# Patient Record
Sex: MALE | Race: WHITE | NOT HISPANIC OR LATINO | Employment: UNEMPLOYED | URBAN - METROPOLITAN AREA
[De-identification: names, ages, dates, MRNs, and addresses within clinical notes are randomized per-mention and may not be internally consistent; named-entity substitution may affect disease eponyms.]

---

## 2019-01-01 ENCOUNTER — APPOINTMENT (OUTPATIENT)
Dept: RADIOLOGY | Facility: MEDICAL CENTER | Age: 54
DRG: 291 | End: 2019-01-01
Attending: HOSPITALIST
Payer: COMMERCIAL

## 2019-01-01 ENCOUNTER — APPOINTMENT (OUTPATIENT)
Dept: CARDIOLOGY | Facility: MEDICAL CENTER | Age: 54
DRG: 291 | End: 2019-01-01
Attending: HOSPITALIST
Payer: COMMERCIAL

## 2019-01-01 ENCOUNTER — PATIENT OUTREACH (OUTPATIENT)
Dept: HEALTH INFORMATION MANAGEMENT | Facility: OTHER | Age: 54
End: 2019-01-01

## 2019-01-01 ENCOUNTER — HOSPITAL ENCOUNTER (OUTPATIENT)
Dept: RADIOLOGY | Facility: MEDICAL CENTER | Age: 54
End: 2019-12-01

## 2019-01-01 ENCOUNTER — HOSPITAL ENCOUNTER (OUTPATIENT)
Facility: MEDICAL CENTER | Age: 54
End: 2019-11-30
Payer: OTHER MISCELLANEOUS

## 2019-01-01 ENCOUNTER — HOSPITAL ENCOUNTER (INPATIENT)
Facility: MEDICAL CENTER | Age: 54
LOS: 6 days | DRG: 291 | End: 2019-12-07
Attending: EMERGENCY MEDICINE | Admitting: HOSPITALIST
Payer: COMMERCIAL

## 2019-01-01 ENCOUNTER — APPOINTMENT (OUTPATIENT)
Dept: RADIOLOGY | Facility: MEDICAL CENTER | Age: 54
DRG: 291 | End: 2019-01-01
Attending: EMERGENCY MEDICINE
Payer: COMMERCIAL

## 2019-01-01 VITALS
RESPIRATION RATE: 16 BRPM | BODY MASS INDEX: 24.94 KG/M2 | HEART RATE: 69 BPM | WEIGHT: 155.2 LBS | TEMPERATURE: 98.3 F | HEIGHT: 66 IN | DIASTOLIC BLOOD PRESSURE: 60 MMHG | SYSTOLIC BLOOD PRESSURE: 93 MMHG | OXYGEN SATURATION: 90 %

## 2019-01-01 LAB
ALBUMIN SERPL BCP-MCNC: 3.7 G/DL (ref 3.2–4.9)
ALBUMIN SERPL BCP-MCNC: 3.9 G/DL (ref 3.2–4.9)
ALBUMIN/GLOB SERPL: 1.3 G/DL
ALBUMIN/GLOB SERPL: 1.4 G/DL
ALP SERPL-CCNC: 60 U/L (ref 30–99)
ALP SERPL-CCNC: 69 U/L (ref 30–99)
ALT SERPL-CCNC: 11 U/L (ref 2–50)
ALT SERPL-CCNC: 13 U/L (ref 2–50)
ANION GAP SERPL CALC-SCNC: 3 MMOL/L (ref 0–11.9)
ANION GAP SERPL CALC-SCNC: 4 MMOL/L (ref 0–11.9)
ANION GAP SERPL CALC-SCNC: 4 MMOL/L (ref 0–11.9)
ANION GAP SERPL CALC-SCNC: 5 MMOL/L (ref 0–11.9)
ANION GAP SERPL CALC-SCNC: 6 MMOL/L (ref 0–11.9)
ANION GAP SERPL CALC-SCNC: 8 MMOL/L (ref 0–11.9)
ANISOCYTOSIS BLD QL SMEAR: ABNORMAL
ANISOCYTOSIS BLD QL SMEAR: ABNORMAL
AST SERPL-CCNC: 10 U/L (ref 12–45)
AST SERPL-CCNC: 9 U/L (ref 12–45)
BASE EXCESS BLDA CALC-SCNC: 15 MMOL/L (ref -4–3)
BASOPHILS # BLD AUTO: 0.3 % (ref 0–1.8)
BASOPHILS # BLD AUTO: 0.4 % (ref 0–1.8)
BASOPHILS # BLD AUTO: 0.5 % (ref 0–1.8)
BASOPHILS # BLD AUTO: 0.5 % (ref 0–1.8)
BASOPHILS # BLD: 0.03 K/UL (ref 0–0.12)
BASOPHILS # BLD: 0.04 K/UL (ref 0–0.12)
BILIRUB SERPL-MCNC: 0.6 MG/DL (ref 0.1–1.5)
BILIRUB SERPL-MCNC: 0.9 MG/DL (ref 0.1–1.5)
BODY TEMPERATURE: ABNORMAL CENTIGRADE
BUN SERPL-MCNC: 12 MG/DL (ref 8–22)
BUN SERPL-MCNC: 13 MG/DL (ref 8–22)
BUN SERPL-MCNC: 14 MG/DL (ref 8–22)
BUN SERPL-MCNC: 14 MG/DL (ref 8–22)
BUN SERPL-MCNC: 15 MG/DL (ref 8–22)
BUN SERPL-MCNC: 15 MG/DL (ref 8–22)
BUN SERPL-MCNC: 17 MG/DL (ref 8–22)
BUN SERPL-MCNC: 18 MG/DL (ref 8–22)
CALCIUM SERPL-MCNC: 8.2 MG/DL (ref 8.5–10.5)
CALCIUM SERPL-MCNC: 8.4 MG/DL (ref 8.5–10.5)
CALCIUM SERPL-MCNC: 8.4 MG/DL (ref 8.5–10.5)
CALCIUM SERPL-MCNC: 8.5 MG/DL (ref 8.5–10.5)
CALCIUM SERPL-MCNC: 8.6 MG/DL (ref 8.5–10.5)
CALCIUM SERPL-MCNC: 8.6 MG/DL (ref 8.5–10.5)
CALCIUM SERPL-MCNC: 8.7 MG/DL (ref 8.5–10.5)
CALCIUM SERPL-MCNC: 8.7 MG/DL (ref 8.5–10.5)
CHLORIDE SERPL-SCNC: 101 MMOL/L (ref 96–112)
CHLORIDE SERPL-SCNC: 84 MMOL/L (ref 96–112)
CHLORIDE SERPL-SCNC: 85 MMOL/L (ref 96–112)
CHLORIDE SERPL-SCNC: 89 MMOL/L (ref 96–112)
CHLORIDE SERPL-SCNC: 92 MMOL/L (ref 96–112)
CHLORIDE SERPL-SCNC: 93 MMOL/L (ref 96–112)
CHLORIDE SERPL-SCNC: 93 MMOL/L (ref 96–112)
CHLORIDE SERPL-SCNC: 98 MMOL/L (ref 96–112)
CO2 SERPL-SCNC: 31 MMOL/L (ref 20–33)
CO2 SERPL-SCNC: 32 MMOL/L (ref 20–33)
CO2 SERPL-SCNC: 36 MMOL/L (ref 20–33)
CO2 SERPL-SCNC: 38 MMOL/L (ref 20–33)
CO2 SERPL-SCNC: 39 MMOL/L (ref 20–33)
CO2 SERPL-SCNC: 42 MMOL/L (ref 20–33)
COMMENT 1642: NORMAL
COMMENT 1642: NORMAL
CREAT SERPL-MCNC: 0.59 MG/DL (ref 0.5–1.4)
CREAT SERPL-MCNC: 0.64 MG/DL (ref 0.5–1.4)
CREAT SERPL-MCNC: 0.64 MG/DL (ref 0.5–1.4)
CREAT SERPL-MCNC: 0.68 MG/DL (ref 0.5–1.4)
CREAT SERPL-MCNC: 0.72 MG/DL (ref 0.5–1.4)
CREAT SERPL-MCNC: 0.73 MG/DL (ref 0.5–1.4)
CREAT SERPL-MCNC: 0.81 MG/DL (ref 0.5–1.4)
CREAT SERPL-MCNC: 0.84 MG/DL (ref 0.5–1.4)
EKG IMPRESSION: NORMAL
EOSINOPHIL # BLD AUTO: 0.02 K/UL (ref 0–0.51)
EOSINOPHIL # BLD AUTO: 0.03 K/UL (ref 0–0.51)
EOSINOPHIL # BLD AUTO: 0.04 K/UL (ref 0–0.51)
EOSINOPHIL # BLD AUTO: 0.06 K/UL (ref 0–0.51)
EOSINOPHIL # BLD AUTO: 0.07 K/UL (ref 0–0.51)
EOSINOPHIL # BLD AUTO: 0.08 K/UL (ref 0–0.51)
EOSINOPHIL NFR BLD: 0.2 % (ref 0–6.9)
EOSINOPHIL NFR BLD: 0.3 % (ref 0–6.9)
EOSINOPHIL NFR BLD: 0.3 % (ref 0–6.9)
EOSINOPHIL NFR BLD: 0.8 % (ref 0–6.9)
EOSINOPHIL NFR BLD: 0.8 % (ref 0–6.9)
EOSINOPHIL NFR BLD: 1 % (ref 0–6.9)
ERYTHROCYTE [DISTWIDTH] IN BLOOD BY AUTOMATED COUNT: 52.4 FL (ref 35.9–50)
ERYTHROCYTE [DISTWIDTH] IN BLOOD BY AUTOMATED COUNT: 53 FL (ref 35.9–50)
ERYTHROCYTE [DISTWIDTH] IN BLOOD BY AUTOMATED COUNT: 53.8 FL (ref 35.9–50)
ERYTHROCYTE [DISTWIDTH] IN BLOOD BY AUTOMATED COUNT: 54.1 FL (ref 35.9–50)
ERYTHROCYTE [DISTWIDTH] IN BLOOD BY AUTOMATED COUNT: 55 FL (ref 35.9–50)
ERYTHROCYTE [DISTWIDTH] IN BLOOD BY AUTOMATED COUNT: 55.1 FL (ref 35.9–50)
ERYTHROCYTE [DISTWIDTH] IN BLOOD BY AUTOMATED COUNT: 55.8 FL (ref 35.9–50)
FERRITIN SERPL-MCNC: 57.4 NG/ML (ref 22–322)
GLOBULIN SER CALC-MCNC: 2.7 G/DL (ref 1.9–3.5)
GLOBULIN SER CALC-MCNC: 2.8 G/DL (ref 1.9–3.5)
GLUCOSE SERPL-MCNC: 101 MG/DL (ref 65–99)
GLUCOSE SERPL-MCNC: 107 MG/DL (ref 65–99)
GLUCOSE SERPL-MCNC: 110 MG/DL (ref 65–99)
GLUCOSE SERPL-MCNC: 124 MG/DL (ref 65–99)
GLUCOSE SERPL-MCNC: 90 MG/DL (ref 65–99)
GLUCOSE SERPL-MCNC: 96 MG/DL (ref 65–99)
GLUCOSE SERPL-MCNC: 97 MG/DL (ref 65–99)
GLUCOSE SERPL-MCNC: 98 MG/DL (ref 65–99)
HCO3 BLDA-SCNC: 41 MMOL/L (ref 17–25)
HCT VFR BLD AUTO: 31.2 % (ref 42–52)
HCT VFR BLD AUTO: 31.3 % (ref 42–52)
HCT VFR BLD AUTO: 31.5 % (ref 42–52)
HCT VFR BLD AUTO: 32.1 % (ref 42–52)
HCT VFR BLD AUTO: 32.5 % (ref 42–52)
HCT VFR BLD AUTO: 32.6 % (ref 42–52)
HCT VFR BLD AUTO: 34.4 % (ref 42–52)
HGB BLD-MCNC: 8.6 G/DL (ref 14–18)
HGB BLD-MCNC: 8.7 G/DL (ref 14–18)
HGB BLD-MCNC: 8.9 G/DL (ref 14–18)
HGB BLD-MCNC: 9.4 G/DL (ref 14–18)
HGB BLD-MCNC: 9.6 G/DL (ref 14–18)
HGB RETIC QN AUTO: 21.4 PG/CELL (ref 29–35)
HYPOCHROMIA BLD QL SMEAR: ABNORMAL
IMM GRANULOCYTES # BLD AUTO: 0.02 K/UL (ref 0–0.11)
IMM GRANULOCYTES # BLD AUTO: 0.02 K/UL (ref 0–0.11)
IMM GRANULOCYTES # BLD AUTO: 0.03 K/UL (ref 0–0.11)
IMM GRANULOCYTES # BLD AUTO: 0.04 K/UL (ref 0–0.11)
IMM GRANULOCYTES # BLD AUTO: 0.04 K/UL (ref 0–0.11)
IMM GRANULOCYTES # BLD AUTO: 0.05 K/UL (ref 0–0.11)
IMM GRANULOCYTES NFR BLD AUTO: 0.2 % (ref 0–0.9)
IMM GRANULOCYTES NFR BLD AUTO: 0.3 % (ref 0–0.9)
IMM GRANULOCYTES NFR BLD AUTO: 0.3 % (ref 0–0.9)
IMM GRANULOCYTES NFR BLD AUTO: 0.4 % (ref 0–0.9)
IMM GRANULOCYTES NFR BLD AUTO: 0.4 % (ref 0–0.9)
IMM GRANULOCYTES NFR BLD AUTO: 0.5 % (ref 0–0.9)
IMM RETICS NFR: 39.9 % (ref 9.3–17.4)
IRON SATN MFR SERPL: 4 % (ref 15–55)
IRON SERPL-MCNC: 17 UG/DL (ref 50–180)
LV EJECT FRACT  99904: 60
LV EJECT FRACT MOD 2C 99903: 56.82
LV EJECT FRACT MOD 4C 99902: 62.07
LV EJECT FRACT MOD BP 99901: 58.47
LYMPHOCYTES # BLD AUTO: 0.45 K/UL (ref 1–4.8)
LYMPHOCYTES # BLD AUTO: 0.63 K/UL (ref 1–4.8)
LYMPHOCYTES # BLD AUTO: 0.72 K/UL (ref 1–4.8)
LYMPHOCYTES # BLD AUTO: 0.73 K/UL (ref 1–4.8)
LYMPHOCYTES # BLD AUTO: 0.79 K/UL (ref 1–4.8)
LYMPHOCYTES # BLD AUTO: 0.9 K/UL (ref 1–4.8)
LYMPHOCYTES NFR BLD: 4.9 % (ref 22–41)
LYMPHOCYTES NFR BLD: 5.8 % (ref 22–41)
LYMPHOCYTES NFR BLD: 7.8 % (ref 22–41)
LYMPHOCYTES NFR BLD: 8.9 % (ref 22–41)
LYMPHOCYTES NFR BLD: 9.4 % (ref 22–41)
LYMPHOCYTES NFR BLD: 9.6 % (ref 22–41)
MAGNESIUM SERPL-MCNC: 1.9 MG/DL (ref 1.5–2.5)
MCH RBC QN AUTO: 21.3 PG (ref 27–33)
MCH RBC QN AUTO: 21.4 PG (ref 27–33)
MCH RBC QN AUTO: 21.4 PG (ref 27–33)
MCH RBC QN AUTO: 21.7 PG (ref 27–33)
MCH RBC QN AUTO: 21.8 PG (ref 27–33)
MCH RBC QN AUTO: 21.9 PG (ref 27–33)
MCH RBC QN AUTO: 22 PG (ref 27–33)
MCHC RBC AUTO-ENTMCNC: 27.3 G/DL (ref 33.7–35.3)
MCHC RBC AUTO-ENTMCNC: 27.3 G/DL (ref 33.7–35.3)
MCHC RBC AUTO-ENTMCNC: 27.7 G/DL (ref 33.7–35.3)
MCHC RBC AUTO-ENTMCNC: 27.8 G/DL (ref 33.7–35.3)
MCHC RBC AUTO-ENTMCNC: 27.9 G/DL (ref 33.7–35.3)
MCHC RBC AUTO-ENTMCNC: 28.5 G/DL (ref 33.7–35.3)
MCHC RBC AUTO-ENTMCNC: 28.9 G/DL (ref 33.7–35.3)
MCV RBC AUTO: 75.8 FL (ref 81.4–97.8)
MCV RBC AUTO: 76.3 FL (ref 81.4–97.8)
MCV RBC AUTO: 77.1 FL (ref 81.4–97.8)
MCV RBC AUTO: 77.7 FL (ref 81.4–97.8)
MCV RBC AUTO: 78.2 FL (ref 81.4–97.8)
MCV RBC AUTO: 78.6 FL (ref 81.4–97.8)
MCV RBC AUTO: 79.5 FL (ref 81.4–97.8)
MICROCYTES BLD QL SMEAR: ABNORMAL
MICROCYTES BLD QL SMEAR: ABNORMAL
MONOCYTES # BLD AUTO: 0.88 K/UL (ref 0–0.85)
MONOCYTES # BLD AUTO: 1 K/UL (ref 0–0.85)
MONOCYTES # BLD AUTO: 1.02 K/UL (ref 0–0.85)
MONOCYTES # BLD AUTO: 1.25 K/UL (ref 0–0.85)
MONOCYTES # BLD AUTO: 1.36 K/UL (ref 0–0.85)
MONOCYTES # BLD AUTO: 1.45 K/UL (ref 0–0.85)
MONOCYTES NFR BLD AUTO: 10.8 % (ref 0–13.4)
MONOCYTES NFR BLD AUTO: 12 % (ref 0–13.4)
MONOCYTES NFR BLD AUTO: 12.5 % (ref 0–13.4)
MONOCYTES NFR BLD AUTO: 12.6 % (ref 0–13.4)
MONOCYTES NFR BLD AUTO: 13.5 % (ref 0–13.4)
MONOCYTES NFR BLD AUTO: 13.7 % (ref 0–13.4)
MORPHOLOGY BLD-IMP: NORMAL
NEUTROPHILS # BLD AUTO: 5.62 K/UL (ref 1.82–7.42)
NEUTROPHILS # BLD AUTO: 6.42 K/UL (ref 1.82–7.42)
NEUTROPHILS # BLD AUTO: 6.44 K/UL (ref 1.82–7.42)
NEUTROPHILS # BLD AUTO: 7.48 K/UL (ref 1.82–7.42)
NEUTROPHILS # BLD AUTO: 8.79 K/UL (ref 1.82–7.42)
NEUTROPHILS # BLD AUTO: 9.06 K/UL (ref 1.82–7.42)
NEUTROPHILS NFR BLD: 75.2 % (ref 44–72)
NEUTROPHILS NFR BLD: 77.1 % (ref 44–72)
NEUTROPHILS NFR BLD: 78.4 % (ref 44–72)
NEUTROPHILS NFR BLD: 78.7 % (ref 44–72)
NEUTROPHILS NFR BLD: 80.6 % (ref 44–72)
NEUTROPHILS NFR BLD: 80.7 % (ref 44–72)
NRBC # BLD AUTO: 0 K/UL
NRBC BLD-RTO: 0 /100 WBC
NT-PROBNP SERPL IA-MCNC: 1322 PG/ML (ref 0–125)
NT-PROBNP SERPL IA-MCNC: 2392 PG/ML (ref 0–125)
NT-PROBNP SERPL IA-MCNC: 2651 PG/ML (ref 0–125)
OVALOCYTES BLD QL SMEAR: NORMAL
PCO2 BLDA: 59.4 MMHG (ref 26–37)
PH BLDA: 7.46 [PH] (ref 7.4–7.5)
PHOSPHATE SERPL-MCNC: 3.1 MG/DL (ref 2.5–4.5)
PLATELET # BLD AUTO: 304 K/UL (ref 164–446)
PLATELET # BLD AUTO: 307 K/UL (ref 164–446)
PLATELET # BLD AUTO: 329 K/UL (ref 164–446)
PLATELET # BLD AUTO: 330 K/UL (ref 164–446)
PLATELET # BLD AUTO: 339 K/UL (ref 164–446)
PLATELET # BLD AUTO: 361 K/UL (ref 164–446)
PLATELET # BLD AUTO: 374 K/UL (ref 164–446)
PLATELET BLD QL SMEAR: NORMAL
PMV BLD AUTO: 10.2 FL (ref 9–12.9)
PMV BLD AUTO: 9.6 FL (ref 9–12.9)
PMV BLD AUTO: 9.6 FL (ref 9–12.9)
PMV BLD AUTO: 9.8 FL (ref 9–12.9)
PMV BLD AUTO: 9.9 FL (ref 9–12.9)
PO2 BLDA: 63.6 MMHG (ref 64–87)
POIKILOCYTOSIS BLD QL SMEAR: NORMAL
POLYCHROMASIA BLD QL SMEAR: NORMAL
POTASSIUM SERPL-SCNC: 3.3 MMOL/L (ref 3.6–5.5)
POTASSIUM SERPL-SCNC: 3.4 MMOL/L (ref 3.6–5.5)
POTASSIUM SERPL-SCNC: 3.5 MMOL/L (ref 3.6–5.5)
POTASSIUM SERPL-SCNC: 3.7 MMOL/L (ref 3.6–5.5)
POTASSIUM SERPL-SCNC: 3.8 MMOL/L (ref 3.6–5.5)
POTASSIUM SERPL-SCNC: 3.9 MMOL/L (ref 3.6–5.5)
POTASSIUM SERPL-SCNC: 4 MMOL/L (ref 3.6–5.5)
POTASSIUM SERPL-SCNC: 4.1 MMOL/L (ref 3.6–5.5)
PROT SERPL-MCNC: 6.5 G/DL (ref 6–8.2)
PROT SERPL-MCNC: 6.6 G/DL (ref 6–8.2)
RBC # BLD AUTO: 4.01 M/UL (ref 4.7–6.1)
RBC # BLD AUTO: 4.04 M/UL (ref 4.7–6.1)
RBC # BLD AUTO: 4.06 M/UL (ref 4.7–6.1)
RBC # BLD AUTO: 4.09 M/UL (ref 4.7–6.1)
RBC # BLD AUTO: 4.17 M/UL (ref 4.7–6.1)
RBC # BLD AUTO: 4.29 M/UL (ref 4.7–6.1)
RBC # BLD AUTO: 4.43 M/UL (ref 4.7–6.1)
RBC BLD AUTO: PRESENT
RBC BLD AUTO: PRESENT
RETICS # AUTO: 0.06 M/UL (ref 0.04–0.06)
RETICS/RBC NFR: 1.5 % (ref 0.8–2.1)
SAO2 % BLDA: 88.2 % (ref 93–99)
SODIUM SERPL-SCNC: 127 MMOL/L (ref 135–145)
SODIUM SERPL-SCNC: 129 MMOL/L (ref 135–145)
SODIUM SERPL-SCNC: 129 MMOL/L (ref 135–145)
SODIUM SERPL-SCNC: 130 MMOL/L (ref 135–145)
SODIUM SERPL-SCNC: 131 MMOL/L (ref 135–145)
SODIUM SERPL-SCNC: 136 MMOL/L (ref 135–145)
SODIUM SERPL-SCNC: 137 MMOL/L (ref 135–145)
SODIUM SERPL-SCNC: 138 MMOL/L (ref 135–145)
TIBC SERPL-MCNC: 433 UG/DL (ref 250–450)
TROPONIN T SERPL-MCNC: 14 NG/L (ref 6–19)
WBC # BLD AUTO: 10.9 K/UL (ref 4.8–10.8)
WBC # BLD AUTO: 11.5 K/UL (ref 4.8–10.8)
WBC # BLD AUTO: 13.6 K/UL (ref 4.8–10.8)
WBC # BLD AUTO: 7.5 K/UL (ref 4.8–10.8)
WBC # BLD AUTO: 8.2 K/UL (ref 4.8–10.8)
WBC # BLD AUTO: 8.4 K/UL (ref 4.8–10.8)
WBC # BLD AUTO: 9.3 K/UL (ref 4.8–10.8)

## 2019-01-01 PROCEDURE — 700102 HCHG RX REV CODE 250 W/ 637 OVERRIDE(OP): Performed by: HOSPITALIST

## 2019-01-01 PROCEDURE — 36415 COLL VENOUS BLD VENIPUNCTURE: CPT

## 2019-01-01 PROCEDURE — 770020 HCHG ROOM/CARE - TELE (206)

## 2019-01-01 PROCEDURE — 82728 ASSAY OF FERRITIN: CPT

## 2019-01-01 PROCEDURE — 82803 BLOOD GASES ANY COMBINATION: CPT

## 2019-01-01 PROCEDURE — 3E02340 INTRODUCTION OF INFLUENZA VACCINE INTO MUSCLE, PERCUTANEOUS APPROACH: ICD-10-PCS | Performed by: HOSPITALIST

## 2019-01-01 PROCEDURE — A9270 NON-COVERED ITEM OR SERVICE: HCPCS | Performed by: HOSPITALIST

## 2019-01-01 PROCEDURE — 93005 ELECTROCARDIOGRAM TRACING: CPT | Performed by: EMERGENCY MEDICINE

## 2019-01-01 PROCEDURE — 85025 COMPLETE CBC W/AUTO DIFF WBC: CPT

## 2019-01-01 PROCEDURE — 80048 BASIC METABOLIC PNL TOTAL CA: CPT

## 2019-01-01 PROCEDURE — 700111 HCHG RX REV CODE 636 W/ 250 OVERRIDE (IP): Performed by: HOSPITALIST

## 2019-01-01 PROCEDURE — 84484 ASSAY OF TROPONIN QUANT: CPT

## 2019-01-01 PROCEDURE — 700105 HCHG RX REV CODE 258: Performed by: HOSPITALIST

## 2019-01-01 PROCEDURE — 80053 COMPREHEN METABOLIC PANEL: CPT

## 2019-01-01 PROCEDURE — 93306 TTE W/DOPPLER COMPLETE: CPT

## 2019-01-01 PROCEDURE — 71045 X-RAY EXAM CHEST 1 VIEW: CPT

## 2019-01-01 PROCEDURE — 700102 HCHG RX REV CODE 250 W/ 637 OVERRIDE(OP): Performed by: FAMILY MEDICINE

## 2019-01-01 PROCEDURE — 90686 IIV4 VACC NO PRSV 0.5 ML IM: CPT | Performed by: HOSPITALIST

## 2019-01-01 PROCEDURE — 84100 ASSAY OF PHOSPHORUS: CPT

## 2019-01-01 PROCEDURE — 83880 ASSAY OF NATRIURETIC PEPTIDE: CPT

## 2019-01-01 PROCEDURE — 99233 SBSQ HOSP IP/OBS HIGH 50: CPT | Performed by: HOSPITALIST

## 2019-01-01 PROCEDURE — 99233 SBSQ HOSP IP/OBS HIGH 50: CPT | Performed by: FAMILY MEDICINE

## 2019-01-01 PROCEDURE — 83540 ASSAY OF IRON: CPT

## 2019-01-01 PROCEDURE — 99223 1ST HOSP IP/OBS HIGH 75: CPT | Performed by: HOSPITALIST

## 2019-01-01 PROCEDURE — 99232 SBSQ HOSP IP/OBS MODERATE 35: CPT | Performed by: HOSPITALIST

## 2019-01-01 PROCEDURE — 93306 TTE W/DOPPLER COMPLETE: CPT | Mod: 26 | Performed by: INTERNAL MEDICINE

## 2019-01-01 PROCEDURE — 304561 HCHG STAT O2

## 2019-01-01 PROCEDURE — 97165 OT EVAL LOW COMPLEX 30 MIN: CPT

## 2019-01-01 PROCEDURE — 97161 PT EVAL LOW COMPLEX 20 MIN: CPT

## 2019-01-01 PROCEDURE — 85046 RETICYTE/HGB CONCENTRATE: CPT

## 2019-01-01 PROCEDURE — 99285 EMERGENCY DEPT VISIT HI MDM: CPT

## 2019-01-01 PROCEDURE — A9270 NON-COVERED ITEM OR SERVICE: HCPCS | Performed by: FAMILY MEDICINE

## 2019-01-01 PROCEDURE — 85027 COMPLETE CBC AUTOMATED: CPT

## 2019-01-01 PROCEDURE — 99239 HOSP IP/OBS DSCHRG MGMT >30: CPT | Performed by: HOSPITALIST

## 2019-01-01 PROCEDURE — 83735 ASSAY OF MAGNESIUM: CPT

## 2019-01-01 PROCEDURE — 90471 IMMUNIZATION ADMIN: CPT

## 2019-01-01 PROCEDURE — 83550 IRON BINDING TEST: CPT

## 2019-01-01 RX ORDER — ACETAZOLAMIDE 125 MG/1
125 TABLET ORAL
Status: DISPENSED | OUTPATIENT
Start: 2019-01-01 | End: 2019-01-01

## 2019-01-01 RX ORDER — BISACODYL 10 MG
10 SUPPOSITORY, RECTAL RECTAL
Status: DISCONTINUED | OUTPATIENT
Start: 2019-01-01 | End: 2019-01-01 | Stop reason: HOSPADM

## 2019-01-01 RX ORDER — METOLAZONE 5 MG/1
2.5 TABLET ORAL
Status: DISCONTINUED | OUTPATIENT
Start: 2019-01-01 | End: 2019-01-01

## 2019-01-01 RX ORDER — DULOXETIN HYDROCHLORIDE 60 MG/1
60 CAPSULE, DELAYED RELEASE ORAL
Status: DISCONTINUED | OUTPATIENT
Start: 2019-01-01 | End: 2019-01-01 | Stop reason: HOSPADM

## 2019-01-01 RX ORDER — POLYETHYLENE GLYCOL 3350 17 G/17G
1 POWDER, FOR SOLUTION ORAL
Status: DISCONTINUED | OUTPATIENT
Start: 2019-01-01 | End: 2019-01-01 | Stop reason: HOSPADM

## 2019-01-01 RX ORDER — POTASSIUM CHLORIDE 20 MEQ/1
20 TABLET, EXTENDED RELEASE ORAL DAILY
Status: DISCONTINUED | OUTPATIENT
Start: 2019-01-01 | End: 2019-01-01

## 2019-01-01 RX ORDER — POTASSIUM CHLORIDE 1.5 G/1.58G
20 POWDER, FOR SOLUTION ORAL DAILY
Start: 2019-01-01

## 2019-01-01 RX ORDER — PROMETHAZINE HYDROCHLORIDE 25 MG/1
12.5-25 SUPPOSITORY RECTAL EVERY 4 HOURS PRN
Status: DISCONTINUED | OUTPATIENT
Start: 2019-01-01 | End: 2019-01-01 | Stop reason: HOSPADM

## 2019-01-01 RX ORDER — ONDANSETRON 4 MG/1
4 TABLET, ORALLY DISINTEGRATING ORAL EVERY 4 HOURS PRN
Status: DISCONTINUED | OUTPATIENT
Start: 2019-01-01 | End: 2019-01-01 | Stop reason: HOSPADM

## 2019-01-01 RX ORDER — ONDANSETRON 2 MG/ML
4 INJECTION INTRAMUSCULAR; INTRAVENOUS EVERY 4 HOURS PRN
Status: DISCONTINUED | OUTPATIENT
Start: 2019-01-01 | End: 2019-01-01 | Stop reason: HOSPADM

## 2019-01-01 RX ORDER — OXYCODONE HYDROCHLORIDE 5 MG/1
5 TABLET ORAL
Status: DISCONTINUED | OUTPATIENT
Start: 2019-01-01 | End: 2019-01-01 | Stop reason: HOSPADM

## 2019-01-01 RX ORDER — AMIODARONE HYDROCHLORIDE 200 MG/1
200 TABLET ORAL DAILY
Status: DISCONTINUED | OUTPATIENT
Start: 2019-01-01 | End: 2019-01-01 | Stop reason: HOSPADM

## 2019-01-01 RX ORDER — POTASSIUM CHLORIDE 1.5 G/1.58G
20 POWDER, FOR SOLUTION ORAL 3 TIMES DAILY
Status: ON HOLD | COMMUNITY
End: 2019-01-01 | Stop reason: SDUPTHER

## 2019-01-01 RX ORDER — CALCITRIOL 0.5 UG/1
0.5 CAPSULE, LIQUID FILLED ORAL 2 TIMES DAILY
COMMUNITY

## 2019-01-01 RX ORDER — TAMSULOSIN HYDROCHLORIDE 0.4 MG/1
0.4 CAPSULE ORAL
COMMUNITY

## 2019-01-01 RX ORDER — TOPIRAMATE 25 MG/1
75 TABLET ORAL DAILY
COMMUNITY

## 2019-01-01 RX ORDER — AMOXICILLIN 250 MG
2 CAPSULE ORAL 2 TIMES DAILY
Status: DISCONTINUED | OUTPATIENT
Start: 2019-01-01 | End: 2019-01-01 | Stop reason: HOSPADM

## 2019-01-01 RX ORDER — PROMETHAZINE HYDROCHLORIDE 25 MG/1
12.5-25 TABLET ORAL EVERY 4 HOURS PRN
Status: DISCONTINUED | OUTPATIENT
Start: 2019-01-01 | End: 2019-01-01 | Stop reason: HOSPADM

## 2019-01-01 RX ORDER — ASCORBIC ACID 500 MG
500 TABLET ORAL 2 TIMES DAILY
COMMUNITY

## 2019-01-01 RX ORDER — FUROSEMIDE 10 MG/ML
40 INJECTION INTRAMUSCULAR; INTRAVENOUS
Status: DISCONTINUED | OUTPATIENT
Start: 2019-01-01 | End: 2019-01-01

## 2019-01-01 RX ORDER — MORPHINE SULFATE 4 MG/ML
2 INJECTION, SOLUTION INTRAMUSCULAR; INTRAVENOUS
Status: DISCONTINUED | OUTPATIENT
Start: 2019-01-01 | End: 2019-01-01 | Stop reason: HOSPADM

## 2019-01-01 RX ORDER — FUROSEMIDE 10 MG/ML
20 INJECTION INTRAMUSCULAR; INTRAVENOUS
Status: DISCONTINUED | OUTPATIENT
Start: 2019-01-01 | End: 2019-01-01

## 2019-01-01 RX ORDER — CALCITRIOL 0.25 UG/1
0.5 CAPSULE, LIQUID FILLED ORAL 2 TIMES DAILY
Status: DISCONTINUED | OUTPATIENT
Start: 2019-01-01 | End: 2019-01-01 | Stop reason: HOSPADM

## 2019-01-01 RX ORDER — CLONIDINE HYDROCHLORIDE 0.1 MG/1
0.1 TABLET ORAL EVERY 6 HOURS PRN
Status: DISCONTINUED | OUTPATIENT
Start: 2019-01-01 | End: 2019-01-01 | Stop reason: HOSPADM

## 2019-01-01 RX ORDER — FOLIC ACID 1 MG/1
1 TABLET ORAL DAILY
COMMUNITY

## 2019-01-01 RX ORDER — AMIODARONE HYDROCHLORIDE 200 MG/1
200 TABLET ORAL DAILY
COMMUNITY

## 2019-01-01 RX ORDER — FUROSEMIDE 40 MG/1
20 TABLET ORAL DAILY
Start: 2019-01-01

## 2019-01-01 RX ORDER — FERROUS SULFATE 325(65) MG
325 TABLET ORAL DAILY
Status: DISCONTINUED | OUTPATIENT
Start: 2019-01-01 | End: 2019-01-01 | Stop reason: HOSPADM

## 2019-01-01 RX ORDER — POTASSIUM CHLORIDE 20 MEQ/1
20 TABLET, EXTENDED RELEASE ORAL ONCE
Status: COMPLETED | OUTPATIENT
Start: 2019-01-01 | End: 2019-01-01

## 2019-01-01 RX ORDER — TAMSULOSIN HYDROCHLORIDE 0.4 MG/1
0.4 CAPSULE ORAL
Status: DISCONTINUED | OUTPATIENT
Start: 2019-01-01 | End: 2019-01-01 | Stop reason: HOSPADM

## 2019-01-01 RX ORDER — FUROSEMIDE 40 MG/1
40 TABLET ORAL DAILY
Status: ON HOLD | COMMUNITY
End: 2019-01-01 | Stop reason: SDUPTHER

## 2019-01-01 RX ORDER — FOLIC ACID 1 MG/1
1 TABLET ORAL DAILY
Status: DISCONTINUED | OUTPATIENT
Start: 2019-01-01 | End: 2019-01-01 | Stop reason: HOSPADM

## 2019-01-01 RX ORDER — ACETAMINOPHEN 325 MG/1
650 TABLET ORAL EVERY 6 HOURS PRN
Status: DISCONTINUED | OUTPATIENT
Start: 2019-01-01 | End: 2019-01-01 | Stop reason: HOSPADM

## 2019-01-01 RX ORDER — ACETAZOLAMIDE 125 MG/1
125 TABLET ORAL DAILY
Status: DISCONTINUED | OUTPATIENT
Start: 2019-01-01 | End: 2019-01-01 | Stop reason: HOSPADM

## 2019-01-01 RX ORDER — DULOXETIN HYDROCHLORIDE 60 MG/1
60 CAPSULE, DELAYED RELEASE ORAL
COMMUNITY

## 2019-01-01 RX ORDER — FERROUS SULFATE 325(65) MG
325 TABLET ORAL DAILY
COMMUNITY

## 2019-01-01 RX ORDER — PROCHLORPERAZINE EDISYLATE 5 MG/ML
5-10 INJECTION INTRAMUSCULAR; INTRAVENOUS EVERY 4 HOURS PRN
Status: DISCONTINUED | OUTPATIENT
Start: 2019-01-01 | End: 2019-01-01 | Stop reason: HOSPADM

## 2019-01-01 RX ORDER — OXYCODONE HYDROCHLORIDE 5 MG/1
2.5 TABLET ORAL
Status: DISCONTINUED | OUTPATIENT
Start: 2019-01-01 | End: 2019-01-01 | Stop reason: HOSPADM

## 2019-01-01 RX ORDER — OMEPRAZOLE 20 MG/1
20 CAPSULE, DELAYED RELEASE ORAL DAILY
Status: DISCONTINUED | OUTPATIENT
Start: 2019-01-01 | End: 2019-01-01 | Stop reason: HOSPADM

## 2019-01-01 RX ORDER — TOPIRAMATE 25 MG/1
75 TABLET ORAL DAILY
Status: DISCONTINUED | OUTPATIENT
Start: 2019-01-01 | End: 2019-01-01 | Stop reason: HOSPADM

## 2019-01-01 RX ORDER — POTASSIUM CHLORIDE 20 MEQ/1
40 TABLET, EXTENDED RELEASE ORAL DAILY
Status: DISCONTINUED | OUTPATIENT
Start: 2019-01-01 | End: 2019-01-01 | Stop reason: HOSPADM

## 2019-01-01 RX ADMIN — CALCITRIOL CAPSULES 0.25 MCG 0.5 MCG: 0.25 CAPSULE ORAL at 05:26

## 2019-01-01 RX ADMIN — POTASSIUM CHLORIDE 20 MEQ: 1500 TABLET, EXTENDED RELEASE ORAL at 17:59

## 2019-01-01 RX ADMIN — CALCITRIOL CAPSULES 0.25 MCG 0.5 MCG: 0.25 CAPSULE ORAL at 17:27

## 2019-01-01 RX ADMIN — FERROUS SULFATE TAB 325 MG (65 MG ELEMENTAL FE) 325 MG: 325 (65 FE) TAB at 05:00

## 2019-01-01 RX ADMIN — FUROSEMIDE 40 MG: 10 INJECTION, SOLUTION INTRAMUSCULAR; INTRAVENOUS at 05:23

## 2019-01-01 RX ADMIN — CALCITRIOL CAPSULES 0.25 MCG 0.5 MCG: 0.25 CAPSULE ORAL at 05:23

## 2019-01-01 RX ADMIN — METOPROLOL TARTRATE 25 MG: 25 TABLET, FILM COATED ORAL at 17:34

## 2019-01-01 RX ADMIN — RIVAROXABAN 20 MG: 20 TABLET, FILM COATED ORAL at 17:27

## 2019-01-01 RX ADMIN — DULOXETINE HYDROCHLORIDE 60 MG: 60 CAPSULE, DELAYED RELEASE ORAL at 21:06

## 2019-01-01 RX ADMIN — AMIODARONE HYDROCHLORIDE 200 MG: 200 TABLET ORAL at 05:46

## 2019-01-01 RX ADMIN — TOPIRAMATE 75 MG: 25 TABLET, FILM COATED ORAL at 06:07

## 2019-01-01 RX ADMIN — TAMSULOSIN HYDROCHLORIDE 0.4 MG: 0.4 CAPSULE ORAL at 21:05

## 2019-01-01 RX ADMIN — METOLAZONE 2.5 MG: 5 TABLET ORAL at 12:35

## 2019-01-01 RX ADMIN — TOPIRAMATE 75 MG: 25 TABLET, FILM COATED ORAL at 04:59

## 2019-01-01 RX ADMIN — METOPROLOL TARTRATE 25 MG: 25 TABLET, FILM COATED ORAL at 05:26

## 2019-01-01 RX ADMIN — SODIUM CHLORIDE 125 MG: 9 INJECTION, SOLUTION INTRAVENOUS at 06:38

## 2019-01-01 RX ADMIN — METOPROLOL TARTRATE 25 MG: 25 TABLET, FILM COATED ORAL at 17:11

## 2019-01-01 RX ADMIN — RIVAROXABAN 20 MG: 20 TABLET, FILM COATED ORAL at 17:59

## 2019-01-01 RX ADMIN — OMEPRAZOLE 20 MG: 20 CAPSULE, DELAYED RELEASE ORAL at 05:46

## 2019-01-01 RX ADMIN — AMIODARONE HYDROCHLORIDE 200 MG: 200 TABLET ORAL at 05:23

## 2019-01-01 RX ADMIN — CALCITRIOL CAPSULES 0.25 MCG 0.5 MCG: 0.25 CAPSULE ORAL at 05:00

## 2019-01-01 RX ADMIN — FERROUS SULFATE TAB 325 MG (65 MG ELEMENTAL FE) 325 MG: 325 (65 FE) TAB at 06:02

## 2019-01-01 RX ADMIN — ACETAMINOPHEN 650 MG: 325 TABLET, FILM COATED ORAL at 20:53

## 2019-01-01 RX ADMIN — SODIUM CHLORIDE 250 MG: 9 INJECTION, SOLUTION INTRAVENOUS at 06:00

## 2019-01-01 RX ADMIN — FERROUS SULFATE TAB 325 MG (65 MG ELEMENTAL FE) 325 MG: 325 (65 FE) TAB at 04:59

## 2019-01-01 RX ADMIN — AMIODARONE HYDROCHLORIDE 200 MG: 200 TABLET ORAL at 05:26

## 2019-01-01 RX ADMIN — DULOXETINE HYDROCHLORIDE 60 MG: 60 CAPSULE, DELAYED RELEASE ORAL at 19:25

## 2019-01-01 RX ADMIN — METOPROLOL TARTRATE 25 MG: 25 TABLET, FILM COATED ORAL at 17:04

## 2019-01-01 RX ADMIN — OMEPRAZOLE 20 MG: 20 CAPSULE, DELAYED RELEASE ORAL at 05:26

## 2019-01-01 RX ADMIN — METOPROLOL TARTRATE 25 MG: 25 TABLET, FILM COATED ORAL at 06:01

## 2019-01-01 RX ADMIN — METOPROLOL TARTRATE 25 MG: 25 TABLET, FILM COATED ORAL at 06:07

## 2019-01-01 RX ADMIN — METOLAZONE 2.5 MG: 5 TABLET ORAL at 06:06

## 2019-01-01 RX ADMIN — RIVAROXABAN 20 MG: 20 TABLET, FILM COATED ORAL at 17:04

## 2019-01-01 RX ADMIN — OMEPRAZOLE 20 MG: 20 CAPSULE, DELAYED RELEASE ORAL at 06:05

## 2019-01-01 RX ADMIN — ACETAMINOPHEN 650 MG: 325 TABLET, FILM COATED ORAL at 21:56

## 2019-01-01 RX ADMIN — METOPROLOL TARTRATE 25 MG: 25 TABLET, FILM COATED ORAL at 17:26

## 2019-01-01 RX ADMIN — POTASSIUM CHLORIDE 40 MEQ: 1500 TABLET, EXTENDED RELEASE ORAL at 05:46

## 2019-01-01 RX ADMIN — AMIODARONE HYDROCHLORIDE 200 MG: 200 TABLET ORAL at 04:59

## 2019-01-01 RX ADMIN — CALCITRIOL CAPSULES 0.25 MCG 0.5 MCG: 0.25 CAPSULE ORAL at 04:59

## 2019-01-01 RX ADMIN — OXYCODONE HYDROCHLORIDE 5 MG: 5 TABLET ORAL at 03:00

## 2019-01-01 RX ADMIN — FERROUS SULFATE TAB 325 MG (65 MG ELEMENTAL FE) 325 MG: 325 (65 FE) TAB at 05:23

## 2019-01-01 RX ADMIN — FUROSEMIDE 40 MG: 10 INJECTION, SOLUTION INTRAMUSCULAR; INTRAVENOUS at 17:12

## 2019-01-01 RX ADMIN — SODIUM CHLORIDE 125 MG: 9 INJECTION, SOLUTION INTRAVENOUS at 14:00

## 2019-01-01 RX ADMIN — FOLIC ACID 1 MG: 1 TABLET ORAL at 05:46

## 2019-01-01 RX ADMIN — SENNOSIDES AND DOCUSATE SODIUM 2 TABLET: 8.6; 5 TABLET ORAL at 05:25

## 2019-01-01 RX ADMIN — METOPROLOL TARTRATE 25 MG: 25 TABLET, FILM COATED ORAL at 05:24

## 2019-01-01 RX ADMIN — FOLIC ACID 1 MG: 1 TABLET ORAL at 05:00

## 2019-01-01 RX ADMIN — DULOXETINE HYDROCHLORIDE 60 MG: 60 CAPSULE, DELAYED RELEASE ORAL at 20:53

## 2019-01-01 RX ADMIN — FOLIC ACID 1 MG: 1 TABLET ORAL at 05:24

## 2019-01-01 RX ADMIN — DULOXETINE HYDROCHLORIDE 60 MG: 60 CAPSULE, DELAYED RELEASE ORAL at 03:00

## 2019-01-01 RX ADMIN — DULOXETINE HYDROCHLORIDE 60 MG: 60 CAPSULE, DELAYED RELEASE ORAL at 21:37

## 2019-01-01 RX ADMIN — TOPIRAMATE 75 MG: 25 TABLET, FILM COATED ORAL at 05:46

## 2019-01-01 RX ADMIN — OMEPRAZOLE 20 MG: 20 CAPSULE, DELAYED RELEASE ORAL at 05:23

## 2019-01-01 RX ADMIN — CALCITRIOL CAPSULES 0.25 MCG 0.5 MCG: 0.25 CAPSULE ORAL at 17:04

## 2019-01-01 RX ADMIN — INFLUENZA A VIRUS A/BRISBANE/02/2018 IVR-190 (H1N1) ANTIGEN (FORMALDEHYDE INACTIVATED), INFLUENZA A VIRUS A/KANSAS/14/2017 X-327 (H3N2) ANTIGEN (FORMALDEHYDE INACTIVATED), INFLUENZA B VIRUS B/PHUKET/3073/2013 ANTIGEN (FORMALDEHYDE INACTIVATED), AND INFLUENZA B VIRUS B/MARYLAND/15/2016 BX-69A ANTIGEN (FORMALDEHYDE INACTIVATED) 0.5 ML: 15; 15; 15; 15 INJECTION, SUSPENSION INTRAMUSCULAR at 06:36

## 2019-01-01 RX ADMIN — TOPIRAMATE 75 MG: 25 TABLET, FILM COATED ORAL at 05:24

## 2019-01-01 RX ADMIN — FUROSEMIDE 40 MG: 10 INJECTION, SOLUTION INTRAMUSCULAR; INTRAVENOUS at 03:00

## 2019-01-01 RX ADMIN — SODIUM CHLORIDE 250 MG: 9 INJECTION, SOLUTION INTRAVENOUS at 05:56

## 2019-01-01 RX ADMIN — TAMSULOSIN HYDROCHLORIDE 0.4 MG: 0.4 CAPSULE ORAL at 20:53

## 2019-01-01 RX ADMIN — TOPIRAMATE 75 MG: 25 TABLET, FILM COATED ORAL at 05:25

## 2019-01-01 RX ADMIN — TOPIRAMATE 75 MG: 25 TABLET, FILM COATED ORAL at 06:01

## 2019-01-01 RX ADMIN — TAMSULOSIN HYDROCHLORIDE 0.4 MG: 0.4 CAPSULE ORAL at 19:16

## 2019-01-01 RX ADMIN — RIVAROXABAN 20 MG: 20 TABLET, FILM COATED ORAL at 17:34

## 2019-01-01 RX ADMIN — OMEPRAZOLE 20 MG: 20 CAPSULE, DELAYED RELEASE ORAL at 05:00

## 2019-01-01 RX ADMIN — AMIODARONE HYDROCHLORIDE 200 MG: 200 TABLET ORAL at 06:02

## 2019-01-01 RX ADMIN — OMEPRAZOLE 20 MG: 20 CAPSULE, DELAYED RELEASE ORAL at 06:01

## 2019-01-01 RX ADMIN — CALCITRIOL CAPSULES 0.25 MCG 0.5 MCG: 0.25 CAPSULE ORAL at 17:34

## 2019-01-01 RX ADMIN — FUROSEMIDE 20 MG: 10 INJECTION, SOLUTION INTRAMUSCULAR; INTRAVENOUS at 05:27

## 2019-01-01 RX ADMIN — FOLIC ACID 1 MG: 1 TABLET ORAL at 06:06

## 2019-01-01 RX ADMIN — TAMSULOSIN HYDROCHLORIDE 0.4 MG: 0.4 CAPSULE ORAL at 22:31

## 2019-01-01 RX ADMIN — DULOXETINE HYDROCHLORIDE 60 MG: 60 CAPSULE, DELAYED RELEASE ORAL at 22:31

## 2019-01-01 RX ADMIN — FUROSEMIDE 40 MG: 10 INJECTION, SOLUTION INTRAMUSCULAR; INTRAVENOUS at 05:01

## 2019-01-01 RX ADMIN — FOLIC ACID 1 MG: 1 TABLET ORAL at 04:59

## 2019-01-01 RX ADMIN — RIVAROXABAN 20 MG: 20 TABLET, FILM COATED ORAL at 17:11

## 2019-01-01 RX ADMIN — FERROUS SULFATE TAB 325 MG (65 MG ELEMENTAL FE) 325 MG: 325 (65 FE) TAB at 05:45

## 2019-01-01 RX ADMIN — SENNOSIDES AND DOCUSATE SODIUM 2 TABLET: 8.6; 5 TABLET ORAL at 05:23

## 2019-01-01 RX ADMIN — AMIODARONE HYDROCHLORIDE 200 MG: 200 TABLET ORAL at 05:00

## 2019-01-01 RX ADMIN — FOLIC ACID 1 MG: 1 TABLET ORAL at 05:27

## 2019-01-01 RX ADMIN — ASPIRIN 81 MG: 81 TABLET, COATED ORAL at 06:01

## 2019-01-01 RX ADMIN — TOPIRAMATE 75 MG: 25 TABLET, FILM COATED ORAL at 05:14

## 2019-01-01 RX ADMIN — CALCITRIOL CAPSULES 0.25 MCG 0.5 MCG: 0.25 CAPSULE ORAL at 06:05

## 2019-01-01 RX ADMIN — CALCITRIOL CAPSULES 0.25 MCG 0.5 MCG: 0.25 CAPSULE ORAL at 06:01

## 2019-01-01 RX ADMIN — FUROSEMIDE 40 MG: 10 INJECTION, SOLUTION INTRAMUSCULAR; INTRAVENOUS at 06:08

## 2019-01-01 RX ADMIN — ACETAMINOPHEN 650 MG: 325 TABLET, FILM COATED ORAL at 17:27

## 2019-01-01 RX ADMIN — DULOXETINE HYDROCHLORIDE 60 MG: 60 CAPSULE, DELAYED RELEASE ORAL at 19:16

## 2019-01-01 RX ADMIN — ACETAZOLAMIDE 125 MG: 125 TABLET ORAL at 05:46

## 2019-01-01 RX ADMIN — FUROSEMIDE 40 MG: 10 INJECTION, SOLUTION INTRAMUSCULAR; INTRAVENOUS at 17:27

## 2019-01-01 RX ADMIN — TAMSULOSIN HYDROCHLORIDE 0.4 MG: 0.4 CAPSULE ORAL at 19:25

## 2019-01-01 RX ADMIN — FERROUS SULFATE TAB 325 MG (65 MG ELEMENTAL FE) 325 MG: 325 (65 FE) TAB at 05:26

## 2019-01-01 RX ADMIN — POTASSIUM CHLORIDE 20 MEQ: 1500 TABLET, EXTENDED RELEASE ORAL at 05:26

## 2019-01-01 RX ADMIN — TAMSULOSIN HYDROCHLORIDE 0.4 MG: 0.4 CAPSULE ORAL at 03:00

## 2019-01-01 RX ADMIN — ACETAZOLAMIDE 125 MG: 125 TABLET ORAL at 05:27

## 2019-01-01 RX ADMIN — TAMSULOSIN HYDROCHLORIDE 0.4 MG: 0.4 CAPSULE ORAL at 21:37

## 2019-01-01 RX ADMIN — ACETAZOLAMIDE 125 MG: 125 TABLET ORAL at 19:16

## 2019-01-01 RX ADMIN — ACETAMINOPHEN 650 MG: 325 TABLET, FILM COATED ORAL at 19:25

## 2019-01-01 RX ADMIN — POTASSIUM CHLORIDE 40 MEQ: 1500 TABLET, EXTENDED RELEASE ORAL at 04:59

## 2019-01-01 RX ADMIN — OMEPRAZOLE 20 MG: 20 CAPSULE, DELAYED RELEASE ORAL at 04:59

## 2019-01-01 RX ADMIN — CALCITRIOL CAPSULES 0.25 MCG 0.5 MCG: 0.25 CAPSULE ORAL at 05:46

## 2019-01-01 RX ADMIN — AMIODARONE HYDROCHLORIDE 200 MG: 200 TABLET ORAL at 06:05

## 2019-01-01 RX ADMIN — CALCITRIOL CAPSULES 0.25 MCG 0.5 MCG: 0.25 CAPSULE ORAL at 17:03

## 2019-01-01 RX ADMIN — METOPROLOL TARTRATE 25 MG: 25 TABLET, FILM COATED ORAL at 05:45

## 2019-01-01 RX ADMIN — FERROUS SULFATE TAB 325 MG (65 MG ELEMENTAL FE) 325 MG: 325 (65 FE) TAB at 06:07

## 2019-01-01 RX ADMIN — CALCITRIOL CAPSULES 0.25 MCG 0.5 MCG: 0.25 CAPSULE ORAL at 17:59

## 2019-01-01 RX ADMIN — FUROSEMIDE 40 MG: 10 INJECTION, SOLUTION INTRAMUSCULAR; INTRAVENOUS at 17:04

## 2019-01-01 RX ADMIN — METOPROLOL TARTRATE 25 MG: 25 TABLET, FILM COATED ORAL at 05:00

## 2019-01-01 RX ADMIN — FOLIC ACID 1 MG: 1 TABLET ORAL at 06:01

## 2019-01-01 RX ADMIN — CALCITRIOL CAPSULES 0.25 MCG 0.5 MCG: 0.25 CAPSULE ORAL at 17:11

## 2019-01-01 SDOH — ECONOMIC STABILITY: FOOD INSECURITY: WITHIN THE PAST 12 MONTHS, THE FOOD YOU BOUGHT JUST DIDN'T LAST AND YOU DIDN'T HAVE MONEY TO GET MORE.: SOMETIMES TRUE

## 2019-01-01 SDOH — ECONOMIC STABILITY: TRANSPORTATION INSECURITY
IN THE PAST 12 MONTHS, HAS LACK OF TRANSPORTATION KEPT YOU FROM MEETINGS, WORK, OR FROM GETTING THINGS NEEDED FOR DAILY LIVING?: NO

## 2019-01-01 SDOH — ECONOMIC STABILITY: INCOME INSECURITY: HOW HARD IS IT FOR YOU TO PAY FOR THE VERY BASICS LIKE FOOD, HOUSING, MEDICAL CARE, AND HEATING?: HARD

## 2019-01-01 SDOH — ECONOMIC STABILITY: FOOD INSECURITY: WITHIN THE PAST 12 MONTHS, YOU WORRIED THAT YOUR FOOD WOULD RUN OUT BEFORE YOU GOT MONEY TO BUY MORE.: SOMETIMES TRUE

## 2019-01-01 SDOH — ECONOMIC STABILITY: TRANSPORTATION INSECURITY
IN THE PAST 12 MONTHS, HAS THE LACK OF TRANSPORTATION KEPT YOU FROM MEDICAL APPOINTMENTS OR FROM GETTING MEDICATIONS?: NO

## 2019-01-01 ASSESSMENT — ENCOUNTER SYMPTOMS
SENSORY CHANGE: 0
CONSTITUTIONAL NEGATIVE: 1
COUGH: 0
FEVER: 0
FOCAL WEAKNESS: 0
DIAPHORESIS: 0
TREMORS: 0
ORTHOPNEA: 1
DEPRESSION: 0
FOCAL WEAKNESS: 0
WEAKNESS: 1
DIZZINESS: 0
WEAKNESS: 1
SHORTNESS OF BREATH: 1
PHOTOPHOBIA: 0
SPEECH CHANGE: 0
HALLUCINATIONS: 0
STRIDOR: 0
NECK PAIN: 0
WHEEZING: 0
DIARRHEA: 0
CHILLS: 0
PALPITATIONS: 0
HEADACHES: 0
PND: 1
BACK PAIN: 0
SPEECH CHANGE: 0
SHORTNESS OF BREATH: 1
DIARRHEA: 0
CHILLS: 0
SPEECH CHANGE: 0
FLANK PAIN: 0
SPEECH CHANGE: 0
DIAPHORESIS: 0
FEVER: 0
HEARTBURN: 0
EYE DISCHARGE: 0
ABDOMINAL PAIN: 0
SHORTNESS OF BREATH: 1
COUGH: 0
NECK PAIN: 0
VOMITING: 0
COUGH: 0
VOMITING: 0
TREMORS: 0
COUGH: 0
SENSORY CHANGE: 0
CHILLS: 0
ABDOMINAL PAIN: 0
FOCAL WEAKNESS: 0
PALPITATIONS: 0
BLURRED VISION: 0
PSYCHIATRIC NEGATIVE: 1
NEUROLOGICAL NEGATIVE: 1
MUSCULOSKELETAL NEGATIVE: 1
DOUBLE VISION: 0
BACK PAIN: 0
SHORTNESS OF BREATH: 1
SENSORY CHANGE: 0
ABDOMINAL PAIN: 0
FOCAL WEAKNESS: 0
BACK PAIN: 0
FLANK PAIN: 0
COUGH: 1
NECK PAIN: 0
PALPITATIONS: 0
WHEEZING: 0
WHEEZING: 0
NECK PAIN: 0
BACK PAIN: 0
DIARRHEA: 0
SHORTNESS OF BREATH: 1
WEAKNESS: 1
MYALGIAS: 0
FEVER: 0
SHORTNESS OF BREATH: 1
VOMITING: 0
DIAPHORESIS: 0
WHEEZING: 0
FEVER: 0
TINGLING: 0
FEVER: 0
GASTROINTESTINAL NEGATIVE: 1
STRIDOR: 0
DIAPHORESIS: 0
ABDOMINAL PAIN: 0
VOMITING: 0
SENSORY CHANGE: 0
CHILLS: 0
VOMITING: 0
NECK PAIN: 0
NAUSEA: 0
WEAKNESS: 1
EYE REDNESS: 0
CHILLS: 0
EYES NEGATIVE: 1
BACK PAIN: 0
TREMORS: 0
DIARRHEA: 0

## 2019-01-01 ASSESSMENT — CHA2DS2 SCORE
HYPERTENSION: NO
PRIOR STROKE OR TIA OR THROMBOEMBOLISM: NO
VASCULAR DISEASE: NO
DIABETES: NO
AGE 65 TO 74: NO
SEX: MALE
CHA2DS2 VASC SCORE: 1
AGE 75 OR GREATER: NO
CHF OR LEFT VENTRICULAR DYSFUNCTION: YES

## 2019-01-01 ASSESSMENT — COGNITIVE AND FUNCTIONAL STATUS - GENERAL
HELP NEEDED FOR BATHING: A LITTLE
TOILETING: A LITTLE
MOBILITY SCORE: 24
SUGGESTED CMS G CODE MODIFIER MOBILITY: CK
MOBILITY SCORE: 19
DRESSING REGULAR LOWER BODY CLOTHING: A LITTLE
STANDING UP FROM CHAIR USING ARMS: A LITTLE
DRESSING REGULAR UPPER BODY CLOTHING: A LITTLE
CLIMB 3 TO 5 STEPS WITH RAILING: A LITTLE
SUGGESTED CMS G CODE MODIFIER DAILY ACTIVITY: CJ
HELP NEEDED FOR BATHING: A LITTLE
DAILY ACTIVITIY SCORE: 21
MOVING FROM LYING ON BACK TO SITTING ON SIDE OF FLAT BED: A LITTLE
DRESSING REGULAR LOWER BODY CLOTHING: A LITTLE
SUGGESTED CMS G CODE MODIFIER MOBILITY: CH
WALKING IN HOSPITAL ROOM: A LITTLE
MOVING TO AND FROM BED TO CHAIR: A LITTLE
DAILY ACTIVITIY SCORE: 21
SUGGESTED CMS G CODE MODIFIER DAILY ACTIVITY: CJ

## 2019-01-01 ASSESSMENT — PATIENT HEALTH QUESTIONNAIRE - PHQ9
1. LITTLE INTEREST OR PLEASURE IN DOING THINGS: NOT AT ALL
SUM OF ALL RESPONSES TO PHQ9 QUESTIONS 1 AND 2: 0
SUM OF ALL RESPONSES TO PHQ9 QUESTIONS 1 AND 2: 0
2. FEELING DOWN, DEPRESSED, IRRITABLE, OR HOPELESS: NOT AT ALL
1. LITTLE INTEREST OR PLEASURE IN DOING THINGS: NOT AT ALL

## 2019-01-01 ASSESSMENT — LIFESTYLE VARIABLES
HAVE YOU EVER FELT YOU SHOULD CUT DOWN ON YOUR DRINKING: NO
HOW MANY TIMES IN THE PAST YEAR HAVE YOU HAD 5 OR MORE DRINKS IN A DAY: 0
TOTAL SCORE: 0
CONSUMPTION TOTAL: NEGATIVE
EVER HAD A DRINK FIRST THING IN THE MORNING TO STEADY YOUR NERVES TO GET RID OF A HANGOVER: NO
HAVE PEOPLE ANNOYED YOU BY CRITICIZING YOUR DRINKING: NO
TOTAL SCORE: 0
ON A TYPICAL DAY WHEN YOU DRINK ALCOHOL HOW MANY DRINKS DO YOU HAVE: 0
EVER FELT BAD OR GUILTY ABOUT YOUR DRINKING: NO
ALCOHOL_USE: NO
TOTAL SCORE: 0
EVER_SMOKED: YES
SUBSTANCE_ABUSE: 0
AVERAGE NUMBER OF DAYS PER WEEK YOU HAVE A DRINK CONTAINING ALCOHOL: 0

## 2019-01-01 ASSESSMENT — COPD QUESTIONNAIRES
DURING THE PAST 4 WEEKS HOW MUCH DID YOU FEEL SHORT OF BREATH: SOME OF THE TIME
HAVE YOU SMOKED AT LEAST 100 CIGARETTES IN YOUR ENTIRE LIFE: YES
IN THE PAST 12 MONTHS DO YOU DO LESS THAN YOU USED TO BECAUSE OF YOUR BREATHING PROBLEMS: AGREE
DO YOU EVER COUGH UP ANY MUCUS OR PHLEGM?: YES, EVERY DAY
COPD SCREENING SCORE: 7

## 2019-01-01 ASSESSMENT — GAIT ASSESSMENTS
GAIT LEVEL OF ASSIST: SUPERVISED
DISTANCE (FEET): 200
ASSISTIVE DEVICE: FRONT WHEEL WALKER

## 2019-01-01 ASSESSMENT — ACTIVITIES OF DAILY LIVING (ADL): TOILETING: INDEPENDENT

## 2019-12-01 PROBLEM — E66.3 OVERWEIGHT (BMI 25.0-29.9): Status: ACTIVE | Noted: 2019-01-01

## 2019-12-01 PROBLEM — I10 ESSENTIAL HYPERTENSION: Status: ACTIVE | Noted: 2019-01-01

## 2019-12-01 PROBLEM — I48.0 PAROXYSMAL ATRIAL FIBRILLATION (HCC): Status: ACTIVE | Noted: 2019-01-01

## 2019-12-01 PROBLEM — I50.9 CHF (CONGESTIVE HEART FAILURE) (HCC): Status: ACTIVE | Noted: 2019-01-01

## 2019-12-01 PROBLEM — F32.9 MAJOR DEPRESSION: Status: ACTIVE | Noted: 2019-01-01

## 2019-12-01 PROBLEM — D50.9 MICROCYTIC ANEMIA: Status: ACTIVE | Noted: 2019-01-01

## 2019-12-01 NOTE — PROGRESS NOTES
Received ED/ED transfer request from [place]  Sending Physician: Fidel  Diagnosis CHF Exacerbation  Patient Accepted by: ANDRIY

## 2019-12-01 NOTE — CARE PLAN
Problem: Safety  Goal: Will remain free from injury  Outcome: PROGRESSING AS EXPECTED  Goal: Will remain free from falls  Outcome: PROGRESSING AS EXPECTED   Fall precautions in place. Bed locked and in lowest position. Call light and personal belongings within reach. Pt verbalized understanding.   Problem: Knowledge Deficit  Goal: Knowledge of disease process/condition, treatment plan, diagnostic tests, and medications will improve  Outcome: PROGRESSING AS EXPECTED  Goal: Knowledge of the prescribed therapeutic regimen will improve  Outcome: PROGRESSING AS EXPECTED   Updated pt on POC.

## 2019-12-01 NOTE — PROGRESS NOTES
Assumed care of patient at bedside report from NOC RN. Updated on POC. Patient currently A & O x 4; on 2 L O2 nasal cannula; up standby assist; without complaints of acute pain. Call light within reach. Whiteboard updated. Fall precautions in place. Bed locked and in lowest position. All questions answered. No other needs indicated at this time.

## 2019-12-01 NOTE — PROGRESS NOTES
Received report from JOMAR Jon RN. Pt A&O x 4.  No report of pain except for headache, requesting medicaton. Pt transported via gurney with ACLS RN and zoll monitor on. Tele box on pt, pt's rhythm is A-fib (71).  Pt oriented to call light and unit routine, verbalized understanding. Pt transferred to unit with belongings. Call light and bedside table within reach. Fall precautions in place. Will continue to monitor.

## 2019-12-01 NOTE — ED NOTES
Pt being taken upstairs at this time by Milly ZELAYA via chandrika on cardiac monitor and oxygen via nasal cannula. Pt is awake and alert, talking to staff, in no apparent distress at time of transfer. Pt's paperwork and belongings sent upstairs with pt and RN.

## 2019-12-01 NOTE — PROGRESS NOTES
2 RN skin check completed with NATHALIE Weber.   Devices in place: VANGIE, NC.  Skin assessed under devices: yes.  Confirmed pressure ulcers found on: n/a.  New potential pressure ulcers noted on: none. Wound consult placed: no.  The following interventions in place: pt able to turn and reposition self in bed, moisturizer, clean and dry linen on hospital bed.    Bilat ears intact, pink and blanching.  Bilat elbows and sacrum intact and blanching.  Small scab noted to R great toe.   Bilat heels dry, callused, intact and blanching.

## 2019-12-01 NOTE — H&P
Hospital Medicine History & Physical Note    Date of Service  12/1/2019    Primary Care Physician  Shazia Flores M.D.    Consultants  None    Code Status  Full code     Chief Complaint  Swelling, Shortness of breath     History of Presenting Illness  54 y.o. male with history of atrial fibrillation on anticoagulation with rate control, essential hypertension with poor control, and depression on medical regimen, was in his usual state of health until approximately 3 days prior to admission.  He reports the onset of shortness of breath, breathing quickly as well.  This is made worse with any activity, and somewhat improved by rest.  He also reports the onset of increased lower extremity swelling.  Of note, prior to his arrival here, his physician had increased his Lasix regimen from 20 to 40 mg daily.  He currently denies headache or vision changes he has no chest pain, no abdominal pain, nausea vomiting diarrhea or constipation.  No other complaints.    Review of Systems  Review of Systems   Constitutional: Negative.    HENT: Negative.    Eyes: Negative.    Respiratory: Positive for shortness of breath.    Cardiovascular: Positive for leg swelling. Negative for chest pain.   Gastrointestinal: Negative.    Genitourinary: Negative.    Musculoskeletal: Negative.    Skin: Negative.    Neurological: Negative.    Endo/Heme/Allergies: Negative.    Psychiatric/Behavioral: Negative.        Past Medical History   has a past medical history of Atrial fibrillation (HCC), GERD (gastroesophageal reflux disease), Hypertension, and Psychiatric problem.    Surgical History   has a past surgical history that includes other orthopedic surgery and other abdominal surgery.     Family History  family history includes Cancer in his father and mother.     Social History   reports that he has quit smoking. He smoked 0.00 packs per day. He has never used smokeless tobacco. He reports current alcohol use. He reports previous drug  use.    Allergies  Allergies   Allergen Reactions   • Iron Dextran    • Keflex    • Penicillins        Medications  None       Physical Exam  Temp:  [37.8 °C (100 °F)] 37.8 °C (100 °F)  Pulse:  [81] 81  Resp:  [18] 18  BP: (121)/(63) 121/63  SpO2:  [94 %] 94 %    Physical Exam  Vitals signs and nursing note reviewed.   Constitutional:       General: He is not in acute distress.     Appearance: Normal appearance. He is not ill-appearing.   HENT:      Head: Normocephalic and atraumatic.      Nose: Nose normal.      Mouth/Throat:      Mouth: Mucous membranes are moist.      Pharynx: Oropharynx is clear. No oropharyngeal exudate or posterior oropharyngeal erythema.   Eyes:      Extraocular Movements: Extraocular movements intact.      Conjunctiva/sclera: Conjunctivae normal.      Pupils: Pupils are equal, round, and reactive to light.   Neck:      Musculoskeletal: Normal range of motion and neck supple. No muscular tenderness.   Cardiovascular:      Rate and Rhythm: Normal rate and regular rhythm.      Pulses: Normal pulses.      Heart sounds: Normal heart sounds. No murmur. No friction rub. No gallop.    Pulmonary:      Effort: Respiratory distress present.      Breath sounds: Rales present. No wheezing or rhonchi.   Abdominal:      General: Abdomen is flat. Bowel sounds are normal. There is no distension.      Palpations: Abdomen is soft. There is no mass.      Tenderness: There is no tenderness.   Musculoskeletal: Normal range of motion.         General: No swelling or deformity.      Right lower leg: Edema present.      Left lower leg: Edema present.   Lymphadenopathy:      Cervical: No cervical adenopathy.   Skin:     General: Skin is warm and dry.      Findings: Lesion present.   Neurological:      General: No focal deficit present.      Mental Status: He is alert and oriented to person, place, and time.      Cranial Nerves: No cranial nerve deficit.      Motor: No weakness.      Gait: Gait normal.   Psychiatric:          Mood and Affect: Mood normal.         Behavior: Behavior normal.         Laboratory:  Recent Labs     12/01/19 0001   WBC 11.5*   RBC 4.17*   HEMOGLOBIN 8.9*   HEMATOCRIT 32.6*   MCV 78.2*   MCH 21.3*   MCHC 27.3*   RDW 55.1*   PLATELETCT 339   MPV 9.6     Recent Labs     12/01/19 0001   SODIUM 138   POTASSIUM 4.0   CHLORIDE 101   CO2 31   GLUCOSE 107*   BUN 13   CREATININE 0.81   CALCIUM 8.4*     Recent Labs     12/01/19 0001   ALTSGPT 11   ASTSGOT 9*   ALKPHOSPHAT 69   TBILIRUBIN 0.6   GLUCOSE 107*         Recent Labs     12/01/19 0001   NTPROBNP 2392*         Recent Labs     12/01/19 0001   TROPONINT 14       Urinalysis:    No results found     Imaging:  Chest radiograph from outside hospital revealing interval worsening of severe interstitial prominence which may reflect worsening edema or atypical infection, questionable new small left pleural effusion, cardiomegaly and aortic atherosclerosis, bibasilar subsegmental atelectasis      Assessment/Plan:  I anticipate this patient will require at least two midnights for appropriate medical management, necessitating inpatient admission.    * CHF (congestive heart failure) (HCC)  Assessment & Plan  Suspected based on clinical and laboratory findings.  No current echocardiogram for review to evaluate ejection fraction.  Plan for formal echocardiogram, diuresis, close monitoring.  Continue BB as already taking.  No ACEI    Major depression  Assessment & Plan  Controlled with cymbalta which will be continued.     Chronic migraine  Assessment & Plan  Continue current medication regimen.      Essential hypertension  Assessment & Plan  Controlled with current medication regimen.  Monitor.     Overweight (BMI 25.0-29.9)  Assessment & Plan  Body mass index is 29.05 kg/m².      Paroxysmal atrial fibrillation (HCC)  Assessment & Plan  On anticoagulation with rate controlled.      Iron deficiency anemia  Assessment & Plan  Monitor.  No current evidence of bleed.  Transfuse if needed for hemoglobin less than 7 gm/dL        VTE prophylaxis: sCD, xarelto    No respiratory distress. Crackles bilat R>L

## 2019-12-01 NOTE — ED PROVIDER NOTES
ED Provider Note    CHIEF COMPLAINT  Chief Complaint   Patient presents with   • Shortness of Breath       HPI  Jas Vann is a 54 y.o. male who presents as a transfer from a skilled nursing facility at [facility].  Patient has had worsening shortness of breath.  Worsening lower extremity edema.  Was worked up and found to have evidence of CHF exacerbation.  He was on supplemental oxygen in the emergency department to maintain adequate oxygenation.  Patient denies chronic supplemental oxygen use.  Has a history of chronic tobacco abuse.  Also has a history of atrial fibrillation on Xarelto.    There was a report that the patient had hemoptysis at the outside hospital.  D-dimer was negative.  No obvious coagulopathy was identified however the patient is on chronic anticoagulation.  No repeat bouts of hemoptysis.    Patient denies chest pain.  Has exertional dyspnea and occasional cough.  No fevers.    REVIEW OF SYSTEMS  See HPI for further details. All other systems are negative.     PAST MEDICAL HISTORY   has a past medical history of Atrial fibrillation (HCC), GERD (gastroesophageal reflux disease), Hypertension, and Psychiatric problem.    SOCIAL HISTORY  Social History     Tobacco Use   • Smoking status: Former Smoker     Packs/day: 0.00   • Smokeless tobacco: Never Used   Substance and Sexual Activity   • Alcohol use: Yes   • Drug use: Not Currently   • Sexual activity: Not on file       SURGICAL HISTORY   has a past surgical history that includes other orthopedic surgery and other abdominal surgery.    CURRENT MEDICATIONS  Home Medications     Reviewed by Michelle Bernard R.N. (Registered Nurse) on 12/01/19 at 0255  Med List Status: Complete   Medication Last Dose Status   amiodarone (CORDARONE) 200 MG Tab  Active   ascorbic acid (ASCORBIC ACID) 500 MG Tab  Active   aspirin EC (ECOTRIN) 81 MG Tablet Delayed Response  Active   calcitRIOL (ROCALTROL) 0.5 MCG Cap  Active   DULoxetine  "(CYMBALTA) 60 MG Cap DR Particles delayed-release capsule  Active   esomeprazole (NEXIUM) 20 MG capsule  Active   ferrous sulfate 325 (65 Fe) MG tablet  Active   folic acid (FOLVITE) 1 MG Tab  Active   furosemide (LASIX) 40 MG Tab  Active   metoprolol (LOPRESSOR) 25 MG Tab  Active   potassium chloride (KLOR-CON) 20 MEQ Pack  Active   rivaroxaban (XARELTO) 20 MG Tab tablet  Active   tamsulosin (FLOMAX) 0.4 MG capsule  Active   topiramate (TOPAMAX) 25 MG Tab  Active   vitamin D (CHOLECALCIFEROL) 1000 UNIT Tab  Active                ALLERGIES  Allergies   Allergen Reactions   • Iron Dextran    • Keflex    • Penicillins        PHYSICAL EXAM  VITAL SIGNS: /63   Pulse 81   Temp 37.8 °C (100 °F) (Temporal)   Resp 18   Ht 1.676 m (5' 6\")   Wt 81.6 kg (180 lb)   SpO2 94%   BMI 29.05 kg/m²   Pulse ox interpretation: I interpret this pulse ox as normal.  Constitutional: Alert in no apparent distress.  HENT: No signs of trauma, Bilateral external ears normal, Nose normal.   Eyes: Pupils are equal and reactive, Conjunctiva normal, Non-icteric.   Neck: Normal range of motion, No tenderness, Supple, No stridor.   Cardiovascular: Regular rate and rhythm.   Thorax & Lungs: Bibasilar rales, No respiratory distress, No chest tenderness.   Abdomen: Bowel sounds normal, Soft, No tenderness, No masses, No pulsatile masses. No peritoneal signs.  Skin: Warm, Dry, No erythema, No rash.   Back: No bony tenderness, No CVA tenderness.   Extremities: Intact distal pulses, 2+ lower extremity edema, No tenderness, No cyanosis  Musculoskeletal: Good range of motion in all major joints. No tenderness to palpation or major deformities noted.   Neurologic: Alert, Normal motor function and gait, Normal sensory function, No focal deficits noted.       DIAGNOSTIC STUDIES / PROCEDURES    EKG - Physician interpretation  Results for orders placed or performed during the hospital encounter of 11/30/19   EKG   Result Value Ref Range    Report   "     [facility].    Test Date:  2019  Pt Name:    CHARLENE GONZALEZ               Department: ER  MRN:        [id number]                      Room:       BL 21  Gender:     Male                         Technician: [id number]  :        1965                   Requested By:WILL WISEMAN  Order #:    [id number]                    Reading MD: WILL WISEMAN MD    Measurements  Intervals                                Axis  Rate:       77                           P:  MD:                                      QRS:        49  QRSD:       92                           T:          85  QT:         380  QTc:        431    Interpretive Statements  ATRIAL FIBRILLATION, V-RATE    RSR' IN V1 OR V2, PROBABLY NORMAL VARIANT  No previous ECG available for comparison  Electronically Signed On 2019 1:15:45 PST by WILL WISEMAN MD           LABS  Labs Reviewed   CBC WITH DIFFERENTIAL - Abnormal; Notable for the following components:       Result Value    WBC 11.5 (*)     RBC 4.17 (*)     Hemoglobin 8.9 (*)     Hematocrit 32.6 (*)     MCV 78.2 (*)     MCH 21.3 (*)     MCHC 27.3 (*)     RDW 55.1 (*)     Neutrophils-Polys 78.70 (*)     Lymphocytes 7.80 (*)     Neutrophils (Absolute) 9.06 (*)     Lymphs (Absolute) 0.90 (*)     Monos (Absolute) 1.45 (*)     All other components within normal limits   COMP METABOLIC PANEL - Abnormal; Notable for the following components:    Glucose 107 (*)     Calcium 8.4 (*)     AST(SGOT) 9 (*)     All other components within normal limits   PROBRAIN NATRIURETIC PEPTIDE, NT - Abnormal; Notable for the following components:    NT-proBNP 2392 (*)     All other components within normal limits   TROPONIN   PERIPHERAL SMEAR REVIEW   MORPHOLOGY   DIFFERENTIAL COMMENT   ESTIMATED GFR         RADIOLOGY  DX-CHEST-PORTABLE (1 VIEW)   Final Result      1.  Enlarged cardiac silhouette with changes most consistent with pulmonary edema.      EC-ECHOCARDIOGRAM COMPLETE W/O CONT     (Results Pending)         COURSE & MEDICAL DECISION MAKING    Medications   amiodarone (CORDARONE) tablet 200 mg (has no administration in time range)   aspirin EC (ECOTRIN) tablet 81 mg (has no administration in time range)   calcitRIOL (ROCALTROL) capsule 0.5 mcg (has no administration in time range)   DULoxetine (CYMBALTA) capsule 60 mg (60 mg Oral Given 12/1/19 0300)   omeprazole (PRILOSEC) capsule 20 mg (has no administration in time range)   ferrous sulfate tablet 325 mg (has no administration in time range)   folic acid (FOLVITE) tablet 1 mg (has no administration in time range)   metoprolol (LOPRESSOR) tablet 25 mg (has no administration in time range)   rivaroxaban (XARELTO) tablet 20 mg (has no administration in time range)   tamsulosin (FLOMAX) capsule 0.4 mg (0.4 mg Oral Given 12/1/19 0300)   topiramate (TOPAMAX) tablet 75 mg (has no administration in time range)   acetaminophen (TYLENOL) tablet 650 mg (has no administration in time range)   Pharmacy Consult Request ...Pain Management Review 1 Each (has no administration in time range)     And   oxyCODONE immediate-release (ROXICODONE) tablet 2.5 mg (has no administration in time range)     And   oxyCODONE immediate-release (ROXICODONE) tablet 5 mg (5 mg Oral Given 12/1/19 0300)     And   morphine (pf) 4 MG/ML injection 2 mg (has no administration in time range)   cloNIDine (CATAPRES) tablet 0.1 mg (has no administration in time range)   ondansetron (ZOFRAN) syringe/vial injection 4 mg (has no administration in time range)   ondansetron (ZOFRAN ODT) dispertab 4 mg (has no administration in time range)   promethazine (PHENERGAN) tablet 12.5-25 mg (has no administration in time range)   promethazine (PHENERGAN) suppository 12.5-25 mg (has no administration in time range)   prochlorperazine (COMPAZINE) injection 5-10 mg (has no administration in time range)   senna-docusate (PERICOLACE or SENOKOT S) 8.6-50 MG per tablet 2 Tab (has no administration in time  "range)     And   polyethylene glycol/lytes (MIRALAX) PACKET 1 Packet (has no administration in time range)     And   magnesium hydroxide (MILK OF MAGNESIA) suspension 30 mL (has no administration in time range)     And   bisacodyl (DULCOLAX) suppository 10 mg (has no administration in time range)   furosemide (LASIX) injection 40 mg (40 mg Intravenous Given 12/1/19 0300)   influenza vaccine quad injection 0.5 mL (has no administration in time range)       Pertinent Labs & Imaging studies reviewed. (See chart for details)  54 y.o. male presenting from a skilled nursing facility with symptoms concerning for CHF exacerbation.  Received Lasix prior to transfer.  Continues to require supplemental oxygen.  Does not appear to be in extremis at this time.  No indication for intubation or BiPAP.  Is doing well with simple nasal cannula oxygen.  Has elevated BNP, lower extremity edema, pulmonary edema on x-ray.    Spoke with the admitting hospitalist, Dr. Hartmann who is agreeable to hospitalization.      /69   Pulse 62   Temp 37.2 °C (99 °F) (Temporal)   Resp 18   Ht 1.676 m (5' 6\")   Wt 81.3 kg (179 lb 3.7 oz)   SpO2 97%   BMI 28.93 kg/m²       FINAL IMPRESSION  Congestive heart failure      Electronically signed by: Kiran Mendez, 12/1/2019 12:02 AM    "

## 2019-12-01 NOTE — ASSESSMENT & PLAN NOTE
Rate controlled   Monitor telemetry  Continue with amiodarone, metoprolol  Xarelto for stroke risk reduction  Monitor telemetry

## 2019-12-01 NOTE — ASSESSMENT & PLAN NOTE
Hemoglobin improved  Recommend outpatient follow-up with GI to work-up GI bleed  Follow-up hemoglobin - transfuse if hemoglobin less than 7   IV Ferrlecit  No symptoms of bleeding, monitor

## 2019-12-01 NOTE — ASSESSMENT & PLAN NOTE
Likely right-sided heart failure.  With dyspnea, lower extremity edema-improved symptoms  Echocardiogram showed normal ejection fraction with severely dilated right ventricle and severe pulmonary hypertension.  Increasing met alk, low BP- ? Contraction alkalosis component .  Lasix stopped.  Continue low-dose Diamox  Strict I's and O's.  Daily weights.  Monitor BMP

## 2019-12-01 NOTE — CARE PLAN
Problem: Communication  Goal: The ability to communicate needs accurately and effectively will improve  Outcome: PROGRESSING AS EXPECTED   Call light within reach. Educated pt to use call light as needed. Reorient and re-educate as needed. Continue to monitor.    Problem: Respiratory:  Goal: Respiratory status will improve  Outcome: PROGRESSING AS EXPECTED   Assess and monitor pulmonary status. Adjust oxygen as needed to maintain adequate saturation. Encourage ambulation, turning, coughing and deep breathing. Continue to monitor.

## 2019-12-01 NOTE — PROGRESS NOTES
Patient coughed up small amount of thick, dark red sputum. Dr. Hartmann notified and made aware as patient is on Xarelto. Continue to monitor per Dr. Hartmann at this time.

## 2019-12-02 PROBLEM — I27.20 PULMONARY HYPERTENSION (HCC): Status: ACTIVE | Noted: 2019-01-01

## 2019-12-02 NOTE — CARE PLAN
Problem: Bowel/Gastric:  Goal: Normal bowel function is maintained or improved  Outcome: PROGRESSING AS EXPECTED   Assess BM pattern. Educate and encourage adequate fluid intake, diet and medication use to promote BM. Re-educate and reinforce as needed. Continue to monitor.     Problem: Pain Management  Goal: Pain level will decrease to patient's comfort goal  Outcome: PROGRESSING AS EXPECTED   Assess and monitor for pain. Provide pharmacological and non-pharmacological interventions as appropriate. Re-evaluate and continue to monitor.     Problem: Communication  Goal: The ability to communicate needs accurately and effectively will improve  Outcome: PROGRESSING SLOWER THAN EXPECTED   Call light within reach. Educated pt to use call light as needed. Reorient and re-educate as needed. Continue to monitor.    Problem: Knowledge Deficit  Goal: Knowledge of disease process/condition, treatment plan, diagnostic tests, and medications will improve  Outcome: PROGRESSING SLOWER THAN EXPECTED   Discuss and review POC with patient/family. Re-educate as needed.

## 2019-12-02 NOTE — PROGRESS NOTES
This is a 54 years old male with past medical history of A. fib on Xarelto comes in with shortness of breath and swelling on the lower extremities.  Started on diuretics.  Echocardiogram ordered.  Further recommendations to follow.

## 2019-12-02 NOTE — PROGRESS NOTES
Bedside report received. No overnight events per night RN. Patient A&O x 4. VS'S. Currently requiring 5L via NC. Crackles throughout. BLE 2+ pitting edema. No complaints of pain at this time. POC discussed with patient. Pt verbalizes understanding. Call light and belongings with in reach. Bed locked and in lowest position, alarm and fall precautions in place.

## 2019-12-02 NOTE — PROGRESS NOTES
[facility] Medicine Daily Progress Note    Date of Service  12/2/2019    Chief Complaint  54 y.o. male admitted 11/30/2019 with shortness of breath and peripheral edema.    Hospital Course  This is a 54 years old male with past medical history of paroxysmal A. fib on Xarelto, and history of congestive heart failure comes in with shortness of breath and swelling on the lower extremities.  Started on diuretics.  Echocardiogram was ordered and showed normal ejection fraction with severely dilated right ventricle and severe pulmonary hypertension.  It also showed severe biatrial enlargement.  Interval Problem Update  Resting comfortably in chair.  Respiratory status stable.  Swelling on the lower extremities slightly improved.  Hemodynamically stable.  Denies any chest pain.  No acute distress noted.    Consultants/Specialty  None    Code Status  Full code    Disposition  Back to skilled nursing facility once medically cleared.    Review of Systems  Review of Systems   Constitutional: Positive for malaise/fatigue. Negative for chills and fever.   HENT: Negative for ear pain, hearing loss and tinnitus.    Eyes: Negative for blurred vision, double vision and photophobia.   Respiratory: Positive for cough and shortness of breath.    Cardiovascular: Positive for orthopnea, leg swelling and PND. Negative for chest pain and palpitations.   Gastrointestinal: Negative for heartburn, nausea and vomiting.   Genitourinary: Negative for dysuria and urgency.   Musculoskeletal: Negative for back pain, myalgias and neck pain.   Skin: Negative for rash.   Neurological: Negative for dizziness, tingling and headaches.   Psychiatric/Behavioral: Negative for depression and suicidal ideas.        Physical Exam  Temp:  [36.4 °C (97.6 °F)-37 °C (98.6 °F)] 36.6 °C (97.9 °F)  Pulse:  [75-89] 75  Resp:  [15-16] 16  BP: (107-125)/(59-76) 107/59  SpO2:  [90 %-95 %] 93 %    Physical Exam  Constitutional:       Appearance: Normal appearance.   HENT:       Head: Normocephalic and atraumatic.   Eyes:      Extraocular Movements: Extraocular movements intact.      Pupils: Pupils are equal, round, and reactive to light.   Neck:      Musculoskeletal: No neck rigidity or muscular tenderness.   Cardiovascular:      Rate and Rhythm: Normal rate and regular rhythm.      Heart sounds: No friction rub. No gallop.    Pulmonary:      Effort: Pulmonary effort is normal.      Breath sounds: Examination of the right-lower field reveals rales. Examination of the left-lower field reveals rales. Decreased breath sounds and rales present. No wheezing.   Abdominal:      General: There is no distension.      Palpations: There is no mass.   Musculoskeletal:         General: Swelling present. No tenderness.      Right lower leg: Edema present.      Left lower leg: Edema present.   Skin:     Coloration: Skin is not jaundiced or pale.   Neurological:      General: No focal deficit present.      Mental Status: He is alert and oriented to person, place, and time.   Psychiatric:         Mood and Affect: Mood normal.         Behavior: Behavior normal.         Fluids    Intake/Output Summary (Last 24 hours) at 12/2/2019 1533  Last data filed at 12/2/2019 0528  Gross per 24 hour   Intake 560 ml   Output 1275 ml   Net -715 ml       Laboratory  Recent Labs     12/01/19  0001 12/02/19  0159   WBC 11.5* 9.3   RBC 4.17* 4.04*   HEMOGLOBIN 8.9* 8.9*   HEMATOCRIT 32.6* 32.1*   MCV 78.2* 79.5*   MCH 21.3* 22.0*   MCHC 27.3* 27.7*   RDW 55.1* 55.8*   PLATELETCT 339 304   MPV 9.6 10.2     Recent Labs     12/01/19  0001 12/02/19  0159   SODIUM 138 137   POTASSIUM 4.0 3.9   CHLORIDE 101 98   CO2 31 36*   GLUCOSE 107* 124*   BUN 13 12   CREATININE 0.81 0.72   CALCIUM 8.4* 8.5                   Imaging  EC-ECHOCARDIOGRAM COMPLETE W/O CONT   Final Result      OUTSIDE IMAGES-DX CHEST   Final Result      ZX-VZNZHPO-RROLOLD FILM X-RAY   Final Result      DX-CHEST-PORTABLE (1 VIEW)   Final Result      1.   Enlarged cardiac silhouette with changes most consistent with pulmonary edema.           Assessment/Plan  * CHF (congestive heart failure) (HCC)  Assessment & Plan  Likely right-sided heart failure.  Echocardiogram showed normal ejection fraction with severely dilated right ventricle and severe pulmonary hypertension.  Continue with forced diuresis.  Strict I's and O's.  Daily weights.      Pulmonary hypertension (HCC)- (present on admission)  Assessment & Plan  Severe.  Continue with forced diuresis.  Will likely need home oxygen.    Major depression  Assessment & Plan  Controlled with cymbalta which will be continued.     Chronic migraine  Assessment & Plan  Continue current medication regimen.      Essential hypertension  Assessment & Plan  Controlled with current medication regimen.  Monitor.     Overweight (BMI 25.0-29.9)  Assessment & Plan  Body mass index is 29.05 kg/m².      Paroxysmal atrial fibrillation (HCC)  Assessment & Plan  Continue metoprolol, amiodarone, and Xarelto.  Rate is controlled.    Iron deficiency anemia  Assessment & Plan  H&H has been stable.  Iron studies pending.  Need work-up for possible GI bleed as an outpatient.         VTE prophylaxis: Xarelto

## 2019-12-03 PROBLEM — J96.01 ACUTE RESPIRATORY FAILURE WITH HYPOXIA (HCC): Status: ACTIVE | Noted: 2019-01-01

## 2019-12-03 NOTE — PROGRESS NOTES
Patient diagnosed with acutely decompensated right heart failure by hospital medicine, this is a new diagnosis for the patient. Please see below for measures that must be addressed prior to discharge.     Daily Nurse: please begin to fill out the HF checklist (pink sheet in hard chart) and use it to guide your daily care.    Discharge Nurse: please ensure completeness of the HF checklist (pink sheet in hard chart) and have it co-signed by the charge RN before the patient leaves the hospital.    Thank you, Genesis, Cardiovascular Nurse Navigator, RN, [facility] [phone]    HF Measures:  1. Documentation of LV systolic function (echo or cath) PTA, during this hospitalization, or plan to assess post discharge or reason for not assessing documented.  2. ACE-I, ARNI or ARB prescribed on discharge for LVEF <40%  3. For HF patients with LVEF less than or equal to 40% evidence based beta blocker must be prescribed upon discharge one of the following: carvedilol, bisoprolol, Toprol XL  4. For EF less than or equal to 35% aldosterone blockade prescribed upon discharge  5. Nutrition consult for diet education  6. HF education documented daily  7. Screening for and administering immunizations as long as no contraindications: Pneumonia and Influenza  8. Written discharge instructions include:  ? Daily weights  ? Record weight on tracker  ? Bring tracker to appointments  ? Call MD for weight gain of 3lb /day or 5lb/week  ? HF medication teaching  ? Low sodium diet  ? Follow up appointment within seven calendar days of d/c must include: date, time and location  ? Activity  ? Worsening symptoms  What if any of the above HF measures are contraindicated?  ? Request that the discharging provider document the medication/intervention and the contraindication specifically in a progress note  ? For example: “no CHF meds due to hypotension” is not enough. It needs to say: “No ACE-I, ARNI, ARB due to hypotension”; “No Beta Blockade due to  bradycardia”…

## 2019-12-03 NOTE — PROGRESS NOTES
Community Health Worker Intake completed at bedside 12/2/2019.  • Social determinates of health intake completed.   • Identified barriers to food and financial security.  • Patient sees PCP at [facility] in [place].  • Referral to RN or SW declined at this time.    Pt. Reports living at [facility], having his sister as his support system, and feeling confident about managing his health.    Plan:  JOSHUA Hanley will provide pt. With CCM brochure tomorrow at bedside.

## 2019-12-03 NOTE — THERAPY
"Pt w/ hx of developmental delay.  He apparently resides in a snf and works at a [employer] store.  He often is slow to answer questions or does not answer them at all.  He does however agree to work w/ PT.  Pt is able to move in/out of bed and stand w/o assist.  He ambulates in the hallway w/ a fww w/o loss of balance.  Anticipate that he is close to, or at his PLOF.  PT will not formally follow.  Recommend oob/amb prn w/ nsg and fww.    Physical Therapy Evaluation completed.   Bed Mobility:  Supine to Sit: Supervised  Transfers: Sit to Stand: Supervised  Gait:   with Front-Wheel Walker       Plan of Care: Patient with no further skilled PT needs in the acute care setting at this time  Discharge Recommendations: Equipment: No Equipment Needed. Post-acute therapy   Anticipate that the patient will have no further physical therapy needs after discharge from the hospital.    See \"Rehab Therapy-Acute\" Patient Summary Report for complete documentation.     "

## 2019-12-03 NOTE — PROGRESS NOTES
Bedside report received. Per night RN pt had an increase in O2 demand, requiring 5 L via oxy mask. Patient A&O x 4. VS'S, currently on 4 L via NC with crackles throughout. Increase dyspnea with exertion. No complaints of pain at this time. [email or web address].FIB on telemetry monitor. POC discussed with patient. Pt verbalizes understanding. Call light and belongings with in reach. Bed locked and in lowest position, alarm and fall precautions in place.

## 2019-12-03 NOTE — DISCHARGE PLANNING
Received Choice form at 0861  Agency/Facility Name: [facility]  Referral sent per Choice form @ 2580

## 2019-12-03 NOTE — CARE PLAN
Problem: Communication  Goal: The ability to communicate needs accurately and effectively will improve  Outcome: PROGRESSING AS EXPECTED  Intervention: Educate patient and significant other/support system about the plan of care, procedures, treatments, medications and allow for questions  Note:   White board updated with POC and care team information during bedside report.      Problem: Safety  Goal: Will remain free from falls  Outcome: PROGRESSING AS EXPECTED  Intervention: Implement fall precautions  Note:   Fall precautions in place. Bed in lowest position. Non-skid socks in place. Personal possessions within reach. Mobility sign on door. Bed-alarm on. Call light within reach. Pt educated regarding fall prevention and states understanding.

## 2019-12-03 NOTE — PROGRESS NOTES
[facility] Medicine Daily Progress Note    Date of Service  12/3/2019    Chief Complaint  54 y.o. male admitted 11/30/2019 with lower ext swelling and shortness of breath.     Hospital Course    History of atrial fibrillation on anticoagulation, hypertension, depression, congestive heart failure admitted with findings of CHF.  Started on diuresis-- Echocardiogram was ordered and showed normal ejection fraction with severely dilated right ventricle and severe pulmonary hypertension with severe by atrial enlargement.    Interval Problem Update  O2 requirements increased to 5-6 L nc.   WBC increased-patient afebrile.  No new pain, urinary or acute GI symptoms.  Diuresing with IV Lasix.     Consultants/Specialty  none    Code Status  Full code    Disposition  Anticipate transfer back to nursing facility when stabilized    Review of Systems  Review of Systems   Constitutional: Positive for malaise/fatigue. Negative for chills, diaphoresis and fever.   HENT: Negative for congestion.    Eyes: Negative for discharge and redness.   Respiratory: Positive for shortness of breath. Negative for cough, wheezing and stridor.    Cardiovascular: Positive for leg swelling. Negative for chest pain and palpitations.   Gastrointestinal: Negative for abdominal pain, diarrhea and vomiting.   Genitourinary: Negative for flank pain and hematuria.   Musculoskeletal: Negative for back pain, joint pain and neck pain.   Neurological: Positive for weakness (Improved). Negative for tremors, sensory change, speech change and focal weakness.   Psychiatric/Behavioral: Negative for hallucinations and substance abuse.        Physical Exam  Temp:  [36.2 °C (97.2 °F)-36.9 °C (98.5 °F)] 36.2 °C (97.2 °F)  Pulse:  [62-92] 92  Resp:  [16-17] 17  BP: (107-118)/(58-71) 110/71  SpO2:  [93 %-95 %] 94 %    Physical Exam  Constitutional:       General: He is not in acute distress.     Appearance: He is not diaphoretic.   HENT:      Head: Normocephalic and  atraumatic.      Right Ear: External ear normal.      Left Ear: External ear normal.      Nose: Nose normal.   Eyes:      General: No scleral icterus.     Conjunctiva/sclera: Conjunctivae normal.      Pupils: Pupils are equal, round, and reactive to light.   Neck:      Musculoskeletal: Normal range of motion. No neck rigidity.   Cardiovascular:      Heart sounds: No murmur.      Comments: irreg irreg  Pulmonary:      Breath sounds: No stridor. No wheezing, rhonchi or rales.      Comments: Tachypnea w exertion   Coarse breath sounds  Abdominal:      General: There is no distension.      Palpations: Abdomen is soft.      Tenderness: There is no tenderness.   Musculoskeletal:         General: No swelling (3+ lower extremity edema).   Skin:     General: Skin is dry.   Neurological:      General: No focal deficit present.   Psychiatric:      Comments: Flat affect         Fluids    Intake/Output Summary (Last 24 hours) at 12/3/2019 0839  Last data filed at 12/3/2019 0600  Gross per 24 hour   Intake 440 ml   Output 1775 ml   Net -1335 ml       Laboratory  Recent Labs     12/01/19  0001 12/02/19  0159 12/03/19 0216   WBC 11.5* 9.3 13.6*   RBC 4.17* 4.04* 4.01*   HEMOGLOBIN 8.9* 8.9* 8.6*   HEMATOCRIT 32.6* 32.1* 31.5*   MCV 78.2* 79.5* 78.6*   MCH 21.3* 22.0* 21.4*   MCHC 27.3* 27.7* 27.3*   RDW 55.1* 55.8* 55.0*   PLATELETCT 339 304 329   MPV 9.6 10.2 9.9     Recent Labs     12/01/19  0001 12/02/19  0159 12/03/19 0216   SODIUM 138 137 136   POTASSIUM 4.0 3.9 3.7   CHLORIDE 101 98 92*   CO2 31 36* 39*   GLUCOSE 107* 124* 110*   BUN 13 12 14   CREATININE 0.81 0.72 0.68   CALCIUM 8.4* 8.5 8.6                   Imaging  EC-ECHOCARDIOGRAM COMPLETE W/O CONT   Final Result      OUTSIDE IMAGES-DX CHEST   Final Result      [id number] FILM X-RAY   Final Result      DX-CHEST-PORTABLE (1 VIEW)   Final Result      1.  Enlarged cardiac silhouette with changes most consistent with pulmonary edema.           Assessment/Plan  *  CHF (congestive heart failure) (HCC)  Assessment & Plan  Likely right-sided heart failure.  With dyspnea, lower extremity edema-clinically improved  Echocardiogram showed normal ejection fraction with severely dilated right ventricle and severe pulmonary hypertension.  Continue IV Lasix diuresis  Add Diamox x2 doses  Monitor BMP  Strict I's and O's.  Daily weights.      Acute respiratory failure with hypoxia (HCC)  Assessment & Plan  Due to acute diastolic heart failure with pulmonary edema- still requiring 5 L  Nc.   Continue IV Lasix, Zaroxolyn  Add Diamox  Wean O2 nasal cannula systolic    Iron deficiency anemia  Assessment & Plan  H&H has been stable.  No symptoms of acute bleeding   recommend outpatient follow-up with GI to work-up GI bleed  Follow-up hemoglobin - transfuse if hemoglobin less than 7  Work-up consistent with iron deficiency-start IV iron    Pulmonary hypertension (HCC)- (present on admission)  Assessment & Plan  Severe.  Continue with Lasix diuresis  O2 support    Major depression  Assessment & Plan  Continue with Cymbalta     Chronic migraine  Assessment & Plan  Neuro stable, continue current medication regimen.      Essential hypertension  Assessment & Plan  Controlled  Continue with metoprolol, diuretics  PRN clonidine  Monitor blood pressure response    Overweight (BMI 25.0-29.9)  Assessment & Plan  Body mass index is 29.05 kg/m².      Paroxysmal atrial fibrillation (HCC)  Assessment & Plan  Rate controlled   continue metoprolol, amiodarone, and Xarelto.         VTE prophylaxis: xarelto       Discussed with multi disciplinary team plan of care.

## 2019-12-03 NOTE — THERAPY
"Occupational Therapy Evaluation completed.   Functional Status:  Supervision supine to sit.  Pt stood and walked to sink without AD, appeared unsteady.  Pt brushed teeth standing at sink supervised.  Pt utilized FWW for balance supervised: left up w/PT.  Plan of Care: Patient with no further skilled OT needs in the acute care setting at this time  Discharge Recommendations:  Pt likely at functional baseline and can return to his SNF at DC. Patient is currently not being actively followed for occupational therapy services at this time. However, pt may be seen at the request of attending provider for an additional visit to address any discharge or equipment needs within 30 days from evaluation.      Pt is 53 y/o M seen for OT evaluation.  Pt admitted from SNF with CHF.  Pt with hx of afib, HTN, and depression.  Pt was a poor historian but is likely at his functional baseline at this time.  Pt with no further acute OT needs, but OT will remain available for DC needs.     See \"Rehab Therapy-Acute\" Patient Summary Report for complete documentation.    "

## 2019-12-03 NOTE — DISCHARGE PLANNING
Care Transition Team Assessment     RN CM met with patient at bedside. Patient reports being a long-term resident at [facility] in [place], [place]. He has been there for over a year.   NATHALIE BOJORQUEZ spoke with Svetlana, staff at [facility], who states that patient has a sort of developmental delay. He has a sister that he does keep in contact with and the patient has a part time job at a [employer] store in [place].   Patient reports that he does want to return there upon discharge.    Information Source  Orientation : Oriented x 4  Information Given By: Patient, Other (Comments)(Facility staff at [facility])  Informant's Name: Svetlana Mark  Who is responsible for making decisions for patient? : Patient    Readmission Evaluation  Is this a readmission?: No    Elopement Risk  Legal Hold: No  Ambulatory or Self Mobile in Wheelchair: Yes  Disoriented: No  Psychiatric Symptoms: None  History of Wandering: No  Elopement this Admit: No  Vocalizing Wanting to Leave: No  Displays Behaviors, Body Language Wanting to Leave: No-Not at Risk for Elopement  Elopement Risk: Not at Risk for Elopement    Interdisciplinary Discharge Planning  Does Admitting Nurse Feel This Could be a Complex Discharge?: No  Primary Care Physician: Shazia Flores  Lives with - Patient's Self Care Capacity: Other (Comments)(SNF)  Patient or legal guardian wants to designate a caregiver (see row info): No  Support Systems: Other (Comments)(staff at [facility])  Housing / Facility: Skilled Nursing Facility, Long Term Care Facility  Name of Care Facility: [facility] SNF  Do You Take your Prescribed Medications Regularly: Yes  Able to Return to Previous ADL's: Yes  Mobility Issues: No  Prior Services: Other (Comments)(SNF long term care)  Patient Expects to be Discharged to:: SNF  Assistance Needed: Unknown at this Time    Discharge Preparedness  What is your plan after discharge?: Skilled nursing facility  What are your discharge supports?: Other  (comment)(staff at SNF)  Prior Functional Level: Ambulatory    Functional Assesment  Prior Functional Level: Ambulatory    Finances  Financial Barriers to Discharge: No  Prescription Coverage: Yes    Vision / Hearing Impairment  Vision Impairment : Yes  Right Eye Vision: Impaired, Wears Glasses  Left Eye Vision: Impaired, Wears Glasses  Hearing Impairment : No    Domestic Abuse  Have you ever been the victim of abuse or violence?: No  Physical Abuse or Sexual Abuse: No  Verbal Abuse or Emotional Abuse: No  Possible Abuse Reported to:: Not Applicable    Anticipated Discharge Information  Anticipated discharge disposition: SNF  Discharge Address: [facility] [place] [address]  Discharge Contact Phone Number: [phone]

## 2019-12-04 NOTE — PROGRESS NOTES
Assumed care of patient.  Pt sitting on side of bed, shortness of breath noted, oxygen in place.  Safety precautions in place with bed in lowest, locked position and call bell  In reach.

## 2019-12-04 NOTE — RESPIRATORY CARE
COPD EDUCATION by COPD CLINICAL EDUCATOR  12/4/2019 at 8:09 AM by Samy Garcia     Patient reviewed by COPD education team. Patient does not have a history or diagnosis of COPD and is a non-smoker, therefore does not qualify for the COPD program.

## 2019-12-04 NOTE — PROGRESS NOTES
Dr Thomas, Hospitalist notified of critical C02 result of 42.  Pt on 5L sat 95%.  No new orders received.

## 2019-12-04 NOTE — PROGRESS NOTES
[facility] Medicine Daily Progress Note    Date of Service  12/4/2019    Chief Complaint  54 y.o. male admitted 11/30/2019 with lower ext swelling and shortness of breath.     Hospital Course    History of atrial fibrillation on anticoagulation, hypertension, depression, congestive heart failure admitted with findings of CHF.  Started on diuresis-- Echocardiogram was ordered and showed normal ejection fraction with severely dilated right ventricle and severe pulmonary hypertension with severe by atrial enlargement.    Interval Problem Update    Diuresing with IV Lasix.  Increasing metabolic alkalosis.  With hypercarbia PCO2 59 on blood gas.  Follow-up chest x-ray with persistent left mid pleural fluid  Thickening.  Still requiring O2 mask.    Consultants/Specialty  none    Code Status  Full code    Disposition  Anticipate transfer back to nursing facility when stabilized    Review of Systems  Review of Systems   Constitutional: Negative for chills, diaphoresis, fever and malaise/fatigue.   HENT: Negative for congestion.    Respiratory: Positive for shortness of breath. Negative for cough, wheezing and stridor.    Cardiovascular: Positive for leg swelling. Negative for chest pain and palpitations.   Gastrointestinal: Negative for abdominal pain, diarrhea and vomiting.   Genitourinary: Negative for flank pain and hematuria.   Musculoskeletal: Negative for back pain, joint pain and neck pain.   Neurological: Positive for weakness (Improved). Negative for tremors, sensory change, speech change and focal weakness.        Physical Exam  Temp:  [36.2 °C (97.1 °F)-37.1 °C (98.8 °F)] 36.2 °C (97.1 °F)  Pulse:  [72-90] 73  Resp:  [16-18] 16  BP: ()/(54-68) 95/54  SpO2:  [91 %-99 %] 91 %    Physical Exam  Constitutional:       General: He is not in acute distress.     Appearance: He is not diaphoretic.   HENT:      Head: Normocephalic and atraumatic.      Right Ear: External ear normal.      Left Ear: External ear normal.       Nose: Nose normal.   Eyes:      General: No scleral icterus.     Conjunctiva/sclera: Conjunctivae normal.      Pupils: Pupils are equal, round, and reactive to light.   Neck:      Musculoskeletal: Normal range of motion. No neck rigidity.   Cardiovascular:      Heart sounds: No murmur.      Comments: irreg irreg  Pulmonary:      Breath sounds: No stridor. Rhonchi present. No wheezing or rales.      Comments: Tachypnea w exertion   Diminished breath sounds bases  Abdominal:      General: There is no distension.      Palpations: Abdomen is soft.      Tenderness: There is no tenderness.   Musculoskeletal:         General: Swelling (3+ lower extremity edema) present.   Skin:     General: Skin is dry.   Neurological:      General: No focal deficit present.   Psychiatric:      Comments: Flat affect         Fluids    Intake/Output Summary (Last 24 hours) at 12/4/2019 1049  Last data filed at 12/4/2019 0941  Gross per 24 hour   Intake --   Output 3400 ml   Net -3400 ml       Laboratory  Recent Labs     12/02/19 0159 12/03/19 0216 12/04/19 0219   WBC 9.3 13.6* 10.9*   RBC 4.04* 4.01* 4.06*   HEMOGLOBIN 8.9* 8.6* 8.7*   HEMATOCRIT 32.1* 31.5* 31.3*   MCV 79.5* 78.6* 77.1*   MCH 22.0* 21.4* 21.4*   MCHC 27.7* 27.3* 27.8*   RDW 55.8* 55.0* 54.1*   PLATELETCT 304 329 307   MPV 10.2 9.9 9.9     Recent Labs     12/02/19  0159 12/03/19 0216 12/04/19 0219   SODIUM 137 136 131*   POTASSIUM 3.9 3.7 3.4*   CHLORIDE 98 92* 85*   CO2 36* 39* 42*   GLUCOSE 124* 110* 101*   BUN 12 14 15   CREATININE 0.72 0.68 0.64   CALCIUM 8.5 8.6 8.7                   Imaging  EC-ECHOCARDIOGRAM COMPLETE W/O CONT   Final Result      OUTSIDE IMAGES-DX CHEST   Final Result      IN-VHIAIKG-[id number] FILM X-RAY   Final Result      DX-CHEST-PORTABLE (1 VIEW)   Final Result      1.  Enlarged cardiac silhouette with changes most consistent with pulmonary edema.           Assessment/Plan  * CHF (congestive heart failure) ([facility])  Assessment & Plan  Likely  right-sided heart failure.  With dyspnea, lower extremity edema-improved symptoms  Echocardiogram showed normal ejection fraction with severely dilated right ventricle and severe pulmonary hypertension.  Increasing met alk, low BP- ? Contraction alkalosis component .- Decrease IV Lasix  Continue with Diamox  Strict I's and O's.  Daily weights.  Monitor BMP      Acute respiratory failure with hypoxia (HCC)  Assessment & Plan  Due to acute diastolic heart failure with pulmonary edema-improved dyspnea.  Although still requiring O2 2 L mask  Follow-up blood gas with hypercarbia-try to keep SaO2 high 80s to low 90s to improve ventilation  Continue with diuretics  Add Diamox  Check CT chest If clinically worsens to eval for loculated effusion  Wean to O2 nasal cannula as tolerated.     Iron deficiency anemia  Assessment & Plan  H&H has been stable.  No symptoms of acute bleeding   recommend outpatient follow-up with GI to work-up GI bleed  Follow-up hemoglobin - transfuse if hemoglobin less than 7  Work-up consistent with iron deficiency-continue with daily Ferrlecit    Pulmonary hypertension (HCC)- (present on admission)  Assessment & Plan  Severe.  Decrease Lasix  O2 support    Major depression  Assessment & Plan  Continue with Cymbalta     Chronic migraine  Assessment & Plan  Neuro stable, continue current medication regimen.      Essential hypertension  Assessment & Plan  Controlled  Continue with metoprolol, diuretics  PRN clonidine  Monitor blood pressure response    Overweight (BMI 25.0-29.9)  Assessment & Plan  Body mass index is 29.05 kg/m².      Paroxysmal atrial fibrillation (HCC)  Assessment & Plan  Rate controlled   continue metoprolol, amiodarone, and Xarelto.         VTE prophylaxis: xarelto       Discussed with multi disciplinary team plan of care.

## 2019-12-04 NOTE — PROGRESS NOTES
Bedside report received. Patient A&O x 3 disoriented to time. VS'S. Overnight pt was requiring 5 L via Oxy mask, critical Lab value of a Co2 on chem pan was 42. Patient was titrated down to 3L via NC with morning assessment, with O2 sats now in the low 90's. Lungs are diminished throughout. BLE edema slightly improved. No complaints of pain at this time. POC discussed with patient. Pt verbalizes understanding. Call light and belongings with in reach. Bed locked and in lowest position, alarm and fall precautions in place.

## 2019-12-04 NOTE — CARE PLAN
Problem: Safety  Goal: Will remain free from injury  Outcome: PROGRESSING AS EXPECTED  Goal: Will remain free from falls  Outcome: PROGRESSING AS EXPECTED     Problem: Respiratory:  Goal: Respiratory status will improve  Outcome: PROGRESSING AS EXPECTED  Intervention: Assess and monitor pulmonary status  Flowsheets  Taken 12/3/2019 2000 by Heavenly Dalton CAubreeN.A.  O2 (LPM): 5  Taken 12/3/2019 2000 by Mica Pate RMIRIAM  Respiratory Pattern: Dyspnea with Exertion;Dyspnea at Rest

## 2019-12-04 NOTE — ASSESSMENT & PLAN NOTE
Due to acute diastolic heart failure with pulmonary edema-improved dyspnea.    Follow-up blood gas with hypercarbia-try to keep SaO2 high 80s to low 90s to improve ventilation  Check CT chest If clinically worsens to eval for loculated effusion  O2 requirements decreased-continue to wean O2 as tolerated

## 2019-12-05 NOTE — PROGRESS NOTES
Report received from NATHALIE James. Assumed care of patient. Updated patient on plan of care. Fall precautions in place, call light within reach.

## 2019-12-05 NOTE — CARE PLAN
Problem: Communication  Goal: The ability to communicate needs accurately and effectively will improve  Outcome: PROGRESSING AS EXPECTED     Problem: Knowledge Deficit  Goal: Knowledge of disease process/condition, treatment plan, diagnostic tests, and medications will improve  Outcome: PROGRESSING AS EXPECTED   Pt educated regarding activity, diet, meds and plan of care. Verbalized understanding.        Problem: Safety  Goal: Will remain free from falls  Outcome: PROGRESSING AS EXPECTED   Discussed with patient the importance to use call light when assistance is needed. Patient verbalized understanding. Patient has bed alarm on, call light within reach, treaded socks on, and hourly rounding in place.

## 2019-12-05 NOTE — CARE PLAN
Problem: Communication  Goal: The ability to communicate needs accurately and effectively will improve  Outcome: PROGRESSING AS EXPECTED     Problem: Safety  Goal: Will remain free from injury  Outcome: PROGRESSING AS EXPECTED  Goal: Will remain free from falls  Outcome: PROGRESSING AS EXPECTED     Problem: Infection  Goal: Will remain free from infection  Outcome: PROGRESSING AS EXPECTED     Problem: Venous Thromboembolism (VTW)/Deep Vein Thrombosis (DVT) Prevention:  Goal: Patient will participate in Venous Thrombosis (VTE)/Deep Vein Thrombosis (DVT)Prevention Measures  Outcome: PROGRESSING AS EXPECTED     Problem: Bowel/Gastric:  Goal: Normal bowel function is maintained or improved  Outcome: PROGRESSING AS EXPECTED  Goal: Will not experience complications related to bowel motility  Outcome: PROGRESSING AS EXPECTED     Problem: Knowledge Deficit  Goal: Knowledge of disease process/condition, treatment plan, diagnostic tests, and medications will improve  Outcome: PROGRESSING AS EXPECTED  Goal: Knowledge of the prescribed therapeutic regimen will improve  Outcome: PROGRESSING AS EXPECTED     Problem: Discharge Barriers/Planning  Goal: Patient's continuum of care needs will be met  Outcome: PROGRESSING AS EXPECTED

## 2019-12-06 PROBLEM — E87.1 HYPONATREMIA: Status: ACTIVE | Noted: 2019-01-01

## 2019-12-06 NOTE — DISCHARGE PLANNING
Agency/Facility Name: [facility]  Outcome: Transportation set up for 12/7/19 at 0700 going to [facility]

## 2019-12-06 NOTE — DISCHARGE PLANNING
RN RENO left voicemail for evening Teresa CUNNINGHAM to print out DC summary once it is completed and place in transfer packet for patient to leave at 0700 tomorrow morning.  Bedside RN aware as well.

## 2019-12-06 NOTE — CARE PLAN
Problem: Safety  Goal: Will remain free from injury  Outcome: PROGRESSING AS EXPECTED  Goal: Will remain free from falls  Outcome: PROGRESSING AS EXPECTED     Problem: Infection  Goal: Will remain free from infection  Outcome: PROGRESSING AS EXPECTED     Problem: Venous Thromboembolism (VTW)/Deep Vein Thrombosis (DVT) Prevention:  Goal: Patient will participate in Venous Thrombosis (VTE)/Deep Vein Thrombosis (DVT)Prevention Measures  Outcome: PROGRESSING AS EXPECTED  Intervention: Ensure patient wears graduated elastic stockings (HUNG hose) and/or SCDs, if ordered, when in bed or chair (Remove at least once per shift for skin check)  Flowsheets (Taken 12/5/2019 6810)  SCDs, Sequential Compression Device: On     Problem: Bowel/Gastric:  Goal: Normal bowel function is maintained or improved  Outcome: PROGRESSING AS EXPECTED     Problem: Knowledge Deficit  Goal: Knowledge of disease process/condition, treatment plan, diagnostic tests, and medications will improve  Outcome: PROGRESSING AS EXPECTED     Problem: Discharge Barriers/Planning  Goal: Patient's continuum of care needs will be met  Outcome: PROGRESSING AS EXPECTED     Problem: Fluid Volume:  Goal: Will maintain balanced intake and output  Outcome: PROGRESSING AS EXPECTED

## 2019-12-06 NOTE — DISCHARGE PLANNING
Spoke to Alan @ [facility]    Transport is scheduled for 12/07 @0700 going to [facility].

## 2019-12-06 NOTE — DISCHARGE PLANNING
Anticipated Discharge Disposition: [facility], long-term bed    Action: NATHALIE BOJORQUEZ working with NATHALIE Nunes CM on Judy 6, as she has a patient that is also from [facility]. Both patient may be ready for discharge by weekend, Manju working to see if patients can ride together via [facility] back to SNF on Saturday.    Barriers to Discharge: transport back to [place] pending medical clearance    Plan: RN CM to f/u with treatment team for medical clearance   F/u regarding transport

## 2019-12-06 NOTE — DISCHARGE PLANNING
Anticipated Discharge Disposition: [facility] SNF    Action: RN CM verified with Dr. Lam that patient can discharge tomorrow morning to go back to his SNF at [facility]. NATHALIE BOJORQUEZ, Manju, has arranged with leadership and [facility] for her patient and Mr. Vann to ride together back to [facility]. Transport scheduled for 0700.   Patient is agreeable.  RN CM spoke with nurse, Yohana, at [facility] and notified that both patients will be back tomorrow via [facility].     Barriers to Discharge: none    Plan: Patient to discharge at 0700 back to his SNF at [facility] 12/7

## 2019-12-06 NOTE — PROGRESS NOTES
[facility] Medicine Daily Progress Note    Date of Service  12/5/2019    Chief Complaint  54 y.o. male admitted 11/30/2019 with lower ext swelling and shortness of breath.     Hospital Course    History of atrial fibrillation on anticoagulation, hypertension, depression, congestive heart failure admitted with findings of CHF.  Started on diuresis-- Echocardiogram was ordered and showed normal ejection fraction with severely dilated right ventricle and severe pulmonary hypertension with severe by atrial enlargement.  Had acute hypoxic respiratory  failure requiring O2 support    Interval Problem Update    Hypotensive-  SBP high 80s.  Patient asymptomatic.  No tachycardia.  Titrating down O2.    Consultants/Specialty  none    Code Status  Full code    Disposition  Anticipate transfer back to nursing facility when stabilized    Review of Systems  Review of Systems   Constitutional: Negative for chills, diaphoresis and fever.   Respiratory: Positive for shortness of breath. Negative for cough and wheezing.         Improved   Cardiovascular: Positive for leg swelling. Negative for chest pain.   Gastrointestinal: Negative for abdominal pain, diarrhea and vomiting.   Musculoskeletal: Negative for back pain, joint pain and neck pain.   Neurological: Positive for weakness (Improved). Negative for tremors, sensory change, speech change and focal weakness.        Physical Exam  Temp:  [36.1 °C (96.9 °F)-37 °C (98.6 °F)] 36.1 °C (96.9 °F)  Pulse:  [64-92] 64  Resp:  [16-18] 16  BP: ()/(55-79) 88/63  SpO2:  [91 %-98 %] 98 %    Physical Exam  Constitutional:       General: He is not in acute distress.     Appearance: He is ill-appearing. He is not diaphoretic.      Comments: Flat affect   HENT:      Head: Normocephalic and atraumatic.      Right Ear: External ear normal.      Left Ear: External ear normal.      Nose: Nose normal.   Eyes:      General: No scleral icterus.     Conjunctiva/sclera: Conjunctivae normal.       Pupils: Pupils are equal, round, and reactive to light.   Neck:      Musculoskeletal: Normal range of motion. No neck rigidity.   Cardiovascular:      Heart sounds: No murmur.      Comments: irreg irreg  Pulmonary:      Breath sounds: No stridor. Rhonchi present. No wheezing or rales.      Comments: Diminished breath sounds bases  Abdominal:      General: There is no distension.      Palpations: Abdomen is soft.      Tenderness: There is no tenderness.   Musculoskeletal:         General: Swelling (3+ lower extremity edema) present.   Skin:     General: Skin is dry.   Neurological:      General: No focal deficit present.   Psychiatric:      Comments: Flat affect         Fluids    Intake/Output Summary (Last 24 hours) at 12/5/2019 1702  Last data filed at 12/5/2019 0900  Gross per 24 hour   Intake --   Output 1400 ml   Net -1400 ml       Laboratory  Recent Labs     12/03/19 0216 12/04/19 0219 12/05/19 0214   WBC 13.6* 10.9* 7.5   RBC 4.01* 4.06* 4.09*   HEMOGLOBIN 8.6* 8.7* 8.9*   HEMATOCRIT 31.5* 31.3* 31.2*   MCV 78.6* 77.1* 76.3*   MCH 21.4* 21.4* 21.8*   MCHC 27.3* 27.8* 28.5*   RDW 55.0* 54.1* 53.0*   PLATELETCT 329 307 330   MPV 9.9 9.9 9.9     Recent Labs     12/03/19 0216 12/04/19 0219 12/05/19 0214   SODIUM 136 131* 127*   POTASSIUM 3.7 3.4* 3.3*   CHLORIDE 92* 85* 84*   CO2 39* 42* 38*   GLUCOSE 110* 101* 98   BUN 14 15 15   CREATININE 0.68 0.64 0.59   CALCIUM 8.6 8.7 8.7                   Imaging  DX-CHEST-PORTABLE (1 VIEW)   Final Result      Loculated pleural fluid or thickening in the left mid chest is more pronounced than on the prior exam. Otherwise, no significant interval change.      EC-ECHOCARDIOGRAM COMPLETE W/O CONT   Final Result      OUTSIDE IMAGES-DX CHEST   Final Result      [id number] FILM X-RAY   Final Result      DX-CHEST-PORTABLE (1 VIEW)   Final Result      1.  Enlarged cardiac silhouette with changes most consistent with pulmonary edema.           Assessment/Plan  * CHF  (congestive heart failure) (HCC)  Assessment & Plan  Likely right-sided heart failure.  With dyspnea, lower extremity edema-improved symptoms  Echocardiogram showed normal ejection fraction with severely dilated right ventricle and severe pulmonary hypertension.  Increasing met alk, low BP- ? Contraction alkalosis component .  Stop Lasix  Continue with Diamox with holding parameters  Strict I's and O's.  Daily weights.  Monitor BMP      Acute respiratory failure with hypoxia (HCC)  Assessment & Plan  Due to acute diastolic heart failure with pulmonary edema-improved dyspnea.  Although still requiring O2 2 L mask  Follow-up blood gas with hypercarbia-try to keep SaO2 high 80s to low 90s to improve ventilation  Diamox  Check CT chest If clinically worsens to eval for loculated effusion  Wean oxygen as tolerated    Essential hypertension  Assessment & Plan  Controlled- mildly hypotensive range  Continue Lopressor with holding parameters  Stop Lasix  Monitor blood pressure response  IV fluid bolus if worsening hypotension, symptomatic    Iron deficiency anemia  Assessment & Plan  H&H has been stable.    Recommend outpatient follow-up with GI to work-up GI bleed  Follow-up hemoglobin - transfuse if hemoglobin less than 7  Work-up consistent with iron deficiency-IV Ferrlecit to 12/6  No symptoms of bleeding, monitor    Pulmonary hypertension (HCC)- (present on admission)  Assessment & Plan  Severe.  Stop Lasix due to low blood pressure  O2 support    Major depression  Assessment & Plan  Continue with Cymbalta     Chronic migraine  Assessment & Plan  Neuro stable, continue current medication regimen.      Overweight (BMI 25.0-29.9)  Assessment & Plan  Body mass index is 29.05 kg/m².      Paroxysmal atrial fibrillation (HCC)  Assessment & Plan  Rate controlled   continue metoprolol, amiodarone, and Xarelto.  Monitor telemetry         VTE prophylaxis: xarelto

## 2019-12-06 NOTE — PROGRESS NOTES
Report received. Care assumed. Patient A&Ox4. Pt reports feeling 0/10 pain, no SOB at this time.  Pt denies needs.Discussed POC for the night with Pt. Call light within reach, encouraged pt to call for assistance.

## 2019-12-06 NOTE — DISCHARGE PLANNING
Leoncio from [facility] requested bedside RN call [phone] between [phone] tomorrow, 12/7, to call report to their nursing staff as that's when they will be in. RN RENO informed bedside RN.

## 2019-12-07 NOTE — DISCHARGE INSTRUCTIONS
Discharge Instructions    Discharged to other by medical transportation with escort. Discharged via wheelchair, hospital escort: Yes.  Special equipment needed: Wheelchair    Be sure to schedule a follow-up appointment with your primary care doctor or any specialists as instructed.     Discharge Plan:   Diet Plan: Discussed  Activity Level: Discussed  Smoking Cessation Offered: Patient Refused  Confirmed Follow up Appointment: Patient to Call and Schedule Appointment  Confirmed Symptoms Management: Discussed  Medication Reconciliation Updated: Yes  Influenza Vaccine Indication: Not indicated: Previously immunized this influenza season and > 8 years of age  Influenza Vaccine Given - only chart on this line when given: Influenza Vaccine Given (See MAR)    I understand that a diet low in cholesterol, fat, and sodium is recommended for good health. Unless I have been given specific instructions below for another diet, I accept this instruction as my diet prescription.   Other diet: Cardiac    Special Instructions:   HF Patient Discharge Instructions  · Monitor your weight daily, and maintain a weight chart, to track your weight changes.   · Activity as tolerated, unless your Doctor has ordered otherwise. Other activity order: as tolerated.  · Follow a low fat, low cholesterol, low salt diet unless instructed otherwise by your Doctor. Read the labels on the back of food products and track your intake of fat, cholesterol and salt.   · Fluid Restriction No. If a Fluid Restriction has been ordered by your Doctor, measure fluids with a measuring cup to ensure that you are not exceeding the restriction.   · No smoking.  · Oxygen Yes. If your Doctor has ordered that you wear Oxygen at home, it is important to wear it as ordered.  · Did you receive an explanation from staff on the importance of taking each of your medications and why it is necessary to keep taking them unless your doctor says to stop? Yes  · Were all of your  questions answered about how to manage your heart failure and what to do if you have increased signs and symptoms after you go home? Yes  · Do you feel like your heart failure care team involved you in the care treatment plan and allowed you to make decisions regarding your care while in the hospital and addressed any discharge needs you might have? Yes    See the educational handout provided at discharge for more information on monitoring your daily weight, activity and diet. This also explains more about Heart Failure, symptoms of a flare-up and some of the tests that you have undergone.     Warning Signs of a Flare-Up include:  · Swelling in the ankles or lower legs.  · Shortness of breath, while at rest, or while doing normal activities.   · Shortness of breath at night when in bed, or coughing in bed.   · Requiring more pillows to sleep at night, or needing to sit up at night to sleep.  · Feeling weak, dizzy or fatigued.     When to call your Doctor:  · Call [facility] seven days a week from 8:00 a.m. to 8:00 p.m. for medical questions [phone].  · Call your Primary Care Physician or Cardiologist if:   1. You experience any pain radiating to your jaw or neck.  2. You have any difficulty breathing.  3. You experience weight gain of 3 lbs in a day or 5 lbs in a week.   4. You feel any palpitations or irregular heartbeats.  5. You become dizzy or lose consciousness.   If you have had an angiogram or had a pacemaker or AICD placed, and experience:  1. Bleeding, drainage or swelling at the surgical / puncture site.  2. Fever greater than 100.0 F  3. Shock from internal defibrillator.  4. Cool and / or numb extremities.      · Is patient discharged on Warfarin / Coumadin?   No         Depression / Suicide Risk    As you are discharged from this [facility], it is important to learn how to keep safe from harming yourself.    Recognize the warning signs:  · Abrupt changes in personality,  positive or negative- including increase in energy   · Giving away possessions  · Change in eating patterns- significant weight changes-  positive or negative  · Change in sleeping patterns- unable to sleep or sleeping all the time   · Unwillingness or inability to communicate  · Depression  · Unusual sadness, discouragement and loneliness  · Talk of wanting to die  · Neglect of personal appearance   · Rebelliousness- reckless behavior  · Withdrawal from people/activities they love  · Confusion- inability to concentrate     If you or a loved one observes any of these behaviors or has concerns about self-harm, here's what you can do:  · Talk about it- your feelings and reasons for harming yourself  · Remove any means that you might use to hurt yourself (examples: pills, rope, extension cords, firearm)  · Get professional help from the community (Mental Health, Substance Abuse, psychological counseling)  · Do not be alone:Call your Safe Contact- someone whom you trust who will be there for you.  · Call your local CRISIS HOTLINE 902-3805 or 023-650-7929  · Call your local Children's Mobile Crisis Response Team [place] (730) 180-8043 or www.[employer]  · Call the toll free National Suicide Prevention Hotlines   · National Suicide Prevention Lifeline 800-121-DUTC (0676)  · National Hope Line Network 800-SUICIDE (676-8263)

## 2019-12-07 NOTE — PROGRESS NOTES
[facility] Medicine Daily Progress Note    Date of Service  12/6/2019    Chief Complaint  54 y.o. male admitted 11/30/2019 with lower ext swelling and shortness of breath.     Hospital Course    History of atrial fibrillation on anticoagulation, hypertension, depression, congestive heart failure admitted with findings of CHF.  Started on diuresis-- Echocardiogram was ordered and showed normal ejection fraction with severely dilated right ventricle and severe pulmonary hypertension with severe by atrial enlargement.  Had acute hypoxic respiratory  failure requiring O2 support    Interval Problem Update    Decreased shortness of breath, weaning  down O2 --now 1 L nasal cannula..  A. fib controlled.    Consultants/Specialty  none    Code Status  Full code    Disposition  Anticipate transfer back to nursing facility when stabilized    Review of Systems  Review of Systems   Constitutional: Negative for chills, diaphoresis and fever.   Respiratory: Positive for shortness of breath. Negative for cough and wheezing.         Improved   Cardiovascular: Positive for leg swelling (Improved). Negative for chest pain.   Gastrointestinal: Negative for abdominal pain, diarrhea and vomiting.   Musculoskeletal: Negative for back pain, joint pain and neck pain.   Neurological: Positive for weakness (Improved). Negative for sensory change, speech change and focal weakness.        Physical Exam  Temp:  [36.4 °C (97.6 °F)-37.1 °C (98.7 °F)] 36.6 °C (97.8 °F)  Pulse:  [61-91] 91  Resp:  [16-20] 17  BP: ()/(56-77) 104/67  SpO2:  [95 %-96 %] 96 %    Physical Exam  Constitutional:       General: He is not in acute distress.     Appearance: He is ill-appearing. He is not diaphoretic.      Comments: Flat affect   HENT:      Head: Normocephalic and atraumatic.      Right Ear: External ear normal.      Left Ear: External ear normal.   Eyes:      General: No scleral icterus.     Conjunctiva/sclera: Conjunctivae normal.   Neck:       Musculoskeletal: Normal range of motion. No neck rigidity.   Cardiovascular:      Heart sounds: No murmur.      Comments: irreg irreg  Pulmonary:      Breath sounds: No stridor. No wheezing, rhonchi or rales.   Abdominal:      General: There is no distension.      Palpations: Abdomen is soft.      Tenderness: There is no tenderness.   Musculoskeletal:         General: Swelling (3+ lower extremity edema) present.   Skin:     General: Skin is dry.   Neurological:      General: No focal deficit present.   Psychiatric:      Comments: Flat affect         Fluids    Intake/Output Summary (Last 24 hours) at 12/6/2019 1812  Last data filed at 12/6/2019 0838  Gross per 24 hour   Intake 250 ml   Output 1090 ml   Net -840 ml       Laboratory  Recent Labs     12/04/19 0219 12/05/19 0214 12/06/19 0212   WBC 10.9* 7.5 8.4   RBC 4.06* 4.09* 4.29*   HEMOGLOBIN 8.7* 8.9* 9.4*   HEMATOCRIT 31.3* 31.2* 32.5*   MCV 77.1* 76.3* 75.8*   MCH 21.4* 21.8* 21.9*   MCHC 27.8* 28.5* 28.9*   RDW 54.1* 53.0* 52.4*   PLATELETCT 307 330 361   MPV 9.9 9.9 9.8     Recent Labs     12/04/19 0219 12/05/19 0214 12/06/19 0212   SODIUM 131* 127* 129*   POTASSIUM 3.4* 3.3* 3.5*   CHLORIDE 85* 84* 89*   CO2 42* 38* 32   GLUCOSE 101* 98 90   BUN 15 15 14   CREATININE 0.64 0.59 0.64   CALCIUM 8.7 8.7 8.6                   Imaging  DX-CHEST-PORTABLE (1 VIEW)   Final Result      Loculated pleural fluid or thickening in the left mid chest is more pronounced than on the prior exam. Otherwise, no significant interval change.      EC-ECHOCARDIOGRAM COMPLETE W/O CONT   Final Result      OUTSIDE IMAGES-DX CHEST   Final Result      [id number] FILM X-RAY   Final Result      DX-CHEST-PORTABLE (1 VIEW)   Final Result      1.  Enlarged cardiac silhouette with changes most consistent with pulmonary edema.           Assessment/Plan  * CHF (congestive heart failure) (HCC)  Assessment & Plan  Likely right-sided heart failure.  With dyspnea, lower extremity  edema-improved symptoms  Echocardiogram showed normal ejection fraction with severely dilated right ventricle and severe pulmonary hypertension.  Increasing met alk, low BP- ? Contraction alkalosis component .  Lasix stopped.  Continue low-dose Diamox  Strict I's and O's.  Daily weights.  Monitor BMP      Acute respiratory failure with hypoxia (HCC)  Assessment & Plan  Due to acute diastolic heart failure with pulmonary edema-improved dyspnea.    Follow-up blood gas with hypercarbia-try to keep SaO2 high 80s to low 90s to improve ventilation  Check CT chest If clinically worsens to eval for loculated effusion  O2 requirements decreased-continue to wean O2 as tolerated    Essential hypertension  Assessment & Plan  Controlled  Continue Lopressor with holding parameters  Monitor blood pressure response      Iron deficiency anemia  Assessment & Plan  Hemoglobin improved  Recommend outpatient follow-up with GI to work-up GI bleed  Follow-up hemoglobin - transfuse if hemoglobin less than 7   IV Ferrlecit  No symptoms of bleeding, monitor    Hyponatremia  Assessment & Plan  Improved  Continue to monitor, further workup if continued decline.     Pulmonary hypertension (HCC)- (present on admission)  Assessment & Plan  Severe.  O2 support    Major depression  Assessment & Plan  Continue with Cymbalta     Chronic migraine  Assessment & Plan  Neuro stable, continue current medication regimen.      Overweight (BMI 25.0-29.9)  Assessment & Plan  Body mass index is 29.05 kg/m².      Paroxysmal atrial fibrillation (HCC)  Assessment & Plan  Rate controlled   Monitor telemetry  Continue with amiodarone, metoprolol  Xarelto for stroke risk reduction  Monitor telemetry         VTE prophylaxis: xarelto

## 2019-12-07 NOTE — DISCHARGE SUMMARY
Hospital Medicine Discharge Note     Admit Date:  11/30/2019       Discharge Date:   12/7/2019    Attending Physician:  Skinny Lam M.D.      Diagnoses (includes active and resolved):     Principal Problem:    CHF (congestive heart failure) (HCC) POA: Unknown    Right heart failure  Active Problems:    Iron deficiency anemia POA: Unknown    Essential hypertension POA: Unknown    Acute respiratory failure with hypoxia (HCC) POA: Unknown    Paroxysmal atrial fibrillation (HCC) POA: Unknown    Overweight (BMI 25.0-29.9) POA: Unknown    Chronic migraine POA: Unknown    Major depression POA: Unknown    Pulmonary hypertension (HCC) POA: Yes    Hyponatremia POA: Unknown    Hospital Summary (Brief Narrative):           54-year-old male History of atrial fibrillation on anticoagulation, hypertension, depression, congestive heart failure admitted with findings of CHF.  Started on diuresis-- Echocardiogram was ordered and showed normal ejection fraction with severely dilated right ventricle and severe pulmonary hypertension with severe by atrial enlargement.  Had acute hypoxic respiratory  failure requiring O2 support.  Patient was diuresed with Lasix.  He did have findings of metabolic alkalosis.  This was attributed to volume contraction although blood gas also revealed PCO2 59 possible compensatory mechanism.  We have been weaning down his oxygen down to 1 L nasal cannula.  He has no chest pain or shortness of breath.  A. fib has been controlled.  He has anemia in the 8-9 range hemoglobin without symptoms of bleeding.  Recommend outpatient follow-up.  He was given IV Ferrlecit iron replacement.  Also had hyponatremia sodium low 130s to high 120s prior to discharge.  Appeared asymptomatic.  Recommend continued surveillance/monitoring following discharge.  Prior to discharge he has history of major depression and at times with flat affect.  Recommended continue his anti-depressants upon discharge.  Patient will be  transferred back to SNF for continued therapies and management of his comorbidities.  On day of discharge I examined patient, discussed findings, plan of care and follow-up. Patient stable on .5 L nc .  He was discharged to nursing facility in stable condition.    Consultants:        none    Imaging/ Testing:      DX-CHEST-PORTABLE (1 VIEW)   Final Result      Loculated pleural fluid or thickening in the left mid chest is more pronounced than on the prior exam. Otherwise, no significant interval change.      EC-ECHOCARDIOGRAM COMPLETE W/O CONT   Final Result   Normal left ventricular systolic function.  Left ventricular ejection fraction is visually estimated to be 60%.  Mild concentric left ventricular hypertrophy.  Aortic sclerosis without stenosis.  Mild aortic insufficiency.  Severely dilated right ventricle.  Reduced right ventricular systolic function.  Severe biatrial enlargement.  Rhythm is atrial fibrillation.  No prior study is available for comparison.    OUTSIDE IMAGES-DX CHEST   Final Result      [id number] FILM X-RAY   Final Result      DX-CHEST-PORTABLE (1 VIEW)   Final Result      1.  Enlarged cardiac silhouette with changes most consistent with pulmonary edema.            Procedures:          none    Discharge Medications:             Medication List      CHANGE how you take these medications      Instructions   furosemide 40 MG Tabs  What changed:  how much to take  Commonly known as:  LASIX   Take 0.5 Tabs by mouth every day.  Dose:  20 mg     metoprolol 25 MG Tabs  Start taking on:  December 7, 2019  What changed:    · how much to take  · when to take this  Commonly known as:  LOPRESSOR   Take 1 Tab by mouth 2 Times a Day.  Dose:  25 mg     potassium chloride 20 MEQ Pack  What changed:  when to take this  Commonly known as:  KLOR-CON   Take 1 Packet by mouth every day.  Dose:  20 mEq        CONTINUE taking these medications      Instructions   amiodarone 200 MG Tabs  Commonly known as:   CORDARONE   Take 200 mg by mouth every day.  Dose:  200 mg     ascorbic acid 500 MG Tabs  Commonly known as:  ascorbic acid   Take 500 mg by mouth 2 Times a Day.  Dose:  500 mg     calcitRIOL 0.5 MCG Caps  Commonly known as:  ROCALTROL   Take 0.5 mcg by mouth 2 Times a Day.  Dose:  0.5 mcg     DULoxetine 60 MG Cpep delayed-release capsule  Commonly known as:  CYMBALTA   Take 60 mg by mouth every bedtime.  Dose:  60 mg     ferrous sulfate 325 (65 Fe) MG tablet   Take 325 mg by mouth every day.  Dose:  325 mg     folic acid 1 MG Tabs  Commonly known as:  FOLVITE   Take 1 mg by mouth every day.  Dose:  1 mg     NEXIUM 20 MG capsule  Generic drug:  esomeprazole   Take 20 mg by mouth 2 Times a Day.  Dose:  20 mg     tamsulosin 0.4 MG capsule  Commonly known as:  FLOMAX   Take 0.4 mg by mouth every bedtime.  Dose:  0.4 mg     topiramate 25 MG Tabs  Commonly known as:  TOPAMAX   Take 75 mg by mouth every day.  Dose:  75 mg     vitamin D 1000 UNIT Tabs  Commonly known as:  cholecalciferol   Take 1,000 Units by mouth every day.  Dose:  1,000 Units     XARELTO 20 MG Tabs tablet  Generic drug:  rivaroxaban   Take 20 mg by mouth with dinner.  Dose:  20 mg        STOP taking these medications    aspirin EC 81 MG Tbec  Commonly known as:  ECOTRIN               Diet:       DIET ORDERS (From admission to next 24h)     Start     Ordered    12/01/19 0152  Diet Order 2 Gram Sodium, Cardiac  ALL MEALS     Question Answer Comment   Diet: 2 Gram Sodium    Diet: Cardiac        12/01/19 0152                  Activity:   As tolerated and directed by skilled nursing.      Code status:   Full code    Primary Care Provider:    Shazia Flores M.D.    Follow up appointment details :      .  [facility]  [address] [facility] [address]  [phone]        [facility]  [address], [address]  [facility] [address] [phone]  In 4 weeks              Time spent on discharge day patient visit:  40 minutes    #################################################

## 2019-12-07 NOTE — PROGRESS NOTES
Pt to be discharged to SNF with Medical transport. Discharge instructions given to pt prior to leaving unit including when to see PCP, and new medications. IV and monitor removed. All questions answered. Verbalized understanding. All belongings including glasses are with pt when leaving unit.

## 2019-12-07 NOTE — PROGRESS NOTES
Monitor called stating patient heart rate went in down to 45 for 1 minute. Patient HR now A-fib 57-60s.  Patient not symptomatic. No hx of this in the past. Dr. Cavazos updated.

## 2019-12-07 NOTE — CARE PLAN
Problem: Communication  Goal: The ability to communicate needs accurately and effectively will improve  Outcome: PROGRESSING AS EXPECTED     Problem: Safety  Goal: Will remain free from injury  Outcome: PROGRESSING AS EXPECTED  Goal: Will remain free from falls  Outcome: PROGRESSING AS EXPECTED     Problem: Infection  Goal: Will remain free from infection  Outcome: PROGRESSING AS EXPECTED     Problem: Venous Thromboembolism (VTW)/Deep Vein Thrombosis (DVT) Prevention:  Goal: Patient will participate in Venous Thrombosis (VTE)/Deep Vein Thrombosis (DVT)Prevention Measures  Outcome: PROGRESSING AS EXPECTED     Problem: Bowel/Gastric:  Goal: Normal bowel function is maintained or improved  Outcome: PROGRESSING AS EXPECTED  Goal: Will not experience complications related to bowel motility  Outcome: PROGRESSING AS EXPECTED     Problem: Knowledge Deficit  Goal: Knowledge of disease process/condition, treatment plan, diagnostic tests, and medications will improve  Outcome: PROGRESSING AS EXPECTED  Goal: Knowledge of the prescribed therapeutic regimen will improve  Outcome: PROGRESSING AS EXPECTED     Problem: Discharge Barriers/Planning  Goal: Patient's continuum of care needs will be met  Outcome: PROGRESSING AS EXPECTED     Problem: Fluid Volume:  Goal: Will maintain balanced intake and output  Outcome: PROGRESSING AS EXPECTED     Problem: Pain Management  Goal: Pain level will decrease to patient's comfort goal  Outcome: PROGRESSING AS EXPECTED

## 2019-12-09 NOTE — PROGRESS NOTES
JOSHUA wsidom d/c Jas from [facility] services since he was d/c'ed to [facility] 12/9/2019.

## 2019-12-17 NOTE — DOCUMENTATION QUERY
[facility]                                                                       Query Response Note      PATIENT:               CHARLENE GONZALEZ  ACCT #:                  [id number]  MRN:                     [id number]  :                      1965  ADMIT DATE:       2019 11:56 PM  DISCH DATE:        2019 7:59 AM  RESPONDING  PROVIDER #:        [id number]           QUERY TEXT:    Congestive Heart Failure is documented in the Medical Record. Please document the type and acuity (includes probable or suspected)    NOTE:  If an appropriate response is not listed below, please respond with a new note.      The patient's Clinical Indicators include:  ED Provider Notes  Date of Service:  2019 12:02 AM  FINAL IMPRESSION  Congestive heart failure    H&P  Date of Service:  2019  1:11 AM  * CHF (congestive heart failure) (HCC)  Assessment & Plan  Suspected based on clinical and laboratory findings.  No current echocardiogram for review to evaluate ejection fraction.  Plan for formal echocardiogram, diuresis, close monitoring.  Continue BB as already taking.  No ACEI      Assessment & Plan Note  Date of Service:  2019  1:14 AM  Likely right-sided heart failure.  With dyspnea, lower extremity edema-improved symptoms  Echocardiogram showed normal ejection fraction with severely dilated right ventricle and severe pulmonary hypertension.  Increasing met alk, low BP- ? Contraction alkalosis component .  Lasix stopped.  Continue low-dose Diamox  Strict I's and O's.  Daily weights.  Monitor BMP    Progress Notes  Date of Service:  12/3/2019  8:39 AM  Acute respiratory failure with hypoxia (HCC)  Assessment & Plan  Due to acute diastolic heart failure with pulmonary edema- still requiring 5 L  Nc.   Continue IV Lasix, Zaroxolyn  Add Diamox  Wean O2 nasal cannula systolic    Discharge Summary  Date of Service:  2019 12:52  PM  Principal Problem:    CHF (congestive heart failure) ([facility]) POA: Unknown    Right heart failure  Options provided:   -- Acute Systolic heart failure   -- Chronic Systolic heart failure   -- Acute on Chronic Systolic heart failure   -- Acute Diastolic heart failure   -- Chronic Diastolic heart failure   -- Acute on Chronic Diastolic heart failure   -- Acute Systolic and Diastolic heart failure   -- Chronic Systolic and Diastolic heart failure   -- Acute on Chronic Systolic and diastolic heart failure   -- Unable to determine      Query created by: Jose Dawson on 12/12/2019 1:50 PM    RESPONSE TEXT:    Acute on Chronic Diastolic heart failure          Electronically signed by:  ADAMS HAWK 12/17/2019 9:29 AM

## 2020-01-01 ENCOUNTER — APPOINTMENT (OUTPATIENT)
Dept: RADIOLOGY | Facility: MEDICAL CENTER | Age: 55
DRG: 003 | End: 2020-01-01
Attending: INTERNAL MEDICINE
Payer: COMMERCIAL

## 2020-01-01 ENCOUNTER — HOSPITAL ENCOUNTER (OUTPATIENT)
Dept: RADIOLOGY | Facility: MEDICAL CENTER | Age: 55
End: 2020-09-10
Payer: COMMERCIAL

## 2020-01-01 ENCOUNTER — ANESTHESIA (OUTPATIENT)
Dept: SURGERY | Facility: MEDICAL CENTER | Age: 55
DRG: 003 | End: 2020-01-01
Payer: COMMERCIAL

## 2020-01-01 ENCOUNTER — APPOINTMENT (OUTPATIENT)
Dept: RADIOLOGY | Facility: MEDICAL CENTER | Age: 55
DRG: 003 | End: 2020-01-01
Attending: PSYCHIATRY & NEUROLOGY
Payer: COMMERCIAL

## 2020-01-01 ENCOUNTER — HOSPITAL ENCOUNTER (INPATIENT)
Facility: MEDICAL CENTER | Age: 55
LOS: 52 days | DRG: 003 | End: 2020-10-31
Attending: EMERGENCY MEDICINE | Admitting: PSYCHIATRY & NEUROLOGY
Payer: COMMERCIAL

## 2020-01-01 ENCOUNTER — APPOINTMENT (OUTPATIENT)
Dept: RADIOLOGY | Facility: MEDICAL CENTER | Age: 55
DRG: 003 | End: 2020-01-01
Attending: NURSE PRACTITIONER
Payer: COMMERCIAL

## 2020-01-01 ENCOUNTER — ANESTHESIA EVENT (OUTPATIENT)
Dept: SURGERY | Facility: MEDICAL CENTER | Age: 55
DRG: 003 | End: 2020-01-01
Payer: COMMERCIAL

## 2020-01-01 ENCOUNTER — APPOINTMENT (OUTPATIENT)
Dept: CARDIOLOGY | Facility: MEDICAL CENTER | Age: 55
DRG: 003 | End: 2020-01-01
Attending: PSYCHIATRY & NEUROLOGY
Payer: COMMERCIAL

## 2020-01-01 ENCOUNTER — APPOINTMENT (OUTPATIENT)
Dept: RADIOLOGY | Facility: MEDICAL CENTER | Age: 55
DRG: 003 | End: 2020-01-01
Attending: SURGERY
Payer: COMMERCIAL

## 2020-01-01 VITALS
SYSTOLIC BLOOD PRESSURE: 125 MMHG | WEIGHT: 138.45 LBS | HEIGHT: 68 IN | TEMPERATURE: 97.4 F | OXYGEN SATURATION: 57 % | BODY MASS INDEX: 20.98 KG/M2 | HEART RATE: 56 BPM | DIASTOLIC BLOOD PRESSURE: 49 MMHG

## 2020-01-01 DIAGNOSIS — I48.0 PAROXYSMAL ATRIAL FIBRILLATION (HCC): ICD-10-CM

## 2020-01-01 DIAGNOSIS — A41.9 SEPTIC SHOCK (HCC): ICD-10-CM

## 2020-01-01 DIAGNOSIS — A41.9 SEPSIS WITH ACUTE HYPOXIC RESPIRATORY FAILURE WITHOUT SEPTIC SHOCK, DUE TO UNSPECIFIED ORGANISM (HCC): ICD-10-CM

## 2020-01-01 DIAGNOSIS — J18.9 PNEUMONIA DUE TO INFECTIOUS ORGANISM, UNSPECIFIED LATERALITY, UNSPECIFIED PART OF LUNG: ICD-10-CM

## 2020-01-01 DIAGNOSIS — R65.21 SEPTIC SHOCK (HCC): ICD-10-CM

## 2020-01-01 DIAGNOSIS — K63.1 BOWEL PERFORATION (HCC): ICD-10-CM

## 2020-01-01 DIAGNOSIS — J96.01 ACUTE RESPIRATORY FAILURE WITH HYPOXIA AND HYPERCAPNIA (HCC): ICD-10-CM

## 2020-01-01 DIAGNOSIS — J96.01 SEPSIS WITH ACUTE HYPOXIC RESPIRATORY FAILURE WITHOUT SEPTIC SHOCK, DUE TO UNSPECIFIED ORGANISM (HCC): ICD-10-CM

## 2020-01-01 DIAGNOSIS — J96.02 ACUTE RESPIRATORY FAILURE WITH HYPOXIA AND HYPERCAPNIA (HCC): ICD-10-CM

## 2020-01-01 DIAGNOSIS — R65.20 SEPSIS WITH ACUTE HYPOXIC RESPIRATORY FAILURE WITHOUT SEPTIC SHOCK, DUE TO UNSPECIFIED ORGANISM (HCC): ICD-10-CM

## 2020-01-01 LAB
ABO + RH BLD: NORMAL
ABO GROUP BLD: NORMAL
ABO GROUP BLD: NORMAL
ACTION RANGE TRIGGERED IACRT: NO
ACTION RANGE TRIGGERED IACRT: YES
ALBUMIN SERPL BCP-MCNC: 1.8 G/DL (ref 3.2–4.9)
ALBUMIN SERPL BCP-MCNC: 2.1 G/DL (ref 3.2–4.9)
ALBUMIN SERPL BCP-MCNC: 2.3 G/DL (ref 3.2–4.9)
ALBUMIN SERPL BCP-MCNC: 2.5 G/DL (ref 3.2–4.9)
ALBUMIN SERPL BCP-MCNC: 3.1 G/DL (ref 3.2–4.9)
ALBUMIN SERPL BCP-MCNC: 3.2 G/DL (ref 3.2–4.9)
ALBUMIN SERPL BCP-MCNC: 3.4 G/DL (ref 3.2–4.9)
ALBUMIN SERPL BCP-MCNC: 3.8 G/DL (ref 3.2–4.9)
ALBUMIN/GLOB SERPL: 0.9 G/DL
ALBUMIN/GLOB SERPL: 0.9 G/DL
ALBUMIN/GLOB SERPL: 1 G/DL
ALBUMIN/GLOB SERPL: 1 G/DL
ALBUMIN/GLOB SERPL: 1.1 G/DL
ALBUMIN/GLOB SERPL: 1.2 G/DL
ALBUMIN/GLOB SERPL: 1.7 G/DL
ALP SERPL-CCNC: 104 U/L (ref 30–99)
ALP SERPL-CCNC: 144 U/L (ref 30–99)
ALP SERPL-CCNC: 158 U/L (ref 30–99)
ALP SERPL-CCNC: 165 U/L (ref 30–99)
ALP SERPL-CCNC: 171 U/L (ref 30–99)
ALP SERPL-CCNC: 174 U/L (ref 30–99)
ALP SERPL-CCNC: 180 U/L (ref 30–99)
ALP SERPL-CCNC: 225 U/L (ref 30–99)
ALP SERPL-CCNC: 49 U/L (ref 30–99)
ALP SERPL-CCNC: 63 U/L (ref 30–99)
ALT SERPL-CCNC: 14 U/L (ref 2–50)
ALT SERPL-CCNC: 15 U/L (ref 2–50)
ALT SERPL-CCNC: 16 U/L (ref 2–50)
ALT SERPL-CCNC: 208 U/L (ref 2–50)
ALT SERPL-CCNC: 25 U/L (ref 2–50)
ALT SERPL-CCNC: 26 U/L (ref 2–50)
ALT SERPL-CCNC: 29 U/L (ref 2–50)
ALT SERPL-CCNC: 33 U/L (ref 2–50)
ALT SERPL-CCNC: 349 U/L (ref 2–50)
ALT SERPL-CCNC: 414 U/L (ref 2–50)
ANION GAP SERPL CALC-SCNC: 10 MMOL/L (ref 7–16)
ANION GAP SERPL CALC-SCNC: 11 MMOL/L (ref 7–16)
ANION GAP SERPL CALC-SCNC: 15 MMOL/L (ref 7–16)
ANION GAP SERPL CALC-SCNC: 17 MMOL/L (ref 7–16)
ANION GAP SERPL CALC-SCNC: 18 MMOL/L (ref 7–16)
ANION GAP SERPL CALC-SCNC: 18 MMOL/L (ref 7–16)
ANION GAP SERPL CALC-SCNC: 19 MMOL/L (ref 7–16)
ANION GAP SERPL CALC-SCNC: 2 MMOL/L (ref 7–16)
ANION GAP SERPL CALC-SCNC: 3 MMOL/L (ref 7–16)
ANION GAP SERPL CALC-SCNC: 4 MMOL/L (ref 7–16)
ANION GAP SERPL CALC-SCNC: 5 MMOL/L (ref 7–16)
ANION GAP SERPL CALC-SCNC: 6 MMOL/L (ref 7–16)
ANION GAP SERPL CALC-SCNC: 7 MMOL/L (ref 7–16)
ANION GAP SERPL CALC-SCNC: 8 MMOL/L (ref 7–16)
ANION GAP SERPL CALC-SCNC: 9 MMOL/L (ref 7–16)
ANISOCYTOSIS BLD QL SMEAR: ABNORMAL
APTT PPP: 35.5 SEC (ref 24.7–36)
APTT PPP: 47.3 SEC (ref 24.7–36)
AST SERPL-CCNC: 10 U/L (ref 12–45)
AST SERPL-CCNC: 10 U/L (ref 12–45)
AST SERPL-CCNC: 11 U/L (ref 12–45)
AST SERPL-CCNC: 16 U/L (ref 12–45)
AST SERPL-CCNC: 20 U/L (ref 12–45)
AST SERPL-CCNC: 26 U/L (ref 12–45)
AST SERPL-CCNC: 297 U/L (ref 12–45)
AST SERPL-CCNC: 521 U/L (ref 12–45)
AST SERPL-CCNC: 543 U/L (ref 12–45)
AST SERPL-CCNC: 7 U/L (ref 12–45)
BACTERIA BLD CULT: NORMAL
BACTERIA BLD CULT: NORMAL
BACTERIA BRONCH AEROBE CULT: ABNORMAL
BACTERIA BRONCH AEROBE CULT: ABNORMAL
BACTERIA BRONCH AEROBE CULT: NORMAL
BARCODED ABORH UBTYP: 5100
BARCODED PRD CODE UBPRD: NORMAL
BARCODED UNIT NUM UBUNT: NORMAL
BASE EXCESS BLDA CALC-SCNC: -1 MMOL/L (ref -4–3)
BASE EXCESS BLDA CALC-SCNC: -11 MMOL/L (ref -4–3)
BASE EXCESS BLDA CALC-SCNC: -4 MMOL/L (ref -4–3)
BASE EXCESS BLDA CALC-SCNC: -4 MMOL/L (ref -4–3)
BASE EXCESS BLDA CALC-SCNC: -5 MMOL/L (ref -4–3)
BASE EXCESS BLDA CALC-SCNC: -5 MMOL/L (ref -4–3)
BASE EXCESS BLDA CALC-SCNC: -7 MMOL/L (ref -4–3)
BASE EXCESS BLDA CALC-SCNC: -8 MMOL/L (ref -4–3)
BASE EXCESS BLDA CALC-SCNC: -8 MMOL/L (ref -4–3)
BASE EXCESS BLDA CALC-SCNC: -9 MMOL/L (ref -4–3)
BASE EXCESS BLDA CALC-SCNC: 11 MMOL/L (ref -4–3)
BASE EXCESS BLDA CALC-SCNC: 12 MMOL/L (ref -4–3)
BASE EXCESS BLDA CALC-SCNC: 19 MMOL/L (ref -4–3)
BASE EXCESS BLDA CALC-SCNC: 2 MMOL/L (ref -4–3)
BASE EXCESS BLDA CALC-SCNC: 2 MMOL/L (ref -4–3)
BASE EXCESS BLDA CALC-SCNC: 3 MMOL/L (ref -4–3)
BASE EXCESS BLDA CALC-SCNC: 3 MMOL/L (ref -4–3)
BASE EXCESS BLDA CALC-SCNC: 4 MMOL/L (ref -4–3)
BASE EXCESS BLDA CALC-SCNC: 5 MMOL/L (ref -4–3)
BASE EXCESS BLDA CALC-SCNC: 5 MMOL/L (ref -4–3)
BASE EXCESS BLDA CALC-SCNC: 6 MMOL/L (ref -4–3)
BASE EXCESS BLDA CALC-SCNC: 6 MMOL/L (ref -4–3)
BASE EXCESS BLDA CALC-SCNC: 7 MMOL/L (ref -4–3)
BASE EXCESS BLDA CALC-SCNC: 8 MMOL/L (ref -4–3)
BASE EXCESS BLDA CALC-SCNC: 9 MMOL/L (ref -4–3)
BASE EXCESS BLDA CALC-SCNC: ABNORMAL MMOL/L (ref -4–3)
BASOPHILS # BLD AUTO: 0 % (ref 0–1.8)
BASOPHILS # BLD AUTO: 0.1 % (ref 0–1.8)
BASOPHILS # BLD AUTO: 0.2 % (ref 0–1.8)
BASOPHILS # BLD AUTO: 0.3 % (ref 0–1.8)
BASOPHILS # BLD AUTO: 0.4 % (ref 0–1.8)
BASOPHILS # BLD AUTO: 0.5 % (ref 0–1.8)
BASOPHILS # BLD AUTO: 0.6 % (ref 0–1.8)
BASOPHILS # BLD AUTO: 0.7 % (ref 0–1.8)
BASOPHILS # BLD AUTO: 0.8 % (ref 0–1.8)
BASOPHILS # BLD AUTO: 0.9 % (ref 0–1.8)
BASOPHILS # BLD: 0 K/UL (ref 0–0.12)
BASOPHILS # BLD: 0.01 K/UL (ref 0–0.12)
BASOPHILS # BLD: 0.02 K/UL (ref 0–0.12)
BASOPHILS # BLD: 0.03 K/UL (ref 0–0.12)
BASOPHILS # BLD: 0.04 K/UL (ref 0–0.12)
BASOPHILS # BLD: 0.05 K/UL (ref 0–0.12)
BASOPHILS # BLD: 0.06 K/UL (ref 0–0.12)
BASOPHILS # BLD: 0.06 K/UL (ref 0–0.12)
BASOPHILS # BLD: 0.07 K/UL (ref 0–0.12)
BASOPHILS # BLD: 0.09 K/UL (ref 0–0.12)
BASOPHILS # BLD: 0.13 K/UL (ref 0–0.12)
BILIRUB SERPL-MCNC: 0.2 MG/DL (ref 0.1–1.5)
BILIRUB SERPL-MCNC: 0.3 MG/DL (ref 0.1–1.5)
BILIRUB SERPL-MCNC: 0.4 MG/DL (ref 0.1–1.5)
BILIRUB SERPL-MCNC: 0.4 MG/DL (ref 0.1–1.5)
BILIRUB SERPL-MCNC: 0.5 MG/DL (ref 0.1–1.5)
BILIRUB SERPL-MCNC: 0.6 MG/DL (ref 0.1–1.5)
BILIRUB SERPL-MCNC: 0.6 MG/DL (ref 0.1–1.5)
BILIRUB SERPL-MCNC: 1.1 MG/DL (ref 0.1–1.5)
BLD GP AB SCN SERPL QL: NORMAL
BLD GP AB SCN SERPL QL: NORMAL
BLOOD CULTURE HOLD CXBCH: NORMAL
BLOOD CULTURE HOLD CXBCH: NORMAL
BODY TEMPERATURE: 37 CENTIGRADE
BODY TEMPERATURE: ABNORMAL CENTIGRADE
BODY TEMPERATURE: ABNORMAL CENTIGRADE
BODY TEMPERATURE: ABNORMAL DEGREES
BUN SERPL-MCNC: 12 MG/DL (ref 8–22)
BUN SERPL-MCNC: 14 MG/DL (ref 8–22)
BUN SERPL-MCNC: 15 MG/DL (ref 8–22)
BUN SERPL-MCNC: 16 MG/DL (ref 8–22)
BUN SERPL-MCNC: 17 MG/DL (ref 8–22)
BUN SERPL-MCNC: 18 MG/DL (ref 8–22)
BUN SERPL-MCNC: 19 MG/DL (ref 8–22)
BUN SERPL-MCNC: 20 MG/DL (ref 8–22)
BUN SERPL-MCNC: 21 MG/DL (ref 8–22)
BUN SERPL-MCNC: 22 MG/DL (ref 8–22)
BUN SERPL-MCNC: 23 MG/DL (ref 8–22)
BUN SERPL-MCNC: 25 MG/DL (ref 8–22)
BUN SERPL-MCNC: 25 MG/DL (ref 8–22)
BUN SERPL-MCNC: 31 MG/DL (ref 8–22)
BUN SERPL-MCNC: 32 MG/DL (ref 8–22)
BUN SERPL-MCNC: 33 MG/DL (ref 8–22)
BUN SERPL-MCNC: 33 MG/DL (ref 8–22)
BURR CELLS BLD QL SMEAR: NORMAL
CA-I BLD ISE-SCNC: 0.88 MMOL/L (ref 1.1–1.3)
CA-I SERPL-SCNC: 0.9 MMOL/L (ref 1.1–1.3)
CA-I SERPL-SCNC: 0.9 MMOL/L (ref 1.1–1.3)
CALCIUM SERPL-MCNC: 6 MG/DL (ref 8.5–10.5)
CALCIUM SERPL-MCNC: 6.6 MG/DL (ref 8.5–10.5)
CALCIUM SERPL-MCNC: 6.8 MG/DL (ref 8.5–10.5)
CALCIUM SERPL-MCNC: 7.2 MG/DL (ref 8.5–10.5)
CALCIUM SERPL-MCNC: 7.5 MG/DL (ref 8.5–10.5)
CALCIUM SERPL-MCNC: 7.6 MG/DL (ref 8.5–10.5)
CALCIUM SERPL-MCNC: 7.7 MG/DL (ref 8.5–10.5)
CALCIUM SERPL-MCNC: 7.7 MG/DL (ref 8.5–10.5)
CALCIUM SERPL-MCNC: 7.8 MG/DL (ref 8.5–10.5)
CALCIUM SERPL-MCNC: 7.9 MG/DL (ref 8.5–10.5)
CALCIUM SERPL-MCNC: 8 MG/DL (ref 8.5–10.5)
CALCIUM SERPL-MCNC: 8.1 MG/DL (ref 8.5–10.5)
CALCIUM SERPL-MCNC: 8.1 MG/DL (ref 8.5–10.5)
CALCIUM SERPL-MCNC: 8.2 MG/DL (ref 8.5–10.5)
CALCIUM SERPL-MCNC: 8.3 MG/DL (ref 8.5–10.5)
CALCIUM SERPL-MCNC: 8.4 MG/DL (ref 8.5–10.5)
CALCIUM SERPL-MCNC: 8.4 MG/DL (ref 8.5–10.5)
CALCIUM SERPL-MCNC: 8.5 MG/DL (ref 8.5–10.5)
CALCIUM SERPL-MCNC: 8.6 MG/DL (ref 8.5–10.5)
CALCIUM SERPL-MCNC: 8.7 MG/DL (ref 8.5–10.5)
CALCIUM SERPL-MCNC: 8.8 MG/DL (ref 8.5–10.5)
CALCIUM SERPL-MCNC: 9.1 MG/DL (ref 8.5–10.5)
CALCIUM SERPL-MCNC: 9.1 MG/DL (ref 8.5–10.5)
CFT BLD TEG: 5.4 MIN (ref 5–10)
CHLORIDE SERPL-SCNC: 100 MMOL/L (ref 96–112)
CHLORIDE SERPL-SCNC: 101 MMOL/L (ref 96–112)
CHLORIDE SERPL-SCNC: 102 MMOL/L (ref 96–112)
CHLORIDE SERPL-SCNC: 103 MMOL/L (ref 96–112)
CHLORIDE SERPL-SCNC: 104 MMOL/L (ref 96–112)
CHLORIDE SERPL-SCNC: 105 MMOL/L (ref 96–112)
CHLORIDE SERPL-SCNC: 105 MMOL/L (ref 96–112)
CHLORIDE SERPL-SCNC: 106 MMOL/L (ref 96–112)
CHLORIDE SERPL-SCNC: 106 MMOL/L (ref 96–112)
CHLORIDE SERPL-SCNC: 107 MMOL/L (ref 96–112)
CHLORIDE SERPL-SCNC: 107 MMOL/L (ref 96–112)
CHLORIDE SERPL-SCNC: 108 MMOL/L (ref 96–112)
CHLORIDE SERPL-SCNC: 92 MMOL/L (ref 96–112)
CHLORIDE SERPL-SCNC: 93 MMOL/L (ref 96–112)
CHLORIDE SERPL-SCNC: 93 MMOL/L (ref 96–112)
CHLORIDE SERPL-SCNC: 94 MMOL/L (ref 96–112)
CHLORIDE SERPL-SCNC: 95 MMOL/L (ref 96–112)
CHLORIDE SERPL-SCNC: 96 MMOL/L (ref 96–112)
CHLORIDE SERPL-SCNC: 96 MMOL/L (ref 96–112)
CHLORIDE SERPL-SCNC: 97 MMOL/L (ref 96–112)
CHLORIDE SERPL-SCNC: 98 MMOL/L (ref 96–112)
CHLORIDE SERPL-SCNC: 99 MMOL/L (ref 96–112)
CLOT ANGLE BLD TEG: 76.7 DEGREES (ref 53–72)
CLOT LYSIS 30M P MA LENFR BLD TEG: 4.9 % (ref 0–8)
CO2 BLDA-SCNC: 17 MMOL/L (ref 20–33)
CO2 BLDA-SCNC: 17 MMOL/L (ref 20–33)
CO2 BLDA-SCNC: 19 MMOL/L (ref 20–33)
CO2 BLDA-SCNC: 20 MMOL/L (ref 20–33)
CO2 BLDA-SCNC: 21 MMOL/L (ref 20–33)
CO2 BLDA-SCNC: 23 MMOL/L (ref 20–33)
CO2 BLDA-SCNC: 24 MMOL/L (ref 20–33)
CO2 BLDA-SCNC: 25 MMOL/L (ref 20–33)
CO2 BLDA-SCNC: 28 MMOL/L (ref 20–33)
CO2 BLDA-SCNC: 29 MMOL/L (ref 20–33)
CO2 BLDA-SCNC: 31 MMOL/L (ref 20–33)
CO2 BLDA-SCNC: 31 MMOL/L (ref 20–33)
CO2 BLDA-SCNC: 32 MMOL/L (ref 20–33)
CO2 BLDA-SCNC: 32 MMOL/L (ref 20–33)
CO2 BLDA-SCNC: 33 MMOL/L (ref 20–33)
CO2 BLDA-SCNC: 36 MMOL/L (ref 20–33)
CO2 BLDA-SCNC: 39 MMOL/L (ref 20–33)
CO2 BLDA-SCNC: 44 MMOL/L (ref 20–33)
CO2 BLDA-SCNC: ABNORMAL MMOL/L (ref 20–33)
CO2 SERPL-SCNC: 17 MMOL/L (ref 20–33)
CO2 SERPL-SCNC: 19 MMOL/L (ref 20–33)
CO2 SERPL-SCNC: 19 MMOL/L (ref 20–33)
CO2 SERPL-SCNC: 20 MMOL/L (ref 20–33)
CO2 SERPL-SCNC: 21 MMOL/L (ref 20–33)
CO2 SERPL-SCNC: 24 MMOL/L (ref 20–33)
CO2 SERPL-SCNC: 25 MMOL/L (ref 20–33)
CO2 SERPL-SCNC: 26 MMOL/L (ref 20–33)
CO2 SERPL-SCNC: 27 MMOL/L (ref 20–33)
CO2 SERPL-SCNC: 27 MMOL/L (ref 20–33)
CO2 SERPL-SCNC: 28 MMOL/L (ref 20–33)
CO2 SERPL-SCNC: 29 MMOL/L (ref 20–33)
CO2 SERPL-SCNC: 30 MMOL/L (ref 20–33)
CO2 SERPL-SCNC: 31 MMOL/L (ref 20–33)
CO2 SERPL-SCNC: 31 MMOL/L (ref 20–33)
CO2 SERPL-SCNC: 32 MMOL/L (ref 20–33)
CO2 SERPL-SCNC: 33 MMOL/L (ref 20–33)
CO2 SERPL-SCNC: 35 MMOL/L (ref 20–33)
CO2 SERPL-SCNC: 38 MMOL/L (ref 20–33)
CO2 SERPL-SCNC: 39 MMOL/L (ref 20–33)
CO2 SERPL-SCNC: 39 MMOL/L (ref 20–33)
CO2 SERPL-SCNC: 43 MMOL/L (ref 20–33)
COMMENT 1642: NORMAL
COMPONENT R 8504R: NORMAL
CORTIS SERPL-MCNC: 20 UG/DL (ref 0–23)
COVID ORDER STATUS COVID19: NORMAL
CREAT SERPL-MCNC: 0.22 MG/DL (ref 0.5–1.4)
CREAT SERPL-MCNC: 0.28 MG/DL (ref 0.5–1.4)
CREAT SERPL-MCNC: 0.28 MG/DL (ref 0.5–1.4)
CREAT SERPL-MCNC: 0.29 MG/DL (ref 0.5–1.4)
CREAT SERPL-MCNC: 0.3 MG/DL (ref 0.5–1.4)
CREAT SERPL-MCNC: 0.3 MG/DL (ref 0.5–1.4)
CREAT SERPL-MCNC: 0.31 MG/DL (ref 0.5–1.4)
CREAT SERPL-MCNC: 0.32 MG/DL (ref 0.5–1.4)
CREAT SERPL-MCNC: 0.33 MG/DL (ref 0.5–1.4)
CREAT SERPL-MCNC: 0.33 MG/DL (ref 0.5–1.4)
CREAT SERPL-MCNC: 0.34 MG/DL (ref 0.5–1.4)
CREAT SERPL-MCNC: 0.35 MG/DL (ref 0.5–1.4)
CREAT SERPL-MCNC: 0.36 MG/DL (ref 0.5–1.4)
CREAT SERPL-MCNC: 0.37 MG/DL (ref 0.5–1.4)
CREAT SERPL-MCNC: 0.37 MG/DL (ref 0.5–1.4)
CREAT SERPL-MCNC: 0.38 MG/DL (ref 0.5–1.4)
CREAT SERPL-MCNC: 0.39 MG/DL (ref 0.5–1.4)
CREAT SERPL-MCNC: 0.4 MG/DL (ref 0.5–1.4)
CREAT SERPL-MCNC: 0.41 MG/DL (ref 0.5–1.4)
CREAT SERPL-MCNC: 0.42 MG/DL (ref 0.5–1.4)
CREAT SERPL-MCNC: 0.42 MG/DL (ref 0.5–1.4)
CREAT SERPL-MCNC: 0.43 MG/DL (ref 0.5–1.4)
CREAT SERPL-MCNC: 0.44 MG/DL (ref 0.5–1.4)
CREAT SERPL-MCNC: 0.46 MG/DL (ref 0.5–1.4)
CREAT SERPL-MCNC: 0.47 MG/DL (ref 0.5–1.4)
CREAT SERPL-MCNC: 0.47 MG/DL (ref 0.5–1.4)
CREAT SERPL-MCNC: 0.48 MG/DL (ref 0.5–1.4)
CREAT SERPL-MCNC: 0.48 MG/DL (ref 0.5–1.4)
CREAT SERPL-MCNC: 0.49 MG/DL (ref 0.5–1.4)
CREAT SERPL-MCNC: 0.5 MG/DL (ref 0.5–1.4)
CREAT SERPL-MCNC: 0.52 MG/DL (ref 0.5–1.4)
CREAT SERPL-MCNC: 0.7 MG/DL (ref 0.5–1.4)
CREAT SERPL-MCNC: 1.02 MG/DL (ref 0.5–1.4)
CREAT SERPL-MCNC: 1.11 MG/DL (ref 0.5–1.4)
CREAT SERPL-MCNC: 1.13 MG/DL (ref 0.5–1.4)
CREAT SERPL-MCNC: 1.23 MG/DL (ref 0.5–1.4)
CRP SERPL HS-MCNC: 0.76 MG/DL (ref 0–0.75)
CRP SERPL HS-MCNC: 1.12 MG/DL (ref 0–0.75)
CRP SERPL HS-MCNC: 10.85 MG/DL (ref 0–0.75)
CRP SERPL HS-MCNC: 3.5 MG/DL (ref 0–0.75)
CRP SERPL HS-MCNC: 3.72 MG/DL (ref 0–0.75)
CRP SERPL HS-MCNC: 5.06 MG/DL (ref 0–0.75)
CRP SERPL HS-MCNC: 8.09 MG/DL (ref 0–0.75)
CT.EXTRINSIC BLD ROTEM: 1 MIN (ref 1–3)
EKG IMPRESSION: NORMAL
EOSINOPHIL # BLD AUTO: 0 K/UL (ref 0–0.51)
EOSINOPHIL # BLD AUTO: 0.01 K/UL (ref 0–0.51)
EOSINOPHIL # BLD AUTO: 0.02 K/UL (ref 0–0.51)
EOSINOPHIL # BLD AUTO: 0.03 K/UL (ref 0–0.51)
EOSINOPHIL # BLD AUTO: 0.04 K/UL (ref 0–0.51)
EOSINOPHIL # BLD AUTO: 0.05 K/UL (ref 0–0.51)
EOSINOPHIL # BLD AUTO: 0.06 K/UL (ref 0–0.51)
EOSINOPHIL # BLD AUTO: 0.07 K/UL (ref 0–0.51)
EOSINOPHIL # BLD AUTO: 0.08 K/UL (ref 0–0.51)
EOSINOPHIL # BLD AUTO: 0.09 K/UL (ref 0–0.51)
EOSINOPHIL # BLD AUTO: 0.1 K/UL (ref 0–0.51)
EOSINOPHIL NFR BLD: 0 % (ref 0–6.9)
EOSINOPHIL NFR BLD: 0.1 % (ref 0–6.9)
EOSINOPHIL NFR BLD: 0.2 % (ref 0–6.9)
EOSINOPHIL NFR BLD: 0.3 % (ref 0–6.9)
EOSINOPHIL NFR BLD: 0.4 % (ref 0–6.9)
EOSINOPHIL NFR BLD: 0.5 % (ref 0–6.9)
EOSINOPHIL NFR BLD: 0.7 % (ref 0–6.9)
EOSINOPHIL NFR BLD: 0.8 % (ref 0–6.9)
EOSINOPHIL NFR BLD: 0.8 % (ref 0–6.9)
EOSINOPHIL NFR BLD: 1.2 % (ref 0–6.9)
EOSINOPHIL NFR BLD: 1.2 % (ref 0–6.9)
EOSINOPHIL NFR BLD: 1.6 % (ref 0–6.9)
ERYTHROCYTE [DISTWIDTH] IN BLOOD BY AUTOMATED COUNT: 47.6 FL (ref 35.9–50)
ERYTHROCYTE [DISTWIDTH] IN BLOOD BY AUTOMATED COUNT: 48.2 FL (ref 35.9–50)
ERYTHROCYTE [DISTWIDTH] IN BLOOD BY AUTOMATED COUNT: 48.2 FL (ref 35.9–50)
ERYTHROCYTE [DISTWIDTH] IN BLOOD BY AUTOMATED COUNT: 48.4 FL (ref 35.9–50)
ERYTHROCYTE [DISTWIDTH] IN BLOOD BY AUTOMATED COUNT: 48.6 FL (ref 35.9–50)
ERYTHROCYTE [DISTWIDTH] IN BLOOD BY AUTOMATED COUNT: 48.7 FL (ref 35.9–50)
ERYTHROCYTE [DISTWIDTH] IN BLOOD BY AUTOMATED COUNT: 49 FL (ref 35.9–50)
ERYTHROCYTE [DISTWIDTH] IN BLOOD BY AUTOMATED COUNT: 49.1 FL (ref 35.9–50)
ERYTHROCYTE [DISTWIDTH] IN BLOOD BY AUTOMATED COUNT: 49.5 FL (ref 35.9–50)
ERYTHROCYTE [DISTWIDTH] IN BLOOD BY AUTOMATED COUNT: 49.6 FL (ref 35.9–50)
ERYTHROCYTE [DISTWIDTH] IN BLOOD BY AUTOMATED COUNT: 49.7 FL (ref 35.9–50)
ERYTHROCYTE [DISTWIDTH] IN BLOOD BY AUTOMATED COUNT: 49.7 FL (ref 35.9–50)
ERYTHROCYTE [DISTWIDTH] IN BLOOD BY AUTOMATED COUNT: 49.8 FL (ref 35.9–50)
ERYTHROCYTE [DISTWIDTH] IN BLOOD BY AUTOMATED COUNT: 49.9 FL (ref 35.9–50)
ERYTHROCYTE [DISTWIDTH] IN BLOOD BY AUTOMATED COUNT: 50 FL (ref 35.9–50)
ERYTHROCYTE [DISTWIDTH] IN BLOOD BY AUTOMATED COUNT: 50.4 FL (ref 35.9–50)
ERYTHROCYTE [DISTWIDTH] IN BLOOD BY AUTOMATED COUNT: 50.4 FL (ref 35.9–50)
ERYTHROCYTE [DISTWIDTH] IN BLOOD BY AUTOMATED COUNT: 50.5 FL (ref 35.9–50)
ERYTHROCYTE [DISTWIDTH] IN BLOOD BY AUTOMATED COUNT: 50.7 FL (ref 35.9–50)
ERYTHROCYTE [DISTWIDTH] IN BLOOD BY AUTOMATED COUNT: 50.8 FL (ref 35.9–50)
ERYTHROCYTE [DISTWIDTH] IN BLOOD BY AUTOMATED COUNT: 51 FL (ref 35.9–50)
ERYTHROCYTE [DISTWIDTH] IN BLOOD BY AUTOMATED COUNT: 51.2 FL (ref 35.9–50)
ERYTHROCYTE [DISTWIDTH] IN BLOOD BY AUTOMATED COUNT: 51.2 FL (ref 35.9–50)
ERYTHROCYTE [DISTWIDTH] IN BLOOD BY AUTOMATED COUNT: 51.3 FL (ref 35.9–50)
ERYTHROCYTE [DISTWIDTH] IN BLOOD BY AUTOMATED COUNT: 51.4 FL (ref 35.9–50)
ERYTHROCYTE [DISTWIDTH] IN BLOOD BY AUTOMATED COUNT: 51.5 FL (ref 35.9–50)
ERYTHROCYTE [DISTWIDTH] IN BLOOD BY AUTOMATED COUNT: 51.6 FL (ref 35.9–50)
ERYTHROCYTE [DISTWIDTH] IN BLOOD BY AUTOMATED COUNT: 51.8 FL (ref 35.9–50)
ERYTHROCYTE [DISTWIDTH] IN BLOOD BY AUTOMATED COUNT: 51.9 FL (ref 35.9–50)
ERYTHROCYTE [DISTWIDTH] IN BLOOD BY AUTOMATED COUNT: 52.1 FL (ref 35.9–50)
ERYTHROCYTE [DISTWIDTH] IN BLOOD BY AUTOMATED COUNT: 52.3 FL (ref 35.9–50)
ERYTHROCYTE [DISTWIDTH] IN BLOOD BY AUTOMATED COUNT: 52.5 FL (ref 35.9–50)
ERYTHROCYTE [DISTWIDTH] IN BLOOD BY AUTOMATED COUNT: 52.6 FL (ref 35.9–50)
ERYTHROCYTE [DISTWIDTH] IN BLOOD BY AUTOMATED COUNT: 52.8 FL (ref 35.9–50)
ERYTHROCYTE [DISTWIDTH] IN BLOOD BY AUTOMATED COUNT: 52.8 FL (ref 35.9–50)
ERYTHROCYTE [DISTWIDTH] IN BLOOD BY AUTOMATED COUNT: 52.9 FL (ref 35.9–50)
ERYTHROCYTE [DISTWIDTH] IN BLOOD BY AUTOMATED COUNT: 53 FL (ref 35.9–50)
ERYTHROCYTE [DISTWIDTH] IN BLOOD BY AUTOMATED COUNT: 53 FL (ref 35.9–50)
ERYTHROCYTE [DISTWIDTH] IN BLOOD BY AUTOMATED COUNT: 53.1 FL (ref 35.9–50)
ERYTHROCYTE [DISTWIDTH] IN BLOOD BY AUTOMATED COUNT: 53.1 FL (ref 35.9–50)
ERYTHROCYTE [DISTWIDTH] IN BLOOD BY AUTOMATED COUNT: 53.2 FL (ref 35.9–50)
ERYTHROCYTE [DISTWIDTH] IN BLOOD BY AUTOMATED COUNT: 53.4 FL (ref 35.9–50)
ERYTHROCYTE [DISTWIDTH] IN BLOOD BY AUTOMATED COUNT: 53.9 FL (ref 35.9–50)
ERYTHROCYTE [DISTWIDTH] IN BLOOD BY AUTOMATED COUNT: 53.9 FL (ref 35.9–50)
ERYTHROCYTE [DISTWIDTH] IN BLOOD BY AUTOMATED COUNT: 54 FL (ref 35.9–50)
ERYTHROCYTE [DISTWIDTH] IN BLOOD BY AUTOMATED COUNT: 54.4 FL (ref 35.9–50)
ERYTHROCYTE [DISTWIDTH] IN BLOOD BY AUTOMATED COUNT: 54.4 FL (ref 35.9–50)
ERYTHROCYTE [DISTWIDTH] IN BLOOD BY AUTOMATED COUNT: 54.9 FL (ref 35.9–50)
ERYTHROCYTE [DISTWIDTH] IN BLOOD BY AUTOMATED COUNT: 54.9 FL (ref 35.9–50)
FUNGUS SPEC CULT: NORMAL
FUNGUS SPEC FUNGUS STN: NORMAL
GLOBULIN SER CALC-MCNC: 1.9 G/DL (ref 1.9–3.5)
GLOBULIN SER CALC-MCNC: 1.9 G/DL (ref 1.9–3.5)
GLOBULIN SER CALC-MCNC: 2 G/DL (ref 1.9–3.5)
GLOBULIN SER CALC-MCNC: 2.5 G/DL (ref 1.9–3.5)
GLOBULIN SER CALC-MCNC: 2.5 G/DL (ref 1.9–3.5)
GLOBULIN SER CALC-MCNC: 2.7 G/DL (ref 1.9–3.5)
GLOBULIN SER CALC-MCNC: 2.7 G/DL (ref 1.9–3.5)
GLOBULIN SER CALC-MCNC: 3 G/DL (ref 1.9–3.5)
GLOBULIN SER CALC-MCNC: 3.2 G/DL (ref 1.9–3.5)
GLOBULIN SER CALC-MCNC: 3.7 G/DL (ref 1.9–3.5)
GLUCOSE BLD-MCNC: 100 MG/DL (ref 65–99)
GLUCOSE BLD-MCNC: 102 MG/DL (ref 65–99)
GLUCOSE BLD-MCNC: 115 MG/DL (ref 65–99)
GLUCOSE BLD-MCNC: 121 MG/DL (ref 65–99)
GLUCOSE BLD-MCNC: 122 MG/DL (ref 65–99)
GLUCOSE BLD-MCNC: 123 MG/DL (ref 65–99)
GLUCOSE BLD-MCNC: 139 MG/DL (ref 65–99)
GLUCOSE BLD-MCNC: 143 MG/DL (ref 65–99)
GLUCOSE BLD-MCNC: 276 MG/DL (ref 65–99)
GLUCOSE BLD-MCNC: 88 MG/DL (ref 65–99)
GLUCOSE BLD-MCNC: 90 MG/DL (ref 65–99)
GLUCOSE SERPL-MCNC: 101 MG/DL (ref 65–99)
GLUCOSE SERPL-MCNC: 103 MG/DL (ref 65–99)
GLUCOSE SERPL-MCNC: 105 MG/DL (ref 65–99)
GLUCOSE SERPL-MCNC: 105 MG/DL (ref 65–99)
GLUCOSE SERPL-MCNC: 106 MG/DL (ref 65–99)
GLUCOSE SERPL-MCNC: 107 MG/DL (ref 65–99)
GLUCOSE SERPL-MCNC: 109 MG/DL (ref 65–99)
GLUCOSE SERPL-MCNC: 110 MG/DL (ref 65–99)
GLUCOSE SERPL-MCNC: 112 MG/DL (ref 65–99)
GLUCOSE SERPL-MCNC: 114 MG/DL (ref 65–99)
GLUCOSE SERPL-MCNC: 115 MG/DL (ref 65–99)
GLUCOSE SERPL-MCNC: 118 MG/DL (ref 65–99)
GLUCOSE SERPL-MCNC: 120 MG/DL (ref 65–99)
GLUCOSE SERPL-MCNC: 135 MG/DL (ref 65–99)
GLUCOSE SERPL-MCNC: 138 MG/DL (ref 65–99)
GLUCOSE SERPL-MCNC: 145 MG/DL (ref 65–99)
GLUCOSE SERPL-MCNC: 152 MG/DL (ref 65–99)
GLUCOSE SERPL-MCNC: 152 MG/DL (ref 65–99)
GLUCOSE SERPL-MCNC: 40 MG/DL (ref 65–99)
GLUCOSE SERPL-MCNC: 74 MG/DL (ref 65–99)
GLUCOSE SERPL-MCNC: 77 MG/DL (ref 65–99)
GLUCOSE SERPL-MCNC: 78 MG/DL (ref 65–99)
GLUCOSE SERPL-MCNC: 79 MG/DL (ref 65–99)
GLUCOSE SERPL-MCNC: 79 MG/DL (ref 65–99)
GLUCOSE SERPL-MCNC: 80 MG/DL (ref 65–99)
GLUCOSE SERPL-MCNC: 82 MG/DL (ref 65–99)
GLUCOSE SERPL-MCNC: 84 MG/DL (ref 65–99)
GLUCOSE SERPL-MCNC: 84 MG/DL (ref 65–99)
GLUCOSE SERPL-MCNC: 85 MG/DL (ref 65–99)
GLUCOSE SERPL-MCNC: 86 MG/DL (ref 65–99)
GLUCOSE SERPL-MCNC: 87 MG/DL (ref 65–99)
GLUCOSE SERPL-MCNC: 88 MG/DL (ref 65–99)
GLUCOSE SERPL-MCNC: 88 MG/DL (ref 65–99)
GLUCOSE SERPL-MCNC: 89 MG/DL (ref 65–99)
GLUCOSE SERPL-MCNC: 90 MG/DL (ref 65–99)
GLUCOSE SERPL-MCNC: 91 MG/DL (ref 65–99)
GLUCOSE SERPL-MCNC: 94 MG/DL (ref 65–99)
GLUCOSE SERPL-MCNC: 96 MG/DL (ref 65–99)
GLUCOSE SERPL-MCNC: 97 MG/DL (ref 65–99)
GLUCOSE SERPL-MCNC: 98 MG/DL (ref 65–99)
GRAM STN SPEC: ABNORMAL
GRAM STN SPEC: NORMAL
HBV SURFACE AG SER QL: NORMAL
HCO3 BLDA-SCNC: 16.1 MMOL/L (ref 17–25)
HCO3 BLDA-SCNC: 16.1 MMOL/L (ref 17–25)
HCO3 BLDA-SCNC: 17.4 MMOL/L (ref 17–25)
HCO3 BLDA-SCNC: 18.6 MMOL/L (ref 17–25)
HCO3 BLDA-SCNC: 18.6 MMOL/L (ref 17–25)
HCO3 BLDA-SCNC: 19.2 MMOL/L (ref 17–25)
HCO3 BLDA-SCNC: 20.2 MMOL/L (ref 17–25)
HCO3 BLDA-SCNC: 20.7 MMOL/L (ref 17–25)
HCO3 BLDA-SCNC: 22.1 MMOL/L (ref 17–25)
HCO3 BLDA-SCNC: 22.5 MMOL/L (ref 17–25)
HCO3 BLDA-SCNC: 23.3 MMOL/L (ref 17–25)
HCO3 BLDA-SCNC: 23.3 MMOL/L (ref 17–25)
HCO3 BLDA-SCNC: 27.1 MMOL/L (ref 17–25)
HCO3 BLDA-SCNC: 27.6 MMOL/L (ref 17–25)
HCO3 BLDA-SCNC: 29.7 MMOL/L (ref 17–25)
HCO3 BLDA-SCNC: 30 MMOL/L (ref 17–25)
HCO3 BLDA-SCNC: 30.4 MMOL/L (ref 17–25)
HCO3 BLDA-SCNC: 31 MMOL/L (ref 17–25)
HCO3 BLDA-SCNC: 31.2 MMOL/L (ref 17–25)
HCO3 BLDA-SCNC: 31.3 MMOL/L (ref 17–25)
HCO3 BLDA-SCNC: 31.3 MMOL/L (ref 17–25)
HCO3 BLDA-SCNC: 31.5 MMOL/L (ref 17–25)
HCO3 BLDA-SCNC: 31.9 MMOL/L (ref 17–25)
HCO3 BLDA-SCNC: 33 MMOL/L (ref 17–25)
HCO3 BLDA-SCNC: 34.4 MMOL/L (ref 17–25)
HCO3 BLDA-SCNC: 35 MMOL/L (ref 17–25)
HCO3 BLDA-SCNC: 37.1 MMOL/L (ref 17–25)
HCO3 BLDA-SCNC: 39 MMOL/L (ref 17–25)
HCO3 BLDA-SCNC: 42.7 MMOL/L (ref 17–25)
HCO3 BLDA-SCNC: ABNORMAL MMOL/L (ref 17–25)
HCT VFR BLD AUTO: 22.1 % (ref 42–52)
HCT VFR BLD AUTO: 24.4 % (ref 42–52)
HCT VFR BLD AUTO: 24.5 % (ref 42–52)
HCT VFR BLD AUTO: 24.7 % (ref 42–52)
HCT VFR BLD AUTO: 25 % (ref 42–52)
HCT VFR BLD AUTO: 25 % (ref 42–52)
HCT VFR BLD AUTO: 25.1 % (ref 42–52)
HCT VFR BLD AUTO: 25.5 % (ref 42–52)
HCT VFR BLD AUTO: 25.7 % (ref 42–52)
HCT VFR BLD AUTO: 25.9 % (ref 42–52)
HCT VFR BLD AUTO: 26 % (ref 42–52)
HCT VFR BLD AUTO: 26.5 % (ref 42–52)
HCT VFR BLD AUTO: 26.6 % (ref 42–52)
HCT VFR BLD AUTO: 27 % (ref 42–52)
HCT VFR BLD AUTO: 27.2 % (ref 42–52)
HCT VFR BLD AUTO: 27.3 % (ref 42–52)
HCT VFR BLD AUTO: 27.4 % (ref 42–52)
HCT VFR BLD AUTO: 27.4 % (ref 42–52)
HCT VFR BLD AUTO: 28.3 % (ref 42–52)
HCT VFR BLD AUTO: 28.7 % (ref 42–52)
HCT VFR BLD AUTO: 28.8 % (ref 42–52)
HCT VFR BLD AUTO: 28.9 % (ref 42–52)
HCT VFR BLD AUTO: 28.9 % (ref 42–52)
HCT VFR BLD AUTO: 29 % (ref 42–52)
HCT VFR BLD AUTO: 29.1 % (ref 42–52)
HCT VFR BLD AUTO: 29.2 % (ref 42–52)
HCT VFR BLD AUTO: 29.5 % (ref 42–52)
HCT VFR BLD AUTO: 29.6 % (ref 42–52)
HCT VFR BLD AUTO: 29.6 % (ref 42–52)
HCT VFR BLD AUTO: 30.1 % (ref 42–52)
HCT VFR BLD AUTO: 30.2 % (ref 42–52)
HCT VFR BLD AUTO: 30.3 % (ref 42–52)
HCT VFR BLD AUTO: 30.3 % (ref 42–52)
HCT VFR BLD AUTO: 30.6 % (ref 42–52)
HCT VFR BLD AUTO: 30.7 % (ref 42–52)
HCT VFR BLD AUTO: 30.7 % (ref 42–52)
HCT VFR BLD AUTO: 31 % (ref 42–52)
HCT VFR BLD AUTO: 31.1 % (ref 42–52)
HCT VFR BLD AUTO: 31.4 % (ref 42–52)
HCT VFR BLD AUTO: 31.5 % (ref 42–52)
HCT VFR BLD AUTO: 31.5 % (ref 42–52)
HCT VFR BLD AUTO: 31.7 % (ref 42–52)
HCT VFR BLD AUTO: 31.9 % (ref 42–52)
HCT VFR BLD AUTO: 32.3 % (ref 42–52)
HCT VFR BLD AUTO: 33 % (ref 42–52)
HCT VFR BLD AUTO: 33.1 % (ref 42–52)
HCT VFR BLD AUTO: 33.3 % (ref 42–52)
HCT VFR BLD AUTO: 33.4 % (ref 42–52)
HCT VFR BLD AUTO: 33.8 % (ref 42–52)
HCT VFR BLD AUTO: 33.9 % (ref 42–52)
HCT VFR BLD AUTO: 34.7 % (ref 42–52)
HCT VFR BLD AUTO: 34.9 % (ref 42–52)
HCT VFR BLD AUTO: 36.3 % (ref 42–52)
HCT VFR BLD AUTO: 36.4 % (ref 42–52)
HCT VFR BLD AUTO: 37.4 % (ref 42–52)
HCT VFR BLD AUTO: 38 % (ref 42–52)
HCT VFR BLD AUTO: 38.8 % (ref 42–52)
HCT VFR BLD CALC: 28 % (ref 42–52)
HCT VFR BLD CALC: 32 % (ref 42–52)
HCT VFR BLD CALC: 34 % (ref 42–52)
HCV AB SER QL: NORMAL
HGB BLD-MCNC: 10.3 G/DL (ref 14–18)
HGB BLD-MCNC: 10.5 G/DL (ref 14–18)
HGB BLD-MCNC: 10.5 G/DL (ref 14–18)
HGB BLD-MCNC: 10.9 G/DL (ref 14–18)
HGB BLD-MCNC: 11.6 G/DL (ref 14–18)
HGB BLD-MCNC: 6.7 G/DL (ref 14–18)
HGB BLD-MCNC: 7.3 G/DL (ref 14–18)
HGB BLD-MCNC: 7.4 G/DL (ref 14–18)
HGB BLD-MCNC: 7.4 G/DL (ref 14–18)
HGB BLD-MCNC: 7.5 G/DL (ref 14–18)
HGB BLD-MCNC: 7.6 G/DL (ref 14–18)
HGB BLD-MCNC: 7.7 G/DL (ref 14–18)
HGB BLD-MCNC: 7.8 G/DL (ref 14–18)
HGB BLD-MCNC: 7.9 G/DL (ref 14–18)
HGB BLD-MCNC: 8 G/DL (ref 14–18)
HGB BLD-MCNC: 8 G/DL (ref 14–18)
HGB BLD-MCNC: 8.1 G/DL (ref 14–18)
HGB BLD-MCNC: 8.1 G/DL (ref 14–18)
HGB BLD-MCNC: 8.3 G/DL (ref 14–18)
HGB BLD-MCNC: 8.4 G/DL (ref 14–18)
HGB BLD-MCNC: 8.4 G/DL (ref 14–18)
HGB BLD-MCNC: 8.5 G/DL (ref 14–18)
HGB BLD-MCNC: 8.6 G/DL (ref 14–18)
HGB BLD-MCNC: 8.7 G/DL (ref 14–18)
HGB BLD-MCNC: 8.8 G/DL (ref 14–18)
HGB BLD-MCNC: 8.9 G/DL (ref 14–18)
HGB BLD-MCNC: 9 G/DL (ref 14–18)
HGB BLD-MCNC: 9.1 G/DL (ref 14–18)
HGB BLD-MCNC: 9.2 G/DL (ref 14–18)
HGB BLD-MCNC: 9.3 G/DL (ref 14–18)
HGB BLD-MCNC: 9.4 G/DL (ref 14–18)
HGB BLD-MCNC: 9.4 G/DL (ref 14–18)
HGB BLD-MCNC: 9.5 G/DL (ref 14–18)
HGB BLD-MCNC: 9.6 G/DL (ref 14–18)
HGB BLD-MCNC: 9.7 G/DL (ref 14–18)
HGB BLD-MCNC: 9.8 G/DL (ref 14–18)
HGB BLD-MCNC: 9.9 G/DL (ref 14–18)
HIV 1+2 AB+HIV1 P24 AG SERPL QL IA: NORMAL
HIV 1+2 AB+HIV1 P24 AG SERPL QL IA: NORMAL
HOROWITZ INDEX BLDA+IHG-RTO: 113 MM[HG]
HOROWITZ INDEX BLDA+IHG-RTO: 113 MM[HG]
HOROWITZ INDEX BLDA+IHG-RTO: 116 MM[HG]
HOROWITZ INDEX BLDA+IHG-RTO: 119 MM[HG]
HOROWITZ INDEX BLDA+IHG-RTO: 129 MM[HG]
HOROWITZ INDEX BLDA+IHG-RTO: 135 MM[HG]
HOROWITZ INDEX BLDA+IHG-RTO: 145 MM[HG]
HOROWITZ INDEX BLDA+IHG-RTO: 154 MM[HG]
HOROWITZ INDEX BLDA+IHG-RTO: 156 MM[HG]
HOROWITZ INDEX BLDA+IHG-RTO: 156 MM[HG]
HOROWITZ INDEX BLDA+IHG-RTO: 168 MM[HG]
HOROWITZ INDEX BLDA+IHG-RTO: 170 MM[HG]
HOROWITZ INDEX BLDA+IHG-RTO: 183 MM[HG]
HOROWITZ INDEX BLDA+IHG-RTO: 203 MM[HG]
HOROWITZ INDEX BLDA+IHG-RTO: 215 MM[HG]
HOROWITZ INDEX BLDA+IHG-RTO: 217 MM[HG]
HOROWITZ INDEX BLDA+IHG-RTO: 230 MM[HG]
HOROWITZ INDEX BLDA+IHG-RTO: 231 MM[HG]
HOROWITZ INDEX BLDA+IHG-RTO: 233 MM[HG]
HOROWITZ INDEX BLDA+IHG-RTO: 237 MM[HG]
HOROWITZ INDEX BLDA+IHG-RTO: 238 MM[HG]
HOROWITZ INDEX BLDA+IHG-RTO: 244 MM[HG]
HOROWITZ INDEX BLDA+IHG-RTO: 280 MM[HG]
HOROWITZ INDEX BLDA+IHG-RTO: 313 MM[HG]
HOROWITZ INDEX BLDA+IHG-RTO: 47 MM[HG]
HOROWITZ INDEX BLDA+IHG-RTO: 60 MM[HG]
HOROWITZ INDEX BLDA+IHG-RTO: 78 MM[HG]
HOROWITZ INDEX BLDA+IHG-RTO: 90 MM[HG]
HOROWITZ INDEX BLDA+IHG-RTO: 91 MM[HG]
HYPOCHROMIA BLD QL SMEAR: ABNORMAL
IMM GRANULOCYTES # BLD AUTO: 0.02 K/UL (ref 0–0.11)
IMM GRANULOCYTES # BLD AUTO: 0.03 K/UL (ref 0–0.11)
IMM GRANULOCYTES # BLD AUTO: 0.04 K/UL (ref 0–0.11)
IMM GRANULOCYTES # BLD AUTO: 0.05 K/UL (ref 0–0.11)
IMM GRANULOCYTES # BLD AUTO: 0.06 K/UL (ref 0–0.11)
IMM GRANULOCYTES # BLD AUTO: 0.07 K/UL (ref 0–0.11)
IMM GRANULOCYTES # BLD AUTO: 0.08 K/UL (ref 0–0.11)
IMM GRANULOCYTES # BLD AUTO: 0.09 K/UL (ref 0–0.11)
IMM GRANULOCYTES # BLD AUTO: 0.1 K/UL (ref 0–0.11)
IMM GRANULOCYTES # BLD AUTO: 0.11 K/UL (ref 0–0.11)
IMM GRANULOCYTES # BLD AUTO: 0.13 K/UL (ref 0–0.11)
IMM GRANULOCYTES # BLD AUTO: 0.14 K/UL (ref 0–0.11)
IMM GRANULOCYTES # BLD AUTO: 0.16 K/UL (ref 0–0.11)
IMM GRANULOCYTES # BLD AUTO: 0.21 K/UL (ref 0–0.11)
IMM GRANULOCYTES # BLD AUTO: 0.49 K/UL (ref 0–0.11)
IMM GRANULOCYTES NFR BLD AUTO: 0.3 % (ref 0–0.9)
IMM GRANULOCYTES NFR BLD AUTO: 0.4 % (ref 0–0.9)
IMM GRANULOCYTES NFR BLD AUTO: 0.5 % (ref 0–0.9)
IMM GRANULOCYTES NFR BLD AUTO: 0.6 % (ref 0–0.9)
IMM GRANULOCYTES NFR BLD AUTO: 0.7 % (ref 0–0.9)
IMM GRANULOCYTES NFR BLD AUTO: 0.8 % (ref 0–0.9)
IMM GRANULOCYTES NFR BLD AUTO: 0.9 % (ref 0–0.9)
IMM GRANULOCYTES NFR BLD AUTO: 0.9 % (ref 0–0.9)
IMM GRANULOCYTES NFR BLD AUTO: 1 % (ref 0–0.9)
IMM GRANULOCYTES NFR BLD AUTO: 1.6 % (ref 0–0.9)
INHALED O2 FLOW RATE: 10 L/MIN (ref 2–10)
INHALED O2 FLOW RATE: 10 L/MIN (ref 2–10)
INR PPP: 1.38 (ref 0.87–1.13)
INR PPP: 1.5 (ref 0.87–1.13)
INR PPP: 1.52 (ref 0.87–1.13)
INR PPP: 1.59 (ref 0.87–1.13)
INR PPP: 1.6 (ref 0.87–1.13)
INST. QUALIFIED PATIENT IIQPT: YES
LACTATE BLD-SCNC: 0.7 MMOL/L (ref 0.5–2)
LACTATE BLD-SCNC: 1.3 MMOL/L (ref 0.5–2)
LACTATE BLD-SCNC: 1.4 MMOL/L (ref 0.5–2)
LACTATE BLD-SCNC: 1.5 MMOL/L (ref 0.5–2)
LACTATE BLD-SCNC: 3.7 MMOL/L (ref 0.5–2)
LACTATE BLD-SCNC: 6.5 MMOL/L (ref 0.5–2)
LACTATE BLD-SCNC: 7.2 MMOL/L (ref 0.5–2)
LACTATE BLD-SCNC: 8.1 MMOL/L (ref 0.5–2)
LACTATE BLD-SCNC: 8.2 MMOL/L (ref 0.5–2)
LACTATE BLD-SCNC: 8.2 MMOL/L (ref 0.5–2)
LACTATE BLD-SCNC: 8.5 MMOL/L (ref 0.5–2)
LACTATE BLD-SCNC: 8.9 MMOL/L (ref 0.5–2)
LACTATE BLD-SCNC: 9.3 MMOL/L (ref 0.5–2)
LV EJECT FRACT  99904: 65
LV EJECT FRACT MOD 2C 99903: 68
LV EJECT FRACT MOD 4C 99902: 61.62
LV EJECT FRACT MOD BP 99901: 65.37
LYMPHOCYTES # BLD AUTO: 0.31 K/UL (ref 1–4.8)
LYMPHOCYTES # BLD AUTO: 0.32 K/UL (ref 1–4.8)
LYMPHOCYTES # BLD AUTO: 0.33 K/UL (ref 1–4.8)
LYMPHOCYTES # BLD AUTO: 0.33 K/UL (ref 1–4.8)
LYMPHOCYTES # BLD AUTO: 0.35 K/UL (ref 1–4.8)
LYMPHOCYTES # BLD AUTO: 0.35 K/UL (ref 1–4.8)
LYMPHOCYTES # BLD AUTO: 0.38 K/UL (ref 1–4.8)
LYMPHOCYTES # BLD AUTO: 0.39 K/UL (ref 1–4.8)
LYMPHOCYTES # BLD AUTO: 0.39 K/UL (ref 1–4.8)
LYMPHOCYTES # BLD AUTO: 0.42 K/UL (ref 1–4.8)
LYMPHOCYTES # BLD AUTO: 0.42 K/UL (ref 1–4.8)
LYMPHOCYTES # BLD AUTO: 0.48 K/UL (ref 1–4.8)
LYMPHOCYTES # BLD AUTO: 0.48 K/UL (ref 1–4.8)
LYMPHOCYTES # BLD AUTO: 0.49 K/UL (ref 1–4.8)
LYMPHOCYTES # BLD AUTO: 0.51 K/UL (ref 1–4.8)
LYMPHOCYTES # BLD AUTO: 0.51 K/UL (ref 1–4.8)
LYMPHOCYTES # BLD AUTO: 0.54 K/UL (ref 1–4.8)
LYMPHOCYTES # BLD AUTO: 0.55 K/UL (ref 1–4.8)
LYMPHOCYTES # BLD AUTO: 0.56 K/UL (ref 1–4.8)
LYMPHOCYTES # BLD AUTO: 0.58 K/UL (ref 1–4.8)
LYMPHOCYTES # BLD AUTO: 0.58 K/UL (ref 1–4.8)
LYMPHOCYTES # BLD AUTO: 0.59 K/UL (ref 1–4.8)
LYMPHOCYTES # BLD AUTO: 0.6 K/UL (ref 1–4.8)
LYMPHOCYTES # BLD AUTO: 0.64 K/UL (ref 1–4.8)
LYMPHOCYTES # BLD AUTO: 0.66 K/UL (ref 1–4.8)
LYMPHOCYTES # BLD AUTO: 0.68 K/UL (ref 1–4.8)
LYMPHOCYTES # BLD AUTO: 0.68 K/UL (ref 1–4.8)
LYMPHOCYTES # BLD AUTO: 0.71 K/UL (ref 1–4.8)
LYMPHOCYTES # BLD AUTO: 0.72 K/UL (ref 1–4.8)
LYMPHOCYTES # BLD AUTO: 0.73 K/UL (ref 1–4.8)
LYMPHOCYTES # BLD AUTO: 0.74 K/UL (ref 1–4.8)
LYMPHOCYTES # BLD AUTO: 0.75 K/UL (ref 1–4.8)
LYMPHOCYTES # BLD AUTO: 0.78 K/UL (ref 1–4.8)
LYMPHOCYTES # BLD AUTO: 0.78 K/UL (ref 1–4.8)
LYMPHOCYTES # BLD AUTO: 0.84 K/UL (ref 1–4.8)
LYMPHOCYTES # BLD AUTO: 0.94 K/UL (ref 1–4.8)
LYMPHOCYTES # BLD AUTO: 0.96 K/UL (ref 1–4.8)
LYMPHOCYTES # BLD AUTO: 1.07 K/UL (ref 1–4.8)
LYMPHOCYTES # BLD AUTO: 1.09 K/UL (ref 1–4.8)
LYMPHOCYTES NFR BLD: 0.9 % (ref 22–41)
LYMPHOCYTES NFR BLD: 1 % (ref 22–41)
LYMPHOCYTES NFR BLD: 1 % (ref 22–41)
LYMPHOCYTES NFR BLD: 1.7 % (ref 22–41)
LYMPHOCYTES NFR BLD: 1.8 % (ref 22–41)
LYMPHOCYTES NFR BLD: 10 % (ref 22–41)
LYMPHOCYTES NFR BLD: 10.2 % (ref 22–41)
LYMPHOCYTES NFR BLD: 10.3 % (ref 22–41)
LYMPHOCYTES NFR BLD: 10.7 % (ref 22–41)
LYMPHOCYTES NFR BLD: 10.9 % (ref 22–41)
LYMPHOCYTES NFR BLD: 12.1 % (ref 22–41)
LYMPHOCYTES NFR BLD: 2.6 % (ref 22–41)
LYMPHOCYTES NFR BLD: 2.9 % (ref 22–41)
LYMPHOCYTES NFR BLD: 3.1 % (ref 22–41)
LYMPHOCYTES NFR BLD: 3.3 % (ref 22–41)
LYMPHOCYTES NFR BLD: 3.5 % (ref 22–41)
LYMPHOCYTES NFR BLD: 3.7 % (ref 22–41)
LYMPHOCYTES NFR BLD: 4 % (ref 22–41)
LYMPHOCYTES NFR BLD: 4.1 % (ref 22–41)
LYMPHOCYTES NFR BLD: 4.2 % (ref 22–41)
LYMPHOCYTES NFR BLD: 4.3 % (ref 22–41)
LYMPHOCYTES NFR BLD: 4.4 % (ref 22–41)
LYMPHOCYTES NFR BLD: 4.6 % (ref 22–41)
LYMPHOCYTES NFR BLD: 4.6 % (ref 22–41)
LYMPHOCYTES NFR BLD: 4.8 % (ref 22–41)
LYMPHOCYTES NFR BLD: 5 % (ref 22–41)
LYMPHOCYTES NFR BLD: 5 % (ref 22–41)
LYMPHOCYTES NFR BLD: 5.1 % (ref 22–41)
LYMPHOCYTES NFR BLD: 5.2 % (ref 22–41)
LYMPHOCYTES NFR BLD: 5.2 % (ref 22–41)
LYMPHOCYTES NFR BLD: 5.3 % (ref 22–41)
LYMPHOCYTES NFR BLD: 5.4 % (ref 22–41)
LYMPHOCYTES NFR BLD: 5.6 % (ref 22–41)
LYMPHOCYTES NFR BLD: 5.8 % (ref 22–41)
LYMPHOCYTES NFR BLD: 5.9 % (ref 22–41)
LYMPHOCYTES NFR BLD: 6.4 % (ref 22–41)
LYMPHOCYTES NFR BLD: 6.5 % (ref 22–41)
LYMPHOCYTES NFR BLD: 6.5 % (ref 22–41)
LYMPHOCYTES NFR BLD: 6.9 % (ref 22–41)
LYMPHOCYTES NFR BLD: 7.4 % (ref 22–41)
LYMPHOCYTES NFR BLD: 7.5 % (ref 22–41)
LYMPHOCYTES NFR BLD: 7.6 % (ref 22–41)
LYMPHOCYTES NFR BLD: 7.7 % (ref 22–41)
LYMPHOCYTES NFR BLD: 7.9 % (ref 22–41)
LYMPHOCYTES NFR BLD: 8.1 % (ref 22–41)
LYMPHOCYTES NFR BLD: 8.4 % (ref 22–41)
LYMPHOCYTES NFR BLD: 8.5 % (ref 22–41)
LYMPHOCYTES NFR BLD: 8.8 % (ref 22–41)
LYMPHOCYTES NFR BLD: 9.6 % (ref 22–41)
MACROCYTES BLD QL SMEAR: ABNORMAL
MAGNESIUM SERPL-MCNC: 1.5 MG/DL (ref 1.5–2.5)
MAGNESIUM SERPL-MCNC: 1.6 MG/DL (ref 1.5–2.5)
MAGNESIUM SERPL-MCNC: 1.7 MG/DL (ref 1.5–2.5)
MAGNESIUM SERPL-MCNC: 1.7 MG/DL (ref 1.5–2.5)
MAGNESIUM SERPL-MCNC: 1.8 MG/DL (ref 1.5–2.5)
MAGNESIUM SERPL-MCNC: 1.9 MG/DL (ref 1.5–2.5)
MAGNESIUM SERPL-MCNC: 2 MG/DL (ref 1.5–2.5)
MAGNESIUM SERPL-MCNC: 2.1 MG/DL (ref 1.5–2.5)
MAGNESIUM SERPL-MCNC: 2.2 MG/DL (ref 1.5–2.5)
MAGNESIUM SERPL-MCNC: 2.3 MG/DL (ref 1.5–2.5)
MAGNESIUM SERPL-MCNC: 2.4 MG/DL (ref 1.5–2.5)
MAGNESIUM SERPL-MCNC: 2.4 MG/DL (ref 1.5–2.5)
MANUAL DIFF BLD: ABNORMAL
MANUAL DIFF BLD: ABNORMAL
MANUAL DIFF BLD: NORMAL
MANUAL DIFF BLD: NORMAL
MCF BLD TEG: 79 MM (ref 50–70)
MCH RBC QN AUTO: 24.1 PG (ref 27–33)
MCH RBC QN AUTO: 24.1 PG (ref 27–33)
MCH RBC QN AUTO: 24.2 PG (ref 27–33)
MCH RBC QN AUTO: 24.3 PG (ref 27–33)
MCH RBC QN AUTO: 24.4 PG (ref 27–33)
MCH RBC QN AUTO: 24.5 PG (ref 27–33)
MCH RBC QN AUTO: 24.6 PG (ref 27–33)
MCH RBC QN AUTO: 24.7 PG (ref 27–33)
MCH RBC QN AUTO: 24.9 PG (ref 27–33)
MCH RBC QN AUTO: 24.9 PG (ref 27–33)
MCH RBC QN AUTO: 25 PG (ref 27–33)
MCH RBC QN AUTO: 25.1 PG (ref 27–33)
MCH RBC QN AUTO: 25.2 PG (ref 27–33)
MCH RBC QN AUTO: 25.3 PG (ref 27–33)
MCH RBC QN AUTO: 25.4 PG (ref 27–33)
MCH RBC QN AUTO: 25.8 PG (ref 27–33)
MCH RBC QN AUTO: 26.3 PG (ref 27–33)
MCH RBC QN AUTO: 26.4 PG (ref 27–33)
MCH RBC QN AUTO: 26.6 PG (ref 27–33)
MCH RBC QN AUTO: 26.6 PG (ref 27–33)
MCH RBC QN AUTO: 26.8 PG (ref 27–33)
MCH RBC QN AUTO: 26.9 PG (ref 27–33)
MCH RBC QN AUTO: 27.3 PG (ref 27–33)
MCH RBC QN AUTO: 27.4 PG (ref 27–33)
MCH RBC QN AUTO: 27.8 PG (ref 27–33)
MCH RBC QN AUTO: 28 PG (ref 27–33)
MCH RBC QN AUTO: 28.1 PG (ref 27–33)
MCH RBC QN AUTO: 28.6 PG (ref 27–33)
MCH RBC QN AUTO: 28.6 PG (ref 27–33)
MCH RBC QN AUTO: 28.7 PG (ref 27–33)
MCH RBC QN AUTO: 28.7 PG (ref 27–33)
MCH RBC QN AUTO: 29.2 PG (ref 27–33)
MCH RBC QN AUTO: 29.2 PG (ref 27–33)
MCH RBC QN AUTO: 29.3 PG (ref 27–33)
MCH RBC QN AUTO: 29.4 PG (ref 27–33)
MCH RBC QN AUTO: 29.5 PG (ref 27–33)
MCH RBC QN AUTO: 29.6 PG (ref 27–33)
MCH RBC QN AUTO: 29.7 PG (ref 27–33)
MCH RBC QN AUTO: 29.7 PG (ref 27–33)
MCH RBC QN AUTO: 29.8 PG (ref 27–33)
MCH RBC QN AUTO: 29.9 PG (ref 27–33)
MCH RBC QN AUTO: 30 PG (ref 27–33)
MCH RBC QN AUTO: 30.2 PG (ref 27–33)
MCH RBC QN AUTO: 30.2 PG (ref 27–33)
MCH RBC QN AUTO: 30.3 PG (ref 27–33)
MCHC RBC AUTO-ENTMCNC: 26.5 G/DL (ref 33.7–35.3)
MCHC RBC AUTO-ENTMCNC: 27.5 G/DL (ref 33.7–35.3)
MCHC RBC AUTO-ENTMCNC: 27.6 G/DL (ref 33.7–35.3)
MCHC RBC AUTO-ENTMCNC: 27.8 G/DL (ref 33.7–35.3)
MCHC RBC AUTO-ENTMCNC: 28 G/DL (ref 33.7–35.3)
MCHC RBC AUTO-ENTMCNC: 28.2 G/DL (ref 33.7–35.3)
MCHC RBC AUTO-ENTMCNC: 28.2 G/DL (ref 33.7–35.3)
MCHC RBC AUTO-ENTMCNC: 28.3 G/DL (ref 33.7–35.3)
MCHC RBC AUTO-ENTMCNC: 28.4 G/DL (ref 33.7–35.3)
MCHC RBC AUTO-ENTMCNC: 28.5 G/DL (ref 33.7–35.3)
MCHC RBC AUTO-ENTMCNC: 28.6 G/DL (ref 33.7–35.3)
MCHC RBC AUTO-ENTMCNC: 28.6 G/DL (ref 33.7–35.3)
MCHC RBC AUTO-ENTMCNC: 28.7 G/DL (ref 33.7–35.3)
MCHC RBC AUTO-ENTMCNC: 28.8 G/DL (ref 33.7–35.3)
MCHC RBC AUTO-ENTMCNC: 28.9 G/DL (ref 33.7–35.3)
MCHC RBC AUTO-ENTMCNC: 29 G/DL (ref 33.7–35.3)
MCHC RBC AUTO-ENTMCNC: 29.1 G/DL (ref 33.7–35.3)
MCHC RBC AUTO-ENTMCNC: 29.2 G/DL (ref 33.7–35.3)
MCHC RBC AUTO-ENTMCNC: 29.2 G/DL (ref 33.7–35.3)
MCHC RBC AUTO-ENTMCNC: 29.3 G/DL (ref 33.7–35.3)
MCHC RBC AUTO-ENTMCNC: 29.4 G/DL (ref 33.7–35.3)
MCHC RBC AUTO-ENTMCNC: 29.5 G/DL (ref 33.7–35.3)
MCHC RBC AUTO-ENTMCNC: 29.6 G/DL (ref 33.7–35.3)
MCHC RBC AUTO-ENTMCNC: 29.6 G/DL (ref 33.7–35.3)
MCHC RBC AUTO-ENTMCNC: 29.7 G/DL (ref 33.7–35.3)
MCHC RBC AUTO-ENTMCNC: 29.8 G/DL (ref 33.7–35.3)
MCHC RBC AUTO-ENTMCNC: 30 G/DL (ref 33.7–35.3)
MCHC RBC AUTO-ENTMCNC: 30.1 G/DL (ref 33.7–35.3)
MCHC RBC AUTO-ENTMCNC: 30.3 G/DL (ref 33.7–35.3)
MCHC RBC AUTO-ENTMCNC: 30.3 G/DL (ref 33.7–35.3)
MCHC RBC AUTO-ENTMCNC: 30.5 G/DL (ref 33.7–35.3)
MCHC RBC AUTO-ENTMCNC: 30.8 G/DL (ref 33.7–35.3)
MCHC RBC AUTO-ENTMCNC: 30.8 G/DL (ref 33.7–35.3)
MCHC RBC AUTO-ENTMCNC: 30.9 G/DL (ref 33.7–35.3)
MCV RBC AUTO: 100 FL (ref 81.4–97.8)
MCV RBC AUTO: 100.3 FL (ref 81.4–97.8)
MCV RBC AUTO: 100.8 FL (ref 81.4–97.8)
MCV RBC AUTO: 102.4 FL (ref 81.4–97.8)
MCV RBC AUTO: 102.9 FL (ref 81.4–97.8)
MCV RBC AUTO: 105.7 FL (ref 81.4–97.8)
MCV RBC AUTO: 106.7 FL (ref 81.4–97.8)
MCV RBC AUTO: 83.7 FL (ref 81.4–97.8)
MCV RBC AUTO: 84.8 FL (ref 81.4–97.8)
MCV RBC AUTO: 85 FL (ref 81.4–97.8)
MCV RBC AUTO: 85.2 FL (ref 81.4–97.8)
MCV RBC AUTO: 85.4 FL (ref 81.4–97.8)
MCV RBC AUTO: 85.4 FL (ref 81.4–97.8)
MCV RBC AUTO: 85.5 FL (ref 81.4–97.8)
MCV RBC AUTO: 85.6 FL (ref 81.4–97.8)
MCV RBC AUTO: 85.8 FL (ref 81.4–97.8)
MCV RBC AUTO: 86 FL (ref 81.4–97.8)
MCV RBC AUTO: 86.5 FL (ref 81.4–97.8)
MCV RBC AUTO: 87 FL (ref 81.4–97.8)
MCV RBC AUTO: 87 FL (ref 81.4–97.8)
MCV RBC AUTO: 87.2 FL (ref 81.4–97.8)
MCV RBC AUTO: 87.4 FL (ref 81.4–97.8)
MCV RBC AUTO: 87.4 FL (ref 81.4–97.8)
MCV RBC AUTO: 87.9 FL (ref 81.4–97.8)
MCV RBC AUTO: 88 FL (ref 81.4–97.8)
MCV RBC AUTO: 88.1 FL (ref 81.4–97.8)
MCV RBC AUTO: 88.7 FL (ref 81.4–97.8)
MCV RBC AUTO: 89.9 FL (ref 81.4–97.8)
MCV RBC AUTO: 90.4 FL (ref 81.4–97.8)
MCV RBC AUTO: 91.3 FL (ref 81.4–97.8)
MCV RBC AUTO: 91.9 FL (ref 81.4–97.8)
MCV RBC AUTO: 92.5 FL (ref 81.4–97.8)
MCV RBC AUTO: 92.7 FL (ref 81.4–97.8)
MCV RBC AUTO: 93.5 FL (ref 81.4–97.8)
MCV RBC AUTO: 94.2 FL (ref 81.4–97.8)
MCV RBC AUTO: 94.5 FL (ref 81.4–97.8)
MCV RBC AUTO: 95 FL (ref 81.4–97.8)
MCV RBC AUTO: 95.7 FL (ref 81.4–97.8)
MCV RBC AUTO: 96.1 FL (ref 81.4–97.8)
MCV RBC AUTO: 96.2 FL (ref 81.4–97.8)
MCV RBC AUTO: 96.5 FL (ref 81.4–97.8)
MCV RBC AUTO: 96.6 FL (ref 81.4–97.8)
MCV RBC AUTO: 96.9 FL (ref 81.4–97.8)
MCV RBC AUTO: 97.3 FL (ref 81.4–97.8)
MCV RBC AUTO: 97.6 FL (ref 81.4–97.8)
MCV RBC AUTO: 97.7 FL (ref 81.4–97.8)
MCV RBC AUTO: 97.7 FL (ref 81.4–97.8)
MCV RBC AUTO: 98.3 FL (ref 81.4–97.8)
MCV RBC AUTO: 98.4 FL (ref 81.4–97.8)
MCV RBC AUTO: 98.5 FL (ref 81.4–97.8)
MCV RBC AUTO: 98.5 FL (ref 81.4–97.8)
MCV RBC AUTO: 98.8 FL (ref 81.4–97.8)
MCV RBC AUTO: 99 FL (ref 81.4–97.8)
MCV RBC AUTO: 99.7 FL (ref 81.4–97.8)
METAMYELOCYTES NFR BLD MANUAL: 2.6 %
METAMYELOCYTES NFR BLD MANUAL: 6 %
MICROCYTES BLD QL SMEAR: ABNORMAL
MONOCYTES # BLD AUTO: 0.25 K/UL (ref 0–0.85)
MONOCYTES # BLD AUTO: 0.39 K/UL (ref 0–0.85)
MONOCYTES # BLD AUTO: 0.47 K/UL (ref 0–0.85)
MONOCYTES # BLD AUTO: 0.54 K/UL (ref 0–0.85)
MONOCYTES # BLD AUTO: 0.57 K/UL (ref 0–0.85)
MONOCYTES # BLD AUTO: 0.59 K/UL (ref 0–0.85)
MONOCYTES # BLD AUTO: 0.61 K/UL (ref 0–0.85)
MONOCYTES # BLD AUTO: 0.62 K/UL (ref 0–0.85)
MONOCYTES # BLD AUTO: 0.63 K/UL (ref 0–0.85)
MONOCYTES # BLD AUTO: 0.65 K/UL (ref 0–0.85)
MONOCYTES # BLD AUTO: 0.65 K/UL (ref 0–0.85)
MONOCYTES # BLD AUTO: 0.68 K/UL (ref 0–0.85)
MONOCYTES # BLD AUTO: 0.68 K/UL (ref 0–0.85)
MONOCYTES # BLD AUTO: 0.69 K/UL (ref 0–0.85)
MONOCYTES # BLD AUTO: 0.69 K/UL (ref 0–0.85)
MONOCYTES # BLD AUTO: 0.71 K/UL (ref 0–0.85)
MONOCYTES # BLD AUTO: 0.72 K/UL (ref 0–0.85)
MONOCYTES # BLD AUTO: 0.73 K/UL (ref 0–0.85)
MONOCYTES # BLD AUTO: 0.77 K/UL (ref 0–0.85)
MONOCYTES # BLD AUTO: 0.78 K/UL (ref 0–0.85)
MONOCYTES # BLD AUTO: 0.78 K/UL (ref 0–0.85)
MONOCYTES # BLD AUTO: 0.79 K/UL (ref 0–0.85)
MONOCYTES # BLD AUTO: 0.81 K/UL (ref 0–0.85)
MONOCYTES # BLD AUTO: 0.82 K/UL (ref 0–0.85)
MONOCYTES # BLD AUTO: 0.83 K/UL (ref 0–0.85)
MONOCYTES # BLD AUTO: 0.84 K/UL (ref 0–0.85)
MONOCYTES # BLD AUTO: 0.85 K/UL (ref 0–0.85)
MONOCYTES # BLD AUTO: 0.88 K/UL (ref 0–0.85)
MONOCYTES # BLD AUTO: 0.9 K/UL (ref 0–0.85)
MONOCYTES # BLD AUTO: 0.92 K/UL (ref 0–0.85)
MONOCYTES # BLD AUTO: 0.92 K/UL (ref 0–0.85)
MONOCYTES # BLD AUTO: 0.96 K/UL (ref 0–0.85)
MONOCYTES # BLD AUTO: 0.96 K/UL (ref 0–0.85)
MONOCYTES # BLD AUTO: 0.98 K/UL (ref 0–0.85)
MONOCYTES # BLD AUTO: 1.04 K/UL (ref 0–0.85)
MONOCYTES # BLD AUTO: 1.05 K/UL (ref 0–0.85)
MONOCYTES # BLD AUTO: 1.06 K/UL (ref 0–0.85)
MONOCYTES # BLD AUTO: 1.11 K/UL (ref 0–0.85)
MONOCYTES # BLD AUTO: 1.13 K/UL (ref 0–0.85)
MONOCYTES # BLD AUTO: 1.14 K/UL (ref 0–0.85)
MONOCYTES # BLD AUTO: 1.15 K/UL (ref 0–0.85)
MONOCYTES # BLD AUTO: 1.15 K/UL (ref 0–0.85)
MONOCYTES # BLD AUTO: 1.17 K/UL (ref 0–0.85)
MONOCYTES # BLD AUTO: 1.17 K/UL (ref 0–0.85)
MONOCYTES # BLD AUTO: 1.32 K/UL (ref 0–0.85)
MONOCYTES # BLD AUTO: 1.33 K/UL (ref 0–0.85)
MONOCYTES # BLD AUTO: 1.33 K/UL (ref 0–0.85)
MONOCYTES # BLD AUTO: 1.34 K/UL (ref 0–0.85)
MONOCYTES # BLD AUTO: 1.44 K/UL (ref 0–0.85)
MONOCYTES # BLD AUTO: 1.6 K/UL (ref 0–0.85)
MONOCYTES # BLD AUTO: 1.71 K/UL (ref 0–0.85)
MONOCYTES NFR BLD AUTO: 1 % (ref 0–13.4)
MONOCYTES NFR BLD AUTO: 1.7 % (ref 0–13.4)
MONOCYTES NFR BLD AUTO: 10 % (ref 0–13.4)
MONOCYTES NFR BLD AUTO: 10.2 % (ref 0–13.4)
MONOCYTES NFR BLD AUTO: 10.4 % (ref 0–13.4)
MONOCYTES NFR BLD AUTO: 10.5 % (ref 0–13.4)
MONOCYTES NFR BLD AUTO: 11.2 % (ref 0–13.4)
MONOCYTES NFR BLD AUTO: 12 % (ref 0–13.4)
MONOCYTES NFR BLD AUTO: 2.6 % (ref 0–13.4)
MONOCYTES NFR BLD AUTO: 3.1 % (ref 0–13.4)
MONOCYTES NFR BLD AUTO: 3.4 % (ref 0–13.4)
MONOCYTES NFR BLD AUTO: 3.6 % (ref 0–13.4)
MONOCYTES NFR BLD AUTO: 4.8 % (ref 0–13.4)
MONOCYTES NFR BLD AUTO: 5.2 % (ref 0–13.4)
MONOCYTES NFR BLD AUTO: 5.6 % (ref 0–13.4)
MONOCYTES NFR BLD AUTO: 6 % (ref 0–13.4)
MONOCYTES NFR BLD AUTO: 6.3 % (ref 0–13.4)
MONOCYTES NFR BLD AUTO: 6.3 % (ref 0–13.4)
MONOCYTES NFR BLD AUTO: 6.4 % (ref 0–13.4)
MONOCYTES NFR BLD AUTO: 6.6 % (ref 0–13.4)
MONOCYTES NFR BLD AUTO: 6.7 % (ref 0–13.4)
MONOCYTES NFR BLD AUTO: 6.9 % (ref 0–13.4)
MONOCYTES NFR BLD AUTO: 7 % (ref 0–13.4)
MONOCYTES NFR BLD AUTO: 7.1 % (ref 0–13.4)
MONOCYTES NFR BLD AUTO: 7.2 % (ref 0–13.4)
MONOCYTES NFR BLD AUTO: 7.2 % (ref 0–13.4)
MONOCYTES NFR BLD AUTO: 7.5 % (ref 0–13.4)
MONOCYTES NFR BLD AUTO: 7.6 % (ref 0–13.4)
MONOCYTES NFR BLD AUTO: 7.7 % (ref 0–13.4)
MONOCYTES NFR BLD AUTO: 7.7 % (ref 0–13.4)
MONOCYTES NFR BLD AUTO: 7.8 % (ref 0–13.4)
MONOCYTES NFR BLD AUTO: 7.8 % (ref 0–13.4)
MONOCYTES NFR BLD AUTO: 8.1 % (ref 0–13.4)
MONOCYTES NFR BLD AUTO: 8.2 % (ref 0–13.4)
MONOCYTES NFR BLD AUTO: 8.3 % (ref 0–13.4)
MONOCYTES NFR BLD AUTO: 8.3 % (ref 0–13.4)
MONOCYTES NFR BLD AUTO: 8.5 % (ref 0–13.4)
MONOCYTES NFR BLD AUTO: 8.6 % (ref 0–13.4)
MONOCYTES NFR BLD AUTO: 8.8 % (ref 0–13.4)
MONOCYTES NFR BLD AUTO: 8.9 % (ref 0–13.4)
MONOCYTES NFR BLD AUTO: 9 % (ref 0–13.4)
MONOCYTES NFR BLD AUTO: 9.1 % (ref 0–13.4)
MONOCYTES NFR BLD AUTO: 9.2 % (ref 0–13.4)
MONOCYTES NFR BLD AUTO: 9.4 % (ref 0–13.4)
MONOCYTES NFR BLD AUTO: 9.6 % (ref 0–13.4)
MONOCYTES NFR BLD AUTO: 9.6 % (ref 0–13.4)
MONOCYTES NFR BLD AUTO: 9.7 % (ref 0–13.4)
MORPHOLOGY BLD-IMP: NORMAL
MRSA DNA SPEC QL NAA+PROBE: NORMAL
MYELOCYTES NFR BLD MANUAL: 0.9 %
MYELOCYTES NFR BLD MANUAL: 3.4 %
NEUTROPHILS # BLD AUTO: 10.16 K/UL (ref 1.82–7.42)
NEUTROPHILS # BLD AUTO: 10.36 K/UL (ref 1.82–7.42)
NEUTROPHILS # BLD AUTO: 10.52 K/UL (ref 1.82–7.42)
NEUTROPHILS # BLD AUTO: 10.92 K/UL (ref 1.82–7.42)
NEUTROPHILS # BLD AUTO: 11.73 K/UL (ref 1.82–7.42)
NEUTROPHILS # BLD AUTO: 11.98 K/UL (ref 1.82–7.42)
NEUTROPHILS # BLD AUTO: 12.86 K/UL (ref 1.82–7.42)
NEUTROPHILS # BLD AUTO: 12.94 K/UL (ref 1.82–7.42)
NEUTROPHILS # BLD AUTO: 13.65 K/UL (ref 1.82–7.42)
NEUTROPHILS # BLD AUTO: 13.74 K/UL (ref 1.82–7.42)
NEUTROPHILS # BLD AUTO: 15.23 K/UL (ref 1.82–7.42)
NEUTROPHILS # BLD AUTO: 16.71 K/UL (ref 1.82–7.42)
NEUTROPHILS # BLD AUTO: 16.91 K/UL (ref 1.82–7.42)
NEUTROPHILS # BLD AUTO: 17.1 K/UL (ref 1.82–7.42)
NEUTROPHILS # BLD AUTO: 17.53 K/UL (ref 1.82–7.42)
NEUTROPHILS # BLD AUTO: 17.74 K/UL (ref 1.82–7.42)
NEUTROPHILS # BLD AUTO: 19.83 K/UL (ref 1.82–7.42)
NEUTROPHILS # BLD AUTO: 26.97 K/UL (ref 1.82–7.42)
NEUTROPHILS # BLD AUTO: 34.75 K/UL (ref 1.82–7.42)
NEUTROPHILS # BLD AUTO: 38.61 K/UL (ref 1.82–7.42)
NEUTROPHILS # BLD AUTO: 4.56 K/UL (ref 1.82–7.42)
NEUTROPHILS # BLD AUTO: 44.98 K/UL (ref 1.82–7.42)
NEUTROPHILS # BLD AUTO: 5.17 K/UL (ref 1.82–7.42)
NEUTROPHILS # BLD AUTO: 5.34 K/UL (ref 1.82–7.42)
NEUTROPHILS # BLD AUTO: 5.52 K/UL (ref 1.82–7.42)
NEUTROPHILS # BLD AUTO: 5.55 K/UL (ref 1.82–7.42)
NEUTROPHILS # BLD AUTO: 5.66 K/UL (ref 1.82–7.42)
NEUTROPHILS # BLD AUTO: 6.12 K/UL (ref 1.82–7.42)
NEUTROPHILS # BLD AUTO: 6.32 K/UL (ref 1.82–7.42)
NEUTROPHILS # BLD AUTO: 6.47 K/UL (ref 1.82–7.42)
NEUTROPHILS # BLD AUTO: 6.51 K/UL (ref 1.82–7.42)
NEUTROPHILS # BLD AUTO: 6.85 K/UL (ref 1.82–7.42)
NEUTROPHILS # BLD AUTO: 7.1 K/UL (ref 1.82–7.42)
NEUTROPHILS # BLD AUTO: 7.27 K/UL (ref 1.82–7.42)
NEUTROPHILS # BLD AUTO: 7.32 K/UL (ref 1.82–7.42)
NEUTROPHILS # BLD AUTO: 7.45 K/UL (ref 1.82–7.42)
NEUTROPHILS # BLD AUTO: 7.53 K/UL (ref 1.82–7.42)
NEUTROPHILS # BLD AUTO: 7.54 K/UL (ref 1.82–7.42)
NEUTROPHILS # BLD AUTO: 7.68 K/UL (ref 1.82–7.42)
NEUTROPHILS # BLD AUTO: 7.69 K/UL (ref 1.82–7.42)
NEUTROPHILS # BLD AUTO: 7.77 K/UL (ref 1.82–7.42)
NEUTROPHILS # BLD AUTO: 7.84 K/UL (ref 1.82–7.42)
NEUTROPHILS # BLD AUTO: 8.15 K/UL (ref 1.82–7.42)
NEUTROPHILS # BLD AUTO: 8.72 K/UL (ref 1.82–7.42)
NEUTROPHILS # BLD AUTO: 8.82 K/UL (ref 1.82–7.42)
NEUTROPHILS # BLD AUTO: 9.17 K/UL (ref 1.82–7.42)
NEUTROPHILS # BLD AUTO: 9.19 K/UL (ref 1.82–7.42)
NEUTROPHILS # BLD AUTO: 9.23 K/UL (ref 1.82–7.42)
NEUTROPHILS # BLD AUTO: 9.46 K/UL (ref 1.82–7.42)
NEUTROPHILS # BLD AUTO: 9.51 K/UL (ref 1.82–7.42)
NEUTROPHILS # BLD AUTO: 9.66 K/UL (ref 1.82–7.42)
NEUTROPHILS # BLD AUTO: 9.73 K/UL (ref 1.82–7.42)
NEUTROPHILS # BLD AUTO: 9.86 K/UL (ref 1.82–7.42)
NEUTROPHILS NFR BLD: 61.5 % (ref 44–72)
NEUTROPHILS NFR BLD: 74.3 % (ref 44–72)
NEUTROPHILS NFR BLD: 77.5 % (ref 44–72)
NEUTROPHILS NFR BLD: 78.4 % (ref 44–72)
NEUTROPHILS NFR BLD: 78.6 % (ref 44–72)
NEUTROPHILS NFR BLD: 79.5 % (ref 44–72)
NEUTROPHILS NFR BLD: 80 % (ref 44–72)
NEUTROPHILS NFR BLD: 80.2 % (ref 44–72)
NEUTROPHILS NFR BLD: 80.6 % (ref 44–72)
NEUTROPHILS NFR BLD: 80.7 % (ref 44–72)
NEUTROPHILS NFR BLD: 80.8 % (ref 44–72)
NEUTROPHILS NFR BLD: 81.2 % (ref 44–72)
NEUTROPHILS NFR BLD: 81.7 % (ref 44–72)
NEUTROPHILS NFR BLD: 82.3 % (ref 44–72)
NEUTROPHILS NFR BLD: 82.3 % (ref 44–72)
NEUTROPHILS NFR BLD: 82.4 % (ref 44–72)
NEUTROPHILS NFR BLD: 82.6 % (ref 44–72)
NEUTROPHILS NFR BLD: 82.6 % (ref 44–72)
NEUTROPHILS NFR BLD: 82.7 % (ref 44–72)
NEUTROPHILS NFR BLD: 83.8 % (ref 44–72)
NEUTROPHILS NFR BLD: 83.9 % (ref 44–72)
NEUTROPHILS NFR BLD: 84 % (ref 44–72)
NEUTROPHILS NFR BLD: 84 % (ref 44–72)
NEUTROPHILS NFR BLD: 84.1 % (ref 44–72)
NEUTROPHILS NFR BLD: 84.4 % (ref 44–72)
NEUTROPHILS NFR BLD: 84.6 % (ref 44–72)
NEUTROPHILS NFR BLD: 84.6 % (ref 44–72)
NEUTROPHILS NFR BLD: 85.3 % (ref 44–72)
NEUTROPHILS NFR BLD: 85.5 % (ref 44–72)
NEUTROPHILS NFR BLD: 85.6 % (ref 44–72)
NEUTROPHILS NFR BLD: 85.7 % (ref 44–72)
NEUTROPHILS NFR BLD: 86 % (ref 44–72)
NEUTROPHILS NFR BLD: 86 % (ref 44–72)
NEUTROPHILS NFR BLD: 86.1 % (ref 44–72)
NEUTROPHILS NFR BLD: 86.4 % (ref 44–72)
NEUTROPHILS NFR BLD: 86.5 % (ref 44–72)
NEUTROPHILS NFR BLD: 86.9 % (ref 44–72)
NEUTROPHILS NFR BLD: 87 % (ref 44–72)
NEUTROPHILS NFR BLD: 87.1 % (ref 44–72)
NEUTROPHILS NFR BLD: 87.5 % (ref 44–72)
NEUTROPHILS NFR BLD: 88.3 % (ref 44–72)
NEUTROPHILS NFR BLD: 89 % (ref 44–72)
NEUTROPHILS NFR BLD: 89.2 % (ref 44–72)
NEUTROPHILS NFR BLD: 89.8 % (ref 44–72)
NEUTROPHILS NFR BLD: 90 % (ref 44–72)
NEUTROPHILS NFR BLD: 91.5 % (ref 44–72)
NEUTROPHILS NFR BLD: 92.3 % (ref 44–72)
NEUTROPHILS NFR BLD: 94 % (ref 44–72)
NEUTROPHILS NFR BLD: 94 % (ref 44–72)
NEUTROPHILS NFR BLD: 94.8 % (ref 44–72)
NEUTROPHILS NFR BLD: 95.7 % (ref 44–72)
NEUTS BAND NFR BLD MANUAL: 12.2 % (ref 0–10)
NEUTS BAND NFR BLD MANUAL: 23.1 % (ref 0–10)
NEUTS BAND NFR BLD MANUAL: 4 % (ref 0–10)
NRBC # BLD AUTO: 0 K/UL
NRBC # BLD AUTO: 0.02 K/UL
NRBC # BLD AUTO: 0.03 K/UL
NRBC BLD-RTO: 0 /100 WBC
NRBC BLD-RTO: 0.1 /100 WBC
NRBC BLD-RTO: 0.2 /100 WBC
O2/TOTAL GAS SETTING VFR VENT: 100 %
O2/TOTAL GAS SETTING VFR VENT: 30 %
O2/TOTAL GAS SETTING VFR VENT: 32 %
O2/TOTAL GAS SETTING VFR VENT: 40 %
O2/TOTAL GAS SETTING VFR VENT: 50 %
O2/TOTAL GAS SETTING VFR VENT: 50 %
O2/TOTAL GAS SETTING VFR VENT: 70 %
O2/TOTAL GAS SETTING VFR VENT: 80 %
OVALOCYTES BLD QL SMEAR: NORMAL
PA AA BLD-ACNC: 7.2 %
PA ADP BLD-ACNC: 1.6 %
PATH REV: NORMAL
PATH REV: NORMAL
PCO2 BLDA: 106.2 MMHG (ref 26–37)
PCO2 BLDA: 30.5 MMHG (ref 26–37)
PCO2 BLDA: 33 MMHG (ref 26–37)
PCO2 BLDA: 35.8 MMHG (ref 26–37)
PCO2 BLDA: 36 MMHG (ref 26–37)
PCO2 BLDA: 37.3 MMHG (ref 26–37)
PCO2 BLDA: 38.2 MMHG (ref 26–37)
PCO2 BLDA: 38.9 MMHG (ref 26–37)
PCO2 BLDA: 40.6 MMHG (ref 26–37)
PCO2 BLDA: 40.7 MMHG (ref 26–37)
PCO2 BLDA: 41.2 MMHG (ref 26–37)
PCO2 BLDA: 41.6 MMHG (ref 26–37)
PCO2 BLDA: 41.6 MMHG (ref 26–37)
PCO2 BLDA: 42.5 MMHG (ref 26–37)
PCO2 BLDA: 43.6 MMHG (ref 26–37)
PCO2 BLDA: 45.4 MMHG (ref 26–37)
PCO2 BLDA: 45.8 MMHG (ref 26–37)
PCO2 BLDA: 47.3 MMHG (ref 26–37)
PCO2 BLDA: 48.1 MMHG (ref 26–37)
PCO2 BLDA: 48.3 MMHG (ref 26–37)
PCO2 BLDA: 48.9 MMHG (ref 26–37)
PCO2 BLDA: 50.4 MMHG (ref 26–37)
PCO2 BLDA: 50.6 MMHG (ref 26–37)
PCO2 BLDA: 53.8 MMHG (ref 26–37)
PCO2 BLDA: 54.9 MMHG (ref 26–37)
PCO2 BLDA: 64 MMHG (ref 26–37)
PCO2 BLDA: 75.1 MMHG (ref 26–37)
PCO2 BLDA: 92.2 MMHG (ref 26–37)
PCO2 BLDA: 99.9 MMHG (ref 26–37)
PCO2 BLDA: >100 MMHG (ref 26–37)
PCO2 TEMP ADJ BLDA: 106.2 MMHG (ref 26–37)
PCO2 TEMP ADJ BLDA: 32.6 MMHG (ref 26–37)
PCO2 TEMP ADJ BLDA: 35.8 MMHG (ref 26–37)
PCO2 TEMP ADJ BLDA: 36.7 MMHG (ref 26–37)
PCO2 TEMP ADJ BLDA: 36.7 MMHG (ref 26–37)
PCO2 TEMP ADJ BLDA: 36.8 MMHG (ref 26–37)
PCO2 TEMP ADJ BLDA: 36.9 MMHG (ref 26–37)
PCO2 TEMP ADJ BLDA: 38.4 MMHG (ref 26–37)
PCO2 TEMP ADJ BLDA: 39.1 MMHG (ref 26–37)
PCO2 TEMP ADJ BLDA: 39.9 MMHG (ref 26–37)
PCO2 TEMP ADJ BLDA: 40.3 MMHG (ref 26–37)
PCO2 TEMP ADJ BLDA: 41 MMHG (ref 26–37)
PCO2 TEMP ADJ BLDA: 41.9 MMHG (ref 26–37)
PCO2 TEMP ADJ BLDA: 43.5 MMHG (ref 26–37)
PCO2 TEMP ADJ BLDA: 44.4 MMHG (ref 26–37)
PCO2 TEMP ADJ BLDA: 44.4 MMHG (ref 26–37)
PCO2 TEMP ADJ BLDA: 45.2 MMHG (ref 26–37)
PCO2 TEMP ADJ BLDA: 46.4 MMHG (ref 26–37)
PCO2 TEMP ADJ BLDA: 46.5 MMHG (ref 26–37)
PCO2 TEMP ADJ BLDA: 47.3 MMHG (ref 26–37)
PCO2 TEMP ADJ BLDA: 49 MMHG (ref 26–37)
PCO2 TEMP ADJ BLDA: 50.7 MMHG (ref 26–37)
PCO2 TEMP ADJ BLDA: 52.2 MMHG (ref 26–37)
PCO2 TEMP ADJ BLDA: 52.4 MMHG (ref 26–37)
PCO2 TEMP ADJ BLDA: 53.9 MMHG (ref 26–37)
PCO2 TEMP ADJ BLDA: 60.6 MMHG (ref 26–37)
PCO2 TEMP ADJ BLDA: 73.1 MMHG (ref 26–37)
PCO2 TEMP ADJ BLDA: ABNORMAL MMHG (ref 26–37)
PCO2 TEMP ADJ BLDA: ABNORMAL MMHG (ref 26–37)
PH BLDA: 7.09 [PH] (ref 7.4–7.5)
PH BLDA: 7.11 [PH] (ref 7.4–7.5)
PH BLDA: 7.12 [PH] (ref 7.4–7.5)
PH BLDA: 7.14 [PH] (ref 7.4–7.5)
PH BLDA: 7.17 [PH] (ref 7.4–7.5)
PH BLDA: 7.18 [PH] (ref 7.4–7.5)
PH BLDA: 7.18 [PH] (ref 7.4–7.5)
PH BLDA: 7.21 [PH] (ref 7.4–7.5)
PH BLDA: 7.22 [PH] (ref 7.4–7.5)
PH BLDA: 7.25 [PH] (ref 7.4–7.5)
PH BLDA: 7.26 [PH] (ref 7.4–7.5)
PH BLDA: 7.26 [PH] (ref 7.4–7.5)
PH BLDA: 7.27 [PH] (ref 7.4–7.5)
PH BLDA: 7.28 [PH] (ref 7.4–7.5)
PH BLDA: 7.28 [PH] (ref 7.4–7.5)
PH BLDA: 7.3 [PH] (ref 7.4–7.5)
PH BLDA: 7.33 [PH] (ref 7.4–7.5)
PH BLDA: 7.33 [PH] (ref 7.4–7.5)
PH BLDA: 7.36 [PH] (ref 7.4–7.5)
PH BLDA: 7.37 [PH] (ref 7.4–7.5)
PH BLDA: 7.39 [PH] (ref 7.4–7.5)
PH BLDA: 7.42 [PH] (ref 7.4–7.5)
PH BLDA: 7.42 [PH] (ref 7.4–7.5)
PH BLDA: 7.43 [PH] (ref 7.4–7.5)
PH BLDA: 7.47 [PH] (ref 7.4–7.5)
PH BLDA: 7.49 [PH] (ref 7.4–7.5)
PH BLDA: 7.49 [PH] (ref 7.4–7.5)
PH BLDA: 7.5 [PH] (ref 7.4–7.5)
PH BLDA: 7.51 [PH] (ref 7.4–7.5)
PH BLDA: 7.52 [PH] (ref 7.4–7.5)
PH BLDA: 7.55 [PH] (ref 7.4–7.5)
PH BLDA: 7.58 [PH] (ref 7.4–7.5)
PH TEMP ADJ BLDA: 7.12 [PH] (ref 7.4–7.5)
PH TEMP ADJ BLDA: 7.13 [PH] (ref 7.4–7.5)
PH TEMP ADJ BLDA: 7.14 [PH] (ref 7.4–7.5)
PH TEMP ADJ BLDA: 7.19 [PH] (ref 7.4–7.5)
PH TEMP ADJ BLDA: 7.22 [PH] (ref 7.4–7.5)
PH TEMP ADJ BLDA: 7.24 [PH] (ref 7.4–7.5)
PH TEMP ADJ BLDA: 7.25 [PH] (ref 7.4–7.5)
PH TEMP ADJ BLDA: 7.26 [PH] (ref 7.4–7.5)
PH TEMP ADJ BLDA: 7.27 [PH] (ref 7.4–7.5)
PH TEMP ADJ BLDA: 7.31 [PH] (ref 7.4–7.5)
PH TEMP ADJ BLDA: 7.33 [PH] (ref 7.4–7.5)
PH TEMP ADJ BLDA: 7.37 [PH] (ref 7.4–7.5)
PH TEMP ADJ BLDA: 7.38 [PH] (ref 7.4–7.5)
PH TEMP ADJ BLDA: 7.38 [PH] (ref 7.4–7.5)
PH TEMP ADJ BLDA: 7.41 [PH] (ref 7.4–7.5)
PH TEMP ADJ BLDA: 7.43 [PH] (ref 7.4–7.5)
PH TEMP ADJ BLDA: 7.45 [PH] (ref 7.4–7.5)
PH TEMP ADJ BLDA: 7.47 [PH] (ref 7.4–7.5)
PH TEMP ADJ BLDA: 7.48 [PH] (ref 7.4–7.5)
PH TEMP ADJ BLDA: 7.5 [PH] (ref 7.4–7.5)
PH TEMP ADJ BLDA: 7.5 [PH] (ref 7.4–7.5)
PH TEMP ADJ BLDA: 7.52 [PH] (ref 7.4–7.5)
PH TEMP ADJ BLDA: 7.53 [PH] (ref 7.4–7.5)
PH TEMP ADJ BLDA: 7.54 [PH] (ref 7.4–7.5)
PH TEMP ADJ BLDA: 7.58 [PH] (ref 7.4–7.5)
PHOSPHATE SERPL-MCNC: 1.7 MG/DL (ref 2.5–4.5)
PHOSPHATE SERPL-MCNC: 1.9 MG/DL (ref 2.5–4.5)
PHOSPHATE SERPL-MCNC: 1.9 MG/DL (ref 2.5–4.5)
PHOSPHATE SERPL-MCNC: 2.1 MG/DL (ref 2.5–4.5)
PHOSPHATE SERPL-MCNC: 2.2 MG/DL (ref 2.5–4.5)
PHOSPHATE SERPL-MCNC: 2.3 MG/DL (ref 2.5–4.5)
PHOSPHATE SERPL-MCNC: 2.4 MG/DL (ref 2.5–4.5)
PHOSPHATE SERPL-MCNC: 2.4 MG/DL (ref 2.5–4.5)
PHOSPHATE SERPL-MCNC: 2.6 MG/DL (ref 2.5–4.5)
PHOSPHATE SERPL-MCNC: 2.6 MG/DL (ref 2.5–4.5)
PHOSPHATE SERPL-MCNC: 2.7 MG/DL (ref 2.5–4.5)
PHOSPHATE SERPL-MCNC: 2.8 MG/DL (ref 2.5–4.5)
PHOSPHATE SERPL-MCNC: 2.8 MG/DL (ref 2.5–4.5)
PHOSPHATE SERPL-MCNC: 2.9 MG/DL (ref 2.5–4.5)
PHOSPHATE SERPL-MCNC: 2.9 MG/DL (ref 2.5–4.5)
PHOSPHATE SERPL-MCNC: 3 MG/DL (ref 2.5–4.5)
PHOSPHATE SERPL-MCNC: 3 MG/DL (ref 2.5–4.5)
PHOSPHATE SERPL-MCNC: 3.1 MG/DL (ref 2.5–4.5)
PHOSPHATE SERPL-MCNC: 3.2 MG/DL (ref 2.5–4.5)
PHOSPHATE SERPL-MCNC: 3.2 MG/DL (ref 2.5–4.5)
PHOSPHATE SERPL-MCNC: 3.3 MG/DL (ref 2.5–4.5)
PHOSPHATE SERPL-MCNC: 3.3 MG/DL (ref 2.5–4.5)
PHOSPHATE SERPL-MCNC: 3.5 MG/DL (ref 2.5–4.5)
PHOSPHATE SERPL-MCNC: 3.6 MG/DL (ref 2.5–4.5)
PHOSPHATE SERPL-MCNC: 3.6 MG/DL (ref 2.5–4.5)
PHOSPHATE SERPL-MCNC: 4.3 MG/DL (ref 2.5–4.5)
PLATELET # BLD AUTO: 190 K/UL (ref 164–446)
PLATELET # BLD AUTO: 197 K/UL (ref 164–446)
PLATELET # BLD AUTO: 199 K/UL (ref 164–446)
PLATELET # BLD AUTO: 207 K/UL (ref 164–446)
PLATELET # BLD AUTO: 211 K/UL (ref 164–446)
PLATELET # BLD AUTO: 217 K/UL (ref 164–446)
PLATELET # BLD AUTO: 223 K/UL (ref 164–446)
PLATELET # BLD AUTO: 235 K/UL (ref 164–446)
PLATELET # BLD AUTO: 240 K/UL (ref 164–446)
PLATELET # BLD AUTO: 245 K/UL (ref 164–446)
PLATELET # BLD AUTO: 247 K/UL (ref 164–446)
PLATELET # BLD AUTO: 250 K/UL (ref 164–446)
PLATELET # BLD AUTO: 255 K/UL (ref 164–446)
PLATELET # BLD AUTO: 257 K/UL (ref 164–446)
PLATELET # BLD AUTO: 277 K/UL (ref 164–446)
PLATELET # BLD AUTO: 277 K/UL (ref 164–446)
PLATELET # BLD AUTO: 280 K/UL (ref 164–446)
PLATELET # BLD AUTO: 281 K/UL (ref 164–446)
PLATELET # BLD AUTO: 284 K/UL (ref 164–446)
PLATELET # BLD AUTO: 286 K/UL (ref 164–446)
PLATELET # BLD AUTO: 287 K/UL (ref 164–446)
PLATELET # BLD AUTO: 298 K/UL (ref 164–446)
PLATELET # BLD AUTO: 302 K/UL (ref 164–446)
PLATELET # BLD AUTO: 320 K/UL (ref 164–446)
PLATELET # BLD AUTO: 334 K/UL (ref 164–446)
PLATELET # BLD AUTO: 338 K/UL (ref 164–446)
PLATELET # BLD AUTO: 339 K/UL (ref 164–446)
PLATELET # BLD AUTO: 341 K/UL (ref 164–446)
PLATELET # BLD AUTO: 341 K/UL (ref 164–446)
PLATELET # BLD AUTO: 350 K/UL (ref 164–446)
PLATELET # BLD AUTO: 358 K/UL (ref 164–446)
PLATELET # BLD AUTO: 370 K/UL (ref 164–446)
PLATELET # BLD AUTO: 374 K/UL (ref 164–446)
PLATELET # BLD AUTO: 389 K/UL (ref 164–446)
PLATELET # BLD AUTO: 394 K/UL (ref 164–446)
PLATELET # BLD AUTO: 414 K/UL (ref 164–446)
PLATELET # BLD AUTO: 421 K/UL (ref 164–446)
PLATELET # BLD AUTO: 421 K/UL (ref 164–446)
PLATELET # BLD AUTO: 423 K/UL (ref 164–446)
PLATELET # BLD AUTO: 489 K/UL (ref 164–446)
PLATELET # BLD AUTO: 494 K/UL (ref 164–446)
PLATELET # BLD AUTO: 495 K/UL (ref 164–446)
PLATELET # BLD AUTO: 517 K/UL (ref 164–446)
PLATELET # BLD AUTO: 544 K/UL (ref 164–446)
PLATELET # BLD AUTO: 615 K/UL (ref 164–446)
PLATELET # BLD AUTO: 628 K/UL (ref 164–446)
PLATELET # BLD AUTO: 640 K/UL (ref 164–446)
PLATELET # BLD AUTO: 657 K/UL (ref 164–446)
PLATELET # BLD AUTO: 667 K/UL (ref 164–446)
PLATELET # BLD AUTO: 690 K/UL (ref 164–446)
PLATELET # BLD AUTO: 707 K/UL (ref 164–446)
PLATELET # BLD AUTO: 714 K/UL (ref 164–446)
PLATELET # BLD AUTO: 809 K/UL (ref 164–446)
PLATELET # BLD AUTO: 844 K/UL (ref 164–446)
PLATELET BLD QL SMEAR: NORMAL
PMV BLD AUTO: 10 FL (ref 9–12.9)
PMV BLD AUTO: 10.1 FL (ref 9–12.9)
PMV BLD AUTO: 10.2 FL (ref 9–12.9)
PMV BLD AUTO: 10.3 FL (ref 9–12.9)
PMV BLD AUTO: 10.4 FL (ref 9–12.9)
PMV BLD AUTO: 10.4 FL (ref 9–12.9)
PMV BLD AUTO: 10.5 FL (ref 9–12.9)
PMV BLD AUTO: 10.6 FL (ref 9–12.9)
PMV BLD AUTO: 10.6 FL (ref 9–12.9)
PMV BLD AUTO: 10.7 FL (ref 9–12.9)
PMV BLD AUTO: 10.8 FL (ref 9–12.9)
PMV BLD AUTO: 10.8 FL (ref 9–12.9)
PMV BLD AUTO: 11.1 FL (ref 9–12.9)
PMV BLD AUTO: 11.2 FL (ref 9–12.9)
PMV BLD AUTO: 11.5 FL (ref 9–12.9)
PMV BLD AUTO: 11.5 FL (ref 9–12.9)
PMV BLD AUTO: 8.9 FL (ref 9–12.9)
PMV BLD AUTO: 9.1 FL (ref 9–12.9)
PMV BLD AUTO: 9.3 FL (ref 9–12.9)
PMV BLD AUTO: 9.4 FL (ref 9–12.9)
PMV BLD AUTO: 9.6 FL (ref 9–12.9)
PMV BLD AUTO: 9.8 FL (ref 9–12.9)
PMV BLD AUTO: 9.9 FL (ref 9–12.9)
PO2 BLDA: 100 MMHG (ref 64–87)
PO2 BLDA: 101.7 MMHG (ref 64–87)
PO2 BLDA: 103 MMHG (ref 64–87)
PO2 BLDA: 108 MMHG (ref 64–87)
PO2 BLDA: 231 MMHG (ref 64–87)
PO2 BLDA: 238 MMHG (ref 64–87)
PO2 BLDA: 24 MMHG (ref 64–87)
PO2 BLDA: 244 MMHG (ref 64–87)
PO2 BLDA: 33 MMHG (ref 64–87)
PO2 BLDA: 51 MMHG (ref 64–87)
PO2 BLDA: 54 MMHG (ref 64–87)
PO2 BLDA: 58 MMHG (ref 64–87)
PO2 BLDA: 61.2 MMHG (ref 64–87)
PO2 BLDA: 65 MMHG (ref 64–87)
PO2 BLDA: 67 MMHG (ref 64–87)
PO2 BLDA: 69 MMHG (ref 64–87)
PO2 BLDA: 70 MMHG (ref 64–87)
PO2 BLDA: 71 MMHG (ref 64–87)
PO2 BLDA: 73 MMHG (ref 64–87)
PO2 BLDA: 78 MMHG (ref 64–87)
PO2 BLDA: 79 MMHG (ref 64–87)
PO2 BLDA: 79 MMHG (ref 64–87)
PO2 BLDA: 81 MMHG (ref 64–87)
PO2 BLDA: 84 MMHG (ref 64–87)
PO2 BLDA: 86 MMHG (ref 64–87)
PO2 BLDA: 87.7 MMHG (ref 64–87)
PO2 BLDA: 90 MMHG (ref 64–87)
PO2 BLDA: 91 MMHG (ref 64–87)
PO2 BLDA: 93 MMHG (ref 64–87)
PO2 BLDA: 95 MMHG (ref 64–87)
PO2 TEMP ADJ BLDA: 100 MMHG (ref 64–87)
PO2 TEMP ADJ BLDA: 101 MMHG (ref 64–87)
PO2 TEMP ADJ BLDA: 101.7 MMHG (ref 64–87)
PO2 TEMP ADJ BLDA: 104 MMHG (ref 64–87)
PO2 TEMP ADJ BLDA: 110 MMHG (ref 64–87)
PO2 TEMP ADJ BLDA: 16 MMHG (ref 64–87)
PO2 TEMP ADJ BLDA: 225 MMHG (ref 64–87)
PO2 TEMP ADJ BLDA: 240 MMHG (ref 64–87)
PO2 TEMP ADJ BLDA: 240 MMHG (ref 64–87)
PO2 TEMP ADJ BLDA: 37 MMHG (ref 64–87)
PO2 TEMP ADJ BLDA: 49 MMHG (ref 64–87)
PO2 TEMP ADJ BLDA: 51 MMHG (ref 64–87)
PO2 TEMP ADJ BLDA: 56 MMHG (ref 64–87)
PO2 TEMP ADJ BLDA: 62 MMHG (ref 64–87)
PO2 TEMP ADJ BLDA: 63 MMHG (ref 64–87)
PO2 TEMP ADJ BLDA: 66 MMHG (ref 64–87)
PO2 TEMP ADJ BLDA: 68 MMHG (ref 64–87)
PO2 TEMP ADJ BLDA: 73 MMHG (ref 64–87)
PO2 TEMP ADJ BLDA: 74 MMHG (ref 64–87)
PO2 TEMP ADJ BLDA: 78 MMHG (ref 64–87)
PO2 TEMP ADJ BLDA: 80 MMHG (ref 64–87)
PO2 TEMP ADJ BLDA: 82 MMHG (ref 64–87)
PO2 TEMP ADJ BLDA: 88 MMHG (ref 64–87)
PO2 TEMP ADJ BLDA: 88 MMHG (ref 64–87)
PO2 TEMP ADJ BLDA: 91 MMHG (ref 64–87)
PO2 TEMP ADJ BLDA: 96 MMHG (ref 64–87)
PO2 TEMP ADJ BLDA: 98 MMHG (ref 64–87)
POIKILOCYTOSIS BLD QL SMEAR: NORMAL
POLYCHROMASIA BLD QL SMEAR: NORMAL
POTASSIUM BLD-SCNC: 4.5 MMOL/L (ref 3.6–5.5)
POTASSIUM SERPL-SCNC: 2.6 MMOL/L (ref 3.6–5.5)
POTASSIUM SERPL-SCNC: 2.9 MMOL/L (ref 3.6–5.5)
POTASSIUM SERPL-SCNC: 3.2 MMOL/L (ref 3.6–5.5)
POTASSIUM SERPL-SCNC: 3.2 MMOL/L (ref 3.6–5.5)
POTASSIUM SERPL-SCNC: 3.4 MMOL/L (ref 3.6–5.5)
POTASSIUM SERPL-SCNC: 3.5 MMOL/L (ref 3.6–5.5)
POTASSIUM SERPL-SCNC: 3.6 MMOL/L (ref 3.6–5.5)
POTASSIUM SERPL-SCNC: 3.6 MMOL/L (ref 3.6–5.5)
POTASSIUM SERPL-SCNC: 3.7 MMOL/L (ref 3.6–5.5)
POTASSIUM SERPL-SCNC: 3.8 MMOL/L (ref 3.6–5.5)
POTASSIUM SERPL-SCNC: 3.9 MMOL/L (ref 3.6–5.5)
POTASSIUM SERPL-SCNC: 4 MMOL/L (ref 3.6–5.5)
POTASSIUM SERPL-SCNC: 4.1 MMOL/L (ref 3.6–5.5)
POTASSIUM SERPL-SCNC: 4.2 MMOL/L (ref 3.6–5.5)
POTASSIUM SERPL-SCNC: 4.2 MMOL/L (ref 3.6–5.5)
POTASSIUM SERPL-SCNC: 4.3 MMOL/L (ref 3.6–5.5)
POTASSIUM SERPL-SCNC: 4.4 MMOL/L (ref 3.6–5.5)
POTASSIUM SERPL-SCNC: 4.4 MMOL/L (ref 3.6–5.5)
POTASSIUM SERPL-SCNC: 4.5 MMOL/L (ref 3.6–5.5)
POTASSIUM SERPL-SCNC: 4.6 MMOL/L (ref 3.6–5.5)
POTASSIUM SERPL-SCNC: 4.7 MMOL/L (ref 3.6–5.5)
POTASSIUM SERPL-SCNC: 4.8 MMOL/L (ref 3.6–5.5)
POTASSIUM SERPL-SCNC: 4.8 MMOL/L (ref 3.6–5.5)
POTASSIUM SERPL-SCNC: 4.9 MMOL/L (ref 3.6–5.5)
POTASSIUM SERPL-SCNC: 4.9 MMOL/L (ref 3.6–5.5)
POTASSIUM SERPL-SCNC: 5 MMOL/L (ref 3.6–5.5)
POTASSIUM SERPL-SCNC: 5 MMOL/L (ref 3.6–5.5)
POTASSIUM SERPL-SCNC: 5.1 MMOL/L (ref 3.6–5.5)
POTASSIUM SERPL-SCNC: 5.1 MMOL/L (ref 3.6–5.5)
POTASSIUM SERPL-SCNC: 5.6 MMOL/L (ref 3.6–5.5)
PREALB SERPL-MCNC: 11.4 MG/DL (ref 18–38)
PREALB SERPL-MCNC: 12.1 MG/DL (ref 18–38)
PREALB SERPL-MCNC: 5.1 MG/DL (ref 18–38)
PREALB SERPL-MCNC: 5.3 MG/DL (ref 18–38)
PREALB SERPL-MCNC: 7.2 MG/DL (ref 18–38)
PROCALCITONIN SERPL-MCNC: 0.08 NG/ML
PROCALCITONIN SERPL-MCNC: <0.05 NG/ML
PROCALCITONIN SERPL-MCNC: <0.05 NG/ML
PRODUCT TYPE UPROD: NORMAL
PROT SERPL-MCNC: 3.7 G/DL (ref 6–8.2)
PROT SERPL-MCNC: 4.1 G/DL (ref 6–8.2)
PROT SERPL-MCNC: 4.8 G/DL (ref 6–8.2)
PROT SERPL-MCNC: 5 G/DL (ref 6–8.2)
PROT SERPL-MCNC: 5.1 G/DL (ref 6–8.2)
PROT SERPL-MCNC: 5.8 G/DL (ref 6–8.2)
PROT SERPL-MCNC: 5.9 G/DL (ref 6–8.2)
PROT SERPL-MCNC: 6.2 G/DL (ref 6–8.2)
PROT SERPL-MCNC: 6.6 G/DL (ref 6–8.2)
PROT SERPL-MCNC: 7.5 G/DL (ref 6–8.2)
PROTHROMBIN TIME: 17.4 SEC (ref 12–14.6)
PROTHROMBIN TIME: 18.6 SEC (ref 12–14.6)
PROTHROMBIN TIME: 18.8 SEC (ref 12–14.6)
PROTHROMBIN TIME: 19.5 SEC (ref 12–14.6)
PROTHROMBIN TIME: 19.5 SEC (ref 12–14.6)
RBC # BLD AUTO: 2.29 M/UL (ref 4.7–6.1)
RBC # BLD AUTO: 2.53 M/UL (ref 4.7–6.1)
RBC # BLD AUTO: 2.54 M/UL (ref 4.7–6.1)
RBC # BLD AUTO: 2.55 M/UL (ref 4.7–6.1)
RBC # BLD AUTO: 2.55 M/UL (ref 4.7–6.1)
RBC # BLD AUTO: 2.58 M/UL (ref 4.7–6.1)
RBC # BLD AUTO: 2.61 M/UL (ref 4.7–6.1)
RBC # BLD AUTO: 2.65 M/UL (ref 4.7–6.1)
RBC # BLD AUTO: 2.7 M/UL (ref 4.7–6.1)
RBC # BLD AUTO: 2.74 M/UL (ref 4.7–6.1)
RBC # BLD AUTO: 2.74 M/UL (ref 4.7–6.1)
RBC # BLD AUTO: 2.89 M/UL (ref 4.7–6.1)
RBC # BLD AUTO: 2.89 M/UL (ref 4.7–6.1)
RBC # BLD AUTO: 2.9 M/UL (ref 4.7–6.1)
RBC # BLD AUTO: 2.91 M/UL (ref 4.7–6.1)
RBC # BLD AUTO: 2.94 M/UL (ref 4.7–6.1)
RBC # BLD AUTO: 2.96 M/UL (ref 4.7–6.1)
RBC # BLD AUTO: 2.98 M/UL (ref 4.7–6.1)
RBC # BLD AUTO: 3.01 M/UL (ref 4.7–6.1)
RBC # BLD AUTO: 3.02 M/UL (ref 4.7–6.1)
RBC # BLD AUTO: 3.08 M/UL (ref 4.7–6.1)
RBC # BLD AUTO: 3.1 M/UL (ref 4.7–6.1)
RBC # BLD AUTO: 3.1 M/UL (ref 4.7–6.1)
RBC # BLD AUTO: 3.13 M/UL (ref 4.7–6.1)
RBC # BLD AUTO: 3.18 M/UL (ref 4.7–6.1)
RBC # BLD AUTO: 3.19 M/UL (ref 4.7–6.1)
RBC # BLD AUTO: 3.21 M/UL (ref 4.7–6.1)
RBC # BLD AUTO: 3.27 M/UL (ref 4.7–6.1)
RBC # BLD AUTO: 3.34 M/UL (ref 4.7–6.1)
RBC # BLD AUTO: 3.34 M/UL (ref 4.7–6.1)
RBC # BLD AUTO: 3.35 M/UL (ref 4.7–6.1)
RBC # BLD AUTO: 3.36 M/UL (ref 4.7–6.1)
RBC # BLD AUTO: 3.37 M/UL (ref 4.7–6.1)
RBC # BLD AUTO: 3.4 M/UL (ref 4.7–6.1)
RBC # BLD AUTO: 3.44 M/UL (ref 4.7–6.1)
RBC # BLD AUTO: 3.44 M/UL (ref 4.7–6.1)
RBC # BLD AUTO: 3.51 M/UL (ref 4.7–6.1)
RBC # BLD AUTO: 3.53 M/UL (ref 4.7–6.1)
RBC # BLD AUTO: 3.54 M/UL (ref 4.7–6.1)
RBC # BLD AUTO: 3.57 M/UL (ref 4.7–6.1)
RBC # BLD AUTO: 3.6 M/UL (ref 4.7–6.1)
RBC # BLD AUTO: 3.6 M/UL (ref 4.7–6.1)
RBC # BLD AUTO: 3.62 M/UL (ref 4.7–6.1)
RBC # BLD AUTO: 3.67 M/UL (ref 4.7–6.1)
RBC # BLD AUTO: 3.81 M/UL (ref 4.7–6.1)
RBC # BLD AUTO: 3.84 M/UL (ref 4.7–6.1)
RBC # BLD AUTO: 3.99 M/UL (ref 4.7–6.1)
RBC # BLD AUTO: 4.07 M/UL (ref 4.7–6.1)
RBC # BLD AUTO: 4.12 M/UL (ref 4.7–6.1)
RBC # BLD AUTO: 4.16 M/UL (ref 4.7–6.1)
RBC # BLD AUTO: 4.25 M/UL (ref 4.7–6.1)
RBC # BLD AUTO: 4.27 M/UL (ref 4.7–6.1)
RBC BLD AUTO: PRESENT
RH BLD: NORMAL
RH BLD: NORMAL
SAO2 % BLDA: 100 % (ref 93–99)
SAO2 % BLDA: 29 % (ref 93–99)
SAO2 % BLDA: 55 % (ref 93–99)
SAO2 % BLDA: 83.5 % (ref 93–99)
SAO2 % BLDA: 86 % (ref 93–99)
SAO2 % BLDA: 88 % (ref 93–99)
SAO2 % BLDA: 88 % (ref 93–99)
SAO2 % BLDA: 89 % (ref 93–99)
SAO2 % BLDA: 92 % (ref 93–99)
SAO2 % BLDA: 93 % (ref 93–99)
SAO2 % BLDA: 93.5 % (ref 93–99)
SAO2 % BLDA: 94 % (ref 93–99)
SAO2 % BLDA: 94.2 % (ref 93–99)
SAO2 % BLDA: 95 % (ref 93–99)
SAO2 % BLDA: 96 % (ref 93–99)
SAO2 % BLDA: 97 % (ref 93–99)
SAO2 % BLDA: 98 % (ref 93–99)
SAO2 % BLDA: ABNORMAL % (ref 93–99)
SARS-COV-2 RNA RESP QL NAA+PROBE: NOTDETECTED
SCHISTOCYTES BLD QL SMEAR: NORMAL
SIGNIFICANT IND 70042: ABNORMAL
SIGNIFICANT IND 70042: NORMAL
SITE SITE: ABNORMAL
SITE SITE: NORMAL
SODIUM BLD-SCNC: 136 MMOL/L (ref 135–145)
SODIUM SERPL-SCNC: 126 MMOL/L (ref 135–145)
SODIUM SERPL-SCNC: 128 MMOL/L (ref 135–145)
SODIUM SERPL-SCNC: 130 MMOL/L (ref 135–145)
SODIUM SERPL-SCNC: 131 MMOL/L (ref 135–145)
SODIUM SERPL-SCNC: 132 MMOL/L (ref 135–145)
SODIUM SERPL-SCNC: 133 MMOL/L (ref 135–145)
SODIUM SERPL-SCNC: 134 MMOL/L (ref 135–145)
SODIUM SERPL-SCNC: 134 MMOL/L (ref 135–145)
SODIUM SERPL-SCNC: 135 MMOL/L (ref 135–145)
SODIUM SERPL-SCNC: 136 MMOL/L (ref 135–145)
SODIUM SERPL-SCNC: 137 MMOL/L (ref 135–145)
SODIUM SERPL-SCNC: 138 MMOL/L (ref 135–145)
SODIUM SERPL-SCNC: 139 MMOL/L (ref 135–145)
SODIUM SERPL-SCNC: 140 MMOL/L (ref 135–145)
SODIUM SERPL-SCNC: 141 MMOL/L (ref 135–145)
SODIUM SERPL-SCNC: 141 MMOL/L (ref 135–145)
SODIUM SERPL-SCNC: 142 MMOL/L (ref 135–145)
SODIUM SERPL-SCNC: 143 MMOL/L (ref 135–145)
SODIUM SERPL-SCNC: 143 MMOL/L (ref 135–145)
SODIUM SERPL-SCNC: 144 MMOL/L (ref 135–145)
SODIUM SERPL-SCNC: 145 MMOL/L (ref 135–145)
SOURCE SOURCE: ABNORMAL
SOURCE SOURCE: NORMAL
SPECIMEN DRAWN FROM PATIENT: ABNORMAL
SPECIMEN SOURCE: NORMAL
STOMATOCYTES BLD QL SMEAR: NORMAL
TARGETS BLD QL SMEAR: NORMAL
TEG ALGORITHM TGALG: ABNORMAL
TRIGL SERPL-MCNC: 393 MG/DL (ref 0–149)
TRIGL SERPL-MCNC: 50 MG/DL (ref 0–149)
TRIGL SERPL-MCNC: 53 MG/DL (ref 0–149)
TROPONIN T SERPL-MCNC: 12 NG/L (ref 6–19)
TROPONIN T SERPL-MCNC: 21 NG/L (ref 6–19)
TSH SERPL DL<=0.005 MIU/L-ACNC: 1.63 UIU/ML (ref 0.38–5.33)
UFH PPP CHRO-ACNC: <0.1 IU/ML
UNIT STATUS USTAT: NORMAL
WBC # BLD AUTO: 10 K/UL (ref 4.8–10.8)
WBC # BLD AUTO: 10 K/UL (ref 4.8–10.8)
WBC # BLD AUTO: 10.4 K/UL (ref 4.8–10.8)
WBC # BLD AUTO: 10.5 K/UL (ref 4.8–10.8)
WBC # BLD AUTO: 10.7 K/UL (ref 4.8–10.8)
WBC # BLD AUTO: 10.9 K/UL (ref 4.8–10.8)
WBC # BLD AUTO: 11 K/UL (ref 4.8–10.8)
WBC # BLD AUTO: 11 K/UL (ref 4.8–10.8)
WBC # BLD AUTO: 11.1 K/UL (ref 4.8–10.8)
WBC # BLD AUTO: 11.2 K/UL (ref 4.8–10.8)
WBC # BLD AUTO: 11.4 K/UL (ref 4.8–10.8)
WBC # BLD AUTO: 11.7 K/UL (ref 4.8–10.8)
WBC # BLD AUTO: 11.8 K/UL (ref 4.8–10.8)
WBC # BLD AUTO: 12 K/UL (ref 4.8–10.8)
WBC # BLD AUTO: 12.1 K/UL (ref 4.8–10.8)
WBC # BLD AUTO: 12.9 K/UL (ref 4.8–10.8)
WBC # BLD AUTO: 13.7 K/UL (ref 4.8–10.8)
WBC # BLD AUTO: 14.1 K/UL (ref 4.8–10.8)
WBC # BLD AUTO: 14.9 K/UL (ref 4.8–10.8)
WBC # BLD AUTO: 14.9 K/UL (ref 4.8–10.8)
WBC # BLD AUTO: 15 K/UL (ref 4.8–10.8)
WBC # BLD AUTO: 15.3 K/UL (ref 4.8–10.8)
WBC # BLD AUTO: 16.6 K/UL (ref 4.8–10.8)
WBC # BLD AUTO: 18 K/UL (ref 4.8–10.8)
WBC # BLD AUTO: 18.2 K/UL (ref 4.8–10.8)
WBC # BLD AUTO: 18.3 K/UL (ref 4.8–10.8)
WBC # BLD AUTO: 18.6 K/UL (ref 4.8–10.8)
WBC # BLD AUTO: 19.2 K/UL (ref 4.8–10.8)
WBC # BLD AUTO: 20.1 K/UL (ref 4.8–10.8)
WBC # BLD AUTO: 22 K/UL (ref 4.8–10.8)
WBC # BLD AUTO: 30.3 K/UL (ref 4.8–10.8)
WBC # BLD AUTO: 36.6 K/UL (ref 4.8–10.8)
WBC # BLD AUTO: 39.4 K/UL (ref 4.8–10.8)
WBC # BLD AUTO: 47 K/UL (ref 4.8–10.8)
WBC # BLD AUTO: 6.1 K/UL (ref 4.8–10.8)
WBC # BLD AUTO: 6.6 K/UL (ref 4.8–10.8)
WBC # BLD AUTO: 6.8 K/UL (ref 4.8–10.8)
WBC # BLD AUTO: 6.9 K/UL (ref 4.8–10.8)
WBC # BLD AUTO: 7 K/UL (ref 4.8–10.8)
WBC # BLD AUTO: 7 K/UL (ref 4.8–10.8)
WBC # BLD AUTO: 7.6 K/UL (ref 4.8–10.8)
WBC # BLD AUTO: 7.8 K/UL (ref 4.8–10.8)
WBC # BLD AUTO: 7.9 K/UL (ref 4.8–10.8)
WBC # BLD AUTO: 7.9 K/UL (ref 4.8–10.8)
WBC # BLD AUTO: 8 K/UL (ref 4.8–10.8)
WBC # BLD AUTO: 8.6 K/UL (ref 4.8–10.8)
WBC # BLD AUTO: 8.6 K/UL (ref 4.8–10.8)
WBC # BLD AUTO: 8.8 K/UL (ref 4.8–10.8)
WBC # BLD AUTO: 8.8 K/UL (ref 4.8–10.8)
WBC # BLD AUTO: 9 K/UL (ref 4.8–10.8)
WBC # BLD AUTO: 9.1 K/UL (ref 4.8–10.8)
WBC # BLD AUTO: 9.1 K/UL (ref 4.8–10.8)
WBC # BLD AUTO: 9.2 K/UL (ref 4.8–10.8)
WBC # BLD AUTO: 9.2 K/UL (ref 4.8–10.8)
WBC OTHER NFR BLD MANUAL: 1.7 %

## 2020-01-01 PROCEDURE — 700102 HCHG RX REV CODE 250 W/ 637 OVERRIDE(OP): Performed by: INTERNAL MEDICINE

## 2020-01-01 PROCEDURE — 700111 HCHG RX REV CODE 636 W/ 250 OVERRIDE (IP): Performed by: INTERNAL MEDICINE

## 2020-01-01 PROCEDURE — 85025 COMPLETE CBC W/AUTO DIFF WBC: CPT

## 2020-01-01 PROCEDURE — 99291 CRITICAL CARE FIRST HOUR: CPT | Performed by: INTERNAL MEDICINE

## 2020-01-01 PROCEDURE — 94640 AIRWAY INHALATION TREATMENT: CPT

## 2020-01-01 PROCEDURE — A9270 NON-COVERED ITEM OR SERVICE: HCPCS | Performed by: INTERNAL MEDICINE

## 2020-01-01 PROCEDURE — 160042 HCHG SURGERY MINUTES - EA ADDL 1 MIN LEVEL 5: Performed by: SURGERY

## 2020-01-01 PROCEDURE — 97162 PT EVAL MOD COMPLEX 30 MIN: CPT

## 2020-01-01 PROCEDURE — 84100 ASSAY OF PHOSPHORUS: CPT

## 2020-01-01 PROCEDURE — 5A09357 ASSISTANCE WITH RESPIRATORY VENTILATION, LESS THAN 24 CONSECUTIVE HOURS, CONTINUOUS POSITIVE AIRWAY PRESSURE: ICD-10-PCS | Performed by: INTERNAL MEDICINE

## 2020-01-01 PROCEDURE — 94002 VENT MGMT INPAT INIT DAY: CPT

## 2020-01-01 PROCEDURE — 0DNW0ZZ RELEASE PERITONEUM, OPEN APPROACH: ICD-10-PCS | Performed by: SURGERY

## 2020-01-01 PROCEDURE — 99233 SBSQ HOSP IP/OBS HIGH 50: CPT | Performed by: INTERNAL MEDICINE

## 2020-01-01 PROCEDURE — 99292 CRITICAL CARE ADDL 30 MIN: CPT | Mod: 25 | Performed by: INTERNAL MEDICINE

## 2020-01-01 PROCEDURE — A9270 NON-COVERED ITEM OR SERVICE: HCPCS | Performed by: PSYCHIATRY & NEUROLOGY

## 2020-01-01 PROCEDURE — 37799 UNLISTED PX VASCULAR SURGERY: CPT

## 2020-01-01 PROCEDURE — 87070 CULTURE OTHR SPECIMN AEROBIC: CPT

## 2020-01-01 PROCEDURE — 94003 VENT MGMT INPAT SUBQ DAY: CPT

## 2020-01-01 PROCEDURE — 94760 N-INVAS EAR/PLS OXIMETRY 1: CPT

## 2020-01-01 PROCEDURE — 84145 PROCALCITONIN (PCT): CPT

## 2020-01-01 PROCEDURE — 770022 HCHG ROOM/CARE - ICU (200)

## 2020-01-01 PROCEDURE — 700102 HCHG RX REV CODE 250 W/ 637 OVERRIDE(OP): Performed by: STUDENT IN AN ORGANIZED HEALTH CARE EDUCATION/TRAINING PROGRAM

## 2020-01-01 PROCEDURE — 83735 ASSAY OF MAGNESIUM: CPT

## 2020-01-01 PROCEDURE — 93306 TTE W/DOPPLER COMPLETE: CPT

## 2020-01-01 PROCEDURE — 302978 HCHG BRONCHOSCOPY-DIAGNOSTIC

## 2020-01-01 PROCEDURE — 71045 X-RAY EXAM CHEST 1 VIEW: CPT

## 2020-01-01 PROCEDURE — 770001 HCHG ROOM/CARE - MED/SURG/GYN PRIV*

## 2020-01-01 PROCEDURE — 700101 HCHG RX REV CODE 250: Performed by: INTERNAL MEDICINE

## 2020-01-01 PROCEDURE — 86850 RBC ANTIBODY SCREEN: CPT

## 2020-01-01 PROCEDURE — 80048 BASIC METABOLIC PNL TOTAL CA: CPT

## 2020-01-01 PROCEDURE — 0B9F8ZX DRAINAGE OF RIGHT LOWER LUNG LOBE, VIA NATURAL OR ARTIFICIAL OPENING ENDOSCOPIC, DIAGNOSTIC: ICD-10-PCS | Performed by: INTERNAL MEDICINE

## 2020-01-01 PROCEDURE — 700102 HCHG RX REV CODE 250 W/ 637 OVERRIDE(OP): Performed by: PSYCHIATRY & NEUROLOGY

## 2020-01-01 PROCEDURE — 85610 PROTHROMBIN TIME: CPT

## 2020-01-01 PROCEDURE — 700111 HCHG RX REV CODE 636 W/ 250 OVERRIDE (IP): Performed by: PSYCHIATRY & NEUROLOGY

## 2020-01-01 PROCEDURE — 94770 HCHG CO2 EXPIRED GAS DETERMINATION: CPT

## 2020-01-01 PROCEDURE — 501570 HCHG TROCAR, SEPARATOR: Performed by: SURGERY

## 2020-01-01 PROCEDURE — 700105 HCHG RX REV CODE 258: Performed by: PSYCHIATRY & NEUROLOGY

## 2020-01-01 PROCEDURE — 86923 COMPATIBILITY TEST ELECTRIC: CPT

## 2020-01-01 PROCEDURE — 93005 ELECTROCARDIOGRAM TRACING: CPT | Performed by: INTERNAL MEDICINE

## 2020-01-01 PROCEDURE — 92507 TX SP LANG VOICE COMM INDIV: CPT

## 2020-01-01 PROCEDURE — 700105 HCHG RX REV CODE 258

## 2020-01-01 PROCEDURE — 700105 HCHG RX REV CODE 258: Performed by: INTERNAL MEDICINE

## 2020-01-01 PROCEDURE — 99153 MOD SED SAME PHYS/QHP EA: CPT

## 2020-01-01 PROCEDURE — 92950 HEART/LUNG RESUSCITATION CPR: CPT | Performed by: INTERNAL MEDICINE

## 2020-01-01 PROCEDURE — 85014 HEMATOCRIT: CPT

## 2020-01-01 PROCEDURE — 85018 HEMOGLOBIN: CPT | Mod: 91

## 2020-01-01 PROCEDURE — 502571 HCHG PACK, LAP CHOLE: Performed by: SURGERY

## 2020-01-01 PROCEDURE — 700111 HCHG RX REV CODE 636 W/ 250 OVERRIDE (IP)

## 2020-01-01 PROCEDURE — 82803 BLOOD GASES ANY COMBINATION: CPT

## 2020-01-01 PROCEDURE — 80053 COMPREHEN METABOLIC PANEL: CPT

## 2020-01-01 PROCEDURE — 92950 HEART/LUNG RESUSCITATION CPR: CPT

## 2020-01-01 PROCEDURE — 99232 SBSQ HOSP IP/OBS MODERATE 35: CPT | Performed by: FAMILY MEDICINE

## 2020-01-01 PROCEDURE — 83605 ASSAY OF LACTIC ACID: CPT

## 2020-01-01 PROCEDURE — 36600 WITHDRAWAL OF ARTERIAL BLOOD: CPT

## 2020-01-01 PROCEDURE — 87641 MR-STAPH DNA AMP PROBE: CPT

## 2020-01-01 PROCEDURE — 92526 ORAL FUNCTION THERAPY: CPT

## 2020-01-01 PROCEDURE — 700111 HCHG RX REV CODE 636 W/ 250 OVERRIDE (IP): Performed by: ANESTHESIOLOGY

## 2020-01-01 PROCEDURE — 99291 CRITICAL CARE FIRST HOUR: CPT | Mod: 25 | Performed by: INTERNAL MEDICINE

## 2020-01-01 PROCEDURE — 5A12012 PERFORMANCE OF CARDIAC OUTPUT, SINGLE, MANUAL: ICD-10-PCS | Performed by: INTERNAL MEDICINE

## 2020-01-01 PROCEDURE — 500378 HCHG DRAIN, J-VAC ROUND 19FR: Performed by: SURGERY

## 2020-01-01 PROCEDURE — A9270 NON-COVERED ITEM OR SERVICE: HCPCS | Performed by: NURSE PRACTITIONER

## 2020-01-01 PROCEDURE — 99152 MOD SED SAME PHYS/QHP 5/>YRS: CPT

## 2020-01-01 PROCEDURE — 86140 C-REACTIVE PROTEIN: CPT

## 2020-01-01 PROCEDURE — 5A1955Z RESPIRATORY VENTILATION, GREATER THAN 96 CONSECUTIVE HOURS: ICD-10-PCS | Performed by: INTERNAL MEDICINE

## 2020-01-01 PROCEDURE — 93010 ELECTROCARDIOGRAM REPORT: CPT | Performed by: INTERNAL MEDICINE

## 2020-01-01 PROCEDURE — 85027 COMPLETE CBC AUTOMATED: CPT | Mod: 91

## 2020-01-01 PROCEDURE — C9113 INJ PANTOPRAZOLE SODIUM, VIA: HCPCS | Performed by: INTERNAL MEDICINE

## 2020-01-01 PROCEDURE — 94660 CPAP INITIATION&MGMT: CPT

## 2020-01-01 PROCEDURE — 85347 COAGULATION TIME ACTIVATED: CPT

## 2020-01-01 PROCEDURE — C9132 KCENTRA, PER I.U.: HCPCS | Performed by: INTERNAL MEDICINE

## 2020-01-01 PROCEDURE — C1725 CATH, TRANSLUMIN NON-LASER: HCPCS | Performed by: SURGERY

## 2020-01-01 PROCEDURE — 0B9B8ZZ DRAINAGE OF LEFT LOWER LOBE BRONCHUS, VIA NATURAL OR ARTIFICIAL OPENING ENDOSCOPIC: ICD-10-PCS | Performed by: INTERNAL MEDICINE

## 2020-01-01 PROCEDURE — 99292 CRITICAL CARE ADDL 30 MIN: CPT | Performed by: INTERNAL MEDICINE

## 2020-01-01 PROCEDURE — 87205 SMEAR GRAM STAIN: CPT

## 2020-01-01 PROCEDURE — 82803 BLOOD GASES ANY COMBINATION: CPT | Mod: 91

## 2020-01-01 PROCEDURE — 99152 MOD SED SAME PHYS/QHP 5/>YRS: CPT | Performed by: INTERNAL MEDICINE

## 2020-01-01 PROCEDURE — 700101 HCHG RX REV CODE 250

## 2020-01-01 PROCEDURE — 31624 DX BRONCHOSCOPE/LAVAGE: CPT | Performed by: INTERNAL MEDICINE

## 2020-01-01 PROCEDURE — 700111 HCHG RX REV CODE 636 W/ 250 OVERRIDE (IP): Performed by: EMERGENCY MEDICINE

## 2020-01-01 PROCEDURE — 82962 GLUCOSE BLOOD TEST: CPT

## 2020-01-01 PROCEDURE — 302977 HCHG BRONCHOSCOPY PROC-THERAPEUTIC

## 2020-01-01 PROCEDURE — 99291 CRITICAL CARE FIRST HOUR: CPT

## 2020-01-01 PROCEDURE — 94669 MECHANICAL CHEST WALL OSCILL: CPT

## 2020-01-01 PROCEDURE — 700105 HCHG RX REV CODE 258: Performed by: ANESTHESIOLOGY

## 2020-01-01 PROCEDURE — 700105 HCHG RX REV CODE 258: Performed by: SURGERY

## 2020-01-01 PROCEDURE — 86901 BLOOD TYPING SEROLOGIC RH(D): CPT

## 2020-01-01 PROCEDURE — 160048 HCHG OR STATISTICAL LEVEL 1-5: Performed by: SURGERY

## 2020-01-01 PROCEDURE — 700117 HCHG RX CONTRAST REV CODE 255: Performed by: INTERNAL MEDICINE

## 2020-01-01 PROCEDURE — 94150 VITAL CAPACITY TEST: CPT

## 2020-01-01 PROCEDURE — 99233 SBSQ HOSP IP/OBS HIGH 50: CPT | Performed by: STUDENT IN AN ORGANIZED HEALTH CARE EDUCATION/TRAINING PROGRAM

## 2020-01-01 PROCEDURE — A9270 NON-COVERED ITEM OR SERVICE: HCPCS | Performed by: STUDENT IN AN ORGANIZED HEALTH CARE EDUCATION/TRAINING PROGRAM

## 2020-01-01 PROCEDURE — 86900 BLOOD TYPING SEROLOGIC ABO: CPT

## 2020-01-01 PROCEDURE — 99232 SBSQ HOSP IP/OBS MODERATE 35: CPT | Performed by: HOSPITALIST

## 2020-01-01 PROCEDURE — 36415 COLL VENOUS BLD VENIPUNCTURE: CPT

## 2020-01-01 PROCEDURE — P9016 RBC LEUKOCYTES REDUCED: HCPCS

## 2020-01-01 PROCEDURE — 99232 SBSQ HOSP IP/OBS MODERATE 35: CPT | Performed by: INTERNAL MEDICINE

## 2020-01-01 PROCEDURE — 85730 THROMBOPLASTIN TIME PARTIAL: CPT

## 2020-01-01 PROCEDURE — 99291 CRITICAL CARE FIRST HOUR: CPT | Mod: 25 | Performed by: PSYCHIATRY & NEUROLOGY

## 2020-01-01 PROCEDURE — 31600 PLANNED TRACHEOSTOMY: CPT | Performed by: SURGERY

## 2020-01-01 PROCEDURE — L8501 TRACHEOSTOMY SPEAKING VALVE: HCPCS

## 2020-01-01 PROCEDURE — C9803 HOPD COVID-19 SPEC COLLECT: HCPCS | Performed by: INTERNAL MEDICINE

## 2020-01-01 PROCEDURE — 87186 SC STD MICRODIL/AGAR DIL: CPT

## 2020-01-01 PROCEDURE — 85018 HEMOGLOBIN: CPT

## 2020-01-01 PROCEDURE — 93005 ELECTROCARDIOGRAM TRACING: CPT | Performed by: NURSE PRACTITIONER

## 2020-01-01 PROCEDURE — 501138 HCHG PLUG, FOLEY CATH: Performed by: SURGERY

## 2020-01-01 PROCEDURE — 31502 CHANGE OF WINDPIPE AIRWAY: CPT

## 2020-01-01 PROCEDURE — 0BC78ZZ EXTIRPATION OF MATTER FROM LEFT MAIN BRONCHUS, VIA NATURAL OR ARTIFICIAL OPENING ENDOSCOPIC: ICD-10-PCS | Performed by: INTERNAL MEDICINE

## 2020-01-01 PROCEDURE — 0B968ZZ DRAINAGE OF RIGHT LOWER LOBE BRONCHUS, VIA NATURAL OR ARTIFICIAL OPENING ENDOSCOPIC: ICD-10-PCS | Performed by: INTERNAL MEDICINE

## 2020-01-01 PROCEDURE — 0B9D8ZZ DRAINAGE OF RIGHT MIDDLE LUNG LOBE, VIA NATURAL OR ARTIFICIAL OPENING ENDOSCOPIC: ICD-10-PCS | Performed by: INTERNAL MEDICINE

## 2020-01-01 PROCEDURE — 84134 ASSAY OF PREALBUMIN: CPT

## 2020-01-01 PROCEDURE — 85576 BLOOD PLATELET AGGREGATION: CPT

## 2020-01-01 PROCEDURE — 84478 ASSAY OF TRIGLYCERIDES: CPT

## 2020-01-01 PROCEDURE — 71275 CT ANGIOGRAPHY CHEST: CPT

## 2020-01-01 PROCEDURE — 500868 HCHG NEEDLE, SURGI(VARES): Performed by: SURGERY

## 2020-01-01 PROCEDURE — 85027 COMPLETE CBC AUTOMATED: CPT

## 2020-01-01 PROCEDURE — 36556 INSERT NON-TUNNEL CV CATH: CPT

## 2020-01-01 PROCEDURE — 160031 HCHG SURGERY MINUTES - 1ST 30 MINS LEVEL 5: Performed by: SURGERY

## 2020-01-01 PROCEDURE — P9045 ALBUMIN (HUMAN), 5%, 250 ML: HCPCS | Performed by: INTERNAL MEDICINE

## 2020-01-01 PROCEDURE — 160048 HCHG OR STATISTICAL LEVEL 1-5: Performed by: INTERNAL MEDICINE

## 2020-01-01 PROCEDURE — 90471 IMMUNIZATION ADMIN: CPT

## 2020-01-01 PROCEDURE — 36556 INSERT NON-TUNNEL CV CATH: CPT | Mod: RT | Performed by: INTERNAL MEDICINE

## 2020-01-01 PROCEDURE — 500066 HCHG BITE BLOCK, ECT: Performed by: INTERNAL MEDICINE

## 2020-01-01 PROCEDURE — 84484 ASSAY OF TROPONIN QUANT: CPT

## 2020-01-01 PROCEDURE — 02HV33Z INSERTION OF INFUSION DEVICE INTO SUPERIOR VENA CAVA, PERCUTANEOUS APPROACH: ICD-10-PCS | Performed by: INTERNAL MEDICINE

## 2020-01-01 PROCEDURE — A6402 STERILE GAUZE <= 16 SQ IN: HCPCS | Performed by: SURGERY

## 2020-01-01 PROCEDURE — 5A12012 PERFORMANCE OF CARDIAC OUTPUT, SINGLE, MANUAL: ICD-10-PCS | Performed by: SURGERY

## 2020-01-01 PROCEDURE — 82330 ASSAY OF CALCIUM: CPT | Mod: 91

## 2020-01-01 PROCEDURE — 700111 HCHG RX REV CODE 636 W/ 250 OVERRIDE (IP): Performed by: NURSE PRACTITIONER

## 2020-01-01 PROCEDURE — 87077 CULTURE AEROBIC IDENTIFY: CPT

## 2020-01-01 PROCEDURE — 501838 HCHG SUTURE GENERAL: Performed by: SURGERY

## 2020-01-01 PROCEDURE — 160203 HCHG ENDO MINUTES - 1ST 30 MINS LEVEL 4: Performed by: INTERNAL MEDICINE

## 2020-01-01 PROCEDURE — 97530 THERAPEUTIC ACTIVITIES: CPT

## 2020-01-01 PROCEDURE — 80500 HCHG CLINICAL PATH CONSULT-LIMITED: CPT

## 2020-01-01 PROCEDURE — 31500 INSERT EMERGENCY AIRWAY: CPT

## 2020-01-01 PROCEDURE — 700102 HCHG RX REV CODE 250 W/ 637 OVERRIDE(OP): Performed by: NURSE PRACTITIONER

## 2020-01-01 PROCEDURE — 93306 TTE W/DOPPLER COMPLETE: CPT | Mod: 26 | Performed by: INTERNAL MEDICINE

## 2020-01-01 PROCEDURE — 500257: Performed by: SURGERY

## 2020-01-01 PROCEDURE — 85384 FIBRINOGEN ACTIVITY: CPT

## 2020-01-01 PROCEDURE — 85014 HEMATOCRIT: CPT | Mod: 91

## 2020-01-01 PROCEDURE — 31645 BRNCHSC W/THER ASPIR 1ST: CPT | Performed by: INTERNAL MEDICINE

## 2020-01-01 PROCEDURE — 99233 SBSQ HOSP IP/OBS HIGH 50: CPT | Mod: 25 | Performed by: INTERNAL MEDICINE

## 2020-01-01 PROCEDURE — 92960 CARDIOVERSION ELECTRIC EXT: CPT | Performed by: INTERNAL MEDICINE

## 2020-01-01 PROCEDURE — 0DNW4ZZ RELEASE PERITONEUM, PERCUTANEOUS ENDOSCOPIC APPROACH: ICD-10-PCS | Performed by: SURGERY

## 2020-01-01 PROCEDURE — 700101 HCHG RX REV CODE 250: Performed by: EMERGENCY MEDICINE

## 2020-01-01 PROCEDURE — 51798 US URINE CAPACITY MEASURE: CPT

## 2020-01-01 PROCEDURE — 83605 ASSAY OF LACTIC ACID: CPT | Mod: 91

## 2020-01-01 PROCEDURE — 96367 TX/PROPH/DG ADDL SEQ IV INF: CPT

## 2020-01-01 PROCEDURE — 74177 CT ABD & PELVIS W/CONTRAST: CPT

## 2020-01-01 PROCEDURE — 160208 HCHG ENDO MINUTES - EA ADDL 1 MIN LEVEL 4: Performed by: INTERNAL MEDICINE

## 2020-01-01 PROCEDURE — 31500 INSERT EMERGENCY AIRWAY: CPT | Performed by: INTERNAL MEDICINE

## 2020-01-01 PROCEDURE — 92611 MOTION FLUOROSCOPY/SWALLOW: CPT

## 2020-01-01 PROCEDURE — 700101 HCHG RX REV CODE 250: Performed by: PSYCHIATRY & NEUROLOGY

## 2020-01-01 PROCEDURE — 85007 BL SMEAR W/DIFF WBC COUNT: CPT

## 2020-01-01 PROCEDURE — 85520 HEPARIN ASSAY: CPT

## 2020-01-01 PROCEDURE — 0B958ZZ DRAINAGE OF RIGHT MIDDLE LOBE BRONCHUS, VIA NATURAL OR ARTIFICIAL OPENING ENDOSCOPIC: ICD-10-PCS | Performed by: INTERNAL MEDICINE

## 2020-01-01 PROCEDURE — 0W9B3ZZ DRAINAGE OF LEFT PLEURAL CAVITY, PERCUTANEOUS APPROACH: ICD-10-PCS | Performed by: INTERNAL MEDICINE

## 2020-01-01 PROCEDURE — 84132 ASSAY OF SERUM POTASSIUM: CPT

## 2020-01-01 PROCEDURE — 0B928ZZ DRAINAGE OF CARINA, VIA NATURAL OR ARTIFICIAL OPENING ENDOSCOPIC: ICD-10-PCS | Performed by: INTERNAL MEDICINE

## 2020-01-01 PROCEDURE — 3E02340 INTRODUCTION OF INFLUENZA VACCINE INTO MUSCLE, PERCUTANEOUS APPROACH: ICD-10-PCS | Performed by: INTERNAL MEDICINE

## 2020-01-01 PROCEDURE — 700101 HCHG RX REV CODE 250: Performed by: ANESTHESIOLOGY

## 2020-01-01 PROCEDURE — 92522 EVALUATE SPEECH PRODUCTION: CPT

## 2020-01-01 PROCEDURE — 87040 BLOOD CULTURE FOR BACTERIA: CPT | Mod: 91

## 2020-01-01 PROCEDURE — 0DQA0ZZ REPAIR JEJUNUM, OPEN APPROACH: ICD-10-PCS | Performed by: SURGERY

## 2020-01-01 PROCEDURE — 84295 ASSAY OF SERUM SODIUM: CPT | Mod: 91

## 2020-01-01 PROCEDURE — 0BH17EZ INSERTION OF ENDOTRACHEAL AIRWAY INTO TRACHEA, VIA NATURAL OR ARTIFICIAL OPENING: ICD-10-PCS | Performed by: INTERNAL MEDICINE

## 2020-01-01 PROCEDURE — 99156 MOD SED OTH PHYS/QHP 5/>YRS: CPT | Performed by: INTERNAL MEDICINE

## 2020-01-01 PROCEDURE — 0DHA0UZ INSERTION OF FEEDING DEVICE INTO JEJUNUM, OPEN APPROACH: ICD-10-PCS | Performed by: SURGERY

## 2020-01-01 PROCEDURE — 94690 O2 UPTK REST INDIRECT: CPT

## 2020-01-01 PROCEDURE — U0003 INFECTIOUS AGENT DETECTION BY NUCLEIC ACID (DNA OR RNA); SEVERE ACUTE RESPIRATORY SYNDROME CORONAVIRUS 2 (SARS-COV-2) (CORONAVIRUS DISEASE [COVID-19]), AMPLIFIED PROBE TECHNIQUE, MAKING USE OF HIGH THROUGHPUT TECHNOLOGIES AS DESCRIBED BY CMS-2020-01-R: HCPCS

## 2020-01-01 PROCEDURE — 0B988ZZ DRAINAGE OF LEFT UPPER LOBE BRONCHUS, VIA NATURAL OR ARTIFICIAL OPENING ENDOSCOPIC: ICD-10-PCS | Performed by: INTERNAL MEDICINE

## 2020-01-01 PROCEDURE — 92610 EVALUATE SWALLOWING FUNCTION: CPT

## 2020-01-01 PROCEDURE — 02H633Z INSERTION OF INFUSION DEVICE INTO RIGHT ATRIUM, PERCUTANEOUS APPROACH: ICD-10-PCS | Performed by: PSYCHIATRY & NEUROLOGY

## 2020-01-01 PROCEDURE — 36556 INSERT NON-TUNNEL CV CATH: CPT | Mod: RT | Performed by: PSYCHIATRY & NEUROLOGY

## 2020-01-01 PROCEDURE — 700117 HCHG RX CONTRAST REV CODE 255: Performed by: SURGERY

## 2020-01-01 PROCEDURE — 90686 IIV4 VACC NO PRSV 0.5 ML IM: CPT | Performed by: INTERNAL MEDICINE

## 2020-01-01 PROCEDURE — 87102 FUNGUS ISOLATION CULTURE: CPT

## 2020-01-01 PROCEDURE — 84443 ASSAY THYROID STIM HORMONE: CPT

## 2020-01-01 PROCEDURE — 36430 TRANSFUSION BLD/BLD COMPNT: CPT

## 2020-01-01 PROCEDURE — 0BH18EZ INSERTION OF ENDOTRACHEAL AIRWAY INTO TRACHEA, VIA NATURAL OR ARTIFICIAL OPENING ENDOSCOPIC: ICD-10-PCS | Performed by: INTERNAL MEDICINE

## 2020-01-01 PROCEDURE — 160009 HCHG ANES TIME/MIN: Performed by: SURGERY

## 2020-01-01 PROCEDURE — 501583 HCHG TROCAR, THRD CAN&SEAL 5X100: Performed by: SURGERY

## 2020-01-01 PROCEDURE — 0B938ZZ DRAINAGE OF RIGHT MAIN BRONCHUS, VIA NATURAL OR ARTIFICIAL OPENING ENDOSCOPIC: ICD-10-PCS | Performed by: INTERNAL MEDICINE

## 2020-01-01 PROCEDURE — 502704 HCHG DEVICE, LIGASURE IMPACT: Performed by: SURGERY

## 2020-01-01 PROCEDURE — 85007 BL SMEAR W/DIFF WBC COUNT: CPT | Mod: 91

## 2020-01-01 PROCEDURE — 74230 X-RAY XM SWLNG FUNCJ C+: CPT

## 2020-01-01 PROCEDURE — 82962 GLUCOSE BLOOD TEST: CPT | Mod: 91

## 2020-01-01 PROCEDURE — 02H633Z INSERTION OF INFUSION DEVICE INTO RIGHT ATRIUM, PERCUTANEOUS APPROACH: ICD-10-PCS | Performed by: INTERNAL MEDICINE

## 2020-01-01 PROCEDURE — 31646 BRNCHSC W/THER ASPIR SBSQ: CPT | Performed by: INTERNAL MEDICINE

## 2020-01-01 PROCEDURE — 0DJ08ZZ INSPECTION OF UPPER INTESTINAL TRACT, VIA NATURAL OR ARTIFICIAL OPENING ENDOSCOPIC: ICD-10-PCS | Performed by: INTERNAL MEDICINE

## 2020-01-01 PROCEDURE — 99231 SBSQ HOSP IP/OBS SF/LOW 25: CPT | Performed by: INTERNAL MEDICINE

## 2020-01-01 PROCEDURE — 502240 HCHG MISC OR SUPPLY RC 0272: Performed by: SURGERY

## 2020-01-01 PROCEDURE — 87147 CULTURE TYPE IMMUNOLOGIC: CPT

## 2020-01-01 PROCEDURE — 0BCB8ZZ EXTIRPATION OF MATTER FROM LEFT LOWER LOBE BRONCHUS, VIA NATURAL OR ARTIFICIAL OPENING ENDOSCOPIC: ICD-10-PCS | Performed by: INTERNAL MEDICINE

## 2020-01-01 PROCEDURE — 0JC80ZZ EXTIRPATION OF MATTER FROM ABDOMEN SUBCUTANEOUS TISSUE AND FASCIA, OPEN APPROACH: ICD-10-PCS | Performed by: SURGERY

## 2020-01-01 PROCEDURE — 74018 RADEX ABDOMEN 1 VIEW: CPT

## 2020-01-01 PROCEDURE — 32100 EXPLORATION OF CHEST: CPT | Mod: RT | Performed by: INTERNAL MEDICINE

## 2020-01-01 PROCEDURE — 97763 ORTHC/PROSTC MGMT SBSQ ENC: CPT

## 2020-01-01 PROCEDURE — 70450 CT HEAD/BRAIN W/O DYE: CPT

## 2020-01-01 PROCEDURE — 5A1945Z RESPIRATORY VENTILATION, 24-96 CONSECUTIVE HOURS: ICD-10-PCS | Performed by: PSYCHIATRY & NEUROLOGY

## 2020-01-01 PROCEDURE — 0B113F4 BYPASS TRACHEA TO CUTANEOUS WITH TRACHEOSTOMY DEVICE, PERCUTANEOUS APPROACH: ICD-10-PCS | Performed by: SURGERY

## 2020-01-01 PROCEDURE — 0B9J8ZX DRAINAGE OF LEFT LOWER LUNG LOBE, VIA NATURAL OR ARTIFICIAL OPENING ENDOSCOPIC, DIAGNOSTIC: ICD-10-PCS | Performed by: INTERNAL MEDICINE

## 2020-01-01 PROCEDURE — 0W993ZZ DRAINAGE OF RIGHT PLEURAL CAVITY, PERCUTANEOUS APPROACH: ICD-10-PCS | Performed by: INTERNAL MEDICINE

## 2020-01-01 PROCEDURE — 96365 THER/PROPH/DIAG IV INF INIT: CPT

## 2020-01-01 PROCEDURE — 99232 SBSQ HOSP IP/OBS MODERATE 35: CPT | Performed by: STUDENT IN AN ORGANIZED HEALTH CARE EDUCATION/TRAINING PROGRAM

## 2020-01-01 PROCEDURE — 96368 THER/DIAG CONCURRENT INF: CPT

## 2020-01-01 PROCEDURE — 82533 TOTAL CORTISOL: CPT

## 2020-01-01 PROCEDURE — 0B948ZZ DRAINAGE OF RIGHT UPPER LOBE BRONCHUS, VIA NATURAL OR ARTIFICIAL OPENING ENDOSCOPIC: ICD-10-PCS | Performed by: INTERNAL MEDICINE

## 2020-01-01 PROCEDURE — 87389 HIV-1 AG W/HIV-1&-2 AB AG IA: CPT

## 2020-01-01 PROCEDURE — 5A1945Z RESPIRATORY VENTILATION, 24-96 CONSECUTIVE HOURS: ICD-10-PCS | Performed by: INTERNAL MEDICINE

## 2020-01-01 RX ORDER — ACETAMINOPHEN 325 MG/1
650 TABLET ORAL EVERY 6 HOURS PRN
Status: DISCONTINUED | OUTPATIENT
Start: 2020-01-01 | End: 2020-01-01

## 2020-01-01 RX ORDER — SODIUM CHLORIDE, SODIUM LACTATE, POTASSIUM CHLORIDE, CALCIUM CHLORIDE 600; 310; 30; 20 MG/100ML; MG/100ML; MG/100ML; MG/100ML
INJECTION, SOLUTION INTRAVENOUS
Status: DISCONTINUED | OUTPATIENT
Start: 2020-01-01 | End: 2020-01-01 | Stop reason: SURG

## 2020-01-01 RX ORDER — LEVALBUTEROL INHALATION SOLUTION 1.25 MG/3ML
1.25 SOLUTION RESPIRATORY (INHALATION)
Status: DISCONTINUED | OUTPATIENT
Start: 2020-01-01 | End: 2020-01-01

## 2020-01-01 RX ORDER — SODIUM CHLORIDE 9 MG/ML
INJECTION, SOLUTION INTRAVENOUS
Status: COMPLETED
Start: 2020-01-01 | End: 2020-01-01

## 2020-01-01 RX ORDER — OXYCODONE HYDROCHLORIDE 5 MG/1
5 TABLET ORAL EVERY 4 HOURS PRN
Status: DISCONTINUED | OUTPATIENT
Start: 2020-01-01 | End: 2020-01-01

## 2020-01-01 RX ORDER — FUROSEMIDE 10 MG/ML
20 INJECTION INTRAMUSCULAR; INTRAVENOUS ONCE
Status: COMPLETED | OUTPATIENT
Start: 2020-01-01 | End: 2020-01-01

## 2020-01-01 RX ORDER — PHENYLEPHRINE HYDROCHLORIDE 10 MG/ML
INJECTION, SOLUTION INTRAMUSCULAR; INTRAVENOUS; SUBCUTANEOUS
Status: COMPLETED
Start: 2020-01-01 | End: 2020-01-01

## 2020-01-01 RX ORDER — IPRATROPIUM BROMIDE AND ALBUTEROL SULFATE 2.5; .5 MG/3ML; MG/3ML
3 SOLUTION RESPIRATORY (INHALATION)
Status: DISCONTINUED | OUTPATIENT
Start: 2020-01-01 | End: 2020-01-01

## 2020-01-01 RX ORDER — ACETYLCYSTEINE 200 MG/ML
3-5 SOLUTION ORAL; RESPIRATORY (INHALATION)
Status: DISCONTINUED | OUTPATIENT
Start: 2020-01-01 | End: 2020-01-01

## 2020-01-01 RX ORDER — LIDOCAINE 50 MG/G
2 PATCH TOPICAL EVERY 24 HOURS
Status: DISCONTINUED | OUTPATIENT
Start: 2020-01-01 | End: 2020-01-01

## 2020-01-01 RX ORDER — NALOXONE HYDROCHLORIDE 0.4 MG/ML
INJECTION, SOLUTION INTRAMUSCULAR; INTRAVENOUS; SUBCUTANEOUS
Status: ACTIVE
Start: 2020-01-01 | End: 2020-01-01

## 2020-01-01 RX ORDER — FUROSEMIDE 20 MG/1
20 TABLET ORAL
Status: DISCONTINUED | OUTPATIENT
Start: 2020-01-01 | End: 2020-01-01

## 2020-01-01 RX ORDER — SODIUM CHLORIDE, SODIUM GLUCONATE, SODIUM ACETATE, POTASSIUM CHLORIDE AND MAGNESIUM CHLORIDE 526; 502; 368; 37; 30 MG/100ML; MG/100ML; MG/100ML; MG/100ML; MG/100ML
1000 INJECTION, SOLUTION INTRAVENOUS ONCE
Status: COMPLETED | OUTPATIENT
Start: 2020-01-01 | End: 2020-01-01

## 2020-01-01 RX ORDER — HYDROCODONE BITARTRATE AND ACETAMINOPHEN 5; 325 MG/1; MG/1
1 TABLET ORAL EVERY 4 HOURS PRN
Status: DISCONTINUED | OUTPATIENT
Start: 2020-01-01 | End: 2020-01-01

## 2020-01-01 RX ORDER — ACETYLCYSTEINE 200 MG/ML
3 SOLUTION ORAL; RESPIRATORY (INHALATION) EVERY 4 HOURS
Status: DISCONTINUED | OUTPATIENT
Start: 2020-01-01 | End: 2020-01-01

## 2020-01-01 RX ORDER — NALOXONE HYDROCHLORIDE 0.4 MG/ML
0.4 INJECTION, SOLUTION INTRAMUSCULAR; INTRAVENOUS; SUBCUTANEOUS ONCE
Status: COMPLETED | OUTPATIENT
Start: 2020-01-01 | End: 2020-01-01

## 2020-01-01 RX ORDER — MIDAZOLAM HYDROCHLORIDE 1 MG/ML
INJECTION INTRAMUSCULAR; INTRAVENOUS
Status: ACTIVE
Start: 2020-01-01 | End: 2020-01-01

## 2020-01-01 RX ORDER — DEXMEDETOMIDINE HYDROCHLORIDE 4 UG/ML
0-1.5 INJECTION, SOLUTION INTRAVENOUS CONTINUOUS
Status: DISCONTINUED | OUTPATIENT
Start: 2020-01-01 | End: 2020-01-01

## 2020-01-01 RX ORDER — GLYCOPYRROLATE 1 MG/1
1 TABLET ORAL 3 TIMES DAILY PRN
Status: DISCONTINUED | OUTPATIENT
Start: 2020-01-01 | End: 2020-01-01 | Stop reason: HOSPADM

## 2020-01-01 RX ORDER — FUROSEMIDE 10 MG/ML
40 INJECTION INTRAMUSCULAR; INTRAVENOUS ONCE
Status: COMPLETED | OUTPATIENT
Start: 2020-01-01 | End: 2020-01-01

## 2020-01-01 RX ORDER — POLYETHYLENE GLYCOL 3350 17 G/17G
1 POWDER, FOR SOLUTION ORAL
Status: DISCONTINUED | OUTPATIENT
Start: 2020-01-01 | End: 2020-01-01

## 2020-01-01 RX ORDER — BISACODYL 10 MG
10 SUPPOSITORY, RECTAL RECTAL
Status: DISCONTINUED | OUTPATIENT
Start: 2020-01-01 | End: 2020-01-01

## 2020-01-01 RX ORDER — LIDOCAINE HYDROCHLORIDE 20 MG/ML
80 INJECTION, SOLUTION INTRAVENOUS ONCE
Status: COMPLETED | OUTPATIENT
Start: 2020-01-01 | End: 2020-01-01

## 2020-01-01 RX ORDER — SODIUM CHLORIDE, SODIUM GLUCONATE, SODIUM ACETATE, POTASSIUM CHLORIDE AND MAGNESIUM CHLORIDE 526; 502; 368; 37; 30 MG/100ML; MG/100ML; MG/100ML; MG/100ML; MG/100ML
INJECTION, SOLUTION INTRAVENOUS
Status: COMPLETED
Start: 2020-01-01 | End: 2020-01-01

## 2020-01-01 RX ORDER — MIDODRINE HYDROCHLORIDE 5 MG/1
5 TABLET ORAL EVERY 8 HOURS
Status: DISCONTINUED | OUTPATIENT
Start: 2020-01-01 | End: 2020-01-01

## 2020-01-01 RX ORDER — LEVALBUTEROL INHALATION SOLUTION 1.25 MG/3ML
1.25 SOLUTION RESPIRATORY (INHALATION) 4 TIMES DAILY
Status: DISCONTINUED | OUTPATIENT
Start: 2020-01-01 | End: 2020-01-01

## 2020-01-01 RX ORDER — MIDAZOLAM HYDROCHLORIDE 1 MG/ML
2 INJECTION INTRAMUSCULAR; INTRAVENOUS ONCE
Status: COMPLETED | OUTPATIENT
Start: 2020-01-01 | End: 2020-01-01

## 2020-01-01 RX ORDER — SODIUM CHLORIDE 9 MG/ML
INJECTION, SOLUTION INTRAVENOUS
Status: DISCONTINUED
Start: 2020-01-01 | End: 2020-01-01

## 2020-01-01 RX ORDER — SODIUM CHLORIDE, SODIUM LACTATE, POTASSIUM CHLORIDE, AND CALCIUM CHLORIDE .6; .31; .03; .02 G/100ML; G/100ML; G/100ML; G/100ML
1000 INJECTION, SOLUTION INTRAVENOUS ONCE
Status: COMPLETED | OUTPATIENT
Start: 2020-01-01 | End: 2020-01-01

## 2020-01-01 RX ORDER — MAGNESIUM SULFATE HEPTAHYDRATE 40 MG/ML
2 INJECTION, SOLUTION INTRAVENOUS ONCE
Status: COMPLETED | OUTPATIENT
Start: 2020-01-01 | End: 2020-01-01

## 2020-01-01 RX ORDER — PROPOFOL 10 MG/ML
100 INJECTION, EMULSION INTRAVENOUS ONCE
Status: COMPLETED | OUTPATIENT
Start: 2020-01-01 | End: 2020-01-01

## 2020-01-01 RX ORDER — ONDANSETRON 4 MG/1
4 TABLET, ORALLY DISINTEGRATING ORAL EVERY 4 HOURS PRN
Status: DISCONTINUED | OUTPATIENT
Start: 2020-01-01 | End: 2020-01-01

## 2020-01-01 RX ORDER — KETOROLAC TROMETHAMINE 30 MG/ML
30 INJECTION, SOLUTION INTRAMUSCULAR; INTRAVENOUS EVERY 6 HOURS PRN
Status: DISPENSED | OUTPATIENT
Start: 2020-01-01 | End: 2020-01-01

## 2020-01-01 RX ORDER — FUROSEMIDE 10 MG/ML
20 INJECTION INTRAMUSCULAR; INTRAVENOUS EVERY 12 HOURS
Status: DISCONTINUED | OUTPATIENT
Start: 2020-01-01 | End: 2020-01-01

## 2020-01-01 RX ORDER — POTASSIUM CHLORIDE 7.45 MG/ML
10 INJECTION INTRAVENOUS
Status: DISPENSED | OUTPATIENT
Start: 2020-01-01 | End: 2020-01-01

## 2020-01-01 RX ORDER — OXYCODONE HYDROCHLORIDE 10 MG/1
10 TABLET ORAL EVERY 4 HOURS PRN
Status: DISCONTINUED | OUTPATIENT
Start: 2020-01-01 | End: 2020-01-01

## 2020-01-01 RX ORDER — SODIUM CHLORIDE, SODIUM LACTATE, POTASSIUM CHLORIDE, CALCIUM CHLORIDE 600; 310; 30; 20 MG/100ML; MG/100ML; MG/100ML; MG/100ML
INJECTION, SOLUTION INTRAVENOUS
Status: ACTIVE
Start: 2020-01-01 | End: 2020-01-01

## 2020-01-01 RX ORDER — TRAZODONE HYDROCHLORIDE 50 MG/1
50 TABLET ORAL NIGHTLY PRN
Status: DISCONTINUED | OUTPATIENT
Start: 2020-01-01 | End: 2020-01-01

## 2020-01-01 RX ORDER — TOPIRAMATE 25 MG/1
75 TABLET ORAL DAILY
Status: DISCONTINUED | OUTPATIENT
Start: 2020-01-01 | End: 2020-01-01

## 2020-01-01 RX ORDER — FUROSEMIDE 10 MG/ML
20 INJECTION INTRAMUSCULAR; INTRAVENOUS ONCE
Status: DISCONTINUED | OUTPATIENT
Start: 2020-01-01 | End: 2020-01-01

## 2020-01-01 RX ORDER — OXYCODONE HYDROCHLORIDE 5 MG/1
5 TABLET ORAL EVERY 6 HOURS
Status: DISCONTINUED | OUTPATIENT
Start: 2020-01-01 | End: 2020-01-01

## 2020-01-01 RX ORDER — MORPHINE SULFATE 100 MG/5ML
10 SOLUTION ORAL
Status: DISCONTINUED | OUTPATIENT
Start: 2020-01-01 | End: 2020-01-01

## 2020-01-01 RX ORDER — METOPROLOL TARTRATE 1 MG/ML
5 INJECTION, SOLUTION INTRAVENOUS
Status: COMPLETED | OUTPATIENT
Start: 2020-01-01 | End: 2020-01-01

## 2020-01-01 RX ORDER — HEPARIN SODIUM 1000 [USP'U]/ML
40 INJECTION, SOLUTION INTRAVENOUS; SUBCUTANEOUS PRN
Status: DISCONTINUED | OUTPATIENT
Start: 2020-01-01 | End: 2020-01-01

## 2020-01-01 RX ORDER — POTASSIUM CHLORIDE 29.8 MG/ML
40 INJECTION INTRAVENOUS ONCE
Status: COMPLETED | OUTPATIENT
Start: 2020-01-01 | End: 2020-01-01

## 2020-01-01 RX ORDER — SODIUM CHLORIDE 9 MG/ML
INJECTION, SOLUTION INTRAVENOUS
Status: ACTIVE
Start: 2020-01-01 | End: 2020-01-01

## 2020-01-01 RX ORDER — PROMETHAZINE HYDROCHLORIDE 25 MG/1
12.5-25 TABLET ORAL EVERY 4 HOURS PRN
Status: DISCONTINUED | OUTPATIENT
Start: 2020-01-01 | End: 2020-01-01

## 2020-01-01 RX ORDER — MORPHINE SULFATE 10 MG/ML
10-20 INJECTION, SOLUTION INTRAMUSCULAR; INTRAVENOUS
Status: DISCONTINUED | OUTPATIENT
Start: 2020-01-01 | End: 2020-01-01 | Stop reason: HOSPADM

## 2020-01-01 RX ORDER — ONDANSETRON 4 MG/1
8 TABLET, ORALLY DISINTEGRATING ORAL EVERY 8 HOURS PRN
Status: DISCONTINUED | OUTPATIENT
Start: 2020-01-01 | End: 2020-01-01 | Stop reason: HOSPADM

## 2020-01-01 RX ORDER — DEXMEDETOMIDINE HYDROCHLORIDE 4 UG/ML
.1-1.5 INJECTION, SOLUTION INTRAVENOUS CONTINUOUS
Status: DISCONTINUED | OUTPATIENT
Start: 2020-01-01 | End: 2020-01-01

## 2020-01-01 RX ORDER — NOREPINEPHRINE BITARTRATE 0.03 MG/ML
0-30 INJECTION, SOLUTION INTRAVENOUS CONTINUOUS
Status: DISCONTINUED | OUTPATIENT
Start: 2020-01-01 | End: 2020-01-01

## 2020-01-01 RX ORDER — HEPARIN SODIUM 5000 [USP'U]/100ML
0-30 INJECTION, SOLUTION INTRAVENOUS CONTINUOUS
Status: DISCONTINUED | OUTPATIENT
Start: 2020-01-01 | End: 2020-01-01

## 2020-01-01 RX ORDER — DEXTROSE MONOHYDRATE 25 G/50ML
50 INJECTION, SOLUTION INTRAVENOUS
Status: DISCONTINUED | OUTPATIENT
Start: 2020-01-01 | End: 2020-01-01

## 2020-01-01 RX ORDER — LIDOCAINE HYDROCHLORIDE 20 MG/ML
5 INJECTION, SOLUTION INFILTRATION; PERINEURAL PRN
Status: ACTIVE | OUTPATIENT
Start: 2020-01-01 | End: 2020-01-01

## 2020-01-01 RX ORDER — HEPARIN SODIUM 5000 [USP'U]/ML
5000 INJECTION, SOLUTION INTRAVENOUS; SUBCUTANEOUS EVERY 8 HOURS
Status: DISCONTINUED | OUTPATIENT
Start: 2020-01-01 | End: 2020-01-01

## 2020-01-01 RX ORDER — SODIUM CHLORIDE 9 MG/ML
1000 INJECTION, SOLUTION INTRAVENOUS ONCE
Status: COMPLETED | OUTPATIENT
Start: 2020-01-01 | End: 2020-01-01

## 2020-01-01 RX ORDER — AMOXICILLIN 250 MG
2 CAPSULE ORAL 2 TIMES DAILY
Status: DISCONTINUED | OUTPATIENT
Start: 2020-01-01 | End: 2020-01-01

## 2020-01-01 RX ORDER — FAMOTIDINE 20 MG/1
20 TABLET, FILM COATED ORAL EVERY 12 HOURS
Status: DISCONTINUED | OUTPATIENT
Start: 2020-01-01 | End: 2020-01-01

## 2020-01-01 RX ORDER — LORAZEPAM 2 MG/ML
1 CONCENTRATE ORAL
Status: DISCONTINUED | OUTPATIENT
Start: 2020-01-01 | End: 2020-01-01 | Stop reason: HOSPADM

## 2020-01-01 RX ORDER — PHENYLEPHRINE HCL IN 0.9% NACL 0.5 MG/5ML
200 SYRINGE (ML) INTRAVENOUS
Status: ACTIVE | OUTPATIENT
Start: 2020-01-01 | End: 2020-01-01

## 2020-01-01 RX ORDER — ACETAMINOPHEN 500 MG
1000 TABLET ORAL EVERY 6 HOURS
Status: DISCONTINUED | OUTPATIENT
Start: 2020-01-01 | End: 2020-01-01

## 2020-01-01 RX ORDER — ALBUMIN, HUMAN INJ 5% 5 %
12.5 SOLUTION INTRAVENOUS ONCE
Status: COMPLETED | OUTPATIENT
Start: 2020-01-01 | End: 2020-01-01

## 2020-01-01 RX ORDER — EPINEPHRINE HCL IN 0.9 % NACL 4MG/250ML
0-10 PLASTIC BAG, INJECTION (ML) INTRAVENOUS CONTINUOUS
Status: DISCONTINUED | OUTPATIENT
Start: 2020-01-01 | End: 2020-01-01

## 2020-01-01 RX ORDER — SODIUM CHLORIDE, SODIUM LACTATE, POTASSIUM CHLORIDE, CALCIUM CHLORIDE 600; 310; 30; 20 MG/100ML; MG/100ML; MG/100ML; MG/100ML
1000 INJECTION, SOLUTION INTRAVENOUS CONTINUOUS
Status: DISCONTINUED | OUTPATIENT
Start: 2020-01-01 | End: 2020-01-01

## 2020-01-01 RX ORDER — IPRATROPIUM BROMIDE AND ALBUTEROL SULFATE 2.5; .5 MG/3ML; MG/3ML
3 SOLUTION RESPIRATORY (INHALATION)
Status: DISCONTINUED | OUTPATIENT
Start: 2020-01-01 | End: 2020-01-01 | Stop reason: ALTCHOICE

## 2020-01-01 RX ORDER — ACETYLCYSTEINE 200 MG/ML
3 SOLUTION ORAL; RESPIRATORY (INHALATION)
Status: DISCONTINUED | OUTPATIENT
Start: 2020-01-01 | End: 2020-01-01

## 2020-01-01 RX ORDER — KETAMINE HYDROCHLORIDE 50 MG/ML
20 INJECTION, SOLUTION INTRAMUSCULAR; INTRAVENOUS ONCE
Status: COMPLETED | OUTPATIENT
Start: 2020-01-01 | End: 2020-01-01

## 2020-01-01 RX ORDER — AMIODARONE HYDROCHLORIDE 200 MG/1
200 TABLET ORAL
Status: DISCONTINUED | OUTPATIENT
Start: 2020-01-01 | End: 2020-01-01

## 2020-01-01 RX ORDER — FUROSEMIDE 10 MG/ML
20 INJECTION INTRAMUSCULAR; INTRAVENOUS
Status: DISCONTINUED | OUTPATIENT
Start: 2020-01-01 | End: 2020-01-01

## 2020-01-01 RX ORDER — PHENYLEPHRINE HYDROCHLORIDE 10 MG/ML
INJECTION, SOLUTION INTRAMUSCULAR; INTRAVENOUS; SUBCUTANEOUS
Status: DISCONTINUED
Start: 2020-01-01 | End: 2020-01-01

## 2020-01-01 RX ORDER — ACETYLCYSTEINE 200 MG/ML
3 SOLUTION ORAL; RESPIRATORY (INHALATION) PRN
Status: ACTIVE | OUTPATIENT
Start: 2020-01-01 | End: 2020-01-01

## 2020-01-01 RX ORDER — DULOXETIN HYDROCHLORIDE 60 MG/1
60 CAPSULE, DELAYED RELEASE ORAL
Status: DISCONTINUED | OUTPATIENT
Start: 2020-01-01 | End: 2020-01-01

## 2020-01-01 RX ORDER — OMEPRAZOLE 20 MG/1
20 CAPSULE, DELAYED RELEASE ORAL 2 TIMES DAILY
Status: DISCONTINUED | OUTPATIENT
Start: 2020-01-01 | End: 2020-01-01

## 2020-01-01 RX ORDER — ROCURONIUM BROMIDE 10 MG/ML
70 INJECTION, SOLUTION INTRAVENOUS ONCE
Status: COMPLETED | OUTPATIENT
Start: 2020-01-01 | End: 2020-01-01

## 2020-01-01 RX ORDER — PANTOPRAZOLE SODIUM 40 MG/10ML
40 INJECTION, POWDER, LYOPHILIZED, FOR SOLUTION INTRAVENOUS 2 TIMES DAILY
Status: DISCONTINUED | OUTPATIENT
Start: 2020-01-01 | End: 2020-01-01

## 2020-01-01 RX ORDER — NOREPINEPHRINE BITARTRATE 0.03 MG/ML
INJECTION, SOLUTION INTRAVENOUS
Status: COMPLETED
Start: 2020-01-01 | End: 2020-01-01

## 2020-01-01 RX ORDER — FUROSEMIDE 20 MG/1
40 TABLET ORAL
Status: DISCONTINUED | OUTPATIENT
Start: 2020-01-01 | End: 2020-01-01

## 2020-01-01 RX ORDER — SODIUM CHLORIDE, SODIUM LACTATE, POTASSIUM CHLORIDE, CALCIUM CHLORIDE 600; 310; 30; 20 MG/100ML; MG/100ML; MG/100ML; MG/100ML
INJECTION, SOLUTION INTRAVENOUS
Status: COMPLETED
Start: 2020-01-01 | End: 2020-01-01

## 2020-01-01 RX ORDER — LIDOCAINE HYDROCHLORIDE 20 MG/ML
5 SOLUTION OROPHARYNGEAL PRN
Status: ACTIVE | OUTPATIENT
Start: 2020-01-01 | End: 2020-01-01

## 2020-01-01 RX ORDER — DEXTROSE AND SODIUM CHLORIDE 5; .9 G/100ML; G/100ML
INJECTION, SOLUTION INTRAVENOUS
Status: ACTIVE
Start: 2020-01-01 | End: 2020-01-01

## 2020-01-01 RX ORDER — CALCIUM CHLORIDE 100 MG/ML
INJECTION INTRAVENOUS; INTRAVENTRICULAR
Status: COMPLETED
Start: 2020-01-01 | End: 2020-01-01

## 2020-01-01 RX ORDER — SODIUM CHLORIDE, SODIUM LACTATE, POTASSIUM CHLORIDE, CALCIUM CHLORIDE 600; 310; 30; 20 MG/100ML; MG/100ML; MG/100ML; MG/100ML
INJECTION, SOLUTION INTRAVENOUS CONTINUOUS
Status: DISCONTINUED | OUTPATIENT
Start: 2020-01-01 | End: 2020-01-01

## 2020-01-01 RX ORDER — OXYCODONE HYDROCHLORIDE 5 MG/1
2.5 TABLET ORAL EVERY 4 HOURS PRN
Status: DISCONTINUED | OUTPATIENT
Start: 2020-01-01 | End: 2020-01-01

## 2020-01-01 RX ORDER — PHENYLEPHRINE HCL IN 0.9% NACL 0.5 MG/5ML
100-300 SYRINGE (ML) INTRAVENOUS
Status: ACTIVE | OUTPATIENT
Start: 2020-01-01 | End: 2020-01-01

## 2020-01-01 RX ORDER — MORPHINE SULFATE 100 MG/5ML
20 SOLUTION ORAL
Status: DISCONTINUED | OUTPATIENT
Start: 2020-01-01 | End: 2020-01-01

## 2020-01-01 RX ORDER — PHENYLEPHRINE HCL IN 0.9% NACL 0.5 MG/5ML
SYRINGE (ML) INTRAVENOUS
Status: ACTIVE
Start: 2020-01-01 | End: 2020-01-01

## 2020-01-01 RX ORDER — DEXTROSE MONOHYDRATE 25 G/50ML
100 INJECTION, SOLUTION INTRAVENOUS ONCE
Status: COMPLETED | OUTPATIENT
Start: 2020-01-01 | End: 2020-01-01

## 2020-01-01 RX ORDER — NALOXONE HYDROCHLORIDE 0.4 MG/ML
INJECTION, SOLUTION INTRAMUSCULAR; INTRAVENOUS; SUBCUTANEOUS
Status: COMPLETED
Start: 2020-01-01 | End: 2020-01-01

## 2020-01-01 RX ORDER — HEPARIN SODIUM 1000 [USP'U]/ML
80 INJECTION, SOLUTION INTRAVENOUS; SUBCUTANEOUS ONCE
Status: DISCONTINUED | OUTPATIENT
Start: 2020-01-01 | End: 2020-01-01

## 2020-01-01 RX ORDER — ACETAMINOPHEN 500 MG
500 TABLET ORAL EVERY 6 HOURS
Status: DISCONTINUED | OUTPATIENT
Start: 2020-01-01 | End: 2020-01-01

## 2020-01-01 RX ORDER — ROCURONIUM BROMIDE 10 MG/ML
40 INJECTION, SOLUTION INTRAVENOUS ONCE
Status: ACTIVE | OUTPATIENT
Start: 2020-01-01 | End: 2020-01-01

## 2020-01-01 RX ORDER — ESMOLOL HYDROCHLORIDE 20 MG/ML
INJECTION, SOLUTION INTRAVENOUS
Status: ACTIVE
Start: 2020-01-01 | End: 2020-01-01

## 2020-01-01 RX ORDER — ALPRAZOLAM 0.25 MG/1
0.25 TABLET ORAL 2 TIMES DAILY PRN
Status: DISCONTINUED | OUTPATIENT
Start: 2020-01-01 | End: 2020-01-01

## 2020-01-01 RX ORDER — ROCURONIUM BROMIDE 10 MG/ML
50 INJECTION, SOLUTION INTRAVENOUS ONCE
Status: COMPLETED | OUTPATIENT
Start: 2020-01-01 | End: 2020-01-01

## 2020-01-01 RX ORDER — DEXTROSE MONOHYDRATE 25 G/50ML
INJECTION, SOLUTION INTRAVENOUS
Status: COMPLETED
Start: 2020-01-01 | End: 2020-01-01

## 2020-01-01 RX ORDER — ETOMIDATE 2 MG/ML
20 INJECTION INTRAVENOUS ONCE
Status: COMPLETED | OUTPATIENT
Start: 2020-01-01 | End: 2020-01-01

## 2020-01-01 RX ORDER — SODIUM BICARBONATE IN D5W 150/1000ML
PLASTIC BAG, INJECTION (ML) INTRAVENOUS CONTINUOUS
Status: DISCONTINUED | OUTPATIENT
Start: 2020-01-01 | End: 2020-01-01

## 2020-01-01 RX ORDER — MIDAZOLAM HYDROCHLORIDE 1 MG/ML
2-5 INJECTION INTRAMUSCULAR; INTRAVENOUS ONCE
Status: COMPLETED | OUTPATIENT
Start: 2020-01-01 | End: 2020-01-01

## 2020-01-01 RX ORDER — GLYCOPYRROLATE 0.2 MG/ML
0.2 INJECTION INTRAMUSCULAR; INTRAVENOUS 3 TIMES DAILY PRN
Status: DISCONTINUED | OUTPATIENT
Start: 2020-01-01 | End: 2020-01-01 | Stop reason: HOSPADM

## 2020-01-01 RX ORDER — PHENYLEPHRINE HYDROCHLORIDE 10 MG/ML
INJECTION, SOLUTION INTRAMUSCULAR; INTRAVENOUS; SUBCUTANEOUS PRN
Status: DISCONTINUED | OUTPATIENT
Start: 2020-01-01 | End: 2020-01-01 | Stop reason: SURG

## 2020-01-01 RX ORDER — TRAMADOL HYDROCHLORIDE 50 MG/1
50 TABLET ORAL EVERY 4 HOURS PRN
Status: DISPENSED | OUTPATIENT
Start: 2020-01-01 | End: 2020-01-01

## 2020-01-01 RX ORDER — ONDANSETRON 2 MG/ML
8 INJECTION INTRAMUSCULAR; INTRAVENOUS EVERY 6 HOURS PRN
Status: DISCONTINUED | OUTPATIENT
Start: 2020-01-01 | End: 2020-01-01 | Stop reason: HOSPADM

## 2020-01-01 RX ORDER — MIDAZOLAM HYDROCHLORIDE 1 MG/ML
INJECTION INTRAMUSCULAR; INTRAVENOUS PRN
Status: DISCONTINUED | OUTPATIENT
Start: 2020-01-01 | End: 2020-01-01 | Stop reason: SURG

## 2020-01-01 RX ORDER — PROCHLORPERAZINE EDISYLATE 5 MG/ML
5-10 INJECTION INTRAMUSCULAR; INTRAVENOUS EVERY 4 HOURS PRN
Status: DISCONTINUED | OUTPATIENT
Start: 2020-01-01 | End: 2020-01-01

## 2020-01-01 RX ORDER — CEFAZOLIN SODIUM 1 G/3ML
INJECTION, POWDER, FOR SOLUTION INTRAMUSCULAR; INTRAVENOUS PRN
Status: DISCONTINUED | OUTPATIENT
Start: 2020-01-01 | End: 2020-01-01 | Stop reason: SURG

## 2020-01-01 RX ORDER — NOREPINEPHRINE BITARTRATE 0.03 MG/ML
0-50 INJECTION, SOLUTION INTRAVENOUS CONTINUOUS
Status: DISCONTINUED | OUTPATIENT
Start: 2020-01-01 | End: 2020-01-01

## 2020-01-01 RX ORDER — ACETAMINOPHEN 650 MG/1
650 SUPPOSITORY RECTAL EVERY 4 HOURS PRN
Status: DISCONTINUED | OUTPATIENT
Start: 2020-01-01 | End: 2020-01-01

## 2020-01-01 RX ORDER — PHENYLEPHRINE HCL IN 0.9% NACL 0.5 MG/5ML
SYRINGE (ML) INTRAVENOUS
Status: COMPLETED
Start: 2020-01-01 | End: 2020-01-01

## 2020-01-01 RX ORDER — CALCIUM CHLORIDE 100 MG/ML
1 INJECTION INTRAVENOUS; INTRAVENTRICULAR ONCE
Status: COMPLETED | OUTPATIENT
Start: 2020-01-01 | End: 2020-01-01

## 2020-01-01 RX ORDER — ATROPINE SULFATE 10 MG/ML
2 SOLUTION/ DROPS OPHTHALMIC EVERY 4 HOURS PRN
Status: DISCONTINUED | OUTPATIENT
Start: 2020-01-01 | End: 2020-01-01 | Stop reason: HOSPADM

## 2020-01-01 RX ORDER — OXYCODONE HYDROCHLORIDE 5 MG/1
5-10 TABLET ORAL EVERY 4 HOURS PRN
Status: DISCONTINUED | OUTPATIENT
Start: 2020-01-01 | End: 2020-01-01

## 2020-01-01 RX ORDER — KETOROLAC TROMETHAMINE 30 MG/ML
30 INJECTION, SOLUTION INTRAMUSCULAR; INTRAVENOUS ONCE
Status: COMPLETED | OUTPATIENT
Start: 2020-01-01 | End: 2020-01-01

## 2020-01-01 RX ORDER — HYDROMORPHONE HYDROCHLORIDE 2 MG/ML
2 INJECTION, SOLUTION INTRAMUSCULAR; INTRAVENOUS; SUBCUTANEOUS NIGHTLY PRN
Status: DISCONTINUED | OUTPATIENT
Start: 2020-01-01 | End: 2020-01-01

## 2020-01-01 RX ORDER — LORAZEPAM 2 MG/ML
1-2 INJECTION INTRAMUSCULAR
Status: DISCONTINUED | OUTPATIENT
Start: 2020-01-01 | End: 2020-01-01 | Stop reason: HOSPADM

## 2020-01-01 RX ORDER — PROMETHAZINE HYDROCHLORIDE 25 MG/1
12.5-25 SUPPOSITORY RECTAL EVERY 4 HOURS PRN
Status: DISCONTINUED | OUTPATIENT
Start: 2020-01-01 | End: 2020-01-01

## 2020-01-01 RX ORDER — ONDANSETRON 2 MG/ML
4 INJECTION INTRAMUSCULAR; INTRAVENOUS EVERY 4 HOURS PRN
Status: DISCONTINUED | OUTPATIENT
Start: 2020-01-01 | End: 2020-01-01

## 2020-01-01 RX ORDER — FLUOXETINE HYDROCHLORIDE 20 MG/1
20 CAPSULE ORAL DAILY
Status: DISCONTINUED | OUTPATIENT
Start: 2020-01-01 | End: 2020-01-01

## 2020-01-01 RX ORDER — MIDAZOLAM HYDROCHLORIDE 1 MG/ML
2 INJECTION INTRAMUSCULAR; INTRAVENOUS
Status: DISCONTINUED | OUTPATIENT
Start: 2020-01-01 | End: 2020-01-01

## 2020-01-01 RX ORDER — ALBUMIN, HUMAN INJ 5% 5 %
25 SOLUTION INTRAVENOUS ONCE
Status: DISCONTINUED | OUTPATIENT
Start: 2020-01-01 | End: 2020-01-01

## 2020-01-01 RX ORDER — EPINEPHRINE HCL IN 0.9 % NACL 4MG/250ML
0-20 PLASTIC BAG, INJECTION (ML) INTRAVENOUS CONTINUOUS
Status: DISCONTINUED | OUTPATIENT
Start: 2020-01-01 | End: 2020-01-01

## 2020-01-01 RX ORDER — DEXTROSE MONOHYDRATE 50 MG/ML
INJECTION, SOLUTION INTRAVENOUS CONTINUOUS
Status: DISCONTINUED | OUTPATIENT
Start: 2020-01-01 | End: 2020-01-01

## 2020-01-01 RX ADMIN — THERA TABS 1 TABLET: TAB at 05:16

## 2020-01-01 RX ADMIN — SODIUM CHLORIDE 3 G: 900 INJECTION INTRAVENOUS at 17:52

## 2020-01-01 RX ADMIN — AMIODARONE HYDROCHLORIDE 150 MG: 1.5 INJECTION, SOLUTION INTRAVENOUS at 10:46

## 2020-01-01 RX ADMIN — ACETAMINOPHEN 650 MG: 325 TABLET, FILM COATED ORAL at 20:28

## 2020-01-01 RX ADMIN — TOPIRAMATE 75 MG: 25 TABLET, FILM COATED ORAL at 05:39

## 2020-01-01 RX ADMIN — MIDODRINE HYDROCHLORIDE 5 MG: 5 TABLET ORAL at 13:44

## 2020-01-01 RX ADMIN — OMEPRAZOLE 40 MG: KIT at 06:37

## 2020-01-01 RX ADMIN — FUROSEMIDE 20 MG: 20 TABLET ORAL at 04:34

## 2020-01-01 RX ADMIN — TOPIRAMATE 75 MG: 25 TABLET, FILM COATED ORAL at 06:18

## 2020-01-01 RX ADMIN — METOPROLOL TARTRATE 25 MG: 25 TABLET, FILM COATED ORAL at 17:28

## 2020-01-01 RX ADMIN — HEPARIN SODIUM 5000 UNITS: 5000 INJECTION, SOLUTION INTRAVENOUS; SUBCUTANEOUS at 05:28

## 2020-01-01 RX ADMIN — FUROSEMIDE 20 MG: 20 TABLET ORAL at 06:11

## 2020-01-01 RX ADMIN — AMIODARONE HYDROCHLORIDE 200 MG: 200 TABLET ORAL at 05:28

## 2020-01-01 RX ADMIN — FUROSEMIDE 20 MG: 20 TABLET ORAL at 05:23

## 2020-01-01 RX ADMIN — IPRATROPIUM BROMIDE AND ALBUTEROL SULFATE 3 ML: 2.5; .5 SOLUTION RESPIRATORY (INHALATION) at 07:05

## 2020-01-01 RX ADMIN — AMIODARONE HYDROCHLORIDE 200 MG: 200 TABLET ORAL at 05:39

## 2020-01-01 RX ADMIN — ACETAMINOPHEN 500 MG: 500 TABLET ORAL at 12:47

## 2020-01-01 RX ADMIN — LIDOCAINE 2 PATCH: 50 PATCH TOPICAL at 20:08

## 2020-01-01 RX ADMIN — NOREPINEPHRINE BITARTRATE 30 MCG/MIN: 1 INJECTION, SOLUTION, CONCENTRATE INTRAVENOUS at 20:48

## 2020-01-01 RX ADMIN — MIDODRINE HYDROCHLORIDE 5 MG: 5 TABLET ORAL at 20:21

## 2020-01-01 RX ADMIN — IPRATROPIUM BROMIDE AND ALBUTEROL SULFATE 3 ML: 2.5; .5 SOLUTION RESPIRATORY (INHALATION) at 14:44

## 2020-01-01 RX ADMIN — ACETYLCYSTEINE 3 ML: 200 INHALANT RESPIRATORY (INHALATION) at 07:47

## 2020-01-01 RX ADMIN — THERA TABS 1 TABLET: TAB at 05:28

## 2020-01-01 RX ADMIN — ONDANSETRON 4 MG: 2 INJECTION INTRAMUSCULAR; INTRAVENOUS at 22:01

## 2020-01-01 RX ADMIN — IPRATROPIUM BROMIDE AND ALBUTEROL SULFATE 3 ML: 2.5; .5 SOLUTION RESPIRATORY (INHALATION) at 14:20

## 2020-01-01 RX ADMIN — METOPROLOL TARTRATE 25 MG: 25 TABLET, FILM COATED ORAL at 05:24

## 2020-01-01 RX ADMIN — ACETAMINOPHEN 500 MG: 500 TABLET ORAL at 11:16

## 2020-01-01 RX ADMIN — THIAMINE HYDROCHLORIDE 500 MG: 100 INJECTION, SOLUTION INTRAMUSCULAR; INTRAVENOUS at 08:08

## 2020-01-01 RX ADMIN — LIDOCAINE 2 PATCH: 50 PATCH TOPICAL at 20:14

## 2020-01-01 RX ADMIN — LEVALBUTEROL 1.25 MG: 1.25 SOLUTION RESPIRATORY (INHALATION) at 11:08

## 2020-01-01 RX ADMIN — FAMOTIDINE 20 MG: 20 TABLET, FILM COATED ORAL at 05:43

## 2020-01-01 RX ADMIN — OXYCODONE HYDROCHLORIDE 10 MG: 10 TABLET ORAL at 16:18

## 2020-01-01 RX ADMIN — PHENYLEPHRINE HYDROCHLORIDE 10 MCG/MIN: 10 INJECTION INTRAVENOUS at 00:52

## 2020-01-01 RX ADMIN — LIDOCAINE 2 PATCH: 50 PATCH TOPICAL at 19:44

## 2020-01-01 RX ADMIN — NOREPINEPHRINE BITARTRATE 10 MCG/MIN: 1 INJECTION, SOLUTION, CONCENTRATE INTRAVENOUS at 13:07

## 2020-01-01 RX ADMIN — ACETAMINOPHEN 650 MG: 325 TABLET, FILM COATED ORAL at 19:21

## 2020-01-01 RX ADMIN — IPRATROPIUM BROMIDE AND ALBUTEROL SULFATE 3 ML: 2.5; .5 SOLUTION RESPIRATORY (INHALATION) at 10:02

## 2020-01-01 RX ADMIN — FUROSEMIDE 20 MG: 20 TABLET ORAL at 05:02

## 2020-01-01 RX ADMIN — THERA TABS 1 TABLET: TAB at 05:53

## 2020-01-01 RX ADMIN — PHENYLEPHRINE HYDROCHLORIDE 300 MCG/MIN: 10 INJECTION INTRAVENOUS at 01:24

## 2020-01-01 RX ADMIN — MIDAZOLAM HYDROCHLORIDE 2 MG: 1 INJECTION, SOLUTION INTRAMUSCULAR; INTRAVENOUS at 20:14

## 2020-01-01 RX ADMIN — MIDODRINE HYDROCHLORIDE 5 MG: 5 TABLET ORAL at 20:13

## 2020-01-01 RX ADMIN — PROPOFOL 100 MG: 10 INJECTION, EMULSION INTRAVENOUS at 00:00

## 2020-01-01 RX ADMIN — TRAZODONE HYDROCHLORIDE 50 MG: 50 TABLET ORAL at 20:08

## 2020-01-01 RX ADMIN — ETOMIDATE 20 MG: 2 INJECTION INTRAVENOUS at 10:49

## 2020-01-01 RX ADMIN — ACETYLCYSTEINE 3 ML: 200 INHALANT RESPIRATORY (INHALATION) at 11:29

## 2020-01-01 RX ADMIN — KETOROLAC TROMETHAMINE 30 MG: 30 INJECTION, SOLUTION INTRAMUSCULAR; INTRAVENOUS at 15:21

## 2020-01-01 RX ADMIN — ACETYLCYSTEINE 4 ML: 200 INHALANT RESPIRATORY (INHALATION) at 19:41

## 2020-01-01 RX ADMIN — DOCUSATE SODIUM 50 MG AND SENNOSIDES 8.6 MG 2 TABLET: 8.6; 5 TABLET, FILM COATED ORAL at 04:47

## 2020-01-01 RX ADMIN — IPRATROPIUM BROMIDE AND ALBUTEROL SULFATE 3 ML: 2.5; .5 SOLUTION RESPIRATORY (INHALATION) at 18:32

## 2020-01-01 RX ADMIN — AMPICILLIN AND SULBACTAM 3 G: 2; 1 INJECTION, POWDER, FOR SOLUTION INTRAVENOUS at 23:41

## 2020-01-01 RX ADMIN — OXYCODONE HYDROCHLORIDE 10 MG: 10 TABLET ORAL at 17:22

## 2020-01-01 RX ADMIN — ACETAMINOPHEN 650 MG: 325 TABLET, FILM COATED ORAL at 15:28

## 2020-01-01 RX ADMIN — HYDROCORTISONE SODIUM SUCCINATE 100 MG: 100 INJECTION, POWDER, FOR SOLUTION INTRAMUSCULAR; INTRAVENOUS at 21:49

## 2020-01-01 RX ADMIN — POTASSIUM BICARBONATE 25 MEQ: 978 TABLET, EFFERVESCENT ORAL at 05:40

## 2020-01-01 RX ADMIN — THERA TABS 1 TABLET: TAB at 04:29

## 2020-01-01 RX ADMIN — ACETAMINOPHEN 650 MG: 325 TABLET, FILM COATED ORAL at 04:58

## 2020-01-01 RX ADMIN — AMIODARONE HYDROCHLORIDE 200 MG: 200 TABLET ORAL at 04:33

## 2020-01-01 RX ADMIN — FLUOXETINE 20 MG: 20 CAPSULE ORAL at 04:33

## 2020-01-01 RX ADMIN — OMEPRAZOLE 20 MG: 20 CAPSULE, DELAYED RELEASE ORAL at 05:12

## 2020-01-01 RX ADMIN — FAMOTIDINE 20 MG: 20 TABLET, FILM COATED ORAL at 17:22

## 2020-01-01 RX ADMIN — PROPOFOL 30 MCG/KG/MIN: 10 INJECTION, EMULSION INTRAVENOUS at 11:06

## 2020-01-01 RX ADMIN — HYDROCODONE BITARTRATE AND ACETAMINOPHEN 1 TABLET: 5; 325 TABLET ORAL at 21:30

## 2020-01-01 RX ADMIN — DOCUSATE SODIUM 50 MG AND SENNOSIDES 8.6 MG 2 TABLET: 8.6; 5 TABLET, FILM COATED ORAL at 17:39

## 2020-01-01 RX ADMIN — LEVALBUTEROL 1.25 MG: 1.25 SOLUTION RESPIRATORY (INHALATION) at 08:08

## 2020-01-01 RX ADMIN — POTASSIUM CHLORIDE 10 MEQ: 7.46 INJECTION, SOLUTION INTRAVENOUS at 23:27

## 2020-01-01 RX ADMIN — ACETAMINOPHEN 1000 MG: 500 TABLET ORAL at 17:02

## 2020-01-01 RX ADMIN — HYDROCODONE BITARTRATE AND ACETAMINOPHEN 1 TABLET: 5; 325 TABLET ORAL at 10:59

## 2020-01-01 RX ADMIN — ACETAMINOPHEN 500 MG: 500 TABLET ORAL at 23:28

## 2020-01-01 RX ADMIN — FUROSEMIDE 20 MG: 10 INJECTION, SOLUTION INTRAMUSCULAR; INTRAVENOUS at 08:34

## 2020-01-01 RX ADMIN — SODIUM PHOSPHATE, MONOBASIC, MONOHYDRATE 15 MMOL: 276; 142 INJECTION, SOLUTION INTRAVENOUS at 08:54

## 2020-01-01 RX ADMIN — RIVAROXABAN 20 MG: 20 TABLET, FILM COATED ORAL at 17:42

## 2020-01-01 RX ADMIN — DULOXETINE HYDROCHLORIDE 60 MG: 60 CAPSULE, DELAYED RELEASE ORAL at 20:03

## 2020-01-01 RX ADMIN — AMIODARONE HYDROCHLORIDE 200 MG: 200 TABLET ORAL at 05:03

## 2020-01-01 RX ADMIN — OMEPRAZOLE 20 MG: KIT at 07:18

## 2020-01-01 RX ADMIN — ACETAMINOPHEN 650 MG: 325 TABLET, FILM COATED ORAL at 12:19

## 2020-01-01 RX ADMIN — METOPROLOL TARTRATE 25 MG: 25 TABLET, FILM COATED ORAL at 05:58

## 2020-01-01 RX ADMIN — TRAZODONE HYDROCHLORIDE 50 MG: 50 TABLET ORAL at 19:49

## 2020-01-01 RX ADMIN — ACETYLCYSTEINE 3 ML: 200 INHALANT RESPIRATORY (INHALATION) at 07:42

## 2020-01-01 RX ADMIN — SODIUM CHLORIDE 3 G: 900 INJECTION INTRAVENOUS at 05:09

## 2020-01-01 RX ADMIN — METOPROLOL TARTRATE 25 MG: 25 TABLET, FILM COATED ORAL at 17:33

## 2020-01-01 RX ADMIN — SODIUM CHLORIDE, SODIUM GLUCONATE, SODIUM ACETATE, POTASSIUM CHLORIDE AND MAGNESIUM CHLORIDE 1000 ML: 526; 502; 368; 37; 30 INJECTION, SOLUTION INTRAVENOUS at 23:00

## 2020-01-01 RX ADMIN — ACETAMINOPHEN 650 MG: 325 TABLET, FILM COATED ORAL at 17:49

## 2020-01-01 RX ADMIN — ACETAMINOPHEN 650 MG: 325 TABLET, FILM COATED ORAL at 21:54

## 2020-01-01 RX ADMIN — TOPIRAMATE 75 MG: 25 TABLET, FILM COATED ORAL at 06:29

## 2020-01-01 RX ADMIN — MIDAZOLAM HYDROCHLORIDE 2 MG: 1 INJECTION, SOLUTION INTRAMUSCULAR; INTRAVENOUS at 18:09

## 2020-01-01 RX ADMIN — LIDOCAINE 2 PATCH: 50 PATCH TOPICAL at 20:37

## 2020-01-01 RX ADMIN — ACETAMINOPHEN 650 MG: 325 TABLET, FILM COATED ORAL at 14:55

## 2020-01-01 RX ADMIN — THERA TABS 1 TABLET: TAB at 05:11

## 2020-01-01 RX ADMIN — RIVAROXABAN 20 MG: 20 TABLET, FILM COATED ORAL at 16:17

## 2020-01-01 RX ADMIN — ACETAMINOPHEN 650 MG: 325 TABLET, FILM COATED ORAL at 04:00

## 2020-01-01 RX ADMIN — HEPARIN SODIUM 5000 UNITS: 5000 INJECTION, SOLUTION INTRAVENOUS; SUBCUTANEOUS at 14:00

## 2020-01-01 RX ADMIN — KETOROLAC TROMETHAMINE 30 MG: 30 INJECTION, SOLUTION INTRAMUSCULAR; INTRAVENOUS at 12:30

## 2020-01-01 RX ADMIN — PHENYLEPHRINE HYDROCHLORIDE 100 MCG: 10 INJECTION INTRAVENOUS at 18:36

## 2020-01-01 RX ADMIN — SODIUM CHLORIDE, SODIUM GLUCONATE, SODIUM ACETATE, POTASSIUM CHLORIDE AND MAGNESIUM CHLORIDE 1000 ML: 526; 502; 368; 37; 30 INJECTION, SOLUTION INTRAVENOUS at 21:57

## 2020-01-01 RX ADMIN — AMPICILLIN AND SULBACTAM 3 G: 2; 1 INJECTION, POWDER, FOR SOLUTION INTRAVENOUS at 17:21

## 2020-01-01 RX ADMIN — FENTANYL CITRATE 100 MCG: 50 INJECTION INTRAMUSCULAR; INTRAVENOUS at 15:00

## 2020-01-01 RX ADMIN — TOPIRAMATE 75 MG: 25 TABLET, FILM COATED ORAL at 06:11

## 2020-01-01 RX ADMIN — DULOXETINE HYDROCHLORIDE 60 MG: 60 CAPSULE, DELAYED RELEASE ORAL at 20:52

## 2020-01-01 RX ADMIN — METOPROLOL TARTRATE 25 MG: 25 TABLET, FILM COATED ORAL at 06:05

## 2020-01-01 RX ADMIN — LIDOCAINE 2 PATCH: 50 PATCH TOPICAL at 11:31

## 2020-01-01 RX ADMIN — RIVAROXABAN 20 MG: 20 TABLET, FILM COATED ORAL at 16:31

## 2020-01-01 RX ADMIN — OXYCODONE 5 MG: 5 TABLET ORAL at 19:07

## 2020-01-01 RX ADMIN — OMEPRAZOLE 40 MG: KIT at 06:10

## 2020-01-01 RX ADMIN — LEVALBUTEROL 1.25 MG: 1.25 SOLUTION RESPIRATORY (INHALATION) at 11:54

## 2020-01-01 RX ADMIN — AMIODARONE HYDROCHLORIDE 200 MG: 200 TABLET ORAL at 04:23

## 2020-01-01 RX ADMIN — DOCUSATE SODIUM 50 MG AND SENNOSIDES 8.6 MG 2 TABLET: 8.6; 5 TABLET, FILM COATED ORAL at 17:07

## 2020-01-01 RX ADMIN — HEPARIN SODIUM 18 UNITS/KG/HR: 5000 INJECTION, SOLUTION INTRAVENOUS at 12:13

## 2020-01-01 RX ADMIN — OMEPRAZOLE 40 MG: KIT at 05:19

## 2020-01-01 RX ADMIN — ACETYLCYSTEINE 3 ML: 200 INHALANT RESPIRATORY (INHALATION) at 10:02

## 2020-01-01 RX ADMIN — OMEPRAZOLE 40 MG: KIT at 06:05

## 2020-01-01 RX ADMIN — LEVALBUTEROL 1.25 MG: 1.25 SOLUTION RESPIRATORY (INHALATION) at 19:09

## 2020-01-01 RX ADMIN — IPRATROPIUM BROMIDE AND ALBUTEROL SULFATE 3 ML: 2.5; .5 SOLUTION RESPIRATORY (INHALATION) at 10:24

## 2020-01-01 RX ADMIN — THIAMINE HYDROCHLORIDE 500 MG: 100 INJECTION, SOLUTION INTRAMUSCULAR; INTRAVENOUS at 05:27

## 2020-01-01 RX ADMIN — ACETYLCYSTEINE 4 ML: 200 INHALANT RESPIRATORY (INHALATION) at 15:20

## 2020-01-01 RX ADMIN — METOPROLOL TARTRATE 25 MG: 25 TABLET, FILM COATED ORAL at 16:41

## 2020-01-01 RX ADMIN — HYDROMORPHONE HYDROCHLORIDE 2 MG: 2 INJECTION, SOLUTION INTRAMUSCULAR; INTRAVENOUS; SUBCUTANEOUS at 21:37

## 2020-01-01 RX ADMIN — FENTANYL CITRATE 25 MCG: 50 INJECTION INTRAMUSCULAR; INTRAVENOUS at 15:22

## 2020-01-01 RX ADMIN — OXYCODONE HYDROCHLORIDE 10 MG: 10 TABLET ORAL at 10:06

## 2020-01-01 RX ADMIN — METOPROLOL TARTRATE 25 MG: 25 TABLET, FILM COATED ORAL at 21:41

## 2020-01-01 RX ADMIN — LIDOCAINE 2 PATCH: 50 PATCH TOPICAL at 17:35

## 2020-01-01 RX ADMIN — NOREPINEPHRINE BITARTRATE 50 MCG/MIN: 1 INJECTION, SOLUTION, CONCENTRATE INTRAVENOUS at 14:24

## 2020-01-01 RX ADMIN — ACETAMINOPHEN 500 MG: 500 TABLET ORAL at 17:24

## 2020-01-01 RX ADMIN — FENTANYL CITRATE 50 MCG: 50 INJECTION, SOLUTION INTRAMUSCULAR; INTRAVENOUS at 18:29

## 2020-01-01 RX ADMIN — METOPROLOL TARTRATE 12.5 MG: 25 TABLET, FILM COATED ORAL at 05:50

## 2020-01-01 RX ADMIN — LEVALBUTEROL 1.25 MG: 1.25 SOLUTION RESPIRATORY (INHALATION) at 19:53

## 2020-01-01 RX ADMIN — AMIODARONE HYDROCHLORIDE 200 MG: 200 TABLET ORAL at 05:33

## 2020-01-01 RX ADMIN — METOPROLOL TARTRATE 12.5 MG: 25 TABLET, FILM COATED ORAL at 18:18

## 2020-01-01 RX ADMIN — DOCUSATE SODIUM 50 MG AND SENNOSIDES 8.6 MG 2 TABLET: 8.6; 5 TABLET, FILM COATED ORAL at 16:59

## 2020-01-01 RX ADMIN — DULOXETINE HYDROCHLORIDE 60 MG: 60 CAPSULE, DELAYED RELEASE ORAL at 19:53

## 2020-01-01 RX ADMIN — OMEPRAZOLE 40 MG: KIT at 05:38

## 2020-01-01 RX ADMIN — LIDOCAINE 2 PATCH: 50 PATCH TOPICAL at 21:49

## 2020-01-01 RX ADMIN — FLUOXETINE 20 MG: 20 CAPSULE ORAL at 05:05

## 2020-01-01 RX ADMIN — METOPROLOL TARTRATE 25 MG: 25 TABLET, FILM COATED ORAL at 04:46

## 2020-01-01 RX ADMIN — POTASSIUM BICARBONATE 25 MEQ: 978 TABLET, EFFERVESCENT ORAL at 04:22

## 2020-01-01 RX ADMIN — TRAZODONE HYDROCHLORIDE 50 MG: 50 TABLET ORAL at 19:41

## 2020-01-01 RX ADMIN — DEXTROSE MONOHYDRATE 50 ML: 25 INJECTION, SOLUTION INTRAVENOUS at 09:07

## 2020-01-01 RX ADMIN — THERA TABS 1 TABLET: TAB at 04:57

## 2020-01-01 RX ADMIN — MORPHINE SULFATE 10 MG: 10 INJECTION INTRAVENOUS at 14:52

## 2020-01-01 RX ADMIN — FUROSEMIDE 20 MG: 10 INJECTION, SOLUTION INTRAMUSCULAR; INTRAVENOUS at 12:55

## 2020-01-01 RX ADMIN — OXYCODONE HYDROCHLORIDE 5 MG: 5 TABLET ORAL at 06:18

## 2020-01-01 RX ADMIN — ACETAMINOPHEN 650 MG: 325 TABLET, FILM COATED ORAL at 17:53

## 2020-01-01 RX ADMIN — SODIUM CHLORIDE, POTASSIUM CHLORIDE, SODIUM LACTATE AND CALCIUM CHLORIDE: 600; 310; 30; 20 INJECTION, SOLUTION INTRAVENOUS at 17:58

## 2020-01-01 RX ADMIN — RIVAROXABAN 20 MG: 20 TABLET, FILM COATED ORAL at 17:27

## 2020-01-01 RX ADMIN — MAGNESIUM SULFATE 2 G: 2 INJECTION INTRAVENOUS at 09:53

## 2020-01-01 RX ADMIN — IPRATROPIUM BROMIDE AND ALBUTEROL SULFATE 3 ML: 2.5; .5 SOLUTION RESPIRATORY (INHALATION) at 21:59

## 2020-01-01 RX ADMIN — PANTOPRAZOLE SODIUM 40 MG: 40 INJECTION, POWDER, LYOPHILIZED, FOR SOLUTION INTRAVENOUS at 22:08

## 2020-01-01 RX ADMIN — NALOXONE HYDROCHLORIDE 0.4 MG: 0.4 INJECTION, SOLUTION INTRAMUSCULAR; INTRAVENOUS; SUBCUTANEOUS at 10:34

## 2020-01-01 RX ADMIN — SODIUM CHLORIDE 250 ML: 9 INJECTION, SOLUTION INTRAVENOUS at 19:58

## 2020-01-01 RX ADMIN — PIPERACILLIN AND TAZOBACTAM 3.38 G: 3; .375 INJECTION, POWDER, LYOPHILIZED, FOR SOLUTION INTRAVENOUS; PARENTERAL at 21:36

## 2020-01-01 RX ADMIN — LIDOCAINE 2 PATCH: 50 PATCH TOPICAL at 19:41

## 2020-01-01 RX ADMIN — POTASSIUM BICARBONATE 25 MEQ: 978 TABLET, EFFERVESCENT ORAL at 06:30

## 2020-01-01 RX ADMIN — DOCUSATE SODIUM 50 MG AND SENNOSIDES 8.6 MG 2 TABLET: 8.6; 5 TABLET, FILM COATED ORAL at 05:39

## 2020-01-01 RX ADMIN — DULOXETINE HYDROCHLORIDE 60 MG: 60 CAPSULE, DELAYED RELEASE ORAL at 21:04

## 2020-01-01 RX ADMIN — IPRATROPIUM BROMIDE AND ALBUTEROL SULFATE 3 ML: 2.5; .5 SOLUTION RESPIRATORY (INHALATION) at 08:09

## 2020-01-01 RX ADMIN — ACETAMINOPHEN 1000 MG: 500 TABLET ORAL at 23:53

## 2020-01-01 RX ADMIN — LEVALBUTEROL 1.25 MG: 1.25 SOLUTION RESPIRATORY (INHALATION) at 15:04

## 2020-01-01 RX ADMIN — THERA TABS 1 TABLET: TAB at 06:37

## 2020-01-01 RX ADMIN — HEPARIN SODIUM 5000 UNITS: 5000 INJECTION, SOLUTION INTRAVENOUS; SUBCUTANEOUS at 22:38

## 2020-01-01 RX ADMIN — METOPROLOL TARTRATE 25 MG: 25 TABLET, FILM COATED ORAL at 04:59

## 2020-01-01 RX ADMIN — IPRATROPIUM BROMIDE AND ALBUTEROL SULFATE 3 ML: 2.5; .5 SOLUTION RESPIRATORY (INHALATION) at 07:12

## 2020-01-01 RX ADMIN — METOPROLOL TARTRATE 25 MG: 25 TABLET, FILM COATED ORAL at 17:29

## 2020-01-01 RX ADMIN — Medication 50 MEQ: at 16:00

## 2020-01-01 RX ADMIN — FUROSEMIDE 20 MG: 20 TABLET ORAL at 05:40

## 2020-01-01 RX ADMIN — ACETAMINOPHEN 1000 MG: 500 TABLET ORAL at 11:42

## 2020-01-01 RX ADMIN — METOPROLOL TARTRATE 25 MG: 25 TABLET, FILM COATED ORAL at 16:55

## 2020-01-01 RX ADMIN — RIVAROXABAN 20 MG: 20 TABLET, FILM COATED ORAL at 17:33

## 2020-01-01 RX ADMIN — HEPARIN SODIUM 5000 UNITS: 5000 INJECTION, SOLUTION INTRAVENOUS; SUBCUTANEOUS at 21:34

## 2020-01-01 RX ADMIN — TRAZODONE HYDROCHLORIDE 50 MG: 50 TABLET ORAL at 23:40

## 2020-01-01 RX ADMIN — LIDOCAINE 2 PATCH: 50 PATCH TOPICAL at 10:14

## 2020-01-01 RX ADMIN — ACETAMINOPHEN 650 MG: 325 TABLET, FILM COATED ORAL at 05:39

## 2020-01-01 RX ADMIN — ACETAMINOPHEN 650 MG: 325 TABLET, FILM COATED ORAL at 19:24

## 2020-01-01 RX ADMIN — METOPROLOL TARTRATE 25 MG: 25 TABLET, FILM COATED ORAL at 17:53

## 2020-01-01 RX ADMIN — LIDOCAINE 2 PATCH: 50 PATCH TOPICAL at 11:16

## 2020-01-01 RX ADMIN — MIDODRINE HYDROCHLORIDE 5 MG: 5 TABLET ORAL at 14:47

## 2020-01-01 RX ADMIN — DOCUSATE SODIUM 50 MG AND SENNOSIDES 8.6 MG 2 TABLET: 8.6; 5 TABLET, FILM COATED ORAL at 17:24

## 2020-01-01 RX ADMIN — TOPIRAMATE 75 MG: 25 TABLET, FILM COATED ORAL at 04:34

## 2020-01-01 RX ADMIN — FUROSEMIDE 20 MG: 20 TABLET ORAL at 05:38

## 2020-01-01 RX ADMIN — ACETYLCYSTEINE 3 ML: 200 INHALANT RESPIRATORY (INHALATION) at 15:36

## 2020-01-01 RX ADMIN — LEVALBUTEROL 1.25 MG: 1.25 SOLUTION RESPIRATORY (INHALATION) at 20:25

## 2020-01-01 RX ADMIN — SODIUM CHLORIDE: 9 INJECTION, SOLUTION INTRAVENOUS at 19:30

## 2020-01-01 RX ADMIN — ACETAMINOPHEN 1000 MG: 500 TABLET ORAL at 12:05

## 2020-01-01 RX ADMIN — FUROSEMIDE 20 MG: 20 TABLET ORAL at 04:29

## 2020-01-01 RX ADMIN — PROPOFOL 20 MG: 10 INJECTION, EMULSION INTRAVENOUS at 10:40

## 2020-01-01 RX ADMIN — ACETYLCYSTEINE 4 ML: 200 INHALANT RESPIRATORY (INHALATION) at 16:29

## 2020-01-01 RX ADMIN — PIPERACILLIN AND TAZOBACTAM 4.5 G: 4; .5 INJECTION, POWDER, LYOPHILIZED, FOR SOLUTION INTRAVENOUS; PARENTERAL at 12:39

## 2020-01-01 RX ADMIN — IOHEXOL 100 ML: 350 INJECTION, SOLUTION INTRAVENOUS at 22:37

## 2020-01-01 RX ADMIN — PROPOFOL 40 MCG/KG/MIN: 10 INJECTION, EMULSION INTRAVENOUS at 20:42

## 2020-01-01 RX ADMIN — THERA TABS 1 TABLET: TAB at 05:03

## 2020-01-01 RX ADMIN — SODIUM CHLORIDE 250 ML: 9 INJECTION, SOLUTION INTRAVENOUS at 08:37

## 2020-01-01 RX ADMIN — ACETYLCYSTEINE 3 ML: 200 INHALANT RESPIRATORY (INHALATION) at 14:54

## 2020-01-01 RX ADMIN — RIVAROXABAN 20 MG: 20 TABLET, FILM COATED ORAL at 18:25

## 2020-01-01 RX ADMIN — FLUOXETINE 20 MG: 20 CAPSULE ORAL at 05:54

## 2020-01-01 RX ADMIN — MIDAZOLAM HYDROCHLORIDE 5 MG: 1 INJECTION, SOLUTION INTRAMUSCULAR; INTRAVENOUS at 11:16

## 2020-01-01 RX ADMIN — METOPROLOL TARTRATE 25 MG: 25 TABLET, FILM COATED ORAL at 18:04

## 2020-01-01 RX ADMIN — MIDODRINE HYDROCHLORIDE 5 MG: 5 TABLET ORAL at 14:18

## 2020-01-01 RX ADMIN — HYDROCODONE BITARTRATE AND ACETAMINOPHEN 1 TABLET: 5; 325 TABLET ORAL at 15:10

## 2020-01-01 RX ADMIN — LIDOCAINE 2 PATCH: 50 PATCH TOPICAL at 18:55

## 2020-01-01 RX ADMIN — METOPROLOL TARTRATE 25 MG: 25 TABLET, FILM COATED ORAL at 04:29

## 2020-01-01 RX ADMIN — ACETAMINOPHEN 650 MG: 325 TABLET, FILM COATED ORAL at 20:13

## 2020-01-01 RX ADMIN — HEPARIN SODIUM 5000 UNITS: 5000 INJECTION, SOLUTION INTRAVENOUS; SUBCUTANEOUS at 20:59

## 2020-01-01 RX ADMIN — LIDOCAINE 2 PATCH: 50 PATCH TOPICAL at 21:11

## 2020-01-01 RX ADMIN — TRAZODONE HYDROCHLORIDE 50 MG: 50 TABLET ORAL at 20:13

## 2020-01-01 RX ADMIN — DULOXETINE HYDROCHLORIDE 60 MG: 60 CAPSULE, DELAYED RELEASE ORAL at 20:54

## 2020-01-01 RX ADMIN — FENTANYL CITRATE 100 MCG: 50 INJECTION INTRAMUSCULAR; INTRAVENOUS at 11:16

## 2020-01-01 RX ADMIN — ROCURONIUM BROMIDE 30 MG: 10 INJECTION, SOLUTION INTRAVENOUS at 16:44

## 2020-01-01 RX ADMIN — SODIUM CHLORIDE, POTASSIUM CHLORIDE, SODIUM LACTATE AND CALCIUM CHLORIDE 1000 ML: 600; 310; 30; 20 INJECTION, SOLUTION INTRAVENOUS at 08:14

## 2020-01-01 RX ADMIN — METOPROLOL TARTRATE 25 MG: 25 TABLET, FILM COATED ORAL at 17:42

## 2020-01-01 RX ADMIN — IPRATROPIUM BROMIDE AND ALBUTEROL SULFATE 3 ML: 2.5; .5 SOLUTION RESPIRATORY (INHALATION) at 07:15

## 2020-01-01 RX ADMIN — FUROSEMIDE 20 MG: 20 TABLET ORAL at 04:49

## 2020-01-01 RX ADMIN — SODIUM CHLORIDE 1000 ML: 9 INJECTION, SOLUTION INTRAVENOUS at 23:11

## 2020-01-01 RX ADMIN — ACETAMINOPHEN 1000 MG: 500 TABLET ORAL at 17:23

## 2020-01-01 RX ADMIN — VANCOMYCIN HYDROCHLORIDE 1750 MG: 500 INJECTION, POWDER, LYOPHILIZED, FOR SOLUTION INTRAVENOUS at 23:38

## 2020-01-01 RX ADMIN — SODIUM CHLORIDE, SODIUM LACTATE, POTASSIUM CHLORIDE, AND CALCIUM CHLORIDE 1000 ML: .6; .31; .03; .02 INJECTION, SOLUTION INTRAVENOUS at 15:55

## 2020-01-01 RX ADMIN — PHENYLEPHRINE HYDROCHLORIDE 40000 MCG: 10 INJECTION INTRAVENOUS at 17:27

## 2020-01-01 RX ADMIN — ACETYLCYSTEINE 3 ML: 200 INHALANT RESPIRATORY (INHALATION) at 11:06

## 2020-01-01 RX ADMIN — IPRATROPIUM BROMIDE AND ALBUTEROL SULFATE 3 ML: 2.5; .5 SOLUTION RESPIRATORY (INHALATION) at 19:24

## 2020-01-01 RX ADMIN — FENTANYL CITRATE 100 MCG: 50 INJECTION, SOLUTION INTRAMUSCULAR; INTRAVENOUS at 16:47

## 2020-01-01 RX ADMIN — OMEPRAZOLE 40 MG: KIT at 04:04

## 2020-01-01 RX ADMIN — MIDODRINE HYDROCHLORIDE 5 MG: 5 TABLET ORAL at 12:36

## 2020-01-01 RX ADMIN — AMIODARONE HYDROCHLORIDE 200 MG: 200 TABLET ORAL at 05:17

## 2020-01-01 RX ADMIN — HYDROCODONE BITARTRATE AND ACETAMINOPHEN 1 TABLET: 5; 325 TABLET ORAL at 01:51

## 2020-01-01 RX ADMIN — IPRATROPIUM BROMIDE AND ALBUTEROL SULFATE 3 ML: 2.5; .5 SOLUTION RESPIRATORY (INHALATION) at 10:35

## 2020-01-01 RX ADMIN — PANTOPRAZOLE SODIUM 40 MG: 40 INJECTION, POWDER, LYOPHILIZED, FOR SOLUTION INTRAVENOUS at 04:18

## 2020-01-01 RX ADMIN — RIVAROXABAN 20 MG: 20 TABLET, FILM COATED ORAL at 18:04

## 2020-01-01 RX ADMIN — OMEPRAZOLE 40 MG: KIT at 05:06

## 2020-01-01 RX ADMIN — AMIODARONE HYDROCHLORIDE 200 MG: 200 TABLET ORAL at 05:02

## 2020-01-01 RX ADMIN — METOPROLOL TARTRATE 12.5 MG: 25 TABLET, FILM COATED ORAL at 17:22

## 2020-01-01 RX ADMIN — LIDOCAINE 2 PATCH: 50 PATCH TOPICAL at 18:51

## 2020-01-01 RX ADMIN — SODIUM BICARBONATE 83 ML/HR: 84 INJECTION, SOLUTION INTRAVENOUS at 20:30

## 2020-01-01 RX ADMIN — POTASSIUM BICARBONATE 25 MEQ: 978 TABLET, EFFERVESCENT ORAL at 11:43

## 2020-01-01 RX ADMIN — CALCIUM CHLORIDE 1 G: 100 INJECTION, SOLUTION INTRAVENOUS at 22:54

## 2020-01-01 RX ADMIN — MIDODRINE HYDROCHLORIDE 5 MG: 5 TABLET ORAL at 15:00

## 2020-01-01 RX ADMIN — DULOXETINE HYDROCHLORIDE 60 MG: 60 CAPSULE, DELAYED RELEASE ORAL at 20:33

## 2020-01-01 RX ADMIN — LIDOCAINE 2 PATCH: 50 PATCH TOPICAL at 19:09

## 2020-01-01 RX ADMIN — FENTANYL CITRATE 25 MCG: 50 INJECTION INTRAMUSCULAR; INTRAVENOUS at 20:35

## 2020-01-01 RX ADMIN — PHENYLEPHRINE HYDROCHLORIDE 100 MCG/MIN: 10 INJECTION INTRAVENOUS at 13:47

## 2020-01-01 RX ADMIN — FUROSEMIDE 20 MG: 20 TABLET ORAL at 05:53

## 2020-01-01 RX ADMIN — ALPRAZOLAM 0.25 MG: 0.25 TABLET ORAL at 04:51

## 2020-01-01 RX ADMIN — IPRATROPIUM BROMIDE AND ALBUTEROL SULFATE 3 ML: 2.5; .5 SOLUTION RESPIRATORY (INHALATION) at 15:46

## 2020-01-01 RX ADMIN — THERA TABS 1 TABLET: TAB at 05:09

## 2020-01-01 RX ADMIN — DULOXETINE HYDROCHLORIDE 60 MG: 60 CAPSULE, DELAYED RELEASE ORAL at 22:00

## 2020-01-01 RX ADMIN — FENTANYL CITRATE 25 MCG: 50 INJECTION INTRAMUSCULAR; INTRAVENOUS at 10:27

## 2020-01-01 RX ADMIN — AMPICILLIN AND SULBACTAM 3 G: 2; 1 INJECTION, POWDER, FOR SOLUTION INTRAVENOUS at 17:23

## 2020-01-01 RX ADMIN — POTASSIUM BICARBONATE 25 MEQ: 978 TABLET, EFFERVESCENT ORAL at 05:02

## 2020-01-01 RX ADMIN — LEVALBUTEROL 1.25 MG: 1.25 SOLUTION RESPIRATORY (INHALATION) at 19:41

## 2020-01-01 RX ADMIN — AMIODARONE HYDROCHLORIDE 200 MG: 200 TABLET ORAL at 05:16

## 2020-01-01 RX ADMIN — FENTANYL CITRATE 25 MCG: 50 INJECTION INTRAMUSCULAR; INTRAVENOUS at 00:35

## 2020-01-01 RX ADMIN — LIDOCAINE 2 PATCH: 50 PATCH TOPICAL at 17:27

## 2020-01-01 RX ADMIN — FUROSEMIDE 20 MG: 20 TABLET ORAL at 06:05

## 2020-01-01 RX ADMIN — ROCURONIUM BROMIDE 50 MG: 10 INJECTION, SOLUTION INTRAVENOUS at 01:10

## 2020-01-01 RX ADMIN — LIDOCAINE 2 PATCH: 50 PATCH TOPICAL at 11:58

## 2020-01-01 RX ADMIN — DEXTROSE MONOHYDRATE 100 ML: 25 INJECTION, SOLUTION INTRAVENOUS at 09:05

## 2020-01-01 RX ADMIN — DULOXETINE HYDROCHLORIDE 60 MG: 60 CAPSULE, DELAYED RELEASE ORAL at 21:14

## 2020-01-01 RX ADMIN — LEVALBUTEROL 1.25 MG: 1.25 SOLUTION RESPIRATORY (INHALATION) at 12:50

## 2020-01-01 RX ADMIN — HYDROCODONE BITARTRATE AND ACETAMINOPHEN 1 TABLET: 5; 325 TABLET ORAL at 04:00

## 2020-01-01 RX ADMIN — SODIUM CHLORIDE, POTASSIUM CHLORIDE, SODIUM LACTATE AND CALCIUM CHLORIDE: 600; 310; 30; 20 INJECTION, SOLUTION INTRAVENOUS at 01:51

## 2020-01-01 RX ADMIN — FUROSEMIDE 20 MG: 20 TABLET ORAL at 05:03

## 2020-01-01 RX ADMIN — RIVAROXABAN 20 MG: 20 TABLET, FILM COATED ORAL at 17:28

## 2020-01-01 RX ADMIN — OMEPRAZOLE 40 MG: KIT at 06:11

## 2020-01-01 RX ADMIN — FAMOTIDINE 20 MG: 20 TABLET, FILM COATED ORAL at 20:13

## 2020-01-01 RX ADMIN — DOCUSATE SODIUM 50 MG AND SENNOSIDES 8.6 MG 2 TABLET: 8.6; 5 TABLET, FILM COATED ORAL at 05:38

## 2020-01-01 RX ADMIN — IPRATROPIUM BROMIDE AND ALBUTEROL SULFATE 3 ML: 2.5; .5 SOLUTION RESPIRATORY (INHALATION) at 18:47

## 2020-01-01 RX ADMIN — THERA TABS 1 TABLET: TAB at 06:05

## 2020-01-01 RX ADMIN — SODIUM CHLORIDE 500 ML: 9 INJECTION, SOLUTION INTRAVENOUS at 00:35

## 2020-01-01 RX ADMIN — PROPOFOL 20 MG: 10 INJECTION, EMULSION INTRAVENOUS at 10:39

## 2020-01-01 RX ADMIN — FAMOTIDINE 20 MG: 20 TABLET, FILM COATED ORAL at 06:36

## 2020-01-01 RX ADMIN — ACETAMINOPHEN 1000 MG: 500 TABLET ORAL at 05:32

## 2020-01-01 RX ADMIN — IPRATROPIUM BROMIDE AND ALBUTEROL SULFATE 3 ML: 2.5; .5 SOLUTION RESPIRATORY (INHALATION) at 11:27

## 2020-01-01 RX ADMIN — DIBASIC SODIUM PHOSPHATE, MONOBASIC POTASSIUM PHOSPHATE AND MONOBASIC SODIUM PHOSPHATE 500 MG: 852; 155; 130 TABLET ORAL at 16:59

## 2020-01-01 RX ADMIN — LEVALBUTEROL 1.25 MG: 1.25 SOLUTION RESPIRATORY (INHALATION) at 03:40

## 2020-01-01 RX ADMIN — THERA TABS 1 TABLET: TAB at 04:03

## 2020-01-01 RX ADMIN — IPRATROPIUM BROMIDE AND ALBUTEROL SULFATE 3 ML: 2.5; .5 SOLUTION RESPIRATORY (INHALATION) at 02:28

## 2020-01-01 RX ADMIN — DOCUSATE SODIUM 50 MG AND SENNOSIDES 8.6 MG 2 TABLET: 8.6; 5 TABLET, FILM COATED ORAL at 05:45

## 2020-01-01 RX ADMIN — FLUOXETINE 20 MG: 20 CAPSULE ORAL at 05:02

## 2020-01-01 RX ADMIN — ACETAMINOPHEN 1000 MG: 500 TABLET ORAL at 05:08

## 2020-01-01 RX ADMIN — POTASSIUM BICARBONATE 25 MEQ: 978 TABLET, EFFERVESCENT ORAL at 05:31

## 2020-01-01 RX ADMIN — ACETAMINOPHEN 1000 MG: 500 TABLET ORAL at 12:10

## 2020-01-01 RX ADMIN — ACETAMINOPHEN 1000 MG: 500 TABLET ORAL at 17:27

## 2020-01-01 RX ADMIN — SODIUM PHOSPHATE, MONOBASIC, MONOHYDRATE AND SODIUM PHOSPHATE, DIBASIC, ANHYDROUS 30 MMOL: 276; 142 INJECTION, SOLUTION INTRAVENOUS at 08:24

## 2020-01-01 RX ADMIN — FUROSEMIDE 20 MG: 20 TABLET ORAL at 03:52

## 2020-01-01 RX ADMIN — THERA TABS 1 TABLET: TAB at 06:10

## 2020-01-01 RX ADMIN — FAMOTIDINE 20 MG: 20 TABLET, FILM COATED ORAL at 05:44

## 2020-01-01 RX ADMIN — FENTANYL CITRATE 50 MCG: 50 INJECTION, SOLUTION INTRAMUSCULAR; INTRAVENOUS at 18:49

## 2020-01-01 RX ADMIN — OMEPRAZOLE 40 MG: KIT at 05:07

## 2020-01-01 RX ADMIN — OXYCODONE 10 MG: 5 TABLET ORAL at 08:51

## 2020-01-01 RX ADMIN — AMIODARONE HYDROCHLORIDE 200 MG: 200 TABLET ORAL at 04:57

## 2020-01-01 RX ADMIN — LIDOCAINE 2 PATCH: 50 PATCH TOPICAL at 20:23

## 2020-01-01 RX ADMIN — MAGNESIUM SULFATE 2 G: 2 INJECTION INTRAVENOUS at 10:14

## 2020-01-01 RX ADMIN — INFLUENZA A VIRUS A/GUANGDONG-MAONAN/SWL1536/2019 CNIC-1909 (H1N1) ANTIGEN (FORMALDEHYDE INACTIVATED), INFLUENZA A VIRUS A/HONG KONG/2671/2019 (H3N2) ANTIGEN (FORMALDEHYDE INACTIVATED), INFLUENZA B VIRUS B/PHUKET/3073/2013 ANTIGEN (FORMALDEHYDE INACTIVATED), AND INFLUENZA B VIRUS B/WASHINGTON/02/2019 ANTIGEN (FORMALDEHYDE INACTIVATED) 0.5 ML: 15; 15; 15; 15 INJECTION, SUSPENSION INTRAMUSCULAR at 08:44

## 2020-01-01 RX ADMIN — AMIODARONE HYDROCHLORIDE 200 MG: 200 TABLET ORAL at 04:03

## 2020-01-01 RX ADMIN — METOPROLOL TARTRATE 5 MG: 5 INJECTION, SOLUTION INTRAVENOUS at 19:38

## 2020-01-01 RX ADMIN — HEPARIN SODIUM 5000 UNITS: 5000 INJECTION, SOLUTION INTRAVENOUS; SUBCUTANEOUS at 13:00

## 2020-01-01 RX ADMIN — FAMOTIDINE 20 MG: 20 TABLET, FILM COATED ORAL at 05:17

## 2020-01-01 RX ADMIN — AMPICILLIN AND SULBACTAM 3 G: 2; 1 INJECTION, POWDER, FOR SOLUTION INTRAVENOUS at 05:46

## 2020-01-01 RX ADMIN — NOREPINEPHRINE BITARTRATE 50 MCG/MIN: 1 INJECTION, SOLUTION, CONCENTRATE INTRAVENOUS at 11:30

## 2020-01-01 RX ADMIN — OMEPRAZOLE 20 MG: 20 CAPSULE, DELAYED RELEASE ORAL at 17:02

## 2020-01-01 RX ADMIN — POTASSIUM BICARBONATE 25 MEQ: 978 TABLET, EFFERVESCENT ORAL at 04:49

## 2020-01-01 RX ADMIN — LIDOCAINE 2 PATCH: 50 PATCH TOPICAL at 12:01

## 2020-01-01 RX ADMIN — ACETAMINOPHEN 1000 MG: 500 TABLET ORAL at 23:03

## 2020-01-01 RX ADMIN — ACETAMINOPHEN 650 MG: 325 TABLET, FILM COATED ORAL at 02:55

## 2020-01-01 RX ADMIN — LEVALBUTEROL 1.25 MG: 1.25 SOLUTION RESPIRATORY (INHALATION) at 06:48

## 2020-01-01 RX ADMIN — ACETAMINOPHEN 650 MG: 325 TABLET, FILM COATED ORAL at 19:41

## 2020-01-01 RX ADMIN — SODIUM BICARBONATE: 84 INJECTION, SOLUTION INTRAVENOUS at 20:15

## 2020-01-01 RX ADMIN — IPRATROPIUM BROMIDE AND ALBUTEROL SULFATE 3 ML: 2.5; .5 SOLUTION RESPIRATORY (INHALATION) at 10:08

## 2020-01-01 RX ADMIN — AMIODARONE HYDROCHLORIDE 200 MG: 200 TABLET ORAL at 04:58

## 2020-01-01 RX ADMIN — MIDODRINE HYDROCHLORIDE 5 MG: 5 TABLET ORAL at 21:39

## 2020-01-01 RX ADMIN — IPRATROPIUM BROMIDE AND ALBUTEROL SULFATE 3 ML: 2.5; .5 SOLUTION RESPIRATORY (INHALATION) at 09:52

## 2020-01-01 RX ADMIN — PIPERACILLIN AND TAZOBACTAM 3.38 G: 3; .375 INJECTION, POWDER, LYOPHILIZED, FOR SOLUTION INTRAVENOUS; PARENTERAL at 23:32

## 2020-01-01 RX ADMIN — SODIUM BICARBONATE 100 MEQ: 84 INJECTION, SOLUTION INTRAVENOUS at 20:11

## 2020-01-01 RX ADMIN — MIDODRINE HYDROCHLORIDE 5 MG: 5 TABLET ORAL at 05:09

## 2020-01-01 RX ADMIN — ACETYLCYSTEINE 3 ML: 200 INHALANT RESPIRATORY (INHALATION) at 15:32

## 2020-01-01 RX ADMIN — ACETAMINOPHEN 500 MG: 500 TABLET ORAL at 17:23

## 2020-01-01 RX ADMIN — SODIUM CHLORIDE, POTASSIUM CHLORIDE, SODIUM LACTATE AND CALCIUM CHLORIDE 1000 ML: 600; 310; 30; 20 INJECTION, SOLUTION INTRAVENOUS at 14:40

## 2020-01-01 RX ADMIN — OMEPRAZOLE 40 MG: KIT at 05:23

## 2020-01-01 RX ADMIN — FAMOTIDINE 20 MG: 10 INJECTION INTRAVENOUS at 17:06

## 2020-01-01 RX ADMIN — OMEPRAZOLE 40 MG: KIT at 05:33

## 2020-01-01 RX ADMIN — TOPIRAMATE 75 MG: 25 TABLET, FILM COATED ORAL at 05:23

## 2020-01-01 RX ADMIN — ACETAMINOPHEN 500 MG: 500 TABLET ORAL at 16:59

## 2020-01-01 RX ADMIN — DULOXETINE HYDROCHLORIDE 60 MG: 60 CAPSULE, DELAYED RELEASE ORAL at 21:28

## 2020-01-01 RX ADMIN — ACETYLCYSTEINE 3 ML: 200 INHALANT RESPIRATORY (INHALATION) at 15:18

## 2020-01-01 RX ADMIN — FENTANYL CITRATE 50 MCG: 50 INJECTION, SOLUTION INTRAMUSCULAR; INTRAVENOUS at 21:23

## 2020-01-01 RX ADMIN — LEVALBUTEROL 1.25 MG: 1.25 SOLUTION RESPIRATORY (INHALATION) at 14:51

## 2020-01-01 RX ADMIN — KETAMINE HYDROCHLORIDE 20 MG: 50 INJECTION INTRAMUSCULAR; INTRAVENOUS at 11:30

## 2020-01-01 RX ADMIN — IPRATROPIUM BROMIDE AND ALBUTEROL SULFATE 3 ML: 2.5; .5 SOLUTION RESPIRATORY (INHALATION) at 06:16

## 2020-01-01 RX ADMIN — MIDAZOLAM HYDROCHLORIDE 2 MG: 1 INJECTION, SOLUTION INTRAMUSCULAR; INTRAVENOUS at 10:37

## 2020-01-01 RX ADMIN — POTASSIUM BICARBONATE 25 MEQ: 978 TABLET, EFFERVESCENT ORAL at 05:33

## 2020-01-01 RX ADMIN — LEVALBUTEROL 1.25 MG: 1.25 SOLUTION RESPIRATORY (INHALATION) at 02:58

## 2020-01-01 RX ADMIN — FENTANYL CITRATE 25 MCG: 50 INJECTION INTRAMUSCULAR; INTRAVENOUS at 21:08

## 2020-01-01 RX ADMIN — RIVAROXABAN 20 MG: 20 TABLET, FILM COATED ORAL at 17:58

## 2020-01-01 RX ADMIN — LEVALBUTEROL 1.25 MG: 1.25 SOLUTION RESPIRATORY (INHALATION) at 06:33

## 2020-01-01 RX ADMIN — POTASSIUM BICARBONATE 25 MEQ: 978 TABLET, EFFERVESCENT ORAL at 05:23

## 2020-01-01 RX ADMIN — IPRATROPIUM BROMIDE AND ALBUTEROL SULFATE 3 ML: 2.5; .5 SOLUTION RESPIRATORY (INHALATION) at 07:50

## 2020-01-01 RX ADMIN — THERA TABS 1 TABLET: TAB at 06:25

## 2020-01-01 RX ADMIN — ACETYLCYSTEINE 3 ML: 200 INHALANT RESPIRATORY (INHALATION) at 11:49

## 2020-01-01 RX ADMIN — FAMOTIDINE 20 MG: 20 TABLET, FILM COATED ORAL at 17:23

## 2020-01-01 RX ADMIN — RIVAROXABAN 20 MG: 20 TABLET, FILM COATED ORAL at 20:14

## 2020-01-01 RX ADMIN — SODIUM CHLORIDE 1000 ML: 9 INJECTION, SOLUTION INTRAVENOUS at 01:29

## 2020-01-01 RX ADMIN — SODIUM CHLORIDE 3 G: 900 INJECTION INTRAVENOUS at 12:24

## 2020-01-01 RX ADMIN — AMIODARONE HYDROCHLORIDE 200 MG: 200 TABLET ORAL at 06:11

## 2020-01-01 RX ADMIN — PHENYLEPHRINE HYDROCHLORIDE 40000 MCG: 10 INJECTION INTRAVENOUS at 08:36

## 2020-01-01 RX ADMIN — FENTANYL CITRATE 25 MCG: 50 INJECTION INTRAMUSCULAR; INTRAVENOUS at 00:07

## 2020-01-01 RX ADMIN — TOPIRAMATE 75 MG: 25 TABLET, FILM COATED ORAL at 05:19

## 2020-01-01 RX ADMIN — IPRATROPIUM BROMIDE AND ALBUTEROL SULFATE 3 ML: 2.5; .5 SOLUTION RESPIRATORY (INHALATION) at 15:31

## 2020-01-01 RX ADMIN — ACETAZOLAMIDE SODIUM 500 MG: 500 INJECTION, POWDER, LYOPHILIZED, FOR SOLUTION INTRAVENOUS at 13:39

## 2020-01-01 RX ADMIN — FENTANYL CITRATE 50 MCG: 50 INJECTION, SOLUTION INTRAMUSCULAR; INTRAVENOUS at 19:52

## 2020-01-01 RX ADMIN — LEVALBUTEROL 1.25 MG: 1.25 SOLUTION RESPIRATORY (INHALATION) at 07:30

## 2020-01-01 RX ADMIN — MAGNESIUM SULFATE 2 G: 2 INJECTION INTRAVENOUS at 07:56

## 2020-01-01 RX ADMIN — ACETAMINOPHEN 650 MG: 325 TABLET, FILM COATED ORAL at 20:52

## 2020-01-01 RX ADMIN — LIDOCAINE 2 PATCH: 50 PATCH TOPICAL at 21:35

## 2020-01-01 RX ADMIN — IPRATROPIUM BROMIDE AND ALBUTEROL SULFATE 3 ML: 2.5; .5 SOLUTION RESPIRATORY (INHALATION) at 03:09

## 2020-01-01 RX ADMIN — ONDANSETRON 4 MG: 2 INJECTION INTRAMUSCULAR; INTRAVENOUS at 16:50

## 2020-01-01 RX ADMIN — SODIUM CHLORIDE 3 G: 900 INJECTION INTRAVENOUS at 18:03

## 2020-01-01 RX ADMIN — DULOXETINE HYDROCHLORIDE 60 MG: 60 CAPSULE, DELAYED RELEASE ORAL at 21:29

## 2020-01-01 RX ADMIN — OMEPRAZOLE 40 MG: KIT at 06:22

## 2020-01-01 RX ADMIN — DULOXETINE HYDROCHLORIDE 60 MG: 60 CAPSULE, DELAYED RELEASE ORAL at 21:30

## 2020-01-01 RX ADMIN — DOCUSATE SODIUM 50 MG AND SENNOSIDES 8.6 MG 2 TABLET: 8.6; 5 TABLET, FILM COATED ORAL at 17:02

## 2020-01-01 RX ADMIN — OMEPRAZOLE 20 MG: 20 CAPSULE, DELAYED RELEASE ORAL at 05:49

## 2020-01-01 RX ADMIN — POTASSIUM BICARBONATE 25 MEQ: 978 TABLET, EFFERVESCENT ORAL at 17:45

## 2020-01-01 RX ADMIN — OMEPRAZOLE 40 MG: KIT at 05:53

## 2020-01-01 RX ADMIN — TOPIRAMATE 75 MG: 25 TABLET, FILM COATED ORAL at 04:45

## 2020-01-01 RX ADMIN — POTASSIUM BICARBONATE 50 MEQ: 978 TABLET, EFFERVESCENT ORAL at 10:27

## 2020-01-01 RX ADMIN — ACETAMINOPHEN 500 MG: 500 TABLET ORAL at 12:01

## 2020-01-01 RX ADMIN — FENTANYL CITRATE 25 MCG: 50 INJECTION INTRAMUSCULAR; INTRAVENOUS at 05:52

## 2020-01-01 RX ADMIN — FENTANYL CITRATE 100 MCG: 50 INJECTION, SOLUTION INTRAMUSCULAR; INTRAVENOUS at 16:40

## 2020-01-01 RX ADMIN — RIVAROXABAN 20 MG: 20 TABLET, FILM COATED ORAL at 17:14

## 2020-01-01 RX ADMIN — LIDOCAINE 2 PATCH: 50 PATCH TOPICAL at 20:18

## 2020-01-01 RX ADMIN — LEVALBUTEROL 1.25 MG: 1.25 SOLUTION RESPIRATORY (INHALATION) at 11:50

## 2020-01-01 RX ADMIN — DOCUSATE SODIUM 50 MG AND SENNOSIDES 8.6 MG 2 TABLET: 8.6; 5 TABLET, FILM COATED ORAL at 04:58

## 2020-01-01 RX ADMIN — AMIODARONE HYDROCHLORIDE 200 MG: 200 TABLET ORAL at 05:24

## 2020-01-01 RX ADMIN — ACETYLCYSTEINE 4 ML: 200 INHALANT RESPIRATORY (INHALATION) at 07:30

## 2020-01-01 RX ADMIN — TRAZODONE HYDROCHLORIDE 50 MG: 50 TABLET ORAL at 21:53

## 2020-01-01 RX ADMIN — DULOXETINE HYDROCHLORIDE 60 MG: 60 CAPSULE, DELAYED RELEASE ORAL at 20:16

## 2020-01-01 RX ADMIN — THIAMINE HYDROCHLORIDE 500 MG: 100 INJECTION, SOLUTION INTRAMUSCULAR; INTRAVENOUS at 05:18

## 2020-01-01 RX ADMIN — DULOXETINE HYDROCHLORIDE 60 MG: 60 CAPSULE, DELAYED RELEASE ORAL at 21:26

## 2020-01-01 RX ADMIN — LEVALBUTEROL 1.25 MG: 1.25 SOLUTION RESPIRATORY (INHALATION) at 12:15

## 2020-01-01 RX ADMIN — FUROSEMIDE 20 MG: 20 TABLET ORAL at 05:32

## 2020-01-01 RX ADMIN — DULOXETINE HYDROCHLORIDE 60 MG: 60 CAPSULE, DELAYED RELEASE ORAL at 21:37

## 2020-01-01 RX ADMIN — ALBUMIN (HUMAN) 12.5 G: 2.5 SOLUTION INTRAVENOUS at 00:20

## 2020-01-01 RX ADMIN — ACETAMINOPHEN 500 MG: 500 TABLET ORAL at 23:57

## 2020-01-01 RX ADMIN — NOREPINEPHRINE BITARTRATE 20 MCG/MIN: 1 INJECTION, SOLUTION, CONCENTRATE INTRAVENOUS at 15:57

## 2020-01-01 RX ADMIN — OMEPRAZOLE 40 MG: KIT at 04:45

## 2020-01-01 RX ADMIN — THERA TABS 1 TABLET: TAB at 04:33

## 2020-01-01 RX ADMIN — ACETYLCYSTEINE 3 ML: 200 INHALANT RESPIRATORY (INHALATION) at 15:31

## 2020-01-01 RX ADMIN — DOCUSATE SODIUM 50 MG AND SENNOSIDES 8.6 MG 2 TABLET: 8.6; 5 TABLET, FILM COATED ORAL at 17:12

## 2020-01-01 RX ADMIN — TOPIRAMATE 75 MG: 25 TABLET, FILM COATED ORAL at 05:11

## 2020-01-01 RX ADMIN — ACETAMINOPHEN 500 MG: 500 TABLET ORAL at 00:34

## 2020-01-01 RX ADMIN — EPINEPHRINE 12 MCG/MIN: 1 INJECTION INTRAMUSCULAR; INTRAVENOUS; SUBCUTANEOUS at 00:00

## 2020-01-01 RX ADMIN — LEVALBUTEROL 1.25 MG: 1.25 SOLUTION RESPIRATORY (INHALATION) at 10:06

## 2020-01-01 RX ADMIN — MIDODRINE HYDROCHLORIDE 5 MG: 5 TABLET ORAL at 13:17

## 2020-01-01 RX ADMIN — DEXMEDETOMIDINE HYDROCHLORIDE 0.5 MCG/KG/HR: 100 INJECTION, SOLUTION INTRAVENOUS at 13:18

## 2020-01-01 RX ADMIN — POTASSIUM BICARBONATE 25 MEQ: 978 TABLET, EFFERVESCENT ORAL at 08:35

## 2020-01-01 RX ADMIN — Medication 50 MCG/HR: at 08:45

## 2020-01-01 RX ADMIN — NOREPINEPHRINE BITARTRATE 50 MCG/MIN: 1 INJECTION, SOLUTION, CONCENTRATE INTRAVENOUS at 06:23

## 2020-01-01 RX ADMIN — ACETAMINOPHEN 650 MG: 325 TABLET, FILM COATED ORAL at 13:05

## 2020-01-01 RX ADMIN — FLUOXETINE 20 MG: 20 CAPSULE ORAL at 04:03

## 2020-01-01 RX ADMIN — RIVAROXABAN 20 MG: 20 TABLET, FILM COATED ORAL at 17:23

## 2020-01-01 RX ADMIN — TRAZODONE HYDROCHLORIDE 50 MG: 50 TABLET ORAL at 21:41

## 2020-01-01 RX ADMIN — OMEPRAZOLE 40 MG: KIT at 05:02

## 2020-01-01 RX ADMIN — MIDODRINE HYDROCHLORIDE 5 MG: 5 TABLET ORAL at 06:25

## 2020-01-01 RX ADMIN — FENTANYL CITRATE 100 MCG: 50 INJECTION, SOLUTION INTRAMUSCULAR; INTRAVENOUS at 18:14

## 2020-01-01 RX ADMIN — FENTANYL CITRATE 100 MCG: 50 INJECTION INTRAMUSCULAR; INTRAVENOUS at 12:20

## 2020-01-01 RX ADMIN — DOCUSATE SODIUM 50 MG AND SENNOSIDES 8.6 MG 2 TABLET: 8.6; 5 TABLET, FILM COATED ORAL at 05:58

## 2020-01-01 RX ADMIN — FAMOTIDINE 20 MG: 10 INJECTION INTRAVENOUS at 05:00

## 2020-01-01 RX ADMIN — FUROSEMIDE 20 MG: 20 TABLET ORAL at 05:08

## 2020-01-01 RX ADMIN — ACETAMINOPHEN 650 MG: 325 TABLET, FILM COATED ORAL at 21:44

## 2020-01-01 RX ADMIN — SODIUM CHLORIDE 1000 ML: 9 INJECTION, SOLUTION INTRAVENOUS at 03:21

## 2020-01-01 RX ADMIN — LIDOCAINE 2 PATCH: 50 PATCH TOPICAL at 18:15

## 2020-01-01 RX ADMIN — POLYETHYLENE GLYCOL 3350 1 PACKET: 17 POWDER, FOR SOLUTION ORAL at 05:52

## 2020-01-01 RX ADMIN — FAMOTIDINE 20 MG: 20 TABLET, FILM COATED ORAL at 05:11

## 2020-01-01 RX ADMIN — MIDODRINE HYDROCHLORIDE 5 MG: 5 TABLET ORAL at 06:36

## 2020-01-01 RX ADMIN — TOPIRAMATE 75 MG: 25 TABLET, FILM COATED ORAL at 05:50

## 2020-01-01 RX ADMIN — SODIUM CHLORIDE 3 G: 900 INJECTION INTRAVENOUS at 23:03

## 2020-01-01 RX ADMIN — ACETAMINOPHEN 650 MG: 325 TABLET, FILM COATED ORAL at 20:09

## 2020-01-01 RX ADMIN — DOCUSATE SODIUM 50 MG AND SENNOSIDES 8.6 MG 2 TABLET: 8.6; 5 TABLET, FILM COATED ORAL at 05:18

## 2020-01-01 RX ADMIN — FLUOXETINE 20 MG: 20 CAPSULE ORAL at 04:57

## 2020-01-01 RX ADMIN — LEVALBUTEROL 1.25 MG: 1.25 SOLUTION RESPIRATORY (INHALATION) at 07:18

## 2020-01-01 RX ADMIN — FENTANYL CITRATE 100 MCG: 50 INJECTION, SOLUTION INTRAMUSCULAR; INTRAVENOUS at 20:53

## 2020-01-01 RX ADMIN — METOPROLOL TARTRATE 25 MG: 25 TABLET, FILM COATED ORAL at 12:33

## 2020-01-01 RX ADMIN — LEVALBUTEROL 1.25 MG: 1.25 SOLUTION RESPIRATORY (INHALATION) at 16:29

## 2020-01-01 RX ADMIN — EPINEPHRINE 20 MCG/MIN: 1 INJECTION INTRAMUSCULAR; INTRAVENOUS; SUBCUTANEOUS at 07:32

## 2020-01-01 RX ADMIN — LEVALBUTEROL 1.25 MG: 1.25 SOLUTION RESPIRATORY (INHALATION) at 15:20

## 2020-01-01 RX ADMIN — Medication 200 MCG: at 19:49

## 2020-01-01 RX ADMIN — DOCUSATE SODIUM 50 MG AND SENNOSIDES 8.6 MG 2 TABLET: 8.6; 5 TABLET, FILM COATED ORAL at 17:33

## 2020-01-01 RX ADMIN — PHENYLEPHRINE HYDROCHLORIDE 50 MCG/MIN: 10 INJECTION INTRAVENOUS at 17:20

## 2020-01-01 RX ADMIN — DOCUSATE SODIUM 50 MG AND SENNOSIDES 8.6 MG 2 TABLET: 8.6; 5 TABLET, FILM COATED ORAL at 17:27

## 2020-01-01 RX ADMIN — LIDOCAINE 2 PATCH: 50 PATCH TOPICAL at 19:58

## 2020-01-01 RX ADMIN — METOPROLOL TARTRATE 5 MG: 5 INJECTION, SOLUTION INTRAVENOUS at 17:09

## 2020-01-01 RX ADMIN — TOPIRAMATE 75 MG: 25 TABLET, FILM COATED ORAL at 04:59

## 2020-01-01 RX ADMIN — DOCUSATE SODIUM 50 MG AND SENNOSIDES 8.6 MG 2 TABLET: 8.6; 5 TABLET, FILM COATED ORAL at 05:02

## 2020-01-01 RX ADMIN — FUROSEMIDE 40 MG: 10 INJECTION, SOLUTION INTRAMUSCULAR; INTRAVENOUS at 10:13

## 2020-01-01 RX ADMIN — TRAZODONE HYDROCHLORIDE 50 MG: 50 TABLET ORAL at 21:39

## 2020-01-01 RX ADMIN — METOPROLOL TARTRATE 25 MG: 25 TABLET, FILM COATED ORAL at 05:03

## 2020-01-01 RX ADMIN — IPRATROPIUM BROMIDE AND ALBUTEROL SULFATE 3 ML: 2.5; .5 SOLUTION RESPIRATORY (INHALATION) at 06:32

## 2020-01-01 RX ADMIN — ACETAMINOPHEN 1000 MG: 500 TABLET ORAL at 06:29

## 2020-01-01 RX ADMIN — IPRATROPIUM BROMIDE AND ALBUTEROL SULFATE 3 ML: 2.5; .5 SOLUTION RESPIRATORY (INHALATION) at 15:16

## 2020-01-01 RX ADMIN — RIVAROXABAN 20 MG: 20 TABLET, FILM COATED ORAL at 17:18

## 2020-01-01 RX ADMIN — PROPOFOL 40 MCG/KG/MIN: 10 INJECTION, EMULSION INTRAVENOUS at 23:26

## 2020-01-01 RX ADMIN — METOPROLOL TARTRATE 25 MG: 25 TABLET, FILM COATED ORAL at 17:06

## 2020-01-01 RX ADMIN — ACETAMINOPHEN 1000 MG: 500 TABLET ORAL at 05:31

## 2020-01-01 RX ADMIN — ACETAMINOPHEN 500 MG: 500 TABLET ORAL at 23:16

## 2020-01-01 RX ADMIN — HEPARIN SODIUM 5000 UNITS: 5000 INJECTION, SOLUTION INTRAVENOUS; SUBCUTANEOUS at 21:04

## 2020-01-01 RX ADMIN — MIDODRINE HYDROCHLORIDE 5 MG: 5 TABLET ORAL at 14:28

## 2020-01-01 RX ADMIN — LIDOCAINE 2 PATCH: 50 PATCH TOPICAL at 17:36

## 2020-01-01 RX ADMIN — DULOXETINE HYDROCHLORIDE 60 MG: 60 CAPSULE, DELAYED RELEASE ORAL at 21:43

## 2020-01-01 RX ADMIN — AMIODARONE HYDROCHLORIDE 200 MG: 200 TABLET ORAL at 06:25

## 2020-01-01 RX ADMIN — ACETAMINOPHEN 1000 MG: 500 TABLET ORAL at 04:45

## 2020-01-01 RX ADMIN — FAMOTIDINE 20 MG: 20 TABLET, FILM COATED ORAL at 17:24

## 2020-01-01 RX ADMIN — IPRATROPIUM BROMIDE AND ALBUTEROL SULFATE 3 ML: 2.5; .5 SOLUTION RESPIRATORY (INHALATION) at 18:54

## 2020-01-01 RX ADMIN — OMEPRAZOLE 40 MG: KIT at 06:00

## 2020-01-01 RX ADMIN — METOPROLOL TARTRATE 25 MG: 25 TABLET, FILM COATED ORAL at 18:15

## 2020-01-01 RX ADMIN — SODIUM CHLORIDE 1000 ML: 9 INJECTION, SOLUTION INTRAVENOUS at 04:34

## 2020-01-01 RX ADMIN — OMEPRAZOLE 20 MG: 20 CAPSULE, DELAYED RELEASE ORAL at 19:53

## 2020-01-01 RX ADMIN — METOPROLOL TARTRATE 12.5 MG: 25 TABLET, FILM COATED ORAL at 05:45

## 2020-01-01 RX ADMIN — AMIODARONE HYDROCHLORIDE 200 MG: 200 TABLET ORAL at 06:05

## 2020-01-01 RX ADMIN — PROPOFOL 40 MCG/KG/MIN: 10 INJECTION, EMULSION INTRAVENOUS at 07:03

## 2020-01-01 RX ADMIN — FLUOXETINE 20 MG: 20 CAPSULE ORAL at 10:27

## 2020-01-01 RX ADMIN — ACETYLCYSTEINE 4 ML: 200 INHALANT RESPIRATORY (INHALATION) at 12:15

## 2020-01-01 RX ADMIN — HEPARIN SODIUM 5000 UNITS: 5000 INJECTION, SOLUTION INTRAVENOUS; SUBCUTANEOUS at 14:41

## 2020-01-01 RX ADMIN — VASOPRESSIN 0.03 UNITS/MIN: 20 INJECTION INTRAVENOUS at 02:16

## 2020-01-01 RX ADMIN — MAGNESIUM HYDROXIDE 30 ML: 400 SUSPENSION ORAL at 15:22

## 2020-01-01 RX ADMIN — POTASSIUM BICARBONATE 50 MEQ: 978 TABLET, EFFERVESCENT ORAL at 08:27

## 2020-01-01 RX ADMIN — TOPIRAMATE 75 MG: 25 TABLET, FILM COATED ORAL at 05:54

## 2020-01-01 RX ADMIN — ACETAMINOPHEN 650 MG: 325 TABLET, FILM COATED ORAL at 20:06

## 2020-01-01 RX ADMIN — FENTANYL CITRATE 100 MCG: 50 INJECTION INTRAMUSCULAR; INTRAVENOUS at 00:42

## 2020-01-01 RX ADMIN — OXYCODONE HYDROCHLORIDE 5 MG: 5 TABLET ORAL at 12:00

## 2020-01-01 RX ADMIN — FUROSEMIDE 20 MG: 10 INJECTION, SOLUTION INTRAMUSCULAR; INTRAVENOUS at 06:03

## 2020-01-01 RX ADMIN — DULOXETINE HYDROCHLORIDE 60 MG: 60 CAPSULE, DELAYED RELEASE ORAL at 21:55

## 2020-01-01 RX ADMIN — ACETAMINOPHEN 1000 MG: 500 TABLET ORAL at 05:38

## 2020-01-01 RX ADMIN — RIVAROXABAN 20 MG: 20 TABLET, FILM COATED ORAL at 17:25

## 2020-01-01 RX ADMIN — OMEPRAZOLE 40 MG: KIT at 04:47

## 2020-01-01 RX ADMIN — ACETAMINOPHEN 500 MG: 500 TABLET ORAL at 17:58

## 2020-01-01 RX ADMIN — DOCUSATE SODIUM 50 MG AND SENNOSIDES 8.6 MG 2 TABLET: 8.6; 5 TABLET, FILM COATED ORAL at 17:23

## 2020-01-01 RX ADMIN — LIDOCAINE 2 PATCH: 50 PATCH TOPICAL at 17:03

## 2020-01-01 RX ADMIN — LIDOCAINE 2 PATCH: 50 PATCH TOPICAL at 20:54

## 2020-01-01 RX ADMIN — TRAZODONE HYDROCHLORIDE 50 MG: 50 TABLET ORAL at 21:54

## 2020-01-01 RX ADMIN — AMIODARONE HYDROCHLORIDE 200 MG: 200 TABLET ORAL at 05:12

## 2020-01-01 RX ADMIN — HYDROMORPHONE HYDROCHLORIDE 2 MG: 2 INJECTION, SOLUTION INTRAMUSCULAR; INTRAVENOUS; SUBCUTANEOUS at 23:07

## 2020-01-01 RX ADMIN — IPRATROPIUM BROMIDE AND ALBUTEROL SULFATE 3 ML: 2.5; .5 SOLUTION RESPIRATORY (INHALATION) at 15:49

## 2020-01-01 RX ADMIN — ACETAMINOPHEN 1000 MG: 500 TABLET ORAL at 17:52

## 2020-01-01 RX ADMIN — LIDOCAINE 2 PATCH: 50 PATCH TOPICAL at 20:53

## 2020-01-01 RX ADMIN — HYDROCORTISONE SODIUM SUCCINATE 100 MG: 100 INJECTION, POWDER, FOR SOLUTION INTRAMUSCULAR; INTRAVENOUS at 13:30

## 2020-01-01 RX ADMIN — DOCUSATE SODIUM 50 MG AND SENNOSIDES 8.6 MG 2 TABLET: 8.6; 5 TABLET, FILM COATED ORAL at 05:11

## 2020-01-01 RX ADMIN — RIVAROXABAN 20 MG: 20 TABLET, FILM COATED ORAL at 18:26

## 2020-01-01 RX ADMIN — PROTHROMBIN, COAGULATION FACTOR VII HUMAN, COAGULATION FACTOR IX HUMAN, COAGULATION FACTOR X HUMAN, PROTEIN C, PROTEIN S HUMAN, AND WATER 2000 UNITS: KIT at 01:11

## 2020-01-01 RX ADMIN — ACETYLCYSTEINE 3 ML: 200 INHALANT RESPIRATORY (INHALATION) at 18:54

## 2020-01-01 RX ADMIN — TOPIRAMATE 75 MG: 25 TABLET, FILM COATED ORAL at 05:41

## 2020-01-01 RX ADMIN — POTASSIUM CHLORIDE 40 MEQ: 29.8 INJECTION, SOLUTION INTRAVENOUS at 08:02

## 2020-01-01 RX ADMIN — ALBUTEROL SULFATE 2.5 MG: 2.5 SOLUTION RESPIRATORY (INHALATION) at 19:02

## 2020-01-01 RX ADMIN — HEPARIN SODIUM 5000 UNITS: 5000 INJECTION, SOLUTION INTRAVENOUS; SUBCUTANEOUS at 15:08

## 2020-01-01 RX ADMIN — NOREPINEPHRINE BITARTRATE 6 MCG/MIN: 1 INJECTION INTRAVENOUS at 23:26

## 2020-01-01 RX ADMIN — DULOXETINE HYDROCHLORIDE 60 MG: 60 CAPSULE, DELAYED RELEASE ORAL at 19:41

## 2020-01-01 RX ADMIN — AMIODARONE HYDROCHLORIDE 200 MG: 200 TABLET ORAL at 06:36

## 2020-01-01 RX ADMIN — HYDROCORTISONE SODIUM SUCCINATE 100 MG: 100 INJECTION, POWDER, FOR SOLUTION INTRAMUSCULAR; INTRAVENOUS at 17:33

## 2020-01-01 RX ADMIN — SODIUM BICARBONATE 83 ML/HR: 84 INJECTION, SOLUTION INTRAVENOUS at 09:00

## 2020-01-01 RX ADMIN — ACETAMINOPHEN 500 MG: 500 TABLET ORAL at 05:02

## 2020-01-01 RX ADMIN — NOREPINEPHRINE BITARTRATE 50 MCG/MIN: 1 INJECTION, SOLUTION, CONCENTRATE INTRAVENOUS at 00:12

## 2020-01-01 RX ADMIN — ROCURONIUM BROMIDE 50 MG: 10 INJECTION, SOLUTION INTRAVENOUS at 18:12

## 2020-01-01 RX ADMIN — ACETYLCYSTEINE 4 ML: 200 INHALANT RESPIRATORY (INHALATION) at 06:40

## 2020-01-01 RX ADMIN — ACETAMINOPHEN 650 MG: 325 TABLET, FILM COATED ORAL at 12:43

## 2020-01-01 RX ADMIN — AMIODARONE HYDROCHLORIDE 200 MG: 200 TABLET ORAL at 06:15

## 2020-01-01 RX ADMIN — DEXTROSE MONOHYDRATE: 50 INJECTION, SOLUTION INTRAVENOUS at 08:30

## 2020-01-01 RX ADMIN — THERA TABS 1 TABLET: TAB at 04:59

## 2020-01-01 RX ADMIN — ACETYLCYSTEINE 4 ML: 200 INHALANT RESPIRATORY (INHALATION) at 19:02

## 2020-01-01 RX ADMIN — AMIODARONE HYDROCHLORIDE 200 MG: 200 TABLET ORAL at 05:54

## 2020-01-01 RX ADMIN — TOPIRAMATE 75 MG: 25 TABLET, FILM COATED ORAL at 06:27

## 2020-01-01 RX ADMIN — DULOXETINE HYDROCHLORIDE 60 MG: 60 CAPSULE, DELAYED RELEASE ORAL at 19:40

## 2020-01-01 RX ADMIN — LEVALBUTEROL 1.25 MG: 1.25 SOLUTION RESPIRATORY (INHALATION) at 19:57

## 2020-01-01 RX ADMIN — THERA TABS 1 TABLET: TAB at 03:54

## 2020-01-01 RX ADMIN — FAMOTIDINE 20 MG: 20 TABLET, FILM COATED ORAL at 16:59

## 2020-01-01 RX ADMIN — OMEPRAZOLE 20 MG: KIT at 18:02

## 2020-01-01 RX ADMIN — FUROSEMIDE 20 MG: 20 TABLET ORAL at 05:24

## 2020-01-01 RX ADMIN — FLUOXETINE 20 MG: 20 CAPSULE ORAL at 06:05

## 2020-01-01 RX ADMIN — POTASSIUM BICARBONATE 25 MEQ: 978 TABLET, EFFERVESCENT ORAL at 05:59

## 2020-01-01 RX ADMIN — ACETAMINOPHEN 650 MG: 325 TABLET, FILM COATED ORAL at 18:22

## 2020-01-01 RX ADMIN — FLUOXETINE 20 MG: 20 CAPSULE ORAL at 04:58

## 2020-01-01 RX ADMIN — SODIUM CHLORIDE 3 G: 900 INJECTION INTRAVENOUS at 04:50

## 2020-01-01 RX ADMIN — MIDODRINE HYDROCHLORIDE 5 MG: 5 TABLET ORAL at 20:56

## 2020-01-01 RX ADMIN — TOPIRAMATE 75 MG: 25 TABLET, FILM COATED ORAL at 05:05

## 2020-01-01 RX ADMIN — IPRATROPIUM BROMIDE AND ALBUTEROL SULFATE 3 ML: 2.5; .5 SOLUTION RESPIRATORY (INHALATION) at 10:33

## 2020-01-01 RX ADMIN — SODIUM BICARBONATE 50 MEQ: 84 INJECTION, SOLUTION INTRAVENOUS at 16:00

## 2020-01-01 RX ADMIN — NALOXONE HYDROCHLORIDE 0.4 MG: 0.4 INJECTION, SOLUTION INTRAMUSCULAR; INTRAVENOUS; SUBCUTANEOUS at 08:45

## 2020-01-01 RX ADMIN — ROCURONIUM BROMIDE 70 MG: 10 INJECTION, SOLUTION INTRAVENOUS at 10:50

## 2020-01-01 RX ADMIN — ACETAMINOPHEN 650 MG: 325 TABLET, FILM COATED ORAL at 01:43

## 2020-01-01 RX ADMIN — RIVAROXABAN 20 MG: 20 TABLET, FILM COATED ORAL at 17:08

## 2020-01-01 RX ADMIN — SODIUM CHLORIDE 3 G: 900 INJECTION INTRAVENOUS at 13:17

## 2020-01-01 RX ADMIN — ACETAMINOPHEN 500 MG: 500 TABLET ORAL at 13:35

## 2020-01-01 RX ADMIN — AMIODARONE HYDROCHLORIDE 200 MG: 200 TABLET ORAL at 05:40

## 2020-01-01 RX ADMIN — LIDOCAINE 2 PATCH: 50 PATCH TOPICAL at 18:26

## 2020-01-01 RX ADMIN — PIPERACILLIN AND TAZOBACTAM 3.38 G: 3; .375 INJECTION, POWDER, LYOPHILIZED, FOR SOLUTION INTRAVENOUS; PARENTERAL at 05:28

## 2020-01-01 RX ADMIN — MIDODRINE HYDROCHLORIDE 5 MG: 5 TABLET ORAL at 05:40

## 2020-01-01 RX ADMIN — LIDOCAINE HYDROCHLORIDE 80 MG: 20 INJECTION, SOLUTION INTRAVENOUS at 09:00

## 2020-01-01 RX ADMIN — TOPIRAMATE 75 MG: 25 TABLET, FILM COATED ORAL at 05:32

## 2020-01-01 RX ADMIN — ACETAMINOPHEN 650 MG: 325 TABLET, FILM COATED ORAL at 22:48

## 2020-01-01 RX ADMIN — OMEPRAZOLE 40 MG: KIT at 06:01

## 2020-01-01 RX ADMIN — LIDOCAINE 2 PATCH: 50 PATCH TOPICAL at 21:14

## 2020-01-01 RX ADMIN — THERA TABS 1 TABLET: TAB at 05:23

## 2020-01-01 RX ADMIN — NOREPINEPHRINE BITARTRATE 50 MCG/MIN: 1 INJECTION, SOLUTION, CONCENTRATE INTRAVENOUS at 09:18

## 2020-01-01 RX ADMIN — ACETAMINOPHEN 650 MG: 325 TABLET, FILM COATED ORAL at 12:53

## 2020-01-01 RX ADMIN — IPRATROPIUM BROMIDE AND ALBUTEROL SULFATE 3 ML: 2.5; .5 SOLUTION RESPIRATORY (INHALATION) at 18:17

## 2020-01-01 RX ADMIN — LIDOCAINE 2 PATCH: 50 PATCH TOPICAL at 17:38

## 2020-01-01 RX ADMIN — FUROSEMIDE 20 MG: 20 TABLET ORAL at 05:28

## 2020-01-01 RX ADMIN — DOCUSATE SODIUM 50 MG AND SENNOSIDES 8.6 MG 2 TABLET: 8.6; 5 TABLET, FILM COATED ORAL at 06:11

## 2020-01-01 RX ADMIN — DOCUSATE SODIUM 50 MG AND SENNOSIDES 8.6 MG 2 TABLET: 8.6; 5 TABLET, FILM COATED ORAL at 05:43

## 2020-01-01 RX ADMIN — DOCUSATE SODIUM 50 MG AND SENNOSIDES 8.6 MG 2 TABLET: 8.6; 5 TABLET, FILM COATED ORAL at 05:27

## 2020-01-01 RX ADMIN — PANTOPRAZOLE SODIUM 40 MG: 40 INJECTION, POWDER, LYOPHILIZED, FOR SOLUTION INTRAVENOUS at 16:06

## 2020-01-01 RX ADMIN — LEVALBUTEROL 1.25 MG: 1.25 SOLUTION RESPIRATORY (INHALATION) at 10:20

## 2020-01-01 RX ADMIN — AMIODARONE HYDROCHLORIDE 200 MG: 200 TABLET ORAL at 05:23

## 2020-01-01 RX ADMIN — ACETAMINOPHEN 650 MG: 325 TABLET, FILM COATED ORAL at 20:55

## 2020-01-01 RX ADMIN — TOPIRAMATE 75 MG: 25 TABLET, FILM COATED ORAL at 05:16

## 2020-01-01 RX ADMIN — AMIODARONE HYDROCHLORIDE 200 MG: 200 TABLET ORAL at 06:18

## 2020-01-01 RX ADMIN — METOPROLOL TARTRATE 25 MG: 25 TABLET, FILM COATED ORAL at 05:17

## 2020-01-01 RX ADMIN — ACETAMINOPHEN 500 MG: 500 TABLET ORAL at 05:43

## 2020-01-01 RX ADMIN — DOCUSATE SODIUM 50 MG AND SENNOSIDES 8.6 MG 2 TABLET: 8.6; 5 TABLET, FILM COATED ORAL at 17:22

## 2020-01-01 RX ADMIN — ACETAMINOPHEN 650 MG: 325 TABLET, FILM COATED ORAL at 05:53

## 2020-01-01 RX ADMIN — TOPIRAMATE 75 MG: 25 TABLET, FILM COATED ORAL at 04:03

## 2020-01-01 RX ADMIN — PHENYLEPHRINE HYDROCHLORIDE 90 MCG/MIN: 10 INJECTION INTRAVENOUS at 20:34

## 2020-01-01 RX ADMIN — ACETAMINOPHEN 500 MG: 500 TABLET ORAL at 12:00

## 2020-01-01 RX ADMIN — SODIUM CHLORIDE 3 G: 900 INJECTION INTRAVENOUS at 04:27

## 2020-01-01 RX ADMIN — Medication 300 MCG/HR: at 10:05

## 2020-01-01 RX ADMIN — POTASSIUM BICARBONATE 25 MEQ: 978 TABLET, EFFERVESCENT ORAL at 10:21

## 2020-01-01 RX ADMIN — THERA TABS 1 TABLET: TAB at 06:11

## 2020-01-01 RX ADMIN — NOREPINEPHRINE BITARTRATE 50 MCG/MIN: 1 INJECTION, SOLUTION, CONCENTRATE INTRAVENOUS at 03:25

## 2020-01-01 RX ADMIN — FUROSEMIDE 20 MG: 20 TABLET ORAL at 05:39

## 2020-01-01 RX ADMIN — OMEPRAZOLE 40 MG: KIT at 05:17

## 2020-01-01 RX ADMIN — AMIODARONE HYDROCHLORIDE 200 MG: 200 TABLET ORAL at 04:55

## 2020-01-01 RX ADMIN — Medication 75 MCG/HR: at 12:53

## 2020-01-01 RX ADMIN — AMIODARONE HYDROCHLORIDE 200 MG: 200 TABLET ORAL at 06:29

## 2020-01-01 RX ADMIN — LEVALBUTEROL 1.25 MG: 1.25 SOLUTION RESPIRATORY (INHALATION) at 14:42

## 2020-01-01 RX ADMIN — FUROSEMIDE 20 MG: 20 TABLET ORAL at 06:15

## 2020-01-01 RX ADMIN — ACETYLCYSTEINE 3 ML: 200 INHALANT RESPIRATORY (INHALATION) at 11:41

## 2020-01-01 RX ADMIN — Medication 300 MCG: at 15:52

## 2020-01-01 RX ADMIN — FENTANYL CITRATE 100 MCG: 50 INJECTION INTRAMUSCULAR; INTRAVENOUS at 01:26

## 2020-01-01 RX ADMIN — DOCUSATE SODIUM 50 MG AND SENNOSIDES 8.6 MG 2 TABLET: 8.6; 5 TABLET, FILM COATED ORAL at 06:36

## 2020-01-01 RX ADMIN — LEVALBUTEROL 1.25 MG: 1.25 SOLUTION RESPIRATORY (INHALATION) at 06:38

## 2020-01-01 RX ADMIN — ACETAMINOPHEN 500 MG: 500 TABLET ORAL at 05:49

## 2020-01-01 RX ADMIN — ACETYLCYSTEINE 4 ML: 200 INHALANT RESPIRATORY (INHALATION) at 19:09

## 2020-01-01 RX ADMIN — PHENYLEPHRINE HYDROCHLORIDE 500 MCG/MIN: 10 INJECTION INTRAVENOUS at 11:30

## 2020-01-01 RX ADMIN — FENTANYL CITRATE 100 MCG: 50 INJECTION INTRAMUSCULAR; INTRAVENOUS at 11:21

## 2020-01-01 RX ADMIN — RIVAROXABAN 20 MG: 20 TABLET, FILM COATED ORAL at 17:54

## 2020-01-01 RX ADMIN — POTASSIUM BICARBONATE 25 MEQ: 978 TABLET, EFFERVESCENT ORAL at 04:45

## 2020-01-01 RX ADMIN — FENTANYL CITRATE 100 MCG: 50 INJECTION, SOLUTION INTRAMUSCULAR; INTRAVENOUS at 11:48

## 2020-01-01 RX ADMIN — HEPARIN SODIUM 5000 UNITS: 5000 INJECTION, SOLUTION INTRAVENOUS; SUBCUTANEOUS at 05:51

## 2020-01-01 RX ADMIN — HEPARIN SODIUM 5000 UNITS: 5000 INJECTION, SOLUTION INTRAVENOUS; SUBCUTANEOUS at 21:06

## 2020-01-01 RX ADMIN — FUROSEMIDE 20 MG: 20 TABLET ORAL at 05:50

## 2020-01-01 RX ADMIN — RIVAROXABAN 20 MG: 20 TABLET, FILM COATED ORAL at 17:02

## 2020-01-01 RX ADMIN — VANCOMYCIN HYDROCHLORIDE 1000 MG: 500 INJECTION, POWDER, LYOPHILIZED, FOR SOLUTION INTRAVENOUS at 10:36

## 2020-01-01 RX ADMIN — OMEPRAZOLE 20 MG: KIT at 17:23

## 2020-01-01 RX ADMIN — POTASSIUM BICARBONATE 50 MEQ: 978 TABLET, EFFERVESCENT ORAL at 08:50

## 2020-01-01 RX ADMIN — DOCUSATE SODIUM 50 MG AND SENNOSIDES 8.6 MG 2 TABLET: 8.6; 5 TABLET, FILM COATED ORAL at 17:08

## 2020-01-01 RX ADMIN — OXYCODONE HYDROCHLORIDE 10 MG: 10 TABLET ORAL at 17:23

## 2020-01-01 RX ADMIN — HEPARIN SODIUM 5000 UNITS: 5000 INJECTION, SOLUTION INTRAVENOUS; SUBCUTANEOUS at 05:11

## 2020-01-01 RX ADMIN — SODIUM CHLORIDE 3 G: 900 INJECTION INTRAVENOUS at 17:29

## 2020-01-01 RX ADMIN — LEVALBUTEROL 1.25 MG: 1.25 SOLUTION RESPIRATORY (INHALATION) at 11:22

## 2020-01-01 RX ADMIN — ACETYLCYSTEINE 4 ML: 200 INHALANT RESPIRATORY (INHALATION) at 11:08

## 2020-01-01 RX ADMIN — DULOXETINE HYDROCHLORIDE 60 MG: 60 CAPSULE, DELAYED RELEASE ORAL at 20:13

## 2020-01-01 RX ADMIN — ACETYLCYSTEINE 4 ML: 200 INHALANT RESPIRATORY (INHALATION) at 07:14

## 2020-01-01 RX ADMIN — FENTANYL CITRATE 50 MCG: 50 INJECTION, SOLUTION INTRAMUSCULAR; INTRAVENOUS at 03:17

## 2020-01-01 RX ADMIN — RIVAROXABAN 20 MG: 20 TABLET, FILM COATED ORAL at 17:39

## 2020-01-01 RX ADMIN — OXYCODONE HYDROCHLORIDE 5 MG: 5 TABLET ORAL at 19:57

## 2020-01-01 RX ADMIN — RIVAROXABAN 20 MG: 20 TABLET, FILM COATED ORAL at 17:36

## 2020-01-01 RX ADMIN — SODIUM CHLORIDE: 9 INJECTION, SOLUTION INTRAVENOUS at 17:30

## 2020-01-01 RX ADMIN — FLUOXETINE 20 MG: 20 CAPSULE ORAL at 06:11

## 2020-01-01 RX ADMIN — OXYCODONE HYDROCHLORIDE 10 MG: 10 TABLET ORAL at 07:30

## 2020-01-01 RX ADMIN — ACETAMINOPHEN 1000 MG: 500 TABLET ORAL at 13:12

## 2020-01-01 RX ADMIN — FLUOXETINE 20 MG: 20 CAPSULE ORAL at 05:58

## 2020-01-01 RX ADMIN — OMEPRAZOLE 40 MG: KIT at 06:27

## 2020-01-01 RX ADMIN — AMIODARONE HYDROCHLORIDE 200 MG: 200 TABLET ORAL at 05:50

## 2020-01-01 RX ADMIN — FLUOXETINE 20 MG: 20 CAPSULE ORAL at 05:03

## 2020-01-01 RX ADMIN — OMEPRAZOLE 20 MG: KIT at 05:03

## 2020-01-01 RX ADMIN — FAMOTIDINE 20 MG: 20 TABLET, FILM COATED ORAL at 04:23

## 2020-01-01 RX ADMIN — CALCIUM CHLORIDE 1000 MG: 100 INJECTION, SOLUTION INTRAVENOUS at 14:20

## 2020-01-01 RX ADMIN — IPRATROPIUM BROMIDE AND ALBUTEROL SULFATE 3 ML: 2.5; .5 SOLUTION RESPIRATORY (INHALATION) at 18:52

## 2020-01-01 RX ADMIN — MIDAZOLAM HYDROCHLORIDE 2 MG: 1 INJECTION, SOLUTION INTRAMUSCULAR; INTRAVENOUS at 00:43

## 2020-01-01 RX ADMIN — RIVAROXABAN 20 MG: 20 TABLET, FILM COATED ORAL at 18:14

## 2020-01-01 RX ADMIN — PHENYLEPHRINE HYDROCHLORIDE 100 MCG: 10 INJECTION INTRAVENOUS at 18:21

## 2020-01-01 RX ADMIN — LIDOCAINE 2 PATCH: 50 PATCH TOPICAL at 20:03

## 2020-01-01 RX ADMIN — OMEPRAZOLE 40 MG: KIT at 05:59

## 2020-01-01 RX ADMIN — DOCUSATE SODIUM 50 MG AND SENNOSIDES 8.6 MG 2 TABLET: 8.6; 5 TABLET, FILM COATED ORAL at 05:53

## 2020-01-01 RX ADMIN — ACETAMINOPHEN 1000 MG: 500 TABLET ORAL at 11:27

## 2020-01-01 RX ADMIN — OXYCODONE 10 MG: 5 TABLET ORAL at 04:05

## 2020-01-01 RX ADMIN — ACETYLCYSTEINE 3 ML: 200 INHALANT RESPIRATORY (INHALATION) at 18:21

## 2020-01-01 RX ADMIN — ACETAMINOPHEN 1000 MG: 500 TABLET ORAL at 23:42

## 2020-01-01 RX ADMIN — RIVAROXABAN 20 MG: 20 TABLET, FILM COATED ORAL at 18:02

## 2020-01-01 RX ADMIN — HYDROCODONE BITARTRATE AND ACETAMINOPHEN 1 TABLET: 5; 325 TABLET ORAL at 02:19

## 2020-01-01 RX ADMIN — PROPOFOL 30 MG: 10 INJECTION, EMULSION INTRAVENOUS at 11:49

## 2020-01-01 RX ADMIN — PHENYLEPHRINE HYDROCHLORIDE 75 MCG/MIN: 10 INJECTION INTRAVENOUS at 06:36

## 2020-01-01 RX ADMIN — ACETYLCYSTEINE 3 ML: 200 INHALANT RESPIRATORY (INHALATION) at 19:23

## 2020-01-01 RX ADMIN — ACETAMINOPHEN 1000 MG: 500 TABLET ORAL at 05:59

## 2020-01-01 RX ADMIN — ACETYLCYSTEINE 3 ML: 200 INHALANT RESPIRATORY (INHALATION) at 21:15

## 2020-01-01 RX ADMIN — DIBASIC SODIUM PHOSPHATE, MONOBASIC POTASSIUM PHOSPHATE AND MONOBASIC SODIUM PHOSPHATE 250 MG: 852; 155; 130 TABLET ORAL at 16:14

## 2020-01-01 RX ADMIN — OXYCODONE 5 MG: 5 TABLET ORAL at 13:12

## 2020-01-01 RX ADMIN — OMEPRAZOLE 40 MG: KIT at 05:10

## 2020-01-01 RX ADMIN — HYDROCODONE BITARTRATE AND ACETAMINOPHEN 1 TABLET: 5; 325 TABLET ORAL at 06:00

## 2020-01-01 RX ADMIN — PHENYLEPHRINE HYDROCHLORIDE 500 MCG/MIN: 10 INJECTION INTRAVENOUS at 13:22

## 2020-01-01 RX ADMIN — TRAZODONE HYDROCHLORIDE 50 MG: 50 TABLET ORAL at 20:03

## 2020-01-01 RX ADMIN — PROPOFOL 30 MG: 10 INJECTION, EMULSION INTRAVENOUS at 11:53

## 2020-01-01 RX ADMIN — PHENYLEPHRINE HYDROCHLORIDE 20 MCG/MIN: 10 INJECTION INTRAVENOUS at 15:00

## 2020-01-01 RX ADMIN — ACETYLCYSTEINE 3 ML: 200 INHALANT RESPIRATORY (INHALATION) at 07:50

## 2020-01-01 RX ADMIN — FUROSEMIDE 20 MG: 10 INJECTION, SOLUTION INTRAMUSCULAR; INTRAVENOUS at 08:53

## 2020-01-01 RX ADMIN — OMEPRAZOLE 40 MG: KIT at 04:58

## 2020-01-01 RX ADMIN — PHENYLEPHRINE HYDROCHLORIDE 150 MCG/MIN: 10 INJECTION INTRAVENOUS at 09:53

## 2020-01-01 RX ADMIN — OMEPRAZOLE 40 MG: KIT at 04:34

## 2020-01-01 RX ADMIN — ALBUTEROL SULFATE 2.5 MG: 2.5 SOLUTION RESPIRATORY (INHALATION) at 06:54

## 2020-01-01 RX ADMIN — DIBASIC SODIUM PHOSPHATE, MONOBASIC POTASSIUM PHOSPHATE AND MONOBASIC SODIUM PHOSPHATE 250 MG: 852; 155; 130 TABLET ORAL at 22:23

## 2020-01-01 RX ADMIN — PHENYLEPHRINE HYDROCHLORIDE 100 MCG: 10 INJECTION INTRAVENOUS at 18:27

## 2020-01-01 RX ADMIN — FENTANYL CITRATE 50 MCG: 50 INJECTION, SOLUTION INTRAMUSCULAR; INTRAVENOUS at 10:37

## 2020-01-01 RX ADMIN — KETOROLAC TROMETHAMINE 30 MG: 30 INJECTION, SOLUTION INTRAMUSCULAR at 03:18

## 2020-01-01 RX ADMIN — FLUOXETINE 20 MG: 20 CAPSULE ORAL at 05:10

## 2020-01-01 RX ADMIN — EPINEPHRINE 18 MCG/MIN: 1 INJECTION INTRAMUSCULAR; INTRAVENOUS; SUBCUTANEOUS at 01:56

## 2020-01-01 RX ADMIN — MIDODRINE HYDROCHLORIDE 5 MG: 5 TABLET ORAL at 06:05

## 2020-01-01 RX ADMIN — Medication 200 MCG/HR: at 04:00

## 2020-01-01 RX ADMIN — FUROSEMIDE 20 MG: 20 TABLET ORAL at 05:09

## 2020-01-01 RX ADMIN — OMEPRAZOLE 40 MG: KIT at 06:15

## 2020-01-01 RX ADMIN — DOCUSATE SODIUM 50 MG AND SENNOSIDES 8.6 MG 2 TABLET: 8.6; 5 TABLET, FILM COATED ORAL at 06:25

## 2020-01-01 RX ADMIN — LIDOCAINE 2 PATCH: 50 PATCH TOPICAL at 20:29

## 2020-01-01 RX ADMIN — POTASSIUM BICARBONATE 25 MEQ: 978 TABLET, EFFERVESCENT ORAL at 06:15

## 2020-01-01 RX ADMIN — IPRATROPIUM BROMIDE AND ALBUTEROL SULFATE 3 ML: 2.5; .5 SOLUTION RESPIRATORY (INHALATION) at 15:54

## 2020-01-01 RX ADMIN — PHENYLEPHRINE HYDROCHLORIDE 50 MCG/MIN: 10 INJECTION INTRAVENOUS at 11:48

## 2020-01-01 RX ADMIN — ACETYLCYSTEINE 4 ML: 200 INHALANT RESPIRATORY (INHALATION) at 19:34

## 2020-01-01 RX ADMIN — DULOXETINE HYDROCHLORIDE 60 MG: 60 CAPSULE, DELAYED RELEASE ORAL at 20:00

## 2020-01-01 RX ADMIN — ACETYLCYSTEINE 4 ML: 200 INHALANT RESPIRATORY (INHALATION) at 10:20

## 2020-01-01 RX ADMIN — LEVALBUTEROL 1.25 MG: 1.25 SOLUTION RESPIRATORY (INHALATION) at 19:00

## 2020-01-01 RX ADMIN — LIDOCAINE 2 PATCH: 50 PATCH TOPICAL at 20:02

## 2020-01-01 RX ADMIN — ACETAMINOPHEN 1000 MG: 500 TABLET ORAL at 12:30

## 2020-01-01 RX ADMIN — LEVALBUTEROL 1.25 MG: 1.25 SOLUTION RESPIRATORY (INHALATION) at 06:47

## 2020-01-01 RX ADMIN — PHENYLEPHRINE HYDROCHLORIDE 20 MCG/MIN: 10 INJECTION INTRAVENOUS at 05:20

## 2020-01-01 RX ADMIN — MIDAZOLAM HYDROCHLORIDE 2 MG: 1 INJECTION, SOLUTION INTRAMUSCULAR; INTRAVENOUS at 17:17

## 2020-01-01 RX ADMIN — ACETAZOLAMIDE SODIUM 500 MG: 500 INJECTION, POWDER, LYOPHILIZED, FOR SOLUTION INTRAVENOUS at 19:45

## 2020-01-01 RX ADMIN — ACETAMINOPHEN 500 MG: 500 TABLET ORAL at 06:18

## 2020-01-01 RX ADMIN — DULOXETINE HYDROCHLORIDE 60 MG: 60 CAPSULE, DELAYED RELEASE ORAL at 20:39

## 2020-01-01 RX ADMIN — OXYCODONE HYDROCHLORIDE 5 MG: 5 TABLET ORAL at 05:28

## 2020-01-01 RX ADMIN — FAMOTIDINE 20 MG: 20 TABLET, FILM COATED ORAL at 18:11

## 2020-01-01 RX ADMIN — LIDOCAINE 2 PATCH: 50 PATCH TOPICAL at 20:38

## 2020-01-01 RX ADMIN — HEPARIN SODIUM 5000 UNITS: 5000 INJECTION, SOLUTION INTRAVENOUS; SUBCUTANEOUS at 05:17

## 2020-01-01 RX ADMIN — DULOXETINE HYDROCHLORIDE 60 MG: 60 CAPSULE, DELAYED RELEASE ORAL at 20:02

## 2020-01-01 RX ADMIN — RIVAROXABAN 20 MG: 20 TABLET, FILM COATED ORAL at 18:03

## 2020-01-01 RX ADMIN — TOPIRAMATE 75 MG: 25 TABLET, FILM COATED ORAL at 05:59

## 2020-01-01 RX ADMIN — ACETAMINOPHEN 500 MG: 500 TABLET ORAL at 17:22

## 2020-01-01 RX ADMIN — DEXMEDETOMIDINE HYDROCHLORIDE 0.1 MCG/KG/HR: 100 INJECTION, SOLUTION INTRAVENOUS at 12:45

## 2020-01-01 RX ADMIN — METOPROLOL TARTRATE 12.5 MG: 25 TABLET, FILM COATED ORAL at 05:11

## 2020-01-01 RX ADMIN — ACETYLCYSTEINE 4 ML: 200 INHALANT RESPIRATORY (INHALATION) at 11:22

## 2020-01-01 RX ADMIN — EPINEPHRINE 12 MCG/MIN: 1 INJECTION INTRAMUSCULAR; INTRAVENOUS; SUBCUTANEOUS at 14:19

## 2020-01-01 RX ADMIN — MIDODRINE HYDROCHLORIDE 5 MG: 5 TABLET ORAL at 20:03

## 2020-01-01 RX ADMIN — TOPIRAMATE 75 MG: 25 TABLET, FILM COATED ORAL at 05:06

## 2020-01-01 RX ADMIN — ACETAMINOPHEN 650 MG: 325 TABLET, FILM COATED ORAL at 15:08

## 2020-01-01 RX ADMIN — OXYCODONE HYDROCHLORIDE 10 MG: 10 TABLET ORAL at 21:43

## 2020-01-01 RX ADMIN — DOCUSATE SODIUM 50 MG AND SENNOSIDES 8.6 MG 2 TABLET: 8.6; 5 TABLET, FILM COATED ORAL at 05:09

## 2020-01-01 RX ADMIN — AMIODARONE HYDROCHLORIDE 200 MG: 200 TABLET ORAL at 04:29

## 2020-01-01 RX ADMIN — POTASSIUM BICARBONATE 25 MEQ: 978 TABLET, EFFERVESCENT ORAL at 08:44

## 2020-01-01 RX ADMIN — SODIUM BICARBONATE 50 MEQ: 84 INJECTION, SOLUTION INTRAVENOUS at 16:01

## 2020-01-01 RX ADMIN — RIVAROXABAN 20 MG: 20 TABLET, FILM COATED ORAL at 17:41

## 2020-01-01 RX ADMIN — TRAZODONE HYDROCHLORIDE 50 MG: 50 TABLET ORAL at 20:53

## 2020-01-01 RX ADMIN — OXYCODONE HYDROCHLORIDE 10 MG: 10 TABLET ORAL at 21:07

## 2020-01-01 RX ADMIN — CEFAZOLIN 2 G: 330 INJECTION, POWDER, FOR SOLUTION INTRAMUSCULAR; INTRAVENOUS at 16:39

## 2020-01-01 RX ADMIN — LEVALBUTEROL 1.25 MG: 1.25 SOLUTION RESPIRATORY (INHALATION) at 15:22

## 2020-01-01 RX ADMIN — OMEPRAZOLE 40 MG: KIT at 05:40

## 2020-01-01 RX ADMIN — LIDOCAINE 1 PATCH: 50 PATCH TOPICAL at 17:25

## 2020-01-01 RX ADMIN — EPINEPHRINE 14 MCG/MIN: 1 INJECTION INTRAMUSCULAR; INTRAVENOUS; SUBCUTANEOUS at 10:40

## 2020-01-01 RX ADMIN — ONDANSETRON 4 MG: 2 INJECTION INTRAMUSCULAR; INTRAVENOUS at 19:54

## 2020-01-01 RX ADMIN — SODIUM CHLORIDE 3 G: 900 INJECTION INTRAVENOUS at 17:27

## 2020-01-01 RX ADMIN — FUROSEMIDE 20 MG: 20 TABLET ORAL at 05:05

## 2020-01-01 RX ADMIN — FUROSEMIDE 20 MG: 10 INJECTION, SOLUTION INTRAMUSCULAR; INTRAVENOUS at 05:12

## 2020-01-01 RX ADMIN — DOCUSATE SODIUM 50 MG AND SENNOSIDES 8.6 MG 2 TABLET: 8.6; 5 TABLET, FILM COATED ORAL at 06:15

## 2020-01-01 RX ADMIN — LIDOCAINE 2 PATCH: 50 PATCH TOPICAL at 16:19

## 2020-01-01 RX ADMIN — TOPIRAMATE 75 MG: 25 TABLET, FILM COATED ORAL at 04:24

## 2020-01-01 RX ADMIN — CEFAZOLIN 2 G: 330 INJECTION, POWDER, FOR SOLUTION INTRAMUSCULAR; INTRAVENOUS at 18:07

## 2020-01-01 RX ADMIN — FENTANYL CITRATE 100 MCG: 50 INJECTION INTRAMUSCULAR; INTRAVENOUS at 14:30

## 2020-01-01 RX ADMIN — PROPOFOL 40 MCG/KG/MIN: 10 INJECTION, EMULSION INTRAVENOUS at 10:28

## 2020-01-01 RX ADMIN — DOCUSATE SODIUM 50 MG AND SENNOSIDES 8.6 MG 2 TABLET: 8.6; 5 TABLET, FILM COATED ORAL at 17:52

## 2020-01-01 RX ADMIN — LIDOCAINE 2 PATCH: 50 PATCH TOPICAL at 17:53

## 2020-01-01 RX ADMIN — SODIUM CHLORIDE: 9 INJECTION, SOLUTION INTRAVENOUS at 05:45

## 2020-01-01 RX ADMIN — PROPOFOL 30 MG: 10 INJECTION, EMULSION INTRAVENOUS at 10:41

## 2020-01-01 RX ADMIN — AMIODARONE HYDROCHLORIDE 200 MG: 200 TABLET ORAL at 05:31

## 2020-01-01 RX ADMIN — FUROSEMIDE 20 MG: 20 TABLET ORAL at 04:46

## 2020-01-01 RX ADMIN — HEPARIN SODIUM 5000 UNITS: 5000 INJECTION, SOLUTION INTRAVENOUS; SUBCUTANEOUS at 05:46

## 2020-01-01 RX ADMIN — IPRATROPIUM BROMIDE AND ALBUTEROL SULFATE 3 ML: 2.5; .5 SOLUTION RESPIRATORY (INHALATION) at 21:15

## 2020-01-01 RX ADMIN — FLUOXETINE 20 MG: 20 CAPSULE ORAL at 04:29

## 2020-01-01 RX ADMIN — NOREPINEPHRINE BITARTRATE 6 MCG/MIN: 1 INJECTION INTRAVENOUS at 20:47

## 2020-01-01 RX ADMIN — ACETAMINOPHEN 650 MG: 325 TABLET, FILM COATED ORAL at 12:33

## 2020-01-01 RX ADMIN — TOPIRAMATE 75 MG: 25 TABLET, FILM COATED ORAL at 05:09

## 2020-01-01 RX ADMIN — TOPIRAMATE 75 MG: 25 TABLET, FILM COATED ORAL at 06:05

## 2020-01-01 RX ADMIN — DOCUSATE SODIUM 50 MG AND SENNOSIDES 8.6 MG 2 TABLET: 8.6; 5 TABLET, FILM COATED ORAL at 18:39

## 2020-01-01 RX ADMIN — THERA TABS 1 TABLET: TAB at 05:02

## 2020-01-01 RX ADMIN — POTASSIUM BICARBONATE 25 MEQ: 978 TABLET, EFFERVESCENT ORAL at 05:05

## 2020-01-01 RX ADMIN — METOPROLOL TARTRATE 25 MG: 25 TABLET, FILM COATED ORAL at 06:11

## 2020-01-01 RX ADMIN — MIDODRINE HYDROCHLORIDE 5 MG: 5 TABLET ORAL at 05:28

## 2020-01-01 RX ADMIN — METOPROLOL TARTRATE 12.5 MG: 25 TABLET, FILM COATED ORAL at 08:52

## 2020-01-01 RX ADMIN — FLUOXETINE 20 MG: 20 CAPSULE ORAL at 05:39

## 2020-01-01 RX ADMIN — HEPARIN SODIUM 5000 UNITS: 5000 INJECTION, SOLUTION INTRAVENOUS; SUBCUTANEOUS at 12:15

## 2020-01-01 RX ADMIN — TOPIRAMATE 75 MG: 25 TABLET, FILM COATED ORAL at 03:55

## 2020-01-01 RX ADMIN — LEVALBUTEROL 1.25 MG: 1.25 SOLUTION RESPIRATORY (INHALATION) at 18:15

## 2020-01-01 RX ADMIN — ACETAMINOPHEN 500 MG: 500 TABLET ORAL at 05:29

## 2020-01-01 RX ADMIN — PROPOFOL 35 MCG/KG/MIN: 10 INJECTION, EMULSION INTRAVENOUS at 23:31

## 2020-01-01 RX ADMIN — FENTANYL CITRATE 100 MCG: 50 INJECTION INTRAMUSCULAR; INTRAVENOUS at 05:11

## 2020-01-01 RX ADMIN — AMPICILLIN AND SULBACTAM 3 G: 2; 1 INJECTION, POWDER, FOR SOLUTION INTRAVENOUS at 12:03

## 2020-01-01 RX ADMIN — RIVAROXABAN 20 MG: 20 TABLET, FILM COATED ORAL at 17:15

## 2020-01-01 RX ADMIN — MIDODRINE HYDROCHLORIDE 5 MG: 5 TABLET ORAL at 05:53

## 2020-01-01 RX ADMIN — LEVALBUTEROL 1.25 MG: 1.25 SOLUTION RESPIRATORY (INHALATION) at 11:04

## 2020-01-01 RX ADMIN — FENTANYL CITRATE 50 MCG: 50 INJECTION, SOLUTION INTRAMUSCULAR; INTRAVENOUS at 10:47

## 2020-01-01 RX ADMIN — ACETAMINOPHEN 650 MG: 325 TABLET, FILM COATED ORAL at 19:59

## 2020-01-01 RX ADMIN — ACETAMINOPHEN 500 MG: 500 TABLET ORAL at 18:03

## 2020-01-01 RX ADMIN — DOCUSATE SODIUM 50 MG AND SENNOSIDES 8.6 MG 2 TABLET: 8.6; 5 TABLET, FILM COATED ORAL at 05:08

## 2020-01-01 RX ADMIN — IPRATROPIUM BROMIDE AND ALBUTEROL SULFATE 3 ML: 2.5; .5 SOLUTION RESPIRATORY (INHALATION) at 18:34

## 2020-01-01 RX ADMIN — HEPARIN SODIUM 5000 UNITS: 5000 INJECTION, SOLUTION INTRAVENOUS; SUBCUTANEOUS at 05:43

## 2020-01-01 RX ADMIN — POTASSIUM PHOSPHATE, MONOBASIC AND POTASSIUM PHOSPHATE, DIBASIC 30 MMOL: 224; 236 INJECTION, SOLUTION, CONCENTRATE INTRAVENOUS at 10:19

## 2020-01-01 RX ADMIN — MIDODRINE HYDROCHLORIDE 5 MG: 5 TABLET ORAL at 21:00

## 2020-01-01 RX ADMIN — METOPROLOL TARTRATE 25 MG: 25 TABLET, FILM COATED ORAL at 17:39

## 2020-01-01 RX ADMIN — FLUOXETINE 20 MG: 20 CAPSULE ORAL at 05:23

## 2020-01-01 RX ADMIN — ACETAMINOPHEN 1000 MG: 500 TABLET ORAL at 04:49

## 2020-01-01 RX ADMIN — DULOXETINE HYDROCHLORIDE 60 MG: 60 CAPSULE, DELAYED RELEASE ORAL at 22:24

## 2020-01-01 RX ADMIN — THERA TABS 1 TABLET: TAB at 05:40

## 2020-01-01 RX ADMIN — PHENYLEPHRINE HYDROCHLORIDE 100 MCG: 10 INJECTION INTRAVENOUS at 18:42

## 2020-01-01 RX ADMIN — AMIODARONE HYDROCHLORIDE 200 MG: 200 TABLET ORAL at 04:47

## 2020-01-01 RX ADMIN — DULOXETINE HYDROCHLORIDE 60 MG: 60 CAPSULE, DELAYED RELEASE ORAL at 20:21

## 2020-01-01 RX ADMIN — PIPERACILLIN AND TAZOBACTAM 3.38 G: 3; .375 INJECTION, POWDER, LYOPHILIZED, FOR SOLUTION INTRAVENOUS; PARENTERAL at 06:37

## 2020-01-01 RX ADMIN — TOPIRAMATE 75 MG: 25 TABLET, FILM COATED ORAL at 06:26

## 2020-01-01 RX ADMIN — OXYCODONE 5 MG: 5 TABLET ORAL at 15:51

## 2020-01-01 RX ADMIN — IPRATROPIUM BROMIDE AND ALBUTEROL SULFATE 3 ML: 2.5; .5 SOLUTION RESPIRATORY (INHALATION) at 15:04

## 2020-01-01 RX ADMIN — SODIUM CHLORIDE 3 G: 900 INJECTION INTRAVENOUS at 11:16

## 2020-01-01 RX ADMIN — OXYCODONE HYDROCHLORIDE 10 MG: 10 TABLET ORAL at 07:24

## 2020-01-01 RX ADMIN — ACETYLCYSTEINE 4 ML: 200 INHALANT RESPIRATORY (INHALATION) at 10:07

## 2020-01-01 RX ADMIN — MIDODRINE HYDROCHLORIDE 5 MG: 5 TABLET ORAL at 16:17

## 2020-01-01 RX ADMIN — DULOXETINE HYDROCHLORIDE 60 MG: 60 CAPSULE, DELAYED RELEASE ORAL at 20:05

## 2020-01-01 RX ADMIN — VASOPRESSIN 0.03 UNITS/MIN: 20 INJECTION INTRAVENOUS at 13:47

## 2020-01-01 RX ADMIN — SODIUM CHLORIDE, POTASSIUM CHLORIDE, SODIUM LACTATE AND CALCIUM CHLORIDE 1000 ML: 600; 310; 30; 20 INJECTION, SOLUTION INTRAVENOUS at 19:43

## 2020-01-01 RX ADMIN — PROPOFOL 80 MCG/KG/MIN: 10 INJECTION, EMULSION INTRAVENOUS at 17:04

## 2020-01-01 RX ADMIN — POTASSIUM PHOSPHATE, MONOBASIC AND POTASSIUM PHOSPHATE, DIBASIC 15 MMOL: 224; 236 INJECTION, SOLUTION, CONCENTRATE INTRAVENOUS at 12:01

## 2020-01-01 RX ADMIN — ACETAMINOPHEN 500 MG: 500 TABLET ORAL at 23:32

## 2020-01-01 RX ADMIN — DULOXETINE HYDROCHLORIDE 60 MG: 60 CAPSULE, DELAYED RELEASE ORAL at 20:55

## 2020-01-01 RX ADMIN — OXYCODONE HYDROCHLORIDE 10 MG: 10 TABLET ORAL at 21:47

## 2020-01-01 RX ADMIN — ACETAMINOPHEN 1000 MG: 500 TABLET ORAL at 18:14

## 2020-01-01 RX ADMIN — ACETAMINOPHEN 650 MG: 325 TABLET, FILM COATED ORAL at 05:40

## 2020-01-01 RX ADMIN — DOCUSATE SODIUM 50 MG AND SENNOSIDES 8.6 MG 2 TABLET: 8.6; 5 TABLET, FILM COATED ORAL at 17:06

## 2020-01-01 RX ADMIN — ACETAMINOPHEN 650 MG: 325 TABLET, FILM COATED ORAL at 13:17

## 2020-01-01 RX ADMIN — TOPIRAMATE 75 MG: 25 TABLET, FILM COATED ORAL at 06:20

## 2020-01-01 RX ADMIN — LIDOCAINE 2 PATCH: 50 PATCH TOPICAL at 12:38

## 2020-01-01 RX ADMIN — MAGNESIUM SULFATE IN WATER 2 G: 40 INJECTION, SOLUTION INTRAVENOUS at 07:37

## 2020-01-01 RX ADMIN — TOPIRAMATE 75 MG: 25 TABLET, FILM COATED ORAL at 06:14

## 2020-01-01 RX ADMIN — DULOXETINE HYDROCHLORIDE 60 MG: 60 CAPSULE, DELAYED RELEASE ORAL at 20:14

## 2020-01-01 RX ADMIN — VANCOMYCIN HYDROCHLORIDE 1000 MG: 500 INJECTION, POWDER, LYOPHILIZED, FOR SOLUTION INTRAVENOUS at 11:37

## 2020-01-01 RX ADMIN — LEVALBUTEROL 1.25 MG: 1.25 SOLUTION RESPIRATORY (INHALATION) at 10:48

## 2020-01-01 RX ADMIN — IPRATROPIUM BROMIDE AND ALBUTEROL SULFATE 3 ML: 2.5; .5 SOLUTION RESPIRATORY (INHALATION) at 06:46

## 2020-01-01 RX ADMIN — HYDROCODONE BITARTRATE AND ACETAMINOPHEN 1 TABLET: 5; 325 TABLET ORAL at 20:37

## 2020-01-01 RX ADMIN — AMPICILLIN AND SULBACTAM 3 G: 2; 1 INJECTION, POWDER, FOR SOLUTION INTRAVENOUS at 23:10

## 2020-01-01 RX ADMIN — MIDODRINE HYDROCHLORIDE 5 MG: 5 TABLET ORAL at 21:43

## 2020-01-01 RX ADMIN — AMIODARONE HYDROCHLORIDE 1 MG/MIN: 1.8 INJECTION, SOLUTION INTRAVENOUS at 20:38

## 2020-01-01 RX ADMIN — ACETAMINOPHEN 1000 MG: 500 TABLET ORAL at 00:30

## 2020-01-01 RX ADMIN — FAMOTIDINE 20 MG: 20 TABLET, FILM COATED ORAL at 04:58

## 2020-01-01 RX ADMIN — AMIODARONE HYDROCHLORIDE 200 MG: 200 TABLET ORAL at 05:43

## 2020-01-01 RX ADMIN — IPRATROPIUM BROMIDE AND ALBUTEROL SULFATE 3 ML: 2.5; .5 SOLUTION RESPIRATORY (INHALATION) at 03:55

## 2020-01-01 RX ADMIN — ACETAMINOPHEN 650 MG: 650 SUPPOSITORY RECTAL at 12:24

## 2020-01-01 RX ADMIN — Medication 200 MCG/HR: at 01:01

## 2020-01-01 RX ADMIN — ACETAMINOPHEN 650 MG: 325 TABLET, FILM COATED ORAL at 13:40

## 2020-01-01 RX ADMIN — LORAZEPAM 2 MG: 2 INJECTION INTRAMUSCULAR; INTRAVENOUS at 14:51

## 2020-01-01 RX ADMIN — DOCUSATE SODIUM 50 MG AND SENNOSIDES 8.6 MG 2 TABLET: 8.6; 5 TABLET, FILM COATED ORAL at 06:18

## 2020-01-01 RX ADMIN — IPRATROPIUM BROMIDE AND ALBUTEROL SULFATE 3 ML: 2.5; .5 SOLUTION RESPIRATORY (INHALATION) at 07:42

## 2020-01-01 RX ADMIN — DULOXETINE HYDROCHLORIDE 60 MG: 60 CAPSULE, DELAYED RELEASE ORAL at 20:56

## 2020-01-01 RX ADMIN — METOPROLOL TARTRATE 25 MG: 25 TABLET, FILM COATED ORAL at 17:18

## 2020-01-01 RX ADMIN — IPRATROPIUM BROMIDE AND ALBUTEROL SULFATE 3 ML: 2.5; .5 SOLUTION RESPIRATORY (INHALATION) at 06:43

## 2020-01-01 RX ADMIN — FUROSEMIDE 20 MG: 10 INJECTION, SOLUTION INTRAMUSCULAR; INTRAVENOUS at 05:17

## 2020-01-01 RX ADMIN — FENTANYL CITRATE 100 MCG: 50 INJECTION, SOLUTION INTRAMUSCULAR; INTRAVENOUS at 07:57

## 2020-01-01 RX ADMIN — KETOROLAC TROMETHAMINE 30 MG: 30 INJECTION, SOLUTION INTRAMUSCULAR; INTRAVENOUS at 06:54

## 2020-01-01 RX ADMIN — THERA TABS 1 TABLET: TAB at 05:24

## 2020-01-01 RX ADMIN — HEPARIN SODIUM 5000 UNITS: 5000 INJECTION, SOLUTION INTRAVENOUS; SUBCUTANEOUS at 15:32

## 2020-01-01 RX ADMIN — DULOXETINE HYDROCHLORIDE 60 MG: 60 CAPSULE, DELAYED RELEASE ORAL at 20:09

## 2020-01-01 RX ADMIN — CALCIUM GLUCONATE 3 G: 98 INJECTION, SOLUTION INTRAVENOUS at 01:19

## 2020-01-01 RX ADMIN — POTASSIUM BICARBONATE 25 MEQ: 978 TABLET, EFFERVESCENT ORAL at 07:48

## 2020-01-01 RX ADMIN — LIDOCAINE 2 PATCH: 50 PATCH TOPICAL at 17:15

## 2020-01-01 RX ADMIN — OXYCODONE 5 MG: 5 TABLET ORAL at 09:28

## 2020-01-01 RX ADMIN — RIVAROXABAN 20 MG: 20 TABLET, FILM COATED ORAL at 17:07

## 2020-01-01 RX ADMIN — BISACODYL 10 MG: 10 SUPPOSITORY RECTAL at 20:13

## 2020-01-01 RX ADMIN — PROPOFOL 40 MG: 10 INJECTION, EMULSION INTRAVENOUS at 16:43

## 2020-01-01 RX ADMIN — LEVALBUTEROL 1.25 MG: 1.25 SOLUTION RESPIRATORY (INHALATION) at 15:36

## 2020-01-01 RX ADMIN — LIDOCAINE 2 PATCH: 50 PATCH TOPICAL at 11:42

## 2020-01-01 RX ADMIN — MIDODRINE HYDROCHLORIDE 5 MG: 5 TABLET ORAL at 20:28

## 2020-01-01 RX ADMIN — AMIODARONE HYDROCHLORIDE 200 MG: 200 TABLET ORAL at 04:49

## 2020-01-01 RX ADMIN — IPRATROPIUM BROMIDE AND ALBUTEROL SULFATE 3 ML: 2.5; .5 SOLUTION RESPIRATORY (INHALATION) at 14:04

## 2020-01-01 RX ADMIN — Medication 100 MCG/HR: at 23:26

## 2020-01-01 RX ADMIN — AMIODARONE HYDROCHLORIDE 200 MG: 200 TABLET ORAL at 05:58

## 2020-01-01 RX ADMIN — DULOXETINE HYDROCHLORIDE 60 MG: 60 CAPSULE, DELAYED RELEASE ORAL at 20:04

## 2020-01-01 RX ADMIN — LEVALBUTEROL 1.25 MG: 1.25 SOLUTION RESPIRATORY (INHALATION) at 23:47

## 2020-01-01 RX ADMIN — RIVAROXABAN 20 MG: 20 TABLET, FILM COATED ORAL at 18:15

## 2020-01-01 RX ADMIN — DOCUSATE SODIUM 50 MG AND SENNOSIDES 8.6 MG 2 TABLET: 8.6; 5 TABLET, FILM COATED ORAL at 04:29

## 2020-01-01 RX ADMIN — POLYETHYLENE GLYCOL 3350 1 PACKET: 17 POWDER, FOR SOLUTION ORAL at 10:14

## 2020-01-01 RX ADMIN — LEVALBUTEROL 1.25 MG: 1.25 SOLUTION RESPIRATORY (INHALATION) at 03:01

## 2020-01-01 RX ADMIN — FENTANYL CITRATE 25 MCG: 50 INJECTION INTRAMUSCULAR; INTRAVENOUS at 01:56

## 2020-01-01 RX ADMIN — OXYCODONE 5 MG: 5 TABLET ORAL at 17:52

## 2020-01-01 RX ADMIN — FUROSEMIDE 20 MG: 20 TABLET ORAL at 04:59

## 2020-01-01 RX ADMIN — IPRATROPIUM BROMIDE AND ALBUTEROL SULFATE 3 ML: 2.5; .5 SOLUTION RESPIRATORY (INHALATION) at 18:21

## 2020-01-01 RX ADMIN — RIVAROXABAN 20 MG: 20 TABLET, FILM COATED ORAL at 17:38

## 2020-01-01 RX ADMIN — ACETAMINOPHEN 1000 MG: 500 TABLET ORAL at 11:28

## 2020-01-01 RX ADMIN — ACETAMINOPHEN 1000 MG: 500 TABLET ORAL at 18:02

## 2020-01-01 RX ADMIN — METOPROLOL TARTRATE 5 MG: 5 INJECTION, SOLUTION INTRAVENOUS at 18:45

## 2020-01-01 RX ADMIN — FENTANYL CITRATE 100 MCG: 50 INJECTION INTRAMUSCULAR; INTRAVENOUS at 03:12

## 2020-01-01 RX ADMIN — ACETAMINOPHEN 650 MG: 325 TABLET, FILM COATED ORAL at 11:50

## 2020-01-01 RX ADMIN — PHENYLEPHRINE HYDROCHLORIDE 400 MCG/MIN: 10 INJECTION INTRAVENOUS at 08:32

## 2020-01-01 RX ADMIN — FLUOXETINE 20 MG: 20 CAPSULE ORAL at 05:16

## 2020-01-01 RX ADMIN — ACETYLCYSTEINE 3 ML: 200 INHALANT RESPIRATORY (INHALATION) at 19:12

## 2020-01-01 RX ADMIN — IPRATROPIUM BROMIDE AND ALBUTEROL SULFATE 3 ML: 2.5; .5 SOLUTION RESPIRATORY (INHALATION) at 06:26

## 2020-01-01 RX ADMIN — RIVAROXABAN 20 MG: 20 TABLET, FILM COATED ORAL at 17:12

## 2020-01-01 RX ADMIN — OXYCODONE 10 MG: 5 TABLET ORAL at 17:26

## 2020-01-01 RX ADMIN — ACETYLCYSTEINE 3 ML: 200 INHALANT RESPIRATORY (INHALATION) at 18:17

## 2020-01-01 RX ADMIN — POTASSIUM BICARBONATE 25 MEQ: 978 TABLET, EFFERVESCENT ORAL at 08:28

## 2020-01-01 RX ADMIN — LEVALBUTEROL 1.25 MG: 1.25 SOLUTION RESPIRATORY (INHALATION) at 07:04

## 2020-01-01 RX ADMIN — SODIUM CHLORIDE 3 G: 900 INJECTION INTRAVENOUS at 17:02

## 2020-01-01 RX ADMIN — DULOXETINE HYDROCHLORIDE 60 MG: 60 CAPSULE, DELAYED RELEASE ORAL at 20:51

## 2020-01-01 RX ADMIN — FENTANYL CITRATE 50 MCG: 50 INJECTION, SOLUTION INTRAMUSCULAR; INTRAVENOUS at 12:57

## 2020-01-01 RX ADMIN — HYDROCODONE BITARTRATE AND ACETAMINOPHEN 1 TABLET: 5; 325 TABLET ORAL at 10:27

## 2020-01-01 RX ADMIN — FUROSEMIDE 20 MG: 20 TABLET ORAL at 06:29

## 2020-01-01 RX ADMIN — IPRATROPIUM BROMIDE AND ALBUTEROL SULFATE 3 ML: 2.5; .5 SOLUTION RESPIRATORY (INHALATION) at 22:33

## 2020-01-01 RX ADMIN — AMIODARONE HYDROCHLORIDE 1 MG/MIN: 1.8 INJECTION, SOLUTION INTRAVENOUS at 13:04

## 2020-01-01 RX ADMIN — FENTANYL CITRATE 100 MCG: 50 INJECTION, SOLUTION INTRAMUSCULAR; INTRAVENOUS at 11:31

## 2020-01-01 RX ADMIN — OMEPRAZOLE 40 MG: KIT at 04:59

## 2020-01-01 RX ADMIN — AMPICILLIN AND SULBACTAM 3 G: 2; 1 INJECTION, POWDER, FOR SOLUTION INTRAVENOUS at 22:38

## 2020-01-01 RX ADMIN — ACETAMINOPHEN 1000 MG: 500 TABLET ORAL at 12:45

## 2020-01-01 RX ADMIN — LEVALBUTEROL 1.25 MG: 1.25 SOLUTION RESPIRATORY (INHALATION) at 11:41

## 2020-01-01 RX ADMIN — FENTANYL CITRATE 100 MCG: 50 INJECTION INTRAMUSCULAR; INTRAVENOUS at 09:28

## 2020-01-01 RX ADMIN — PHENYLEPHRINE HYDROCHLORIDE 65 MCG/MIN: 10 INJECTION INTRAVENOUS at 20:15

## 2020-01-01 RX ADMIN — Medication 200 MCG/HR: at 17:18

## 2020-01-01 RX ADMIN — FENTANYL CITRATE 50 MCG: 50 INJECTION, SOLUTION INTRAMUSCULAR; INTRAVENOUS at 00:58

## 2020-01-01 RX ADMIN — OMEPRAZOLE 40 MG: KIT at 04:29

## 2020-01-01 RX ADMIN — ACETAMINOPHEN 500 MG: 500 TABLET ORAL at 23:41

## 2020-01-01 RX ADMIN — DULOXETINE HYDROCHLORIDE 60 MG: 60 CAPSULE, DELAYED RELEASE ORAL at 20:28

## 2020-01-01 RX ADMIN — RIVAROXABAN 20 MG: 20 TABLET, FILM COATED ORAL at 16:20

## 2020-01-01 RX ADMIN — ACETAMINOPHEN 500 MG: 500 TABLET ORAL at 23:09

## 2020-01-01 RX ADMIN — IPRATROPIUM BROMIDE AND ALBUTEROL SULFATE 3 ML: 2.5; .5 SOLUTION RESPIRATORY (INHALATION) at 19:27

## 2020-01-01 RX ADMIN — ACETAMINOPHEN 500 MG: 500 TABLET ORAL at 17:02

## 2020-01-01 RX ADMIN — OXYCODONE 5 MG: 5 TABLET ORAL at 07:19

## 2020-01-01 RX ADMIN — FENTANYL CITRATE 50 MCG: 50 INJECTION, SOLUTION INTRAMUSCULAR; INTRAVENOUS at 05:03

## 2020-01-01 RX ADMIN — ALPRAZOLAM 0.25 MG: 0.25 TABLET ORAL at 16:21

## 2020-01-01 RX ADMIN — FUROSEMIDE 40 MG: 20 TABLET ORAL at 11:58

## 2020-01-01 RX ADMIN — ACETAMINOPHEN 650 MG: 325 TABLET, FILM COATED ORAL at 07:38

## 2020-01-01 RX ADMIN — ALBUMIN (HUMAN) 12.5 G: 2.5 SOLUTION INTRAVENOUS at 00:09

## 2020-01-01 RX ADMIN — TRAZODONE HYDROCHLORIDE 50 MG: 50 TABLET ORAL at 20:16

## 2020-01-01 RX ADMIN — PIPERACILLIN AND TAZOBACTAM 4.5 G: 4; .5 INJECTION, POWDER, LYOPHILIZED, FOR SOLUTION INTRAVENOUS; PARENTERAL at 22:08

## 2020-01-01 RX ADMIN — TRAZODONE HYDROCHLORIDE 50 MG: 50 TABLET ORAL at 20:56

## 2020-01-01 RX ADMIN — AMIODARONE HYDROCHLORIDE 200 MG: 200 TABLET ORAL at 05:44

## 2020-01-01 RX ADMIN — ACETYLCYSTEINE 3 ML: 200 INHALANT RESPIRATORY (INHALATION) at 06:36

## 2020-01-01 RX ADMIN — ACETYLCYSTEINE 4 ML: 200 INHALANT RESPIRATORY (INHALATION) at 14:19

## 2020-01-01 RX ADMIN — PHENYLEPHRINE HYDROCHLORIDE 80 MCG/MIN: 10 INJECTION INTRAVENOUS at 05:06

## 2020-01-01 RX ADMIN — ALBUTEROL SULFATE 2.5 MG: 2.5 SOLUTION RESPIRATORY (INHALATION) at 10:09

## 2020-01-01 RX ADMIN — ACETYLCYSTEINE 4 ML: 200 INHALANT RESPIRATORY (INHALATION) at 20:24

## 2020-01-01 RX ADMIN — PROPOFOL 40 MCG/KG/MIN: 10 INJECTION, EMULSION INTRAVENOUS at 15:07

## 2020-01-01 RX ADMIN — SODIUM BICARBONATE: 84 INJECTION, SOLUTION INTRAVENOUS at 12:35

## 2020-01-01 RX ADMIN — THERA TABS 1 TABLET: TAB at 05:05

## 2020-01-01 RX ADMIN — FENTANYL CITRATE 100 MCG: 50 INJECTION, SOLUTION INTRAMUSCULAR; INTRAVENOUS at 09:05

## 2020-01-01 RX ADMIN — PHENYLEPHRINE HYDROCHLORIDE 300 MCG/MIN: 10 INJECTION INTRAVENOUS at 06:21

## 2020-01-01 RX ADMIN — FUROSEMIDE 20 MG: 20 TABLET ORAL at 04:57

## 2020-01-01 RX ADMIN — OXYCODONE 10 MG: 5 TABLET ORAL at 20:00

## 2020-01-01 RX ADMIN — ACETAMINOPHEN 650 MG: 325 TABLET, FILM COATED ORAL at 11:39

## 2020-01-01 RX ADMIN — OMEPRAZOLE 40 MG: KIT at 05:27

## 2020-01-01 RX ADMIN — FENTANYL CITRATE 100 MCG: 50 INJECTION INTRAMUSCULAR; INTRAVENOUS at 08:13

## 2020-01-01 RX ADMIN — DOCUSATE SODIUM 50 MG AND SENNOSIDES 8.6 MG 2 TABLET: 8.6; 5 TABLET, FILM COATED ORAL at 17:38

## 2020-01-01 RX ADMIN — SODIUM CHLORIDE, POTASSIUM CHLORIDE, SODIUM LACTATE AND CALCIUM CHLORIDE 1000 ML: 600; 310; 30; 20 INJECTION, SOLUTION INTRAVENOUS at 15:55

## 2020-01-01 RX ADMIN — ACETAMINOPHEN 1000 MG: 500 TABLET ORAL at 06:15

## 2020-01-01 RX ADMIN — IPRATROPIUM BROMIDE AND ALBUTEROL SULFATE 3 ML: 2.5; .5 SOLUTION RESPIRATORY (INHALATION) at 11:40

## 2020-01-01 RX ADMIN — ACETYLCYSTEINE 3 ML: 200 INHALANT RESPIRATORY (INHALATION) at 15:04

## 2020-01-01 RX ADMIN — TOPIRAMATE 75 MG: 25 TABLET, FILM COATED ORAL at 05:02

## 2020-01-01 RX ADMIN — DULOXETINE HYDROCHLORIDE 60 MG: 60 CAPSULE, DELAYED RELEASE ORAL at 20:23

## 2020-01-01 RX ADMIN — LIDOCAINE 2 PATCH: 50 PATCH TOPICAL at 21:55

## 2020-01-01 RX ADMIN — FAMOTIDINE 20 MG: 20 TABLET, FILM COATED ORAL at 05:28

## 2020-01-01 RX ADMIN — TOPIRAMATE 75 MG: 25 TABLET, FILM COATED ORAL at 04:29

## 2020-01-01 RX ADMIN — OXYCODONE HYDROCHLORIDE 10 MG: 10 TABLET ORAL at 22:16

## 2020-01-01 RX ADMIN — FLUOXETINE 20 MG: 20 CAPSULE ORAL at 04:47

## 2020-01-01 RX ADMIN — OXYCODONE HYDROCHLORIDE 5 MG: 5 TABLET ORAL at 18:00

## 2020-01-01 RX ADMIN — ACETYLCYSTEINE 4 ML: 200 INHALANT RESPIRATORY (INHALATION) at 19:40

## 2020-01-01 RX ADMIN — POTASSIUM BICARBONATE 25 MEQ: 978 TABLET, EFFERVESCENT ORAL at 05:08

## 2020-01-01 RX ADMIN — RIVAROXABAN 20 MG: 20 TABLET, FILM COATED ORAL at 17:06

## 2020-01-01 RX ADMIN — AMIODARONE HYDROCHLORIDE 200 MG: 200 TABLET ORAL at 05:05

## 2020-01-01 RX ADMIN — ACETYLCYSTEINE 4 ML: 200 INHALANT RESPIRATORY (INHALATION) at 06:32

## 2020-01-01 RX ADMIN — OMEPRAZOLE 40 MG: KIT at 05:08

## 2020-01-01 RX ADMIN — SODIUM CHLORIDE 3 G: 900 INJECTION INTRAVENOUS at 06:16

## 2020-01-01 RX ADMIN — ACETAMINOPHEN 650 MG: 325 TABLET, FILM COATED ORAL at 03:54

## 2020-01-01 RX ADMIN — LIDOCAINE 2 PATCH: 50 PATCH TOPICAL at 20:12

## 2020-01-01 RX ADMIN — AMIODARONE HYDROCHLORIDE 150 MG: 1.5 INJECTION, SOLUTION INTRAVENOUS at 21:10

## 2020-01-01 RX ADMIN — THERA TABS 1 TABLET: TAB at 05:39

## 2020-01-01 RX ADMIN — ACETAMINOPHEN 650 MG: 325 TABLET, FILM COATED ORAL at 16:17

## 2020-01-01 RX ADMIN — OMEPRAZOLE 40 MG: KIT at 06:30

## 2020-01-01 RX ADMIN — PIPERACILLIN AND TAZOBACTAM 3.38 G: 3; .375 INJECTION, POWDER, LYOPHILIZED, FOR SOLUTION INTRAVENOUS; PARENTERAL at 13:06

## 2020-01-01 RX ADMIN — RIVAROXABAN 20 MG: 20 TABLET, FILM COATED ORAL at 16:41

## 2020-01-01 RX ADMIN — MAGNESIUM SULFATE 2 G: 2 INJECTION INTRAVENOUS at 08:51

## 2020-01-01 RX ADMIN — ACETYLCYSTEINE 4 ML: 200 INHALANT RESPIRATORY (INHALATION) at 06:38

## 2020-01-01 RX ADMIN — OXYCODONE 5 MG: 5 TABLET ORAL at 20:05

## 2020-01-01 RX ADMIN — Medication 200 MCG: at 00:01

## 2020-01-01 RX ADMIN — RIVAROXABAN 20 MG: 20 TABLET, FILM COATED ORAL at 16:54

## 2020-01-01 RX ADMIN — LEVALBUTEROL 1.25 MG: 1.25 SOLUTION RESPIRATORY (INHALATION) at 19:06

## 2020-01-01 RX ADMIN — SODIUM CHLORIDE, SODIUM GLUCONATE, SODIUM ACETATE, POTASSIUM CHLORIDE AND MAGNESIUM CHLORIDE 1000 ML: 526; 502; 368; 37; 30 INJECTION, SOLUTION INTRAVENOUS at 22:40

## 2020-01-01 RX ADMIN — AMIODARONE HYDROCHLORIDE 150 MG: 1.5 INJECTION, SOLUTION INTRAVENOUS at 16:54

## 2020-01-01 RX ADMIN — ACETAMINOPHEN 1000 MG: 500 TABLET ORAL at 00:26

## 2020-01-01 RX ADMIN — METOPROLOL TARTRATE 25 MG: 25 TABLET, FILM COATED ORAL at 17:41

## 2020-01-01 RX ADMIN — ACETAMINOPHEN 1000 MG: 500 TABLET ORAL at 05:23

## 2020-01-01 RX ADMIN — ROCURONIUM BROMIDE 50 MG: 10 INJECTION, SOLUTION INTRAVENOUS at 11:18

## 2020-01-01 RX ADMIN — ACETAMINOPHEN 500 MG: 500 TABLET ORAL at 11:32

## 2020-01-01 RX ADMIN — DOCUSATE SODIUM 50 MG AND SENNOSIDES 8.6 MG 2 TABLET: 8.6; 5 TABLET, FILM COATED ORAL at 05:50

## 2020-01-01 RX ADMIN — SODIUM CHLORIDE, SODIUM GLUCONATE, SODIUM ACETATE, POTASSIUM CHLORIDE AND MAGNESIUM CHLORIDE 1000 ML: 526; 502; 368; 37; 30 INJECTION, SOLUTION INTRAVENOUS at 20:42

## 2020-01-01 RX ADMIN — MIDODRINE HYDROCHLORIDE 5 MG: 5 TABLET ORAL at 21:29

## 2020-01-01 RX ADMIN — DOCUSATE SODIUM 50 MG AND SENNOSIDES 8.6 MG 2 TABLET: 8.6; 5 TABLET, FILM COATED ORAL at 18:04

## 2020-01-01 RX ADMIN — MIDODRINE HYDROCHLORIDE 5 MG: 5 TABLET ORAL at 03:53

## 2020-01-01 RX ADMIN — FENTANYL CITRATE 100 MCG: 50 INJECTION, SOLUTION INTRAMUSCULAR; INTRAVENOUS at 06:51

## 2020-01-01 RX ADMIN — PIPERACILLIN AND TAZOBACTAM 4.5 G: 4; .5 INJECTION, POWDER, LYOPHILIZED, FOR SOLUTION INTRAVENOUS; PARENTERAL at 15:33

## 2020-01-01 RX ADMIN — PROPOFOL 30 MCG/KG/MIN: 10 INJECTION, EMULSION INTRAVENOUS at 11:09

## 2020-01-01 RX ADMIN — HEPARIN SODIUM 5000 UNITS: 5000 INJECTION, SOLUTION INTRAVENOUS; SUBCUTANEOUS at 22:25

## 2020-01-01 RX ADMIN — SODIUM CHLORIDE, POTASSIUM CHLORIDE, SODIUM LACTATE AND CALCIUM CHLORIDE 1000 ML: 600; 310; 30; 20 INJECTION, SOLUTION INTRAVENOUS at 19:58

## 2020-01-01 RX ADMIN — ACETAMINOPHEN 1000 MG: 500 TABLET ORAL at 18:26

## 2020-01-01 RX ADMIN — Medication 200 MCG: at 17:32

## 2020-01-01 RX ADMIN — POTASSIUM BICARBONATE 50 MEQ: 978 TABLET, EFFERVESCENT ORAL at 10:14

## 2020-01-01 RX ADMIN — FENTANYL CITRATE 50 MCG: 50 INJECTION, SOLUTION INTRAMUSCULAR; INTRAVENOUS at 18:50

## 2020-01-01 RX ADMIN — IPRATROPIUM BROMIDE AND ALBUTEROL SULFATE 3 ML: 2.5; .5 SOLUTION RESPIRATORY (INHALATION) at 07:40

## 2020-01-01 RX ADMIN — HYDROCODONE BITARTRATE AND ACETAMINOPHEN 1 TABLET: 5; 325 TABLET ORAL at 11:19

## 2020-01-01 RX ADMIN — IPRATROPIUM BROMIDE AND ALBUTEROL SULFATE 3 ML: 2.5; .5 SOLUTION RESPIRATORY (INHALATION) at 14:54

## 2020-01-01 RX ADMIN — DIBASIC SODIUM PHOSPHATE, MONOBASIC POTASSIUM PHOSPHATE AND MONOBASIC SODIUM PHOSPHATE 250 MG: 852; 155; 130 TABLET ORAL at 05:28

## 2020-01-01 RX ADMIN — PHENYLEPHRINE HYDROCHLORIDE 500 MCG/MIN: 10 INJECTION INTRAVENOUS at 10:08

## 2020-01-01 RX ADMIN — FUROSEMIDE 20 MG: 20 TABLET ORAL at 05:31

## 2020-01-01 RX ADMIN — AMPICILLIN AND SULBACTAM 3 G: 2; 1 INJECTION, POWDER, FOR SOLUTION INTRAVENOUS at 16:59

## 2020-01-01 RX ADMIN — ACETAMINOPHEN 500 MG: 500 TABLET ORAL at 18:11

## 2020-01-01 RX ADMIN — DOCUSATE SODIUM 50 MG AND SENNOSIDES 8.6 MG 2 TABLET: 8.6; 5 TABLET, FILM COATED ORAL at 05:31

## 2020-01-01 RX ADMIN — HYDROCODONE BITARTRATE AND ACETAMINOPHEN 1 TABLET: 5; 325 TABLET ORAL at 11:33

## 2020-01-01 RX ADMIN — OMEPRAZOLE 40 MG: KIT at 04:49

## 2020-01-01 RX ADMIN — MIDODRINE HYDROCHLORIDE 5 MG: 5 TABLET ORAL at 21:37

## 2020-01-01 RX ADMIN — DULOXETINE HYDROCHLORIDE 60 MG: 60 CAPSULE, DELAYED RELEASE ORAL at 19:43

## 2020-01-01 RX ADMIN — DEXMEDETOMIDINE HYDROCHLORIDE 0.7 MCG/KG/HR: 100 INJECTION, SOLUTION INTRAVENOUS at 20:23

## 2020-01-01 RX ADMIN — FENTANYL CITRATE 100 MCG: 50 INJECTION INTRAMUSCULAR; INTRAVENOUS at 07:41

## 2020-01-01 RX ADMIN — AMIODARONE HYDROCHLORIDE 200 MG: 200 TABLET ORAL at 05:08

## 2020-01-01 RX ADMIN — OXYCODONE 10 MG: 5 TABLET ORAL at 21:39

## 2020-01-01 RX ADMIN — ACETAMINOPHEN 1000 MG: 500 TABLET ORAL at 17:38

## 2020-01-01 RX ADMIN — HYDROCODONE BITARTRATE AND ACETAMINOPHEN 1 TABLET: 5; 325 TABLET ORAL at 16:41

## 2020-01-01 RX ADMIN — PROPOFOL 30 MG: 10 INJECTION, EMULSION INTRAVENOUS at 10:44

## 2020-01-01 RX ADMIN — LEVALBUTEROL 1.25 MG: 1.25 SOLUTION RESPIRATORY (INHALATION) at 19:34

## 2020-01-01 RX ADMIN — AMIODARONE HYDROCHLORIDE 200 MG: 200 TABLET ORAL at 06:00

## 2020-01-01 RX ADMIN — PROPOFOL 40 MCG/KG/MIN: 10 INJECTION, EMULSION INTRAVENOUS at 04:15

## 2020-01-01 RX ADMIN — ACETAMINOPHEN 650 MG: 325 TABLET, FILM COATED ORAL at 06:35

## 2020-01-01 RX ADMIN — PHENYLEPHRINE HYDROCHLORIDE 300 MCG/MIN: 10 INJECTION INTRAVENOUS at 03:49

## 2020-01-01 RX ADMIN — ONDANSETRON 4 MG: 2 INJECTION INTRAMUSCULAR; INTRAVENOUS at 06:39

## 2020-01-01 RX ADMIN — ACETAMINOPHEN 650 MG: 325 TABLET, FILM COATED ORAL at 17:39

## 2020-01-01 RX ADMIN — METOPROLOL TARTRATE 12.5 MG: 25 TABLET, FILM COATED ORAL at 05:18

## 2020-01-01 RX ADMIN — MIDODRINE HYDROCHLORIDE 5 MG: 5 TABLET ORAL at 05:39

## 2020-01-01 RX ADMIN — IPRATROPIUM BROMIDE AND ALBUTEROL SULFATE 3 ML: 2.5; .5 SOLUTION RESPIRATORY (INHALATION) at 20:13

## 2020-01-01 RX ADMIN — METOPROLOL TARTRATE 25 MG: 25 TABLET, FILM COATED ORAL at 04:39

## 2020-01-01 RX ADMIN — ACETAMINOPHEN 650 MG: 325 TABLET, FILM COATED ORAL at 01:15

## 2020-01-01 RX ADMIN — OXYCODONE HYDROCHLORIDE 10 MG: 10 TABLET ORAL at 12:01

## 2020-01-01 RX ADMIN — TOPIRAMATE 75 MG: 25 TABLET, FILM COATED ORAL at 05:27

## 2020-01-01 RX ADMIN — ACETAMINOPHEN 500 MG: 500 TABLET ORAL at 04:23

## 2020-01-01 RX ADMIN — PIPERACILLIN AND TAZOBACTAM 3.38 G: 3; .375 INJECTION, POWDER, LYOPHILIZED, FOR SOLUTION INTRAVENOUS; PARENTERAL at 12:37

## 2020-01-01 RX ADMIN — PHENYLEPHRINE HYDROCHLORIDE 22 MCG/MIN: 10 INJECTION INTRAVENOUS at 03:18

## 2020-01-01 RX ADMIN — MIDODRINE HYDROCHLORIDE 5 MG: 5 TABLET ORAL at 05:23

## 2020-01-01 RX ADMIN — DIBASIC SODIUM PHOSPHATE, MONOBASIC POTASSIUM PHOSPHATE AND MONOBASIC SODIUM PHOSPHATE 500 MG: 852; 155; 130 TABLET ORAL at 10:13

## 2020-01-01 RX ADMIN — DULOXETINE HYDROCHLORIDE 60 MG: 60 CAPSULE, DELAYED RELEASE ORAL at 20:01

## 2020-01-01 RX ADMIN — TOPIRAMATE 75 MG: 25 TABLET, FILM COATED ORAL at 08:02

## 2020-01-01 RX ADMIN — LEVALBUTEROL 1.25 MG: 1.25 SOLUTION RESPIRATORY (INHALATION) at 06:40

## 2020-01-01 RX ADMIN — IPRATROPIUM BROMIDE AND ALBUTEROL SULFATE 3 ML: 2.5; .5 SOLUTION RESPIRATORY (INHALATION) at 11:48

## 2020-01-01 RX ADMIN — FUROSEMIDE 20 MG: 20 TABLET ORAL at 06:25

## 2020-01-01 RX ADMIN — ALBUTEROL SULFATE 10 MG/HR: 5 SOLUTION RESPIRATORY (INHALATION) at 03:28

## 2020-01-01 RX ADMIN — IOHEXOL 80 ML: 350 INJECTION, SOLUTION INTRAVENOUS at 18:02

## 2020-01-01 RX ADMIN — IPRATROPIUM BROMIDE AND ALBUTEROL SULFATE 3 ML: 2.5; .5 SOLUTION RESPIRATORY (INHALATION) at 06:37

## 2020-01-01 RX ADMIN — SODIUM CHLORIDE 3 G: 900 INJECTION INTRAVENOUS at 05:02

## 2020-01-01 RX ADMIN — FENTANYL CITRATE 50 MCG: 50 INJECTION, SOLUTION INTRAMUSCULAR; INTRAVENOUS at 23:13

## 2020-01-01 RX ADMIN — KETOROLAC TROMETHAMINE 30 MG: 30 INJECTION, SOLUTION INTRAMUSCULAR; INTRAVENOUS at 18:02

## 2020-01-01 RX ADMIN — ACETAMINOPHEN 650 MG: 325 TABLET, FILM COATED ORAL at 23:16

## 2020-01-01 RX ADMIN — FLUOXETINE 20 MG: 20 CAPSULE ORAL at 05:40

## 2020-01-01 RX ADMIN — POTASSIUM BICARBONATE 25 MEQ: 978 TABLET, EFFERVESCENT ORAL at 17:23

## 2020-01-01 RX ADMIN — DOCUSATE SODIUM 50 MG AND SENNOSIDES 8.6 MG 2 TABLET: 8.6; 5 TABLET, FILM COATED ORAL at 17:59

## 2020-01-01 RX ADMIN — PIPERACILLIN AND TAZOBACTAM 3.38 G: 3; .375 INJECTION, POWDER, LYOPHILIZED, FOR SOLUTION INTRAVENOUS; PARENTERAL at 02:04

## 2020-01-01 RX ADMIN — LIDOCAINE 2 PATCH: 50 PATCH TOPICAL at 17:23

## 2020-01-01 RX ADMIN — TOPIRAMATE 75 MG: 25 TABLET, FILM COATED ORAL at 05:40

## 2020-01-01 RX ADMIN — ACETAMINOPHEN 1000 MG: 500 TABLET ORAL at 17:15

## 2020-01-01 RX ADMIN — FUROSEMIDE 20 MG: 20 TABLET ORAL at 05:58

## 2020-01-01 RX ADMIN — POTASSIUM PHOSPHATE, MONOBASIC AND POTASSIUM PHOSPHATE, DIBASIC 15 MMOL: 224; 236 INJECTION, SOLUTION, CONCENTRATE INTRAVENOUS at 08:39

## 2020-01-01 RX ADMIN — DULOXETINE HYDROCHLORIDE 60 MG: 60 CAPSULE, DELAYED RELEASE ORAL at 20:29

## 2020-01-01 RX ADMIN — DULOXETINE HYDROCHLORIDE 60 MG: 60 CAPSULE, DELAYED RELEASE ORAL at 20:24

## 2020-01-01 RX ADMIN — ACETAMINOPHEN 650 MG: 325 TABLET, FILM COATED ORAL at 17:14

## 2020-01-01 RX ADMIN — DOCUSATE SODIUM 50 MG AND SENNOSIDES 8.6 MG 2 TABLET: 8.6; 5 TABLET, FILM COATED ORAL at 17:28

## 2020-01-01 RX ADMIN — DEXMEDETOMIDINE HYDROCHLORIDE 1 MCG/KG/HR: 100 INJECTION, SOLUTION INTRAVENOUS at 00:13

## 2020-01-01 RX ADMIN — IPRATROPIUM BROMIDE AND ALBUTEROL SULFATE 3 ML: 2.5; .5 SOLUTION RESPIRATORY (INHALATION) at 19:11

## 2020-01-01 RX ADMIN — OMEPRAZOLE 20 MG: KIT at 13:35

## 2020-01-01 RX ADMIN — CALCIUM GLUCONATE 2 G: 98 INJECTION, SOLUTION INTRAVENOUS at 07:32

## 2020-01-01 RX ADMIN — MIDODRINE HYDROCHLORIDE 5 MG: 5 TABLET ORAL at 15:49

## 2020-01-01 RX ADMIN — HYDROMORPHONE HYDROCHLORIDE 2 MG: 2 INJECTION, SOLUTION INTRAMUSCULAR; INTRAVENOUS; SUBCUTANEOUS at 23:02

## 2020-01-01 RX ADMIN — LIDOCAINE 2 PATCH: 50 PATCH TOPICAL at 22:21

## 2020-01-01 RX ADMIN — SODIUM CHLORIDE 3 G: 900 INJECTION INTRAVENOUS at 00:34

## 2020-01-01 RX ADMIN — MAGNESIUM SULFATE 2 G: 2 INJECTION INTRAVENOUS at 08:26

## 2020-01-01 RX ADMIN — FENTANYL CITRATE 100 MCG: 50 INJECTION INTRAMUSCULAR; INTRAVENOUS at 01:10

## 2020-01-01 RX ADMIN — AMIODARONE HYDROCHLORIDE 1 MG/MIN: 1.8 INJECTION, SOLUTION INTRAVENOUS at 08:30

## 2020-01-01 RX ADMIN — AMIODARONE HYDROCHLORIDE 200 MG: 200 TABLET ORAL at 05:38

## 2020-01-01 RX ADMIN — SODIUM CHLORIDE 3 G: 900 INJECTION INTRAVENOUS at 23:29

## 2020-01-01 RX ADMIN — LIDOCAINE 2 PATCH: 50 PATCH TOPICAL at 20:33

## 2020-01-01 RX ADMIN — LIDOCAINE 2 PATCH: 50 PATCH TOPICAL at 21:43

## 2020-01-01 RX ADMIN — MIDODRINE HYDROCHLORIDE 5 MG: 5 TABLET ORAL at 21:28

## 2020-01-01 RX ADMIN — ACETAMINOPHEN 650 MG: 325 TABLET, FILM COATED ORAL at 01:13

## 2020-01-01 RX ADMIN — FUROSEMIDE 20 MG: 10 INJECTION, SOLUTION INTRAMUSCULAR; INTRAVENOUS at 17:33

## 2020-01-01 RX ADMIN — LEVALBUTEROL 1.25 MG: 1.25 SOLUTION RESPIRATORY (INHALATION) at 15:47

## 2020-01-01 RX ADMIN — OMEPRAZOLE 40 MG: KIT at 07:39

## 2020-01-01 RX ADMIN — FUROSEMIDE 20 MG: 20 TABLET ORAL at 04:45

## 2020-01-01 RX ADMIN — FENTANYL CITRATE 100 MCG: 50 INJECTION INTRAMUSCULAR; INTRAVENOUS at 12:26

## 2020-01-01 RX ADMIN — LIDOCAINE 2 PATCH: 50 PATCH TOPICAL at 17:14

## 2020-01-01 RX ADMIN — ACETAMINOPHEN 1000 MG: 500 TABLET ORAL at 23:08

## 2020-01-01 RX ADMIN — ACETAMINOPHEN 500 MG: 500 TABLET ORAL at 05:44

## 2020-01-01 RX ADMIN — DOCUSATE SODIUM 50 MG AND SENNOSIDES 8.6 MG 2 TABLET: 8.6; 5 TABLET, FILM COATED ORAL at 04:57

## 2020-01-01 RX ADMIN — KETOROLAC TROMETHAMINE 30 MG: 30 INJECTION, SOLUTION INTRAMUSCULAR; INTRAVENOUS at 15:55

## 2020-01-01 RX ADMIN — ACETAMINOPHEN 650 MG: 325 TABLET, FILM COATED ORAL at 01:09

## 2020-01-01 RX ADMIN — ACETAMINOPHEN 500 MG: 500 TABLET ORAL at 10:09

## 2020-01-01 RX ADMIN — TOPIRAMATE 75 MG: 25 TABLET, FILM COATED ORAL at 05:34

## 2020-01-01 RX ADMIN — ACETAMINOPHEN 650 MG: 325 TABLET, FILM COATED ORAL at 08:04

## 2020-01-01 RX ADMIN — FLUOXETINE 20 MG: 20 CAPSULE ORAL at 06:25

## 2020-01-01 RX ADMIN — FENTANYL CITRATE 25 MCG: 50 INJECTION INTRAMUSCULAR; INTRAVENOUS at 06:13

## 2020-01-01 RX ADMIN — IPRATROPIUM BROMIDE AND ALBUTEROL SULFATE 3 ML: 2.5; .5 SOLUTION RESPIRATORY (INHALATION) at 06:36

## 2020-01-01 RX ADMIN — HYDROCODONE BITARTRATE AND ACETAMINOPHEN 1 TABLET: 5; 325 TABLET ORAL at 20:29

## 2020-01-01 RX ADMIN — SODIUM CHLORIDE 3 G: 900 INJECTION INTRAVENOUS at 11:43

## 2020-01-01 RX ADMIN — ACETYLCYSTEINE 3 ML: 200 INHALANT RESPIRATORY (INHALATION) at 14:20

## 2020-01-01 RX ADMIN — POTASSIUM BICARBONATE 25 MEQ: 978 TABLET, EFFERVESCENT ORAL at 05:38

## 2020-01-01 RX ADMIN — PHENYLEPHRINE HYDROCHLORIDE: 10 INJECTION INTRAVENOUS at 13:30

## 2020-01-01 RX ADMIN — LEVALBUTEROL 1.25 MG: 1.25 SOLUTION RESPIRATORY (INHALATION) at 14:15

## 2020-01-01 RX ADMIN — PIPERACILLIN AND TAZOBACTAM 3.38 G: 3; .375 INJECTION, POWDER, LYOPHILIZED, FOR SOLUTION INTRAVENOUS; PARENTERAL at 22:24

## 2020-01-01 RX ADMIN — DOCUSATE SODIUM 50 MG AND SENNOSIDES 8.6 MG 2 TABLET: 8.6; 5 TABLET, FILM COATED ORAL at 20:13

## 2020-01-01 RX ADMIN — TRAMADOL HYDROCHLORIDE 25 MG: 50 TABLET, FILM COATED ORAL at 22:00

## 2020-01-01 RX ADMIN — ACETAMINOPHEN 500 MG: 500 TABLET ORAL at 05:17

## 2020-01-01 RX ADMIN — AMPICILLIN AND SULBACTAM 3 G: 2; 1 INJECTION, POWDER, FOR SOLUTION INTRAVENOUS at 11:49

## 2020-01-01 RX ADMIN — TOPIRAMATE 75 MG: 25 TABLET, FILM COATED ORAL at 06:37

## 2020-01-01 RX ADMIN — PHENYLEPHRINE HYDROCHLORIDE 100 MCG/MIN: 10 INJECTION INTRAVENOUS at 08:32

## 2020-01-01 RX ADMIN — FENTANYL CITRATE 100 MCG: 50 INJECTION INTRAMUSCULAR; INTRAVENOUS at 04:02

## 2020-01-01 RX ADMIN — LIDOCAINE 2 PATCH: 50 PATCH TOPICAL at 10:31

## 2020-01-01 RX ADMIN — DOCUSATE SODIUM 50 MG AND SENNOSIDES 8.6 MG 2 TABLET: 8.6; 5 TABLET, FILM COATED ORAL at 18:15

## 2020-01-01 RX ADMIN — DOCUSATE SODIUM 50 MG AND SENNOSIDES 8.6 MG 2 TABLET: 8.6; 5 TABLET, FILM COATED ORAL at 05:17

## 2020-01-01 RX ADMIN — HYDROCODONE BITARTRATE AND ACETAMINOPHEN 1 TABLET: 5; 325 TABLET ORAL at 13:02

## 2020-01-01 RX ADMIN — PHENYLEPHRINE HYDROCHLORIDE 200 MCG/MIN: 10 INJECTION INTRAVENOUS at 13:34

## 2020-01-01 RX ADMIN — LEVALBUTEROL 1.25 MG: 1.25 SOLUTION RESPIRATORY (INHALATION) at 14:19

## 2020-01-01 RX ADMIN — IPRATROPIUM BROMIDE AND ALBUTEROL SULFATE 3 ML: 2.5; .5 SOLUTION RESPIRATORY (INHALATION) at 11:06

## 2020-01-01 RX ADMIN — IPRATROPIUM BROMIDE AND ALBUTEROL SULFATE 3 ML: 2.5; .5 SOLUTION RESPIRATORY (INHALATION) at 15:32

## 2020-01-01 RX ADMIN — ACETAMINOPHEN 500 MG: 500 TABLET ORAL at 22:38

## 2020-01-01 RX ADMIN — ACETAMINOPHEN 500 MG: 500 TABLET ORAL at 05:12

## 2020-01-01 RX ADMIN — AMIODARONE HYDROCHLORIDE 200 MG: 200 TABLET ORAL at 05:09

## 2020-01-01 RX ADMIN — LIDOCAINE 2 PATCH: 50 PATCH TOPICAL at 20:24

## 2020-01-01 RX ADMIN — LEVALBUTEROL 1.25 MG: 1.25 SOLUTION RESPIRATORY (INHALATION) at 21:50

## 2020-01-01 RX ADMIN — SODIUM CHLORIDE 3 G: 900 INJECTION INTRAVENOUS at 12:45

## 2020-01-01 RX ADMIN — THERA TABS 1 TABLET: TAB at 04:46

## 2020-01-01 RX ADMIN — EPINEPHRINE 2 MCG/MIN: 1 INJECTION INTRAMUSCULAR; INTRAVENOUS; SUBCUTANEOUS at 20:40

## 2020-01-01 RX ADMIN — OMEPRAZOLE 40 MG: KIT at 05:43

## 2020-01-01 RX ADMIN — ACETYLCYSTEINE 3 ML: 200 INHALANT RESPIRATORY (INHALATION) at 09:52

## 2020-01-01 RX ADMIN — METOPROLOL TARTRATE 12.5 MG: 25 TABLET, FILM COATED ORAL at 17:02

## 2020-01-01 RX ADMIN — THERA TABS 1 TABLET: TAB at 05:58

## 2020-01-01 RX ADMIN — LEVALBUTEROL 1.25 MG: 1.25 SOLUTION RESPIRATORY (INHALATION) at 23:00

## 2020-01-01 RX ADMIN — ONDANSETRON 4 MG: 4 TABLET, ORALLY DISINTEGRATING ORAL at 15:45

## 2020-01-01 RX ADMIN — ACETAMINOPHEN 1000 MG: 500 TABLET ORAL at 00:09

## 2020-01-01 RX ADMIN — PHENYLEPHRINE HYDROCHLORIDE 100 MCG: 10 INJECTION INTRAVENOUS at 18:14

## 2020-01-01 RX ADMIN — SODIUM CHLORIDE 3 G: 900 INJECTION INTRAVENOUS at 00:27

## 2020-01-01 RX ADMIN — AMPICILLIN AND SULBACTAM 3 G: 2; 1 INJECTION, POWDER, FOR SOLUTION INTRAVENOUS at 05:19

## 2020-01-01 RX ADMIN — IPRATROPIUM BROMIDE AND ALBUTEROL SULFATE 3 ML: 2.5; .5 SOLUTION RESPIRATORY (INHALATION) at 11:41

## 2020-01-01 RX ADMIN — DOCUSATE SODIUM 50 MG AND SENNOSIDES 8.6 MG 2 TABLET: 8.6; 5 TABLET, FILM COATED ORAL at 18:11

## 2020-01-01 RX ADMIN — AMIODARONE HYDROCHLORIDE 200 MG: 200 TABLET ORAL at 05:10

## 2020-01-01 RX ADMIN — OMEPRAZOLE 20 MG: KIT at 04:23

## 2020-01-01 RX ADMIN — OMEPRAZOLE 20 MG: KIT at 17:59

## 2020-01-01 RX ADMIN — METOPROLOL TARTRATE 25 MG: 25 TABLET, FILM COATED ORAL at 04:58

## 2020-01-01 RX ADMIN — OXYCODONE 10 MG: 5 TABLET ORAL at 06:29

## 2020-01-01 RX ADMIN — ACETAMINOPHEN 1000 MG: 500 TABLET ORAL at 00:07

## 2020-01-01 RX ADMIN — METOPROLOL TARTRATE 25 MG: 25 TABLET, FILM COATED ORAL at 17:08

## 2020-01-01 RX ADMIN — OXYCODONE HYDROCHLORIDE 5 MG: 5 TABLET ORAL at 23:58

## 2020-01-01 RX ADMIN — OXYCODONE HYDROCHLORIDE 10 MG: 10 TABLET ORAL at 03:07

## 2020-01-01 RX ADMIN — ACETAMINOPHEN 1000 MG: 500 TABLET ORAL at 00:35

## 2020-01-01 RX ADMIN — FUROSEMIDE 20 MG: 20 TABLET ORAL at 05:59

## 2020-01-01 RX ADMIN — AMIODARONE HYDROCHLORIDE 200 MG: 200 TABLET ORAL at 03:52

## 2020-01-01 RX ADMIN — AMPICILLIN AND SULBACTAM 3 G: 2; 1 INJECTION, POWDER, FOR SOLUTION INTRAVENOUS at 05:43

## 2020-01-01 RX ADMIN — ACETYLCYSTEINE 4 ML: 200 INHALANT RESPIRATORY (INHALATION) at 06:55

## 2020-01-01 RX ADMIN — TOPIRAMATE 75 MG: 25 TABLET, FILM COATED ORAL at 06:00

## 2020-01-01 RX ADMIN — DULOXETINE HYDROCHLORIDE 60 MG: 60 CAPSULE, DELAYED RELEASE ORAL at 21:12

## 2020-01-01 RX ADMIN — IPRATROPIUM BROMIDE AND ALBUTEROL SULFATE 3 ML: .5; 3 SOLUTION RESPIRATORY (INHALATION) at 04:06

## 2020-01-01 RX ADMIN — FUROSEMIDE 20 MG: 20 TABLET ORAL at 06:36

## 2020-01-01 RX ADMIN — TOPIRAMATE 75 MG: 25 TABLET, FILM COATED ORAL at 06:10

## 2020-01-01 RX ADMIN — FUROSEMIDE 20 MG: 20 TABLET ORAL at 06:18

## 2020-01-01 RX ADMIN — DULOXETINE HYDROCHLORIDE 60 MG: 60 CAPSULE, DELAYED RELEASE ORAL at 21:11

## 2020-01-01 RX ADMIN — DULOXETINE HYDROCHLORIDE 60 MG: 60 CAPSULE, DELAYED RELEASE ORAL at 20:37

## 2020-01-01 RX ADMIN — DULOXETINE HYDROCHLORIDE 60 MG: 60 CAPSULE, DELAYED RELEASE ORAL at 21:10

## 2020-01-01 RX ADMIN — CALCIUM CHLORIDE 1 G: 100 INJECTION, SOLUTION INTRAVENOUS at 01:28

## 2020-01-01 RX ADMIN — TOPIRAMATE 75 MG: 25 TABLET, FILM COATED ORAL at 04:49

## 2020-01-01 RX ADMIN — IPRATROPIUM BROMIDE AND ALBUTEROL SULFATE 3 ML: 2.5; .5 SOLUTION RESPIRATORY (INHALATION) at 14:19

## 2020-01-01 RX ADMIN — AMPICILLIN AND SULBACTAM 3 G: 2; 1 INJECTION, POWDER, FOR SOLUTION INTRAVENOUS at 12:16

## 2020-01-01 RX ADMIN — FUROSEMIDE 20 MG: 20 TABLET ORAL at 04:23

## 2020-01-01 RX ADMIN — ACETYLCYSTEINE 4 ML: 200 INHALANT RESPIRATORY (INHALATION) at 14:15

## 2020-01-01 RX ADMIN — FUROSEMIDE 20 MG: 20 TABLET ORAL at 05:16

## 2020-01-01 RX ADMIN — ACETAMINOPHEN 1000 MG: 500 TABLET ORAL at 12:27

## 2020-01-01 RX ADMIN — LIDOCAINE 2 PATCH: 50 PATCH TOPICAL at 12:15

## 2020-01-01 RX ADMIN — HYDROCORTISONE SODIUM SUCCINATE 100 MG: 100 INJECTION, POWDER, FOR SOLUTION INTRAMUSCULAR; INTRAVENOUS at 04:18

## 2020-01-01 RX ADMIN — DEXMEDETOMIDINE HYDROCHLORIDE 0.9 MCG/KG/HR: 100 INJECTION, SOLUTION INTRAVENOUS at 04:21

## 2020-01-01 RX ADMIN — VASOPRESSIN 0.03 UNITS/MIN: 20 INJECTION INTRAVENOUS at 16:42

## 2020-01-01 RX ADMIN — SODIUM CHLORIDE 3 G: 900 INJECTION INTRAVENOUS at 00:45

## 2020-01-01 RX ADMIN — PIPERACILLIN AND TAZOBACTAM 4.5 G: 4; .5 INJECTION, POWDER, LYOPHILIZED, FOR SOLUTION INTRAVENOUS; PARENTERAL at 04:18

## 2020-01-01 ASSESSMENT — ENCOUNTER SYMPTOMS
WEAKNESS: 0
NERVOUS/ANXIOUS: 1
SENSORY CHANGE: 0
BRUISES/BLEEDS EASILY: 1
BLURRED VISION: 0
HEADACHES: 0
CHILLS: 0
DOUBLE VISION: 0
DIZZINESS: 0
BLURRED VISION: 0
BLURRED VISION: 0
SPEECH CHANGE: 0
HEADACHES: 0
SPUTUM PRODUCTION: 1
VOMITING: 0
ABDOMINAL PAIN: 0
COUGH: 1
DEPRESSION: 0
BLURRED VISION: 0
BACK PAIN: 0
NERVOUS/ANXIOUS: 1
SHORTNESS OF BREATH: 1
DOUBLE VISION: 0
FEVER: 0
HEADACHES: 0
HEARTBURN: 0
NAUSEA: 0
CHILLS: 0
WEAKNESS: 1
FOCAL WEAKNESS: 0
BLURRED VISION: 0
WHEEZING: 1
ABDOMINAL PAIN: 0
VOMITING: 0
ABDOMINAL PAIN: 0
HEADACHES: 0
SHORTNESS OF BREATH: 1
DOUBLE VISION: 0
BLURRED VISION: 0
PALPITATIONS: 0
NAUSEA: 0
PALPITATIONS: 0
CHILLS: 0
DIZZINESS: 0
NECK PAIN: 0
SPUTUM PRODUCTION: 1
SHORTNESS OF BREATH: 1
BACK PAIN: 0
SORE THROAT: 0
CHILLS: 0
BACK PAIN: 0
BLURRED VISION: 0
DIARRHEA: 0
DIZZINESS: 0
COUGH: 1
MYALGIAS: 0
FEVER: 0
WEAKNESS: 0
NECK PAIN: 0
HEADACHES: 0
VOMITING: 0
CHILLS: 0
NERVOUS/ANXIOUS: 1
LOSS OF CONSCIOUSNESS: 0
DOUBLE VISION: 0
COUGH: 1
WHEEZING: 0
TINGLING: 0
COUGH: 1
CHILLS: 0
ABDOMINAL PAIN: 0
VOMITING: 0
VOMITING: 0
SHORTNESS OF BREATH: 0
DEPRESSION: 0
DOUBLE VISION: 0
WEAKNESS: 1
DIZZINESS: 0
NAUSEA: 0
NAUSEA: 0
DEPRESSION: 0
DIARRHEA: 0
ABDOMINAL PAIN: 0
CHILLS: 0
DEPRESSION: 0
SHORTNESS OF BREATH: 1
HEARTBURN: 0
SHORTNESS OF BREATH: 0
COUGH: 1
CHILLS: 0
NAUSEA: 0
HEMOPTYSIS: 0
ABDOMINAL PAIN: 0
COUGH: 0
SENSORY CHANGE: 0
SORE THROAT: 0
SPUTUM PRODUCTION: 0
CHILLS: 0
BRUISES/BLEEDS EASILY: 0
COUGH: 0
HEMOPTYSIS: 0
BRUISES/BLEEDS EASILY: 1
PALPITATIONS: 0
MYALGIAS: 0
WEAKNESS: 1
WEAKNESS: 1
DIZZINESS: 0
NAUSEA: 0
HEADACHES: 0
VOMITING: 0
BACK PAIN: 0
DEPRESSION: 0
SPEECH CHANGE: 0
CLAUDICATION: 0
VOMITING: 0
WEAKNESS: 1
PALPITATIONS: 0
PALPITATIONS: 0
PHOTOPHOBIA: 0
NERVOUS/ANXIOUS: 1
COUGH: 0
CHILLS: 0
WEAKNESS: 0
HEADACHES: 0
VOMITING: 0
VOMITING: 0
HEADACHES: 0
DIARRHEA: 0
NECK PAIN: 0
CHILLS: 0
NECK PAIN: 0
CHILLS: 0
COUGH: 1
SPUTUM PRODUCTION: 1
SHORTNESS OF BREATH: 0
VOMITING: 0
CLAUDICATION: 0
DOUBLE VISION: 0
WEAKNESS: 1
SORE THROAT: 0
NECK PAIN: 0
DEPRESSION: 0
BLURRED VISION: 0
DEPRESSION: 0
COUGH: 0
CHILLS: 0
MYALGIAS: 0
ABDOMINAL PAIN: 0
SHORTNESS OF BREATH: 1
WEAKNESS: 1
DIARRHEA: 0
HEADACHES: 0
VOMITING: 0
HEADACHES: 0
NAUSEA: 0
COUGH: 0
DOUBLE VISION: 0
WEAKNESS: 1
SORE THROAT: 0
SPUTUM PRODUCTION: 1
BLURRED VISION: 0
FEVER: 0
DEPRESSION: 0
NECK PAIN: 0
DEPRESSION: 0
SEIZURES: 0
BRUISES/BLEEDS EASILY: 1
BACK PAIN: 0
DEPRESSION: 0
DIARRHEA: 0
HEADACHES: 0
HEADACHES: 0
SORE THROAT: 0
NAUSEA: 0
DOUBLE VISION: 0
DEPRESSION: 0
HEADACHES: 0
DEPRESSION: 0
SENSORY CHANGE: 0
ORTHOPNEA: 0
PALPITATIONS: 0
COUGH: 1
DEPRESSION: 0
SHORTNESS OF BREATH: 1
ABDOMINAL PAIN: 0
BACK PAIN: 0
NECK PAIN: 0
WEAKNESS: 0
NAUSEA: 0
SORE THROAT: 0
SHORTNESS OF BREATH: 0
NERVOUS/ANXIOUS: 1
ABDOMINAL PAIN: 0
VOMITING: 0
EYE DISCHARGE: 0
ORTHOPNEA: 0
CLAUDICATION: 0
SHORTNESS OF BREATH: 1
FEVER: 0
PALPITATIONS: 0
SPUTUM PRODUCTION: 1
SHORTNESS OF BREATH: 1
HEADACHES: 0
HEADACHES: 0
BRUISES/BLEEDS EASILY: 1
NERVOUS/ANXIOUS: 1
COUGH: 1
SHORTNESS OF BREATH: 1
DOUBLE VISION: 0
DIARRHEA: 0
ABDOMINAL PAIN: 1
COUGH: 0
CHILLS: 0
CHILLS: 0
NERVOUS/ANXIOUS: 1
SPUTUM PRODUCTION: 1
NERVOUS/ANXIOUS: 1
DIARRHEA: 0
VOMITING: 0
DOUBLE VISION: 0
NAUSEA: 0
COUGH: 1
SORE THROAT: 0
NAUSEA: 0
SPUTUM PRODUCTION: 1
ABDOMINAL PAIN: 0
BACK PAIN: 0
BRUISES/BLEEDS EASILY: 1
WEAKNESS: 1
SENSORY CHANGE: 0
NERVOUS/ANXIOUS: 1
BACK PAIN: 0
PHOTOPHOBIA: 0
BRUISES/BLEEDS EASILY: 0
HEADACHES: 0
NERVOUS/ANXIOUS: 1
DEPRESSION: 0
SPUTUM PRODUCTION: 1
VOMITING: 0
COUGH: 1
DIARRHEA: 0
DOUBLE VISION: 0
CHILLS: 0
ABDOMINAL PAIN: 0
NERVOUS/ANXIOUS: 1
NECK PAIN: 0
DIARRHEA: 0
ABDOMINAL PAIN: 0
CHILLS: 0
NERVOUS/ANXIOUS: 1
TREMORS: 0
ABDOMINAL PAIN: 0
SORE THROAT: 0
BACK PAIN: 0
SORE THROAT: 0
NECK PAIN: 0
WHEEZING: 1
NAUSEA: 0
PHOTOPHOBIA: 0
SPUTUM PRODUCTION: 0
DOUBLE VISION: 0
LOSS OF CONSCIOUSNESS: 0
SHORTNESS OF BREATH: 1
BACK PAIN: 0
DIARRHEA: 0
SPUTUM PRODUCTION: 1
HEADACHES: 0
COUGH: 1
ORTHOPNEA: 0
FEVER: 0
NECK PAIN: 0
DEPRESSION: 0
ABDOMINAL PAIN: 0
VOMITING: 0
EYE DISCHARGE: 0
CHILLS: 0
BRUISES/BLEEDS EASILY: 1
NAUSEA: 0
WEAKNESS: 1
BLURRED VISION: 0
NAUSEA: 0
ABDOMINAL PAIN: 1
NECK PAIN: 0
BRUISES/BLEEDS EASILY: 0
DOUBLE VISION: 0
NAUSEA: 0
DIARRHEA: 0
BRUISES/BLEEDS EASILY: 1
DIARRHEA: 0
DIZZINESS: 0
VOMITING: 0
VOMITING: 0
SPUTUM PRODUCTION: 0
NAUSEA: 0
WHEEZING: 1
VOMITING: 0
ABDOMINAL PAIN: 0
DIARRHEA: 0
WEAKNESS: 1
BACK PAIN: 0
VOMITING: 0
DEPRESSION: 0
SORE THROAT: 0
HEADACHES: 0
DEPRESSION: 0
BRUISES/BLEEDS EASILY: 1
NAUSEA: 0
BACK PAIN: 0
DIARRHEA: 0
COUGH: 1
ORTHOPNEA: 0
BLURRED VISION: 0
SORE THROAT: 0
BRUISES/BLEEDS EASILY: 1
BACK PAIN: 0
HEADACHES: 0
FEVER: 0
CHILLS: 0
BRUISES/BLEEDS EASILY: 1
ABDOMINAL PAIN: 0
DIARRHEA: 0
BLURRED VISION: 0
SHORTNESS OF BREATH: 1
DIARRHEA: 0
CLAUDICATION: 0
SPUTUM PRODUCTION: 1
SORE THROAT: 0
COUGH: 0
SPUTUM PRODUCTION: 1
NECK PAIN: 0
SHORTNESS OF BREATH: 0
COUGH: 1
DOUBLE VISION: 0
COUGH: 1
DIARRHEA: 0
DIARRHEA: 0
SPEECH CHANGE: 0
NAUSEA: 0
HEADACHES: 0
ABDOMINAL PAIN: 0
SHORTNESS OF BREATH: 1
FEVER: 0
ABDOMINAL PAIN: 0
VOMITING: 0
BLURRED VISION: 0
COUGH: 0
NAUSEA: 0
BLURRED VISION: 0
EYE DISCHARGE: 0
ABDOMINAL PAIN: 0
SORE THROAT: 0
SORE THROAT: 0
COUGH: 1
VOMITING: 0
NAUSEA: 0
ABDOMINAL PAIN: 0
LOSS OF CONSCIOUSNESS: 0
SPUTUM PRODUCTION: 1
CHILLS: 0
FEVER: 0
BRUISES/BLEEDS EASILY: 1
COUGH: 0
WHEEZING: 1
WEAKNESS: 1
SORE THROAT: 0
BACK PAIN: 0
DOUBLE VISION: 0
DEPRESSION: 0
NECK PAIN: 0
HEADACHES: 0
BLURRED VISION: 0
SORE THROAT: 0
COUGH: 0
DIARRHEA: 0
CHILLS: 0
SHORTNESS OF BREATH: 1
SHORTNESS OF BREATH: 0
COUGH: 0
CHILLS: 0
PALPITATIONS: 0
SORE THROAT: 0
BACK PAIN: 0
CHILLS: 0
HEADACHES: 0
BLURRED VISION: 0
DEPRESSION: 0
NECK PAIN: 0
COUGH: 0
COUGH: 1
FEVER: 0
SHORTNESS OF BREATH: 1
HEARTBURN: 0
SPUTUM PRODUCTION: 0
DIARRHEA: 0
NAUSEA: 0
CHILLS: 0
NAUSEA: 0
NAUSEA: 0
HEADACHES: 0
NERVOUS/ANXIOUS: 1
NERVOUS/ANXIOUS: 1
ABDOMINAL PAIN: 0
BLOOD IN STOOL: 0
HEADACHES: 0
CHILLS: 0
BRUISES/BLEEDS EASILY: 0
ABDOMINAL PAIN: 0
HEADACHES: 0
NAUSEA: 0
DEPRESSION: 0
SHORTNESS OF BREATH: 1
BACK PAIN: 0
HEADACHES: 0
DOUBLE VISION: 0
SHORTNESS OF BREATH: 0
DOUBLE VISION: 0
BACK PAIN: 0
DOUBLE VISION: 0
WEAKNESS: 1
FLANK PAIN: 0
VOMITING: 0
BLURRED VISION: 0
DOUBLE VISION: 0
DIARRHEA: 0
DIARRHEA: 0
WHEEZING: 1
ABDOMINAL PAIN: 0
WEAKNESS: 1
BLURRED VISION: 0
MYALGIAS: 0
SPUTUM PRODUCTION: 1
BRUISES/BLEEDS EASILY: 1
DIZZINESS: 0
NAUSEA: 0
NAUSEA: 0
VOMITING: 0
COUGH: 1
SPUTUM PRODUCTION: 1
FLANK PAIN: 0
DIARRHEA: 0
BRUISES/BLEEDS EASILY: 0
SHORTNESS OF BREATH: 0
NAUSEA: 0
HEMOPTYSIS: 0
NERVOUS/ANXIOUS: 1
COUGH: 0
SORE THROAT: 0
SPUTUM PRODUCTION: 1
VOMITING: 0
BLOOD IN STOOL: 0
DIZZINESS: 0
VOMITING: 0
SORE THROAT: 0
FLANK PAIN: 0
WEAKNESS: 1
BACK PAIN: 0
PALPITATIONS: 0
VOMITING: 0
BRUISES/BLEEDS EASILY: 1
ABDOMINAL PAIN: 0

## 2020-01-01 ASSESSMENT — PATIENT HEALTH QUESTIONNAIRE - PHQ9
SUM OF ALL RESPONSES TO PHQ QUESTIONS 1-9: 4
6. FEELING BAD ABOUT YOURSELF - OR THAT YOU ARE A FAILURE OR HAVE LET YOURSELF OR YOUR FAMILY DOWN: NOT AL ALL
SUM OF ALL RESPONSES TO PHQ9 QUESTIONS 1 AND 2: 1
9. THOUGHTS THAT YOU WOULD BE BETTER OFF DEAD, OR OF HURTING YOURSELF: NOT AT ALL
7. TROUBLE CONCENTRATING ON THINGS, SUCH AS READING THE NEWSPAPER OR WATCHING TELEVISION: SEVERAL DAYS
3. TROUBLE FALLING OR STAYING ASLEEP OR SLEEPING TOO MUCH: SEVERAL DAYS
9. THOUGHTS THAT YOU WOULD BE BETTER OFF DEAD, OR OF HURTING YOURSELF: NOT AT ALL
1. LITTLE INTEREST OR PLEASURE IN DOING THINGS: NOT AT ALL
SUM OF ALL RESPONSES TO PHQ QUESTIONS 1-9: 4
5. POOR APPETITE OR OVEREATING: NOT AT ALL
8. MOVING OR SPEAKING SO SLOWLY THAT OTHER PEOPLE COULD HAVE NOTICED. OR THE OPPOSITE, BEING SO FIGETY OR RESTLESS THAT YOU HAVE BEEN MOVING AROUND A LOT MORE THAN USUAL: NOT AT ALL
3. TROUBLE FALLING OR STAYING ASLEEP OR SLEEPING TOO MUCH: SEVERAL DAYS
6. FEELING BAD ABOUT YOURSELF - OR THAT YOU ARE A FAILURE OR HAVE LET YOURSELF OR YOUR FAMILY DOWN: NOT AL ALL
SUM OF ALL RESPONSES TO PHQ9 QUESTIONS 1 AND 2: 1
5. POOR APPETITE OR OVEREATING: NOT AT ALL
4. FEELING TIRED OR HAVING LITTLE ENERGY: SEVERAL DAYS
1. LITTLE INTEREST OR PLEASURE IN DOING THINGS: NOT AT ALL
6. FEELING BAD ABOUT YOURSELF - OR THAT YOU ARE A FAILURE OR HAVE LET YOURSELF OR YOUR FAMILY DOWN: NOT AL ALL
SUM OF ALL RESPONSES TO PHQ QUESTIONS 1-9: 4
7. TROUBLE CONCENTRATING ON THINGS, SUCH AS READING THE NEWSPAPER OR WATCHING TELEVISION: SEVERAL DAYS
7. TROUBLE CONCENTRATING ON THINGS, SUCH AS READING THE NEWSPAPER OR WATCHING TELEVISION: SEVERAL DAYS
8. MOVING OR SPEAKING SO SLOWLY THAT OTHER PEOPLE COULD HAVE NOTICED. OR THE OPPOSITE, BEING SO FIGETY OR RESTLESS THAT YOU HAVE BEEN MOVING AROUND A LOT MORE THAN USUAL: NOT AT ALL
2. FEELING DOWN, DEPRESSED, IRRITABLE, OR HOPELESS: SEVERAL DAYS
4. FEELING TIRED OR HAVING LITTLE ENERGY: SEVERAL DAYS
SUM OF ALL RESPONSES TO PHQ9 QUESTIONS 1 AND 2: 1
2. FEELING DOWN, DEPRESSED, IRRITABLE, OR HOPELESS: SEVERAL DAYS
8. MOVING OR SPEAKING SO SLOWLY THAT OTHER PEOPLE COULD HAVE NOTICED. OR THE OPPOSITE, BEING SO FIGETY OR RESTLESS THAT YOU HAVE BEEN MOVING AROUND A LOT MORE THAN USUAL: NOT AT ALL
9. THOUGHTS THAT YOU WOULD BE BETTER OFF DEAD, OR OF HURTING YOURSELF: NOT AT ALL
5. POOR APPETITE OR OVEREATING: NOT AT ALL
1. LITTLE INTEREST OR PLEASURE IN DOING THINGS: NOT AT ALL
2. FEELING DOWN, DEPRESSED, IRRITABLE, OR HOPELESS: SEVERAL DAYS
4. FEELING TIRED OR HAVING LITTLE ENERGY: SEVERAL DAYS
3. TROUBLE FALLING OR STAYING ASLEEP OR SLEEPING TOO MUCH: SEVERAL DAYS

## 2020-01-01 ASSESSMENT — COGNITIVE AND FUNCTIONAL STATUS - GENERAL
SUGGESTED CMS G CODE MODIFIER MOBILITY: CK
CLIMB 3 TO 5 STEPS WITH RAILING: A LITTLE
STANDING UP FROM CHAIR USING ARMS: A LITTLE
CLIMB 3 TO 5 STEPS WITH RAILING: A LITTLE
MOBILITY SCORE: 18
SUGGESTED CMS G CODE MODIFIER MOBILITY: CK
MOVING FROM LYING ON BACK TO SITTING ON SIDE OF FLAT BED: A LITTLE
MOVING FROM LYING ON BACK TO SITTING ON SIDE OF FLAT BED: A LITTLE
SUGGESTED CMS G CODE MODIFIER MOBILITY: CK
TURNING FROM BACK TO SIDE WHILE IN FLAT BAD: A LOT
TURNING FROM BACK TO SIDE WHILE IN FLAT BAD: A LITTLE
MOVING FROM LYING ON BACK TO SITTING ON SIDE OF FLAT BED: A LITTLE
STANDING UP FROM CHAIR USING ARMS: A LOT
MOVING TO AND FROM BED TO CHAIR: A LITTLE
TURNING FROM BACK TO SIDE WHILE IN FLAT BAD: A LITTLE
HELP NEEDED FOR BATHING: A LOT
TOILETING: A LITTLE
SUGGESTED CMS G CODE MODIFIER MOBILITY: CK
SUGGESTED CMS G CODE MODIFIER DAILY ACTIVITY: CK
STANDING UP FROM CHAIR USING ARMS: A LITTLE
MOVING TO AND FROM BED TO CHAIR: A LITTLE
PERSONAL GROOMING: A LITTLE
SUGGESTED CMS G CODE MODIFIER DAILY ACTIVITY: CK
PERSONAL GROOMING: A LITTLE
WALKING IN HOSPITAL ROOM: A LITTLE
DAILY ACTIVITIY SCORE: 18
MOVING FROM LYING ON BACK TO SITTING ON SIDE OF FLAT BED: A LITTLE
TOILETING: A LOT
CLIMB 3 TO 5 STEPS WITH RAILING: A LOT
MOBILITY SCORE: 18
SUGGESTED CMS G CODE MODIFIER DAILY ACTIVITY: CK
DRESSING REGULAR LOWER BODY CLOTHING: A LITTLE
HELP NEEDED FOR BATHING: A LITTLE
DAILY ACTIVITIY SCORE: 16
WALKING IN HOSPITAL ROOM: A LITTLE
CLIMB 3 TO 5 STEPS WITH RAILING: A LITTLE
DRESSING REGULAR LOWER BODY CLOTHING: A LOT
SUGGESTED CMS G CODE MODIFIER MOBILITY: CK
MOBILITY SCORE: 18
MOVING TO AND FROM BED TO CHAIR: A LITTLE
MOVING FROM LYING ON BACK TO SITTING ON SIDE OF FLAT BED: A LOT
MOVING TO AND FROM BED TO CHAIR: A LITTLE
STANDING UP FROM CHAIR USING ARMS: A LITTLE
STANDING UP FROM CHAIR USING ARMS: A LITTLE
TOILETING: A LITTLE
CLIMB 3 TO 5 STEPS WITH RAILING: A LOT
EATING MEALS: A LITTLE
SUGGESTED CMS G CODE MODIFIER MOBILITY: CL
WALKING IN HOSPITAL ROOM: A LITTLE
HELP NEEDED FOR BATHING: A LITTLE
DRESSING REGULAR UPPER BODY CLOTHING: A LOT
MOBILITY SCORE: 12
DAILY ACTIVITIY SCORE: 19
CLIMB 3 TO 5 STEPS WITH RAILING: A LOT
MOVING FROM LYING ON BACK TO SITTING ON SIDE OF FLAT BED: A LITTLE
MOBILITY SCORE: 19
DRESSING REGULAR LOWER BODY CLOTHING: A LITTLE
DRESSING REGULAR UPPER BODY CLOTHING: A LITTLE
TURNING FROM BACK TO SIDE WHILE IN FLAT BAD: A LITTLE
MOBILITY SCORE: 17
WALKING IN HOSPITAL ROOM: A LITTLE
WALKING IN HOSPITAL ROOM: A LOT
MOVING TO AND FROM BED TO CHAIR: A LOT
WALKING IN HOSPITAL ROOM: A LITTLE
MOVING TO AND FROM BED TO CHAIR: A LITTLE
STANDING UP FROM CHAIR USING ARMS: A LITTLE
DRESSING REGULAR UPPER BODY CLOTHING: A LITTLE

## 2020-01-01 ASSESSMENT — PAIN DESCRIPTION - PAIN TYPE
TYPE: ACUTE PAIN
TYPE: ACUTE PAIN
TYPE: ACUTE PAIN;CHRONIC PAIN
TYPE: CHRONIC PAIN
TYPE: ACUTE PAIN
TYPE: SURGICAL PAIN
TYPE: ACUTE PAIN
TYPE: CHRONIC PAIN
TYPE: ACUTE PAIN
TYPE: CHRONIC PAIN
TYPE: ACUTE PAIN
TYPE: ACUTE PAIN
TYPE: SURGICAL PAIN
TYPE: SURGICAL PAIN
TYPE: ACUTE PAIN
TYPE: ACUTE PAIN
TYPE: SURGICAL PAIN
TYPE: CHRONIC PAIN
TYPE: SURGICAL PAIN
TYPE: ACUTE PAIN
TYPE: CHRONIC PAIN
TYPE: ACUTE PAIN
TYPE: SURGICAL PAIN
TYPE: CHRONIC PAIN
TYPE: ACUTE PAIN
TYPE: ACUTE PAIN
TYPE: CHRONIC PAIN
TYPE: CHRONIC PAIN
TYPE: ACUTE PAIN
TYPE: ACUTE PAIN
TYPE: SURGICAL PAIN
TYPE: ACUTE PAIN
TYPE: ACUTE PAIN
TYPE: ACUTE PAIN;CHRONIC PAIN
TYPE: ACUTE PAIN
TYPE: ACUTE PAIN
TYPE: CHRONIC PAIN
TYPE: SURGICAL PAIN
TYPE: ACUTE PAIN
TYPE: CHRONIC PAIN
TYPE: ACUTE PAIN
TYPE: ACUTE PAIN
TYPE: CHRONIC PAIN
TYPE: ACUTE PAIN
TYPE: ACUTE PAIN;CHRONIC PAIN
TYPE: ACUTE PAIN
TYPE: CHRONIC PAIN
TYPE: ACUTE PAIN
TYPE: ACUTE PAIN
TYPE: CHRONIC PAIN
TYPE: ACUTE PAIN;CHRONIC PAIN
TYPE: ACUTE PAIN
TYPE: CHRONIC PAIN
TYPE: ACUTE PAIN
TYPE: SURGICAL PAIN
TYPE: ACUTE PAIN
TYPE: CHRONIC PAIN
TYPE: ACUTE PAIN
TYPE: ACUTE PAIN
TYPE: SURGICAL PAIN
TYPE: ACUTE PAIN
TYPE: SURGICAL PAIN
TYPE: ACUTE PAIN
TYPE: SURGICAL PAIN
TYPE: SURGICAL PAIN
TYPE: ACUTE PAIN
TYPE: ACUTE PAIN;CHRONIC PAIN
TYPE: SURGICAL PAIN
TYPE: ACUTE PAIN
TYPE: SURGICAL PAIN
TYPE: CHRONIC PAIN
TYPE: ACUTE PAIN
TYPE: SURGICAL PAIN
TYPE: ACUTE PAIN
TYPE: SURGICAL PAIN
TYPE: CHRONIC PAIN
TYPE: ACUTE PAIN
TYPE: SURGICAL PAIN
TYPE: SURGICAL PAIN
TYPE: ACUTE PAIN
TYPE: CHRONIC PAIN
TYPE: ACUTE PAIN
TYPE: CHRONIC PAIN
TYPE: ACUTE PAIN
TYPE: SURGICAL PAIN
TYPE: ACUTE PAIN
TYPE: ACUTE PAIN
TYPE: CHRONIC PAIN
TYPE: ACUTE PAIN
TYPE: CHRONIC PAIN
TYPE: CHRONIC PAIN
TYPE: ACUTE PAIN
TYPE: CHRONIC PAIN
TYPE: ACUTE PAIN
TYPE: ACUTE PAIN;CHRONIC PAIN
TYPE: SURGICAL PAIN
TYPE: ACUTE PAIN
TYPE: ACUTE PAIN
TYPE: CHRONIC PAIN
TYPE: ACUTE PAIN
TYPE: SURGICAL PAIN
TYPE: ACUTE PAIN
TYPE: SURGICAL PAIN
TYPE: CHRONIC PAIN
TYPE: ACUTE PAIN
TYPE: ACUTE PAIN
TYPE: SURGICAL PAIN
TYPE: CHRONIC PAIN
TYPE: ACUTE PAIN
TYPE: ACUTE PAIN
TYPE: ACUTE PAIN;CHRONIC PAIN
TYPE: CHRONIC PAIN
TYPE: SURGICAL PAIN
TYPE: CHRONIC PAIN
TYPE: ACUTE PAIN
TYPE: ACUTE PAIN
TYPE: CHRONIC PAIN
TYPE: ACUTE PAIN
TYPE: CHRONIC PAIN
TYPE: ACUTE PAIN
TYPE: ACUTE PAIN
TYPE: CHRONIC PAIN
TYPE: ACUTE PAIN
TYPE: CHRONIC PAIN
TYPE: ACUTE PAIN
TYPE: CHRONIC PAIN
TYPE: ACUTE PAIN
TYPE: SURGICAL PAIN
TYPE: SURGICAL PAIN

## 2020-01-01 ASSESSMENT — FIBROSIS 4 INDEX
FIB4 SCORE: 0.22
FIB4 SCORE: 0.37
FIB4 SCORE: 0.42
FIB4 SCORE: 0.61
FIB4 SCORE: 0.47
FIB4 SCORE: 0.61
FIB4 SCORE: 0.37
FIB4 SCORE: 0.52
FIB4 SCORE: 0.4
FIB4 SCORE: 0.43
FIB4 SCORE: 0.45
FIB4 SCORE: 3.16
FIB4 SCORE: 0.39
FIB4 SCORE: 0.61
FIB4 SCORE: 0.39
FIB4 SCORE: 0.72
FIB4 SCORE: 0.24
FIB4 SCORE: 0.4
FIB4 SCORE: 0.61
FIB4 SCORE: 0.22
FIB4 SCORE: 0.49
FIB4 SCORE: 0.26
FIB4 SCORE: 0.48
FIB4 SCORE: 0.4
FIB4 SCORE: 0.48
FIB4 SCORE: 0.45
FIB4 SCORE: 0.53
FIB4 SCORE: 0.39
FIB4 SCORE: 0.61
FIB4 SCORE: 0.7
FIB4 SCORE: 0.69
FIB4 SCORE: 0.34
FIB4 SCORE: 0.5
FIB4 SCORE: 0.37
FIB4 SCORE: 0.39
FIB4 SCORE: 0.26
FIB4 SCORE: 0.35

## 2020-01-01 ASSESSMENT — GAIT ASSESSMENTS
DISTANCE (FEET): 30
DEVIATION: BRADYKINETIC;SHUFFLED GAIT
DISTANCE (FEET): 25
GAIT LEVEL OF ASSIST: MINIMAL ASSIST
ASSISTIVE DEVICE: FRONT WHEEL WALKER
ASSISTIVE DEVICE: FRONT WHEEL WALKER
DISTANCE (FEET): 75
GAIT LEVEL OF ASSIST: MINIMAL ASSIST
GAIT LEVEL OF ASSIST: MINIMAL ASSIST
ASSISTIVE DEVICE: FRONT WHEEL WALKER

## 2020-01-01 ASSESSMENT — COPD QUESTIONNAIRES
DO YOU EVER COUGH UP ANY MUCUS OR PHLEGM?: YES, A FEW DAYS A WEEK OR MONTH
COPD SCREENING SCORE: 3
HAVE YOU SMOKED AT LEAST 100 CIGARETTES IN YOUR ENTIRE LIFE: NO/DON'T KNOW
DURING THE PAST 4 WEEKS HOW MUCH DID YOU FEEL SHORT OF BREATH: NONE/LITTLE OF THE TIME

## 2020-01-01 ASSESSMENT — CHA2DS2 SCORE
PRIOR STROKE OR TIA OR THROMBOEMBOLISM: NO
DIABETES: NO
AGE 65 TO 74: NO
SEX: MALE
AGE 65 TO 74: NO
CHA2DS2 VASC SCORE: 2
CHF OR LEFT VENTRICULAR DYSFUNCTION: YES
AGE 75 OR GREATER: NO
DIABETES: NO
SEX: MALE
HYPERTENSION: YES
VASCULAR DISEASE: NO
CHF OR LEFT VENTRICULAR DYSFUNCTION: YES
VASCULAR DISEASE: NO
CHA2DS2 VASC SCORE: 1
AGE 75 OR GREATER: NO
HYPERTENSION: NO
PRIOR STROKE OR TIA OR THROMBOEMBOLISM: NO

## 2020-01-01 ASSESSMENT — PAIN SCALES - WONG BAKER
WONGBAKER_NUMERICALRESPONSE: DOESN'T HURT AT ALL

## 2020-01-01 ASSESSMENT — LIFESTYLE VARIABLES
TOTAL SCORE: 0
CONSUMPTION TOTAL: NEGATIVE
ON A TYPICAL DAY WHEN YOU DRINK ALCOHOL HOW MANY DRINKS DO YOU HAVE: 0
AVERAGE NUMBER OF DAYS PER WEEK YOU HAVE A DRINK CONTAINING ALCOHOL: 0
HAVE PEOPLE ANNOYED YOU BY CRITICIZING YOUR DRINKING: NO
TOTAL SCORE: 0
TOTAL SCORE: 0
EVER HAD A DRINK FIRST THING IN THE MORNING TO STEADY YOUR NERVES TO GET RID OF A HANGOVER: NO
HAVE YOU EVER FELT YOU SHOULD CUT DOWN ON YOUR DRINKING: NO
HOW MANY TIMES IN THE PAST YEAR HAVE YOU HAD 5 OR MORE DRINKS IN A DAY: 0
ALCOHOL_USE: NO
EVER FELT BAD OR GUILTY ABOUT YOUR DRINKING: NO

## 2020-01-01 ASSESSMENT — PAIN SCALES - GENERAL: PAIN_LEVEL: 0

## 2020-01-01 ASSESSMENT — PULMONARY FUNCTION TESTS
FVC: 800
EPAP_CMH2O: 8
FVC: .6
FVC: .5

## 2020-09-09 PROBLEM — J96.00 ACUTE RESPIRATORY FAILURE (HCC): Status: ACTIVE | Noted: 2020-01-01

## 2020-09-09 PROBLEM — A41.9 SEPSIS (HCC): Status: ACTIVE | Noted: 2020-01-01

## 2020-09-09 PROBLEM — J18.9 PNEUMONIA: Status: ACTIVE | Noted: 2020-01-01

## 2020-09-10 NOTE — ASSESSMENT & PLAN NOTE
This is Severe Sepsis Present on admission  SIRS criteria identified on my evaluation include: Tachycardia, with heart rate greater than 90 BPM and Leukocytosis, with WBC greater than 12,000  Source of infection is respiratroy  Clinical indicators of end organ dysfunction include Acute respiratory failure as evidenced by a new need for invasive or non-invasive mechanical ventilation (Ventilator or BiPap)   Sepsis protocol initiated  Fluid resuscitation ordered per protocol  IV antibiotics as appropriate for source of sepsis  Reassessment: I have reassessed the patient's hemodynamic status  End organ dysfunction include(s):  Acute respiratory failure     Secondary to MSSA pneumonia

## 2020-09-10 NOTE — CARE PLAN
Problem: Nutritional:  Goal: Nutrition support tolerated and meeting greater than 85% of estimated needs  Outcome: NOT MET   Trickle feed per MD- suggest Impact 1.5 @ 10 mL/hr. Advancement per MD to goal rate of 50 mL/hr. See RD note.    RD following.

## 2020-09-10 NOTE — ED TRIAGE NOTES
Chief Complaint   Patient presents with   • Respiratory Distress     Patient is a flight transfer from [facility].  Patient drove self to ED for coughing up blood (on xarelto for AFib) and weakness.  Patient was found on 71% on non re breather.  CT Chest: Left sided PNA., Negative PE, COVID negative.  ED did not have CPAP and decision to intubate and transfer.      PTA:     Left 20G AC patent; Right 18G AC Patent.    At [facility] patient received 3.375 Zosyn, 750 Levaquin, 2L NS     In Flight: 70mg Ketamine

## 2020-09-10 NOTE — ASSESSMENT & PLAN NOTE
Intubated 9/9-9/12, 9/17-9/19, 9/20-9/23 - now on 4th course on vent this admission alone  Trach 9/23  Given multiple cardiac arrest - currently no weaning trials attempted.

## 2020-09-10 NOTE — CARE PLAN
Ventilator Daily Summary    Vent Day #  2     7.5      22   Ventilator settings changed this shift:  no  Weaning trials:  no  Respiratory Procedures:  Bronch post code   Plan: Continue current ventilator settings and wean mechanical ventilation as tolerated per physician orders.   20 [phone]

## 2020-09-10 NOTE — PROGRESS NOTES
Dr. Duval notified of pt HR sustaining between 118-132 in a-fib.   Per Dr. Duval, switch patient over from norepinephrine to phenylephrine for vasopressor support.   Orders placed into Epic.

## 2020-09-10 NOTE — ASSESSMENT & PLAN NOTE
RHF per report  Bedside echo demonstrates a severely dilated RV  Diuresis has substantially improved hemodynamics

## 2020-09-10 NOTE — CONSULTS
Critical Care Consultation    Date of consult: 9/9/2020    Referring Physician  Enrrique Russell D.O.    Reason for Consultation  Acute resp failure and PN    History of Presenting Illness  54 y.o. male w/ PMH of afib who presented 9/9/2020 with acute resp failure and PNA as a tx from [place]. Per report drove himself to the ED due to SOB and hemoptysis. Per report O2 sats were in the 70s and was intubated. Per report there was imagine suggestive of multi lobar PNA however those are not available for review at present. He was given Zosyn and Levaquin and transferred to [facility]. ON arrival his SBP is in the low 100s on propofol and norepi. Per report he received 2L of NS priro to transfer. There is no family available for history and I am unaware if he has been recently hospitalized or treated for any recent infections. He was covid negative at the outside facility. At present there is no donovan blood in the ETT.      Code Status  Full Code    Review of Systems  Review of Systems   Unable to perform ROS: Intubated       Past Medical History   has a past medical history of Atrial fibrillation (HCC), GERD (gastroesophageal reflux disease), Hypertension, and Psychiatric problem.    Surgical History   has a past surgical history that includes other orthopedic surgery and other abdominal surgery.    Family History  family history includes Cancer in his father and mother.    Social History   reports that he has quit smoking. He smoked 0.00 packs per day. He has never used smokeless tobacco. He reports current alcohol use. He reports previous drug use.    Medications  Home Medications     Reviewed by Kiran Black R.N. (Registered Nurse) on 09/09/20 at 2031  Med List Status: Partial   Medication Last Dose Status   amiodarone (CORDARONE) 200 MG Tab  Active   ascorbic acid (ASCORBIC ACID) 500 MG Tab  Active   calcitRIOL (ROCALTROL) 0.5 MCG Cap  Active   DULoxetine (CYMBALTA) 60 MG Cap DR Particles delayed-release  capsule  Active   esomeprazole (NEXIUM) 20 MG capsule  Active   ferrous sulfate 325 (65 Fe) MG tablet  Active   folic acid (FOLVITE) 1 MG Tab  Active   furosemide (LASIX) 40 MG Tab  Active   metoprolol (LOPRESSOR) 25 MG Tab  Active   potassium chloride (KLOR-CON) 20 MEQ Pack  Active   rivaroxaban (XARELTO) 20 MG Tab tablet  Active   tamsulosin (FLOMAX) 0.4 MG capsule  Active   topiramate (TOPAMAX) 25 MG Tab  Active   vitamin D (CHOLECALCIFEROL) 1000 UNIT Tab  Active              Current Facility-Administered Medications   Medication Dose Route Frequency Provider Last Rate Last Dose   • norepinephrine (Levophed) infusion 8 mg/250 mL (premix)  0-30 mcg/min Intravenous Continuous Enrrique AMANDA Russell D.O. 11.3 mL/hr at 09/09/20 2047 6 mcg/min at 09/09/20 2047   • senna-docusate (PERICOLACE or SENOKOT S) 8.6-50 MG per tablet 2 Tab  2 Tab Oral BID Arcadio Bangura M.D.        And   • polyethylene glycol/lytes (MIRALAX) PACKET 1 Packet  1 Packet Oral QDAY PRN Arcadio Bangura M.D.        And   • magnesium hydroxide (MILK OF MAGNESIA) suspension 30 mL  30 mL Oral QDAY PRN Arcadio Bangura M.D.        And   • bisacodyl (DULCOLAX) suppository 10 mg  10 mg Rectal QDAY PRN Arcadio Bangura M.D.       • acetaminophen (TYLENOL) tablet 650 mg  650 mg Oral Q6HRS PRN Arcadio Bangura M.D.       • ondansetron (ZOFRAN) syringe/vial injection 4 mg  4 mg Intravenous Q4HRS PRN Arcadio Bangura M.D.       • ondansetron (ZOFRAN ODT) dispertab 4 mg  4 mg Oral Q4HRS PRN Arcadio Bangura M.D.       • promethazine (PHENERGAN) tablet 12.5-25 mg  12.5-25 mg Oral Q4HRS PRN Arcadio Bangura M.D.       • promethazine (PHENERGAN) suppository 12.5-25 mg  12.5-25 mg Rectal Q4HRS PRN Arcadio Bangura M.D.       • prochlorperazine (COMPAZINE) injection 5-10 mg  5-10 mg Intravenous Q4HRS PRN Arcadio Bangura M.D.       • piperacillin-tazobactam (ZOSYN) 3.375 g in  mL IVPB  3.375 g Intravenous Once Arcadio Bangura M.D.        And   • [START ON 9/10/2020]  piperacillin-tazobactam (ZOSYN) 3.375 g in  mL IVPB  3.375 g Intravenous Q8HRS Arcadio Bangura M.D.       • Respiratory Therapy Consult   Nebulization Continuous RT Arcadio Bangura M.D.       • ipratropium-albuterol (DUONEB) nebulizer solution  3 mL Nebulization Q2HRS PRN (RT) Arcadio Bangura M.D.       • famotidine (PEPCID) tablet 20 mg  20 mg Enteral Tube Q12HRS Arcadio Bangura M.D.        Or   • famotidine (PEPCID) injection 20 mg  20 mg Intravenous Q12HRS Arcadio Bangura M.D.       • senna-docusate (PERICOLACE or SENOKOT S) 8.6-50 MG per tablet 2 Tab  2 Tab Enteral Tube BID Arcadio Bangura M.D.        And   • polyethylene glycol/lytes (MIRALAX) PACKET 1 Packet  1 Packet Enteral Tube QDAY PRN Arcadio Bangura M.D.        And   • magnesium hydroxide (MILK OF MAGNESIA) suspension 30 mL  30 mL Enteral Tube QDAY PRN Arcadio Bangura M.D.        And   • bisacodyl (DULCOLAX) suppository 10 mg  10 mg Rectal QDAY PRN Arcadio Bangura M.D.       • MD Alert...ICU Electrolyte Replacement per Pharmacy   Other PHARMACY TO DOSE Arcadio Bangura M.D.       • lidocaine (XYLOCAINE) 1 % injection 1-2 mL  1-2 mL Tracheal Tube Q30 MIN PRN Arcadio Bangura M.D.       • fentaNYL (SUBLIMAZE) injection 50 mcg  50 mcg Intravenous Q15 MIN PRN Arcadio Bangura M.D.        And   • fentaNYL (SUBLIMAZE) injection 100 mcg  100 mcg Intravenous Q15 MIN PRN Arcadio Bangura M.D.        And   • fentaNYL (SUBLIMAZE) 50 mcg/mL in 50mL (Continuous Infusion)   Intravenous Continuous Arcadio Bangura M.D.        And   • propofol (DIPRIVAN) injection  0-40 mcg/kg/min Intravenous Continuous Arcadio Bangura M.D.         Current Outpatient Medications   Medication Sig Dispense Refill   • furosemide (LASIX) 40 MG Tab Take 0.5 Tabs by mouth every day.     • potassium chloride (KLOR-CON) 20 MEQ Pack Take 1 Packet by mouth every day.     • metoprolol (LOPRESSOR) 25 MG Tab Take 1 Tab by mouth 2 Times a Day. 60 Tab    • esomeprazole (NEXIUM) 20 MG capsule Take 20  mg by mouth 2 Times a Day.     • tamsulosin (FLOMAX) 0.4 MG capsule Take 0.4 mg by mouth every bedtime.     • topiramate (TOPAMAX) 25 MG Tab Take 75 mg by mouth every day.     • rivaroxaban (XARELTO) 20 MG Tab tablet Take 20 mg by mouth with dinner.     • amiodarone (CORDARONE) 200 MG Tab Take 200 mg by mouth every day.     • calcitRIOL (ROCALTROL) 0.5 MCG Cap Take 0.5 mcg by mouth 2 Times a Day.     • ferrous sulfate 325 (65 Fe) MG tablet Take 325 mg by mouth every day.     • folic acid (FOLVITE) 1 MG Tab Take 1 mg by mouth every day.     • DULoxetine (CYMBALTA) 60 MG Cap DR Particles delayed-release capsule Take 60 mg by mouth every bedtime.     • ascorbic acid (ASCORBIC ACID) 500 MG Tab Take 500 mg by mouth 2 Times a Day.     • vitamin D (CHOLECALCIFEROL) 1000 UNIT Tab Take 1,000 Units by mouth every day.         Allergies  Allergies   Allergen Reactions   • Iron Dextran    • Keflex    • Penicillins        Vital Signs last 24 hours  Temp:  [36.6 °C (97.8 °F)] 36.6 °C (97.8 °F)  Pulse:  [] 106  Resp:  [20-22] 20  BP: ()/(59-76) 115/76  SpO2:  [98 %-100 %] 98 %    Physical Exam  Physical Exam  Constitutional:       Appearance: He is ill-appearing.      Comments: Frail   HENT:      Head: Normocephalic and atraumatic.      Right Ear: External ear normal.      Left Ear: External ear normal.      Nose: Nose normal.      Mouth/Throat:      Comments: Crusted dry blood around his lips and mouth, but no active bleeding  Eyes:      Pupils: Pupils are equal, round, and reactive to light.   Cardiovascular:      Rate and Rhythm: Regular rhythm. Tachycardia present.      Pulses: Normal pulses.   Pulmonary:      Breath sounds: Rhonchi present. No wheezing.   Abdominal:      General: Bowel sounds are normal. There is distension.   Musculoskeletal:      Right lower leg: Edema present.      Left lower leg: Edema present.   Skin:     General: Skin is warm.      Capillary Refill: Capillary refill takes 2 to 3 seconds.       Coloration: Skin is pale.   Neurological:      Comments: GCS 3t. Brainstem reflexes intact         Fluids  No intake or output data in the 24 hours ending 09/09/20 2111    Laboratory  Recent Results (from the past 48 hour(s))   ISTAT ARTERIAL BLOOD GAS    Collection Time: 09/09/20  8:37 PM   Result Value Ref Range    Ph 7.364 (L) 7.400 - 7.500    Pco2 54.9 (HH) 26.0 - 37.0 mmHg    Po2 58 (L) 64 - 87 mmHg    Tco2 33 20 - 33 mmol/L    S02 88 (L) 93 - 99 %    Hco3 31.3 (H) 17.0 - 25.0 mmol/L    BE 5 (H) -4 - 3 mmol/L    Body Temp 97.8 F degrees    O2 Therapy 40 %    iPF Ratio 145     Ph Temp Chris 7.371 (L) 7.400 - 7.500    Pco2 Temp Co 53.9 (HH) 26.0 - 37.0 mmHg    Po2 Temp Cor 56 (L) 64 - 87 mmHg    Specimen Arterial     Action Range Triggered YES     Inst. Qualified Patient YES    CBC WITH DIFFERENTIAL    Collection Time: 09/09/20  8:50 PM   Result Value Ref Range    WBC 18.6 (H) 4.8 - 10.8 K/uL    RBC 2.74 (L) 4.70 - 6.10 M/uL    Hemoglobin 8.1 (L) 14.0 - 18.0 g/dL    Hematocrit 27.0 (L) 42.0 - 52.0 %    MCV 98.5 (H) 81.4 - 97.8 fL    MCH 29.6 27.0 - 33.0 pg    MCHC 30.0 (L) 33.7 - 35.3 g/dL    RDW 47.6 35.9 - 50.0 fL    Platelet Count 199 164 - 446 K/uL    MPV 10.0 9.0 - 12.9 fL    Neutrophils-Polys 89.80 (H) 44.00 - 72.00 %    Lymphocytes 1.80 (L) 22.00 - 41.00 %    Monocytes 7.70 0.00 - 13.40 %    Eosinophils 0.00 0.00 - 6.90 %    Basophils 0.20 0.00 - 1.80 %    Immature Granulocytes 0.50 0.00 - 0.90 %    Nucleated RBC 0.00 /100 WBC    Neutrophils (Absolute) 16.71 (H) 1.82 - 7.42 K/uL    Lymphs (Absolute) 0.33 (L) 1.00 - 4.80 K/uL    Monos (Absolute) 1.44 (H) 0.00 - 0.85 K/uL    Eos (Absolute) 0.00 0.00 - 0.51 K/uL    Baso (Absolute) 0.03 0.00 - 0.12 K/uL    Immature Granulocytes (abs) 0.09 0.00 - 0.11 K/uL    NRBC (Absolute) 0.00 K/uL       Imaging  DX-CHEST-PORTABLE (1 VIEW)    (Results Pending)   EC-ECHOCARDIOGRAM COMPLETE W/O CONT    (Results Pending)   DX-CHEST-LIMITED (1 VIEW)    (Results Pending)        Assessment/Plan  Sepsis (HCC)  Assessment & Plan  This is Severe Sepsis Present on admission  SIRS criteria identified on my evaluation include: Tachycardia, with heart rate greater than 90 BPM and Leukocytosis, with WBC greater than 12,000  Source of infection is respiratroy  Clinical indicators of end organ dysfunction include Acute respiratory failure as evidenced by a new need for invasive or non-invasive mechanical ventilation (Ventilator or BiPap)   Sepsis protocol initiated  Fluid resuscitation ordered per protocol  IV antibiotics as appropriate for source of sepsis  Reassessment: I have reassessed the patient's hemodynamic status  End organ dysfunction include(s):  Acute respiratory failure     Goal MAP > 65; CVC placed at beside by ERP; Levophed started  Judicious fluids given hx of HF  Empiric abx; deescalate as able      Pneumonia  Assessment & Plan  Empiric abx with Zosyn and Vanc for now  Unsure if he has been recently hospitalized last admission at [facility] was in 12/19    Acute respiratory failure (HCC)  Assessment & Plan  2/2 to PNA  RT/O2 protocols  Titration of ventilator therapy based on ABGs, waveform analysis and patient's status  SAT/SBT when able (ABCDEF Bundle)    Pulmonary hypertension (HCC)- (present on admission)  Assessment & Plan  Goal euvolemia when MAP able to tolerate    Paroxysmal atrial fibrillation (HCC)- (present on admission)  Assessment & Plan  Some report of hemoptysis at OSH; no signs of active hemorrhage at present  Will hold Xarelto for now  Continue amiodarone through enteral tube  Hold BB for now given hypotension, resume when appropriate    CHF (congestive heart failure) (HCC)- (present on admission)  Assessment & Plan  RHF per report  Hold diuretic given hypotension and sepsis      Discussed patient condition and risk of morbidity and/or mortality with RN, RT, Pharmacy and Charge nurse / hot rounds.    The patient remains critically ill.  Critical care time = 40  minutes in directly providing and coordinating critical care and extensive data review.  No time overlap and excludes procedures.

## 2020-09-10 NOTE — CARE PLAN
Ventilator Daily Summary    Vent Day # 2     Ventilator settings changed this shift: fio2 to 30%     Weaning trials: sbt 2x a day     Respiratory Procedures: duo q6    Plan: Continue current ventilator settings and wean mechanical ventilation as tolerated per physician orders.

## 2020-09-10 NOTE — PROGRESS NOTES
Patient transferred to us from [place]  On 9/9/20 due to hypoxia and Pneumonia. He had been intubated there.    He is admitted to T631 and is diagnosed with Severe Sepsis and PNA.    Patient has a history of right heart failure. The acuity of the HF is not addressed at this time.    There is a good chance of iatrogenic exacerbation given treatment of sepsis.    Thank you, Genesis Cardio RN Navigator [phone]      UPDATE 9/11/20 messaged Dr. Duval to inquire about acuity/chronicity of patient's right HF. Yesterday's note does not address and echo is resulted.

## 2020-09-10 NOTE — PROGRESS NOTES
Pulmonary/Critical Care Medicine   Progress Note    Date of service: 9/10/2020  Time: 00:05    Called emergently to bedside for inability to ventilate patient, RT reports elevated peak pressures on vent and inability to obtain any TVs, he was removed from the ventilator and bagged with substantial difficulty. Albuterol and saline lavage was completed by the RT without improvement. Exam on arrival: no air movement R>L, ?leftward deviation of trachea. POCUS with no lung slide on R hemithorax    Shortly after my arrival the patient had a PEA arrest. CPR was initiated immediately and at 2 min pulse check we had ROSC. The patient had continued hypoxia, inability to ventilate and hypotension following ROSC. B/L finger thoracostomies were completed due to concern for tension pneumothorax as the inciting event of hypoxic arrest. Thoracotomies completed without rush of air, (small amount of effusion from right hemithorax), B/L lungs densely adherent to parietal pleura therefore tube thoracostomies were not completed. Incisions closed primarily with horizontal mattresses.    The patient was paralyzed with rocuronium due to continued insufficient TVs/inability to ventilate. Emergent bronchoscopy was completed with bloody mucus plugging of the LLL which was suctioned clear, continuous albuterol neb started for possible continued bronchospasm.    The patient awoke and was following commands, therefore TTM protocol will not be initiated.    A/P:  Cardiac Arrest   - PEA arrest due to hypoxia   - Not a TTM candidate   - Tele monitoring   - Echo pending    Respiratory failure with inability to ventilate   - PTX ruled out, negative thoracostomies   - ? Mucus plugging and bronchospasm   - continuous albuterol neb    Adelso Abdi Jr., D.O.  [facility]  [id number]

## 2020-09-10 NOTE — DIETARY
"Nutrition Support Assessment:  Day 1 of admit.  Jas Vann is a 54 y.o. male with admitting DX of acute respiratory failure.      Current problem list:  1. Acute respiratory failure  2. Pneumonia  3. Sepsis  4. Pulmonary hypertension  5. CHF  6. Paroxysmal atrial fibrillation      Assessment:  Estimated Nutritional Needs based on:   Height: 172.7 cm (5' 8\")  Weight: 75.4 kg (166 lb 3.6 oz) via bed scale 9/10.  Body mass index is 25.27 kg/m²., BMI classification: overweight    Calculation/Equation: MSJ x 1.2 = 1870 kcal/day; PSU (Ve 8, Tmax 36.3) = 1744 kcal/day  Calories: 7332-4042 kcal/day (23-26 kcal/kg current wt)  Grams Protein:  g/day (1.2-2.0 g/kg current wt)     Evaluation:   1. Nutrition support indicated in intubated/sedated patient (vent day 2).   2. Enteral access via Cortrak pending placement.   3. MAR: ICU electrolyte replacement per pharmacy, john @ 175 mcg/min, potassium phosphate IV @ 83.3 mL/h  4. Labs: K 2.9 (L), glu 106-145 over past 24 hr (acceptable), creat 0.48  5. Skin/bowel movements reviewed- no nutrition concerns at this time.  6. Bronchoscopy completed this morning after worsening compliance with ventilator, PEA arrest due to hypoxia.  7. High protein, low-CHO, low volume formula appropriate to meet patient's needs at this time.       Malnutrition Risk: NA     Recommendations/Plan:  1. Trickle feed per MD- suggest Impact 1.5 @ 10 mL/hr. Advancement per MD to goal rate of 50 mL/hr to provide 1800 kcal, 112.8 g protein, and 924 mL free water per day.   2. Fluids per MD.    RD following.               "

## 2020-09-10 NOTE — PROGRESS NOTES
At approximately 2300 9/9 the patient began to have decreased compliance with the ventilator. The RT was called, and the the patient was suctioned, line irrigated, and lavaged. The ventilator was switched for another and the problems resolved. At approximately 0010, the ventilation decreased again, and the RT began bagging the patient and Dr. Abdi was called to bedside. During the preparation of a bedside bronchoscopy, the patient became bradycardic and transitioned to PEA. Compressions were started immediately and a code blue was called. After one round of CPR, the patient regained pulses and vital signs all stabilized. Further assessment of the patient was completed. Dr. Abdi completed a bedside thoracostomy to further assess a pneumothorax. The thoracostomy was negative. At this time calcium and potassium replacement occurred as well as anticoagulation reversal for xarelto. Dr. Abdi then completed a bronchoscopy. Specimen cultures were sent to the lab. The patient was then started on continuous albuterol. The patient is currently stable. Will continue to monitor and assess.

## 2020-09-10 NOTE — PROGRESS NOTES
"Pharmacy Vancomycin Kinetics     Jas Vann is a 54 y.o. male on vancomycin day # 1 for an indication of PNA.    Anticipated Duration of Therapy:  TBD    Current dosing:  · Vancomycin 1750 mg IV loading dose on   · Vancomycin 1000 mg IV every 12 hours starting 9/10    History of Present Illness:   Admitted on 2020 following intubation at an outside facility for respiratory failure.  Pneumonia noted on CT scan PTA and patient started on piperacillin/tazobactam and levofloxacin.  Continued on broad spectrum antibiotics upon arrival to [facility] with piperacillin/tazobactam and vancomycin.  Pertinent past medical history per chart.    Antimicrobial history for admission:   · Piperacillin/tazobactam  - current  · Vancomycin  - current    Allergies: Iron dextran, Keflex, and Penicillins     Concerns for renal function: Concomitant nephrotoxin (piperacillin/tazobactam), hypotension/vasopressors, CT scan PTA (IV contrast?)    Pertinent cultures to date:   ·  - Blood, pending    MRSA nares swab ordered: Yes, not yet collected    Recent Labs     20   WBC 18.6*   NEUTSPOLYS 89.80*     Recent Labs     20   BUN 17   CREATININE 0.38*   ALBUMIN 3.2     No results for input(s): VANCOTROUGH, VANCOPEAK, VANCORANDOM in the last 72 hours.No intake or output data in the 24 hours ending 20   /76   Pulse (!) 106   Temp 36.6 °C (97.8 °F) (Temporal)   Resp 20   Ht 1.727 m (5' 8\")   Wt 70 kg (154 lb 5.2 oz)   SpO2 98%  Temp (24hrs), Av.6 °C (97.8 °F), Min:36.6 °C (97.8 °F), Max:36.6 °C (97.8 °F)      Assessment and Plan:  1. Dosing regimen and changes: New start  2. Next vancomycin level: In 2-3 days, not yet ordered  3. Goal trough: 15-20 mcg/mL for indication of PNA  4. Comments and plan: New start vancomycin to cover pneumonia in an intubated patient - unknown previous exposures to antibiotics.  Concerns for accumulation noted above.  Consider de-escalation as " appropriate based upon culture data and clinical status.  MRSA nares ordered.  Pharmacy to follow daily during therapy.    Thank you!  Tia Mena, PharmD, BCCCP

## 2020-09-10 NOTE — PROCEDURES
Procedures  Procedure Note    Date: 9/10/2020  Time: 1:48 AM    Procedure: Bronchoscopy    Indication: hypoxia, elevated Peak Pressures, hemoptysis    Consent: Emergent    Procedure: A time-out was performed. Respiratory therapy and nursing at bedside throughout procedure. Patient provided sedation and analgesia throughout the procedure. Placed on full ventilator support with an FiO2 of 100% throughout the procedure. Using a fiberoptic bronchoscope, trachea entered via ETT.  0 mL of local anesthetic sprayed at the yamilka (2% lidocaine) achieving appropriate comfort level for patient. Airways visualized directly and the following intervention was performed: suctioning of obstructing mucus and BAL. Findings as below. Patient tolerated procedure well without any difficulties and left in care of bedside nurse/RT.     Medications: propofol, rocuronium    Findings: Upper airway - Not visualized as bronchoscope passed through ETT.        Trachea to yamilka - inflammed appearing mucosa without lesions or mass, ETT tip measured 5 cm from the yamilka. Advanced to 2 cm from yamilka under bronchoscopic guidance        R proximal and distal airways - inflamed appearing mucosa without mass/lesion/anatomic variance, secretions: minimal, white mucus in the RLL distal bronchi. All bronchi sequentially suctioned/lavaged until clear effluent returned.        L proximal and distal airways - inflamed, ecchymotic appearing mucosa without mass/lesion/anatomic variance, secretions: large, bloody mucus/clots in the LLL and left mainstem. All bronchi sequentially suctioned/lavaged until clear effluent returned (quickly cleared, not DAH).        Samples - LLL BAL    Complications: none  CXR (if applicable): clear    Adelso Abdi,   Critical Care Medicine

## 2020-09-10 NOTE — ED PROVIDER NOTES
ED Provider Note    CHIEF COMPLAINT  Chief Complaint   Patient presents with   • Respiratory Distress       HPI  Jas Vann is a 54 y.o. male here for evaluation of sepsis and hemoptysis.  The patient is a transfer from [place], secondary to being hypoxic and having pneumonia.  The patient drove himself to the hospital because he was having trouble breathing.  Is found to have oxygen saturations of 70% on room air.  He was also having hemoptysis.  Patient is on Xarelto for history of atrial fibrillation.  It was noted that the patient had multi lobar pneumonia, and because of his respiratory distress, again, was intubated.  Patient has no reported fever or chills, his cover test was negative.  He currently has 3 IVs, he has propofol running, ketamine, and nor epi.  Patient received Zosyn and Levaquin as antibiotics prior to arrival.          ROS  See HPI for further details, o/w negative.     PAST MEDICAL HISTORY   has a past medical history of Atrial fibrillation (HCC), GERD (gastroesophageal reflux disease), Hypertension, and Psychiatric problem.    SOCIAL HISTORY  Social History     Tobacco Use   • Smoking status: Former Smoker     Packs/day: 0.00   • Smokeless tobacco: Never Used   Substance and Sexual Activity   • Alcohol use: Yes   • Drug use: Not Currently   • Sexual activity: Not on file       Family History  No bleeding disorders    SURGICAL HISTORY   has a past surgical history that includes other orthopedic surgery and other abdominal surgery.    CURRENT MEDICATIONS  Home Medications     Reviewed by Kiran Black R.N. (Registered Nurse) on 09/09/20 at 2031  Med List Status: Partial   Medication Last Dose Status   amiodarone (CORDARONE) 200 MG Tab  Active   ascorbic acid (ASCORBIC ACID) 500 MG Tab  Active   calcitRIOL (ROCALTROL) 0.5 MCG Cap  Active   DULoxetine (CYMBALTA) 60 MG Cap DR Particles delayed-release capsule  Active   esomeprazole (NEXIUM) 20 MG capsule  Active   ferrous  sulfate 325 (65 Fe) MG tablet  Active   folic acid (FOLVITE) 1 MG Tab  Active   furosemide (LASIX) 40 MG Tab  Active   metoprolol (LOPRESSOR) 25 MG Tab  Active   potassium chloride (KLOR-CON) 20 MEQ Pack  Active   rivaroxaban (XARELTO) 20 MG Tab tablet  Active   tamsulosin (FLOMAX) 0.4 MG capsule  Active   topiramate (TOPAMAX) 25 MG Tab  Active   vitamin D (CHOLECALCIFEROL) 1000 UNIT Tab  Active                ALLERGIES  Allergies   Allergen Reactions   • Iron Dextran    • Keflex    • Penicillins        REVIEW OF SYSTEMS  See HPI for further details. Review of systems as above, otherwise all other systems are negative.     PHYSICAL EXAM  Constitutional: Ill-appearing, intubated  HEENT: atraumatic. Posterior pharynx with ET tube, and dry  Eyes: 2 mm and minimally reactive, no scleral injection  Neck: Supple, trachea midline  Chest/Pulmonary:   Diminished breath sounds, equal expansion  Cardio: Tachycardic rate and rhythm with no murmur.   Abdomen: Soft, No palpable masses.  No ecchymosis  Musculoskeletal: No deformity, no edema, neurovascular intact.   Neuro: Sedated, downgoing toes, GCS 3  Skin; warm and dry, no petechial rash     Results for orders placed or performed during the hospital encounter of 09/09/20   CBC WITH DIFFERENTIAL   Result Value Ref Range    WBC 18.6 (H) 4.8 - 10.8 K/uL    RBC 2.74 (L) 4.70 - 6.10 M/uL    Hemoglobin 8.1 (L) 14.0 - 18.0 g/dL    Hematocrit 27.0 (L) 42.0 - 52.0 %    MCV 98.5 (H) 81.4 - 97.8 fL    MCH 29.6 27.0 - 33.0 pg    MCHC 30.0 (L) 33.7 - 35.3 g/dL    RDW 47.6 35.9 - 50.0 fL    Platelet Count 199 164 - 446 K/uL    MPV 10.0 9.0 - 12.9 fL    Neutrophils-Polys 89.80 (H) 44.00 - 72.00 %    Lymphocytes 1.80 (L) 22.00 - 41.00 %    Monocytes 7.70 0.00 - 13.40 %    Eosinophils 0.00 0.00 - 6.90 %    Basophils 0.20 0.00 - 1.80 %    Immature Granulocytes 0.50 0.00 - 0.90 %    Nucleated RBC 0.00 /100 WBC    Neutrophils (Absolute) 16.71 (H) 1.82 - 7.42 K/uL    Lymphs (Absolute) 0.33 (L)  1.00 - 4.80 K/uL    Monos (Absolute) 1.44 (H) 0.00 - 0.85 K/uL    Eos (Absolute) 0.00 0.00 - 0.51 K/uL    Baso (Absolute) 0.03 0.00 - 0.12 K/uL    Immature Granulocytes (abs) 0.09 0.00 - 0.11 K/uL    NRBC (Absolute) 0.00 K/uL   ISTAT ARTERIAL BLOOD GAS   Result Value Ref Range    Ph 7.364 (L) 7.400 - 7.500    Pco2 54.9 (HH) 26.0 - 37.0 mmHg    Po2 58 (L) 64 - 87 mmHg    Tco2 33 20 - 33 mmol/L    S02 88 (L) 93 - 99 %    Hco3 31.3 (H) 17.0 - 25.0 mmol/L    BE 5 (H) -4 - 3 mmol/L    Body Temp 97.8 F degrees    O2 Therapy 40 %    iPF Ratio 145     Ph Temp Chris 7.371 (L) 7.400 - 7.500    Pco2 Temp Co 53.9 (HH) 26.0 - 37.0 mmHg    Po2 Temp Cor 56 (L) 64 - 87 mmHg    Specimen Arterial     Action Range Triggered YES     Inst. Qualified Patient YES      DX-CHEST-PORTABLE (1 VIEW)    (Results Pending)   EC-ECHOCARDIOGRAM COMPLETE W/O CONT    (Results Pending)   DX-CHEST-LIMITED (1 VIEW)    (Results Pending)           PROCEDURES     MEDICAL RECORD  I have reviewed patient's medical record and pertinent results are listed.    COURSE & MEDICAL DECISION MAKING  I have reviewed any medical record information, laboratory studies and radiographic results as noted above.      HYDRATION: Based on the patient's presentation of Dehydration the patient was given IV fluids. IV Hydration was used because oral hydration was not adequate alone. Upon recheck following hydration, the patient was improved.    8:50 pm  Pt intubated, and remains sedated.   Nor epi 6 running, propofol at 40     9:00 pm  I spoke to Willem, on for ICU. He will see and admit the pt.  He is aware of the pt on xarelto.  No reversal at this time.  He will place the [facility]     CRITICAL CARE  The very real possibility of a deterioration of this patient's condition required the highest level of my preparedness for sudden, emergent intervention.  I provided critical care services, which included medication orders, frequent reevaluations of the patient's condition and  response to treatment, ordering and reviewing test results, and discussing the case with various consultants.  The critical care time associated with the care of the patient was 40 minutes. Review chart for interventions. This time is exclusive of any other billable procedures.       FINAL IMPRESSION  Pneumonia  Sepsis  Critical care time 40 minutes.       Electronically signed by: Enrrique Russell D.O., 9/9/2020 8:44 PM

## 2020-09-10 NOTE — PROCEDURES
Central Line Insertion    Date/Time: 9/9/2020 10:51 PM  Performed by: Arcadio Bangura M.D.  Authorized by: Arcadio Bangura M.D.     Consent:     Consent obtained:  Emergent situation  Pre-procedure details:     Hand hygiene: Hand hygiene performed prior to insertion      Sterile barrier technique: All elements of maximal sterile technique followed      Skin preparation:  2% chlorhexidine    Skin preparation agent: Skin preparation agent completely dried prior to procedure    Sedation:     Sedation type:  Moderate (conscious) sedation  Anesthesia:     Anesthesia method:  Local infiltration  Procedure details:     Location:  R internal jugular    Patient position:  Flat    Catheter size:  7 Fr    Ultrasound guidance: yes      Sterile ultrasound techniques: Sterile gel and sterile probe covers were used      Number of attempts:  1    Successful placement: yes    Post-procedure details:     Post-procedure:  Line sutured    Assessment:  Blood return through all ports and free fluid flow    Patient tolerance of procedure:  Tolerated well, no immediate complications  Comments:      CXR pending for placement

## 2020-09-11 NOTE — CARE PLAN
Problem: Ventilation Defect:  Goal: Ability to achieve and maintain unassisted ventilation or tolerate decreased levels of ventilator support  Outcome: NOT MET   Vent day 3  15/400/+8, 30%  SBT attempted, SPO2 decreased to 70s.

## 2020-09-11 NOTE — CARE PLAN
Problem: Pain Management  Goal: Pain level will decrease to patient's comfort goal  Outcome: PROGRESSING AS EXPECTED  Note: Q2hr pain assessments using CPOT scale.  Pain medication to be administered as necessary and as prescribed per the MAR.  Fentanyl gtt currently infusing. PRN Fentanyl IVP.  Frequent repositioning for comfort and skin integrity.     Problem: Fluid Volume:  Goal: Will maintain balanced intake and output  Outcome: PROGRESSING AS EXPECTED  Intervention: Monitor, educate, and encourage compliance with therapeutic intake of liquids  Note: Strict I&O.  Orozco catheter in place

## 2020-09-11 NOTE — CARE PLAN
Problem: Safety - Medical Restraint  Goal: Remains free of injury from restraints (Restraint for Interference with Medical Device)  Description: INTERVENTIONS:  1. Determine that other, less restrictive measures have been tried or would not be effective before applying the restraint  2. Evaluate the patient's condition at the time of restraint application  3. Inform patient/family regarding the reason for restraint  4. Q2H: Monitor safety, psychosocial status, comfort, nutrition and hydration  Outcome: PROGRESSING AS EXPECTED  Goal: Free from restraint(s) (Restraint for Interference with Medical Device)  Description: INTERVENTIONS:  1. ONCE/SHIFT or MINIMUM Q12H: Assess and document the continuing need for restraints  2. Q24H: Continued use of restraint requires LIP to perform face to face examination and written order  3. Identify and implement measures to help patient regain control  Outcome: MET     Problem: Safety - Medical Restraint  Goal: Free from restraint(s) (Restraint for Interference with Medical Device)  Description: INTERVENTIONS:  1. ONCE/SHIFT or MINIMUM Q12H: Assess and document the continuing need for restraints  2. Q24H: Continued use of restraint requires LIP to perform face to face examination and written order  3. Identify and implement measures to help patient regain control  Outcome: MET     Problem: Communication  Goal: The ability to communicate needs accurately and effectively will improve  Outcome: PROGRESSING AS EXPECTED     Problem: Safety  Goal: Will remain free from injury  Outcome: PROGRESSING AS EXPECTED

## 2020-09-11 NOTE — HEART FAILURE PROGRAM
Discussed with Dr. Duval. This patient's right heart failure is not in exaceration on this admission.    Therefore, HF f/u appointment and checklist are not indicated at this time. With that said, because pt is being treated for sepsis, he should be monitored very closely for iatrogenic exacerbation.    Thank you, Genesis, Cardio RN Navigator [phone]

## 2020-09-11 NOTE — CONSULTS
Critical Care Consultation    Date of consult: 9/9/2020    Referring Physician  Enrrique Russell D.O.    Reason for Consultation  Acute resp failure and PN    History of Presenting Illness  54 y.o. male w/ PMH of afib who presented 9/9/2020 with acute resp failure and PNA as a tx from [facility]    He was covid negative at the outside facility.  Concern was for multi lobar pneumonia requiring mechanical ventilation and norepinephrine for presumed septic shock.    9/10 brief respiratory arrest overnight, currently not requiring high ventilator settings.  Stop continuous albuterol, stop vancomycin, with lighten sedation he is awake and alert follows commands    9/11 awake and alert on the ventilator, complains of chest pain related to recent chest compressions.  Pursue multimodal pain relief.  Stop vancomycin.  Tube feeds to goal.    Reviewed last 24 hour events:              - Hemodynamics: HDS on [place]              - Neuro/Pain/Sedation: dex + fentanyl   - Resp: 8/40%              - GI: TF  - Infectious Disease: zosyn           Code Status  Full Code    Review of Systems  Review of Systems   Unable to perform ROS: Intubated       Past Medical History   has a past medical history of Atrial fibrillation (HCC), GERD (gastroesophageal reflux disease), Hypertension, and Psychiatric problem.    Surgical History   has a past surgical history that includes other orthopedic surgery and other abdominal surgery.    Family History  family history includes Cancer in his father and mother.    Social History   reports that he has quit smoking. He smoked 0.00 packs per day. He has never used smokeless tobacco. He reports current alcohol use. He reports previous drug use.    Medications  Home Medications     Reviewed by Kiran Black R.N. (Registered Nurse) on 09/09/20 at 2031  Med List Status: Partial   Medication Last Dose Status   amiodarone (CORDARONE) 200 MG Tab  Active   ascorbic acid (ASCORBIC ACID) 500 MG Tab   Active   calcitRIOL (ROCALTROL) 0.5 MCG Cap  Active   DULoxetine (CYMBALTA) 60 MG Cap DR Particles delayed-release capsule  Active   esomeprazole (NEXIUM) 20 MG capsule  Active   ferrous sulfate 325 (65 Fe) MG tablet  Active   folic acid (FOLVITE) 1 MG Tab  Active   furosemide (LASIX) 40 MG Tab  Active   metoprolol (LOPRESSOR) 25 MG Tab  Active   potassium chloride (KLOR-CON) 20 MEQ Pack  Active   rivaroxaban (XARELTO) 20 MG Tab tablet  Active   tamsulosin (FLOMAX) 0.4 MG capsule  Active   topiramate (TOPAMAX) 25 MG Tab  Active   vitamin D (CHOLECALCIFEROL) 1000 UNIT Tab  Active              Current Facility-Administered Medications   Medication Dose Route Frequency Provider Last Rate Last Dose   • lidocaine (LIDODERM) 5 % 2 Patch  2 Patch Transdermal Q24HR Francisco Duval M.D.   2 Patch at 09/11/20 1142   • heparin injection 5,000 Units  5,000 Units Subcutaneous Q8HRS Francisco Duval M.D.       • phenylephrine (LISANDRO-SYNEPHRINE) 40 mg in  mL Infusion  0-300 mcg/min Intravenous Continuous Francisco Duval M.D. 37.5 mL/hr at 09/11/20 1347 100 mcg/min at 09/11/20 1347   • ipratropium-albuterol (DUONEB) nebulizer solution  3 mL Nebulization Q6HRS (RT) Francisco Duval M.D.   3 mL at 09/11/20 0712   • dexmedetomidine (PRECEDEX) 400 mcg/100mL NS premix infusion  0.1-1.5 mcg/kg/hr Intravenous Continuous Francisco Duval M.D.   Stopped at 09/10/20 2237   • Pharmacy Consult: Enteral tube insertion - review meds/change route/product selection  1 Each Other PHARMACY TO DOSE Francisco Duval M.D.       • promethazine (PHENERGAN) tablet 12.5-25 mg  12.5-25 mg Enteral Tube Q4HRS PRN Francisco Duval M.D.       • ondansetron (ZOFRAN ODT) dispertab 4 mg  4 mg Enteral Tube Q4HRS PRN Francisco Duval M.D.       • acetaminophen (TYLENOL) tablet 650 mg  650 mg Enteral Tube Q6HRS PRN Francisco D Duval, M.D.       • norepinephrine (Levophed) infusion 8 mg/250 mL (premix)  0-30 mcg/min Intravenous Continuous Arcadio B Peel,  M.D.   Stopped at 09/10/20 0832   • ondansetron (ZOFRAN) syringe/vial injection 4 mg  4 mg Intravenous Q4HRS PRN Arcadio Bangura M.D.       • promethazine (PHENERGAN) suppository 12.5-25 mg  12.5-25 mg Rectal Q4HRS PRN Arcadio Bangura M.D.       • prochlorperazine (COMPAZINE) injection 5-10 mg  5-10 mg Intravenous Q4HRS PRN Arcadio Bangura M.D.       • piperacillin-tazobactam (ZOSYN) 3.375 g in  mL IVPB  3.375 g Intravenous Q8HRS Arcadio Bangura M.D. 25 mL/hr at 09/11/20 1237 3.375 g at 09/11/20 1237   • Respiratory Therapy Consult   Nebulization Continuous RT Arcadio Bangura M.D.       • famotidine (PEPCID) tablet 20 mg  20 mg Enteral Tube Q12HRS Arcadio Bangura M.D.   20 mg at 09/11/20 0636    Or   • famotidine (PEPCID) injection 20 mg  20 mg Intravenous Q12HRS Arcadio Bangura M.D.   20 mg at 09/10/20 1706   • senna-docusate (PERICOLACE or SENOKOT S) 8.6-50 MG per tablet 2 Tab  2 Tab Enteral Tube BID Arcadio Bangura M.D.   2 Tab at 09/11/20 0636    And   • polyethylene glycol/lytes (MIRALAX) PACKET 1 Packet  1 Packet Enteral Tube QDAY PRN Arcadio Bangura M.D.        And   • magnesium hydroxide (MILK OF MAGNESIA) suspension 30 mL  30 mL Enteral Tube QDAY PRN Arcadio Bangura M.D.        And   • bisacodyl (DULCOLAX) suppository 10 mg  10 mg Rectal QDAY PRN Arcadio Bangura M.D.       • MD Alert...ICU Electrolyte Replacement per Pharmacy   Other PHARMACY TO DOSE Arcadio Bangura M.D.       • lidocaine (XYLOCAINE) 1 % injection 1-2 mL  1-2 mL Tracheal Tube Q30 MIN PRN Arcadio Bangura M.D.       • fentaNYL (SUBLIMAZE) injection 50 mcg  50 mcg Intravenous Q15 MIN PRN Arcadio Bangura M.D.        And   • fentaNYL (SUBLIMAZE) injection 100 mcg  100 mcg Intravenous Q15 MIN PRN Arcadio Bangura M.D.   Stopped at 09/10/20 2246    And   • fentaNYL (SUBLIMAZE) 50 mcg/mL in 50mL (Continuous Infusion)   Intravenous Continuous Arcadio Bangura M.D.   Stopped at 09/11/20 1000   • amiodarone (CORDARONE) tablet 200 mg  200 mg  Enteral Tube Q DAY Arcadio Bangura M.D.   200 mg at 09/11/20 0636       Allergies  Allergies   Allergen Reactions   • Iron Dextran    • Keflex    • Penicillins      Tolerated zosyn at outside hospital 9/2020       Vital Signs last 24 hours  Temp:  [36.1 °C (96.9 °F)-36.7 °C (98 °F)] 36.1 °C (96.9 °F)  Pulse:  [] 116  Resp:  [1-34] 26  BP: ()/(39-76) 87/57  SpO2:  [82 %-100 %] 97 %    Physical Exam  Physical Exam  Constitutional:       Appearance: He is ill-appearing. He is not toxic-appearing.   HENT:      Head: Normocephalic and atraumatic.      Mouth/Throat:      Mouth: Mucous membranes are moist.   Eyes:      Extraocular Movements: Extraocular movements intact.      Pupils: Pupils are equal, round, and reactive to light.   Cardiovascular:      Rate and Rhythm: Normal rate and regular rhythm.   Pulmonary:      Breath sounds: Normal breath sounds.      Comments: Mechanical ventilation  Abdominal:      General: Abdomen is flat. There is no distension.      Tenderness: There is no abdominal tenderness. There is no guarding or rebound.   Musculoskeletal:         General: No swelling or tenderness.   Skin:     General: Skin is warm and dry.      Capillary Refill: Capillary refill takes less than 2 seconds.   Neurological:      Mental Status: He is alert.      Comments: Somnolent but moves all four ext, follows commands         Fluids    Intake/Output Summary (Last 24 hours) at 9/11/2020 1402  Last data filed at 9/11/2020 1200  Gross per 24 hour   Intake 1080.12 ml   Output 1400 ml   Net -319.88 ml       Laboratory  Recent Results (from the past 48 hour(s))   [facility] ARTERIAL BLOOD GAS    Collection Time: 09/09/20  8:37 PM   Result Value Ref Range    Ph 7.364 (L) 7.400 - 7.500    Pco2 54.9 (HH) 26.0 - 37.0 mmHg    Po2 58 (L) 64 - 87 mmHg    Tco2 33 20 - 33 mmol/L    S02 88 (L) 93 - 99 %    Hco3 31.3 (H) 17.0 - 25.0 mmol/L    BE 5 (H) -4 - 3 mmol/L    Body Temp 97.8 F degrees    O2 Therapy 40 %    iPF Ratio 145      Ph Temp Chris 7.371 (L) 7.400 - 7.500    Pco2 Temp Co 53.9 (HH) 26.0 - 37.0 mmHg    Po2 Temp Cor 56 (L) 64 - 87 mmHg    Specimen Arterial     Action Range Triggered YES     Inst. Qualified Patient YES    Triglycerides Starting now and then Every 3 Days    Collection Time: 09/09/20  8:50 PM   Result Value Ref Range    Triglycerides 50 0 - 149 mg/dL   CBC WITH DIFFERENTIAL    Collection Time: 09/09/20  8:50 PM   Result Value Ref Range    WBC 18.6 (H) 4.8 - 10.8 K/uL    RBC 2.74 (L) 4.70 - 6.10 M/uL    Hemoglobin 8.1 (L) 14.0 - 18.0 g/dL    Hematocrit 27.0 (L) 42.0 - 52.0 %    MCV 98.5 (H) 81.4 - 97.8 fL    MCH 29.6 27.0 - 33.0 pg    MCHC 30.0 (L) 33.7 - 35.3 g/dL    RDW 47.6 35.9 - 50.0 fL    Platelet Count 199 164 - 446 K/uL    MPV 10.0 9.0 - 12.9 fL    Neutrophils-Polys 89.80 (H) 44.00 - 72.00 %    Lymphocytes 1.80 (L) 22.00 - 41.00 %    Monocytes 7.70 0.00 - 13.40 %    Eosinophils 0.00 0.00 - 6.90 %    Basophils 0.20 0.00 - 1.80 %    Immature Granulocytes 0.50 0.00 - 0.90 %    Nucleated RBC 0.00 /100 WBC    Neutrophils (Absolute) 16.71 (H) 1.82 - 7.42 K/uL    Lymphs (Absolute) 0.33 (L) 1.00 - 4.80 K/uL    Monos (Absolute) 1.44 (H) 0.00 - 0.85 K/uL    Eos (Absolute) 0.00 0.00 - 0.51 K/uL    Baso (Absolute) 0.03 0.00 - 0.12 K/uL    Immature Granulocytes (abs) 0.09 0.00 - 0.11 K/uL    NRBC (Absolute) 0.00 K/uL   COMP METABOLIC PANEL    Collection Time: 09/09/20  8:50 PM   Result Value Ref Range    Sodium 141 135 - 145 mmol/L    Potassium 3.4 (L) 3.6 - 5.5 mmol/L    Chloride 100 96 - 112 mmol/L    Co2 30 20 - 33 mmol/L    Anion Gap 11.0 7.0 - 16.0    Glucose 106 (H) 65 - 99 mg/dL    Bun 17 8 - 22 mg/dL    Creatinine 0.38 (L) 0.50 - 1.40 mg/dL    Calcium 7.6 (L) 8.5 - 10.5 mg/dL    AST(SGOT) 7 (L) 12 - 45 U/L    ALT(SGPT) 14 2 - 50 U/L    Alkaline Phosphatase 49 30 - 99 U/L    Total Bilirubin 0.4 0.1 - 1.5 mg/dL    Albumin 3.2 3.2 - 4.9 g/dL    Total Protein 5.1 (L) 6.0 - 8.2 g/dL    Globulin 1.9 1.9 - 3.5 g/dL     A-G Ratio 1.7 g/dL   PROTHROMBIN TIME    Collection Time: 09/09/20  8:50 PM   Result Value Ref Range    PT 18.6 (H) 12.0 - 14.6 sec    INR 1.50 (H) 0.87 - 1.13   APTT    Collection Time: 09/09/20  8:50 PM   Result Value Ref Range    APTT 35.5 24.7 - 36.0 sec   TROPONIN    Collection Time: 09/09/20  8:50 PM   Result Value Ref Range    Troponin T 12 6 - 19 ng/L   ESTIMATED GFR    Collection Time: 09/09/20  8:50 PM   Result Value Ref Range    GFR If African American >60 >60 mL/min/1.73 m 2    GFR If Non African American >60 >60 mL/min/1.73 m 2   Lactic acid    Collection Time: 09/09/20 10:05 PM   Result Value Ref Range    Lactic Acid 0.7 0.5 - 2.0 mmol/L   Blood Culture,Hold    Collection Time: 09/09/20 10:05 PM   Result Value Ref Range    Blood Culture Hold Collected    Blood Culture,Hold    Collection Time: 09/09/20 10:05 PM   Result Value Ref Range    Blood Culture Hold Collected    BLOOD CULTURE    Collection Time: 09/09/20 10:05 PM    Specimen: Peripheral; Blood   Result Value Ref Range    Significant Indicator NEG     Source BLD     Site PERIPHERAL     Culture Result       No Growth  Note: Blood cultures are incubated for 5 days and  are monitored continuously.Positive blood cultures  are called to the RN and reported as soon as  they are identified.     BLOOD CULTURE    Collection Time: 09/09/20 10:05 PM    Specimen: Peripheral; Blood   Result Value Ref Range    Significant Indicator NEG     Source BLD     Site PERIPHERAL     Culture Result       No Growth  Note: Blood cultures are incubated for 5 days and  are monitored continuously.Positive blood cultures  are called to the RN and reported as soon as  they are identified.     ISTAT ARTERIAL BLOOD GAS    Collection Time: 09/09/20 11:49 PM   Result Value Ref Range    Ph 7.372 (L) 7.400 - 7.500    Pco2 53.8 (HH) 26.0 - 37.0 mmHg    Po2 54 (L) 64 - 87 mmHg    Tco2 33 20 - 33 mmol/L    S02 86 (L) 93 - 99 %    Hco3 31.2 (H) 17.0 - 25.0 mmol/L    BE 5 (H) -4 - 3  mmol/L    Body Temp 97.4 F degrees    O2 Therapy 40 %    iPF Ratio 135     Ph Temp Chris 7.382 (L) 7.400 - 7.500    Pco2 Temp Co 52.2 (HH) 26.0 - 37.0 mmHg    Po2 Temp Cor 51 (L) 64 - 87 mmHg    Specimen Arterial     Action Range Triggered YES     Inst. Qualified Patient YES    Fungal Culture - BAL    Collection Time: 09/10/20 12:45 AM    Specimen: Bronchoalveolar Lavage; Respirate   Result Value Ref Range    Significant Indicator NEG     Source RESP     Site Bronchoalveolar Lavage LLL     Culture Result Culture in progress.    Fungal Smear - BAL    Collection Time: 09/10/20 12:45 AM    Specimen: Bronchoalveolar Lavage; Respirate   Result Value Ref Range    Significant Indicator NEG     Source RESP     Site Bronchoalveolar Lavage LLL     Fungal Smear Results No fungal elements seen.    GRAM STAIN    Collection Time: 09/10/20 12:45 AM    Specimen: Respirate   Result Value Ref Range    Significant Indicator .     Source RESP     Site Bronchoalveolar Lavage LLL     Gram Stain Result       Many WBCs.  Rare columnar epithelial cells.  Rare epithelial cells.  No organisms seen.     ISTAT ARTERIAL BLOOD GAS    Collection Time: 09/10/20  3:08 AM   Result Value Ref Range    Ph 7.433 7.400 - 7.500    Pco2 40.6 (H) 26.0 - 37.0 mmHg    Po2 67 64 - 87 mmHg    Tco2 28 20 - 33 mmol/L    S02 93 93 - 99 %    Hco3 27.1 (H) 17.0 - 25.0 mmol/L    BE 3 -4 - 3 mmol/L    Body Temp 97.1 F degrees    O2 Therapy 40 %    iPF Ratio 168     Ph Temp Chris 7.446 7.400 - 7.500    Pco2 Temp Co 39.1 (H) 26.0 - 37.0 mmHg    Po2 Temp Cor 63 (L) 64 - 87 mmHg    Specimen Arterial     Action Range Triggered NO     Inst. Qualified Patient YES    CBC with Differential    Collection Time: 09/10/20  5:05 AM   Result Value Ref Range    WBC 22.0 (H) 4.8 - 10.8 K/uL    RBC 2.54 (L) 4.70 - 6.10 M/uL    Hemoglobin 7.7 (L) 14.0 - 18.0 g/dL    Hematocrit 25.0 (L) 42.0 - 52.0 %    MCV 98.4 (H) 81.4 - 97.8 fL    MCH 30.3 27.0 - 33.0 pg    MCHC 30.8 (L) 33.7 - 35.3 g/dL     RDW 48.2 35.9 - 50.0 fL    Platelet Count 207 164 - 446 K/uL    MPV 10.0 9.0 - 12.9 fL    Neutrophils-Polys 90.00 (H) 44.00 - 72.00 %    Lymphocytes 3.30 (L) 22.00 - 41.00 %    Monocytes 6.00 0.00 - 13.40 %    Eosinophils 0.00 0.00 - 6.90 %    Basophils 0.20 0.00 - 1.80 %    Immature Granulocytes 0.50 0.00 - 0.90 %    Nucleated RBC 0.00 /100 WBC    Neutrophils (Absolute) 19.83 (H) 1.82 - 7.42 K/uL    Lymphs (Absolute) 0.73 (L) 1.00 - 4.80 K/uL    Monos (Absolute) 1.33 (H) 0.00 - 0.85 K/uL    Eos (Absolute) 0.00 0.00 - 0.51 K/uL    Baso (Absolute) 0.04 0.00 - 0.12 K/uL    Immature Granulocytes (abs) 0.11 0.00 - 0.11 K/uL    NRBC (Absolute) 0.00 K/uL   Basic Metabolic Panel (BMP)    Collection Time: 09/10/20  5:05 AM   Result Value Ref Range    Sodium 140 135 - 145 mmol/L    Potassium 2.9 (L) 3.6 - 5.5 mmol/L    Chloride 100 96 - 112 mmol/L    Co2 29 20 - 33 mmol/L    Glucose 145 (H) 65 - 99 mg/dL    Bun 17 8 - 22 mg/dL    Creatinine 0.48 (L) 0.50 - 1.40 mg/dL    Calcium 7.9 (L) 8.5 - 10.5 mg/dL    Anion Gap 11.0 7.0 - 16.0   Magnesium    Collection Time: 09/10/20  5:05 AM   Result Value Ref Range    Magnesium 1.5 1.5 - 2.5 mg/dL   Phosphorus    Collection Time: 09/10/20  5:05 AM   Result Value Ref Range    Phosphorus 1.9 (L) 2.5 - 4.5 mg/dL   ESTIMATED GFR    Collection Time: 09/10/20  5:05 AM   Result Value Ref Range    GFR If African American >60 >60 mL/min/1.73 m 2    GFR If Non African American >60 >60 mL/min/1.73 m 2   Infectious Disease Testing (Exposure)    Collection Time: 09/10/20  7:39 AM   Result Value Ref Range    Hepatitis B Surface Antigen Non-Reactive Non-Reactive    Hepatitis C Antibody Non-Reactive Non-Reactive   HIV Rapid Screen (Exposure)    Collection Time: 09/10/20  7:39 AM   Result Value Ref Range    HIV Ag/Ab Combo Assay Non-Reactive Non Reactive   HEMOGLOBIN AND HEMATOCRIT    Collection Time: 09/10/20 10:14 AM   Result Value Ref Range    Hemoglobin 7.3 (L) 14.0 - 18.0 g/dL    Hematocrit  24.4 (L) 42.0 - 52.0 %   PROCALCITONIN    Collection Time: 09/10/20 11:32 AM   Result Value Ref Range    Procalcitonin <0.05 <0.25 ng/mL   EC-ECHOCARDIOGRAM COMPLETE W/O CONT    Collection Time: 09/10/20 12:39 PM   Result Value Ref Range    Eject.Frac. MOD BP 65.37     Eject.Frac. MOD 4C 61.62     Eject.Frac. MOD 2C 68     Left Ventrical Ejection Fraction 65    HEMOGLOBIN AND HEMATOCRIT    Collection Time: 09/10/20  5:14 PM   Result Value Ref Range    Hemoglobin 7.9 (L) 14.0 - 18.0 g/dL    Hematocrit 26.0 (L) 42.0 - 52.0 %   HEMOGLOBIN AND HEMATOCRIT    Collection Time: 09/11/20  2:00 AM   Result Value Ref Range    Hemoglobin 7.4 (L) 14.0 - 18.0 g/dL    Hematocrit 24.5 (L) 42.0 - 52.0 %   ISTAT ARTERIAL BLOOD GAS    Collection Time: 09/11/20  2:19 AM   Result Value Ref Range    Ph 7.424 7.400 - 7.500    Pco2 47.3 (H) 26.0 - 37.0 mmHg    Po2 84 64 - 87 mmHg    Tco2 32 20 - 33 mmol/L    S02 96 93 - 99 %    Hco3 31.0 (H) 17.0 - 25.0 mmol/L    BE 6 (H) -4 - 3 mmol/L    Body Temp 97.9 F degrees    O2 Therapy 30 %    iPF Ratio 280     Ph Temp Chris 7.430 7.400 - 7.500    Pco2 Temp Co 46.5 (H) 26.0 - 37.0 mmHg    Po2 Temp Cor 82 64 - 87 mmHg    Specimen Arterial     Action Range Triggered NO     Inst. Qualified Patient YES    Prealbumin    Collection Time: 09/11/20  6:10 AM   Result Value Ref Range    Pre-Albumin 5.3 (L) 18.0 - 38.0 mg/dL   CBC with Differential    Collection Time: 09/11/20  6:10 AM   Result Value Ref Range    WBC 14.1 (H) 4.8 - 10.8 K/uL    RBC 2.58 (L) 4.70 - 6.10 M/uL    Hemoglobin 7.6 (L) 14.0 - 18.0 g/dL    Hematocrit 25.1 (L) 42.0 - 52.0 %    MCV 97.3 81.4 - 97.8 fL    MCH 29.5 27.0 - 33.0 pg    MCHC 30.3 (L) 33.7 - 35.3 g/dL    RDW 49.0 35.9 - 50.0 fL    Platelet Count 211 164 - 446 K/uL    MPV 10.7 9.0 - 12.9 fL    Neutrophils-Polys 84.60 (H) 44.00 - 72.00 %    Lymphocytes 5.10 (L) 22.00 - 41.00 %    Monocytes 9.40 0.00 - 13.40 %    Eosinophils 0.10 0.00 - 6.90 %    Basophils 0.40 0.00 - 1.80 %     Immature Granulocytes 0.40 0.00 - 0.90 %    Nucleated RBC 0.10 /100 WBC    Neutrophils (Absolute) 11.98 (H) 1.82 - 7.42 K/uL    Lymphs (Absolute) 0.72 (L) 1.00 - 4.80 K/uL    Monos (Absolute) 1.33 (H) 0.00 - 0.85 K/uL    Eos (Absolute) 0.01 0.00 - 0.51 K/uL    Baso (Absolute) 0.05 0.00 - 0.12 K/uL    Immature Granulocytes (abs) 0.05 0.00 - 0.11 K/uL    NRBC (Absolute) 0.02 K/uL   Basic Metabolic Panel (BMP)    Collection Time: 09/11/20  6:10 AM   Result Value Ref Range    Sodium 143 135 - 145 mmol/L    Potassium 4.0 3.6 - 5.5 mmol/L    Chloride 105 96 - 112 mmol/L    Co2 30 20 - 33 mmol/L    Glucose 79 65 - 99 mg/dL    Bun 12 8 - 22 mg/dL    Creatinine 0.42 (L) 0.50 - 1.40 mg/dL    Calcium 8.1 (L) 8.5 - 10.5 mg/dL    Anion Gap 8.0 7.0 - 16.0   Magnesium    Collection Time: 09/11/20  6:10 AM   Result Value Ref Range    Magnesium 1.9 1.5 - 2.5 mg/dL   Phosphorus    Collection Time: 09/11/20  6:10 AM   Result Value Ref Range    Phosphorus 2.2 (L) 2.5 - 4.5 mg/dL   CRP QUANTITIVE (NON-CARDIAC)    Collection Time: 09/11/20  6:10 AM   Result Value Ref Range    Stat C-Reactive Protein 10.85 (H) 0.00 - 0.75 mg/dL   ESTIMATED GFR    Collection Time: 09/11/20  6:10 AM   Result Value Ref Range    GFR If African American >60 >60 mL/min/1.73 m 2    GFR If Non African American >60 >60 mL/min/1.73 m 2       Imaging  DX-CHEST-PORTABLE (1 VIEW)   Final Result         1.  Pulmonary edema and/or infiltrates are identified, which are stable since the prior exam.   2.  Air-filled distention of bowel, nonspecific and could represent ileus or enteritis, similar to prior dedicated view the abdomen.  Follow-up abdominal x-ray as clinically appropriate.   3.  Trace bilateral pleural effusion   4.  Cardiomegaly         DX-ABDOMEN FOR TUBE PLACEMENT   Final Result      Feeding tube placement with the tip projecting over the distal stomach or duodenal bulb      EC-ECHOCARDIOGRAM COMPLETE W/O CONT   Final Result      OUTSIDE IMAGES-DX CHEST    Final Result      US-FOREIGN FILM ULTRASOUND   Final Result      OUTSIDE IMAGES-DX CHEST   Final Result      OUTSIDE IMAGES-US ABDOMEN   Final Result      OUTSIDE IMAGES-CT CHEST   Final Result      DX-CHEST-PORTABLE (1 VIEW)   Final Result         1.  Pulmonary edema and/or infiltrates are identified, which are stable since the prior exam.   2.  Trace bilateral pleural effusion   3.  Cardiomegaly      DX-CHEST-LIMITED (1 VIEW)   Final Result         1.  Pulmonary edema and/or infiltrates.   2.  Cardiomegaly      DX-CHEST-LIMITED (1 VIEW)   Final Result         1.  Pulmonary edema and/or infiltrates are identified, which are stable since the prior exam.   2.  Small bilateral pleural effusions   3.  Cardiomegaly          Assessment/Plan  Sepsis (HCC)  Assessment & Plan  This is Severe Sepsis Present on admission  SIRS criteria identified on my evaluation include: Tachycardia, with heart rate greater than 90 BPM and Leukocytosis, with WBC greater than 12,000  Source of infection is respiratroy  Clinical indicators of end organ dysfunction include Acute respiratory failure as evidenced by a new need for invasive or non-invasive mechanical ventilation (Ventilator or BiPap)   Sepsis protocol initiated  Fluid resuscitation ordered per protocol  IV antibiotics as appropriate for source of sepsis  Reassessment: I have reassessed the patient's hemodynamic status  End organ dysfunction include(s):  Acute respiratory failure     Goal MAP > 65; CVC placed at beside by ERP; currently titrating phenylephrine for maps greater than 65  Empiric abx; deescalate as able      Pneumonia  Assessment & Plan  Empiric abx with Zosyn  MRSA swab negative status post vancomycin  Initial bronchoscopy studies negative    Acute respiratory failure (HCC)  Assessment & Plan  Multi lobar pneumonia versus pulmonary edema  RT/O2 protocols  Titration of ventilator therapy based on ABGs, waveform analysis and patient's status  SAT/SBT when able  (ABCDEF Bundle)       Pulmonary hypertension (HCC)- (present on admission)  Assessment & Plan  Goal euvolemia when MAP able to tolerate  Avoid hypoxia, hypercarbia, and acidosis    Paroxysmal atrial fibrillation (HCC)- (present on admission)  Assessment & Plan  Some report of hemoptysis at OSH; no signs of active hemorrhage at present  Will hold Xarelto for now  Continue amiodarone through enteral tube    CHF (congestive heart failure) (HCC)- (present on admission)  Assessment & Plan  RHF per report  Bedside echo demonstrates a severely dilated RV  Resume diuretics to improve the hemodynamics of the RV.  Phenylephrine is not ideal but he has been having tachydysrhythmias on norepinephrine.    Lung protective ventilation strategies  Optimize oxygenation, ventilation, and acid base balance.      Discussed patient condition and risk of morbidity and/or mortality with RN, RT, Pharmacy and Charge nurse / hot rounds.    The patient remains critically ill.  Critical care time = 41 minutes in directly providing and coordinating critical care and extensive data review.  No time overlap and excludes procedures.

## 2020-09-12 NOTE — RESPIRATORY CARE
COPD EDUCATION by COPD CLINICAL EDUCATOR  9/12/2020 at 9:50 AM by Shameka Canales, BETTY     Patient reviewed by COPD education team. Patient does not have a history or diagnosis of COPD and is a non-smoker, therefore does not qualify for the COPD program.

## 2020-09-12 NOTE — PROGRESS NOTES
Received bedside report assumed care of pt. Pt resting comfortably in bed. Bedside table, and call light within reach. Bed alarm on and in lowest position.  Drips verified. Pt denying pain at this time. VS stable. Updated whiteboard in room and discussed today's POC. No questions at this time.

## 2020-09-12 NOTE — CARE PLAN
Problem: Ventilation Defect:  Goal: Ability to achieve and maintain unassisted ventilation or tolerate decreased levels of ventilator support  Outcome: NOT MET   Vent day 4  , +8, 30%  SBT performed with no sedation present. RSBI >105 noted.

## 2020-09-12 NOTE — RESPIRATORY CARE
Extubation    Cuff leak noted yes  Stridor present no     O2 (LPM): 2 (09/12/20 1210)     Patient tolerating well  RCP Complete? yes  Events/Summary/Plan: extubated to 2L NC w/ MD at bedside.  (09/12/20 1210)

## 2020-09-12 NOTE — CONSULTS
Critical Care Consultation    Date of consult: 9/9/2020    Referring Physician  Enrrique Russell D.O.    Reason for Consultation  Acute resp failure and PN    History of Presenting Illness  54 y.o. male w/ PMH of afib who presented 9/9/2020 with acute resp failure and PNA as a tx from [facility]    He was covid negative at the outside facility.  Concern was for multi lobar pneumonia requiring mechanical ventilation and norepinephrine for presumed septic shock.    9/10 brief respiratory arrest overnight, currently not requiring high ventilator settings.  Stop continuous albuterol, stop vancomycin, with lighten sedation he is awake and alert follows commands    9/11 awake and alert on the ventilator, complains of chest pain related to recent chest compressions.  Pursue multimodal pain relief.  Stop vancomycin.  Tube feeds to goal.    9/12 extubated this am. H/H drop s/p txf - will hold SQH and recheck, hemodynamics have improved with diuresis in line with his history of RV failure    Reviewed last 24 hour events:              - Hemodynamics: HDS               - Neuro/Pain/Sedation: oxy prn   - Resp: NC              - GI: TF  - Infectious Disease: zosyn           Code Status  Full Code    Review of Systems  Review of Systems   Unable to perform ROS: Intubated       Past Medical History   has a past medical history of Atrial fibrillation (HCC), GERD (gastroesophageal reflux disease), Hypertension, and Psychiatric problem.    Surgical History   has a past surgical history that includes other orthopedic surgery and other abdominal surgery.    Family History  family history includes Cancer in his father and mother.    Social History   reports that he has quit smoking. He smoked 0.00 packs per day. He has never used smokeless tobacco. He reports current alcohol use. He reports previous drug use.    Medications  Home Medications     Reviewed by Kiran Black R.N. (Registered Nurse) on 09/09/20 at 2031  Med List  Status: Partial   Medication Last Dose Status   amiodarone (CORDARONE) 200 MG Tab  Active   ascorbic acid (ASCORBIC ACID) 500 MG Tab  Active   calcitRIOL (ROCALTROL) 0.5 MCG Cap  Active   DULoxetine (CYMBALTA) 60 MG Cap DR Particles delayed-release capsule  Active   esomeprazole (NEXIUM) 20 MG capsule  Active   ferrous sulfate 325 (65 Fe) MG tablet  Active   folic acid (FOLVITE) 1 MG Tab  Active   furosemide (LASIX) 40 MG Tab  Active   metoprolol (LOPRESSOR) 25 MG Tab  Active   potassium chloride (KLOR-CON) 20 MEQ Pack  Active   rivaroxaban (XARELTO) 20 MG Tab tablet  Active   tamsulosin (FLOMAX) 0.4 MG capsule  Active   topiramate (TOPAMAX) 25 MG Tab  Active   vitamin D (CHOLECALCIFEROL) 1000 UNIT Tab  Active              Current Facility-Administered Medications   Medication Dose Route Frequency Provider Last Rate Last Dose   • phosphorus (K-Phos-Neutral) per tablet 500 mg  500 mg Enteral Tube BID Francisco Duval M.D.   500 mg at 09/12/20 1013   • ampicillin/sulbactam (UNASYN) 3 g in  mL IVPB  3 g Intravenous Q6HRS Francisco Duval M.D.   Stopped at 09/12/20 1219   • levalbuterol (XOPENEX) 1.25 MG/3ML nebulizer solution 1.25 mg  1.25 mg Nebulization Q4H PRN (RT) Francisco Duval M.D.       • oxyCODONE immediate-release (ROXICODONE) tablet 5 mg  5 mg Oral Q4HRS PRN Francisco Duval M.D.        Or   • oxyCODONE immediate release (ROXICODONE) tablet 10 mg  10 mg Oral Q4HRS PRN Francisco Duval M.D.       • fentaNYL (SUBLIMAZE) injection 25 mcg  25 mcg Intravenous Q HOUR PRN Francisco Duval M.D.       • lidocaine (LIDODERM) 5 % 2 Patch  2 Patch Transdermal Q24HR Francisco Duval M.D.   2 Patch at 09/12/20 1014   • heparin injection 5,000 Units  5,000 Units Subcutaneous Q8HRS Francisco Duval M.D.   5,000 Units at 09/12/20 0528   • acetaminophen (TYLENOL) tablet 500 mg  500 mg Enteral Tube Q6HRS Francisco Duval M.D.   500 mg at 09/12/20 1200   • phenylephrine (LISANDRO-SYNEPHRINE) 40 mg in  mL  Infusion  0-300 mcg/min Intravenous Continuous Francisco Duval M.D.   Stopped at 09/12/20 0900   • Pharmacy Consult: Enteral tube insertion - review meds/change route/product selection  1 Each Other PHARMACY TO DOSE Francisco Duval M.D.       • promethazine (PHENERGAN) tablet 12.5-25 mg  12.5-25 mg Enteral Tube Q4HRS PRN Francisco Duval M.D.       • ondansetron (ZOFRAN ODT) dispertab 4 mg  4 mg Enteral Tube Q4HRS PRN Francisco Duval M.D.       • ondansetron (ZOFRAN) syringe/vial injection 4 mg  4 mg Intravenous Q4HRS PRN Arcadio Bangura M.D.       • promethazine (PHENERGAN) suppository 12.5-25 mg  12.5-25 mg Rectal Q4HRS PRN Arcadio Bangura M.D.       • prochlorperazine (COMPAZINE) injection 5-10 mg  5-10 mg Intravenous Q4HRS PRN Arcadio Bangura M.D.       • Respiratory Therapy Consult   Nebulization Continuous RT Arcadio Bangura M.D.       • famotidine (PEPCID) tablet 20 mg  20 mg Enteral Tube Q12HRS Arcadio Bangura M.D.   20 mg at 09/12/20 0528    Or   • famotidine (PEPCID) injection 20 mg  20 mg Intravenous Q12HRS Arcadio Bangura M.D.   20 mg at 09/10/20 1706   • senna-docusate (PERICOLACE or SENOKOT S) 8.6-50 MG per tablet 2 Tab  2 Tab Enteral Tube BID Arcadio Bangura M.D.   2 Tab at 09/11/20 1724    And   • polyethylene glycol/lytes (MIRALAX) PACKET 1 Packet  1 Packet Enteral Tube QDAY PRN Arcadio Bangura M.D.   1 Packet at 09/12/20 1014    And   • magnesium hydroxide (MILK OF MAGNESIA) suspension 30 mL  30 mL Enteral Tube QDAY PRN Arcadio Bangura M.D.        And   • bisacodyl (DULCOLAX) suppository 10 mg  10 mg Rectal QDAY PRN Arcadio Bangura M.D.       • MD Alert...ICU Electrolyte Replacement per Pharmacy   Other PHARMACY TO DOSE Arcadio Bangura M.D.       • amiodarone (CORDARONE) tablet 200 mg  200 mg Enteral Tube Q DAY Arcadio Bangura M.D.   200 mg at 09/12/20 0528       Allergies  Allergies   Allergen Reactions   • Iron Dextran    • Keflex    • Penicillins      Tolerated zosyn at outside hospital  9/2020       Vital Signs last 24 hours  Temp:  [36.4 °C (97.6 °F)-37 °C (98.6 °F)] 37 °C (98.6 °F)  Pulse:  [] 119  Resp:  [7-46] 26  BP: ()/(39-77) 113/72  SpO2:  [89 %-100 %] 98 %    Physical Exam  Physical Exam  Constitutional:       Appearance: He is ill-appearing. He is not toxic-appearing.   HENT:      Head: Normocephalic and atraumatic.      Mouth/Throat:      Mouth: Mucous membranes are moist.   Eyes:      Extraocular Movements: Extraocular movements intact.      Pupils: Pupils are equal, round, and reactive to light.   Cardiovascular:      Rate and Rhythm: Normal rate and regular rhythm.   Pulmonary:      Effort: No respiratory distress.      Breath sounds: No stridor. Wheezing present. No rhonchi.   Chest:      Chest wall: Tenderness present.   Abdominal:      General: Abdomen is flat. There is no distension.      Tenderness: There is no abdominal tenderness. There is no guarding or rebound.   Musculoskeletal:         General: No swelling or tenderness.   Skin:     General: Skin is warm and dry.      Capillary Refill: Capillary refill takes less than 2 seconds.   Neurological:      General: No focal deficit present.      Mental Status: He is alert and oriented to person, place, and time.      Sensory: No sensory deficit.      Motor: No weakness.   Psychiatric:         Mood and Affect: Mood normal.         Fluids    Intake/Output Summary (Last 24 hours) at 9/12/2020 1408  Last data filed at 9/12/2020 1000  Gross per 24 hour   Intake 2340 ml   Output 950 ml   Net 1390 ml       Laboratory  Recent Results (from the past 48 hour(s))   HEMOGLOBIN AND HEMATOCRIT    Collection Time: 09/10/20  5:14 PM   Result Value Ref Range    Hemoglobin 7.9 (L) 14.0 - 18.0 g/dL    Hematocrit 26.0 (L) 42.0 - 52.0 %   HEMOGLOBIN AND HEMATOCRIT    Collection Time: 09/11/20  2:00 AM   Result Value Ref Range    Hemoglobin 7.4 (L) 14.0 - 18.0 g/dL    Hematocrit 24.5 (L) 42.0 - 52.0 %   ISTAT ARTERIAL BLOOD GAS     Collection Time: 09/11/20  2:19 AM   Result Value Ref Range    Ph 7.424 7.400 - 7.500    Pco2 47.3 (H) 26.0 - 37.0 mmHg    Po2 84 64 - 87 mmHg    Tco2 32 20 - 33 mmol/L    S02 96 93 - 99 %    Hco3 31.0 (H) 17.0 - 25.0 mmol/L    BE 6 (H) -4 - 3 mmol/L    Body Temp 97.9 F degrees    O2 Therapy 30 %    iPF Ratio 280     Ph Temp Chris 7.430 7.400 - 7.500    Pco2 Temp Co 46.5 (H) 26.0 - 37.0 mmHg    Po2 Temp Cor 82 64 - 87 mmHg    Specimen Arterial     Action Range Triggered NO     Inst. Qualified Patient YES    Prealbumin    Collection Time: 09/11/20  6:10 AM   Result Value Ref Range    Pre-Albumin 5.3 (L) 18.0 - 38.0 mg/dL   CBC with Differential    Collection Time: 09/11/20  6:10 AM   Result Value Ref Range    WBC 14.1 (H) 4.8 - 10.8 K/uL    RBC 2.58 (L) 4.70 - 6.10 M/uL    Hemoglobin 7.6 (L) 14.0 - 18.0 g/dL    Hematocrit 25.1 (L) 42.0 - 52.0 %    MCV 97.3 81.4 - 97.8 fL    MCH 29.5 27.0 - 33.0 pg    MCHC 30.3 (L) 33.7 - 35.3 g/dL    RDW 49.0 35.9 - 50.0 fL    Platelet Count 211 164 - 446 K/uL    MPV 10.7 9.0 - 12.9 fL    Neutrophils-Polys 84.60 (H) 44.00 - 72.00 %    Lymphocytes 5.10 (L) 22.00 - 41.00 %    Monocytes 9.40 0.00 - 13.40 %    Eosinophils 0.10 0.00 - 6.90 %    Basophils 0.40 0.00 - 1.80 %    Immature Granulocytes 0.40 0.00 - 0.90 %    Nucleated RBC 0.10 /100 WBC    Neutrophils (Absolute) 11.98 (H) 1.82 - 7.42 K/uL    Lymphs (Absolute) 0.72 (L) 1.00 - 4.80 K/uL    Monos (Absolute) 1.33 (H) 0.00 - 0.85 K/uL    Eos (Absolute) 0.01 0.00 - 0.51 K/uL    Baso (Absolute) 0.05 0.00 - 0.12 K/uL    Immature Granulocytes (abs) 0.05 0.00 - 0.11 K/uL    NRBC (Absolute) 0.02 K/uL   Basic Metabolic Panel (BMP)    Collection Time: 09/11/20  6:10 AM   Result Value Ref Range    Sodium 143 135 - 145 mmol/L    Potassium 4.0 3.6 - 5.5 mmol/L    Chloride 105 96 - 112 mmol/L    Co2 30 20 - 33 mmol/L    Glucose 79 65 - 99 mg/dL    Bun 12 8 - 22 mg/dL    Creatinine 0.42 (L) 0.50 - 1.40 mg/dL    Calcium 8.1 (L) 8.5 - 10.5 mg/dL     Anion Gap 8.0 7.0 - 16.0   Magnesium    Collection Time: 09/11/20  6:10 AM   Result Value Ref Range    Magnesium 1.9 1.5 - 2.5 mg/dL   Phosphorus    Collection Time: 09/11/20  6:10 AM   Result Value Ref Range    Phosphorus 2.2 (L) 2.5 - 4.5 mg/dL   CRP QUANTITIVE (NON-CARDIAC)    Collection Time: 09/11/20  6:10 AM   Result Value Ref Range    Stat C-Reactive Protein 10.85 (H) 0.00 - 0.75 mg/dL   ESTIMATED GFR    Collection Time: 09/11/20  6:10 AM   Result Value Ref Range    GFR If African American >60 >60 mL/min/1.73 m 2    GFR If Non African American >60 >60 mL/min/1.73 m 2   ABO Rh Confirm    Collection Time: 09/11/20  6:10 AM   Result Value Ref Range    ABO Rh Confirm O POS    ISTAT ARTERIAL BLOOD GAS    Collection Time: 09/12/20  2:43 AM   Result Value Ref Range    Ph 7.497 7.400 - 7.500    Pco2 40.7 (H) 26.0 - 37.0 mmHg    Po2 70 64 - 87 mmHg    Tco2 33 20 - 33 mmol/L    S02 95 93 - 99 %    Hco3 31.5 (H) 17.0 - 25.0 mmol/L    BE 8 (H) -4 - 3 mmol/L    Body Temp 98.2 F degrees    O2 Therapy 30 %    iPF Ratio 233     Ph Temp Chris 7.500 7.400 - 7.500    Pco2 Temp Co 40.3 (H) 26.0 - 37.0 mmHg    Po2 Temp Cor 68 64 - 87 mmHg    Specimen Arterial     Action Range Triggered NO     Inst. Qualified Patient YES    CBC with Differential    Collection Time: 09/12/20  3:18 AM   Result Value Ref Range    WBC 14.9 (H) 4.8 - 10.8 K/uL    RBC 2.29 (L) 4.70 - 6.10 M/uL    Hemoglobin 6.7 (L) 14.0 - 18.0 g/dL    Hematocrit 22.1 (L) 42.0 - 52.0 %    MCV 96.5 81.4 - 97.8 fL    MCH 29.3 27.0 - 33.0 pg    MCHC 30.3 (L) 33.7 - 35.3 g/dL    RDW 49.1 35.9 - 50.0 fL    Platelet Count 197 164 - 446 K/uL    MPV 10.7 9.0 - 12.9 fL    Neutrophils-Polys 86.40 (H) 44.00 - 72.00 %    Lymphocytes 5.20 (L) 22.00 - 41.00 %    Monocytes 7.60 0.00 - 13.40 %    Eosinophils 0.10 0.00 - 6.90 %    Basophils 0.20 0.00 - 1.80 %    Immature Granulocytes 0.50 0.00 - 0.90 %    Nucleated RBC 0.00 /100 WBC    Neutrophils (Absolute) 12.86 (H) 1.82 - 7.42 K/uL     Lymphs (Absolute) 0.78 (L) 1.00 - 4.80 K/uL    Monos (Absolute) 1.13 (H) 0.00 - 0.85 K/uL    Eos (Absolute) 0.02 0.00 - 0.51 K/uL    Baso (Absolute) 0.03 0.00 - 0.12 K/uL    Immature Granulocytes (abs) 0.07 0.00 - 0.11 K/uL    NRBC (Absolute) 0.00 K/uL   Basic Metabolic Panel (BMP)    Collection Time: 09/12/20  3:18 AM   Result Value Ref Range    Sodium 142 135 - 145 mmol/L    Potassium 3.6 3.6 - 5.5 mmol/L    Chloride 103 96 - 112 mmol/L    Co2 32 20 - 33 mmol/L    Glucose 90 65 - 99 mg/dL    Bun 15 8 - 22 mg/dL    Creatinine 0.36 (L) 0.50 - 1.40 mg/dL    Calcium 7.9 (L) 8.5 - 10.5 mg/dL    Anion Gap 7.0 7.0 - 16.0   Magnesium    Collection Time: 09/12/20  3:18 AM   Result Value Ref Range    Magnesium 1.8 1.5 - 2.5 mg/dL   Phosphorus    Collection Time: 09/12/20  3:18 AM   Result Value Ref Range    Phosphorus 2.4 (L) 2.5 - 4.5 mg/dL   ESTIMATED GFR    Collection Time: 09/12/20  3:18 AM   Result Value Ref Range    GFR If African American >60 >60 mL/min/1.73 m 2    GFR If Non African American >60 >60 mL/min/1.73 m 2   COD - Adult (Type and Screen)    Collection Time: 09/12/20  3:18 AM   Result Value Ref Range    ABO Grouping Only O     Rh Grouping Only POS     Antibody Screen-Cod NEG     Component R       R99                 Red Cells, LR       [id number]   issued       09/12/20   05:48      Product Type R99     Dispense Status issued     Unit Number (Barcoded) [id number]     Product Code (Barcoded) [id number]     Blood Type (Barcoded) 5100        Imaging  DX-CHEST-PORTABLE (1 VIEW)   Final Result         1.  Pulmonary edema and/or infiltrates are identified, which are stable since the prior exam.   2.  Air-filled distention of bowel, nonspecific and could represent ileus or enteritis, incompletely visualized, but appears somewhat decreased since prior study   3.  Trace bilateral pleural effusion   4.  Cardiomegaly            DX-CHEST-PORTABLE (1 VIEW)   Final Result         1.  Pulmonary edema and/or infiltrates  are identified, which are stable since the prior exam.   2.  Air-filled distention of bowel, nonspecific and could represent ileus or enteritis, similar to prior dedicated view the abdomen.  Follow-up abdominal x-ray as clinically appropriate.   3.  Trace bilateral pleural effusion   4.  Cardiomegaly         DX-ABDOMEN FOR TUBE PLACEMENT   Final Result      Feeding tube placement with the tip projecting over the distal stomach or duodenal bulb      EC-ECHOCARDIOGRAM COMPLETE W/O CONT   Final Result      OUTSIDE IMAGES-DX CHEST   Final Result      US-FOREIGN FILM ULTRASOUND   Final Result      OUTSIDE IMAGES-DX CHEST   Final Result      OUTSIDE IMAGES-US ABDOMEN   Final Result      OUTSIDE IMAGES-CT CHEST   Final Result      DX-CHEST-PORTABLE (1 VIEW)   Final Result         1.  Pulmonary edema and/or infiltrates are identified, which are stable since the prior exam.   2.  Trace bilateral pleural effusion   3.  Cardiomegaly      DX-CHEST-LIMITED (1 VIEW)   Final Result         1.  Pulmonary edema and/or infiltrates.   2.  Cardiomegaly      DX-CHEST-LIMITED (1 VIEW)   Final Result         1.  Pulmonary edema and/or infiltrates are identified, which are stable since the prior exam.   2.  Small bilateral pleural effusions   3.  Cardiomegaly          Assessment/Plan  Sepsis (HCC)  Assessment & Plan  This is Severe Sepsis Present on admission  SIRS criteria identified on my evaluation include: Tachycardia, with heart rate greater than 90 BPM and Leukocytosis, with WBC greater than 12,000  Source of infection is respiratroy  Clinical indicators of end organ dysfunction include Acute respiratory failure as evidenced by a new need for invasive or non-invasive mechanical ventilation (Ventilator or BiPap)   Sepsis protocol initiated  Fluid resuscitation ordered per protocol  IV antibiotics as appropriate for source of sepsis  Reassessment: I have reassessed the patient's hemodynamic status  End organ dysfunction include(s):   Acute respiratory failure     Secondary to MSSA pneumonia      Pneumonia  Assessment & Plan  MRSA swab negative status post vancomycin  MSSA pneumonia will treat with beta-lactam    Acute respiratory failure (HCC)  Assessment & Plan  Extubated on 9/12  Remains high risk for need to reintubate given pulmonary edema/MSSA pneumonia and recent history of respiratory arrest    Pulmonary hypertension (HCC)- (present on admission)  Assessment & Plan  Goal euvolemia when MAP able to tolerate  Avoid hypoxia, hypercarbia, and acidosis    Paroxysmal atrial fibrillation (HCC)- (present on admission)  Assessment & Plan  Some report of hemoptysis at OSH; no signs of active hemorrhage at present  Will hold Xarelto for now  Continue amiodarone through enteral tube    CHF (congestive heart failure) (HCC)- (present on admission)  Assessment & Plan  RHF per report  Bedside echo demonstrates a severely dilated RV  Diuresis has substantially improved hemodynamics    54-year-old male here with acute hypoxic respiratory failure secondary to pneumonia and right heart failure. Extubated today but remains high risk for need to be reintubated given recent history of respiratory arrest, RV failure, and pneumonia.    Discussed patient condition and risk of morbidity and/or mortality with RN, RT, Pharmacy and Charge nurse / hot rounds.    The patient remains critically ill.  Critical care time = 38 minutes in directly providing and coordinating critical care and extensive data review.  No time overlap and excludes procedures.

## 2020-09-12 NOTE — CARE PLAN
Problem: Infection  Goal: Will remain free from infection  Outcome: PROGRESSING AS EXPECTED  Intervention: Implement standard precautions and perform hand washing before and after patient contact  Note: Standard precautions in place, hand washing done prior to and before pt care.      Problem: Skin Integrity  Goal: Risk for impaired skin integrity will decrease  Outcome: PROGRESSING AS EXPECTED  Intervention: Implement precautions to protect skin integrity in collaboration with the interdisciplinary team  Note: Precautions in place to promote skin integrity, routine turning, pt on dry sheets, pillows in use for positioning.

## 2020-09-13 NOTE — THERAPY
"Speech Language Pathology   Clinical Swallow Evaluation     Patient Name: Jas Vann  AGE:  54 y.o., SEX:  male  Medical Record #: [id number]  Today's Date: 9/13/2020          Assessment    53 y/o admitted on 9/9 for hypoxia, hemoptysis,and elevated peak pressures. Bronchoscopy conducted on 9/10 which found inflammed appear mucosa without mass/lession. Dx chest on 9/12 found \"Perihilar and bibasilar opacities may represent a combination of edema, atelectasis, pneumonia.\" Not seen by SLP before at [facility]. Intubated 9/9-9/12.    Pt seen on this date for a clinical swallow evaluation. Pt A&Ox4 and agreeable to the evaluation. Pt was a poor historian and unable to determine history/PLOF, but endorsed no hx of dysphagia. Labial weakness appreciated during the oral motor evaluation and slight hoarse vocal quality appreciated. No overt s/sx of aspiration appreciated with trials of single ice chips. Intermittent wet vocal quality, coughing, and audible wet upper airway congestion noted through out trials of MTL, apple sauce, pudding, and water which is concerning for aspiration. He c/o feeling \"pressure\" in pharyngeal area with trials of MTL and apple sauce, but reported that he felt like he needed to \"swallow hard\" with pudding given thicker texture. Swallow trigger initially timely, however, as session progressed, delayed up to 9 seconds likely due to fatigue. Of note, intermittent drop in 02 down to 87% following the swallow and pt became diaphoretic as session progressed. He c/o fatigue especially when self feeding so this clinician assisted about 3/4 of the way through. Given s/sx of aspiration with minimal PO trials and quick fatigue, would recommend continuation of NPO with TF for nutrition. Okay for 10 single ice chips an hour with RN. Pt will need ongoing education regarding role of SLP and dysphagia.     Plan    Continue NPO with non-oral source of nutrition. Okay for 10 single ice chips an hour with RN " only    Recommend Speech Therapy 3 times per week until therapy goals are met for the following treatments:  Dysphagia Training and Patient / Family / Caregiver Education.    Discharge Recommendations: (P) Recommend post-acute placement for additional speech therapy services prior to discharge home         Objective       09/13/20 1120   Vitals   O2 (LPM) 2   O2 Delivery Device Silicone Nasal Cannula   Pain 0 - 10 Group   Therapist Pain Assessment Post Activity Pain Same as Prior to Activity;Nurse Notified;9  (chest pain)   Prior Living Situation   Housing / Facility Other (Comments)  (pt is not a reliable historian )   Prior Level Of Function   Communication Unknown   Swallow Unknown   Dentition Edentulous   Dentures None   Hearing Within Functional Limits for Evaluation   Vision Within Functional Limits for Evaluation   Patient's Primary Language English   Occupation (Pre-Hospital Vocational) Not Employed   Oral Motor Eval    Is Patient Able to Complete Oral Motor Eval Yes but Impaired   Labial Function   Labial Structure At Rest Within Functional Limits   Labial Vowel Production / I /, / U / Minimal   Labial Sequence / I /, / U / Minimal   Lingual Function   Lingual Protrude Within Functional Limits   Lingual Retract Within Functional Limits   Lateralization No Impairment Right;No Impairment Left   Laryngeal Function   Voice Quality Minimal   Volutional Cough Moderate   Excursion Upon Swallow Weak   (weak and sluggish)   Oral Food Presentation   Ice Chips Within Functional Limits   Single Swallow Mildly Thick (2) - (Nectar Thick)  Minimal   Single Swallow Thin (0) Minimal   Liquidised (3) Minimal   Pureed (4) Moderate   Self Feeding Needs Assistance   Dysphagia Strategies / Recommendations   Strategies / Interventions Recommended (Yes / No) Yes   Compensatory Strategies To Be Assessed   Diet / Liquid Recommendation NPO;Pre-Feeding Trials with SLP Only   Medication Administration  Via Gastric Tube   Therapy  Interventions Dysphagia Therapy By Speech Language Pathologist;Oral Motor Exercises;Pharyngeal / Laryngeal Exercises   Short Term Goals   Short Term Goal # 1 Pt will consume prefeeding trials with no overt s/sx of aspiration

## 2020-09-13 NOTE — PROGRESS NOTES
I was alerted by Dr Duval, ICU attending, that patient is stable and safe to transfer out of the ICU today.    I have contatcted Dr Rangel and he will assume Hospitalist Attending role of patient today.

## 2020-09-13 NOTE — PROGRESS NOTES
"Hospital Medicine Daily Progress Note    Date of Service  9/13/2020    Chief Complaint  54 y.o. male admitted 9/9/2020 with acute hypoxic respiratory failure.    Hospital Course    For full details of admission please see the admission consultation of Dr. Bangura dated 9/9/2020, briefly, \"54 y.o. male w/ PMH of afib who presented 9/9/2020 with acute resp failure and PNA as a tx from [place]. Per report drove himself to the ED due to SOB and hemoptysis. Per report O2 sats were in the 70s and was intubated. Per report there was imagine suggestive of multi lobar PNA however those are not available for review at present. He was given Zosyn and Levaquin and transferred to [facility]. ON arrival his SBP is in the low 100s on propofol and norepi. Per report he received 2L of NS priro to transfer. There is no family available for history and I am unaware if he has been recently hospitalized or treated for any recent infections. He was covid negative at the outside facility. At present there is no donovan blood in the ETT.\"  [From the Consult of Dr. Bangura dated 9/9/2020.]    Patient was managed in the intensive care unit with the aid of mechanical ventilation for 2 days, extubated on the morning of 9/12/2020, deemed stable for transfer out of the intensive care unit on 9/13/2020.      Interval Problem Update  No acute overnight events.  Patient requesting oral intake.  Currently with nasogastric tube feeds in place.    Consultants/Specialty  Pulm/CC    Code Status  Full Code    Disposition  Anticipate back to home 9/16/2023 if ongoing continued clinical improvement.    Review of Systems  Review of Systems   Respiratory: Positive for cough and wheezing.    Cardiovascular: Negative for chest pain.   All other systems reviewed and are negative.       Physical Exam  Temp:  [36.1 °C (97 °F)-37 °C (98.6 °F)] (P) 36.1 °C (97 °F)  Pulse:  [] (P) 102  Resp:  [12-45] (P) 30  BP: ()/(56-71) (P) 121/62  SpO2:  [91 %-99 %] (P) 91 " %  General: No acute distress  HEENT atraumatic, normocephalic, pupils equal round reactive to light  Chest: Respirations are unlabored  Cardiac: Physiologic S1 and S2  Abdomen: Soft, nontender, nondistended  Extremities: Without clubbing, cyanosis or edema  Neuro: Cranial nerves II through XII are grossly intact.      Current Facility-Administered Medications:   •  furosemide (LASIX) injection 20 mg, 20 mg, Intravenous, Q DAY, Francisco Duval M.D., 20 mg at 09/13/20 0853  •  metoprolol (LOPRESSOR) tablet 12.5 mg, 12.5 mg, Enteral Tube, TWICE DAILY, Francisco Duval M.D., 12.5 mg at 09/13/20 0852  •  ampicillin/sulbactam (UNASYN) 3 g in  mL IVPB, 3 g, Intravenous, Q6HRS, Francisco Duval M.D., Stopped at 09/13/20 1233  •  levalbuterol (XOPENEX) 1.25 MG/3ML nebulizer solution 1.25 mg, 1.25 mg, Nebulization, Q4H PRN (RT), Francisco Duval M.D.  •  oxyCODONE immediate-release (ROXICODONE) tablet 5 mg, 5 mg, Oral, Q4HRS PRN, 5 mg at 09/12/20 1957 **OR** oxyCODONE immediate release (ROXICODONE) tablet 10 mg, 10 mg, Oral, Q4HRS PRN, Francisco Duval M.D., 10 mg at 09/13/20 1201  •  fentaNYL (SUBLIMAZE) injection 25 mcg, 25 mcg, Intravenous, Q HOUR PRN, Francisco Duval M.D., 25 mcg at 09/13/20 1027  •  lidocaine (LIDODERM) 5 % 2 Patch, 2 Patch, Transdermal, Q24HR, Francisco Duval M.D., 2 Patch at 09/13/20 1158  •  heparin injection 5,000 Units, 5,000 Units, Subcutaneous, Q8HRS, Francisco Duval M.D., 5,000 Units at 09/13/20 0543  •  acetaminophen (TYLENOL) tablet 500 mg, 500 mg, Enteral Tube, Q6HRS, Francisco Duval M.D., 500 mg at 09/13/20 1201  •  Pharmacy Consult: Enteral tube insertion - review meds/change route/product selection, 1 Each, Other, PHARMACY TO DOSE, Francisco Duval M.D.  •  promethazine (PHENERGAN) tablet 12.5-25 mg, 12.5-25 mg, Enteral Tube, Q4HRS PRN, Francisco Duval M.D.  •  ondansetron (ZOFRAN ODT) dispertab 4 mg, 4 mg, Enteral Tube, Q4HRS PRN, Francisco Duval M.D.  •   ondansetron (ZOFRAN) syringe/vial injection 4 mg, 4 mg, Intravenous, Q4HRS PRN, Arcadio Bangura M.D.  •  promethazine (PHENERGAN) suppository 12.5-25 mg, 12.5-25 mg, Rectal, Q4HRS PRN, Arcadio Banguar M.D.  •  prochlorperazine (COMPAZINE) injection 5-10 mg, 5-10 mg, Intravenous, Q4HRS PRN, Arcadio Bangura M.D.  •  Respiratory Therapy Consult, , Nebulization, Continuous RT, Arcadio Bangura M.D.  •  famotidine (PEPCID) tablet 20 mg, 20 mg, Enteral Tube, Q12HRS, 20 mg at 09/13/20 0543 **OR** famotidine (PEPCID) injection 20 mg, 20 mg, Intravenous, Q12HRS, Arcadio Bangura M.D., 20 mg at 09/10/20 1706  •  senna-docusate (PERICOLACE or SENOKOT S) 8.6-50 MG per tablet 2 Tab, 2 Tab, Enteral Tube, BID, 2 Tab at 09/13/20 0543 **AND** polyethylene glycol/lytes (MIRALAX) PACKET 1 Packet, 1 Packet, Enteral Tube, QDAY PRN, 1 Packet at 09/13/20 0552 **AND** magnesium hydroxide (MILK OF MAGNESIA) suspension 30 mL, 30 mL, Enteral Tube, QDAY PRN **AND** bisacodyl (DULCOLAX) suppository 10 mg, 10 mg, Rectal, QDAY PRN, Arcadio Bangura M.D.  •  MD Alert...ICU Electrolyte Replacement per Pharmacy, , Other, PHARMACY TO DOSE, Arcadio Bangura M.D.  •  amiodarone (CORDARONE) tablet 200 mg, 200 mg, Enteral Tube, Q DAY, Arcadio Bangura M.D., 200 mg at 09/13/20 0543        Fluids    Intake/Output Summary (Last 24 hours) at 9/13/2020 1433  Last data filed at 9/13/2020 1335  Gross per 24 hour   Intake 1230 ml   Output 1390 ml   Net -160 ml       Laboratory  Recent Labs     09/12/20  0318 09/12/20  1411 09/13/20  0511   WBC 14.9* 16.6* 11.8*   RBC 2.29* 3.19* 2.91*   HEMOGLOBIN 6.7* 9.5* 8.7*   HEMATOCRIT 22.1* 31.0* 29.0*   MCV 96.5 96.2 99.7*   MCH 29.3 29.8 29.9   MCHC 30.3* 30.9* 30.0*   RDW 49.1 51.6* 54.4*   PLATELETCT 197 223 190   MPV 10.7 10.6 10.6     Recent Labs     09/11/20  0610 09/12/20  0318 09/13/20  0511   SODIUM 143 142 144   POTASSIUM 4.0 3.6 4.4   CHLORIDE 105 103 103   CO2 30 32 35*   GLUCOSE 79 90 106*   BUN 12 15 15    CREATININE 0.42* 0.36* 0.32*   CALCIUM 8.1* 7.9* 7.9*                   Imaging  DX-CHEST-LIMITED (1 VIEW)   Final Result      Perihilar and bibasilar opacities may represent a combination of edema, atelectasis, pneumonia.      Trace pleural effusions are not excluded.      Interstitial prominence likely represents interstitial edema.         DX-CHEST-PORTABLE (1 VIEW)   Final Result         1.  Pulmonary edema and/or infiltrates are identified, which are stable since the prior exam.   2.  Air-filled distention of bowel, nonspecific and could represent ileus or enteritis, incompletely visualized, but appears somewhat decreased since prior study   3.  Trace bilateral pleural effusion   4.  Cardiomegaly            DX-CHEST-PORTABLE (1 VIEW)   Final Result         1.  Pulmonary edema and/or infiltrates are identified, which are stable since the prior exam.   2.  Air-filled distention of bowel, nonspecific and could represent ileus or enteritis, similar to prior dedicated view the abdomen.  Follow-up abdominal x-ray as clinically appropriate.   3.  Trace bilateral pleural effusion   4.  Cardiomegaly         DX-ABDOMEN FOR TUBE PLACEMENT   Final Result      Feeding tube placement with the tip projecting over the distal stomach or duodenal bulb      EC-ECHOCARDIOGRAM COMPLETE W/O CONT   Final Result      OUTSIDE IMAGES-DX CHEST   Final Result      US-FOREIGN FILM ULTRASOUND   Final Result      OUTSIDE IMAGES-DX CHEST   Final Result      OUTSIDE IMAGES-US ABDOMEN   Final Result      OUTSIDE IMAGES-CT CHEST   Final Result      DX-CHEST-PORTABLE (1 VIEW)   Final Result         1.  Pulmonary edema and/or infiltrates are identified, which are stable since the prior exam.   2.  Trace bilateral pleural effusion   3.  Cardiomegaly      DX-CHEST-LIMITED (1 VIEW)   Final Result         1.  Pulmonary edema and/or infiltrates.   2.  Cardiomegaly      DX-CHEST-LIMITED (1 VIEW)   Final Result         1.  Pulmonary edema and/or  infiltrates are identified, which are stable since the prior exam.   2.  Small bilateral pleural effusions   3.  Cardiomegaly           Assessment/Plan  Sepsis (HCC)  Assessment & Plan  Sepsis physiology is now resolved, patient stable for transfer out of ICU.  Continue antibiotics per orders.  Continue IV hydration with careful monitoring for volume overload given his history of heart failure.  Clinically appears euvolemic to slightly dry, will reassess daily.  May require gentle diuresis once this changes.    Pneumonia  Assessment & Plan  MSSA pneumonia per the documentation of the intensivist, all cultures obtained within our facility are no growth to date.    Acute respiratory failure (HCC)  Assessment & Plan  Resolved, extubated 9/12/2020, continue close clinical monitoring.  Stable for transfer out of ICU.    Paroxysmal atrial fibrillation (HCC)- (present on admission)  Assessment & Plan  Home dose anticoagulation on hold given that he had hemoptysis at outside hospital, no evidence of hemoptysis thus far, continue to monitor, resume once appropriate.  Continue amiodarone.  Continue beta-blockade.       VTE prophylaxis: Heparins as ordered.

## 2020-09-13 NOTE — PROGRESS NOTES
Received bedside report assumed care of pt. Pt resting comfortably in bed. Bedside table, and call light within reach. Bed alarm on and in lowest position.Pt denying pain at this time. VS stable. Updated whiteboard in room and discussed today's POC. No questions at this time.

## 2020-09-13 NOTE — ASSESSMENT & PLAN NOTE
Continue anticoagulation with rivaroxaban  Currently rhythm controlled with amiodarone  Continue follow up with cardiology on outpatient basis.

## 2020-09-13 NOTE — ASSESSMENT & PLAN NOTE
Sepsis secondary to PNA, Now resolved  - completed antibiotic therapy  - appears relatively euvolemic

## 2020-09-13 NOTE — ASSESSMENT & PLAN NOTE
The patient was found to be lethargic this morning and had episode of desaturations into the 70s requiring high flow oxygen at 10 L.  ABG done at that time shows significant hypercapnia and respiratory acidosis.  Repeat ABG after RT treatments show minimal improvement.  The patient is currently back to baseline in terms of his mentation however he will need further evaluation and management and possible reintubation in the ICU. Altered mentation likely related to CO2 narcosis.

## 2020-09-14 NOTE — CARE PLAN
Problem: Nutritional:  Goal: Nutrition support tolerated and meeting greater than 85% of estimated needs  Outcome: MET. TF running at goal with Impact Peptide 1.5 @ 50 ml/hr. RD following.

## 2020-09-14 NOTE — CARE PLAN
Problem: Fluid Volume:  Goal: Will maintain balanced intake and output  Outcome: PROGRESSING AS EXPECTED     Problem: Communication  Goal: The ability to communicate needs accurately and effectively will improve  Outcome: PROGRESSING AS EXPECTED     Problem: Venous Thromboembolism (VTW)/Deep Vein Thrombosis (DVT) Prevention:  Goal: Patient will participate in Venous Thrombosis (VTE)/Deep Vein Thrombosis (DVT)Prevention Measures  Outcome: PROGRESSING AS EXPECTED

## 2020-09-14 NOTE — THERAPY
Speech Language Pathology  Daily Treatment     Patient Name: Jas Vann  Age:  54 y.o., Sex:  male  Medical Record #: [id number]  Today's Date: 9/14/2020     Precautions  Precautions: Nasogastric Tube, Swallow Precautions ( See Comments)  Comments: Small bites/sips, 1:1 feeding, no straws    Assessment    Pt seen on this date for dysphagia tx. Pt NPO with NG tube and non-oral source of nutrition, tolerating ice chips with RN with no overt difficulty. Pt participated in PO trials of ice chips, thin liquids via spoon, MTL via spoon/cup sips, apple sauce, pudding and soft fruit with/without juice. No overt s/sx aspiration exhibited with trials of single ice chips, MTL via spoon/cup sip, apple sauce and pudding. Throat clear/mild cough observed with thin liquids via spoon presentation. Pt reported globus sensation with trials of soft fruit which cleared with  MTL liquid rinse x2 with verbal cues to complete an effortful swallows. Pt completed 3-4 swallows per bite/sip for all consistencies trialed. Swallow trigger was timely across consistencies. Vocal quality was clear for the duration of the session and O2 levels remained in the 90's.      Plan    SLP rec's to initiate PO diet of Pureed (Level 4) and Mildly Thick (Level 2) with 1:1 supervision. Consider keeping NG tube in place and hold tube feeds to ensure pt is able to tolerate PO and maintain hydration/nutrition needs via oral route only.         Discharge Recommendations: Recommend post-acute placement for additional speech therapy services prior to discharge home      Objective       09/14/20 1516   Precautions   Precautions Nasogastric Tube;Swallow Precautions ( See Comments)   Comments Small bites/sips, 1:1 feeding, no straws   Vitals   O2 (LPM) 4   O2 Delivery Device Silicone Nasal Cannula   Dysphagia    Dysphagia X   Positioning / Behavior Modification Modulate Rate or Bite Size   Diet / Liquid Recommendation Puree (4);Mildly Thick (2) - (Nectar Thick)    Nursing Communication Swallow Precaution Sign Posted at Head of Bed   Skilled Intervention Compensatory Strategies;Verbal Cueing   Recommended Route of Medication Administration   Medication Administration  Crush all Medications in Puree;Via Gastric Tube

## 2020-09-14 NOTE — CARE PLAN
Problem: Ventilation Defect:  Goal: Ability to achieve and maintain unassisted ventilation or tolerate decreased levels of ventilator support  Outcome: MET     Problem: Hyperinflation:  Goal: Prevent or improve atelectasis  Outcome: PROGRESSING SLOWER THAN EXPECTED   IS QID. Patient achieving 250 ml  Receiving 3 LPM via n/c

## 2020-09-14 NOTE — CARE PLAN
Problem: Skin Integrity  Goal: Risk for impaired skin integrity will decrease  Outcome: PROGRESSING AS EXPECTED  Intervention: Assess risk factors for impaired skin integrity and/or pressure ulcers  Note: Pt encouraged to turn q2 to reduce the risk of skin breakdown.      Problem: Urinary Elimination:  Goal: Ability to reestablish a normal urinary elimination pattern will improve  Outcome: PROGRESSING AS EXPECTED  Intervention: Assess and monitor for signs and symptoms of urinary retention  Note: Pt able to return to normal voiding pattern following catheter removal.

## 2020-09-15 NOTE — DIETARY
Nutrition Services: Transition to PO diet    Admit day 6.  TF @ goal via Cortrak.   Modified PO diet started 9/14 per SLP.  Pt eating well thus far and wants Cortrak out.  Per D/w pt and RN, plan to hold TF (now turned off) and monitor PO intake for next 2-3 meals. Then anticipate removal of Cortrak.   Will add supplements to meals, as pt is reporting hunger.    RD following.

## 2020-09-15 NOTE — PROGRESS NOTES
"Hospital Medicine Daily Progress Note    Date of Service  9/14/2020    Chief Complaint  54 y.o. male admitted 9/9/2020 with acute hypoxic respiratory failure.    Hospital Course    For full details of admission please see the admission consultation of Dr. Bangura dated 9/9/2020, briefly, \"54 y.o. male w/ PMH of afib who presented 9/9/2020 with acute resp failure and PNA as a tx from [place]. Per report drove himself to the ED due to SOB and hemoptysis. Per report O2 sats were in the 70s and was intubated. Per report there was imagine suggestive of multi lobar PNA however those are not available for review at present. He was given Zosyn and Levaquin and transferred to [facility]. ON arrival his SBP is in the low 100s on propofol and norepi. Per report he received 2L of NS priro to transfer. There is no family available for history and I am unaware if he has been recently hospitalized or treated for any recent infections. He was covid negative at the outside facility. At present there is no donovan blood in the ETT.\"  [From the Consult of Dr. Bangura dated 9/9/2020.]    Patient was managed in the intensive care unit with the aid of mechanical ventilation for 2 days, extubated on the morning of 9/12/2020, deemed stable for transfer out of the intensive care unit on 9/13/2020.      Interval Problem Update  No acute overnight events.  Patient again requesting oral intake.  Currently with nasogastric tube feeds in place.    Consultants/Specialty  Pulm/CC    Code Status  Full Code    Disposition  Anticipate back to home 9/16/2023 if ongoing continued clinical improvement.    Review of Systems  Review of Systems   Respiratory: Positive for cough and wheezing.    Cardiovascular: Negative for chest pain.   All other systems reviewed and are negative.       Physical Exam  Temp:  [35.9 °C (96.6 °F)-36.6 °C (97.9 °F)] 36 °C (96.8 °F)  Pulse:  [] 77  Resp:  [22] 22  BP: (105-124)/(42-79) 123/72  SpO2:  [95 %-100 %] 100 %  General: No " acute distress  HEENT atraumatic, normocephalic, pupils equal round reactive to light  Chest: Respirations are unlabored  Cardiac: Physiologic S1 and S2  Abdomen: Soft, nontender, nondistended  Extremities: Without clubbing, cyanosis or edema  Neuro: Cranial nerves II through XII are grossly intact.      Current Facility-Administered Medications:   •  furosemide (LASIX) injection 20 mg, 20 mg, Intravenous, Q DAY, Francisco Duvla M.D., 20 mg at 09/14/20 0603  •  metoprolol (LOPRESSOR) tablet 12.5 mg, 12.5 mg, Enteral Tube, TWICE DAILY, Francisco Duval M.D., 12.5 mg at 09/14/20 0545  •  ampicillin/sulbactam (UNASYN) 3 g in  mL IVPB, 3 g, Intravenous, Q6HRS, Francisco Duval M.D., Stopped at 09/14/20 1246  •  levalbuterol (XOPENEX) 1.25 MG/3ML nebulizer solution 1.25 mg, 1.25 mg, Nebulization, Q4H PRN (RT), Francisco Duval M.D.  •  oxyCODONE immediate-release (ROXICODONE) tablet 5 mg, 5 mg, Oral, Q4HRS PRN, 5 mg at 09/12/20 1957 **OR** oxyCODONE immediate release (ROXICODONE) tablet 10 mg, 10 mg, Oral, Q4HRS PRN, Francisco Duval M.D., 10 mg at 09/14/20 1006  •  fentaNYL (SUBLIMAZE) injection 25 mcg, 25 mcg, Intravenous, Q HOUR PRN, Francisco Duval M.D., 25 mcg at 09/13/20 2035  •  lidocaine (LIDODERM) 5 % 2 Patch, 2 Patch, Transdermal, Q24HR, Francisco Duval M.D., 2 Patch at 09/14/20 1215  •  heparin injection 5,000 Units, 5,000 Units, Subcutaneous, Q8HRS, Francisco Duval M.D., 5,000 Units at 09/14/20 1215  •  acetaminophen (TYLENOL) tablet 500 mg, 500 mg, Enteral Tube, Q6HRS, Francisco Duval M.D., 500 mg at 09/14/20 1009  •  Pharmacy Consult: Enteral tube insertion - review meds/change route/product selection, 1 Each, Other, PHARMACY TO DOSE, Francisco Duval M.D.  •  promethazine (PHENERGAN) tablet 12.5-25 mg, 12.5-25 mg, Enteral Tube, Q4HRS PRN, Francisco Duval M.D.  •  ondansetron (ZOFRAN ODT) dispertab 4 mg, 4 mg, Enteral Tube, Q4HRS PRN, Francisco Duval M.D.  •  ondansetron (ZOFRAN)  syringe/vial injection 4 mg, 4 mg, Intravenous, Q4HRS PRN, Arcadio Bangura M.D.  •  promethazine (PHENERGAN) suppository 12.5-25 mg, 12.5-25 mg, Rectal, Q4HRS PRN, Arcadio Bangura M.D.  •  prochlorperazine (COMPAZINE) injection 5-10 mg, 5-10 mg, Intravenous, Q4HRS PRN, Arcadio Bangura M.D.  •  Respiratory Therapy Consult, , Nebulization, Continuous RT, Arcadio Bangura M.D.  •  famotidine (PEPCID) tablet 20 mg, 20 mg, Enteral Tube, Q12HRS, 20 mg at 09/14/20 0544 **OR** famotidine (PEPCID) injection 20 mg, 20 mg, Intravenous, Q12HRS, Arcadio Bangura M.D., 20 mg at 09/10/20 1706  •  senna-docusate (PERICOLACE or SENOKOT S) 8.6-50 MG per tablet 2 Tab, 2 Tab, Enteral Tube, BID, 2 Tab at 09/14/20 0545 **AND** polyethylene glycol/lytes (MIRALAX) PACKET 1 Packet, 1 Packet, Enteral Tube, QDAY PRN, 1 Packet at 09/13/20 0552 **AND** magnesium hydroxide (MILK OF MAGNESIA) suspension 30 mL, 30 mL, Enteral Tube, QDAY PRN, 30 mL at 09/13/20 1522 **AND** bisacodyl (DULCOLAX) suppository 10 mg, 10 mg, Rectal, QDAY PRN, Arcadio Bangura M.D.  •  amiodarone (CORDARONE) tablet 200 mg, 200 mg, Enteral Tube, Q DAY, Arcadio Bangura M.D., 200 mg at 09/14/20 0544        Fluids    Intake/Output Summary (Last 24 hours) at 9/14/2020 1706  Last data filed at 9/14/2020 1400  Gross per 24 hour   Intake 1310 ml   Output 1675 ml   Net -365 ml       Laboratory  Recent Labs     09/12/20  1411 09/13/20  0511 09/14/20  0548   WBC 16.6* 11.8* 11.7*   RBC 3.19* 2.91* 3.08*   HEMOGLOBIN 9.5* 8.7* 9.3*   HEMATOCRIT 31.0* 29.0* 31.7*   MCV 96.2 99.7* 102.9*   MCH 29.8 29.9 30.2   MCHC 30.9* 30.0* 29.3*   RDW 51.6* 54.4* 53.9*   PLATELETCT 223 190 240   MPV 10.6 10.6 10.8     Recent Labs     09/12/20  0318 09/13/20  0511 09/14/20  0548   SODIUM 142 144 145   POTASSIUM 3.6 4.4 5.0   CHLORIDE 103 103 103   CO2 32 35* 38*   GLUCOSE 90 106* 118*   BUN 15 15 17   CREATININE 0.36* 0.32* 0.30*   CALCIUM 7.9* 7.9* 8.5                   Imaging  DX-CHEST-LIMITED (1  VIEW)   Final Result      Perihilar and bibasilar opacities may represent a combination of edema, atelectasis, pneumonia.      Trace pleural effusions are not excluded.      Interstitial prominence likely represents interstitial edema.         DX-CHEST-PORTABLE (1 VIEW)   Final Result         1.  Pulmonary edema and/or infiltrates are identified, which are stable since the prior exam.   2.  Air-filled distention of bowel, nonspecific and could represent ileus or enteritis, incompletely visualized, but appears somewhat decreased since prior study   3.  Trace bilateral pleural effusion   4.  Cardiomegaly            DX-CHEST-PORTABLE (1 VIEW)   Final Result         1.  Pulmonary edema and/or infiltrates are identified, which are stable since the prior exam.   2.  Air-filled distention of bowel, nonspecific and could represent ileus or enteritis, similar to prior dedicated view the abdomen.  Follow-up abdominal x-ray as clinically appropriate.   3.  Trace bilateral pleural effusion   4.  Cardiomegaly         DX-ABDOMEN FOR TUBE PLACEMENT   Final Result      Feeding tube placement with the tip projecting over the distal stomach or duodenal bulb      EC-ECHOCARDIOGRAM COMPLETE W/O CONT   Final Result      OUTSIDE IMAGES-DX CHEST   Final Result      US-FOREIGN FILM ULTRASOUND   Final Result      OUTSIDE IMAGES-DX CHEST   Final Result      OUTSIDE IMAGES-US ABDOMEN   Final Result      OUTSIDE IMAGES-CT CHEST   Final Result      DX-CHEST-PORTABLE (1 VIEW)   Final Result         1.  Pulmonary edema and/or infiltrates are identified, which are stable since the prior exam.   2.  Trace bilateral pleural effusion   3.  Cardiomegaly      DX-CHEST-LIMITED (1 VIEW)   Final Result         1.  Pulmonary edema and/or infiltrates.   2.  Cardiomegaly      DX-CHEST-LIMITED (1 VIEW)   Final Result         1.  Pulmonary edema and/or infiltrates are identified, which are stable since the prior exam.   2.  Small bilateral pleural effusions    3.  Cardiomegaly           Assessment/Plan  Sepsis (HCC)  Assessment & Plan  Sepsis physiology is now resolved, patient stable for transfer out of ICU.  Continue antibiotics per orders.  Continue IV hydration with careful monitoring for volume overload given his history of heart failure.  Clinically appears euvolemic to slightly dry, will reassess daily.  May require gentle diuresis once this changes.    Pneumonia  Assessment & Plan  MSSA pneumonia per the documentation of the intensivist, all cultures obtained within our facility are no growth to date.    Acute respiratory failure (HCC)  Assessment & Plan  Resolved, extubated 9/12/2020, continue close clinical monitoring.  Stable for transfer out of ICU.    Paroxysmal atrial fibrillation (HCC)- (present on admission)  Assessment & Plan  Home dose anticoagulation on hold given that he had hemoptysis at outside hospital, no evidence of hemoptysis thus far, continue to monitor, resume once appropriate.  Continue amiodarone.  Continue beta-blockade.       VTE prophylaxis: Heparins as ordered.

## 2020-09-15 NOTE — PROGRESS NOTES
Attending Hospitalist is Dr Acuna starting at 0700. Please contact this physician for orders, updates or questions today.

## 2020-09-15 NOTE — THERAPY
Speech Language Pathology  Daily Treatment     Patient Name: Jas Vann  Age:  54 y.o., Sex:  male  Medical Record #: [id number]  Today's Date: 9/15/2020     Precautions  Precautions: (P) Nasogastric Tube, Swallow Precautions ( See Comments)  Comments: (P) small bites/sips, alternate consistencies, no straws, direct supervision,    Assessment    Pt was seen for diet tolerance assessment during breakfast meal of puree and mildly thickened liquids. Pt was seated in his chair and fed himself 100% of meal. Pt required verbal cues for bolus size/pace. No overt s/sx aspiration appreciated with trials of puree eggs and fruit. Pt exhibited intermittent/mild throat clear with trials of mildly thick cream of wheat via spoon, which resolved when cued to take small sips from bowl. Throat clear also noted with trials of mildly thick liquids when Pt consumed consecutive sips and/or a large sip. Pt exhibited one cough episode which he reported was associated with clearance of mucus (audible by SLP). No additional overt s/sx aspiration observed. Swallow trigger was timely across consistencies and Pt was noted to elicit 1-2 swallows per bite/sip which is much improved from prior session where he required 3-4 swallows per bolus. Vocal quality remain clear for the duration of the session. Pt appeared to maintain fair endurance throughout meal and did not show signs of fatigue. Pt eager to eat and requesting other options of food textures. Pt consumed 100 %  of breakfast meal.  SLP discussed Pt's status with RN and Dietitian. Dietitian reported that she will follow up with Pt this morning and will likely hold feedings for the rest of the day.Dietitian will consider removal of cortrak if Pt continues to tolerate Po diet and is able to maintain adequate nutrition/hydration needs via oral route only.       Plan    Continue current treatment plan.    Discharge Recommendations: (P) Recommend post-acute placement for additional speech  therapy services prior to discharge home         Objective       09/15/20 0950   Precautions   Precautions Nasogastric Tube;Swallow Precautions ( See Comments)   Comments small bites/sips, alternate consistencies, no straws, direct supervision,   Pain   Pain Scales 0 to 10 Scale    Pain 0 - 10 Group   Therapist Pain Assessment 9;Nurse Notified;Post Activity Pain Same as Prior to Activity  (Rib cage pain (right side))   Dysphagia    Dysphagia X   Positioning / Behavior Modification Effortful Swallow;Alternate Solids and Liquids;Modulate Rate or Bite Size   Diet / Liquid Recommendation Mildly Thick (2) - (Nectar Thick);Minced & Moist (5) - (Dysphagia II)   Nutritional Liquid Intake Rating Scale Thickened beverages (mildly thick unless otherwise specified)   Nutritional Food Intake Rating Scale Tube dependent with consistent oral intake   Nursing Communication Swallow Precaution Sign Posted at Head of Bed   Skilled Intervention Compensatory Strategies;Verbal Cueing   Recommended Route of Medication Administration   Medication Administration  Crush all Medications in Puree;Via Gastric Tube   Short Term Goals   Short Term Goal # 1 B   9/14 Pt will consume PU4/MT2 with no overt s/sx aspiration given direct supervision

## 2020-09-15 NOTE — CARE PLAN
Problem: Hyperinflation:  Goal: Prevent or improve atelectasis  Outcome: PROGRESSING AS EXPECTED   IS QID

## 2020-09-15 NOTE — DISCHARGE PLANNING
Care Transition Team Discharge Planning    Anticipated Discharge Disposition: d/c back to long term care bed at [facility] in [place], [place]    Action: Pt states he has a bed as above. He also states the emergency  on his face sheet, Jessie Vann, is his sister. They have not spoken for three years and he would rather speak w/ his step-father, Pamela Quintero. However, he has not been in contact w/ Mr. Quintero for 30-40 years.    Zina explained to BS RN more than likely the [facility] system will not locate a non biological relative.     Lsw requested a [employer] search for pt.      Barriers to Discharge: TBD    Plan: f/u w/ medical team, pt, etc.

## 2020-09-16 NOTE — PROGRESS NOTES
"Hospital Medicine Daily Progress Note    Date of Service  9/16/2020    Chief Complaint  54 y.o. male admitted 9/9/2020 with acute hypoxic respiratory failure.    Hospital Course    For full details of admission please see the admission consultation of Dr. Bangura dated 9/9/2020, briefly, \"54 y.o. male w/ PMH of afib who presented 9/9/2020 with acute resp failure and PNA as a transfer from [place]. Per report drove himself to the ED due to SOB and hemoptysis. Per report O2 sats were in the 70s and he was thus intubated. imaging suggestive of multi lobar PNA and increased mediastinum. He was given Zosyn and Levaquin and transferred to [facility]. On arrival his SBP was in the low 100s on propofol and norepi. Per report he received 2L of NS priro to transfer. There is no family available for history and it was unclear if he had recently been treated for infection.  He was covid negative at the outside facility.  Patient was managed in the ICU with the aid of mechanical ventilation and total of 8 days of , he was successfully extubated on 9/12. He had post intubation dysphagia and required Cortak placement with enteral feeding, he was followed by SLP and eventually transitioned to a diet.     Interval Problem Update  Patient seen and examined at bedside, denies any major complaints  - diet advanced, has tolerated the last 3 meals without issues, Remove Cortrak today, diet per speech   - on 3L NC  - Hgb low but stable  - plan to transition back to [facility] when transport can be arranged  - will need PT/OT and SLP post discharge      Consultants/Specialty  Pulm/CC    Code Status  Full Code    Disposition  Pending transport back to [facility], medically stable for transfer     Review of Systems  Review of Systems   Constitutional: Negative for chills and fever.   HENT: Negative for congestion and sore throat.    Respiratory: Positive for wheezing. Negative for cough.    Cardiovascular: Positive " for chest pain. Negative for palpitations.   Gastrointestinal: Negative for nausea and vomiting.   Musculoskeletal: Positive for joint pain.   Neurological: Negative for dizziness and headaches.   All other systems reviewed and are negative.       Physical Exam  Temp:  [36.1 °C (97 °F)-36.9 °C (98.4 °F)] 36.1 °C (97 °F)  Pulse:  [] 105  Resp:  [16] 16  BP: (108-125)/(67-78) 108/67  SpO2:  [91 %-99 %] 96 %   Physical Exam  Constitutional:       Appearance: He is not ill-appearing.      Comments: Chronically ill appearing, cachectic male, appears older than stated age   HENT:      Head: Normocephalic and atraumatic.      Mouth/Throat:      Mouth: Mucous membranes are moist.      Pharynx: Oropharynx is clear.   Eyes:      Extraocular Movements: Extraocular movements intact.      Pupils: Pupils are equal, round, and reactive to light.   Neck:      Musculoskeletal: Normal range of motion and neck supple.   Cardiovascular:      Rate and Rhythm: Regular rhythm. Tachycardia present.      Heart sounds: No murmur.   Pulmonary:      Breath sounds: No wheezing or rales.      Comments: Diminished breath sounds in bases, using accessory muscles   Chest:      Chest wall: No tenderness.   Abdominal:      General: Abdomen is flat. There is no distension.      Palpations: Abdomen is soft.   Musculoskeletal: Normal range of motion.         General: No swelling.   Skin:     General: Skin is warm and dry.   Neurological:      Mental Status: He is alert and oriented to person, place, and time.      Motor: Weakness present.   Psychiatric:         Mood and Affect: Mood normal.         Behavior: Behavior normal.           Current Facility-Administered Medications:   •  furosemide (LASIX) tablet 20 mg, 20 mg, Oral, Q DAY, Kendal Acuna M.D.  •  oxyCODONE immediate-release (ROXICODONE) tablet 5 mg, 5 mg, Enteral Tube, Q4HRS PRN **OR** oxyCODONE immediate release (ROXICODONE) tablet 10 mg, 10 mg, Enteral Tube, Q4HRS PRN, Francisco PIZARRO  TANA Duval, 10 mg at 09/15/20 2107  •  DULoxetine (CYMBALTA) capsule 60 mg, 60 mg, Oral, QHS, Kendal Acuna M.D., 60 mg at 09/15/20 1953  •  topiramate (TOPAMAX) tablet 75 mg, 75 mg, Oral, DAILY, Kendal Acuna M.D., 75 mg at 09/16/20 0509  •  omeprazole (PRILOSEC) capsule 20 mg, 20 mg, Oral, BID, Kendal Acuna M.D., 20 mg at 09/16/20 0512  •  metoprolol (LOPRESSOR) tablet 12.5 mg, 12.5 mg, Enteral Tube, TWICE DAILY, Francisco Duval M.D., 12.5 mg at 09/16/20 0511  •  levalbuterol (XOPENEX) 1.25 MG/3ML nebulizer solution 1.25 mg, 1.25 mg, Nebulization, Q4H PRN (RT), Francisco Duval M.D.  •  fentaNYL (SUBLIMAZE) injection 25 mcg, 25 mcg, Intravenous, Q HOUR PRN, Francisco Duval M.D., 25 mcg at 09/13/20 2035  •  lidocaine (LIDODERM) 5 % 2 Patch, 2 Patch, Transdermal, Q24HR, Francisco Duval M.D., 2 Patch at 09/16/20 1131  •  heparin injection 5,000 Units, 5,000 Units, Subcutaneous, Q8HRS, Francisco Duval M.D., 5,000 Units at 09/16/20 1300  •  acetaminophen (TYLENOL) tablet 500 mg, 500 mg, Enteral Tube, Q6HRS, Francisco Duval M.D., 500 mg at 09/16/20 1132  •  Pharmacy Consult: Enteral tube insertion - review meds/change route/product selection, 1 Each, Other, PHARMACY TO DOSE, Francisco Duval M.D.  •  promethazine (PHENERGAN) tablet 12.5-25 mg, 12.5-25 mg, Enteral Tube, Q4HRS PRN, Francisco Duval M.D.  •  ondansetron (ZOFRAN ODT) dispertab 4 mg, 4 mg, Enteral Tube, Q4HRS PRN, Francisco Duval M.D.  •  ondansetron (ZOFRAN) syringe/vial injection 4 mg, 4 mg, Intravenous, Q4HRS PRN, Arcadio Bangura M.D.  •  promethazine (PHENERGAN) suppository 12.5-25 mg, 12.5-25 mg, Rectal, Q4HRS PRN, Arcadio Bangura M.D.  •  prochlorperazine (COMPAZINE) injection 5-10 mg, 5-10 mg, Intravenous, Q4HRS PRN, Arcadio Bangura M.D.  •  Respiratory Therapy Consult, , Nebulization, Continuous RT, Arcadio Bangura M.D.  •  famotidine (PEPCID) tablet 20 mg, 20 mg, Enteral Tube, Q12HRS, 20 mg at 09/16/20 0511 **OR**  [DISCONTINUED] famotidine (PEPCID) injection 20 mg, 20 mg, Intravenous, Q12HRS, Arcadio Bangura M.D., 20 mg at 09/10/20 1706  •  senna-docusate (PERICOLACE or SENOKOT S) 8.6-50 MG per tablet 2 Tab, 2 Tab, Enteral Tube, BID, 2 Tab at 09/16/20 0511 **AND** polyethylene glycol/lytes (MIRALAX) PACKET 1 Packet, 1 Packet, Enteral Tube, QDAY PRN, 1 Packet at 09/13/20 0552 **AND** magnesium hydroxide (MILK OF MAGNESIA) suspension 30 mL, 30 mL, Enteral Tube, QDAY PRN, 30 mL at 09/13/20 1522 **AND** bisacodyl (DULCOLAX) suppository 10 mg, 10 mg, Rectal, QDAY PRN, Arcadio Bangura M.D.  •  amiodarone (CORDARONE) tablet 200 mg, 200 mg, Enteral Tube, Q DAY, Arcadio Bangura M.D., 200 mg at 09/16/20 0512        Fluids    Intake/Output Summary (Last 24 hours) at 9/16/2020 1311  Last data filed at 9/16/2020 1130  Gross per 24 hour   Intake 240 ml   Output 1300 ml   Net -1060 ml       Laboratory  Recent Labs     09/14/20  0548 09/15/20  0507 09/16/20  0515   WBC 11.7* 11.2* 8.8   RBC 3.08* 2.98* 2.96*   HEMOGLOBIN 9.3* 9.0* 8.8*   HEMATOCRIT 31.7* 31.5* 30.3*   .9* 105.7* 102.4*   MCH 30.2 30.2 29.7   MCHC 29.3* 28.6* 29.0*   RDW 53.9* 52.8* 51.3*   PLATELETCT 240 277 298   MPV 10.8 10.4 10.2     Recent Labs     09/15/20  0507 09/15/20  1026 09/16/20  0515   SODIUM 143 139 139   POTASSIUM 5.6* 4.7 4.5   CHLORIDE 100 97 94*   CO2 39* 38* 38*   GLUCOSE 107* 112* 101*   BUN 22 23* 21   CREATININE 0.36* 0.32* 0.33*   CALCIUM 8.6 8.6 8.6                   Imaging  DX-CHEST-LIMITED (1 VIEW)   Final Result      Perihilar and bibasilar opacities may represent a combination of edema, atelectasis, pneumonia.      Trace pleural effusions are not excluded.      Interstitial prominence likely represents interstitial edema.         DX-ABDOMEN FOR TUBE PLACEMENT   Final Result      Feeding tube placement with the tip projecting over the distal stomach or duodenal bulb      EC-ECHOCARDIOGRAM COMPLETE W/O CONT   Final Result      OUTSIDE  IMAGES-DX CHEST   Final Result      US-FOREIGN FILM ULTRASOUND   Final Result      OUTSIDE IMAGES-DX CHEST   Final Result      OUTSIDE IMAGES-US ABDOMEN   Final Result      OUTSIDE IMAGES-CT CHEST   Final Result      DX-CHEST-PORTABLE (1 VIEW)   Final Result         1.  Pulmonary edema and/or infiltrates are identified, which are stable since the prior exam.   2.  Trace bilateral pleural effusion   3.  Cardiomegaly      DX-CHEST-LIMITED (1 VIEW)   Final Result         1.  Pulmonary edema and/or infiltrates.   2.  Cardiomegaly      DX-CHEST-LIMITED (1 VIEW)   Final Result         1.  Pulmonary edema and/or infiltrates are identified, which are stable since the prior exam.   2.  Small bilateral pleural effusions   3.  Cardiomegaly           Assessment/Plan  Sepsis (HCC)  Assessment & Plan  Sepsis secondary to PNA, Now resolved  - completed antibiotic therapy  - appears relatively euvolemic     Pneumonia  Assessment & Plan  MSSA pneumonia per the documentation of the intensivist, all cultures obtained within our facility are no growth to date.  Completed antibiotic course    Acute respiratory failure (HCC)  Assessment & Plan  Resolved, extubated 9/12/2020, continue close clinical monitoring.  Wean oxygen as able, will likely need O2 on discharge   Continue lasix for now, monitor volume status   Encourage IS, xopenex PRN   RT protocol       Major depression- (present on admission)  Assessment & Plan  Stable, resume Cymbalta     Paroxysmal atrial fibrillation (HCC)- (present on admission)  Assessment & Plan  Home dose anticoagulation on hold given that he had hemoptysis at outside hospital, no evidence of hemoptysis thus far, continue to monitor  - Hgb 9, consistent with baseline  - Continue amiodarone and metoprolol   - consider resumption of anticoagulation prior to discharge        VTE prophylaxis: Heparin

## 2020-09-16 NOTE — DISCHARGE PLANNING
Care Transition Team Discharge Planning    Anticipated Discharge Disposition: d/c back to LTC bed at [facility] attached SNF    Action: Lsw received update from hospitalist. Pt is medically cleared for d/c back to SNF.    Lsw called [facility] w/ SNF in [place] and left  advising as above.    Lsw spoke to BS RN @ [facility] to advise as above. Pt is able to transport via w/c van.    Lsw called [employer] and pt has regular FFS [employer] which does not include MTM. Lsw was provided a number for [employer]FS general benefits, but that number did not contact a human  after three attempted phone calls from this LSW.    Lsw spoke to immediate [facility]. She will discuss transport options w/ SW Manager [SWM] d/t [employer] cost for [facility] [see [facility] note for quotes] transport from [facility] to [facility].    Lsw received text w/ direction from [facility] to complete Approved Services [AS] for non-dedicated van.     Lsw completed ITF and AS. Lsw faxed ITF to [facility] and scanned/emailed AS to [facility] and SW.    LSw updated admission @ SNF in [facility]. She is okay w/ Friday admission, but prefers Thursday. She will wait for update phone contact from Lsw w/ acquired transport time once a team is located by [employer].      Barriers to Discharge: see above     Plan: f/u w/ [facility], medical team, SNF in [place]

## 2020-09-16 NOTE — DISCHARGE PLANNING
Care Transition Team Discharge Planning    Anticipated Discharge Disposition: d/c back to LTC bed at [facility], [place], [place]    Action: Zina called pt's LTC facility above @ main line [phone].     Zina spoke to  NATHALIE Macias @ [phone]. She indicates they do not have admissions on the weekend or Fridays. They prefer pt d/c and transport Monday-Thursday. Pt does have a LTC bed there in the SNF and they will take him back. He does have O2 there. He will need access to O2 during transport. If he needs PT or OT, the d/c summary should include this information in the f/u section. Also, if he does not need any lines or drains, they should all be removed prior to transport especially catheter, cortrak etc.    Phone to call for RN to RN report at d/c is  [phone]    If anyone else needs to be contacted at [facility] LTC unit for admission, contact Zina Valdez @ [phone].    Zina called hospitalist, Dr. Acuna and left VM advising as above.           Barriers to Discharge: TBD    Plan: f/u w/ medical team, etc.

## 2020-09-16 NOTE — DISCHARGE PLANNING
1320-Spoke To: Alan  Agency/Facility Name: [facility]  Plan or Request: [facility] requested to get transport quote for pt going to [facility] in [place], [place]  Non dedicated van- $1,410  Dedicated van- $1,035  Zina Jameson informed      1350-Spoke To: Alan  Agency/Facility Name: [facility]  Plan or Request: [facility] faxed TCS and Approved Services to [facility].  Alan stated time and date TBD.    Zina Jameson aware

## 2020-09-16 NOTE — PROGRESS NOTES
"Hospital Medicine Daily Progress Note    Date of Service  9/15/2020    Chief Complaint  54 y.o. male admitted 9/9/2020 with acute hypoxic respiratory failure.    Hospital Course    For full details of admission please see the admission consultation of Dr. Bangura dated 9/9/2020, briefly, \"54 y.o. male w/ PMH of afib who presented 9/9/2020 with acute resp failure and PNA as a tx from [place]. Per report drove himself to the ED due to SOB and hemoptysis. Per report O2 sats were in the 70s and was intubated. Per report there was imagine suggestive of multi lobar PNA however those are not available for review at present. He was given Zosyn and Levaquin and transferred to [facility]. ON arrival his SBP is in the low 100s on propofol and norepi. Per report he received 2L of NS priro to transfer. There is no family available for history and I am unaware if he has been recently hospitalized or treated for any recent infections. He was covid negative at the outside facility. At present there is no donovan blood in the ETT.\"  [From the Consult of Dr. Bangura dated 9/9/2020.]    Patient was managed in the intensive care unit with the aid of mechanical ventilation for 2 days, extubated on the morning of 9/12/2020, deemed stable for transfer out of the intensive care unit on 9/13/2020.      Interval Problem Update  Patient seen and examined at bedside, denies any major complaints, does not feel like breathing is improving at this point. Does complain of constant band like pain across his upper chest, not made worse with exertion or deep breathing   - diet advanced today, if able to tolerate the next 2-3 meals, okay to remove Cortrak   - on 4L NC, still intermittently tachycardic   - Hgb stable  - mild hyperkalemia this AM, repeat normalized without intervention   - resume home topiramate, Cymbalta and PPI therapy     Consultants/Specialty  Pulm/CC    Code Status  Full Code    Disposition  Anticipate back to home 9/16/2023 if ongoing continued " clinical improvement.    Review of Systems  Review of Systems   Respiratory: Positive for cough and wheezing.    Cardiovascular: Positive for chest pain. Negative for palpitations.   All other systems reviewed and are negative.       Physical Exam  Temp:  [36.4 °C (97.5 °F)] 36.4 °C (97.5 °F)  Pulse:  [] 107  Resp:  [19] 19  BP: (102-125)/(71-75) 113/72  SpO2:  [91 %-98 %] 97 %   Physical Exam  Constitutional:       Appearance: He is not ill-appearing.      Comments: Chronically ill appearing, cachectic male, appears older than stated age   HENT:      Head: Normocephalic and atraumatic.      Nose:      Comments: Cortrak in place     Mouth/Throat:      Mouth: Mucous membranes are moist.      Pharynx: Oropharynx is clear.   Eyes:      Extraocular Movements: Extraocular movements intact.      Pupils: Pupils are equal, round, and reactive to light.   Neck:      Musculoskeletal: Normal range of motion and neck supple.   Cardiovascular:      Rate and Rhythm: Regular rhythm. Tachycardia present.      Heart sounds: No murmur.   Pulmonary:      Breath sounds: No wheezing or rales.      Comments: Diminished breath sounds in bases, using accessory muscles   Chest:      Chest wall: No tenderness.   Abdominal:      General: Abdomen is flat. There is no distension.      Palpations: Abdomen is soft.   Musculoskeletal: Normal range of motion.         General: No swelling.   Skin:     General: Skin is warm and dry.   Neurological:      Mental Status: He is alert and oriented to person, place, and time.      Motor: Weakness present.   Psychiatric:         Mood and Affect: Mood normal.         Behavior: Behavior normal.           Current Facility-Administered Medications:   •  oxyCODONE immediate-release (ROXICODONE) tablet 5 mg, 5 mg, Enteral Tube, Q4HRS PRN **OR** oxyCODONE immediate release (ROXICODONE) tablet 10 mg, 10 mg, Enteral Tube, Q4HRS PRN, Francisco Duval M.D.  •  DULoxetine (CYMBALTA) capsule 60 mg, 60 mg, Oral,  QHS, Kendal Acuna M.D.  •  esomeprazole (NEXIUM) capsule 20 mg, 20 mg, Oral, BID, Kendal Acuna M.D.  •  [START ON 9/16/2020] topiramate (TOPAMAX) tablet 75 mg, 75 mg, Oral, DAILY, Kendal Acuna M.D.  •  furosemide (LASIX) injection 20 mg, 20 mg, Intravenous, Q DAY, Francisco Duval M.D., 20 mg at 09/15/20 0517  •  metoprolol (LOPRESSOR) tablet 12.5 mg, 12.5 mg, Enteral Tube, TWICE DAILY, Francisco Duval M.D., 12.5 mg at 09/15/20 1818  •  levalbuterol (XOPENEX) 1.25 MG/3ML nebulizer solution 1.25 mg, 1.25 mg, Nebulization, Q4H PRN (RT), Francisco Duval M.D.  •  fentaNYL (SUBLIMAZE) injection 25 mcg, 25 mcg, Intravenous, Q HOUR PRN, Francisco Duval M.D., 25 mcg at 09/13/20 2035  •  lidocaine (LIDODERM) 5 % 2 Patch, 2 Patch, Transdermal, Q24HR, Francisco Duval M.D., 2 Patch at 09/15/20 1031  •  heparin injection 5,000 Units, 5,000 Units, Subcutaneous, Q8HRS, Francisco Duval M.D., 5,000 Units at 09/15/20 1400  •  acetaminophen (TYLENOL) tablet 500 mg, 500 mg, Enteral Tube, Q6HRS, Francisco Duval M.D., 500 mg at 09/15/20 1811  •  Pharmacy Consult: Enteral tube insertion - review meds/change route/product selection, 1 Each, Other, PHARMACY TO DOSE, Francisco Duval M.D.  •  promethazine (PHENERGAN) tablet 12.5-25 mg, 12.5-25 mg, Enteral Tube, Q4HRS PRN, Francisco Duval M.D.  •  ondansetron (ZOFRAN ODT) dispertab 4 mg, 4 mg, Enteral Tube, Q4HRS PRN, Francisco Duval M.D.  •  ondansetron (ZOFRAN) syringe/vial injection 4 mg, 4 mg, Intravenous, Q4HRS PRN, Arcadio Bangura M.D.  •  promethazine (PHENERGAN) suppository 12.5-25 mg, 12.5-25 mg, Rectal, Q4HRS PRN, Arcadio Bangura M.D.  •  prochlorperazine (COMPAZINE) injection 5-10 mg, 5-10 mg, Intravenous, Q4HRS PRN, Arcadio Bangura M.D.  •  Respiratory Therapy Consult, , Nebulization, Continuous RT, Arcadio Bangura M.D.  •  famotidine (PEPCID) tablet 20 mg, 20 mg, Enteral Tube, Q12HRS, 20 mg at 09/15/20 1811 **OR** [DISCONTINUED] famotidine  (PEPCID) injection 20 mg, 20 mg, Intravenous, Q12HRS, Arcadio Bangura M.D., 20 mg at 09/10/20 1706  •  senna-docusate (PERICOLACE or SENOKOT S) 8.6-50 MG per tablet 2 Tab, 2 Tab, Enteral Tube, BID, 2 Tab at 09/15/20 1811 **AND** polyethylene glycol/lytes (MIRALAX) PACKET 1 Packet, 1 Packet, Enteral Tube, QDAY PRN, 1 Packet at 09/13/20 0552 **AND** magnesium hydroxide (MILK OF MAGNESIA) suspension 30 mL, 30 mL, Enteral Tube, QDAY PRN, 30 mL at 09/13/20 1522 **AND** bisacodyl (DULCOLAX) suppository 10 mg, 10 mg, Rectal, QDAY PRN, Arcadio Bangura M.D.  •  amiodarone (CORDARONE) tablet 200 mg, 200 mg, Enteral Tube, Q DAY, Arcadio Bangura M.D., 200 mg at 09/15/20 0517        Fluids    Intake/Output Summary (Last 24 hours) at 9/15/2020 1832  Last data filed at 9/15/2020 1200  Gross per 24 hour   Intake 830 ml   Output 1025 ml   Net -195 ml       Laboratory  Recent Labs     09/13/20  0511 09/14/20  0548 09/15/20  0507   WBC 11.8* 11.7* 11.2*   RBC 2.91* 3.08* 2.98*   HEMOGLOBIN 8.7* 9.3* 9.0*   HEMATOCRIT 29.0* 31.7* 31.5*   MCV 99.7* 102.9* 105.7*   MCH 29.9 30.2 30.2   MCHC 30.0* 29.3* 28.6*   RDW 54.4* 53.9* 52.8*   PLATELETCT 190 240 277   MPV 10.6 10.8 10.4     Recent Labs     09/14/20  0548 09/15/20  0507 09/15/20  1026   SODIUM 145 143 139   POTASSIUM 5.0 5.6* 4.7   CHLORIDE 103 100 97   CO2 38* 39* 38*   GLUCOSE 118* 107* 112*   BUN 17 22 23*   CREATININE 0.30* 0.36* 0.32*   CALCIUM 8.5 8.6 8.6                   Imaging  DX-CHEST-LIMITED (1 VIEW)   Final Result      Perihilar and bibasilar opacities may represent a combination of edema, atelectasis, pneumonia.      Trace pleural effusions are not excluded.      Interstitial prominence likely represents interstitial edema.         DX-CHEST-PORTABLE (1 VIEW)   Final Result         1.  Pulmonary edema and/or infiltrates are identified, which are stable since the prior exam.   2.  Air-filled distention of bowel, nonspecific and could represent ileus or enteritis,  incompletely visualized, but appears somewhat decreased since prior study   3.  Trace bilateral pleural effusion   4.  Cardiomegaly            DX-CHEST-PORTABLE (1 VIEW)   Final Result         1.  Pulmonary edema and/or infiltrates are identified, which are stable since the prior exam.   2.  Air-filled distention of bowel, nonspecific and could represent ileus or enteritis, similar to prior dedicated view the abdomen.  Follow-up abdominal x-ray as clinically appropriate.   3.  Trace bilateral pleural effusion   4.  Cardiomegaly         DX-ABDOMEN FOR TUBE PLACEMENT   Final Result      Feeding tube placement with the tip projecting over the distal stomach or duodenal bulb      EC-ECHOCARDIOGRAM COMPLETE W/O CONT   Final Result      OUTSIDE IMAGES-DX CHEST   Final Result      US-FOREIGN FILM ULTRASOUND   Final Result      OUTSIDE IMAGES-DX CHEST   Final Result      OUTSIDE IMAGES-US ABDOMEN   Final Result      OUTSIDE IMAGES-CT CHEST   Final Result      DX-CHEST-PORTABLE (1 VIEW)   Final Result         1.  Pulmonary edema and/or infiltrates are identified, which are stable since the prior exam.   2.  Trace bilateral pleural effusion   3.  Cardiomegaly      DX-CHEST-LIMITED (1 VIEW)   Final Result         1.  Pulmonary edema and/or infiltrates.   2.  Cardiomegaly      DX-CHEST-LIMITED (1 VIEW)   Final Result         1.  Pulmonary edema and/or infiltrates are identified, which are stable since the prior exam.   2.  Small bilateral pleural effusions   3.  Cardiomegaly           Assessment/Plan  Sepsis (HCC)  Assessment & Plan  Sepsis secondary to PNA, Now resolved  - completed antibiotic therapy  - appears relatively euvolemic     Pneumonia  Assessment & Plan  MSSA pneumonia per the documentation of the intensivist, all cultures obtained within our facility are no growth to date.  Completed antibiotic course    Acute respiratory failure (HCC)  Assessment & Plan  Resolved, extubated 9/12/2020, continue close clinical  monitoring.  Wean oxygen as able, will likely need O2 on discharge   Continue lasix for now, monitor volume status   Encourage IS, xopenex PRN   RT protocol       Paroxysmal atrial fibrillation (HCC)- (present on admission)  Assessment & Plan  Home dose anticoagulation on hold given that he had hemoptysis at outside hospital, no evidence of hemoptysis thus far, continue to monitor  - Hgb 9, consistent with baseline  - Continue amiodarone and metoprolol   - consider resumption of anticoagulation prior to discharge        VTE prophylaxis: Heparins

## 2020-09-17 PROBLEM — J96.02 ACUTE RESPIRATORY FAILURE WITH HYPOXIA AND HYPERCAPNIA (HCC): Status: ACTIVE | Noted: 2020-01-01

## 2020-09-17 PROBLEM — I95.9 HYPOTENSION: Status: ACTIVE | Noted: 2020-01-01

## 2020-09-17 PROBLEM — J98.11 ATELECTASIS: Status: ACTIVE | Noted: 2020-01-01

## 2020-09-17 PROBLEM — J96.01 ACUTE RESPIRATORY FAILURE WITH HYPOXIA AND HYPERCAPNIA (HCC): Status: ACTIVE | Noted: 2020-01-01

## 2020-09-17 PROBLEM — A41.9 SEPSIS (HCC): Status: RESOLVED | Noted: 2020-01-01 | Resolved: 2020-01-01

## 2020-09-17 PROBLEM — I51.7 RIGHT VENTRICULAR DILATION: Status: ACTIVE | Noted: 2019-01-01

## 2020-09-17 NOTE — PROGRESS NOTES
Critical Care Progress Note    Date of admission  9/9/2020    Chief Complaint  54 y.o. male admitted 9/9/2020 with respiratory failure, sepsis and pneumonia.    Hospital Course   9/17 -    developed worsening hypercarbic and hypoxemic respiratory failure requiring emergent intubation.  Full vent support.      Interval Problem Update  Reviewed last 24 hour events:      I was called STAT to bedside due to worsening mental status and hypoxemia  He had received some oxycodone earlier today and I gave him a dose of IV Narcan with no improvement  Stat ABG revealed severe respiratory acidosis  Emergently intubated and bronchoscopy performed (see separate procedure notes)  Atrial fibrillation  Start propofol and titrate for sedation  T-max 97.2  CXR with worsening bibasilar opacification      Review of Systems  Review of Systems   Unable to perform ROS: Acuity of condition        Vital Signs for last 24 hours   Temp:  [35.9 °C (96.7 °F)-36.2 °C (97.2 °F)] 36.1 °C (96.9 °F)  Pulse:  [] 80  Resp:  [0-56] 28  BP: ()/() 140/85  SpO2:  [57 %-100 %] 99 %    Hemodynamic parameters for last 24 hours       Respiratory Information for the last 24 hours  Vent Mode: APVCMV  Rate (breaths/min): 28  Vt Target (mL): 420  PEEP/CPAP: 8    Physical Exam   Physical Exam  Constitutional:       Appearance: He is diaphoretic.   HENT:      Head: Normocephalic and atraumatic.      Right Ear: External ear normal.      Left Ear: External ear normal.      Nose: Nose normal.      Mouth/Throat:      Mouth: Mucous membranes are moist.      Pharynx: Oropharynx is clear.   Eyes:      Conjunctiva/sclera: Conjunctivae normal.      Pupils: Pupils are equal, round, and reactive to light.   Neck:      Musculoskeletal: Normal range of motion and neck supple.   Cardiovascular:      Pulses: Normal pulses.      Heart sounds: No gallop.       Comments: Atrial fibrillation  Pulmonary:      Breath sounds: Rales (Coarse crackles bilaterally) present.  No wheezing.   Abdominal:      General: Bowel sounds are normal. There is no distension.      Palpations: Abdomen is soft.      Tenderness: There is no abdominal tenderness.   Musculoskeletal:      Right lower leg: Edema present.      Left lower leg: Edema present.      Comments: No clubbing or cyanosis   Skin:     Capillary Refill: Capillary refill takes less than 2 seconds.      Coloration: Skin is not jaundiced.   Neurological:      Comments: Very lethargic prior to intubation.  Moving all 4 extremities.         Medications  Current Facility-Administered Medications   Medication Dose Route Frequency Provider Last Rate Last Dose   • NALOXONE HCL 0.4 MG/ML INJ SOLN            • propofol (DIPRIVAN) injection  0-80 mcg/kg/min Intravenous Continuous Joe Grimaldo M.D. 14.1 mL/hr at 09/17/20 1106 30 mcg/kg/min at 09/17/20 1106   • Respiratory Therapy Consult   Nebulization Continuous RT Joe Grimaldo M.D.       • ipratropium-albuterol (DUONEB) nebulizer solution  3 mL Nebulization Q2HRS PRN (RT) Joe Grimaldo M.D.       • MD Alert...ICU Electrolyte Replacement per Pharmacy   Other PHARMACY TO DOSE Joe Grimaldo M.D.       • lidocaine (XYLOCAINE) 1 % injection 1-2 mL  1-2 mL Tracheal Tube Q30 MIN PRN Joe Grimaldo M.D.       • Pharmacy Consult: Enteral tube insertion - review meds/change route/product selection  1 Each Other PHARMACY TO DOSE Joe Grimaldo M.D.       • fentaNYL (SUBLIMAZE) injection 25 mcg  25 mcg Intravenous Q HOUR PRN Joe Grimaldo M.D.        Or   • fentaNYL (SUBLIMAZE) injection 50 mcg  50 mcg Intravenous Q HOUR PRN Joe Grimaldo M.D.        Or   • fentaNYL (SUBLIMAZE) injection 100 mcg  100 mcg Intravenous Q HOUR PRN Joe Grimaldo M.D.   100 mcg at 09/17/20 1226   • ipratropium-albuterol (DUONEB) nebulizer solution  3 mL Nebulization Q4HRS (RT) Joe Grimaldo M.D.       • enoxaparin (LOVENOX) inj 40 mg  40 mg  Subcutaneous DAILY Joe Grimaldo M.D.       • omeprazole (FIRST-OMEPRAZOLE) 2 mg/mL oral susp 20 mg  20 mg Enteral Tube Q12HRS oJe Grimaldo M.D.       • acetaZOLAMIDE (DIAMOX) 500 mg in NS 50 mL IVPB  500 mg Intravenous Once Joe Grimaldo M.D.       • furosemide (LASIX) tablet 20 mg  20 mg Oral Q DAY Kendal Acuna M.D.   20 mg at 09/17/20 0550   • metoprolol (LOPRESSOR) tablet 12.5 mg  12.5 mg Oral TWICE DAILY Kendal Acuna M.D.   12.5 mg at 09/17/20 0550   • amiodarone (CORDARONE) tablet 200 mg  200 mg Oral Q DAY Kendal Acuna M.D.   200 mg at 09/17/20 0550   • oxyCODONE immediate-release (ROXICODONE) tablet 5 mg  5 mg Oral Q4HRS PRN Kendal Acuna M.D.   5 mg at 09/17/20 0618    Or   • oxyCODONE immediate release (ROXICODONE) tablet 10 mg  10 mg Oral Q4HRS PRN Kendal Acuna M.D.   10 mg at 09/16/20 2147   • acetaminophen (TYLENOL) tablet 500 mg  500 mg Oral Q6HRS Kendal Acuna M.D.   500 mg at 09/17/20 0549   • ondansetron (ZOFRAN ODT) dispertab 4 mg  4 mg Oral Q4HRS PRN Kendal Acuna M.D.       • senna-docusate (PERICOLACE or SENOKOT S) 8.6-50 MG per tablet 2 Tab  2 Tab Oral BID Kendal Acuna M.D.   2 Tab at 09/17/20 0550    And   • polyethylene glycol/lytes (MIRALAX) PACKET 1 Packet  1 Packet Oral QDAY PRN Kendal Acuna M.D.        And   • magnesium hydroxide (MILK OF MAGNESIA) suspension 30 mL  30 mL Oral QDAY PRN Kendal Acuna M.D.        And   • bisacodyl (DULCOLAX) suppository 10 mg  10 mg Rectal QDAY PRN Kendal Acuna M.D.       • DULoxetine (CYMBALTA) capsule 60 mg  60 mg Oral QHS Kendal Acuna M.D.   60 mg at 09/16/20 2104   • topiramate (TOPAMAX) tablet 75 mg  75 mg Oral DAILY Kendal Acuna M.D.   75 mg at 09/17/20 0550   • levalbuterol (XOPENEX) 1.25 MG/3ML nebulizer solution 1.25 mg  1.25 mg Nebulization Q4H PRN (RT) Francisco Duval M.D.       • lidocaine (LIDODERM) 5 % 2 Patch  2 Patch Transdermal Q24HR Francisco Duval,  M.D.   2 Patch at 09/16/20 1131   • promethazine (PHENERGAN) tablet 12.5-25 mg  12.5-25 mg Enteral Tube Q4HRS PRN Francisco Duval M.D.       • ondansetron (ZOFRAN) syringe/vial injection 4 mg  4 mg Intravenous Q4HRS PRN Arcadio Bangura M.D.       • promethazine (PHENERGAN) suppository 12.5-25 mg  12.5-25 mg Rectal Q4HRS PRN Arcadio Bangura M.D.       • prochlorperazine (COMPAZINE) injection 5-10 mg  5-10 mg Intravenous Q4HRS PRN Arcadio Bangura M.D.           Fluids    Intake/Output Summary (Last 24 hours) at 9/17/2020 1238  Last data filed at 9/17/2020 0600  Gross per 24 hour   Intake 170 ml   Output 825 ml   Net -655 ml       Laboratory  Recent Labs     09/17/20  1034   ISTATAPH 7.094*   ISTATAPCO2 >100.0*   ISTATAPO2 78   ISTATATCO2 see below   [id number] see below   ISTATARTHCO3 see below   ISTATARTBE see below   ISTATTEMP see below   ISTATFIO2 100   ISTATSPEC Arterial         Recent Labs     09/15/20  1026 09/16/20  0515 09/17/20  0545   SODIUM 139 139 140   POTASSIUM 4.7 4.5 5.0   CHLORIDE 97 94* 94*   CO2 38* 38* 43*   BUN 23* 21 20   CREATININE 0.32* 0.33* 0.38*   CALCIUM 8.6 8.6 8.8     Recent Labs     09/14/20  1659  09/15/20  1026 09/16/20  0515 09/17/20  0545   PREALBUMIN 7.2*  --   --   --   --    GLUCOSE  --    < > 112* 101* 110*    < > = values in this interval not displayed.     Recent Labs     09/15/20  0507 09/16/20  0515 09/17/20  0545   WBC 11.2* 8.8 12.1*   NEUTSPOLYS 82.30* 82.60* 86.90*   LYMPHOCYTES 4.60* 5.60* 3.10*   MONOCYTES 12.00 10.50 9.20   EOSINOPHILS 0.40 0.50 0.00   BASOPHILS 0.30 0.30 0.20     Recent Labs     09/15/20  0507 09/16/20  0515 09/17/20  0545   RBC 2.98* 2.96* 3.27*   HEMOGLOBIN 9.0* 8.8* 9.7*   HEMATOCRIT 31.5* 30.3* 34.9*   PLATELETCT 277 298 358       Imaging  X-Ray:  I have personally reviewed the images and compared with prior images. and My impression is: Worsening bibasilar atelectasis    Assessment/Plan  * Acute respiratory failure with hypoxia and hypercapnia  (HCC)  Assessment & Plan  Intubated 9/9-9/12  Reintubated 9/17 due to hypercarbia  All the appropriate ventilator bundles are initiated  Full vent support    Pneumonia  Assessment & Plan  MSSA in sputum on 9/10  Reculture sputum  S/P 3 days of Zosyn followed by 3 days of Unasyn which completed on 9/15  Observe off antibiotics    Paroxysmal atrial fibrillation (HCC)- (present on admission)  Assessment & Plan  Continue amiodarone, 200 mg daily  Continue metoprolol, 12.5 mg twice daily  Echo with normal LV systolic function and normal size of LA  Optimize potassium and magnesium  Check thyroid function  Hold rivaroxaban for now    Right ventricular dilation- (present on admission)  Assessment & Plan  Echo confirms severely dilated RV with mild RV systolic dysfunction  RVSP 42 mmHg  Force diuresis with acetazolamide    Major depression- (present on admission)  Assessment & Plan  Continue Cymbalta and Topamax       VTE:  Lovenox  Ulcer: PPI  Lines: Orozco Catheter  Ongoing indication addressed    I have performed a physical exam and reviewed and updated ROS and Plan today (9/17/2020). In review of yesterday's note (9/16/2020), there are no changes except as documented above.     I have assessed and reassessed his respiratory status with ventilator adjustments, airway mechanics, ventilator waveforms, blood pressure, hemodynamics, cardiovascular status and neurologic status.  He is at increased risk for worsening respiratory system dysfunction.    Discussed patient condition and risk of morbidity and/or mortality with RN, RT, Pharmacy, Charge nurse / hot rounds and QA team     The patient remains critically ill.  Critical care time = 85 minutes in directly providing and coordinating critical care and extensive data review.  No time overlap and excludes procedures.    Joe Grimaldo MD  Pulmonary and Critical Care Medicine

## 2020-09-17 NOTE — PROGRESS NOTES
Cortrak Placement    Tube Team verified patient name and medical record number prior to tube placement.  Cortrak tube (43 inches, 10 [place]) placed at 63 cm in left nare.  Per Cortrak picture, tube appears to be in the stomach.  Nursing Instructions: Awaiting KUB to confirm placement before use for medications or feeding. Once placement confirmed, flush tube with 30 ml of water, and then remove and save stylet, in patient medication drawer.

## 2020-09-17 NOTE — PROCEDURES
Date of service:  9/17/2020    Title:  Emergent endotracheal intubation    Indication:  Respiratory failure    Narrative:    The patient was sedated and paralyzed with 20 mg of IV etomidate and 70 mg of IV rocuronium.  A 8.0 Irish endotracheal tube was placed directly into the trachea using a #4 Glidescope without difficulty or apparent complication.  The CO2 detector made the appropriate color change, bilateral symmetrical breath sounds are present and fogging is present in the endotracheal tube.  All of this confirms that the endotracheal tube is indeed in the patient's trachea.  The patient tolerated the procedure quite nicely.  No complications are apparent.      Joe Grimaldo MD  Pulmonary and Critical Care Medicine

## 2020-09-17 NOTE — PROGRESS NOTES
"Hospital Medicine Daily Progress Note    Date of Service  9/17/2020    Chief Complaint  54 y.o. male admitted 9/9/2020 with acute hypoxic respiratory failure.    Hospital Course    For full details of admission please see the admission consultation of Dr. Bangura dated 9/9/2020, briefly, \"54 y.o. male w/ PMH of afib who presented 9/9/2020 with acute resp failure and PNA as a transfer from [place]. Per report drove himself to the ED due to SOB and hemoptysis. Per report O2 sats were in the 70s and he was thus intubated. imaging suggestive of multi lobar PNA and increased mediastinum. He was given Zosyn and Levaquin and transferred to [facility]. On arrival his SBP was in the low 100s on propofol and norepi. Per report he received 2L of NS priro to transfer. There is no family available for history and it was unclear if he had recently been treated for infection.  He was covid negative at the outside facility.  Patient was managed in the ICU with the aid of mechanical ventilation and total of 8 days of , he was successfully extubated on 9/12. He had post intubation dysphagia and required Cortak placement with enteral feeding, he was followed by SLP and eventually transitioned to a diet.     Interval Problem Update  Patient seen and examined, become altered by nursing ABG completed and CO2 >100  - pt intubated by intensivist  - pt transferred to ICU for acute hypercapnic respiratory failure       Consultants/Specialty  Pulm/CC    Code Status  Full Code    Disposition  Pending transport back to [facility] long term care, medically stable for transfer     Review of Systems  Review of Systems   Unable to perform ROS: Acuity of condition   All other systems reviewed and are negative.       Physical Exam  Temp:  [35.9 °C (96.7 °F)-36.2 °C (97.2 °F)] 36.1 °C (96.9 °F)  Pulse:  [] 80  Resp:  [0-56] 28  BP: ()/() 140/85  SpO2:  [57 %-100 %] 99 %   Physical Exam  Constitutional:       Appearance: He is not " ill-appearing.      Comments: Chronically ill appearing, cachectic male, appears older than stated age   HENT:      Head: Normocephalic and atraumatic.   Cardiovascular:      Rate and Rhythm: Regular rhythm. Tachycardia present.      Heart sounds: No murmur.   Pulmonary:      Comments: Pt intubated  Abdominal:      General: Abdomen is flat. There is no distension.      Palpations: Abdomen is soft.   Musculoskeletal: Normal range of motion.         General: No swelling.   Skin:     General: Skin is warm and dry.   Neurological:      Mental Status: He is alert.           Current Facility-Administered Medications:   •  NALOXONE HCL 0.4 MG/ML INJ SOLN, , , ,   •  propofol (DIPRIVAN) injection, 0-80 mcg/kg/min, Intravenous, Continuous, Last Rate: 14.1 mL/hr at 09/17/20 1106, 30 mcg/kg/min at 09/17/20 1106 **AND** Triglycerides Starting now and then Every 3 Days, , , Every 3 Days (0300), Joe Grimaldo M.D.  •  Respiratory Therapy Consult, , Nebulization, Continuous RT, Joe Grimaldo M.D.  •  ipratropium-albuterol (DUONEB) nebulizer solution, 3 mL, Nebulization, Q2HRS PRN (RT), Joe Grimaldo M.D.  •  MD Alert...ICU Electrolyte Replacement per Pharmacy, , Other, PHARMACY TO DOSE, Joe Grimaldo M.D.  •  lidocaine (XYLOCAINE) 1 % injection 1-2 mL, 1-2 mL, Tracheal Tube, Q30 MIN PRN, Joe Grimaldo M.D.  •  Pharmacy Consult: Enteral tube insertion - review meds/change route/product selection, 1 Each, Other, PHARMACY TO DOSE, Joe Grimaldo M.D.  •  fentaNYL (SUBLIMAZE) injection 25 mcg, 25 mcg, Intravenous, Q HOUR PRN **OR** fentaNYL (SUBLIMAZE) injection 50 mcg, 50 mcg, Intravenous, Q HOUR PRN **OR** fentaNYL (SUBLIMAZE) injection 100 mcg, 100 mcg, Intravenous, Q HOUR PRN, Joe Grimaldo M.D., 100 mcg at 09/17/20 1226  •  ipratropium-albuterol (DUONEB) nebulizer solution, 3 mL, Nebulization, Q4HRS (RT), Joe Grimaldo M.D.  •  enoxaparin (LOVENOX) inj 40 mg,  40 mg, Subcutaneous, DAILY, Joe Grimaldo M.D.  •  omeprazole (FIRST-OMEPRAZOLE) 2 mg/mL oral susp 20 mg, 20 mg, Enteral Tube, Q12HRS, Joe Grimaldo M.D.  •  acetaZOLAMIDE (DIAMOX) 500 mg in NS 50 mL IVPB, 500 mg, Intravenous, Once, Joe Grimaldo M.D.  •  furosemide (LASIX) tablet 20 mg, 20 mg, Oral, Q DAY, Kendal Acuna M.D., 20 mg at 09/17/20 0550  •  metoprolol (LOPRESSOR) tablet 12.5 mg, 12.5 mg, Oral, TWICE DAILY, Kendal Acuna M.D., 12.5 mg at 09/17/20 0550  •  amiodarone (CORDARONE) tablet 200 mg, 200 mg, Oral, Q DAY, Kendal Acuna M.D., 200 mg at 09/17/20 0550  •  oxyCODONE immediate-release (ROXICODONE) tablet 5 mg, 5 mg, Oral, Q4HRS PRN, 5 mg at 09/17/20 0618 **OR** oxyCODONE immediate release (ROXICODONE) tablet 10 mg, 10 mg, Oral, Q4HRS PRN, Kendal Acuna M.D., 10 mg at 09/16/20 2147  •  acetaminophen (TYLENOL) tablet 500 mg, 500 mg, Oral, Q6HRS, Kendal Acuna M.D., 500 mg at 09/17/20 0549  •  ondansetron (ZOFRAN ODT) dispertab 4 mg, 4 mg, Oral, Q4HRS PRN, Kendal Acuna M.D.  •  senna-docusate (PERICOLACE or SENOKOT S) 8.6-50 MG per tablet 2 Tab, 2 Tab, Oral, BID, 2 Tab at 09/17/20 0550 **AND** polyethylene glycol/lytes (MIRALAX) PACKET 1 Packet, 1 Packet, Oral, QDAY PRN **AND** magnesium hydroxide (MILK OF MAGNESIA) suspension 30 mL, 30 mL, Oral, QDAY PRN **AND** bisacodyl (DULCOLAX) suppository 10 mg, 10 mg, Rectal, QDAY PRN, Kendal Acuna M.D.  •  DULoxetine (CYMBALTA) capsule 60 mg, 60 mg, Oral, QHS, Kendal Acuna M.D., 60 mg at 09/16/20 2104  •  topiramate (TOPAMAX) tablet 75 mg, 75 mg, Oral, DAILY, Kendal Acuna M.D., 75 mg at 09/17/20 0550  •  levalbuterol (XOPENEX) 1.25 MG/3ML nebulizer solution 1.25 mg, 1.25 mg, Nebulization, Q4H PRN (RT), Francisco Duval M.D.  •  lidocaine (LIDODERM) 5 % 2 Patch, 2 Patch, Transdermal, Q24HR, Francisco Duval M.D., 2 Patch at 09/17/20 1238  •  promethazine (PHENERGAN) tablet 12.5-25 mg, 12.5-25  mg, Enteral Tube, Q4HRS PRN, Francisco Duval M.D.  •  ondansetron (ZOFRAN) syringe/vial injection 4 mg, 4 mg, Intravenous, Q4HRS PRN, Arcadio Bangura M.D.  •  promethazine (PHENERGAN) suppository 12.5-25 mg, 12.5-25 mg, Rectal, Q4HRS PRN, Arcadio Bangura M.D.  •  prochlorperazine (COMPAZINE) injection 5-10 mg, 5-10 mg, Intravenous, Q4HRS PRN, Arcadio Bangura M.D.        Fluids    Intake/Output Summary (Last 24 hours) at 9/17/2020 1319  Last data filed at 9/17/2020 0600  Gross per 24 hour   Intake 170 ml   Output 825 ml   Net -655 ml       Laboratory  Recent Labs     09/15/20  0507 09/16/20  0515 09/17/20  0545   WBC 11.2* 8.8 12.1*   RBC 2.98* 2.96* 3.27*   HEMOGLOBIN 9.0* 8.8* 9.7*   HEMATOCRIT 31.5* 30.3* 34.9*   .7* 102.4* 106.7*   MCH 30.2 29.7 29.7   MCHC 28.6* 29.0* 27.8*   RDW 52.8* 51.3* 53.1*   PLATELETCT 277 298 358   MPV 10.4 10.2 10.1     Recent Labs     09/15/20  1026 09/16/20  0515 09/17/20  0545   SODIUM 139 139 140   POTASSIUM 4.7 4.5 5.0   CHLORIDE 97 94* 94*   CO2 38* 38* 43*   GLUCOSE 112* 101* 110*   BUN 23* 21 20   CREATININE 0.32* 0.33* 0.38*   CALCIUM 8.6 8.6 8.8                   Imaging  DX-CHEST-PORTABLE (1 VIEW)   Final Result      1.  Cardiomegaly with bilateral pulmonary infiltrates.      2.  Stable bilateral pleural effusions.      3.  Endotracheal tube present.      DX-CHEST-LIMITED (1 VIEW)   Final Result      Perihilar and bibasilar opacities may represent a combination of edema, atelectasis, pneumonia.      Trace pleural effusions are not excluded.      Interstitial prominence likely represents interstitial edema.         DX-ABDOMEN FOR TUBE PLACEMENT   Final Result      Feeding tube placement with the tip projecting over the distal stomach or duodenal bulb      EC-ECHOCARDIOGRAM COMPLETE W/O CONT   Final Result      OUTSIDE IMAGES-DX CHEST   Final Result      US-FOREIGN FILM ULTRASOUND   Final Result      OUTSIDE IMAGES-DX CHEST   Final Result      OUTSIDE IMAGES-US  ABDOMEN   Final Result      OUTSIDE IMAGES-CT CHEST   Final Result      DX-CHEST-PORTABLE (1 VIEW)   Final Result         1.  Pulmonary edema and/or infiltrates are identified, which are stable since the prior exam.   2.  Trace bilateral pleural effusion   3.  Cardiomegaly      DX-CHEST-LIMITED (1 VIEW)   Final Result         1.  Pulmonary edema and/or infiltrates.   2.  Cardiomegaly      DX-CHEST-LIMITED (1 VIEW)   Final Result         1.  Pulmonary edema and/or infiltrates are identified, which are stable since the prior exam.   2.  Small bilateral pleural effusions   3.  Cardiomegaly      DX-ABDOMEN FOR TUBE PLACEMENT    (Results Pending)        Assessment/Plan  * Acute respiratory failure with hypoxia and hypercapnia (HCC)  Assessment & Plan  Resolved, extubated 9/12/2020  re intubated 9/17 due to hypercapnic respiratory failure   RT protocol       Pneumonia  Assessment & Plan  MSSA pneumonia per the documentation of the intensivist, all cultures obtained within our facility are no growth to date.  Completed antibiotic course    Paroxysmal atrial fibrillation (HCC)- (present on admission)  Assessment & Plan  Home dose anticoagulation on hold given that he had hemoptysis at outside hospital, no evidence of hemoptysis thus far, continue to monitor  - Hgb 9, consistent with baseline  - Continue amiodarone and metoprolol   - consider resumption of anticoagulation prior to discharge     Major depression- (present on admission)  Assessment & Plan  Stable, resume Cymbalta        VTE prophylaxis: Heparin

## 2020-09-17 NOTE — PROCEDURES
Date of Procedure:  9/17/2020    Title of Procedure:  Diagnostic and therapeutic flexible fiberoptic bronchoscopy with bronchoalveolar lavage    Indication for Procedure:   Atelectasis    Post-procedure Diagnoses:    1.  Normal endobronchial anatomy  2.  No endobronchial tumor identified  3.  Copious amounts of juicy, white to cream-colored secretions in the left lower lobe bronchi, right lower lobe bronchi and right middle lobe bronchi    Narrative:    A time out was performed identifying the correct patient, correct procedure and correct location prior to this procedure.    The patient was sedated, intubated and ventilated at the time of this procedure.  The flexible fiberoptic bronchoscope was inserted through the lumen of the endotracheal tube and advanced into the distal trachea without difficulty.  The airways were examined to the subsegmental bronchus level bilaterally.    The endobronchial anatomy was normal.  No tumor was identified.    There was copious amounts of juicy, white to cream-colored secretions seen in the left lower lobe bronchi, right lower lobe bronchi and right middle lobe bronchi.  The left lower lobe bronchi, right lower lobe bronchi and right middle lobe bronchi were occluded with these secretions.  I therapeutically suctioned these secretions.    Bronchoalveolar lavage was carried out in the basilar segments of the right lower lobe bronchi using the standard technique with good fluid return.    Bronchoalveolar lavage fluid from the right lower lobe is submitted to the laboratory for gram stain, culture and sensitivity.    The patient tolerated the procedure quite nicely.  No complications were apparent.  The heart rate and rhythm, blood pressure and oxygenation saturation were continuously monitored.      Joe Grimaldo MD  Pulmonary and Critical Care Medicine

## 2020-09-17 NOTE — DISCHARGE PLANNING
Agency/Facility Name: [facility]  Spoke To: Eladio  Outcome: NATHALIE Rodríguez requested [facility] to cancel transport for tomorrow.

## 2020-09-17 NOTE — RESPIRATORY CARE
Problem: Hyperinflation:  Goal: Prevent or improve atelectasis  Outcome: PROGRESSING AS EXPECTED   PEP/ IS QID  Pt is currently refusing PEP therapy

## 2020-09-17 NOTE — DISCHARGE PLANNING
Anticipated Discharge Disposition: [facility], [place], [place]    Action: pt intubated this morning. Cancelled [facility] transport (originally scheduled tomorrow 9/18 08:00) back to [facility].     Barriers to Discharge:   Need approved services for transportation back to [facility]  Pt has no [facility] benefits (verified by REID Jameson yesterday)    Plan: TBD

## 2020-09-17 NOTE — PROGRESS NOTES
1015: Skin team at bedside assessing patient's skin. Patient oxygen desaturated to 70% with turning, as low as 52%. NRB placed at 15L, Dr. Grimaldo to bedside.    1030: ABG drawn, showing pH 7.094, PaCO2 > 100, PaO2 78. FSBG 123.    1034: Nargan 0.4 mg IVP administered with no changes after administration. See MAR.    1044: Timeout called for endotracheal intubatuin. Drug allergies reviewed. All agree to procedure.    1049: 20 mg Etomidate IVP administered per Dr. Grimaldo, see MAR.    1050: 70 mg IVP Rocuronium administered per Dr. Grimaldo, see MAR.    1051: Intubation begun by Jessie DIAMOND student, assisted by Dr. Grimaldo.    1052: Intubation finished. 8.0 ETT at 24 cm at the lip. Chest rise and lung sounds equal bilaterally. SpO2 94%.    1055: APVcmv (RR 28, Vt 420, PEEP 8, FiO2 100%).    1106: Propofol infusion started, see MAR.    1110: Tube placement verified by x-ray.    1112: Timeout called for bronchoscopy. Drug allergies reviewed. All agree to procedure.    1116: Bronchoscopy begun by Dr. Grimaldo.    1121: Fentanyl administered, see MAR.    1121: ETT moved to 23 cm at the lip.  Bronchoscopy procedure ended. Vital signs stable, patient in no apparent distress.

## 2020-09-17 NOTE — CARE PLAN
Problem: Safety - Medical Restraint  Goal: Remains free of injury from restraints (Restraint for Interference with Medical Device)  Description: INTERVENTIONS:  1. Determine that other, less restrictive measures have been tried or would not be effective before applying the restraint  2. Evaluate the patient's condition at the time of restraint application  3. Inform patient/family regarding the reason for restraint  4. Q2H: Monitor safety, psychosocial status, comfort, nutrition and hydration  Outcome: PROGRESSING AS EXPECTED     Problem: Fluid Volume:  Goal: Will maintain balanced intake and output  Outcome: PROGRESSING AS EXPECTED     Problem: Communication  Goal: The ability to communicate needs accurately and effectively will improve  Outcome: PROGRESSING AS EXPECTED     Problem: Safety  Goal: Will remain free from injury  Outcome: PROGRESSING AS EXPECTED  Goal: Will remain free from falls  Outcome: PROGRESSING AS EXPECTED     Problem: Infection  Goal: Will remain free from infection  Outcome: PROGRESSING AS EXPECTED     Problem: Venous Thromboembolism (VTW)/Deep Vein Thrombosis (DVT) Prevention:  Goal: Patient will participate in Venous Thrombosis (VTE)/Deep Vein Thrombosis (DVT)Prevention Measures  Outcome: PROGRESSING AS EXPECTED     Problem: Bowel/Gastric:  Goal: Normal bowel function is maintained or improved  Outcome: PROGRESSING AS EXPECTED  Goal: Will not experience complications related to bowel motility  Outcome: PROGRESSING AS EXPECTED  Flowsheets (Taken 9/16/2020 2000)  Last BM: 09/16/20     Problem: Knowledge Deficit  Goal: Knowledge of disease process/condition, treatment plan, diagnostic tests, and medications will improve  Outcome: PROGRESSING AS EXPECTED  Goal: Knowledge of the prescribed therapeutic regimen will improve  Outcome: PROGRESSING AS EXPECTED     Problem: Discharge Barriers/Planning  Goal: Patient's continuum of care needs will be met  Outcome: PROGRESSING AS EXPECTED     Problem:  Skin Integrity  Goal: Risk for impaired skin integrity will decrease  Outcome: PROGRESSING AS EXPECTED     Problem: Pain Management  Goal: Pain level will decrease to patient's comfort goal  Outcome: PROGRESSING AS EXPECTED  Intervention: Follow pain managment plan developed in collaboration with patient and Interdisciplinary Team  Note: Patient requests pain medications as needed       Problem: Urinary Elimination:  Goal: Ability to reestablish a normal urinary elimination pattern will improve  Outcome: PROGRESSING AS EXPECTED     Problem: Psychosocial Needs:  Goal: Level of anxiety will decrease  Outcome: PROGRESSING AS EXPECTED     Problem: Mobility  Goal: Risk for activity intolerance will decrease  Outcome: PROGRESSING AS EXPECTED

## 2020-09-17 NOTE — DIETARY
"Nutrition Support Assessment:  Day 8 of admit. Jas Vnan is a 54 y.o. male with admitting DX of Acute Respiratory Failure.     Pt initially admitted 9/9/2020 with acute respiratroy failure, PNA, SOB and hemoptysis. After 4 days of ventilation, he was extubated 9/12. He was followed by SLP as he presented with dysphagia and required Cortak placement for enteral feeding, and eventually transitioned to a diet 9/14.      Current problem list:  1. Congestive heart failure  2. Proximal atrial fibrillation  3. Major depression  4. Pulmonary hypertension  5. Acute respiratory failure  6. Pneumonia  7. Sepsis      Assessment:  Estimated Nutritional Needs based on:   Height: 172.7 cm (5' 8\")  Weight: 78.2 kg (172 lb 6.4 oz) via bed scale   Weight to Use in Calculations: 75.4 kg (166 lb 3.6 oz) - admit weight, currently edema present, ongoing diuresis.   Body mass index is 26.22 kg/m²., BMI classification: overweight    Calculation/Equation:  REE MSJ x 1.2 = 1872 kcal/day   Total Calories / day: 1885- 2260 ( 25 -30 kcal/ kg)   Protein: 94 - 156 grams/day  ( 1.2 - 2.0 grams/kg current wt)     Evaluation:   1. Pt is currently intubated and requiring nutrition support.   2. Cortrak - pending KUB confirmation.  3. PO since 9/14 - 9/16 was % of meals and supplements were in place.  4. Pt has generalized edema, 1+ abdominal pitting edema, 2+ BLE edema and 1+ LUE edema.   5. Meds: Amiodarone, Lasix, senna, Prilosec,   6. Labs: Glu 110, Glucose-Accu-Ck 123, crea 0.38  7. Last BM: 9/16  8. High protein, low volume formula appropriate to meet patient's needs at this time.      Malnutrition Risk: Criteria not met.       Recommendations/Plan:  1. Initiate Impact Peptide 1.5 @ 25mL/hr and advance per protocol to goal rate of 55mL/hr to provide 1980 kcal, 124 grams protein, 1016. ML free water and 183 g CHO.   2. Free water per MD.  3. Monitor weights.  4. Initiate diet when medically feasible per SLP.   5. Metabolic cart study. "     RD following.

## 2020-09-17 NOTE — PROGRESS NOTES
Dr. Grimaldo updated on BP 80/40s after Fentanyl IVP. Per Dr. Grimaldo, monitor patient and update MD if SBP reaches 70s. Third retake BP 87/52.

## 2020-09-18 PROBLEM — E83.42 HYPOMAGNESEMIA: Status: ACTIVE | Noted: 2020-01-01

## 2020-09-18 PROBLEM — E83.39 HYPOPHOSPHATEMIA: Status: ACTIVE | Noted: 2020-01-01

## 2020-09-18 PROBLEM — E87.6 HYPOKALEMIA: Status: ACTIVE | Noted: 2020-01-01

## 2020-09-18 NOTE — CARE PLAN
Adult Ventilation Update    Total Vent Days: 2  Vent:  x 20, 30% +8    Patient Lines/Drains/Airways Status      Active Airway       Name: Placement date: Placement time: Site: Days:    Airway ETT 8.0  09/17/20   1050   --  2                  Mobility  Activity Performed: Out of bed, sitting in chair    Events/Summary/Plan: Patient stable on vent. No SBT.

## 2020-09-18 NOTE — PROGRESS NOTES
Dr. Grimaldo updated on increased vasopressor usage. Dr. PIZARRO at bedside for placement of CVC.    1705: timeout called. Allergies reviewed. All agree.    1717: Dr. Grimaldo began insertion of R IJ triple lumen CVC.    1726: procedure ended. Vital signs stable, patient does not appear to be in distress. Awaiting CXR to confirm placement.

## 2020-09-18 NOTE — THERAPY
Missed Therapy     Patient Name: Jas Vann  Age:  54 y.o., Sex:  male  Medical Record #: [id number]  Today's Date: 9/18/2020    Discussed missed therapy with NATHALIE Colon.    Pt is currently intubated. SLP will hold dysphagia tx. Please reconsult as medically appropriate.

## 2020-09-18 NOTE — PROCEDURES
Date of service:  9/17/2020    Title:  Central venous catheter placement - internal jugular vein    Indication: Hypotension.  Requires central venous access for vasopressor administration.    Narrative:    A time out was performed identifying the correct patient, correct procedure and correct location prior to this procedure.    The right neck was prepped with chlorhexidine and draped in the usual sterile fashion.  1% Xylocaine solution was used for topical anesthesia.  A triple lumen central venous catheter was placed into the right internal jugular vein under ultrasound guidance using the technique described by Martinez without difficulty or apparent complication.  The line was sutured into place and a sterile dressing was placed over the line.  All 3 ports flush and return venous blood easily.  The patient tolerated the procedure quite nicely.  No complications are apparent.  A STAT CXR is ordered to confirm placement.      Joe Grimaldo MD  Pulmonary and Critical Care Medicine

## 2020-09-18 NOTE — DIETARY
"Nutrition Support Update:     Pt now on propofol. Adjusted nutrition support needs.     Assessment:  Estimated Nutritional Needs based on:   Height: 172.7 cm (5' 7.99\")  Weight: 74.6 kg (164 lb 7.4 oz)  Body mass index is 25.01 kg/m²., BMI classification: overweight    Calculation/Equation:   REE MSJ x 1.2 = 1862 kcal/day (25 kcal/kg)  PSU (Tmax 38.6, EMV 10.6) = 2051 kcal/day  Total Calories / day: 1865 - 2238  (Calories / k - 30)  Total Grams Protein / day: 90 - 150  (Grams Protein / k.2 - 2)     Recommendations/Plan:  1. With Propofol: Initiate Impact Peptide 1.5 @ 25mL/hr and advance per protocol to goal rate of 40mL/hr to provide 1,440kcal (+ 496 kcal propofol), 90 grams of protein, and 739 mL free water per day.  2. If propofol d/c'd: resume Impact Peptide 1.5 @ 55mL/hr to provide 1980 kcal, 124 grams protein, 1016. ML free water and 183 g CHO.   3. Fluids per MD.  4. Monitor weights.  5. Initiate diet when medically feasible per SLP.       RD following.         "

## 2020-09-18 NOTE — PROGRESS NOTES
Critical Care Progress Note    Date of admission  9/9/2020    Chief Complaint  54 y.o. male admitted 9/9/2020 with respiratory failure, sepsis and pneumonia.    Hospital Course   9/17 -    developed worsening hypercarbic and hypoxemic respiratory failure requiring emergent intubation.  Full vent support.  9/18 -    continue vent support.  Titrating phenylephrine.  Replete electrolytes.  SAT/SBT as tolerated.      Interval Problem Update  Reviewed last 24 hour events:      Vent 2  Follows  Prop 40  AF   Keven 50  Replete K and Mg and PO4  UOP 1200 mL last night  Xarelto  Amio      Review of Systems  Review of Systems   Unable to perform ROS: Acuity of condition        Vital Signs for last 24 hours   Pulse:  [] 118  Resp:  [0-39] 16  BP: ()/(48-78) 106/57  SpO2:  [90 %-100 %] 95 %    Hemodynamic parameters for last 24 hours       Respiratory Information for the last 24 hours  Vent Mode: Spont  Rate (breaths/min): 20  Vt Target (mL): 420  PEEP/CPAP: 8  P Support: 5  MAP: 15  Length of Weaning Trial (Hours): 5 min  Control VTE (exp VT): 426    Physical Exam   Physical Exam  Constitutional:       Appearance: He is not diaphoretic.      Comments: On ventilator   HENT:      Head: Normocephalic and atraumatic.      Right Ear: External ear normal.      Left Ear: External ear normal.      Nose: Nose normal.      Mouth/Throat:      Mouth: Mucous membranes are moist.      Pharynx: No oropharyngeal exudate.   Eyes:      General: No scleral icterus.     Pupils: Pupils are equal, round, and reactive to light.   Neck:      Musculoskeletal: Normal range of motion. No neck rigidity or muscular tenderness.   Cardiovascular:      Pulses: Normal pulses.      Heart sounds: No friction rub. No gallop.       Comments: Atrial fibrillation  Pulmonary:      Breath sounds: Rales (Scattered crackles) present. No wheezing.   Abdominal:      General: Bowel sounds are normal. There is no distension.      Palpations: Abdomen is  soft.      Tenderness: There is no abdominal tenderness. There is no guarding or rebound.      Comments: Tolerating enteral tube feedings   Musculoskeletal:      Right lower leg: Edema present.      Left lower leg: Edema present.      Comments: No clubbing or cyanosis   Skin:     General: Skin is warm and dry.      Capillary Refill: Capillary refill takes less than 2 seconds.   Neurological:      Comments: Awake and alert.  Nods.  Follows.  Moves all 4 extremities.         Medications  Current Facility-Administered Medications   Medication Dose Route Frequency Provider Last Rate Last Dose   • levalbuterol (XOPENEX) 1.25 MG/3ML nebulizer solution 1.25 mg  1.25 mg Nebulization Q4H PRN (RT) Joe Grimaldo M.D.       • levalbuterol (XOPENEX) 1.25 MG/3ML nebulizer solution 1.25 mg  1.25 mg Nebulization Q4HRS (RT) Joe Grimaldo M.D.   1.25 mg at 09/18/20 1504   • potassium bicarbonate (KLYTE) effervescent tablet 25 mEq  25 mEq Enteral Tube DAILY Joe Grimaldo M.D.       • ampicillin/sulbactam (UNASYN) 3 g in  mL IVPB  3 g Intravenous Q6HRS Joe Grimaldo M.D.       • propofol (DIPRIVAN) injection  0-80 mcg/kg/min Intravenous Continuous Joe Grimaldo M.D.   Stopped at 09/18/20 1555   • Respiratory Therapy Consult   Nebulization Continuous RT Joe Grimaldo M.D.       • MD Alert...ICU Electrolyte Replacement per Pharmacy   Other PHARMACY TO DOSE Joe Grimaldo M.D.       • lidocaine (XYLOCAINE) 1 % injection 1-2 mL  1-2 mL Tracheal Tube Q30 MIN PRN Joe Grimaldo M.D.       • Pharmacy Consult: Enteral tube insertion - review meds/change route/product selection  1 Each Other PHARMACY TO DOSE Joe Grimaldo M.D.       • fentaNYL (SUBLIMAZE) injection 25 mcg  25 mcg Intravenous Q HOUR PRN Joe Grimaldo M.D.   25 mcg at 09/18/20 0613    Or   • fentaNYL (SUBLIMAZE) injection 50 mcg  50 mcg Intravenous Q HOUR PRN Joe Grimaldo M.D.         Or   • fentaNYL (SUBLIMAZE) injection 100 mcg  100 mcg Intravenous Q HOUR PRN Joe Grimaldo M.D.   100 mcg at 09/18/20 1220   • omeprazole (FIRST-OMEPRAZOLE) 2 mg/mL oral susp 20 mg  20 mg Enteral Tube Q12HRS Joe Grimaldo M.D.   20 mg at 09/18/20 0718   • phenylephrine (LISANDRO-SYNEPHRINE) 40 mg in  mL Infusion  0-300 mcg/min Intravenous Continuous Joe Grimaldo M.D. 18.8 mL/hr at 09/18/20 1148 50 mcg/min at 09/18/20 1148   • oxyCODONE immediate-release (ROXICODONE) tablet 5 mg  5 mg Enteral Tube Q4HRS PRN Joe Grimaldo M.D.        Or   • oxyCODONE immediate release (ROXICODONE) tablet 10 mg  10 mg Enteral Tube Q4HRS PRN Joe Grimaldo M.D.       • acetaminophen (TYLENOL) tablet 500 mg  500 mg Enteral Tube Q6HRS Joe Grimaldo M.D.   500 mg at 09/18/20 1201   • amiodarone (CORDARONE) tablet 200 mg  200 mg Enteral Tube Q DAY Joe Grimaldo M.D.   200 mg at 09/18/20 0618   • furosemide (LASIX) tablet 20 mg  20 mg Enteral Tube Q DAY Joe Grimaldo M.D.   20 mg at 09/18/20 0618   • DULoxetine (CYMBALTA) capsule 60 mg  60 mg Enteral Tube QHS Joe Grimaldo M.D.   60 mg at 09/17/20 2155   • topiramate (TOPAMAX) tablet 75 mg  75 mg Enteral Tube DAILY Joe Grimaldo M.D.   75 mg at 09/18/20 0618   • rivaroxaban (XARELTO) tablet 20 mg  20 mg Enteral Tube PM MEAL Joe Grimaldo M.D.   20 mg at 09/17/20 1758   • ondansetron (ZOFRAN ODT) dispertab 4 mg  4 mg Oral Q4HRS PRN Kendal Acuna M.D.       • senna-docusate (PERICOLACE or SENOKOT S) 8.6-50 MG per tablet 2 Tab  2 Tab Oral BID Kendal Acuna M.D.   2 Tab at 09/18/20 0618    And   • polyethylene glycol/lytes (MIRALAX) PACKET 1 Packet  1 Packet Oral QDAY PRN Kendal Acuna M.D.        And   • magnesium hydroxide (MILK OF MAGNESIA) suspension 30 mL  30 mL Oral QDAY PRN Knedal Acuna M.D.        And   • bisacodyl (DULCOLAX) suppository 10 mg  10 mg Rectal QDAY PRN  Knedal Acuna M.D.       • lidocaine (LIDODERM) 5 % 2 Patch  2 Patch Transdermal Q24HR Francisco Duval M.D.   2 Patch at 09/18/20 1201   • promethazine (PHENERGAN) tablet 12.5-25 mg  12.5-25 mg Enteral Tube Q4HRS PRN Francisco Duval M.D.       • ondansetron (ZOFRAN) syringe/vial injection 4 mg  4 mg Intravenous Q4HRS PRN Arcadio Bangura M.D.       • promethazine (PHENERGAN) suppository 12.5-25 mg  12.5-25 mg Rectal Q4HRS PRN Arcadio Bangura M.D.       • prochlorperazine (COMPAZINE) injection 5-10 mg  5-10 mg Intravenous Q4HRS PRHILDA Bangura M.D.           Fluids    Intake/Output Summary (Last 24 hours) at 9/18/2020 1722  Last data filed at 9/18/2020 1600  Gross per 24 hour   Intake 2424.41 ml   Output 2691 ml   Net -266.59 ml       Laboratory  Recent Labs     09/17/20  1034 09/17/20  1310 09/18/20  0434   ISTATAPH 7.094* 7.581* 7.550*   ISTATAPCO2 >100.0* 45.4* 35.8   ISTATAPO2 78 91* 78   ISTATATCO2 see below 44* 32   AGVNUON4BTT see below 98 97   ISTATARTHCO3 see below 42.7* 31.3*   ISTATARTBE see below 19* 9*   ISTATTEMP see below 36.9 C 37.7 C   ISTATFIO2 100 100 50   ISTATSPEC Arterial Arterial Arterial   ISTATAPHTC  --  7.583* 7.539*   [id number]  --  91* 82         Recent Labs     09/16/20  0515 09/17/20  0545 09/18/20  0412   SODIUM 139 140 139   POTASSIUM 4.5 5.0 2.6*   CHLORIDE 94* 94* 98   CO2 38* 43* 33   BUN 21 20 16   CREATININE 0.33* 0.38* 0.43*   MAGNESIUM  --   --  1.7   PHOSPHORUS  --   --  2.1*   CALCIUM 8.6 8.8 8.3*     Recent Labs     09/16/20  0515 09/17/20  0545 09/18/20  0412   GLUCOSE 101* 110* 97     Recent Labs     09/16/20  0515 09/17/20  0545 09/18/20  0412   WBC 8.8 12.1* 10.5   NEUTSPOLYS 82.60* 86.90* 87.00*   LYMPHOCYTES 5.60* 3.10* 4.00*   MONOCYTES 10.50 9.20 8.10   EOSINOPHILS 0.50 0.00 0.00   BASOPHILS 0.30 0.20 0.10     Recent Labs     09/16/20  0515 09/17/20  0545 09/18/20  0412   RBC 2.96* 3.27* 2.70*   HEMOGLOBIN 8.8* 9.7* 8.1*   HEMATOCRIT 30.3* 34.9* 26.6*    PLATELETCT 298 358 250       Imaging  X-Ray:  I have personally reviewed the images and compared with prior images. and My impression is: Unchanged right lower lobe opacification    Assessment/Plan  * Acute respiratory failure with hypoxia and hypercapnia (HCC)  Assessment & Plan  Intubated 9/9-9/12  Reintubated 9/17 due to hypercarbia  All the appropriate ventilator bundles are initiated  Continue vent support  SBT as tolerated    Pneumonia  Assessment & Plan  MSSA in sputum on 9/10  Usual ashwin in sputum culture  S/P 3 days of Zosyn followed by 3 days of Unasyn which completed on 9/15  Resume Unasyn    Paroxysmal atrial fibrillation (HCC)- (present on admission)  Assessment & Plan  Continue amiodarone, 200 mg daily  Continue metoprolol, 12.5 mg twice daily  Echo with normal LV systolic function and normal size of LA  Optimize magnesium and potassium  Thyroid studies pending  Anticoagulate with rivaroxaban    Right ventricular dilation- (present on admission)  Assessment & Plan  Echo confirms severely dilated RV with mild RV systolic dysfunction  RVSP 42 mmHg  Continue to force diuresis    Hypophosphatemia  Assessment & Plan  Replete phosphorus    Hypomagnesemia  Assessment & Plan  Replete magnesium    Hypokalemia  Assessment & Plan  Replete potassium    Major depression- (present on admission)  Assessment & Plan  Continue Cymbalta and Topamax       VTE:  NOAC  Ulcer: PPI  Lines: Orozco Catheter  Ongoing indication addressed    I have performed a physical exam and reviewed and updated ROS and Plan today (9/18/2020). In review of yesterday's note (9/17/2020), there are no changes except as documented above.     I have assessed and reassessed his respiratory status with ventilator adjustments, ventilator waveforms, airway mechanics, blood pressure, hemodynamics, cardiovascular status and his neurologic status.  He is at increased risk for worsening respiratory system dysfunction.    Discussed patient condition and  risk of morbidity and/or mortality with RN, RT, Pharmacy, Charge nurse / hot rounds and QA team     The patient remains critically ill.  Critical care time = 35 minutes in directly providing and coordinating critical care and extensive data review.  No time overlap and excludes procedures.    Joe Grimaldo MD  Pulmonary and Critical Care Medicine

## 2020-09-18 NOTE — CARE PLAN
Adult Ventilation Update    Total Vent Days: 1  Vent:  x 28, 60% +8    Patient Lines/Drains/Airways Status      Active Airway       Name: Placement date: Placement time: Site: Days:    Airway ETT 8.0  09/17/20   1050   --  1                  Events/Summary/Plan: Patient newly intubated.

## 2020-09-19 NOTE — FLOWSHEET NOTE
09/19/20 0525   General Vent Information   Vent Mode APVCMV   Weaning Trial   Weaning Trial Stopped due to: Pt weaned for 1 hour and returned to rest settings per protocol   Length of Weaning Trial (Hours) 1 hour   Weaning Parameters   RR (bpm) (!) 37   $ FVC / Vital Capacity (liters)  0.5   NIF (cm H2O)  -19   Rapid Shallow Breathing Index (RR/VT) 118   Spontaneous VE 5   Spontaneous

## 2020-09-19 NOTE — THERAPY
Speech Language Pathology  Daily Treatment     Patient Name: Jas Vann  Age:  54 y.o., Sex:  male  Medical Record #: [id number]  Today's Date: 9/19/2020     Precautions  Precautions: (P) Swallow Precautions ( See Comments)  Comments:     Assessment    Pt seen on this date for a repeat swallow evaluation as pt extubated after 2 days and is currently NPO with no source of nutrition. Voice is very weak and breathy and pt had difficulty increasing vocal quality as he said it was painful. Volitional cough is very weak and appears unproductive for clearing secretions. Immediate coughing, wet vocal quality following PO, and audible upper airway congestion appreciated with trials of single ice chips and MTL which is very concerning for aspiration. Upon palpation, laryngeal elevation was weak/incomplete and pt triggering 3-4 swallows per bite/sip which is concerning for pharyngeal residue. Pt c/o globus sensation with trials of MTL. At end of session, coughing event lasting ~1 minute note. At this time, pt is at very high risk for aspiration and would recommend NPO with consideration for replacement of cortrak. Dysphagia education and recommendations provided to pt and he verbalized understanding. SLP to follow.    Plan    Recommend NPO with consideration for replacement of cortrak    Continue current treatment plan.    Discharge Recommendations: (P) Recommend post-acute placement for additional speech therapy services prior to discharge home       Objective       09/19/20 1532   Vitals   O2 (LPM) 3   O2 Delivery Device Silicone Nasal Cannula   Pain 0 - 10 Group   Therapist Pain Assessment Post Activity Pain Same as Prior to Activity;Nurse Notified;4  (arm pain)   Voice   Comments breathy, reduced vocal loudness   Dysphagia    Dysphagia X   Positioning / Behavior Modification Multiple Swallows;Self Monitoring   Diet / Liquid Recommendation NPO;Pre-Feeding Trials with SLP Only   Nutritional Liquid Intake Rating Scale  Nothing by mouth   Nutritional Food Intake Rating Scale Nothing by mouth   Skilled Intervention Compensatory Strategies;Verbal Cueing   Short Term Goals   Short Term Goal # 1 Reinstated 9/19: Pt will consume prefeeding trials with no overt s/sx of aspiration   Short Term Goal # 1 B   Pt will consume PU4/MT2 with no overt s/sx aspiration given direct supervision   Goal Outcome  # 1 B Goal not met

## 2020-09-19 NOTE — CARE PLAN
Problem: Safety - Medical Restraint  Goal: Remains free of injury from restraints (Restraint for Interference with Medical Device)  Description: INTERVENTIONS:  1. Determine that other, less restrictive measures have been tried or would not be effective before applying the restraint  2. Evaluate the patient's condition at the time of restraint application  3. Inform patient/family regarding the reason for restraint  4. Q2H: Monitor safety, psychosocial status, comfort, nutrition and hydration  Outcome: PROGRESSING AS EXPECTED     Problem: Fluid Volume:  Goal: Will maintain balanced intake and output  Outcome: PROGRESSING AS EXPECTED     Problem: Safety  Goal: Will remain free from injury  Outcome: PROGRESSING AS EXPECTED     Problem: Infection  Goal: Will remain free from infection  Outcome: PROGRESSING AS EXPECTED     Problem: Bowel/Gastric:  Goal: Normal bowel function is maintained or improved  Outcome: PROGRESSING AS EXPECTED     Problem: Knowledge Deficit  Goal: Knowledge of disease process/condition, treatment plan, diagnostic tests, and medications will improve  Outcome: PROGRESSING AS EXPECTED

## 2020-09-19 NOTE — PROGRESS NOTES
Critical Care Progress Note    Date of admission  9/9/2020    Chief Complaint  54 y.o. male admitted 9/9/2020 with respiratory failure, sepsis and pneumonia.    Hospital Course   9/17 -    developed worsening hypercarbic and hypoxemic respiratory failure requiring emergent intubation.  Full vent support.  9/18 -    continue vent support.  Titrating phenylephrine.  Replete electrolytes.  SAT/SBT as tolerated.  9/19 -    SAT/SBT as tolerated.  Continue vent support.  Titrating phenylephrine.  Replete potassium.  Continue Unasyn.      Interval Problem Update  Reviewed last 24 hour events:      Replete K  SAT/SBT done  Vent 3  Keven 10  AF  TF      Review of Systems  Review of Systems   Unable to perform ROS: Acuity of condition        Vital Signs for last 24 hours   Pulse:  [] 105  Resp:  [0-39] 18  BP: ()/(48-80) 100/58  SpO2:  [92 %-100 %] 97 %    Hemodynamic parameters for last 24 hours       Respiratory Information for the last 24 hours  Vent Mode: APVCMV  Rate (breaths/min): 20  Vt Target (mL): 420  PEEP/CPAP: 8  P Support: 8  MAP: 15  Length of Weaning Trial (Hours): 1 hour  Control VTE (exp VT): 412    Physical Exam   Physical Exam  Constitutional:       Appearance: He is not diaphoretic.      Comments: On ventilator   HENT:      Head: Normocephalic and atraumatic.      Right Ear: External ear normal.      Left Ear: External ear normal.      Nose: Nose normal.      Mouth/Throat:      Mouth: Mucous membranes are moist.      Pharynx: Oropharynx is clear.   Eyes:      Conjunctiva/sclera: Conjunctivae normal.      Pupils: Pupils are equal, round, and reactive to light.   Neck:      Musculoskeletal: Normal range of motion and neck supple.   Cardiovascular:      Pulses: Normal pulses.      Heart sounds: No gallop.       Comments: Atrial fibrillation  Pulmonary:      Breath sounds: Rales (Unchanged crackles) present. No wheezing.   Abdominal:      General: Bowel sounds are normal. There is no distension.       Palpations: Abdomen is soft.      Tenderness: There is no guarding.      Comments: Tolerating enteral tube feedings   Musculoskeletal: Normal range of motion.      Right lower leg: Edema present.      Left lower leg: Edema present.      Comments: No clubbing or cyanosis   Skin:     General: Skin is warm and dry.      Capillary Refill: Capillary refill takes less than 2 seconds.   Neurological:      Comments: Awake and alert.  Nods.  Follows.  Moves all 4 extremities.         Medications  Current Facility-Administered Medications   Medication Dose Route Frequency Provider Last Rate Last Dose   • potassium bicarbonate (KLYTE) effervescent tablet 25 mEq  25 mEq Enteral Tube Q4HRS Joe Grimaldo M.D.   25 mEq at 09/19/20 0828   • levalbuterol (XOPENEX) 1.25 MG/3ML nebulizer solution 1.25 mg  1.25 mg Nebulization Q4H PRN (RT) Joe Grimaldo M.D.       • levalbuterol (XOPENEX) 1.25 MG/3ML nebulizer solution 1.25 mg  1.25 mg Nebulization Q4HRS (RT) Joe Grimaldo M.D.   1.25 mg at 09/19/20 0301   • potassium bicarbonate (KLYTE) effervescent tablet 25 mEq  25 mEq Enteral Tube DAILY Joe Grimaldo M.D.   25 mEq at 09/19/20 0502   • ampicillin/sulbactam (UNASYN) 3 g in  mL IVPB  3 g Intravenous Q6HRS Joe Grimaldo M.D.   Stopped at 09/19/20 0532   • propofol (DIPRIVAN) injection  0-80 mcg/kg/min Intravenous Continuous Joe Grimaldo M.D.   Stopped at 09/18/20 1555   • Respiratory Therapy Consult   Nebulization Continuous RT Joe Grimaldo M.D.       • MD Alert...ICU Electrolyte Replacement per Pharmacy   Other PHARMACY TO DOSE Joe Grimaldo M.D.       • lidocaine (XYLOCAINE) 1 % injection 1-2 mL  1-2 mL Tracheal Tube Q30 MIN PRN Joe Grimaldo M.D.       • Pharmacy Consult: Enteral tube insertion - review meds/change route/product selection  1 Each Other PHARMACY TO DOSE Joe Grimaldo M.D.       • fentaNYL (SUBLIMAZE) injection 25 mcg  25  mcg Intravenous Q HOUR PRN Joe Grimaldo M.D.   25 mcg at 09/19/20 0035    Or   • fentaNYL (SUBLIMAZE) injection 50 mcg  50 mcg Intravenous Q HOUR PRN Joe Grimaldo M.D.        Or   • fentaNYL (SUBLIMAZE) injection 100 mcg  100 mcg Intravenous Q HOUR PRN Joe Grimaldo M.D.   100 mcg at 09/18/20 1220   • omeprazole (FIRST-OMEPRAZOLE) 2 mg/mL oral susp 20 mg  20 mg Enteral Tube Q12HRS Joe Grimaldo M.D.   20 mg at 09/19/20 0503   • phenylephrine (LISANDRO-SYNEPHRINE) 40 mg in  mL Infusion  0-300 mcg/min Intravenous Continuous Joe Grimaldo M.D. 3.8 mL/hr at 09/19/20 0830 10 mcg/min at 09/19/20 0830   • oxyCODONE immediate-release (ROXICODONE) tablet 5 mg  5 mg Enteral Tube Q4HRS PRN Joe Grimaldo M.D.        Or   • oxyCODONE immediate release (ROXICODONE) tablet 10 mg  10 mg Enteral Tube Q4HRS PRN Joe Grimaldo M.D.       • acetaminophen (TYLENOL) tablet 500 mg  500 mg Enteral Tube Q6HRS Joe Grimaldo M.D.   500 mg at 09/19/20 0502   • amiodarone (CORDARONE) tablet 200 mg  200 mg Enteral Tube Q DAY Joe Grimaldo M.D.   200 mg at 09/19/20 0502   • furosemide (LASIX) tablet 20 mg  20 mg Enteral Tube Q DAY Joe Grimaldo M.D.   20 mg at 09/19/20 0502   • DULoxetine (CYMBALTA) capsule 60 mg  60 mg Enteral Tube QHS Joe Grimaldo M.D.   60 mg at 09/18/20 2126   • topiramate (TOPAMAX) tablet 75 mg  75 mg Enteral Tube DAILY Joe Grimaldo M.D.   75 mg at 09/19/20 0502   • rivaroxaban (XARELTO) tablet 20 mg  20 mg Enteral Tube PM MEAL Joe Grimaldo M.D.   20 mg at 09/18/20 1723   • ondansetron (ZOFRAN ODT) dispertab 4 mg  4 mg Oral Q4HRS PRN Kendal Acuna M.D.       • senna-docusate (PERICOLACE or SENOKOT S) 8.6-50 MG per tablet 2 Tab  2 Tab Oral BID Kendal Acuna M.D.   2 Tab at 09/19/20 0502    And   • polyethylene glycol/lytes (MIRALAX) PACKET 1 Packet  1 Packet Oral QDAY PRN Kendal Acuna M.D.         And   • magnesium hydroxide (MILK OF MAGNESIA) suspension 30 mL  30 mL Oral QDAY PRN Kendal Acuna M.D.        And   • bisacodyl (DULCOLAX) suppository 10 mg  10 mg Rectal QDAY PRN Kendal Acuna M.D.       • lidocaine (LIDODERM) 5 % 2 Patch  2 Patch Transdermal Q24HR Francisco Duval M.D.   2 Patch at 09/18/20 1201   • promethazine (PHENERGAN) tablet 12.5-25 mg  12.5-25 mg Enteral Tube Q4HRS PRN Francisco Duval M.D.       • ondansetron (ZOFRAN) syringe/vial injection 4 mg  4 mg Intravenous Q4HRS PRN Arcadio Bangura M.D.   4 mg at 09/18/20 1954   • promethazine (PHENERGAN) suppository 12.5-25 mg  12.5-25 mg Rectal Q4HRS PRN Arcadio Bangura M.D.       • prochlorperazine (COMPAZINE) injection 5-10 mg  5-10 mg Intravenous Q4HRS PRN Arcadio Bangura M.D.           Fluids    Intake/Output Summary (Last 24 hours) at 9/19/2020 0916  Last data filed at 9/19/2020 0830  Gross per 24 hour   Intake 2242.61 ml   Output 2045 ml   Net 197.61 ml       Laboratory  Recent Labs     09/17/20  1310 09/18/20  0434 09/19/20  0253   ISTATAPH 7.581* 7.550* 7.492   ISTATAPCO2 45.4* 35.8 38.9*   ISTATAPO2 91* 78 71   ISTATATCO2 44* 32 31   [id number] 98 97 95   ISTATARTHCO3 42.7* 31.3* 29.7*   ISTATARTBE 19* 9* 6*   ISTATTEMP 36.9 C 37.7 C 37.6 C   ISTATFIO2 100 50 30   ISTATSPEC Arterial Arterial Arterial   ISTATAPHTC 7.583* 7.539* 7.483   [id number] 91* 82 74         Recent Labs     09/17/20  0545 09/18/20  0412 09/18/20  1715 09/19/20  0455   SODIUM 140 139  --  138   POTASSIUM 5.0 2.6* 3.2* 3.2*   CHLORIDE 94* 98  --  101   CO2 43* 33  --  31   BUN 20 16  --  15   CREATININE 0.38* 0.43*  --  0.38*   MAGNESIUM  --  1.7  --  2.0   PHOSPHORUS  --  2.1*  --  2.8   CALCIUM 8.8 8.3*  --  7.9*     Recent Labs     09/17/20  0545 09/18/20 0412 09/19/20 0455   GLUCOSE 110* 97 101*     Recent Labs     09/17/20  0545 09/18/20  0412 09/19/20  0455   WBC 12.1* 10.5 11.1*   NEUTSPOLYS 86.90* 87.00* 85.50*   LYMPHOCYTES 3.10* 4.00* 4.30*    MONOCYTES 9.20 8.10 9.60   EOSINOPHILS 0.00 0.00 0.00   BASOPHILS 0.20 0.10 0.10     Recent Labs     09/17/20  0545 09/18/20  0412 09/19/20  0455   RBC 3.27* 2.70* 2.55*   HEMOGLOBIN 9.7* 8.1* 7.6*   HEMATOCRIT 34.9* 26.6* 24.7*   PLATELETCT 358 250 255       Imaging  X-Ray:  I have personally reviewed the images and compared with prior images. and My impression is: Slightly improved right lower lobe opacification    Assessment/Plan  * Acute respiratory failure with hypoxia and hypercapnia (HCC)  Assessment & Plan  Intubated 9/9-9/12  Reintubated 9/17 due to hypercarbia  All the appropriate ventilator bundles are initiated  Continue vent support and SBT as tolerated    Pneumonia  Assessment & Plan  MSSA in sputum on 9/10  Usual ashwin in sputum culture on 9/17  S/P 3 days of Zosyn followed by 3 days of Unasyn which completed on 9/15  Unasyn resumed on 9/18 - complete 5 days of therapy    Paroxysmal atrial fibrillation (HCC)- (present on admission)  Assessment & Plan  Continue amiodarone, 200 mg daily  Echo with normal LV systolic function and normal size of LA  Optimize potassium and magnesium  Thyroid studies pending  Anticoagulate with rivaroxaban    Right ventricular dilation- (present on admission)  Assessment & Plan  Echo confirms severely dilated RV with mild RV systolic dysfunction  RVSP 42 mmHg  Force diuresis with Lasix    Hypokalemia  Assessment & Plan  Replete potassium    Major depression- (present on admission)  Assessment & Plan  Continue Cymbalta and Topamax       VTE:  NOAC  Ulcer: PPI  Lines: Orozco Catheter  Ongoing indication addressed    I have performed a physical exam and reviewed and updated ROS and Plan today (9/19/2020). In review of yesterday's note (9/18/2020), there are no changes except as documented above.     I have assessed and reassessed his respiratory status with spontaneous breathing trials and ventilator adjustments, airway mechanics, ventilator waveforms, blood pressure,  hemodynamics, cardiovascular status with titration of phenylephrine and his neurologic status.  He is at increased risk for worsening cardiovascular and respiratory system dysfunction.    Discussed patient condition and risk of morbidity and/or mortality with RN, RT, Pharmacy, Charge nurse / hot rounds and QA team     The patient remains critically ill.  Critical care time = 40 minutes in directly providing and coordinating critical care and extensive data review.  No time overlap and excludes procedures.    Joe Grimaldo MD  Pulmonary and Critical Care Medicine

## 2020-09-19 NOTE — RESPIRATORY CARE
Extubation    Cuff leak noted Yes  Stridor present No     FiO2%: 30 % (09/19/20 1000)  O2 (LPM): 5 (09/19/20 1055)     Patient toleration No complications at this time    Events/Summary/Plan: Extubated per MD order (09/19/20 1055)

## 2020-09-20 NOTE — RESPIRATORY CARE
Critical ABG results hand delivered to Dr. Daniel, per MD 30 minute BiPAP trial on 14/8. Will isis to monitor patient.    7.11  >100  PaO2- 100

## 2020-09-20 NOTE — PROCEDURES
Procedures  Procedure: endotracheal intubation    : Dr Daniel    Reason: Acute respiratory failure with hypercapnia and hypoxia    Permit was implied secondary to emergent situation. A MAC 3 blade was inserted into the oropharynx at which time the vocal cords were visualized. A 8.0-Urdu endotracheal tube was inserted and visualized going through the vocal cords. The stylette was removed. Colorimetric change was visualized on the CO2 meter. Breath sounds were heard in both lung fields equally. The endotracheal tube was placed at 23.5 cm, measured at the teeth.  Post procedure cxray no pneumothorax, us no pneumothorax bilateral, et tube pulled back 1.5 cm.     Complications: none

## 2020-09-20 NOTE — PROGRESS NOTES
Critical care progress note:  Patient lethargic. ABG 7.11 and co2 > 100.  Extubated today.  Has been reintubated prior.  With physical stimulation able to wake him up and he can remove oxymask. Placed on bipap for rescue.  High risk for intubation.      Reviewed chart notes, orders, labs and imaging    HEMATOLOGY/ ONCOLOGY/ID:            Recent Labs     09/17/20  0545 09/18/20  0412 09/19/20  0455   WBC 12.1* 10.5 11.1*   RBC 3.27* 2.70* 2.55*   HEMOGLOBIN 9.7* 8.1* 7.6*   HEMATOCRIT 34.9* 26.6* 24.7*   .7* 98.5* 96.9   MCH 29.7 30.0 29.8   RDW 53.1* 49.8 49.6   PLATELETCT 358 250 255   MPV 10.1 10.5 10.5   NEUTSPOLYS 86.90* 87.00* 85.50*   LYMPHOCYTES 3.10* 4.00* 4.30*   MONOCYTES 9.20 8.10 9.60   EOSINOPHILS 0.00 0.00 0.00   BASOPHILS 0.20 0.10 0.10     Lab Results   Component Value Date    FERRITIN 57.4 12/03/2019    IRON 17 (L) 12/03/2019    [facility]       RENAL:        Estimated GFR/CRCL = Estimated Creatinine Clearance: 210 mL/min (A) (by C-G formula based on SCr of 0.38 mg/dL (L)).  Recent Labs     09/17/20  0545 09/18/20  0412 09/18/20  1715 09/19/20  0455 09/19/20  1840   SODIUM 140 139  --  138  --    POTASSIUM 5.0 2.6* 3.2* 3.2* 5.1   CHLORIDE 94* 98  --  101  --    CO2 43* 33  --  31  --    BUN 20 16  --  15  --    CREATININE 0.38* 0.43*  --  0.38*  --    CALCIUM 8.8 8.3*  --  7.9*  --    MAGNESIUM  --  1.7  --  2.0  --    PHOSPHORUS  --  2.1*  --  2.8  --        GASTROINTESTINAL/ HEPATIC:          No results for input(s): ALTSGPT, ASTSGOT, ALKPHOSPHAT, TBILIRUBIN, DBILIRUBIN, GAMMAGT, LIPASE, ALBUMIN, GLOBULIN, PREALBUMIN, INR, MACROCYTOSIS in the last 72 hours.  No results found for: AMMONIA    ENDOCRINE:              Recent Labs     09/17/20  1028   POCGLUCOSE 123*     No results found for: HBA1C, TSH, FREET4, FREET3, CORTISOL    Imaging, reviewed    Assessment and Plan:  Hypercapnic and hypoxic respiratory failure  Altered mental status    30 min bipap trial, if tolerates keep  on overnight. If not then intubate for mechanical ventilation.     Addendum:  Failed bipap trial. Intubated, placed on ASV vent settings, precedex drip and prn fentanyl.  Et tube near yamilka, pulled back 1.0 cm.  No pneumothorax chest xray, upper left obscured by neck/chin, per us bilateral no pneumothorax.  Fentanyl drip added.  Given 2 mg versed and 100 mcg fentanly as poorly sedated. Adjusted asv to 180    Patient is critically ill. Hypercapnic respiratory failure with altered mental status.  Bipap 14/8 trial for 30 minutes, if mental status improves then continue. Failed bipap, intubated for mechanical ventilation.  Precedex drip and fentanyl drip for sedation.  Neosynephrine for map > 65 and sbp > 90.  If untreated there is a high chance of deterioration and eventually death. The critical that has been undertaken is medically complex. There has been no overlap in critical care time. Critical Care Time not including procedures: 45 minutes

## 2020-09-20 NOTE — PROGRESS NOTES
At approximately 2230 on 9/19 the patient became increasingly lethargic, and was difficult to rouse. The patient was still oriented X4 and could still answer questions, but would quickly loose consciousness. Dr. Daniel was paged and a ABG was ordered. The patient was found to be hypercapnic, and Dr. Daniel ordered BiPAP. The patient was unable to tolerate BiPAP, and was intubated at 2355. New medications and interventions all initiated per orders. The patient is currently stable. Will continue to monitor and assess.

## 2020-09-20 NOTE — CARE PLAN
Ventilator Daily Summary    Vent Day #2    Ventilator settings changed this shift: %    Patient became hypercapneic and was reintubated, due to failure on BiPAP.      Plan: Continue current ventilator settings and wean mechanical ventilation as tolerated per physician orders.

## 2020-09-20 NOTE — CARE PLAN
Problem: Communication  Goal: The ability to communicate needs accurately and effectively will improve  Outcome: PROGRESSING AS EXPECTED  Note: The patient has a weak and hoarse voice from intubation. The patient has been given paper and a pen to write comments, questions, and concerns. The patient is also able to communicate effectively through non verbal communication such as gestures, and facial expressions. Will continue to assess modes of communication.      Problem: Safety  Goal: Will remain free from falls  Outcome: PROGRESSING AS EXPECTED  Note: The patient is currently sitting up in the chair with all of his personal belongings within reach, and is wearing non skid footwear. The chair alarm is on. The patient has been educated on the call light, has it within reach, and utilizes it appropriately. Will continue hourly rounding.

## 2020-09-20 NOTE — RESPIRATORY CARE
Assisted Dr. Daniel with reintubating 7.5 ETT checked cuff check, stylet in place, Glidescope #4 was used, ETT secured at 23.5 at the teeth. Per MD pull back 1 cm. Placed on ASV per Md.

## 2020-09-20 NOTE — DIETARY
Nutrition Services: Brief Update    Pt previously on tube feeding: Impact Peptide 1.5 @ goal rate of 40 mL/hr on propofol, goal of 55 mL/hr off propofol.  Extubated 9/19.  Swallow evaluation by SLP 9/19, recommended continued NPO with alternative source of nutrition.  Pt re-intubated late last night. Met cart consult received.  Cortrak remains in place.  Propofol is off, Keven is @ 30 mcg/min.  Current tube feeding formula and goal rate remains appropriate.    Recommendations/Plan:  1. Continue with Impact Peptide 1.5 @ goal rate of 55mL/hr, which provides 1980 kcal, 124 grams protein, 1016. ML free water and 183 g CHO.   2. Fluids per MD.    RD will continue to follow.

## 2020-09-20 NOTE — PROGRESS NOTES
Critical Care Progress Note    Date of admission  9/9/2020    Chief Complaint  54 y.o. male admitted 9/9/2020 with respiratory failure, sepsis and pneumonia.    Hospital Course   9/17 -    developed worsening hypercarbic and hypoxemic respiratory failure requiring emergent intubation.  Full vent support.  9/18 -    continue vent support.  Titrating phenylephrine.  Replete electrolytes.  SAT/SBT as tolerated.  9/19 -    SAT/SBT as tolerated.  Continue vent support.  Titrating phenylephrine.  Replete potassium.  Continue Unasyn.  9/20 -    liberated from the ventilator yesterday and did very well until last night when he required reintubation.  Continue vent support.  Continue Unasyn and complete 5 days of therapy.      Interval Problem Update  Reviewed last 24 hour events:      SAT done - follows, moves all 4, nods  Fent 100  Dex titrating  Increase scheduled Tylenol  Keven 30  T max 100  TF at goal   last night  Replete PO4  Vent 4  %  PEEP 8  Apnea with SBT  AF      Review of Systems  Review of Systems   Unable to perform ROS: Acuity of condition        Vital Signs for last 24 hours   Temp:  [36.8 °C (98.2 °F)-37.3 °C (99.1 °F)] 37.3 °C (99.1 °F)  Pulse:  [] 49  Resp:  [3-39] 23  BP: ()/(47-76) 101/62  SpO2:  [90 %-100 %] 100 %    Hemodynamic parameters for last 24 hours       Respiratory Information for the last 24 hours  Vent Mode: ASV  Rate (breaths/min): 20  Vt Target (mL): 420  PEEP/CPAP: 8  MAP: 17  Control VTE (exp VT): 365    Physical Exam   Physical Exam  Constitutional:       Appearance: He is not diaphoretic.      Comments: On ventilator   HENT:      Head: Normocephalic and atraumatic.      Right Ear: External ear normal.      Left Ear: External ear normal.      Nose: Nose normal.      Mouth/Throat:      Mouth: Mucous membranes are moist.      Pharynx: No oropharyngeal exudate.   Eyes:      General: No scleral icterus.     Pupils: Pupils are equal, round, and reactive to light.    Neck:      Musculoskeletal: Normal range of motion and neck supple.   Cardiovascular:      Pulses: Normal pulses.      Heart sounds: No friction rub. No gallop.       Comments: Atrial fibrillation  Pulmonary:      Breath sounds: Rales (Few coarse crackles bilaterally) present. No wheezing.   Abdominal:      General: Bowel sounds are normal. There is no distension.      Palpations: Abdomen is soft.      Tenderness: There is no abdominal tenderness. There is no guarding.      Comments: Tolerating enteral tube feedings   Musculoskeletal: Normal range of motion.      Comments: No clubbing or cyanosis   Skin:     General: Skin is warm and dry.      Capillary Refill: Capillary refill takes less than 2 seconds.   Neurological:      Comments: Awake and alert.  Nods.  Follows.  Moves all 4 extremities.         Medications  Current Facility-Administered Medications   Medication Dose Route Frequency Provider Last Rate Last Dose   • Respiratory Therapy Consult   Nebulization Continuous RT Jus Daniel M.D.       • ipratropium-albuterol (DUONEB) nebulizer solution  3 mL Nebulization Q2HRS PRN (RT) Jus Daniel M.D.   3 mL at 09/20/20 0355   • senna-docusate (PERICOLACE or SENOKOT S) 8.6-50 MG per tablet 2 Tab  2 Tab Enteral Tube BID Jus Daniel M.D.   Stopped at 09/20/20 0600    And   • polyethylene glycol/lytes (MIRALAX) PACKET 1 Packet  1 Packet Enteral Tube QDAY PRN Jus Daniel M.D.        And   • magnesium hydroxide (MILK OF MAGNESIA) suspension 30 mL  30 mL Enteral Tube QDAY PRN Jus Daniel M.D.        And   • bisacodyl (DULCOLAX) suppository 10 mg  10 mg Rectal QDAY PRN Jus Daniel M.D.       • MD Alert...ICU Electrolyte Replacement per Pharmacy   Other PHARMACY TO DOSE Jus Daniel M.D.       • lidocaine (XYLOCAINE) 1 % injection 1-2 mL  1-2 mL Tracheal Tube Q30 MIN PRN Jus Daniel M.D.       • fentaNYL (SUBLIMAZE) injection 25 mcg  25 mcg Intravenous Q HOUR PRN Jus Daniel M.D.        Or   •  fentaNYL (SUBLIMAZE) injection 50 mcg  50 mcg Intravenous Q HOUR PRN Jus Daniel M.D.        Or   • fentaNYL (SUBLIMAZE) injection 100 mcg  100 mcg Intravenous Q HOUR PRN Jus Daniel M.D.   100 mcg at 09/20/20 0928   • fentaNYL (SUBLIMAZE) 50 mcg/mL in 50mL (Continuous Infusion)   Intravenous Continuous Jus Daniel M.D. 1.5 mL/hr at 09/20/20 1253 75 mcg/hr at 09/20/20 1253   • SODIUM CHLORIDE 0.9 % IV SOLN            • acetaminophen (TYLENOL) tablet 1,000 mg  1,000 mg Enteral Tube Q6HRS Joe Grimaldo M.D.   1,000 mg at 09/20/20 1245   • [START ON 9/21/2020] omeprazole (FIRST-OMEPRAZOLE) 2 mg/mL oral susp 40 mg  40 mg Enteral Tube DAILY Joe Grimaldo M.D.       • oxyCODONE immediate-release (ROXICODONE) tablet 2.5 mg  2.5 mg Enteral Tube Q4HRS PRN Joe Grimaldo M.D.        Or   • oxyCODONE immediate-release (ROXICODONE) tablet 5 mg  5 mg Enteral Tube Q4HRS PRN Joe Grimaldo M.D.   5 mg at 09/20/20 0928   • rocuronium (ZEMURON) injection 40 mg  40 mg Intravenous Once Jus Daniel M.D.       • dexmedetomidine (PRECEDEX) 400 mcg/100mL NS premix infusion  0.1-1.5 mcg/kg/hr Intravenous Continuous Jus Daniel M.D. 1.9 mL/hr at 09/20/20 1245 0.1 mcg/kg/hr at 09/20/20 1245   • levalbuterol (XOPENEX) 1.25 MG/3ML nebulizer solution 1.25 mg  1.25 mg Nebulization Q4H PRN (RT) Joe Grimaldo M.D.       • levalbuterol (XOPENEX) 1.25 MG/3ML nebulizer solution 1.25 mg  1.25 mg Nebulization Q4HRS (RT) Joe Grimaldo M.D.   1.25 mg at 09/20/20 1048   • potassium bicarbonate (KLYTE) effervescent tablet 25 mEq  25 mEq Enteral Tube DAILY Joe Grimaldo M.D.   25 mEq at 09/20/20 0422   • ampicillin/sulbactam (UNASYN) 3 g in  mL IVPB  3 g Intravenous Q6HRS Joe Grimaldo M.D. 200 mL/hr at 09/20/20 1245 3 g at 09/20/20 1245   • Pharmacy Consult: Enteral tube insertion - review meds/change route/product selection  1 Each Other PHARMACY TO DOSE Joe Martinez  Ilan Page M.D.       • phenylephrine (LISANDRO-SYNEPHRINE) 40 mg in  mL Infusion  0-300 mcg/min Intravenous Continuous Joe Grimaldo M.D. 11.3 mL/hr at 09/20/20 1006 30 mcg/min at 09/20/20 1006   • amiodarone (CORDARONE) tablet 200 mg  200 mg Enteral Tube Q DAY Joe Grimaldo M.D.   200 mg at 09/20/20 0423   • furosemide (LASIX) tablet 20 mg  20 mg Enteral Tube Q DAY Joe Grimaldo M.D.   20 mg at 09/20/20 0423   • DULoxetine (CYMBALTA) capsule 60 mg  60 mg Enteral Tube QHS Joe Grimaldo M.D.   60 mg at 09/19/20 2004   • topiramate (TOPAMAX) tablet 75 mg  75 mg Enteral Tube DAILY Joe Grimaldo M.D.   75 mg at 09/20/20 0424   • rivaroxaban (XARELTO) tablet 20 mg  20 mg Enteral Tube PM MEAL Joe Grimaldo M.D.   20 mg at 09/19/20 1803   • ondansetron (ZOFRAN ODT) dispertab 4 mg  4 mg Oral Q4HRS PRN Kendal Acuna M.D.       • lidocaine (LIDODERM) 5 % 2 Patch  2 Patch Transdermal Q24HR Francisco Duval M.D.   2 Patch at 09/19/20 1116   • promethazine (PHENERGAN) tablet 12.5-25 mg  12.5-25 mg Enteral Tube Q4HRS PRN Francisco Duval M.D.       • ondansetron (ZOFRAN) syringe/vial injection 4 mg  4 mg Intravenous Q4HRS PRN Arcadio Bangura M.D.   4 mg at 09/18/20 1954   • promethazine (PHENERGAN) suppository 12.5-25 mg  12.5-25 mg Rectal Q4HRS PRN Arcadio Bangura M.D.       • prochlorperazine (COMPAZINE) injection 5-10 mg  5-10 mg Intravenous Q4HRS PRN Arcadio Bangura M.D.           Fluids    Intake/Output Summary (Last 24 hours) at 9/20/2020 1416  Last data filed at 9/20/2020 1400  Gross per 24 hour   Intake 1720.62 ml   Output 1555 ml   Net 165.62 ml       Laboratory  Recent Labs     09/18/20  0434 09/19/20  0253 09/20/20  0330   ISTATAPH 7.550* 7.492 7.473   ISTATAPCO2 35.8 38.9* 43.6*   ISTATAPO2 78 71 238*   ISTATATCO2 32 31 33   JALCRGX3OTA 97 95 100*   ISTATARTHCO3 31.3* 29.7* 31.9*   ISTATARTBE 9* 6* 8*   ISTATTEMP 37.7 C 37.6 C 37.4 C   ISTATFIO2 50 30 100    ISTATSPEC Arterial Arterial Arterial   ISTATAPHTC 7.539* 7.483 7.467   [id number] 82 74 240*         Recent Labs     09/18/20  0412  09/19/20  0455 09/19/20  1840 09/20/20  0415   SODIUM 139  --  138  --  139   POTASSIUM 2.6*   < > 3.2* 5.1 4.0   CHLORIDE 98  --  101  --  101   CO2 33  --  31  --  33   BUN 16  --  15  --  17   CREATININE 0.43*  --  0.38*  --  0.29*   MAGNESIUM 1.7  --  2.0  --  2.2   PHOSPHORUS 2.1*  --  2.8  --  2.1*   CALCIUM 8.3*  --  7.9*  --  7.6*    < > = values in this interval not displayed.     Recent Labs     09/18/20 0412 09/19/20 0455 09/20/20  0415   GLUCOSE 97 101* 89     Recent Labs     09/18/20 0412 09/19/20  0455 09/20/20  0415   WBC 10.5 11.1* 11.0*   NEUTSPOLYS 87.00* 85.50* 87.50*   LYMPHOCYTES 4.00* 4.30* 4.60*   MONOCYTES 8.10 9.60 7.10   EOSINOPHILS 0.00 0.00 0.00   BASOPHILS 0.10 0.10 0.10     Recent Labs     09/18/20 0412 09/19/20  0455 09/20/20  0415   RBC 2.70* 2.55* 2.55*   HEMOGLOBIN 8.1* 7.6* 7.6*   HEMATOCRIT 26.6* 24.7* 25.7*   PLATELETCT 250 255 245       Imaging  X-Ray:  I have personally reviewed the images and compared with prior images. and My impression is: Worsening left lower lobe opacities, improved right lower lobe opacities    Assessment/Plan  * Acute respiratory failure with hypoxia and hypercapnia (HCC)  Assessment & Plan  Intubated 9/9-9/12  Reintubated 9/17 due to hypercarbia - liberated on 9/19 and then reintubated later that evening, bless his heart  All of the appropriate ventilator bundles are in place  Continue vent support    Pneumonia  Assessment & Plan  MSSA in sputum on 9/10  Usual ashwin in sputum culture on 9/17  S/P 3 days of Zosyn followed by 3 days of Unasyn which completed on 9/15  Unasyn resumed on 9/18 - complete 5 days of therapy    Paroxysmal atrial fibrillation (HCC)- (present on admission)  Assessment & Plan  Continue amiodarone, 200 mg daily  Echo with normal LV systolic function and normal size of LA  Optimize magnesium and  potassium  Thyroid studies pending (collected on 9/18 - Lord knows why not yet resulted)  Anticoagulate with rivaroxaban    Right ventricular dilation- (present on admission)  Assessment & Plan  Echo confirms severely dilated RV with mild RV systolic dysfunction  RVSP 42 mmHg  Force diuresis with Lasix    Hypophosphatemia  Assessment & Plan  Replete phosphorus    Major depression- (present on admission)  Assessment & Plan  Continue Cymbalta and Topamax       VTE:  NOAC  Ulcer: PPI  Lines: Orozco Catheter  Ongoing indication addressed    I have performed a physical exam and reviewed and updated ROS and Plan today (9/20/2020). In review of yesterday's note (9/19/2020), there are no changes except as documented above.     I have assessed and reassessed his respiratory status with ventilator adjustments, ventilator waveforms, airway mechanics, blood pressure, hemodynamics, cardiovascular status with titration of phenylephrine as well as his neurologic status.  He is at increased risk for worsening respiratory and cardiovascular system dysfunction.    Discussed patient condition and risk of morbidity and/or mortality with RN, RT, Pharmacy, Charge nurse / hot rounds and QA team     The patient remains critically ill.  Critical care time = 35 minutes in directly providing and coordinating critical care and extensive data review.  No time overlap and excludes procedures.    Joe Grimaldo MD  Pulmonary and Critical Care Medicine

## 2020-09-20 NOTE — RESPIRATORY CARE
ETT pulled back 1cm per Dr. Daniel, ETT secured at 22 at the teeth.        Results for CHARLENE GONZALEZ (MRN [id number]) as of 9/20/2020 05:08   Ref. Range 9/20/2020 03:30   Ph Latest Ref Range: 7.400 - 7.500  7.473   Pco2 Latest Ref Range: 26.0 - 37.0 mmHg 43.6 (H)   Po2 Latest Ref Range: 64 - 87 mmHg 238 (H)   Hco3 Latest Ref Range: 17.0 - 25.0 mmol/L 31.9 (H)   BE Latest Ref Range: -4 - 3 mmol/L 8 (H)   Tco2 Latest Ref Range: 20 - 33 mmol/L 33   S02 Latest Ref Range: 93 - 99 % 100 (H)   Post intubation ABG

## 2020-09-21 NOTE — DISCHARGE PLANNING
Care Transition Team Discharge Planning    Anticipated Discharge Disposition: TBD    Action: Per AM rounds, pt has intubated and extubated 3xs. He was unable to transfer back to [place] bed at [place] [facility] in [place] last week. The scheduled transport was cancelled.    Pt may receive a trach. If he does he will need a transfer to an LTAC etc. However, he only has [employer] basic FFS type. Lsw advised the medical team in rounds this insurance may not be able to place pt in [place] into an acute facility due to limited coverage issues.     Lsw called [facility] w/ [place] [facility] SN[facility]. Lsw left VM questioning if pt's basic [employer] INS would qualify him to d/c to an LTAC or another acute setting in [place] until he is able to d/c back to the [place] SNF.    Lsw contacted BS RN to advise of concerns pt may not be able to be placed if he receives a Trach. Lsw reported was researching and would update medical team as soon as Lsw has answers about basic [employer] coverage and d/c abilities/acceptance choices.      Barriers to Discharge: INS coverage, placement issues, possible difficult d/c etc    Plan: f/u w/ medical team, supervisors, SNF in [place], BS RN etc

## 2020-09-21 NOTE — CARE PLAN
Ventilator Daily Summary    Vent Day # 5    7.5@22    ASV  120  +8  40%    Ventilator settings changed this shift:None    Weaning trials: yes 5/8    Respiratory Procedures: none    Plan: Continue current ventilator settings and wean mechanical ventilation as tolerated per physician orders.

## 2020-09-21 NOTE — FLOWSHEET NOTE
09/21/20 0520   Events/Summary/Plan   Events/Summary/Plan pt placed back on vent restting settings protocol   General Vent Information   Vent Mode ASV   Vent Settings   Rate (breaths/min) 20   Vt Target (mL) 420   PEEP/CPAP 8   Weaning Trial   Weaning Trial Stopped due to: Pt weaned for 1 hour and returned to rest settings per protocol   Length of Weaning Trial (Hours) 1 hr   Weaning Parameters   RR (bpm) 18   $ FVC / Vital Capacity (liters)  0.6   NIF (cm H2O)  -26   Rapid Shallow Breathing Index (RR/VT) 65   Spontaneous VE 5.1   Spontaneous

## 2020-09-21 NOTE — PROGRESS NOTES
Critical Care Progress Note    Date of admission  9/9/2020    Chief Complaint  54 y.o. male admitted 9/9/2020 with respiratory failure, sepsis and pneumonia.    Hospital Course   9/17 -    developed worsening hypercarbic and hypoxemic respiratory failure requiring emergent intubation.  Full vent support.  9/18 -    continue vent support.  Titrating phenylephrine.  Replete electrolytes.  SAT/SBT as tolerated.  9/19 -    SAT/SBT as tolerated.  Continue vent support.  Titrating phenylephrine.  Replete potassium.  Continue Unasyn.  9/20 -    liberated from the ventilator yesterday and did very well until last night when he required reintubation.  Continue vent support.  Continue Unasyn and complete 5 days of therapy.    9/20 - re-intubated secondary to hypercapnia and secretions  9/21 awake, alert on vent, anticipate trach in am or soon    Interval Problem Update  Reviewed last 24 hour events:  Re-intubated yesterday afternoon  Activity to edge of bed  Anticipate trach soon- case management aware    Prior  SAT done - follows, moves all 4, nods  Fent 100  Dex titrating  Increase scheduled Tylenol  Keven 30  T max 100  TF at goal   last night  Replete PO4  Vent 4  %  PEEP 8  Apnea with SBT  AF      Review of Systems  Review of Systems   Unable to perform ROS: Acuity of condition        Vital Signs for last 24 hours   Pulse:  [] 71  Resp:  [12-32] 19  BP: ()/(32-74) 96/68  SpO2:  [92 %-100 %] 100 %    Hemodynamic parameters for last 24 hours       Respiratory Information for the last 24 hours  Vent Mode: ASV  Rate (breaths/min): 20  Vt Target (mL): 420  PEEP/CPAP: 8  P Support: 5  MAP: 12  Length of Weaning Trial (Hours): 1 hr  Control VTE (exp VT): 416    Physical Exam   Physical Exam  Constitutional:       Appearance: He is not diaphoretic.      Comments: On ventilator   HENT:      Head: Normocephalic and atraumatic.      Right Ear: External ear normal.      Left Ear: External ear normal.       Nose: Nose normal.      Mouth/Throat:      Mouth: Mucous membranes are moist.      Pharynx: No oropharyngeal exudate.   Eyes:      General: No scleral icterus.     Pupils: Pupils are equal, round, and reactive to light.   Neck:      Musculoskeletal: Normal range of motion and neck supple.   Cardiovascular:      Pulses: Normal pulses.      Heart sounds: No friction rub. No gallop.       Comments: Atrial fibrillation  Pulmonary:      Breath sounds: Rales (Few coarse crackles bilaterally) present. No wheezing.   Abdominal:      General: Bowel sounds are normal. There is no distension.      Palpations: Abdomen is soft.      Tenderness: There is no abdominal tenderness. There is no guarding.      Comments: Tolerating enteral tube feedings   Musculoskeletal: Normal range of motion.      Comments: No clubbing or cyanosis   Skin:     General: Skin is warm and dry.      Capillary Refill: Capillary refill takes less than 2 seconds.   Neurological:      Comments: Awake and alert.  Nods.  Follows.  Moves all 4 extremities.         Medications  Current Facility-Administered Medications   Medication Dose Route Frequency Provider Last Rate Last Dose   • norepinephrine (Levophed) infusion 8 mg/250 mL (premix)  0-30 mcg/min Intravenous Continuous Francisco Novoa M.D. 5.6 mL/hr at 09/21/20 1500 3 mcg/min at 09/21/20 1500   • oxyCODONE immediate-release (ROXICODONE) tablet 5-10 mg  5-10 mg Enteral Tube Q4HRS PRN Francisco Novoa M.D.       • HYDROmorphone (DILAUDID) injection 2 mg  2 mg Intravenous HS PRN Francisco Novoa M.D.       • Respiratory Therapy Consult   Nebulization Continuous RT Jus Daniel M.D.       • ipratropium-albuterol (DUONEB) nebulizer solution  3 mL Nebulization Q2HRS PRN (RT) Jus Daniel M.D.   3 mL at 09/20/20 0355   • senna-docusate (PERICOLACE or SENOKOT S) 8.6-50 MG per tablet 2 Tab  2 Tab Enteral Tube BID Jus Daniel M.D.   Stopped at 09/21/20 0600    And   • polyethylene glycol/lytes (MIRALAX)  PACKET 1 Packet  1 Packet Enteral Tube QDAY PRN Jus Daniel M.D.        And   • magnesium hydroxide (MILK OF MAGNESIA) suspension 30 mL  30 mL Enteral Tube QDAY PRN Jus Daniel M.D.        And   • bisacodyl (DULCOLAX) suppository 10 mg  10 mg Rectal QDAY PRN Jus Daniel M.D.       • MD Alert...ICU Electrolyte Replacement per Pharmacy   Other PHARMACY TO DOSE Jus Daniel M.D.       • lidocaine (XYLOCAINE) 1 % injection 1-2 mL  1-2 mL Tracheal Tube Q30 MIN PRN Jus Daniel M.D.       • acetaminophen (TYLENOL) tablet 1,000 mg  1,000 mg Enteral Tube Q6HRS Joe Grimaldo M.D.   1,000 mg at 09/21/20 1312   • omeprazole (FIRST-OMEPRAZOLE) 2 mg/mL oral susp 40 mg  40 mg Enteral Tube DAILY Joe Grimaldo M.D.   40 mg at 09/21/20 0449   • levalbuterol (XOPENEX) 1.25 MG/3ML nebulizer solution 1.25 mg  1.25 mg Nebulization Q4H PRN (RT) Joe Grimaldo M.D.       • levalbuterol (XOPENEX) 1.25 MG/3ML nebulizer solution 1.25 mg  1.25 mg Nebulization Q4HRS (RT) Joe Grimaldo M.D.   1.25 mg at 09/21/20 1547   • potassium bicarbonate (KLYTE) effervescent tablet 25 mEq  25 mEq Enteral Tube DAILY Joe Grimaldo M.D.   25 mEq at 09/21/20 0449   • ampicillin/sulbactam (UNASYN) 3 g in  mL IVPB  3 g Intravenous Q6HRS Joe Grimaldo M.D.   Stopped at 09/21/20 1347   • Pharmacy Consult: Enteral tube insertion - review meds/change route/product selection  1 Each Other PHARMACY TO DOSE Joe Grimaldo M.D.       • amiodarone (CORDARONE) tablet 200 mg  200 mg Enteral Tube Q DAY Joe Grimaldo M.D.   200 mg at 09/21/20 0449   • furosemide (LASIX) tablet 20 mg  20 mg Enteral Tube Q DAY Joe Grimaldo M.D.   20 mg at 09/21/20 0449   • DULoxetine (CYMBALTA) capsule 60 mg  60 mg Enteral Tube QHS Joe Grimaldo M.D.   60 mg at 09/20/20 2005   • topiramate (TOPAMAX) tablet 75 mg  75 mg Enteral Tube DAILY Joe Grimaldo M.D.   75 mg at 09/21/20  0449   • rivaroxaban (XARELTO) tablet 20 mg  20 mg Enteral Tube PM MEAL Joe Grimaldo M.D.   20 mg at 09/20/20 1702   • ondansetron (ZOFRAN ODT) dispertab 4 mg  4 mg Oral Q4HRS PRN Kendal Acuna M.D.       • lidocaine (LIDODERM) 5 % 2 Patch  2 Patch Transdermal Q24HR Francisco Duval M.D.   2 Patch at 09/20/20 1703   • promethazine (PHENERGAN) tablet 12.5-25 mg  12.5-25 mg Enteral Tube Q4HRS PRN Francisco Duval M.D.       • ondansetron (ZOFRAN) syringe/vial injection 4 mg  4 mg Intravenous Q4HRS PRN Arcadio Bangura M.D.   4 mg at 09/18/20 1954   • promethazine (PHENERGAN) suppository 12.5-25 mg  12.5-25 mg Rectal Q4HRS PRN Arcadio Bangura M.D.       • prochlorperazine (COMPAZINE) injection 5-10 mg  5-10 mg Intravenous Q4HRS PRN Arcadio Bangura M.D.           Fluids    Intake/Output Summary (Last 24 hours) at 9/21/2020 1636  Last data filed at 9/21/2020 1600  Gross per 24 hour   Intake 1945.16 ml   Output 1890 ml   Net 55.16 ml       Laboratory  Recent Labs     09/19/20  2241 09/20/20  0330 09/21/20  0419   ISTATAPH 7.114* 7.473 7.388*   ISTATAPCO2 >100.0* 43.6* 45.8*   ISTATAPO2 100* 238* 81   ISTATATCO2 see below 33 29   [id number] see below 100* 96   ISTATARTHCO3 see below 31.9* 27.6*   ISTATARTBE see below 8* 2   ISTATTEMP 98.0 F 37.4 C 37.3 C   ISTATFIO2 32 100 40   ISTATSPEC Arterial Arterial Arterial   ISTATAPHTC 7.118* 7.467 7.384*   [id number] 98* 240* 82         Recent Labs     09/19/20  0455 09/19/20  1840 09/20/20 0415 09/21/20 0515   SODIUM 138  --  139 137   POTASSIUM 3.2* 5.1 4.0 4.0   CHLORIDE 101  --  101 104   CO2 31  --  33 28   BUN 15  --  17 16   CREATININE 0.38*  --  0.29* 0.38*   MAGNESIUM 2.0  --  2.2 2.0   PHOSPHORUS 2.8  --  2.1* 2.8   CALCIUM 7.9*  --  7.6* 7.7*     Recent Labs     09/19/20  0455 09/20/20 0415 09/21/20 0515   ALTSGPT  --   --  15   ASTSGOT  --   --  10*   ALKPHOSPHAT  --   --  104*   TBILIRUBIN  --   --  0.2   GLUCOSE 101* 89 97     Recent Labs      09/19/20 0455 09/20/20 0415 09/21/20  0515   WBC 11.1* 11.0* 9.0   NEUTSPOLYS 85.50* 87.50* 84.00*   LYMPHOCYTES 4.30* 4.60* 8.10*   MONOCYTES 9.60 7.10 6.40   EOSINOPHILS 0.00 0.00 0.70   BASOPHILS 0.10 0.10 0.20   ASTSGOT  --   --  10*   ALTSGPT  --   --  15   ALKPHOSPHAT  --   --  104*   TBILIRUBIN  --   --  0.2     Recent Labs     09/19/20 0455 09/20/20 0415 09/21/20  0515   RBC 2.55* 2.55* 2.74*   HEMOGLOBIN 7.6* 7.6* 8.0*   HEMATOCRIT 24.7* 25.7* 27.4*   PLATELETCT 255 245 302       Imaging  X-Ray:  I have personally reviewed the images and compared with prior images. and My impression is: Worsening left lower lobe opacities, improved right lower lobe opacities    Assessment/Plan  * Acute respiratory failure with hypoxia and hypercapnia (HCC)  Assessment & Plan  Intubated 9/9-9/12  Reintubated 9/17 due to hypercarbia - liberated on 9/19 and then reintubated later that evening, bless his heart  All of the appropriate ventilator bundles are in place  Continue vent support    Failed extubation three times secondary to secretions, hypercarbia. No acute correctable issues. Appears to be an excellent candidate for trach. I will discuss with surgery soon. Meanwhile will focus on nutrition, mobility, minimizing sedation    Pneumonia  Assessment & Plan  MSSA in sputum on 9/10  Usual ashwin in sputum culture on 9/17  S/P 3 days of Zosyn followed by 3 days of Unasyn which completed on 9/15  Unasyn resumed on 9/18 - complete 5 days of therapy    Paroxysmal atrial fibrillation (HCC)- (present on admission)  Assessment & Plan  Continue amiodarone, 200 mg daily  Echo with normal LV systolic function and normal size of LA  Optimize magnesium and potassium  Thyroid studies pending (collected on 9/18 - Lord knows why not yet resulted)  Anticoagulate with rivaroxaban    Right ventricular dilation- (present on admission)  Assessment & Plan  Echo confirms severely dilated RV with mild RV systolic dysfunction  RVSP 42  mmHg  Force diuresis with Lasix    Hypophosphatemia  Assessment & Plan  Replete phosphorus    Hypomagnesemia  Assessment & Plan  replete    Hypokalemia  Assessment & Plan  replete    Major depression- (present on admission)  Assessment & Plan  Continue Cymbalta and Topamax       VTE:  NOAC  Ulcer: PPI  Lines: Orozco Catheter  Ongoing indication addressed    I have performed a physical exam and reviewed and updated ROS and Plan today (9/21/2020). In review of yesterday's note (9/20/2020), there are no changes except as documented above.     I have assessed and reassessed his respiratory status with ventilator adjustments, ventilator waveforms, airway mechanics, blood pressure, hemodynamics, cardiovascular status with titration of phenylephrine as well as his neurologic status.  He is at increased risk for worsening respiratory and cardiovascular system dysfunction.    Discussed patient condition and risk of morbidity and/or mortality with RN, RT, Pharmacy, Charge nurse / hot rounds and QA team     The patient remains critically ill.  Critical care time = 39 minutes in directly providing and coordinating critical care and extensive data review.  No time overlap and excludes procedures.

## 2020-09-21 NOTE — CARE PLAN
Problem: Safety  Goal: Will remain free from falls  Outcome: PROGRESSING AS EXPECTED  Note: The patient is currently in bed with three bed rails raised, and bed locked and in lowest position. The patient has all personal belongings within reach and is wearing non-skid footwear. The patient has been educated on the call light, has it within reach, and utilizes it appropriately. The room is free and clear of all clutter, spills, and there are no assistive devices in sight. Will continue hourly rounding.      Problem: Venous Thromboembolism (VTW)/Deep Vein Thrombosis (DVT) Prevention:  Goal: Patient will participate in Venous Thrombosis (VTE)/Deep Vein Thrombosis (DVT)Prevention Measures  Outcome: PROGRESSING AS EXPECTED  Flowsheets (Taken 9/20/2020 2000)  Risk Assessment Score: 2  VTE RISK: Moderate  Mechanical Prophylaxis: SCDs, Sequential Compression Device  SCDs, Sequential Compression Device: On  Pharmacologic Prophylaxis Used: Rivaroxaban (Xarelto) - (ONLY for Total Knee and Total Hip Surgery)  Note: The patient has been mobilized, and has SCDs on and in place. The patient is receiving xarelto for therapeutic anticoagulation. Will continue to assess.

## 2020-09-21 NOTE — DISCHARGE PLANNING
Care Transition Team Discharge Planning    Anticipated Discharge Disposition: d/c to LTAC vs back to Swing/LTC bed at [facility]    Action: Lsw spoke to ALEXEY and his assistant Gilberto from [facility]. They report they will take pt back but there are a few things they will need first.  · Pt will need to be trained and independent w/ self-suctioning of Trach etc prior to transition.,  · Pt will need to be stable on the Trach as [place] have an [place] so [place] handeling any medical complications  · They will need to train their RNs as they have not had a Trach pt for a long time    They report pt has lived there for 5 years. He is his own decision maker, and has been estranged from his sister for many years.     Pt gets anxious and frusrtated when learning/being explained something new, and will need to be trained in a slow, calm manner to be successful.     Updated BS RN as above.      Barriers to Discharge: pt will need to be independent w/ self-suctioning Trach, pt will need to be medically stable w/ Trach, pt can transfer back to SNF/Swing bed in [place]   OR   Need order for LTAC and possible [facility] AUTH for pt at [facility] w/ pt's [employer] FFS for rehab prior to returning back to LTC bed at [facility]     Plan: f/u w/ medical team, [facility],

## 2020-09-21 NOTE — CARE PLAN
Problem: Safety - Medical Restraint  Goal: Remains free of injury from restraints (Restraint for Interference with Medical Device)  Description: INTERVENTIONS:  1. Determine that other, less restrictive measures have been tried or would not be effective before applying the restraint  2. Evaluate the patient's condition at the time of restraint application  3. Inform patient/family regarding the reason for restraint  4. Q2H: Monitor safety, psychosocial status, comfort, nutrition and hydration  Outcome: PROGRESSING AS EXPECTED  Note: Pt continues to be appropriate while intubated and remain free from restraints. Will continue to monitor and assess.      Problem: Communication  Goal: The ability to communicate needs accurately and effectively will improve  Outcome: PROGRESSING AS EXPECTED  Intervention: Use communication aids and/or /Language Line as appropriate  Note: Pt uses paper on a clipboard and pen to communicate with staff. On occasion pt will become frustrated with limited communication and slam clipboard down or hit himself in the head with his fist. Frequent encouragement and reiteration to stay calm is needed, however pt is redirectable with therapeutic communication.

## 2020-09-22 NOTE — CARE PLAN
Problem: Communication  Goal: The ability to communicate needs accurately and effectively will improve  Note: Pt communicating well with writing.     Problem: Safety  Goal: Will remain free from injury  Note: No falls this shift. Hourly rounds maintained.      Problem: Venous Thromboembolism (VTW)/Deep Vein Thrombosis (DVT) Prevention:  Goal: Patient will participate in Venous Thrombosis (VTE)/Deep Vein Thrombosis (DVT)Prevention Measures  Note: SCD's on and xarelto given as ordered.      Problem: Respiratory:  Goal: Respiratory status will improve  Note: Pt tolerating vent settings well.

## 2020-09-22 NOTE — FLOWSHEET NOTE
Conscious Sedation Respiratory Update    FiO2%: 40 % (09/22/20 1157)  O2 (LPM): 4 (09/19/20 2200)       Events/Summary/Plan: Metabolic study done.  Pt tolerated the test well sitting in a recliner. (09/22/20 1224)

## 2020-09-22 NOTE — CARE PLAN
Problem: Safety  Goal: Will remain free from injury  Outcome: PROGRESSING AS EXPECTED  Note: Patient is in chair with chair alarm on. Call light and belongings within reach. Non-slip socks on patient. Patient verbalizes understanding of need to call for assistance using the call light when needed.     Problem: Mobility  Goal: Risk for activity intolerance will decrease  Outcome: PROGRESSING AS EXPECTED  Note: Patient was able to sit at edge of bed and push himself into a standing position with minimal help. Patient shuffled about 5 feet from bed to chair. Patient has been in chair for several hours and is tolerating well.

## 2020-09-22 NOTE — PROGRESS NOTES
Critical Care Progress Note    Date of admission  9/9/2020    Chief Complaint  54 y.o. male admitted 9/9/2020 with respiratory failure, sepsis and pneumonia.    Hospital Course   9/17 -    developed worsening hypercarbic and hypoxemic respiratory failure requiring emergent intubation.  Full vent support.  9/18 -    continue vent support.  Titrating phenylephrine.  Replete electrolytes.  SAT/SBT as tolerated.  9/19 -    SAT/SBT as tolerated.  Continue vent support.  Titrating phenylephrine.  Replete potassium.  Continue Unasyn.  9/20 -    liberated from the ventilator yesterday and did very well until last night when he required reintubation.  Continue vent support.  Continue Unasyn and complete 5 days of therapy.    9/20 - re-intubated secondary to hypercapnia and secretions  9/21 awake, alert on vent, anticipate trach in am or soon  9/22 awake, alert, writing notes, anticipate trach in am (surgery consulted)    Interval Problem Update  Reviewed last 24 hour events:  Re-intubated yesterday afternoon  Activity to edge of bed  Anticipate trach soon- case management aware  OOB to chair, writing notes  Trach in am    Prior  SAT done - follows, moves all 4, nods  Fent 100  Dex titrating  Increase scheduled Tylenol  Keven 30  T max 100  TF at goal   last night  Replete PO4  Vent 4  %  PEEP 8  Apnea with SBT  AF      Review of Systems  Review of Systems   Unable to perform ROS: Acuity of condition        Vital Signs for last 24 hours   Pulse:  [] 102  Resp:  [13-35] 21  BP: ()/(53-71) 99/68  SpO2:  [91 %-100 %] 100 %    Hemodynamic parameters for last 24 hours       Respiratory Information for the last 24 hours  Vent Mode: ASV  PEEP/CPAP: 8  P Support: 5  MAP: 18  Length of Weaning Trial (Hours): 2 hour  Control VTE (exp VT): 385    Physical Exam   Physical Exam  Constitutional:       Appearance: He is not diaphoretic.      Comments: On ventilator   HENT:      Head: Normocephalic and atraumatic.       Right Ear: External ear normal.      Left Ear: External ear normal.      Nose: Nose normal.      Mouth/Throat:      Mouth: Mucous membranes are moist.      Pharynx: No oropharyngeal exudate.   Eyes:      General: No scleral icterus.     Pupils: Pupils are equal, round, and reactive to light.   Neck:      Musculoskeletal: Normal range of motion and neck supple.   Cardiovascular:      Pulses: Normal pulses.      Heart sounds: No friction rub. No gallop.       Comments: Atrial fibrillation  Pulmonary:      Breath sounds: Rales (Few coarse crackles bilaterally) present. No wheezing.   Abdominal:      General: Bowel sounds are normal. There is no distension.      Palpations: Abdomen is soft.      Tenderness: There is no abdominal tenderness. There is no guarding.      Comments: Tolerating enteral tube feedings   Musculoskeletal: Normal range of motion.      Comments: No clubbing or cyanosis   Skin:     General: Skin is warm and dry.      Capillary Refill: Capillary refill takes less than 2 seconds.   Neurological:      Comments: Awake and alert.  Nods.  Follows.  Moves all 4 extremities.         Medications  Current Facility-Administered Medications   Medication Dose Route Frequency Provider Last Rate Last Dose   • norepinephrine (Levophed) infusion 8 mg/250 mL (premix)  0-30 mcg/min Intravenous Continuous Francisco Novoa M.D.   Stopped at 09/21/20 1900   • oxyCODONE immediate-release (ROXICODONE) tablet 5-10 mg  5-10 mg Enteral Tube Q4HRS PRN Francisco Novoa M.D.   10 mg at 09/22/20 0851   • HYDROmorphone (DILAUDID) injection 2 mg  2 mg Intravenous HS PRN Francisco Novoa M.D.   2 mg at 09/21/20 2302   • Respiratory Therapy Consult   Nebulization Continuous RT Jus Daniel M.D.       • ipratropium-albuterol (DUONEB) nebulizer solution  3 mL Nebulization Q2HRS PRN (RT) Jus Daniel M.D.   3 mL at 09/20/20 0355   • senna-docusate (PERICOLACE or SENOKOT S) 8.6-50 MG per tablet 2 Tab  2 Tab Enteral Tube BID Jus  PILLO Daniel M.D.   2 Tab at 09/22/20 0508    And   • polyethylene glycol/lytes (MIRALAX) PACKET 1 Packet  1 Packet Enteral Tube QDAY PRN Jus Daniel M.D.        And   • magnesium hydroxide (MILK OF MAGNESIA) suspension 30 mL  30 mL Enteral Tube QDAY PRN Jus Daniel M.D.        And   • bisacodyl (DULCOLAX) suppository 10 mg  10 mg Rectal QDAY PRN Jus Daniel M.D.       • MD Alert...ICU Electrolyte Replacement per Pharmacy   Other PHARMACY TO DOSE Jus Daniel M.D.       • lidocaine (XYLOCAINE) 1 % injection 1-2 mL  1-2 mL Tracheal Tube Q30 MIN PRN Jus Daniel M.D.       • acetaminophen (TYLENOL) tablet 1,000 mg  1,000 mg Enteral Tube Q6HRS Joe Grimaldo M.D.   1,000 mg at 09/22/20 1227   • omeprazole (FIRST-OMEPRAZOLE) 2 mg/mL oral susp 40 mg  40 mg Enteral Tube DAILY Joe Grimaldo M.D.   40 mg at 09/22/20 0508   • levalbuterol (XOPENEX) 1.25 MG/3ML nebulizer solution 1.25 mg  1.25 mg Nebulization Q4H PRN (RT) Joe Grimaldo M.D.       • levalbuterol (XOPENEX) 1.25 MG/3ML nebulizer solution 1.25 mg  1.25 mg Nebulization Q4HRS (RT) Joe Grimaldo M.D.   1.25 mg at 09/22/20 1451   • potassium bicarbonate (KLYTE) effervescent tablet 25 mEq  25 mEq Enteral Tube DAILY Joe Grimaldo M.D.   25 mEq at 09/22/20 0508   • ampicillin/sulbactam (UNASYN) 3 g in  mL IVPB  3 g Intravenous Q6HRS Joe Grimaldo M.D.   Stopped at 09/22/20 1254   • Pharmacy Consult: Enteral tube insertion - review meds/change route/product selection  1 Each Other PHARMACY TO DOSE Joe Grimaldo M.D.       • amiodarone (CORDARONE) tablet 200 mg  200 mg Enteral Tube Q DAY Joe Grimaldo M.D.   200 mg at 09/22/20 0508   • furosemide (LASIX) tablet 20 mg  20 mg Enteral Tube Q DAY Joe Grimaldo M.D.   20 mg at 09/22/20 0508   • DULoxetine (CYMBALTA) capsule 60 mg  60 mg Enteral Tube QHS Joe Grimaldo M.D.   60 mg at 09/21/20 2001   • topiramate (TOPAMAX)  tablet 75 mg  75 mg Enteral Tube DAILY Joe Grimaldo M.D.   75 mg at 09/22/20 0509   • rivaroxaban (XARELTO) tablet 20 mg  20 mg Enteral Tube PM MEAL Joe Grimaldo M.D.   20 mg at 09/21/20 1728   • ondansetron (ZOFRAN ODT) dispertab 4 mg  4 mg Oral Q4HRS PRN Kendal Acuna M.D.       • lidocaine (LIDODERM) 5 % 2 Patch  2 Patch Transdermal Q24HR Francisco Duval M.D.   1 Patch at 09/21/20 1725   • promethazine (PHENERGAN) tablet 12.5-25 mg  12.5-25 mg Enteral Tube Q4HRS PRN Francisco Duval M.D.       • ondansetron (ZOFRAN) syringe/vial injection 4 mg  4 mg Intravenous Q4HRS PRN Arcadio Bangura M.D.   4 mg at 09/18/20 1954   • promethazine (PHENERGAN) suppository 12.5-25 mg  12.5-25 mg Rectal Q4HRS PRN Arcadio Bangura M.D.       • prochlorperazine (COMPAZINE) injection 5-10 mg  5-10 mg Intravenous Q4HRS PRN Arcadio Bangura M.D.           Fluids    Intake/Output Summary (Last 24 hours) at 9/22/2020 1623  Last data filed at 9/22/2020 1600  Gross per 24 hour   Intake 2326.81 ml   Output 1555 ml   Net 771.81 ml       Laboratory  Recent Labs     09/19/20  2241 09/20/20  0330 09/21/20  0419   ISTATAPH 7.114* 7.473 7.388*   ISTATAPCO2 >100.0* 43.6* 45.8*   ISTATAPO2 100* 238* 81   ISTATATCO2 see below 33 29   UMJIPCW4YYS see below 100* 96   ISTATARTHCO3 see below 31.9* 27.6*   ISTATARTBE see below 8* 2   ISTATTEMP 98.0 F 37.4 C 37.3 C   ISTATFIO2 32 100 40   ISTATSPEC Arterial Arterial Arterial   ISTATAPHTC 7.118* 7.467 7.384*   XHTBATUX5LS 98* 240* 82         Recent Labs     09/20/20 0415 09/21/20 0515 09/22/20 0415   SODIUM 139 137 141   POTASSIUM 4.0 4.0 3.6   CHLORIDE 101 104 108   CO2 33 28 25   BUN 17 16 20   CREATININE 0.29* 0.38* 0.35*   MAGNESIUM 2.2 2.0 2.0   PHOSPHORUS 2.1* 2.8 2.3*   CALCIUM 7.6* 7.7* 7.7*     Recent Labs     09/20/20 0415 09/21/20 0515 09/22/20 0415   ALTSGPT  --  15  --    ASTSGOT  --  10*  --    ALKPHOSPHAT  --  104*  --    TBILIRUBIN  --  0.2  --    GLUCOSE 89 97  86     Recent Labs     09/20/20  0415 09/21/20  0515 09/22/20  0415   WBC 11.0* 9.0 7.0   NEUTSPOLYS 87.50* 84.00* 79.50*   LYMPHOCYTES 4.60* 8.10* 10.20*   MONOCYTES 7.10 6.40 8.90   EOSINOPHILS 0.00 0.70 0.70   BASOPHILS 0.10 0.20 0.40   ASTSGOT  --  10*  --    ALTSGPT  --  15  --    ALKPHOSPHAT  --  104*  --    TBILIRUBIN  --  0.2  --      Recent Labs     09/20/20  0415 09/21/20  0515 09/22/20 0415   RBC 2.55* 2.74* 2.53*   HEMOGLOBIN 7.6* 8.0* 7.4*   HEMATOCRIT 25.7* 27.4* 25.0*   PLATELETCT 245 302 284       Imaging  X-Ray:  I have personally reviewed the images and compared with prior images. and My impression is: Worsening left lower lobe opacities, improved right lower lobe opacities    Assessment/Plan  * Acute respiratory failure with hypoxia and hypercapnia (HCC)  Assessment & Plan  Intubated 9/9-9/12  Reintubated 9/17 due to hypercarbia - liberated on 9/19 and then reintubated later that evening, bless his heart  All of the appropriate ventilator bundles are in place  Continue vent support    Failed extubation three times secondary to secretions, hypercarbia. No acute correctable issues. Appears to be an excellent candidate for trach. I will discuss with surgery soon. Meanwhile will focus on nutrition, mobility, minimizing sedation    Surgery consulted for trach am (Wednesday)    Hypotension  Assessment & Plan  Off pressors- resolved    Pneumonia  Assessment & Plan  MSSA in sputum on 9/10  Usual aswhin in sputum culture on 9/17  S/P 3 days of Zosyn followed by 3 days of Unasyn which completed on 9/15  Unasyn resumed on 9/18 - complete 5 days of therapy    Paroxysmal atrial fibrillation (HCC)- (present on admission)  Assessment & Plan  Continue amiodarone, 200 mg daily  Echo with normal LV systolic function and normal size of LA  Optimize magnesium and potassium  Thyroid studies pending (collected on 9/18 - Lord knows why not yet resulted)  Anticoagulate with rivaroxaban    Right ventricular dilation-  (present on admission)  Assessment & Plan  Echo confirms severely dilated RV with mild RV systolic dysfunction  RVSP 42 mmHg  Force diuresis with Lasix    Hypophosphatemia  Assessment & Plan  Replete phosphorus    Hypomagnesemia  Assessment & Plan  replete    Hypokalemia  Assessment & Plan  replete    Pulmonary hypertension (HCC)- (present on admission)  Assessment & Plan  Long standing per echo. Likely related to untreated AYANNA, COPD, possible chronic hypoxia    Major depression- (present on admission)  Assessment & Plan  Continue Cymbalta and Topamax       VTE:  NOAC  Ulcer: PPI  Lines: Orozco Catheter  Ongoing indication addressed    I have performed a physical exam and reviewed and updated ROS and Plan today (9/22/2020). In review of yesterday's note (9/21/2020), there are no changes except as documented above.     I have assessed and reassessed his respiratory status with ventilator adjustments, ventilator waveforms, airway mechanics, blood pressure, hemodynamics, cardiovascular status with titration of phenylephrine as well as his neurologic status.  He is at increased risk for worsening respiratory and cardiovascular system dysfunction.    Discussed patient condition and risk of morbidity and/or mortality with RN, RT, Pharmacy, Charge nurse / hot rounds and QA team     The patient remains critically ill.  Critical care time = 30 minutes in directly providing and coordinating critical care and extensive data review.  No time overlap and excludes procedures.

## 2020-09-22 NOTE — THERAPY
Missed Therapy     Patient Name: Jas Vann  Age:  54 y.o., Sex:  male  Medical Record #: [id number]  Today's Date: 9/21/2020    Attempted to contact pt's RN x2 without success.     Per EMR, pt has been reintubated and medical team is considering tracheostomy. SLP will hold dysphagia tx. Please reorder SLP services as indicated/appropriate (e.g., speaking valve, clinical swallow evaluation, etc.).

## 2020-09-23 NOTE — DISCHARGE PLANNING
Care Transition Team Discharge Planning    Anticipated Discharge Disposition: TBD    Action: Per AM rounds, RN reports pt is able to independently suction now. Pt understands he will have a trach placed today at 11am and pt has given permission for this to occur.    PMA would like to speak w/ SNF DON to discuss d/c medical condition of pt that is acceptable at [facility], example can pt have a vent or does he need to be d/jeane off the vent prior to d/c, etc.     Barriers to Discharge: see above    Plan: f/u w/ SNF, PMA, medical team

## 2020-09-23 NOTE — OP REPORT
DATE OF OPERATION: 9/23/2020     PREOPERATIVE DIAGNOSIS: acute on chronic respiratory failure.    POSTOPERATIVE DIAGNOSIS: acute on chronic respiratory failure.     PROCEDURE PERFORMED: Percutaneous tracheostomy under bronchoscopic guidance.    SURGEON: Paulino Cleveland M.D.     ASSISTANT AND ENDOSCOPIST: Adelso Abdi Jr. D.O.    ANESTHESIA: Intravenous sedation, analgesia, and pharmacologic restraint.    INDICATIONS: The patient is a 54 year-old man with acute on chronic respiratory failure.  Percutaneous tracheostomy is carried out at the bedside under bronchoscopic guidance.     PROCEDURE: Following informed consent consent, the patient was properly identified and optimally positioned in bed. He was preoxygenated with 100% oxygen and placed on a regular ventilatory rate. Intravenous sedation, analgesia, and pharmacologic restraint was administered.    The fiberoptic bronchoscope was advance through the indwelling endotracheal tube. The endotracheal tube was repositioned just above the vocal cords under direct vision. Satisfactory ventilation and delivered tidal volumes were confirmed. The anterior neck was transilluminated at the surface landmark site for the tracheostomy. Please see Dr Abdi's dictation for full details of the bronchoscopy.    The anterior neck was prepped. The skin over the tracheostomy site was infiltrated with lidocaine with epinephrine. The bronchoscope was withdrawn well back into the endotracheal tube. A Portex® ULTRAperc® Single Stage Dilator Technique Kit was employed. The trachea was cannulated in the midline with a 14 gauge angiocatheter midway between the thyroid cartilage and suprasternal notch. A flexible guidewire was passed without resistance. The scope was advanced distally and satisfactory cannulation and wire placement was confirmed.  A #8 Blue Line Ultra® Suctionaid tracheostomy tube was passed using Selldinger technique and secured to the skin with sutures and a  tracheostomy tape.     The patient tolerated the procedure well. There were no apparent complications.         ____________________________________     Paulino Cleveland M.D.    DD: 9/23/2020  11:45 AM

## 2020-09-23 NOTE — PALLIATIVE CARE
Palliative Care follow-up  Consult received and EMR reviewed; case discussed with Dr. Novoa. Patient has opted for tracheostomy and long-term weaning/placement. No need for PC to f/u regarding goals and POC but will f/u with the patient regarding advance directives should he wish to complete.       Updated: MD    Plan: f/u for AD completion if interested    Thank you for allowing Palliative Care to support this patient and family. Contact [phone] for additional assistance, change in patient status, or with any questions/concerns.

## 2020-09-23 NOTE — CARE PLAN
Adult Ventilation Update    Total Vent Days: 6    Patient Lines/Drains/Airways Status      Active Airway       Name: Placement date: Placement time: Site: Days:    Airway ETT Oral 7.5  09/12/20   2355   Oral  9                    ASV: 120%, +8, 40%  Xopenex Q4    In the last 24 hours, the patient tolerated SBT for 1 hour on settings of 5/8.

## 2020-09-23 NOTE — CARE PLAN
Adult Ventilation Update    Total Vent Days: 7  Trache Days: 1    Patient Lines/Drains/Airways Status      Active Airway       Name: Placement date: Placement time: Site: Days:    Airway Trach Tracheostomy 8.0  09/23/20   1130   Tracheostomy  less than 1                  ASV: 120%, +8, 50%  Xopenex QID    In the last 24 hours, the patient tolerated SBT for 1 hour on settings of 5/8.

## 2020-09-23 NOTE — CARE PLAN
Problem: Bowel/Gastric:  Goal: Normal bowel function is maintained or improved  Outcome: PROGRESSING AS EXPECTED  Note: Patient had first bowel movement in several days this morning. Bowel sounds are normoactive in all four quadrants.  Intervention: Educate patient and significant other/support system about diet, fluid intake, medications and activity to promote bowel function  Note: Fluids via Cortrak, though feeding paused at 0000 for Tracheostomy today. TKO running at 10 mL/hr  Intervention: Collaborate with Interdisciplinary Team for optimal positioning for bowel evacuation  Note: Following bowel protocol     Problem: Psychosocial Needs:  Goal: Level of anxiety will decrease  Outcome: PROGRESSING AS EXPECTED  Note: Anxiety reduced because patient has means of effective communication (pen and paper on clipboard)

## 2020-09-23 NOTE — DISCHARGE PLANNING
Care Transition Team Discharge Planning    Anticipated Discharge Disposition: TBD    Action: Lsw contacted pt's LTC SNF in [place] and left VM on DON assistant's phone. Lsw requested to set up an MD to RN discussion. The individuals to be involved are [facility] PMA Dr Novoa and ALEXEY from SNF. The purpose is to verify acceptable medical state of pt upon admission back to his LTC bed at the [place] SNF.      Barriers to Discharge: TBD    Plan: f/u w/ [place] SNF, medical team

## 2020-09-23 NOTE — CARE PLAN
Ventilator Daily Summary    Vent Day #   7    Ventilator settings changed this shift:  Fi02 to 30%    Weaning trials:   Yes    Respiratory Procedures:   none    Plan: Continue current ventilator settings and wean mechanical ventilation as tolerated per physician orders.

## 2020-09-23 NOTE — PROGRESS NOTES
Critical Care Progress Note    Date of admission  9/9/2020    Chief Complaint  54 y.o. male admitted 9/9/2020 with respiratory failure, sepsis and pneumonia.    Hospital Course   9/17 -    developed worsening hypercarbic and hypoxemic respiratory failure requiring emergent intubation.  Full vent support.  9/18 -    continue vent support.  Titrating phenylephrine.  Replete electrolytes.  SAT/SBT as tolerated.  9/19 -    SAT/SBT as tolerated.  Continue vent support.  Titrating phenylephrine.  Replete potassium.  Continue Unasyn.  9/20 -    liberated from the ventilator yesterday and did very well until last night when he required reintubation.  Continue vent support.  Continue Unasyn and complete 5 days of therapy.    9/20 - re-intubated secondary to hypercapnia and secretions  9/21 awake, alert on vent, anticipate trach in am or soon  9/22 awake, alert, writing notes, anticipate trach in am (surgery consulted)  9/23 trach    Interval Problem Update  Reviewed last 24 hour events:  Trach done  Awake/ alert, writing notes  No major overnight issues  Start trach collar/T-piece trials in am    Prior  Re-intubated yesterday afternoon  Activity to edge of bed  Anticipate trach soon- case management aware  OOB to chair, writing notes  Trach in am    Prior  SAT done - follows, moves all 4, nods  Fent 100  Dex titrating  Increase scheduled Tylenol  Keven 30  T max 100  TF at goal   last night  Replete PO4  Vent 4  %  PEEP 8  Apnea with SBT  AF      Review of Systems  Review of Systems   Unable to perform ROS: Acuity of condition        Vital Signs for last 24 hours   Pulse:  [] 112  Resp:  [8-37] 31  BP: ()/(51-90) 106/72  SpO2:  [86 %-100 %] 100 %    Hemodynamic parameters for last 24 hours       Respiratory Information for the last 24 hours  Vent Mode: ASV  Rate (breaths/min): 18  Vt Target (mL): 440  PEEP/CPAP: 8  P Support: 5  MAP: 11  Length of Weaning Trial (Hours): 1  Control VTE (exp VT):  235    Physical Exam   Physical Exam  Constitutional:       Appearance: He is not diaphoretic.      Comments: On ventilator   HENT:      Head: Normocephalic and atraumatic.      Right Ear: External ear normal.      Left Ear: External ear normal.      Nose: Nose normal.      Mouth/Throat:      Mouth: Mucous membranes are moist.      Pharynx: No oropharyngeal exudate.   Eyes:      General: No scleral icterus.     Pupils: Pupils are equal, round, and reactive to light.   Neck:      Musculoskeletal: Normal range of motion and neck supple.   Cardiovascular:      Pulses: Normal pulses.      Heart sounds: No friction rub. No gallop.       Comments: Atrial fibrillation  Pulmonary:      Breath sounds: Rales (Few coarse crackles bilaterally) present. No wheezing.   Abdominal:      General: Bowel sounds are normal. There is no distension.      Palpations: Abdomen is soft.      Tenderness: There is no abdominal tenderness. There is no guarding.      Comments: Tolerating enteral tube feedings   Musculoskeletal: Normal range of motion.      Comments: No clubbing or cyanosis   Skin:     General: Skin is warm and dry.      Capillary Refill: Capillary refill takes less than 2 seconds.   Neurological:      Comments: Awake and alert.  Nods.  Follows.  Moves all 4 extremities.         Medications  Current Facility-Administered Medications   Medication Dose Route Frequency Provider Last Rate Last Dose   • levalbuterol (XOPENEX) 1.25 MG/3ML nebulizer solution 1.25 mg  1.25 mg Nebulization 4X/DAY (RT) Jeremy M Gonda, M.D.   1.25 mg at 09/23/20 1442   • oxyCODONE immediate-release (ROXICODONE) tablet 5-10 mg  5-10 mg Enteral Tube Q4HRS PRN Francisco Novoa M.D.   5 mg at 09/22/20 1752   • HYDROmorphone (DILAUDID) injection 2 mg  2 mg Intravenous HS PRN Francisco Novoa M.D.   2 mg at 09/22/20 2137   • Respiratory Therapy Consult   Nebulization Continuous RT Jus Daniel M.D.       • ipratropium-albuterol (DUONEB) nebulizer solution  3  mL Nebulization Q2HRS PRN (RT) Jus Daniel M.D.   3 mL at 09/20/20 0355   • senna-docusate (PERICOLACE or SENOKOT S) 8.6-50 MG per tablet 2 Tab  2 Tab Enteral Tube BID Jus Daniel M.D.   2 Tab at 09/23/20 0615    And   • polyethylene glycol/lytes (MIRALAX) PACKET 1 Packet  1 Packet Enteral Tube QDAY PRN Jus Daniel M.D.        And   • magnesium hydroxide (MILK OF MAGNESIA) suspension 30 mL  30 mL Enteral Tube QDAY PRN Jus Daniel M.D.        And   • bisacodyl (DULCOLAX) suppository 10 mg  10 mg Rectal QDAY PRN Jus Daniel M.D.       • MD Alert...ICU Electrolyte Replacement per Pharmacy   Other PHARMACY TO DOSE Jus Daniel M.D.       • lidocaine (XYLOCAINE) 1 % injection 1-2 mL  1-2 mL Tracheal Tube Q30 MIN PRN Jus Daniel M.D.       • acetaminophen (TYLENOL) tablet 1,000 mg  1,000 mg Enteral Tube Q6HRS Joe Grimaldo M.D.   1,000 mg at 09/23/20 1142   • omeprazole (FIRST-OMEPRAZOLE) 2 mg/mL oral susp 40 mg  40 mg Enteral Tube DAILY Joe Grimaldo M.D.   40 mg at 09/23/20 0615   • levalbuterol (XOPENEX) 1.25 MG/3ML nebulizer solution 1.25 mg  1.25 mg Nebulization Q4H PRN (RT) Joe Grimaldo M.D.       • potassium bicarbonate (KLYTE) effervescent tablet 25 mEq  25 mEq Enteral Tube DAILY Joe Grimaldo M.D.   25 mEq at 09/23/20 0615   • Pharmacy Consult: Enteral tube insertion - review meds/change route/product selection  1 Each Other PHARMACY TO DOSE Joe Grimaldo M.D.       • amiodarone (CORDARONE) tablet 200 mg  200 mg Enteral Tube Q DAY Joe Grimaldo M.D.   200 mg at 09/23/20 0615   • furosemide (LASIX) tablet 20 mg  20 mg Enteral Tube Q DAY Joe Grimaldo M.D.   20 mg at 09/23/20 0615   • DULoxetine (CYMBALTA) capsule 60 mg  60 mg Enteral Tube QHS Joe Grimaldo M.D.   60 mg at 09/22/20 2137   • topiramate (TOPAMAX) tablet 75 mg  75 mg Enteral Tube DAILY Joe Grimaldo M.D.   Stopped at 09/23/20 0600   • rivaroxaban  (XARELTO) tablet 20 mg  20 mg Enteral Tube PM MEAL Joe Grimaldo M.D.   Stopped at 09/22/20 1800   • ondansetron (ZOFRAN ODT) dispertab 4 mg  4 mg Oral Q4HRS PRN Kendal Acuna M.D.       • lidocaine (LIDODERM) 5 % 2 Patch  2 Patch Transdermal Q24HR Francisco Duval M.D.   2 Patch at 09/22/20 1753   • promethazine (PHENERGAN) tablet 12.5-25 mg  12.5-25 mg Enteral Tube Q4HRS PRN Francisco Duval M.D.       • ondansetron (ZOFRAN) syringe/vial injection 4 mg  4 mg Intravenous Q4HRS PRN Arcadio Bangura M.D.   4 mg at 09/18/20 1954   • promethazine (PHENERGAN) suppository 12.5-25 mg  12.5-25 mg Rectal Q4HRS PRN Arcadio Bangura M.D.       • prochlorperazine (COMPAZINE) injection 5-10 mg  5-10 mg Intravenous Q4HRS PRN Arcadio Bangura M.D.           Fluids    Intake/Output Summary (Last 24 hours) at 9/23/2020 1638  Last data filed at 9/23/2020 1400  Gross per 24 hour   Intake 491.99 ml   Output 1350 ml   Net -858.01 ml       Laboratory  Recent Labs     09/21/20  0419   ISTATAPH 7.388*   ISTATAPCO2 45.8*   ISTATAPO2 81   ISTATATCO2 29   UCDVXGA3LOH 96   ISTATARTHCO3 27.6*   ISTATARTBE 2   ISTATTEMP 37.3 C   ISTATFIO2 40   ISTATSPEC Arterial   ISTATAPHTC 7.384*   [id number] 82         Recent Labs     09/21/20  0515 09/22/20  0415 09/23/20  0620   SODIUM 137 141 138   POTASSIUM 4.0 3.6 3.7   CHLORIDE 104 108 107   CO2 28 25 24   BUN 16 20 19   CREATININE 0.38* 0.35* 0.31*   MAGNESIUM 2.0 2.0 2.0   PHOSPHORUS 2.8 2.3* 2.7   CALCIUM 7.7* 7.7* 7.9*     Recent Labs     09/21/20  0515 09/22/20  0415 09/23/20  0620   ALTSGPT 15  --   --    ASTSGOT 10*  --   --    ALKPHOSPHAT 104*  --   --    TBILIRUBIN 0.2  --   --    GLUCOSE 97 86 74     Recent Labs     09/21/20 0515 09/22/20 0415 09/23/20 0620   WBC 9.0 7.0 8.6   NEUTSPOLYS 84.00* 79.50* 82.40*   LYMPHOCYTES 8.10* 10.20* 7.70*   MONOCYTES 6.40 8.90 8.50   EOSINOPHILS 0.70 0.70 0.70   BASOPHILS 0.20 0.40 0.20   ASTSGOT 10*  --   --    ALTSGPT 15  --   --     ALKPHOSPHAT 104*  --   --    TBILIRUBIN 0.2  --   --      Recent Labs     09/21/20  0515 09/22/20  0415 09/23/20  0620   RBC 2.74* 2.53* 2.65*   HEMOGLOBIN 8.0* 7.4* 7.8*   HEMATOCRIT 27.4* 25.0* 25.9*   PLATELETCT 302 284 338       Imaging  X-Ray:  I have personally reviewed the images and compared with prior images. and My impression is: Worsening left lower lobe opacities, improved right lower lobe opacities    Assessment/Plan  * Acute respiratory failure with hypoxia and hypercapnia (HCC)  Assessment & Plan  Intubated 9/9-9/12  Reintubated 9/17 due to hypercarbia - liberated on 9/19 and then reintubated later that evening, bless his heart  All of the appropriate ventilator bundles are in place  Continue vent support    Failed extubation three times secondary to secretions, hypercarbia. No acute correctable issues. Appears to be an excellent candidate for trach. I will discuss with surgery soon. Meanwhile will focus on nutrition, mobility, minimizing sedation    Surgery consulted for trach am (Wednesday)    Trach done  Trach collar or t-piece trial starting in am    Hypotension  Assessment & Plan  Off pressors- resolved    Pneumonia  Assessment & Plan  MSSA in sputum on 9/10  Usual ashwin in sputum culture on 9/17  S/P 3 days of Zosyn followed by 3 days of Unasyn which completed on 9/15  Unasyn resumed on 9/18 - complete 5 days of therapy    Paroxysmal atrial fibrillation (HCC)- (present on admission)  Assessment & Plan  Continue amiodarone, 200 mg daily  Echo with normal LV systolic function and normal size of LA  Optimize magnesium and potassium  Thyroid studies pending (collected on 9/18 - Lord knows why not yet resulted)  Anticoagulate with rivaroxaban    Right ventricular dilation- (present on admission)  Assessment & Plan  Echo confirms severely dilated RV with mild RV systolic dysfunction  RVSP 42 mmHg  Force diuresis with Lasix    Decrease Lasix- clinically euvolemic    Hypophosphatemia  Assessment &  Plan  Replete phosphorus    Hypomagnesemia  Assessment & Plan  replete    Hypokalemia  Assessment & Plan  replete    Pulmonary hypertension (HCC)- (present on admission)  Assessment & Plan  Long standing per echo. Likely related to untreated AYANNA, COPD, possible chronic hypoxia    Major depression- (present on admission)  Assessment & Plan  Continue Cymbalta and Topamax       VTE:  NOAC  Ulcer: PPI  Lines: Orozco Catheter  Ongoing indication addressed    I have performed a physical exam and reviewed and updated ROS and Plan today (9/23/2020). In review of yesterday's note (9/22/2020), there are no changes except as documented above.     I have assessed and reassessed his respiratory status with ventilator adjustments, ventilator waveforms, airway mechanics, blood pressure, hemodynamics, cardiovascular status with titration of phenylephrine as well as his neurologic status.  He is at increased risk for worsening respiratory and cardiovascular system dysfunction.    Discussed patient condition and risk of morbidity and/or mortality with RN, RT, Pharmacy, Charge nurse / hot rounds and QA team     The patient remains critically ill.  Critical care time = 35 minutes in directly providing and coordinating critical care and extensive data review.  No time overlap and excludes procedures.

## 2020-09-23 NOTE — PROGRESS NOTES
Dr. Abdi and Dr. Cleveland at bedside for bronchoscopy and tracheostomy.  1114: Timeout called. Procedures and drug allergies reviewed. All agree.  1116: 100 mcg Fentanyl IVP, 5 mg Versed IVP administered, see MAR.  1118: 50 mg Rocuronium IVP administered, see MAR.  1119: Bronchoscopy started, immediately followed by tracheostomy.  1121: Brionchoscopy and tracheostomy procedures both completed. APVcmv (RR 18, Vt 440, PEEP 8, FiO2 100%). SpO2 100%. Breath sounds and chest rise equal bilaterally.

## 2020-09-24 NOTE — PROGRESS NOTES
Critical Care Progress Note    Date of admission  9/9/2020    Chief Complaint  54 y.o. male admitted 9/9/2020 with respiratory failure, sepsis and pneumonia.    Hospital Course   9/17 -    developed worsening hypercarbic and hypoxemic respiratory failure requiring emergent intubation.  Full vent support.  9/18 -    continue vent support.  Titrating phenylephrine.  Replete electrolytes.  SAT/SBT as tolerated.  9/19 -    SAT/SBT as tolerated.  Continue vent support.  Titrating phenylephrine.  Replete potassium.  Continue Unasyn.  9/20 -    liberated from the ventilator yesterday and did very well until last night when he required reintubation.  Continue vent support.  Continue Unasyn and complete 5 days of therapy.    9/20 - re-intubated secondary to hypercapnia and secretions  9/21 awake, alert on vent, anticipate trach in am or soon  9/22 awake, alert, writing notes, anticipate trach in am (surgery consulted)  9/23 trach  9/24 changed to continuous t-piece    Interval Problem Update  Reviewed last 24 hour events:  On continuous t-piece starting this am  Labs good  No events overnight  Writes copious notes  On no sedation  LTAC placement requested yesterday    Prior  Trach done  Awake/ alert, writing notes  No major overnight issues  Start trach collar/T-piece trials in am    Prior  Re-intubated yesterday afternoon  Activity to edge of bed  Anticipate trach soon- case management aware  OOB to chair, writing notes  Trach in am    Prior  SAT done - follows, moves all 4, nods  Fent 100  Dex titrating  Increase scheduled Tylenol  Keven 30  T max 100  TF at goal   last night  Replete PO4  Vent 4  %  PEEP 8  Apnea with SBT  AF      Review of Systems  Review of Systems   Unable to perform ROS: Acuity of condition        Vital Signs for last 24 hours   Temp:  [36.6 °C (97.8 °F)] 36.6 °C (97.8 °F)  Pulse:  [] 93  Resp:  [2-34] 18  BP: ()/(52-81) 102/56  SpO2:  [91 %-100 %] 97 %    Hemodynamic  parameters for last 24 hours       Respiratory Information for the last 24 hours  Vent Mode: Spont  PEEP/CPAP: 8  P Support: 10  MAP: 12  Control VTE (exp VT): 260    Physical Exam   Physical Exam  Constitutional:       Appearance: He is not diaphoretic.      Comments: On ventilator   HENT:      Head: Normocephalic and atraumatic.      Right Ear: External ear normal.      Left Ear: External ear normal.      Nose: Nose normal.      Mouth/Throat:      Mouth: Mucous membranes are moist.      Pharynx: No oropharyngeal exudate.   Eyes:      General: No scleral icterus.     Pupils: Pupils are equal, round, and reactive to light.   Neck:      Musculoskeletal: Normal range of motion and neck supple.   Cardiovascular:      Pulses: Normal pulses.      Heart sounds: No friction rub. No gallop.       Comments: Atrial fibrillation  Pulmonary:      Breath sounds: Rales (Few coarse crackles bilaterally) present. No wheezing.   Abdominal:      General: Bowel sounds are normal. There is no distension.      Palpations: Abdomen is soft.      Tenderness: There is no abdominal tenderness. There is no guarding.      Comments: Tolerating enteral tube feedings   Musculoskeletal: Normal range of motion.      Comments: No clubbing or cyanosis   Skin:     General: Skin is warm and dry.      Capillary Refill: Capillary refill takes less than 2 seconds.   Neurological:      Comments: Awake and alert.  Nods.  Follows.  Moves all 4 extremities.         Medications  Current Facility-Administered Medications   Medication Dose Route Frequency Provider Last Rate Last Dose   • levalbuterol (XOPENEX) 1.25 MG/3ML nebulizer solution 1.25 mg  1.25 mg Nebulization 4X/DAY (RT) Jeremy M Gonda, M.D.   1.25 mg at 09/24/20 1250   • oxyCODONE immediate-release (ROXICODONE) tablet 5-10 mg  5-10 mg Enteral Tube Q4HRS PRN Francisco Novoa M.D.   10 mg at 09/23/20 3454   • HYDROmorphone (DILAUDID) injection 2 mg  2 mg Intravenous HS PRN Francisco Novoa M.D.   2  mg at 09/22/20 2137   • Respiratory Therapy Consult   Nebulization Continuous RT Jus Daniel M.D.       • ipratropium-albuterol (DUONEB) nebulizer solution  3 mL Nebulization Q2HRS PRN (RT) Jus Daniel M.D.   3 mL at 09/20/20 0355   • senna-docusate (PERICOLACE or SENOKOT S) 8.6-50 MG per tablet 2 Tab  2 Tab Enteral Tube BID Jus Daniel M.D.   2 Tab at 09/24/20 0538    And   • polyethylene glycol/lytes (MIRALAX) PACKET 1 Packet  1 Packet Enteral Tube QDAY PRN Jus Daniel M.D.        And   • magnesium hydroxide (MILK OF MAGNESIA) suspension 30 mL  30 mL Enteral Tube QDAY PRN Jus Daniel M.D.        And   • bisacodyl (DULCOLAX) suppository 10 mg  10 mg Rectal QDAY PRN Jus Daniel M.D.       • MD Alert...ICU Electrolyte Replacement per Pharmacy   Other PHARMACY TO DOSE Jus Daniel M.D.       • lidocaine (XYLOCAINE) 1 % injection 1-2 mL  1-2 mL Tracheal Tube Q30 MIN PRN Jus Daniel M.D.       • acetaminophen (TYLENOL) tablet 1,000 mg  1,000 mg Enteral Tube Q6HRS Joe Grimaldo M.D.   1,000 mg at 09/24/20 1210   • omeprazole (FIRST-OMEPRAZOLE) 2 mg/mL oral susp 40 mg  40 mg Enteral Tube DAILY Joe Grimaldo M.D.   40 mg at 09/24/20 0538   • levalbuterol (XOPENEX) 1.25 MG/3ML nebulizer solution 1.25 mg  1.25 mg Nebulization Q4H PRN (RT) Joe Grimaldo M.D.       • potassium bicarbonate (KLYTE) effervescent tablet 25 mEq  25 mEq Enteral Tube DAILY Joe Grimaldo M.D.   25 mEq at 09/24/20 0538   • Pharmacy Consult: Enteral tube insertion - review meds/change route/product selection  1 Each Other PHARMACY TO DOSE Joe Grimaldo M.D.       • amiodarone (CORDARONE) tablet 200 mg  200 mg Enteral Tube Q DAY Joe Grimaldo M.D.   200 mg at 09/24/20 0538   • furosemide (LASIX) tablet 20 mg  20 mg Enteral Tube Q DAY Joe Grimaldo M.D.   20 mg at 09/24/20 0538   • DULoxetine (CYMBALTA) capsule 60 mg  60 mg Enteral Tube QHS Joe Grimaldo M.D.    60 mg at 09/23/20 2039   • topiramate (TOPAMAX) tablet 75 mg  75 mg Enteral Tube DAILY Joe Grimaldo M.D.   75 mg at 09/24/20 0614   • rivaroxaban (XARELTO) tablet 20 mg  20 mg Enteral Tube PM MEAL Joe Grimaldo M.D.   20 mg at 09/23/20 1723   • ondansetron (ZOFRAN ODT) dispertab 4 mg  4 mg Oral Q4HRS PRN Kendal Acuna M.D.       • lidocaine (LIDODERM) 5 % 2 Patch  2 Patch Transdermal Q24HR Francisco Duval M.D.   2 Patch at 09/23/20 1727   • promethazine (PHENERGAN) tablet 12.5-25 mg  12.5-25 mg Enteral Tube Q4HRS PRN Francisco Duval M.D.       • ondansetron (ZOFRAN) syringe/vial injection 4 mg  4 mg Intravenous Q4HRS PRN Arcadio Bangura M.D.   4 mg at 09/18/20 1954   • promethazine (PHENERGAN) suppository 12.5-25 mg  12.5-25 mg Rectal Q4HRS PRN Arcadio Bangura M.D.       • prochlorperazine (COMPAZINE) injection 5-10 mg  5-10 mg Intravenous Q4HRS PRN Arcadio Bangura M.D.           Fluids    Intake/Output Summary (Last 24 hours) at 9/24/2020 1432  Last data filed at 9/24/2020 1200  Gross per 24 hour   Intake 1090 ml   Output 925 ml   Net 165 ml       Laboratory  Recent Labs     09/24/20  0330   ISTATAPH 7.419   ISTATAPCO2 36.0   ISTATAPO2 86   ISTATATCO2 24   [id number] 97   ISTATARTHCO3 23.3   ISTATARTBE -1   ISTATTEMP 37.5 C   ISTATFIO2 40   ISTATSPEC Arterial   ISTATAPHTC 7.412   [id number] 88*         Recent Labs     09/22/20  0415 09/23/20  0620 09/24/20  0541   SODIUM 141 138 139   POTASSIUM 3.6 3.7 4.0   CHLORIDE 108 107 107   CO2 25 24 26   BUN 20 19 18   CREATININE 0.35* 0.31* 0.36*   MAGNESIUM 2.0 2.0 2.1   PHOSPHORUS 2.3* 2.7 2.7   CALCIUM 7.7* 7.9* 8.0*     Recent Labs     09/22/20  0415 09/23/20  0620 09/24/20  0541   GLUCOSE 86 74 97     Recent Labs     09/22/20  0415 09/23/20  0620 09/24/20  0541   WBC 7.0 8.6 9.2   NEUTSPOLYS 79.50* 82.40* 83.90*   LYMPHOCYTES 10.20* 7.70* 5.30*   MONOCYTES 8.90 8.50 9.60   EOSINOPHILS 0.70 0.70 0.40   BASOPHILS 0.40 0.20 0.30     Recent  Labs     09/22/20  0415 09/23/20  0620 09/24/20  0541   RBC 2.53* 2.65* 2.61*   HEMOGLOBIN 7.4* 7.8* 7.5*   HEMATOCRIT 25.0* 25.9* 25.5*   PLATELETCT 284 338 414       Imaging  X-Ray:  I have personally reviewed the images and compared with prior images. and My impression is: Worsening left lower lobe opacities, improved right lower lobe opacities    Assessment/Plan  * Acute respiratory failure with hypoxia and hypercapnia (HCC)  Assessment & Plan  Intubated 9/9-9/12  Reintubated 9/17 due to hypercarbia - liberated on 9/19 and then reintubated later that evening, bless his heart  All of the appropriate ventilator bundles are in place  Continue vent support    Failed extubation three times secondary to secretions, hypercarbia. No acute correctable issues. Appears to be an excellent candidate for trach. I will discuss with surgery soon. Meanwhile will focus on nutrition, mobility, minimizing sedation    Surgery consulted for trach am (Wednesday)    Trach done  Now on continuous trach collar  Will check ABG  Minimize sedation, mobilize  Ready for LTAC if bed available      Hypotension  Assessment & Plan  Off pressors- resolved    Pneumonia  Assessment & Plan  MSSA in sputum on 9/10  Usual ashwin in sputum culture on 9/17  S/P 3 days of Zosyn followed by 3 days of Unasyn which completed on 9/15  Unasyn resumed on 9/18 - complete 5 days of therapy    Paroxysmal atrial fibrillation (HCC)- (present on admission)  Assessment & Plan  Continue amiodarone, 200 mg daily  Echo with normal LV systolic function and normal size of LA  Optimize magnesium and potassium  Thyroid studies pending (collected on 9/18 - Lord knows why not yet resulted)  Anticoagulate with rivaroxaban    Right ventricular dilation- (present on admission)  Assessment & Plan  Echo confirms severely dilated RV with mild RV systolic dysfunction  RVSP 42 mmHg  Force diuresis with Lasix    Decrease Lasix- clinically euvolemic    Hypophosphatemia  Assessment &  Plan  Replete phosphorus    Hypomagnesemia  Assessment & Plan  replete    Hypokalemia  Assessment & Plan  replete    Pulmonary hypertension (HCC)- (present on admission)  Assessment & Plan  Long standing per echo. Likely related to untreated AYANNA, COPD, possible chronic hypoxia    Major depression- (present on admission)  Assessment & Plan  Continue Cymbalta and Topamax       VTE:  NOAC  Ulcer: PPI  Lines: Orozco Catheter  Ongoing indication addressed    I have performed a physical exam and reviewed and updated ROS and Plan today (9/24/2020). In review of yesterday's note (9/23/2020), there are no changes except as documented above.     I have assessed and reassessed his respiratory status with ventilator adjustments, ventilator waveforms, airway mechanics, blood pressure, hemodynamics, cardiovascular status with titration of phenylephrine as well as his neurologic status.  He is at increased risk for worsening respiratory and cardiovascular system dysfunction.    Discussed patient condition and risk of morbidity and/or mortality with RN, RT, Pharmacy, Charge nurse / hot rounds and QA team

## 2020-09-24 NOTE — CARE PLAN
Problem: Bowel/Gastric:  Goal: Normal bowel function is maintained or improved  Outcome: PROGRESSING AS EXPECTED  Note: Patient had one bowel movement yesterday and one today. Before yesterday, last bowel movement was several days ago     Problem: Respiratory:  Goal: Respiratory status will improve  Outcome: PROGRESSING AS EXPECTED  Note: Patient trached yesterday 9/23. Tolerating well. T-piecing currently. 10L/40%.

## 2020-09-24 NOTE — CARE PLAN
Problem: Safety  Goal: Will remain free from falls  Outcome: PROGRESSING AS EXPECTED  Intervention: Assess risk factors for falls  Flowsheets (Taken 9/24/2020 3150)  History of fall: 0  Mobility Status Assessment: 2-2 Healthcare Providers Required for Assistance with Ambulation & Transfer     Problem: Knowledge Deficit  Goal: Knowledge of disease process/condition, treatment plan, diagnostic tests, and medications will improve  Outcome: PROGRESSING AS EXPECTED

## 2020-09-24 NOTE — PALLIATIVE CARE
Palliative Care follow-up  Pt s/p trach placement today. Will f/u when more able to discuss desire for completion of AD documents.      Plan: f/u re: AD completion when pt more able to participate    Thank you for allowing Palliative Care to support this patient and family. Contact [email or web address] for additional assistance, change in patient status, or with any questions/concerns.

## 2020-09-24 NOTE — PROCEDURES
Procedures  Procedure Note    Date: 9/23/2020  Time: 11:10    Procedure: Bronchoscopy    Indication: Assistance to Dr. Cleveland with bedside percutaneous tracheostomy placement    Consent: Informed consent obtained from patient or designated decision maker after explaining the benefits/risks of the procedure including but not limited to bleeding, infection, airway trauma or loss therof, pneumothorax/hemothorax, arrhythmia, or death. Patient or surrogate expressed understanding and agreement and signed consent which can be found in the patient's chart.    Procedure: After obtaining consent, a time-out was performed. Respiratory therapy and nursing at bedside throughout procedure.  Patient provided sedation and analgesia throughout the procedure. Using a fiberoptic bronchoscope, trachea entered via ETT. Airways visualized directly bilaterally to the subsegmental airways. The bronchoscope was then placed into R main bronchus and held in place while ETT backed up through vocal cords by RT and held in place right above the cords. The bronchoscope then withdrawn to level of ETT tip to provide transillumination for the percutaneous tracheostomy. Direct observation of the needle, guidewire, and seldinger technique of the percutaneous tracheostomy placement occurred throughout the entire procedure. Once tracheostomy tube in place, bronchoscope withdrawn and again placed into the trachea through the newly inserted tracheostomy tube providing direct confirmation of appropriate placement. Findings as below. Patient tolerated procedure well without any difficulties and left in care of bedside nurse/RT.     Medications: Per surgery  Findings:  Upper airway - Not visualized as bronchoscope passed through ETT    Trachea to yamilka - normal mucosa without anatomic variation, lesions, or intraluminal mass    R proximal and distal airways - normal mucosa without anatomic variation, lesions, or obvious endobronchial mass, secretions bloody  secretions in the RLL, RML and right mainstem. All bronchi lavaged sequentially until clear effluent returned.     L proximal and distal airways - normal mucosa without anatomic variation, lesions, or obvious endobronchial mass, secretions scant cloudy mucus in the LLL. All bronchi lavaged sequentially until clear effluent returned.     Samples - none    Complications: none  CXR (if applicable): n/a    Adelso Abdi,   Critical Care Medicine

## 2020-09-24 NOTE — DIETARY
Nutrition support weekly update:  Day 15 of admit.  Jas Vann is a 54 y.o. male with admitting DX of Acute respiratory failure (HCC).    Tube feeding first initiated on 9/10, D/c'd briefly, then resumed 9/17.  Current TF via gastric Cortrak: Impact Peptide 1.5 @ goal rate 55 ml/hr, providing 1980 kcal, 124 grams protein, and 1016 ml free water per day.     Assessment:  Weight 71.8 kg with BMI 24.07  Initial measured weight 75.4 kg on 9/10. 5% loss in 14 days. However, pt is -2.2 L fluid per I/O.    Estimated nutritional needs:  REE per MSJ x1.2 = 1829 kcal/day   REE per PSU (vent L/min 9.3, T max/24 hours 38) = 1884 kcal/day  25-30 kcal/kg = 1181-8122 kcal/day  1.2-1.5 grams protein/kg =  gram protein/day    Evaluation:   1. Pt remains on vent support. Trach placed yesterday. Observed pt sitting in chair this morning.   2. TF @ goal, meeting/exceeding estimated needs per predictive equations.   3. Reviewed labs. No pre-albumin this week; CRP improving.  4. Meds include lasix, electrolyte replacement, and bowel regimen.   5. No pressure injuries noted.  6. Medium, loose BM today per chart.  7. Current feeding regimen remains appropriate to meet needs with low-volume.     Malnutrition risk: None identified @ this time.     Recommendations/Plan:  1. Continue TF formula and rate.  2. Fluids per MD. POSADA following.

## 2020-09-25 NOTE — CARE PLAN
Problem: Fluid Volume:  Goal: Will maintain balanced intake and output  Outcome: PROGRESSING AS EXPECTED     Problem: Communication  Goal: The ability to communicate needs accurately and effectively will improve  Outcome: PROGRESSING AS EXPECTED     Problem: Safety  Goal: Will remain free from falls  Outcome: PROGRESSING AS EXPECTED     Problem: Venous Thromboembolism (VTW)/Deep Vein Thrombosis (DVT) Prevention:  Goal: Patient will participate in Venous Thrombosis (VTE)/Deep Vein Thrombosis (DVT)Prevention Measures  Outcome: PROGRESSING AS EXPECTED     Problem: Bowel/Gastric:  Goal: Normal bowel function is maintained or improved  Outcome: PROGRESSING AS EXPECTED     Problem: Urinary Elimination:  Goal: Ability to reestablish a normal urinary elimination pattern will improve  Outcome: PROGRESSING AS EXPECTED

## 2020-09-25 NOTE — THERAPY
"Speech Language Pathology   Speaking Valve     Patient Name: Jas Vann  AGE:  54 y.o., SEX:  male  Medical Record #: [id number]  Today's Date: 9/25/2020     Precautions  Precautions: Fall Risk, Swallow Precautions ( See Comments), Nasogastric Tube, Tracheostomy     Assessment    Patient is a 55 y/o M admitted 9/9/20 from [place], [place] with respiratory failure, sepsis and PNA. Pt has been intubated 3x and has now been trached. CXR 9/25: \"Worsening left basilar atelectasis, pleural fluid. Otherwise stable chest with right basilar scarring, pleural fluid and probable consolidation.\" PMHx includes Afib, GERD, HTN, psychiatric problem.    Pt was alert, sitting upright in chair, having difficulty reattaching his trach collar to stay on his neck. Deep tracheal suctioning via catheter provided prior to placement of speaking valve, yielding minimal thin clear secretions. Baseline vitals were as follows: HR 90; O2 95% on 10L via 40% FiO2. RT had already deflated the cuff prior to SLP eval. Speaking valve was placed with pt initially tolerating well, able to produce a clear voice to communicate at the sentence level (mildly hypophonic). Pt was oriented x3, not to reason for hospitalization. Pt tolerated the speaking valve for 7 minutes before it had to be removed due to sustained tachycardia (-160) and desaturation of O2 (84%) with excessive coughing. Pt utilized Y[place]auer for oral suctioning, yielding minimal thin clear secretions, reporting he was not able to bring up the secretions he felt in his throat. Once speaking valve was removed, tracheal suctioning performed again, yielding minimal thin clear secretions and vitals returned to baseline. Cuff was left deflated as it was found. RT/RN aware. Pt began communicating via writing, expressing that he was eager to eat and drink again. SLP provided education re: process for returning to eating and drinking and need for pt to tolerate the speaking valve for an increased " "length of time before the clinical swallow evaluation can be done. Pt expressed understanding. SLP will follow and complete [place]ye Swallow Test as appropriate. Recommend speaking valve placement with SLP only at this time.     Plan    Recommend speaking valve placement with SLP only at this time.   SLP to complete Blue Dye Swallow Test as appropriate.     Recommend Speech Therapy 3 times per week until therapy goals are met for the following treatments:  Expression Training and Patient / Family / Caregiver Education.    Discharge Recommendations: (P) Recommend post-acute placement for additional speech therapy services prior to discharge home    Subjective    \"To be able to eat and drink water and soda.\" (written)     Objective       09/25/20 1547   Speaking Valve Airway Assessment   Type of Airway Portex Trach   Size of Airway 8.0   FiO2% 40 %   Respiratory Support Trach Collar / Mask   Trial Speaking Valve Placement   Speaking Valve Style Aqua   Cuff Inflation Deflated  (RT had deflated the cuff earlier)   Upper Airway Patency Adequate   Tolerated Intervention (Yes / No) Yes   Duration Of Speaking Valve Tolerance (Minutes) 7   Speaking Valve Placement Problems   Respiratory / Cardiac Problems Observed Heart Rate (Tachycardia) (Increased 10);Oxygen Saturation Level Decreased (5);Other (Comments)  (excessive coughing)   Physical / Psychosocial Problems Observed None   Oral-Pharyngeal Status   Current Method Of Nutrition Nasogastric Tube (NG Tube)   Secretion Management Minimal;Thin;Clear / White   Voice Production Reduced Loudness   Breath Phase Coordination Adequate for Sentence Level   Short Term Goals   Short Term Goal # 1 NEW 9/25: Patient will tolerate the speaking valve for >15 minutes with no significant change in vitals w/ SLP.         "

## 2020-09-25 NOTE — PROGRESS NOTES
Critical Care Progress Note    Date of admission  9/9/2020    Chief Complaint  54 y.o. male admitted 9/9/2020 with respiratory failure, sepsis and pneumonia.    Hospital Course   9/17 -    developed worsening hypercarbic and hypoxemic respiratory failure requiring emergent intubation.  Full vent support.  9/18 -    continue vent support.  Titrating phenylephrine.  Replete electrolytes.  SAT/SBT as tolerated.  9/19 -    SAT/SBT as tolerated.  Continue vent support.  Titrating phenylephrine.  Replete potassium.  Continue Unasyn.  9/20 -    liberated from the ventilator yesterday and did very well until last night when he required reintubation.  Continue vent support.  Continue Unasyn and complete 5 days of therapy.    9/20 - re-intubated secondary to hypercapnia and secretions  9/21 awake, alert on vent, anticipate trach in am or soon  9/22 awake, alert, writing notes, anticipate trach in am (surgery consulted)  9/23 trach  9/24 changed to continuous t-piece    Interval Problem Update  Reviewed last 24 hour events:  No major events  RT trying to teach pt self-trach care  Anticipate trach change out next week- then possible candidate to go back to SNF  Palliative care consult re: goals of care  Possible transfer out of ICU tomorrow    Prior  On continuous t-piece starting this am  Labs good  No events overnight  Writes copious notes  On no sedation  LTAC placement requested yesterday    Prior  Trach done  Awake/ alert, writing notes  No major overnight issues  Start trach collar/T-piece trials in am    Prior  Re-intubated yesterday afternoon  Activity to edge of bed  Anticipate trach soon- case management aware  OOB to chair, writing notes  Trach in am    Prior  SAT done - follows, moves all 4, nods  Fent 100  Dex titrating  Increase scheduled Tylenol  Keven 30  T max 100  TF at goal   last night  Replete PO4  Vent 4  %  PEEP 8  Apnea with SBT  AF      Review of Systems  Review of Systems   Unable to  perform ROS: Acuity of condition        Vital Signs for last 24 hours   Temp:  [36.4 °C (97.5 °F)-36.8 °C (98.3 °F)] 36.7 °C (98.1 °F)  Pulse:  [] 94  Resp:  [1-51] 31  BP: ()/(63-75) 113/69  SpO2:  [78 %-100 %] 99 %    Hemodynamic parameters for last 24 hours       Respiratory Information for the last 24 hours       Physical Exam   Physical Exam  Constitutional:       Appearance: He is not diaphoretic.      Comments: On ventilator   HENT:      Head: Normocephalic and atraumatic.      Right Ear: External ear normal.      Left Ear: External ear normal.      Nose: Nose normal.      Mouth/Throat:      Mouth: Mucous membranes are moist.      Pharynx: No oropharyngeal exudate.   Eyes:      General: No scleral icterus.     Pupils: Pupils are equal, round, and reactive to light.   Neck:      Musculoskeletal: Normal range of motion and neck supple.   Cardiovascular:      Pulses: Normal pulses.      Heart sounds: No friction rub. No gallop.       Comments: Atrial fibrillation  Pulmonary:      Breath sounds: Rales (Few coarse crackles bilaterally) present. No wheezing.   Abdominal:      General: Bowel sounds are normal. There is no distension.      Palpations: Abdomen is soft.      Tenderness: There is no abdominal tenderness. There is no guarding.      Comments: Tolerating enteral tube feedings   Musculoskeletal: Normal range of motion.      Comments: No clubbing or cyanosis   Skin:     General: Skin is warm and dry.      Capillary Refill: Capillary refill takes less than 2 seconds.   Neurological:      Comments: Awake and alert.  Nods.  Follows.  Moves all 4 extremities.         Medications  Current Facility-Administered Medications   Medication Dose Route Frequency Provider Last Rate Last Dose   • ondansetron (ZOFRAN ODT) dispertab 4 mg  4 mg Enteral Tube Q4HRS PRN Francisco Novoa M.D.       • oxyCODONE immediate-release (ROXICODONE) tablet 5-10 mg  5-10 mg Enteral Tube Q4HRS PRN Francisco Novoa M.D.   5 mg  at 09/24/20 1551   • HYDROmorphone (DILAUDID) injection 2 mg  2 mg Intravenous HS PRN Francisco Novoa M.D.   2 mg at 09/22/20 2137   • Respiratory Therapy Consult   Nebulization Continuous RT Jus Daniel M.D.       • ipratropium-albuterol (DUONEB) nebulizer solution  3 mL Nebulization Q2HRS PRN (RT) Jus Daniel M.D.   3 mL at 09/24/20 1546   • senna-docusate (PERICOLACE or SENOKOT S) 8.6-50 MG per tablet 2 Tab  2 Tab Enteral Tube BID Jus Daniel M.D.   Stopped at 09/24/20 1800    And   • polyethylene glycol/lytes (MIRALAX) PACKET 1 Packet  1 Packet Enteral Tube QDAY PRN Jus Daniel M.D.        And   • magnesium hydroxide (MILK OF MAGNESIA) suspension 30 mL  30 mL Enteral Tube QDAY PRN Jus Daniel M.D.        And   • bisacodyl (DULCOLAX) suppository 10 mg  10 mg Rectal QDAY PRN Jus Daniel M.D.       • MD Alert...ICU Electrolyte Replacement per Pharmacy   Other PHARMACY TO DOSE Jus Daniel M.D.       • lidocaine (XYLOCAINE) 1 % injection 1-2 mL  1-2 mL Tracheal Tube Q30 MIN PRN Jus Daniel M.D.       • acetaminophen (TYLENOL) tablet 1,000 mg  1,000 mg Enteral Tube Q6HRS Joe Grimaldo M.D.   1,000 mg at 09/25/20 0532   • omeprazole (FIRST-OMEPRAZOLE) 2 mg/mL oral susp 40 mg  40 mg Enteral Tube DAILY Joe Grimaldo M.D.   40 mg at 09/25/20 0533   • levalbuterol (XOPENEX) 1.25 MG/3ML nebulizer solution 1.25 mg  1.25 mg Nebulization Q4H PRN (RT) Joe Grimaldo M.D.       • potassium bicarbonate (KLYTE) effervescent tablet 25 mEq  25 mEq Enteral Tube DAILY Joe Grimaldo M.D.   25 mEq at 09/25/20 0533   • Pharmacy Consult: Enteral tube insertion - review meds/change route/product selection  1 Each Other PHARMACY TO DOSE Joe Grimaldo M.D.       • amiodarone (CORDARONE) tablet 200 mg  200 mg Enteral Tube Q DAY Joe Grimaldo M.D.   200 mg at 09/25/20 0533   • furosemide (LASIX) tablet 20 mg  20 mg Enteral Tube Q DAY Joe Grimaldo M.D.   20  mg at 09/25/20 0532   • DULoxetine (CYMBALTA) capsule 60 mg  60 mg Enteral Tube QHS Joe Grimaldo M.D.   Stopped at 09/24/20 2309   • topiramate (TOPAMAX) tablet 75 mg  75 mg Enteral Tube DAILY Joe Grimaldo M.D.   75 mg at 09/25/20 0532   • rivaroxaban (XARELTO) tablet 20 mg  20 mg Enteral Tube PM MEAL Joe Grimaldo M.D.   20 mg at 09/24/20 1715   • lidocaine (LIDODERM) 5 % 2 Patch  2 Patch Transdermal Q24HR Francisco Duval M.D.   2 Patch at 09/24/20 1715   • promethazine (PHENERGAN) tablet 12.5-25 mg  12.5-25 mg Enteral Tube Q4HRS PRN Francisco Duval M.D.       • ondansetron (ZOFRAN) syringe/vial injection 4 mg  4 mg Intravenous Q4HRS PRN Arcadio Bangura M.D.   4 mg at 09/18/20 1954   • promethazine (PHENERGAN) suppository 12.5-25 mg  12.5-25 mg Rectal Q4HRS PRN Arcadio Bangura M.D.       • prochlorperazine (COMPAZINE) injection 5-10 mg  5-10 mg Intravenous Q4HRS PRN Arcadio Bangura M.D.           Fluids    Intake/Output Summary (Last 24 hours) at 9/25/2020 1358  Last data filed at 9/25/2020 1200  Gross per 24 hour   Intake 1250 ml   Output 850 ml   Net 400 ml       Laboratory  Recent Labs     09/24/20  0330   ISTATAPH 7.419   ISTATAPCO2 36.0   ISTATAPO2 86   ISTATATCO2 24   HTYUPCI8VBU 97   ISTATARTHCO3 23.3   ISTATARTBE -1   ISTATTEMP 37.5 C   ISTATFIO2 40   ISTATSPEC Arterial   ISTATAPHTC 7.412   KSIAAWNF2RY 88*         Recent Labs     09/23/20  0620 09/24/20  0541 09/25/20  0556   SODIUM 138 139 138   POTASSIUM 3.7 4.0 3.9   CHLORIDE 107 107 106   CO2 24 26 25   BUN 19 18 15   CREATININE 0.31* 0.36* 0.31*   MAGNESIUM 2.0 2.1 1.9   PHOSPHORUS 2.7 2.7 2.9   CALCIUM 7.9* 8.0* 7.9*     Recent Labs     09/23/20  0620 09/24/20  0541 09/25/20  0556   GLUCOSE 74 97 84     Recent Labs     09/23/20  0620 09/24/20  0541 09/25/20  0556   WBC 8.6 9.2 11.4*   NEUTSPOLYS 82.40* 83.90* 85.30*   LYMPHOCYTES 7.70* 5.30* 5.10*   MONOCYTES 8.50 9.60 8.60   EOSINOPHILS 0.70 0.40 0.20   BASOPHILS 0.20  0.30 0.30     Recent Labs     09/23/20  0620 09/24/20  0541 09/25/20  0556   RBC 2.65* 2.61* 2.90*   HEMOGLOBIN 7.8* 7.5* 8.3*   HEMATOCRIT 25.9* 25.5* 28.7*   PLATELETCT 338 414 494*       Imaging  X-Ray:  I have personally reviewed the images and compared with prior images. and My impression is: Worsening left lower lobe opacities, improved right lower lobe opacities    Assessment/Plan  * Acute respiratory failure with hypoxia and hypercapnia (HCC)  Assessment & Plan  Intubated 9/9-9/12  Reintubated 9/17 due to hypercarbia - liberated on 9/19 and then reintubated later that evening, bless his heart  All of the appropriate ventilator bundles are in place  Continue vent support    Failed extubation three times secondary to secretions, hypercarbia. No acute correctable issues. Appears to be an excellent candidate for trach. I will discuss with surgery soon. Meanwhile will focus on nutrition, mobility, minimizing sedation    Surgery consulted for trach am (Wednesday)    Trach done  Now on continuous trach collar  Will check ABG  Minimize sedation, mobilize  Ready for LTAC if bed available      Hypotension  Assessment & Plan  Off pressors- resolved    Pneumonia  Assessment & Plan  MSSA in sputum on 9/10  Usual ashwin in sputum culture on 9/17  S/P 3 days of Zosyn followed by 3 days of Unasyn which completed on 9/15  Unasyn resumed on 9/18 - complete 5 days of therapy    Paroxysmal atrial fibrillation (HCC)- (present on admission)  Assessment & Plan  Continue amiodarone, 200 mg daily  Echo with normal LV systolic function and normal size of LA  Optimize magnesium and potassium  Thyroid studies pending (collected on 9/18 - Lord knows why not yet resulted)  Anticoagulate with rivaroxaban    Right ventricular dilation- (present on admission)  Assessment & Plan  Echo confirms severely dilated RV with mild RV systolic dysfunction  RVSP 42 mmHg  Force diuresis with Lasix    Decrease Lasix- clinically  euvolemic    Hypophosphatemia  Assessment & Plan  Replete phosphorus    Hypomagnesemia  Assessment & Plan  replete    Hypokalemia  Assessment & Plan  replete    Pulmonary hypertension (HCC)- (present on admission)  Assessment & Plan  Long standing per echo. Likely related to untreated AYANNA, COPD, possible chronic hypoxia    Major depression- (present on admission)  Assessment & Plan  Continue Cymbalta and Topamax       VTE:  NOAC  Ulcer: PPI  Lines: Orozco Catheter  Ongoing indication addressed    I have performed a physical exam and reviewed and updated ROS and Plan today (9/25/2020). In review of yesterday's note (9/24/2020), there are no changes except as documented above.     I have assessed and reassessed his respiratory status with ventilator adjustments, ventilator waveforms, airway mechanics, blood pressure, hemodynamics, cardiovascular status with titration of phenylephrine as well as his neurologic status.  He is at increased risk for worsening respiratory and cardiovascular system dysfunction.    Discussed patient condition and risk of morbidity and/or mortality with RN, RT, Pharmacy, Charge nurse / hot rounds and QA team

## 2020-09-25 NOTE — CONSULTS
*Palliative Care requested for AD completion only. This consult did not include a full discussion on goals of care. If the team is desiring a full consult, please call [phone] and alert the Palliative Care team. Please do not place another order.*    Pt intubated/extubated multiple times on this admission and opted for tracheostomy. Communication completed with writing/nodding. PC visited BS to discuss completion of AD with the patient which he states he has done. He is uncertain if Jessie has a copy but thinks she is who he appointed as his POA. Additionally, he thinks it may be on file at [facility]. Will check with [facility] and/or his sister Jessie. If unable to be located, will f/u with Jas about completion. He expressed understanding and was appreciative.     Plan: Will f/u on AD completion if unable to be located

## 2020-09-26 PROBLEM — J98.11 ATELECTASIS: Status: RESOLVED | Noted: 2020-01-01 | Resolved: 2020-01-01

## 2020-09-26 NOTE — CARE PLAN
Problem: Fluid Volume:  Goal: Will maintain balanced intake and output  Outcome: PROGRESSING AS EXPECTED     Problem: Communication  Goal: The ability to communicate needs accurately and effectively will improve  Outcome: PROGRESSING AS EXPECTED     Problem: Safety  Goal: Will remain free from injury  Outcome: PROGRESSING AS EXPECTED  Goal: Will remain free from falls  Outcome: PROGRESSING AS EXPECTED     Problem: Venous Thromboembolism (VTW)/Deep Vein Thrombosis (DVT) Prevention:  Goal: Patient will participate in Venous Thrombosis (VTE)/Deep Vein Thrombosis (DVT)Prevention Measures  Outcome: PROGRESSING AS EXPECTED     Problem: Knowledge Deficit  Goal: Knowledge of disease process/condition, treatment plan, diagnostic tests, and medications will improve  Outcome: PROGRESSING AS EXPECTED     Problem: Respiratory:  Goal: Respiratory status will improve  Outcome: PROGRESSING AS EXPECTED     Problem: Skin Integrity  Goal: Risk for impaired skin integrity will decrease  Outcome: PROGRESSING AS EXPECTED

## 2020-09-26 NOTE — PROGRESS NOTES
0810: Upon assessment this morning, pt is very groggy, difficult to arouse, will open eyes when name is said and spoken to but quickly drifts back off to sleep. Received pain medicine early this morning. Dr. Novoa at bedside discussing care; ordered ABG STAT. Discontinue Oxy and Dilaudid PRNs.     0835: ABG reviewed by Dr. Novoa. Ordered to give x1 dose of Narcan STAT.

## 2020-09-26 NOTE — CARE PLAN
Problem: Safety  Goal: Will remain free from falls  Outcome: PROGRESSING AS EXPECTED  Calls for assist with call bell, bed alarm on, chair alarm in place when out of bed.     Problem: Knowledge Deficit  Goal: Knowledge of disease process/condition, treatment plan, diagnostic tests, and medications will improve  Outcome: PROGRESSING SLOWER THAN EXPECTED   Explaining further need for speaking valve under ST supervision, NPO status, no swallowing food or water at this time, educated on suctioning.

## 2020-09-26 NOTE — PROGRESS NOTES
ICU progress    Pt seen and examined personally.     This morning pt received prn Oxy for a headache and subsequently became lethargic and hyper capneic. Responded well to one dose of Narcan and now back to baseline. Four days s/p trach, awake, alert, on trach collar, writing notes, learning self care. All narcotics discontinued I treated hypercabia and corrected lytes.No ICU needs. Pt transferred to Hospitalist service. Please re-consult critical care if needed.     Plan  - trach change out mid next week (by Dr Cleveland or colleague)  - pulmonary consult on lester if help wanted  - continue teaching self care  - avoid all sedatives, narcotics (pt becomes hypercapnic easily  - to return to his SNF patient needs to be able to do all his self care with trach (suctioning)  - LTAC referral made  - encourage mobility (OOB to chair and walking)  - leave trach in place indefinitely- pt has end-stage COPD and very weak- very unlikely to improve

## 2020-09-26 NOTE — CARE PLAN
Problem: Respiratory:  Goal: Respiratory status will improve  Outcome: PROGRESSING AS EXPECTED  Tolerating decrease in O2 needs with ATC, attempting to self suction, did not tolerate speaking valve with speech therapy.     Problem: Discharge Barriers/Planning  Goal: Patient's continuum of care needs will be met  Outcome: PROGRESSING SLOWER THAN EXPECTED   Continue to wean O2, educate on trach care and self suctioning, not tolerating being on speaking valve long.

## 2020-09-26 NOTE — PROGRESS NOTES
Hospital Medicine Daily Progress Note    Date of Service  9/26/2020    Chief Complaint  54 y.o. male admitted 9/9/2020 with respiratory failure    Hospital Course    This is a 54-year-old male who presented for evaluation on September 9 after being transferred from [facility] in [place].  he had been been intubated there after having had saturations on room air of 70%.  he was given PIP Tazocin, and Levaquin and transferred to our facility.  His COVID was negative here she was found to be borderline hypotensive with systolics around 100, and required norepinephrine.  he was admitted to the ICU  he was eventually extubated however developed worsening hypercarbic respiratory failure on September 17, and required reintubation.  he was then extubated on September 20, but again worsened requiring reintubation, and then subsequently had a tracheostomy on September 23        Interval Problem Update  Review of systems is limited by the patient's trach  Reports some discomfort around the site of his trach.  RN reports he was sedated this morning after having received Dilaudid and oxycodone overnight.  He is more awake this afternoon.    Consultants/Specialty      Code Status  Full Code    Disposition  Likely long-term acute care hospital discharge    Review of Systems  Review of Systems   Unable to perform ROS: Other   Constitutional: Negative for chills and fever.   HENT: Negative for nosebleeds and sore throat.    Eyes: Negative for blurred vision and double vision.   Respiratory: Negative for cough and shortness of breath.    Cardiovascular: Negative for chest pain, palpitations and leg swelling.   Gastrointestinal: Negative for abdominal pain, diarrhea, nausea and vomiting.   Genitourinary: Negative for dysuria and urgency.   Musculoskeletal: Negative for back pain.   Skin: Negative for rash.   Neurological: Negative for dizziness, loss of consciousness and headaches.        Physical Exam  Temp:  [36.5 °C  (97.7 °F)-36.8 °C (98.3 °F)] 36.5 °C (97.7 °F)  Pulse:  [] 85  Resp:  [10-41] 14  BP: ()/(58-82) 106/62  SpO2:  [79 %-99 %] 97 %    Physical Exam  Vitals signs reviewed.   Constitutional:       General: He is not in acute distress.     Appearance: He is well-developed and underweight. He is not diaphoretic.   HENT:      Head: Normocephalic and atraumatic.   Eyes:      Conjunctiva/sclera: Conjunctivae normal.   Neck:      Vascular: No JVD.      Comments: Trach noted  Cardiovascular:      Rate and Rhythm: Normal rate.      Heart sounds: Murmur present. No gallop.    Pulmonary:      Effort: Pulmonary effort is normal. No respiratory distress.      Breath sounds: No stridor. Rhonchi present. No wheezing or rales.   Abdominal:      Palpations: Abdomen is soft.      Tenderness: There is no abdominal tenderness. There is no guarding or rebound.   Musculoskeletal:         General: No tenderness.      Right lower leg: No edema.      Left lower leg: No edema.   Skin:     General: Skin is warm and dry.      Findings: No rash.   Neurological:      Mental Status: He is alert and oriented to person, place, and time.   Psychiatric:         Thought Content: Thought content normal.         Fluids    Intake/Output Summary (Last 24 hours) at 9/26/2020 1451  Last data filed at 9/26/2020 1200  Gross per 24 hour   Intake 925 ml   Output 1000 ml   Net -75 ml       Laboratory  Recent Labs     09/24/20  0541 09/25/20  0556 09/26/20  0645   WBC 9.2 11.4* 8.0   RBC 2.61* 2.90* 3.01*   HEMOGLOBIN 7.5* 8.3* 8.6*   HEMATOCRIT 25.5* 28.7* 29.6*   MCV 97.7 99.0* 98.3*   MCH 28.7 28.6 28.6   MCHC 29.4* 28.9* 29.1*   RDW 51.9* 52.5* 51.2*   PLATELETCT 414 494* 544*   MPV 10.3 10.0 9.6     Recent Labs     09/24/20  0541 09/25/20  0556 09/26/20  0645   SODIUM 139 138 140   POTASSIUM 4.0 3.9 3.8   CHLORIDE 107 106 106   CO2 26 25 26   GLUCOSE 97 84 120*   BUN 18 15 16   CREATININE 0.36* 0.31* 0.28*   CALCIUM 8.0* 7.9* 7.8*                    Imaging  DX-ABDOMEN FOR TUBE PLACEMENT   Final Result      Enteric tube terminates over the expected position of the cardiac region of the stomach favored over distal esophagus      DX-CHEST-PORTABLE (1 VIEW)   Final Result      Worsening left basilar atelectasis, pleural fluid      Otherwise stable chest with right basilar scarring, pleural fluid and probable consolidation      DX-CHEST-PORTABLE (1 VIEW)   Final Result      No significant interval change.         DX-CHEST-PORTABLE (1 VIEW)   Final Result      No significant interval change.      DX-CHEST-PORTABLE (1 VIEW)   Final Result      Stable chest findings compared with 9/21.      DX-CHEST-PORTABLE (1 VIEW)   Final Result      Grossly stable chest appearance compared with 9/20. Direct comparison is hampered due to rotational factors on the 9/20 image.      DX-CHEST-PORTABLE (1 VIEW)   Final Result      Grossly stable chest appearance compared with 9/19.      DX-ABDOMEN FOR TUBE PLACEMENT   Final Result      Feeding tube tip projects over the stomach.      DX-CHEST-PORTABLE (1 VIEW)   Final Result      Stable chest findings compared with 9/18.      DX-CHEST-PORTABLE (1 VIEW)   Final Result         1. Suboptimal study due to patient rotation.   2. Lines and tubes as above.   3. Ill-defined right basilar atelectasis versus consolidation. Possible right effusion.   4. Mild interstitial edema.         DX-CHEST-FOR LINE PLACEMENT Perform procedure in: Patient's Room   Final Result            1. A right central venous catheter with tip projects appropriately over the expected area of the superior vena cava.      DX-ABDOMEN FOR TUBE PLACEMENT   Final Result      Feeding tube tip overlies the upper gastric fundus.      DX-CHEST-PORTABLE (1 VIEW)   Final Result      1.  Cardiomegaly with bilateral pulmonary infiltrates.      2.  Stable bilateral pleural effusions.      3.  Endotracheal tube present.      DX-CHEST-LIMITED (1 VIEW)   Final Result      Perihilar and  bibasilar opacities may represent a combination of edema, atelectasis, pneumonia.      Trace pleural effusions are not excluded.      Interstitial prominence likely represents interstitial edema.         DX-ABDOMEN FOR TUBE PLACEMENT   Final Result      Feeding tube placement with the tip projecting over the distal stomach or duodenal bulb      EC-ECHOCARDIOGRAM COMPLETE W/O CONT   Final Result      OUTSIDE IMAGES-DX CHEST   Final Result      US-FOREIGN FILM ULTRASOUND   Final Result      OUTSIDE IMAGES-DX CHEST   Final Result      OUTSIDE IMAGES-US ABDOMEN   Final Result      OUTSIDE IMAGES-CT CHEST   Final Result      DX-CHEST-PORTABLE (1 VIEW)   Final Result         1.  Pulmonary edema and/or infiltrates are identified, which are stable since the prior exam.   2.  Trace bilateral pleural effusion   3.  Cardiomegaly      DX-CHEST-LIMITED (1 VIEW)   Final Result         1.  Pulmonary edema and/or infiltrates.   2.  Cardiomegaly      DX-CHEST-LIMITED (1 VIEW)   Final Result         1.  Pulmonary edema and/or infiltrates are identified, which are stable since the prior exam.   2.  Small bilateral pleural effusions   3.  Cardiomegaly           Assessment/Plan  * Acute respiratory failure with hypoxia and hypercapnia (HCC)  Assessment & Plan  Patient has failed extubation twice in house and is currently has a trach in place.  Continue to aggressively manage his underlying COPD  Status post completed therapy for his pneumonia  Follow volume status closely  Continue O2 and RT protocols  Mobilize      Pneumonia  Assessment & Plan  States post completion of antibiotic course    Paroxysmal atrial fibrillation (HCC)- (present on admission)  Assessment & Plan  Anticoagulation with rivaroxaban  Rate rhythm control with amiodarone  Patient had been on metoprolol as an outpatient  We will continue to follow his pressures and resume this medication as appropriate    Right ventricular dilation- (present on admission)  Assessment &  Plan  Secondary volume and pulmonary hypertension    Hypophosphatemia  Assessment & Plan  Status post replacement    Hypomagnesemia  Assessment & Plan  Status post replacement  Follow intermittently    Hypokalemia  Assessment & Plan  Follow and replace as appropriate    Pulmonary hypertension (HCC)- (present on admission)  Assessment & Plan  Continue to try to improve his a oxygenation and respiratory status  Continue maintenance Lasix    Major depression- (present on admission)  Assessment & Plan   Cymbalta        VTE prophylaxis: Rivaroxaban

## 2020-09-27 NOTE — PROGRESS NOTES
Hospital Medicine Daily Progress Note    Date of Service  9/27/2020    Chief Complaint  54 y.o. male admitted 9/9/2020 with respiratory failure    Hospital Course    This is a 54-year-old male who presented for evaluation on September 9 after being transferred from [facility] in [place].  he had been been intubated there after having had saturations on room air of 70%.  he was given PIP Tazocin, and Levaquin and transferred to our facility.  His COVID was negative here she was found to be borderline hypotensive with systolics around 100, and required norepinephrine.  he was admitted to the ICU  he was eventually extubated however developed worsening hypercarbic respiratory failure on September 17, and required reintubation.  he was then extubated on September 20, but again worsened requiring reintubation, and then subsequently had a tracheostomy on September 23        Interval Problem Update  9/27: Sitting up in bed comfortable.  Respiratory status stable.  Saturating well on room 4 to 5 L of oxygen via trach.  Patient is alert and able to communicate with writing.  Requesting that trach is removed and be discharged home.  Explained to patient that he will probably need the trach for long time if not indefinitely considering his poor respiratory and physical status.  Patient became upset and irritated. Other wise has been afebrile. No distress noted. No issues over night per staff.     Consultants/Specialty  Pulmonary     Code Status  Full Code    Disposition  Likely long-term acute care hospital discharge    Review of Systems  Review of Systems   Unable to perform ROS: Other   Constitutional: Negative for fever.   HENT: Negative for hearing loss, nosebleeds and tinnitus.    Eyes: Negative for blurred vision and double vision.   Respiratory: Negative for cough and hemoptysis.    Cardiovascular: Negative for chest pain and palpitations.   Gastrointestinal: Negative for nausea and vomiting.   Genitourinary:  Negative for dysuria.   Skin: Negative for rash.   Neurological: Negative for dizziness and headaches.   Psychiatric/Behavioral: Negative for depression.        Physical Exam  Temp:  [36.3 °C (97.4 °F)-37.1 °C (98.7 °F)] 36.6 °C (97.8 °F)  Pulse:  [] 89  Resp:  [1-51] 22  BP: (102-129)/(63-83) 122/73  SpO2:  [77 %-99 %] 97 %    Physical Exam  Vitals signs reviewed.   Constitutional:       General: He is not in acute distress.     Appearance: He is well-developed and underweight. He is not ill-appearing.      Comments: Thin built    HENT:      Head: Normocephalic and atraumatic.   Eyes:      Conjunctiva/sclera: Conjunctivae normal.   Neck:      Vascular: No JVD.      Comments: Trach noted  Cardiovascular:      Rate and Rhythm: Normal rate.      Heart sounds: Murmur present. No friction rub. No gallop.    Pulmonary:      Effort: Pulmonary effort is normal. No respiratory distress.      Breath sounds: No stridor. Examination of the right-lower field reveals rales. Examination of the left-lower field reveals rales. Rhonchi and rales present. No wheezing.   Abdominal:      Palpations: Abdomen is soft.      Tenderness: There is no abdominal tenderness. There is no guarding or rebound.   Musculoskeletal:         General: No tenderness.      Right lower leg: No edema.      Left lower leg: No edema.   Skin:     General: Skin is warm and dry.      Findings: No rash.   Neurological:      Mental Status: He is alert and oriented to person, place, and time.   Psychiatric:         Judgment: Judgment is impulsive.         Fluids    Intake/Output Summary (Last 24 hours) at 9/27/2020 1333  Last data filed at 9/27/2020 1311  Gross per 24 hour   Intake 1220 ml   Output 1375 ml   Net -155 ml       Laboratory  Recent Labs     09/25/20  0556 09/26/20  0645 09/27/20  0528   WBC 11.4* 8.0 6.9   RBC 2.90* 3.01* 2.89*   HEMOGLOBIN 8.3* 8.6* 8.3*   HEMATOCRIT 28.7* 29.6* 29.0*   MCV 99.0* 98.3* 100.3*   MCH 28.6 28.6 28.7   MCHC 28.9*  29.1* 28.6*   RDW 52.5* 51.2* 50.7*   PLATELETCT 494* 544* 657*   MPV 10.0 9.6 9.6     Recent Labs     09/25/20  0556 09/26/20  0645 09/27/20  0528   SODIUM 138 140 138   POTASSIUM 3.9 3.8 4.5   CHLORIDE 106 106 104   CO2 25 26 28   GLUCOSE 84 120* 101*   BUN 15 16 18   CREATININE 0.31* 0.28* 0.30*   CALCIUM 7.9* 7.8* 8.0*                   Imaging  DX-ABDOMEN FOR TUBE PLACEMENT   Final Result      Enteric tube terminates over the expected position of the cardiac region of the stomach favored over distal esophagus      DX-CHEST-PORTABLE (1 VIEW)   Final Result      Worsening left basilar atelectasis, pleural fluid      Otherwise stable chest with right basilar scarring, pleural fluid and probable consolidation      DX-CHEST-PORTABLE (1 VIEW)   Final Result      No significant interval change.         DX-CHEST-PORTABLE (1 VIEW)   Final Result      No significant interval change.      DX-CHEST-PORTABLE (1 VIEW)   Final Result      Stable chest findings compared with 9/21.      DX-CHEST-PORTABLE (1 VIEW)   Final Result      Grossly stable chest appearance compared with 9/20. Direct comparison is hampered due to rotational factors on the 9/20 image.      DX-CHEST-PORTABLE (1 VIEW)   Final Result      Grossly stable chest appearance compared with 9/19.      DX-ABDOMEN FOR TUBE PLACEMENT   Final Result      Feeding tube tip projects over the stomach.      DX-CHEST-PORTABLE (1 VIEW)   Final Result      Stable chest findings compared with 9/18.      DX-CHEST-PORTABLE (1 VIEW)   Final Result         1. Suboptimal study due to patient rotation.   2. Lines and tubes as above.   3. Ill-defined right basilar atelectasis versus consolidation. Possible right effusion.   4. Mild interstitial edema.         DX-CHEST-FOR LINE PLACEMENT Perform procedure in: Patient's Room   Final Result            1. A right central venous catheter with tip projects appropriately over the expected area of the superior vena cava.      DX-ABDOMEN FOR  TUBE PLACEMENT   Final Result      Feeding tube tip overlies the upper gastric fundus.      DX-CHEST-PORTABLE (1 VIEW)   Final Result      1.  Cardiomegaly with bilateral pulmonary infiltrates.      2.  Stable bilateral pleural effusions.      3.  Endotracheal tube present.      DX-CHEST-LIMITED (1 VIEW)   Final Result      Perihilar and bibasilar opacities may represent a combination of edema, atelectasis, pneumonia.      Trace pleural effusions are not excluded.      Interstitial prominence likely represents interstitial edema.         DX-ABDOMEN FOR TUBE PLACEMENT   Final Result      Feeding tube placement with the tip projecting over the distal stomach or duodenal bulb      EC-ECHOCARDIOGRAM COMPLETE W/O CONT   Final Result      OUTSIDE IMAGES-DX CHEST   Final Result      US-FOREIGN FILM ULTRASOUND   Final Result      OUTSIDE IMAGES-DX CHEST   Final Result      OUTSIDE IMAGES-US ABDOMEN   Final Result      OUTSIDE IMAGES-CT CHEST   Final Result      DX-CHEST-PORTABLE (1 VIEW)   Final Result         1.  Pulmonary edema and/or infiltrates are identified, which are stable since the prior exam.   2.  Trace bilateral pleural effusion   3.  Cardiomegaly      DX-CHEST-LIMITED (1 VIEW)   Final Result         1.  Pulmonary edema and/or infiltrates.   2.  Cardiomegaly      DX-CHEST-LIMITED (1 VIEW)   Final Result         1.  Pulmonary edema and/or infiltrates are identified, which are stable since the prior exam.   2.  Small bilateral pleural effusions   3.  Cardiomegaly           Assessment/Plan  * Acute respiratory failure with hypoxia and hypercapnia (HCC)  Assessment & Plan  Patient has failed extubation twice in house and is currently has a trach in place.  Continue to aggressively manage his underlying COPD  Status post completed therapy for his pneumonia  Follow volume status closely  Continue O2 and RT protocols  Mobilize  9/27: Trach change out mid next week. Referral was sent to LT[facility]. Awaiting acceptance        Pneumonia  Assessment & Plan  Grew MSSA in sputum.  Completed a course of antibiotics.    Paroxysmal atrial fibrillation (HCC)- (present on admission)  Assessment & Plan  Anticoagulation with rivaroxaban  Rate rhythm control with amiodarone  Patient had been on metoprolol as an outpatient      Right ventricular dilation- (present on admission)  Assessment & Plan  Severe on echocardiogram  RVSP 42 mmHg.  Continue with diuresis.      Hypophosphatemia  Assessment & Plan  Status post replacement    Hypomagnesemia  Assessment & Plan  Status post replacement  Follow intermittently    Hypokalemia  Assessment & Plan  Replaced and corrected.    Pulmonary hypertension (HCC)- (present on admission)  Assessment & Plan  Likely secondary to obstructive sleep apnea and COPD.  Continue with diuresis.    Major depression- (present on admission)  Assessment & Plan   Cymbalta        VTE prophylaxis: Rivaroxaban

## 2020-09-27 NOTE — CARE PLAN
Problem: Safety  Goal: Will remain free from injury  Outcome: PROGRESSING AS EXPECTED  Note: Fall precautions in use, bed alarm or chair alarm in use, patient calls for help when needed, bed in low position.      Problem: Respiratory:  Goal: Respiratory status will improve  Outcome: PROGRESSING AS EXPECTED  Note: Trach patent to trach collar. Patient tolerating well. Education done regarding trach care and trach suctioning.

## 2020-09-27 NOTE — PROGRESS NOTES
Patient transported to T401. All belongings taken with patient (black elinor pack, clothes, shoes, wrist watch). Bed side report given to receiving RN.

## 2020-09-27 NOTE — PROGRESS NOTES
Assumed care of pt at this time. Pt is A&O x4. Denies pain this morning. Pt has laith in R martín clark NPO provided pt this education and he nodded his head. Tach assessed. Bed alarm in use. Pt educated to call for assistance. Hourly rounding in place.

## 2020-09-27 NOTE — PROGRESS NOTES
1845: Report received from previous day shift RN. Patient care assumed at this time. Patient currently up in chair. Trach patent to trach collar. Patient tolerating well. Cardiac monitor- Afib, VSS. Will continue to monitor patient.  2000: Complete assessment done per flow sheet, see assessment for further details. ADL's per flow sheet.   2102: Care plan and education done per flow sheet.   2200: ADL's per flow sheet  0000: ADL's per flow sheet  0200: ADL's per flow sheet  0253: Hospitalist paged regarding another PO pain medication, patient complaining of pain at this time. Awaiting call back.  0256: Kalee Cobb NP returned paged. Orders being received.   0300: Patient back to bed at this time  0400: ADL's per flow sheet.   0600: ADL's per flow sheet  0645: Report given to oncoming day shift RN. Patient care assumed by day shift at this time. All questions answered.

## 2020-09-27 NOTE — CARE PLAN
Problem: Communication  Goal: The ability to communicate needs accurately and effectively will improve  Outcome: PROGRESSING AS EXPECTED  Intervention: Clare patient and significant other/support system to call light to alert staff of needs  Note: Encouraged patient to voice needs and concerns, pt nodded head.      Problem: Discharge Barriers/Planning  Goal: Patient's continuum of care needs will be met  Outcome: PROGRESSING AS EXPECTED  Intervention: Assess potential discharge barriers on admission and throughout hospital stay  Note: Patient updated on plan of care and discharge plan.

## 2020-09-27 NOTE — CARE PLAN
Problem: Ventilation Defect:  Goal: Ability to achieve and maintain unassisted ventilation or tolerate decreased levels of ventilator support  Outcome: MET

## 2020-09-28 NOTE — CARE PLAN
Problem: Communication  Goal: The ability to communicate needs accurately and effectively will improve  Outcome: PROGRESSING AS EXPECTED     Problem: Safety  Goal: Will remain free from injury  Outcome: PROGRESSING AS EXPECTED     Problem: Bowel/Gastric:  Goal: Normal bowel function is maintained or improved  Outcome: PROGRESSING AS EXPECTED     Problem: Knowledge Deficit  Goal: Knowledge of disease process/condition, treatment plan, diagnostic tests, and medications will improve  Outcome: PROGRESSING AS EXPECTED     Problem: Respiratory:  Goal: Respiratory status will improve  Outcome: PROGRESSING AS EXPECTED     Problem: Skin Integrity  Goal: Risk for impaired skin integrity will decrease  Outcome: PROGRESSING AS EXPECTED     Problem: Pain Management  Goal: Pain level will decrease to patient's comfort goal  Outcome: PROGRESSING AS EXPECTED

## 2020-09-28 NOTE — RESPIRATORY CARE
Patient on Trache collar 4L/28%  Tolerated suction well. Deep breathing and coughing/ self suction tried.

## 2020-09-28 NOTE — CARE PLAN
Problem: Safety  Goal: Will remain free from injury  Outcome: PROGRESSING AS EXPECTED     Problem: Infection  Goal: Will remain free from infection  Outcome: PROGRESSING AS EXPECTED     Problem: Venous Thromboembolism (VTW)/Deep Vein Thrombosis (DVT) Prevention:  Goal: Patient will participate in Venous Thrombosis (VTE)/Deep Vein Thrombosis (DVT)Prevention Measures  Outcome: PROGRESSING AS EXPECTED     Problem: Skin Integrity  Goal: Risk for impaired skin integrity will decrease  Outcome: PROGRESSING AS EXPECTED     Problem: Pain Management  Goal: Pain level will decrease to patient's comfort goal  Outcome: PROGRESSING AS EXPECTED

## 2020-09-28 NOTE — PROGRESS NOTES
"0345: Pt stated \"I have chest pain\"    Confirmed it was new onset and intense in nature    Pt has hx of paroxysmal A-fib   0347: APRN hospitalist Kalee notified   0349: New orders for Troponin and stat EKG placed   0403: A-fib confirmed with EKG   0408: No new orders at this time   "

## 2020-09-28 NOTE — PROGRESS NOTES
"Report received. Assessment complete. Pt A&O x4    Vital Signs: /68   Pulse 71   Temp 36.7 °C (98.1 °F) (Temporal)   Resp 18   Ht 1.727 m (5' 8\")   Wt 70.8 kg (156 lb 1.4 oz)   SpO2 94%   BMI 23.73 kg/m²     Pain: Patient has 0/10 pain. Pain managed with prescribed medications as needed.    Neuro: Denies numbness/tingling    Cardiac: Denies chest pain. Irregular HR    Vascular pulses +2 BUE, BLE. +3 pitting edema BLE    Respiratory:  On 4L tracheostomy w mask. Suction at bedside. Sating 90%<. Reinforced education on IS.    GI: Patient passing flatus, last BM 9/28 in commode. Patient strict NPO. R cortrak in place running impact at goal of 55.  Denies N/V.    : Patient voiding adequately.    MSK: Patient ambulates with SB assist. Bed alarm NA. Patient calling appropriately    Skin: Loose, poor turgor. Pressure injury noted on coccyx.  BLE pitting edema +3    Discharge Barriers/Plan: Pending medical clearance and placement    Fall precautions in place. Treaded socks on. Bed locked in lowest position. Call light and personal belongings at bedside within reach. Reinforced use of call light to patient. Hourly rounding in place.  All needs met at this time and POC discussed.     "

## 2020-09-28 NOTE — PROGRESS NOTES
Pt is A+Ox4   Complains of 6/10 pain; pt repositioned for comfort     Strict NPO per MD order   Impact Peptide running via cortrak @ 55 (goal)    Equipment changed   Pt tolerating feedings; denies N/V/D     +BM  +void     Tracheostomy in place    Site care completed, inner canula changed; pt tolerated well    Pt unable to tolerate speaking valve at this time; T-piece w/ inline suction placed by RT    4 liters O2     3+ pitting edema BLE   + numbness/tingling   Right nare cortrak; skin is CDI/pink   Sacrum is red/blanching; suspected small pressure injury developing    Q2 turns in place, waffle mattress on, pillows in use for support/positioning, barrier cream wipes  provided     Ambulating with minimal staff assistance; pivoting to the commode     Bed locked and in the lowest position, call light within reach, upper bed rails raised, all needs met at this time   Daily weights in place     Hourly rounding in place

## 2020-09-28 NOTE — DISCHARGE PLANNING
Care Transition Team Discharge Planning                   Discharge Plan:  LTACH    Per Rounds discussion with Dr Solomon, Pt needs to discharge to an LTACH.    Pt has Medical and states he is from [facility].    Pt requested that I notify his Sister, Jessie Vann. Contacted Jessie thrice but the number In Demographics belongs to a different person.    Will send referrals to LTACH in [place].    Pt is also pending acceptance at [facility].   Will follow and assist as needed.

## 2020-09-28 NOTE — THERAPY
Speech Language Pathology  Daily Treatment     Patient Name: Jas Vann  Age:  54 y.o., Sex:  male  Medical Record #: [id number]  Today's Date: 9/28/2020     Precautions: Fall Risk, Nasogastric Tube, Tracheostomy   Comments: Speaking valve with RT, RN or Therapy only with supervision    Assessment    Patient seen for speaking valve trial today.  He was awake, alert and sitting up in chair.  He did have nasal cannula on, but given trache and Trache collar for FiO2, nasal cannula is not needed (RT in agreement, and nasal cannula removed).  Cuff was already deflated and Pt with some leak speech around trache.  He did have some audible congestion and suction provided (tracheal and subglottic) with minimal secretions noted.  Oral suction did yield moderate amount of secretions that were in the upper airway.  Speaking valve was placed.  Voice was noted to be intermittently gurgly, but would clear after patient coughed and suctioned.  Intensity was fair, but speech was intelligible. Pt tolerated speaking valve in line with T-Piece with FiO2 at 28% for 25 minutes before he reported feeling dizzy and light headed.  Saturations remained in the low to mid 90s, but with dizziness HR went from the 80s to the 120s and RR from the 20s to the 40s.  Valve was removed and patient reported that he felt better.  He wanted to rest.  Would recommend increased use of speaking valve with close monitoring from RT, RN or therapies. Per RT, cuff has remained deflated for the last 3 days, so cuff was left deflated.  Also, once patient is able to have trache downsize, consider cuffless trache.   Would like to proceed with blue dye swallow evaluation tomorrow.  Anticipate that with increased valve use and initiation of dysphagia therapy, this may assist with better upper airway secretion management.  Obtained orders from Dr. Solomon.  SLP will follow.  RN and RT updated.  Per RT, cuff has remained deflated for the last 3 days.  Also, once  patient is able to have trache downsize, consider cuffless trache.      Plan    1) Speaking valve with RN, RT or SLP/therapies with close supervision and monitoring.  2) Blue Dye swallow evaluation tomorrow.    Continue current treatment plan.    Discharge Recommendations: Recommend post-acute placement for additional speech therapy services prior to discharge home    Objective       09/28/20 1228   Vitals   O2 (LPM) 4   O2 Delivery Device Tracheal Mask   Pain 0 - 10 Group   Therapist Pain Assessment 0;Nurse Notified;Post Activity Pain Same as Prior to Activity   Voice   Modulating Loudness Minimal (4)   Respiration Training Minimal (4)   Skilled Intervention Compensatory Strategies;Verbal Cueing   Comments Speaking valve tolerated for 25 minutes before patient started getting dizzy and HR into the 120s--see notes   Dysphagia    Nutritional Liquid Intake Rating Scale Nothing by mouth   Nutritional Food Intake Rating Scale Nothing by mouth   Comments Requested order from MD to complete blue dye swallow tomorrow.   Recommended Route of Medication Administration   Medication Administration  Via Gastric Tube   Other Treatments   Other Treatments Provided Speaking valve trial   Short Term Goals   Short Term Goal # 1 MODIFIED ON 9/28: Patient will tolerate the speaking valve for >30 minutes INTERVALS with no significant change in vitals given close supervision from RT, RN, Therapy   Goal Outcome # 1 Progressing as expected   Anticipated Discharge Needs   Discharge Recommendations Recommend post-acute placement for additional speech therapy services prior to discharge home   Therapy Recommendations Upon DC Expression Training;Dysphagia Training;Patient / Family / Caregiver Education;Community Re-Integration

## 2020-09-28 NOTE — PROGRESS NOTES
"Report received. Assessment complete. Pt A&O x4    Vital Signs: /73   Pulse 89   Temp 36.6 °C (97.8 °F) (Temporal)   Resp (!) 22   Ht 1.727 m (5' 8\")   Wt 70.8 kg (156 lb 1.4 oz)   SpO2 97%   BMI 23.73 kg/m²     Pain: Patient has 0/10 pain. Pain managed with prescribed medications as needed.    Neuro: Denies numbness/tingling    Cardiac: Denies chest pain. Regular heart sounds. Tachycardic 120s with movement    Vascular pulses +2 BUE, BLE. +3 pitting edema BLE    Respiratory:  On 4L tracheostomy w mask. +ronchi. Suction at bedside. Sating 90%<. Reinforced education on IS.    GI: Patient passing flatus, last BM 9/27 in commode. Patient strict NPO. R cortrak in place running impact at goal of 55.  Denies N/V.    : Patient voiding adequately.    MSK: Patient ambulates with SB assist. Bed alarm on. Patient calling appropriately    Skin: Loose, poor turgor. Pressure injury noted on coccyx.  BLE pitting edema +3    Discharge Barriers/Plan: Pending medical clearance and placement    Fall precautions in place. Treaded socks on. Bed locked in lowest position. Call light and personal belongings at bedside within reach. Reinforced use of call light to patient. Hourly rounding in place.  All needs met at this time and POC discussed.     "

## 2020-09-28 NOTE — PROGRESS NOTES
2 RN skin check complete with Kerry BREEN    Skin assessment  -Fragile, loose turgor.  -R elbow red, blanching  -Single suture on left side noted  -Pressure ulcer found on coccyx.  -BLE 3+ pitting edema  -Bilateral heels red, blanching    Devices in place  -glasses  -nasal cannula  -R cortrak  -trachesotomy    Interventions  -grey ear cushions on NC  -Sacral mepilex  -Tube feed  -waffle mattress  -Q2 turns

## 2020-09-28 NOTE — DISCHARGE PLANNING
Care Transition Team Discharge Planning                   Discharge Plan:  SNF vs LTACH    Per RN Eladio ,she is educating Pt on doing Trach care and it is still a work in progress. [facility] cannot accept Pt until Pt can do Trach care  independently.     Will follow and assist as needed.     15:54 This RN RENO spoke with Courtney and faxed Progress Notes to her at [facility] at F [phone].  as requested. Will follow and assist as needed.

## 2020-09-28 NOTE — PROGRESS NOTES
"Hospital Medicine Daily Progress Note    Date of Service  9/28/2020    Chief Complaint  54 y.o. male admitted 9/9/2020 with respiratory failure    Hospital Course    This is a 54-year-old male who presented for evaluation on September 9 after being transferred from [facility] in [place].  he had been been intubated there after having had saturations on room air of 70%.  he was given PIP Tazocin, and Levaquin and transferred to our facility.  His COVID was negative here she was found to be borderline hypotensive with systolics around 100, and required norepinephrine.  he was admitted to the ICU  he was eventually extubated however developed worsening hypercarbic respiratory failure on September 17, and required reintubation.  he was then extubated on September 20, but again worsened requiring reintubation, and then subsequently had a tracheostomy on September 23        Interval Problem Update  Review of systems is limited by the patient's trach  Reports some discomfort around the site of his trach but states it is \"a little\"  Having significant problems with secretions and requiring suction every 1-2 hours.    Consultants/Specialty      Code Status  Full Code    Disposition  long-term acute care hospital discharge    Review of Systems  Review of Systems   Unable to perform ROS: Other   Constitutional: Negative for chills and fever.   HENT: Negative for nosebleeds and sore throat.    Eyes: Negative for blurred vision and double vision.   Respiratory: Positive for sputum production. Negative for cough and shortness of breath.    Cardiovascular: Negative for chest pain, palpitations and leg swelling.   Gastrointestinal: Negative for abdominal pain, diarrhea, nausea and vomiting.   Genitourinary: Negative for dysuria and urgency.   Musculoskeletal: Negative for back pain.   Skin: Negative for rash.   Neurological: Negative for dizziness, loss of consciousness and headaches.        Physical Exam  Temp:  [36.3 " °C (97.3 °F)-37.2 °C (99 °F)] 36.7 °C (98.1 °F)  Pulse:  [] 86  Resp:  [17-51] 18  BP: (106-138)/(68-85) 138/68  SpO2:  [89 %-99 %] 96 %    Physical Exam  Vitals signs reviewed.   Constitutional:       General: He is not in acute distress.     Appearance: He is well-developed and underweight. He is not diaphoretic.   HENT:      Head: Normocephalic and atraumatic.   Eyes:      Conjunctiva/sclera: Conjunctivae normal.   Neck:      Vascular: No JVD.      Comments: Trach noted  Cardiovascular:      Rate and Rhythm: Normal rate.      Heart sounds: Murmur present. No gallop.    Pulmonary:      Effort: Pulmonary effort is normal. No respiratory distress.      Breath sounds: No stridor. Rhonchi present. No wheezing or rales.   Abdominal:      Palpations: Abdomen is soft.      Tenderness: There is no abdominal tenderness. There is no guarding or rebound.   Musculoskeletal:         General: No tenderness.      Right lower leg: No edema.      Left lower leg: No edema.   Skin:     General: Skin is warm and dry.      Findings: No rash.   Neurological:      Mental Status: He is alert and oriented to person, place, and time.   Psychiatric:         Thought Content: Thought content normal.         Fluids    Intake/Output Summary (Last 24 hours) at 9/28/2020 0707  Last data filed at 9/28/2020 0700  Gross per 24 hour   Intake 420 ml   Output 1625 ml   Net -1205 ml       Laboratory  Recent Labs     09/26/20  0645 09/27/20  0528 09/28/20  0555   WBC 8.0 6.9 7.0   RBC 3.01* 2.89* 3.02*   HEMOGLOBIN 8.6* 8.3* 8.5*   HEMATOCRIT 29.6* 29.0* 28.9*   MCV 98.3* 100.3* 95.7   MCH 28.6 28.7 28.1   MCHC 29.1* 28.6* 29.4*   RDW 51.2* 50.7* 49.5   PLATELETCT 544* 657* 640*   MPV 9.6 9.6 9.3     Recent Labs     09/26/20  0645 09/27/20  0528   SODIUM 140 138   POTASSIUM 3.8 4.5   CHLORIDE 106 104   CO2 26 28   GLUCOSE 120* 101*   BUN 16 18   CREATININE 0.28* 0.30*   CALCIUM 7.8* 8.0*                   Imaging  DX-ABDOMEN FOR TUBE PLACEMENT    Final Result      Enteric tube terminates over the expected position of the cardiac region of the stomach favored over distal esophagus      DX-CHEST-PORTABLE (1 VIEW)   Final Result      Worsening left basilar atelectasis, pleural fluid      Otherwise stable chest with right basilar scarring, pleural fluid and probable consolidation      DX-CHEST-PORTABLE (1 VIEW)   Final Result      No significant interval change.         DX-CHEST-PORTABLE (1 VIEW)   Final Result      No significant interval change.      DX-CHEST-PORTABLE (1 VIEW)   Final Result      Stable chest findings compared with 9/21.      DX-CHEST-PORTABLE (1 VIEW)   Final Result      Grossly stable chest appearance compared with 9/20. Direct comparison is hampered due to rotational factors on the 9/20 image.      DX-CHEST-PORTABLE (1 VIEW)   Final Result      Grossly stable chest appearance compared with 9/19.      DX-ABDOMEN FOR TUBE PLACEMENT   Final Result      Feeding tube tip projects over the stomach.      DX-CHEST-PORTABLE (1 VIEW)   Final Result      Stable chest findings compared with 9/18.      DX-CHEST-PORTABLE (1 VIEW)   Final Result         1. Suboptimal study due to patient rotation.   2. Lines and tubes as above.   3. Ill-defined right basilar atelectasis versus consolidation. Possible right effusion.   4. Mild interstitial edema.         DX-CHEST-FOR LINE PLACEMENT Perform procedure in: Patient's Room   Final Result            1. A right central venous catheter with tip projects appropriately over the expected area of the superior vena cava.      DX-ABDOMEN FOR TUBE PLACEMENT   Final Result      Feeding tube tip overlies the upper gastric fundus.      DX-CHEST-PORTABLE (1 VIEW)   Final Result      1.  Cardiomegaly with bilateral pulmonary infiltrates.      2.  Stable bilateral pleural effusions.      3.  Endotracheal tube present.      DX-CHEST-LIMITED (1 VIEW)   Final Result      Perihilar and bibasilar opacities may represent a  combination of edema, atelectasis, pneumonia.      Trace pleural effusions are not excluded.      Interstitial prominence likely represents interstitial edema.         DX-ABDOMEN FOR TUBE PLACEMENT   Final Result      Feeding tube placement with the tip projecting over the distal stomach or duodenal bulb      EC-ECHOCARDIOGRAM COMPLETE W/O CONT   Final Result      OUTSIDE IMAGES-DX CHEST   Final Result      US-FOREIGN FILM ULTRASOUND   Final Result      OUTSIDE IMAGES-DX CHEST   Final Result      OUTSIDE IMAGES-US ABDOMEN   Final Result      OUTSIDE IMAGES-CT CHEST   Final Result      DX-CHEST-PORTABLE (1 VIEW)   Final Result         1.  Pulmonary edema and/or infiltrates are identified, which are stable since the prior exam.   2.  Trace bilateral pleural effusion   3.  Cardiomegaly      DX-CHEST-LIMITED (1 VIEW)   Final Result         1.  Pulmonary edema and/or infiltrates.   2.  Cardiomegaly      DX-CHEST-LIMITED (1 VIEW)   Final Result         1.  Pulmonary edema and/or infiltrates are identified, which are stable since the prior exam.   2.  Small bilateral pleural effusions   3.  Cardiomegaly           Assessment/Plan  * Acute respiratory failure with hypoxia and hypercapnia (HCC)  Assessment & Plan  Patient has failed extubation twice in house and is currently has a trach in place.  Continue to aggressively manage his underlying COPD  Status post completed therapy for his pneumonia  Follow volume status closely  Continue O2 and RT protocols  Mobilize  9/27: Trach change out mid next week. Referral was sent to LTAC. Awaiting acceptance       Pneumonia  Assessment & Plan  Grew MSSA in sputum.  Completed a course of antibiotics.    Paroxysmal atrial fibrillation (HCC)- (present on admission)  Assessment & Plan  Anticoagulation with rivaroxaban  Rate rhythm control with amiodarone  Patient had been on metoprolol as an outpatient      Right ventricular dilation- (present on admission)  Assessment & Plan  Severe on  echocardiogram  RVSP 42 mmHg.  Has reached likely ideal dry weight.  Currently on maintenance p.o. Lasix  Continue to follow volume status closely      Hypophosphatemia  Assessment & Plan  Status post replacement    Hypomagnesemia  Assessment & Plan  Status post replacement  Follow intermittently    Hypokalemia  Assessment & Plan  Replaced and corrected.    Pulmonary hypertension (HCC)- (present on admission)  Assessment & Plan  Likely secondary to obstructive sleep apnea and COPD.      Major depression- (present on admission)  Assessment & Plan   Cymbalta        VTE prophylaxis: Rivaroxaban

## 2020-09-28 NOTE — DISCHARGE PLANNING
Agency/Facility Name: [facility]   Outcome:  Left voicemail for admissions regarding referral. Requested call back.   RN RENO notified.

## 2020-09-28 NOTE — DISCHARGE PLANNING
Care Transition Team Discharge Planning                   Discharge Plan:  TBD    Per Rounds discussion with Dr Solomon, Pt is a candidate for LTACH. [place] texted Dr Solomon to put in the order/referral.    Will speak with Pt on choice.  Will follow and assist as needed.

## 2020-09-29 PROBLEM — R13.10 DYSPHAGIA: Status: ACTIVE | Noted: 2020-01-01

## 2020-09-29 PROBLEM — I95.9 HYPOTENSION: Status: RESOLVED | Noted: 2020-01-01 | Resolved: 2020-01-01

## 2020-09-29 NOTE — RESPIRATORY CARE
Educated the patient on how to clear secretions in his trach using suction catheter. Patient was able to perform it himself and do well.  I also educated him on how to change his inner cannula. Patient was able to perform it himself and did well.

## 2020-09-29 NOTE — THERAPY
"Speech Language Pathology   Clinical Swallow Evaluation     Patient Name: Jas Vann  AGE:  54 y.o., SEX:  male  Medical Record #: [id number]  Today's Date: 9/29/2020     Precautions  Precautions: (P) Fall Risk, Tracheostomy , Swallow Precautions ( See Comments), Nasogastric Tube  Comments: (P) Speaking valve with RT, RN or Therapy only with supervision. BLUE DYE COMPLETED 9/29 14:20 24 HOUR MONITORING IN PLACE    Assessment  Patient is a 55 y/o M admitted 9/9/20 from [place], [place] with respiratory failure, sepsis and PNA. Pt has been intubated 3x and has now been trached. CXR 9/25: \"Worsening left basilar atelectasis, pleural fluid. Otherwise stable chest with right basilar scarring, pleural fluid and probable consolidation.\" PMHx includes Afib, GERD, HTN, psychiatric problem.    Pt seen this date for clinical swallow evaluation. Pt with cuffed Portex 8.0 and trach collar in place at 28% at 4L FiO2. Pt seen sitting up in chair, cuff was noted to be deflated upon entering room, full deflation verified prior to placement of speaking valve. Oral mech exam completed, no gross oral motor deficits appreciated. Pt is edentulous with no dentures. Sub glottic and deep tracheal suctioning completed prior to placement of speaking valve, min clear secretions removed from sub glottic port. Speaking valve placed. Pt tolerated placement of valve, however, fatigued as session progressed. PO trials of blue-tinged ice chips x5 assessed. Intermittent wet vocal quality and delayed wet and gurgly vocal quality appreciated, which is concerning for possible pharyngeal residue and penetration/aspiration. No blue dye suctioned from sub glottic port during trials. Pt desatted x1 during placement of valve and PO trials. Pt tolerated speaking valve placement for 22 minutes. Speaking valve was removed, cuff was left deflated, and sub glottic and tracheal suctioning were completed, no blue dye was removed via suctioning. Although no blue dye was " suctioned, penetration/aspiration cannot be ruled out.     Pt is at risk for aspiration given wet vocal quality, throat clearing and respiratory status. Given prolonged NPO status and fatiguability recommend dysphagia intervention and PO trials prior to consideration of a diagnostic swallow study. RN and RT aware of session and 24 hour blue dye monitoring. SLP following.       Plan  1) Continue NPO/TF   2) Please monitor tracheal and subglottic suctioning for blue dye for 24 hours (Blue Dye Sign Posted)  3) Prefeeding trials with SLP only   4) Speaking valve with RN, RT or SLP/therapies with close supervision and monitoring.    Recommend Speech Therapy 3 times per week until therapy goals are met for the following treatments:  Dysphagia Training, Expression Training and Patient / Family / Caregiver Education.    Discharge Recommendations: (P) Recommend post-acute placement for additional speech therapy services prior to discharge home    Subjective    Pt was awake, followed directions and participated fully. Pt increasingly fatigued with progression of session.      Objective       09/29/20 1420   Oral Motor Eval    Is Patient Able to Complete Oral Motor Eval Yes but Impaired   Labial Function   Labial Structure At Rest Within Functional Limits   Labial Vowel Production / I /, / U / Within Functional Limits   Labial Sequence / I /, / U / Within Functional Limits   Frown, Pucker Within Functional Limits   Lingual Function   Lingual Structure At Rest Within Functional Limits   Lingual Protrude Within Functional Limits   Lingual Retract Within Functional Limits   Elevate In Mouth Within Functional Limits   Elevate Outside Mouth Within Functional Limits   Lateralization No Impairment Right;No Impairment Left   / Pa / 5X's Within Functional Limits   / Ta / 5X's Within Functional Limits   / Ka / 5X's Within Functional Limits   / [place] / 5X's Within Functional Limits   Jaw   Jaw Structure At Rest Within Functional Limits  "  Bite ([place]) Not Tested   Chew ([place]) Not Tested   Velar Function   / A / Prolonged Minimal   Laryngeal Function   Voice Quality Minimal   Volutional Cough Minimal   Excursion Upon Swallow Complete   Max Phonation Time (Seconds) 3-4   Oral Food Presentation   Ice Chips Moderate  (delayed wet gurgly voice, intermittent throat clearing)   Self Feeding Needs Assistance   Tracheostomy   Tracheostomy  Yes   Type of Airway Portex Trach   Size of Airway 8.0   Tracheostomy Cuff Deflated   Speaking Valve Placement Yes   Respiratory Support Trach Collar / Mask   Blue Dye Test In Progress, Monitor for 24 Hours   Dysphagia Strategies / Recommendations   Strategies / Interventions Recommended (Yes / No) Yes   Compensatory Strategies To Be Assessed   Diet / Liquid Recommendation NPO;Pre-Feeding Trials with SLP Only   Medication Administration  Via Gastric Tube   Therapy Interventions Dysphagia Therapy By Speech Language Pathologist;Oral Motor Exercises;Pharyngeal / Laryngeal Exercises   Dysphagia Rating   Nutritional Liquid Intake Rating Scale Nothing by mouth   Nutritional Food Intake Rating Scale Nothing by mouth   Patient / Family Goals   Patient / Family Goal #1 NEW 9/25: \"To be able to eat and drink water and soda.\"   Goal #1 Outcome Progressing slower than expected   Short Term Goals   Short Term Goal # 1 MODIFIED ON 9/28: Patient will tolerate the speaking valve for >30 minutes INTERVALS with no significant change in vitals given close supervision from RT, RN, Therapy   Goal Outcome # 1 Progressing as expected     Short Term Goal #2    Pt will participate in prefeeding trials 1:1 with SLP with no clinical         s/sx of aspiration and min cues.     "

## 2020-09-29 NOTE — PROGRESS NOTES
Pt is A+Ox4   Complains of 5/10 pain; medicate per MAR      Strict NPO per MD order   Impact Peptide running via [email or web address] @ 55 (goal)               Equipment changed              Pt tolerating feedings; denies N/V/D      +BM  +void      Tracheostomy in place               Pt unable to tolerate speaking valve at this time; frequent suctioning required               4 liters O2    Pt being encouraged to complete tracheostomy care independently; pt nodded after education    Emergency equipment and extra canulas at bedside      3+ pitting edema BLE   + numbness/tingling BLE   Right nare cortrak; skin is CDI/pink   Sacrum is red/blanching; suspected small pressure injury noted              Q2 turns in place, waffle mattress on, pillows in use for support/positioning, barrier cream wipes   provided                Ambulating with minimal staff assistance; pivoting to the commode      Bed locked and in the lowest position, call light within reach, upper bed rails raised, all needs met at this time   Daily weights in place

## 2020-09-29 NOTE — DISCHARGE PLANNING
Agency/Facility Name: [facility]   Outcome: Faxed pt progress notes to [facility] per RN CM request.

## 2020-09-29 NOTE — PROGRESS NOTES
[facility] Medicine Daily Progress Note    Date of Service  9/29/2020    Chief Complaint  54 y.o. male admitted 9/9/2020 with shortness of breath    Hospital Course     This is a 54-year-old male who presented for evaluation on September 9 after being transferred from [facility] in [place].  he had been been intubated there after having had saturations on room air of 70%.  he was given PIP Tazocin, and Levaquin and transferred to our facility.  His COVID was negative here she was found to be borderline hypotensive with systolics around 100, and required norepinephrine.  he was admitted to the ICU  he was eventually extubated however developed worsening hypercarbic respiratory failure on September 17, and required reintubation.  he was then extubated on September 20, but again worsened requiring reintubation, and then subsequently had a tracheostomy on September 23    Patient continues to do well. We are pending placement once he has had adequate trach care education from RT. Pending swallow study and possible discontinuation of NGT and tube feeds.      Interval Problem Update  The patient was seen and examined at bedside this AM and states that he is feeling well. He is tolerating tube feeds and having regular bowel movements. He denies any chest pain, palpitations, or shortness of breath. No other overnight events reported by nursing staff.        Consultants/Specialty  Pulmonology    Code Status  Full Code    Disposition  Pending placement to LTAC. Pending RT for trach care education and speech therapy for swallow study.    Review of Systems  Review of Systems   Constitutional: Negative for chills, fever and malaise/fatigue.   HENT: Negative for congestion, hearing loss, sore throat and tinnitus.    Eyes: Negative for blurred vision, double vision, photophobia and discharge.   Respiratory: Negative for cough, hemoptysis, sputum production, shortness of breath and wheezing.    Cardiovascular: Negative for  chest pain, palpitations, orthopnea, claudication and leg swelling.   Gastrointestinal: Negative for abdominal pain, blood in stool, diarrhea, heartburn, melena, nausea and vomiting.   Genitourinary: Negative for dysuria, flank pain, hematuria and urgency.   Musculoskeletal: Negative for back pain, joint pain and myalgias.   Skin: Negative for itching and rash.   Neurological: Negative for dizziness, sensory change, speech change, weakness and headaches.   Endo/Heme/Allergies: Does not bruise/bleed easily.   Psychiatric/Behavioral: Negative for depression and suicidal ideas.        Physical Exam  Temp:  [36.2 °C (97.1 °F)-36.9 °C (98.5 °F)] 36.2 °C (97.2 °F)  Pulse:  [68-94] 94  Resp:  [16-24] 20  BP: (116-146)/(69-85) 122/69  SpO2:  [90 %-99 %] 99 %    Physical Exam  Vitals signs reviewed.   Constitutional:       Appearance: Normal appearance. He is obese.   HENT:      Head: Normocephalic and atraumatic.      Nose: No congestion or rhinorrhea.      Mouth/Throat:      Mouth: Mucous membranes are moist.      Pharynx: Oropharynx is clear.   Eyes:      General: No scleral icterus.     Extraocular Movements: Extraocular movements intact.      Conjunctiva/sclera: Conjunctivae normal.      Pupils: Pupils are equal, round, and reactive to light.   Neck:      Musculoskeletal: Neck supple. No neck rigidity or muscular tenderness.   Cardiovascular:      Rate and Rhythm: Normal rate and regular rhythm.      Heart sounds: No murmur. No friction rub. No gallop.    Pulmonary:      Effort: Pulmonary effort is normal. No respiratory distress.      Breath sounds: Normal breath sounds. No stridor. No wheezing, rhonchi or rales.   Abdominal:      General: Abdomen is flat. Bowel sounds are normal. There is no distension.      Palpations: Abdomen is soft. There is no mass.      Tenderness: There is no abdominal tenderness. There is no guarding or rebound.   Musculoskeletal:         General: No swelling, tenderness or deformity.    Lymphadenopathy:      Cervical: No cervical adenopathy.   Skin:     General: Skin is warm and dry.      Findings: No erythema or rash.   Neurological:      General: No focal deficit present.      Mental Status: He is alert and oriented to person, place, and time. Mental status is at baseline.      Cranial Nerves: No cranial nerve deficit.      Sensory: No sensory deficit.      Motor: No weakness.   Psychiatric:         Mood and Affect: Mood normal.         Behavior: Behavior normal.         Thought Content: Thought content normal.         Fluids    Intake/Output Summary (Last 24 hours) at 9/29/2020 1405  Last data filed at 9/29/2020 1330  Gross per 24 hour   Intake 720 ml   Output 1375 ml   Net -655 ml       Laboratory  Recent Labs     09/27/20 0528 09/28/20  0555 09/29/20  0844   WBC 6.9 7.0 8.8   RBC 2.89* 3.02* 3.27*   HEMOGLOBIN 8.3* 8.5* 9.1*   HEMATOCRIT 29.0* 28.9* 31.9*   .3* 95.7 97.6   MCH 28.7 28.1 27.8   MCHC 28.6* 29.4* 28.5*   RDW 50.7* 49.5 51.4*   PLATELETCT 657* 640* 809*   MPV 9.6 9.3 9.4     Recent Labs     09/27/20 0528 09/28/20  0555 09/29/20  0844   SODIUM 138 138 140   POTASSIUM 4.5 3.9 3.8   CHLORIDE 104 104 102   CO2 28 27 29   GLUCOSE 101* 96 91   BUN 18 15 18   CREATININE 0.30* 0.22* 0.33*   CALCIUM 8.0* 8.2* 8.8                   Imaging  DX-ABDOMEN FOR TUBE PLACEMENT   Final Result      Enteric tube terminates over the expected position of the cardiac region of the stomach favored over distal esophagus      DX-CHEST-PORTABLE (1 VIEW)   Final Result      Worsening left basilar atelectasis, pleural fluid      Otherwise stable chest with right basilar scarring, pleural fluid and probable consolidation      DX-CHEST-PORTABLE (1 VIEW)   Final Result      No significant interval change.         DX-CHEST-PORTABLE (1 VIEW)   Final Result      No significant interval change.      DX-CHEST-PORTABLE (1 VIEW)   Final Result      Stable chest findings compared with 9/21.       DX-CHEST-PORTABLE (1 VIEW)   Final Result      Grossly stable chest appearance compared with 9/20. Direct comparison is hampered due to rotational factors on the 9/20 image.      DX-CHEST-PORTABLE (1 VIEW)   Final Result      Grossly stable chest appearance compared with 9/19.      DX-ABDOMEN FOR TUBE PLACEMENT   Final Result      Feeding tube tip projects over the stomach.      DX-CHEST-PORTABLE (1 VIEW)   Final Result      Stable chest findings compared with 9/18.      DX-CHEST-PORTABLE (1 VIEW)   Final Result         1. Suboptimal study due to patient rotation.   2. Lines and tubes as above.   3. Ill-defined right basilar atelectasis versus consolidation. Possible right effusion.   4. Mild interstitial edema.         DX-CHEST-FOR LINE PLACEMENT Perform procedure in: Patient's Room   Final Result            1. A right central venous catheter with tip projects appropriately over the expected area of the superior vena cava.      DX-ABDOMEN FOR TUBE PLACEMENT   Final Result      Feeding tube tip overlies the upper gastric fundus.      DX-CHEST-PORTABLE (1 VIEW)   Final Result      1.  Cardiomegaly with bilateral pulmonary infiltrates.      2.  Stable bilateral pleural effusions.      3.  Endotracheal tube present.      DX-CHEST-LIMITED (1 VIEW)   Final Result      Perihilar and bibasilar opacities may represent a combination of edema, atelectasis, pneumonia.      Trace pleural effusions are not excluded.      Interstitial prominence likely represents interstitial edema.         DX-ABDOMEN FOR TUBE PLACEMENT   Final Result      Feeding tube placement with the tip projecting over the distal stomach or duodenal bulb      EC-ECHOCARDIOGRAM COMPLETE W/O CONT   Final Result      OUTSIDE IMAGES-DX CHEST   Final Result      US-FOREIGN FILM ULTRASOUND   Final Result      OUTSIDE IMAGES-DX CHEST   Final Result      OUTSIDE IMAGES-US ABDOMEN   Final Result      OUTSIDE IMAGES-CT CHEST   Final Result      DX-CHEST-PORTABLE (1  VIEW)   Final Result         1.  Pulmonary edema and/or infiltrates are identified, which are stable since the prior exam.   2.  Trace bilateral pleural effusion   3.  Cardiomegaly      DX-CHEST-LIMITED (1 VIEW)   Final Result         1.  Pulmonary edema and/or infiltrates.   2.  Cardiomegaly      DX-CHEST-LIMITED (1 VIEW)   Final Result         1.  Pulmonary edema and/or infiltrates are identified, which are stable since the prior exam.   2.  Small bilateral pleural effusions   3.  Cardiomegaly           Assessment/Plan  * Acute respiratory failure with hypoxia and hypercapnia (HCC)  Assessment & Plan  Patient has failed extubation twice in house and is currently has a trach in place.  Continue to aggressively manage his underlying COPD  Status post completed therapy for his pneumonia  Follow volume status closely  Continue O2 and RT protocols  Mobilize  9/27: Trach change out mid next week. Referral was sent to LTAC. Awaiting acceptance       Pneumonia  Assessment & Plan  Grew MSSA in sputum.  Completed a course of antibiotics.    Paroxysmal atrial fibrillation (HCC)- (present on admission)  Assessment & Plan  Anticoagulation with rivaroxaban  Rate rhythm control with amiodarone  Patient had been on metoprolol as an outpatient      Right ventricular dilation- (present on admission)  Assessment & Plan  Severe on echocardiogram  RVSP 42 mmHg.  Has reached likely ideal dry weight.  Currently on maintenance p.o. Lasix  Continue to follow volume status closely      Hypophosphatemia  Assessment & Plan  Status post replacement    Hypomagnesemia  Assessment & Plan  Status post replacement  Follow intermittently    Hypokalemia  Assessment & Plan  Replaced and corrected.    Pulmonary hypertension (HCC)- (present on admission)  Assessment & Plan  Likely secondary to obstructive sleep apnea and COPD.      Major depression- (present on admission)  Assessment & Plan   Cymbalta          VTE prophylaxis: on chronic  anticoagulation with apixaban

## 2020-09-29 NOTE — PROGRESS NOTES
Assumed care of pt.  AAOx4.  C/o mild abdominal pain, declines intervention at this time.  3+ BLE edema.  Strict NPO with continuous tube feeding running.   Right nare cortrak.  LBM this morning, + flatus, + void.  POC reviewed with pt.  Call light within reach, pt educated to call for assistance as needed.  Hourly rounding in place.

## 2020-09-29 NOTE — CARE PLAN
Problem: Communication  Goal: The ability to communicate needs accurately and effectively will improve  Outcome: PROGRESSING AS EXPECTED     Problem: Safety  Goal: Will remain free from injury  Outcome: PROGRESSING AS EXPECTED     Problem: Bowel/Gastric:  Goal: Normal bowel function is maintained or improved  Outcome: PROGRESSING AS EXPECTED     Problem: Knowledge Deficit  Goal: Knowledge of disease process/condition, treatment plan, diagnostic tests, and medications will improve  Outcome: PROGRESSING AS EXPECTED     Problem: Respiratory:  Goal: Respiratory status will improve  Outcome: PROGRESSING AS EXPECTED     Problem: Pain Management  Goal: Pain level will decrease to patient's comfort goal  Outcome: PROGRESSING AS EXPECTED

## 2020-09-29 NOTE — ASSESSMENT & PLAN NOTE
Patient is currently on tube feeds via NGT  Pending speech eval/swallow study to determine further tube feeding requirements.  Discontinue NGT if speech recommends  Continued speech therapy on outpatient basis. Case management is working with outside facility to continue speech therapy.

## 2020-09-29 NOTE — PROGRESS NOTES
Educated patient on trach care and suctioning. Patient successfully demonstrated trach care and suctioned self independently. Will continue encouraging independence.   Working on using speaking valve with patient when appropriate. Patient tolerating better. Productive cough noted.

## 2020-09-29 NOTE — DISCHARGE PLANNING
Care Transition Team Discharge Planning                   Discharge Plan:  SNF    This RN RENO met with Pt at bedside today and explained that we sent the latest Respiratory notes and MD;s progress notes to Admissions at [facility].  This RN RENO explained to Pt that Speech Therapy will see him and will also send that note.     Pt gave me another number to contact his Sister, Jessie at Tel [phone].   Will contact Pt's Sister and follow up,

## 2020-09-30 PROBLEM — D64.9 NORMOCYTIC ANEMIA: Status: ACTIVE | Noted: 2020-01-01

## 2020-09-30 NOTE — PROGRESS NOTES
Hospital Medicine Daily Progress Note    Date of Service  9/30/2020    Chief Complaint  54 y.o. male admitted 9/9/2020 with CAP    Hospital Course    This is a 54-year-old male who presented for evaluation on September 9 after being transferred from [facility] in [place].  he had been been intubated there after having had saturations on room air of 70%.  he was given PIP Tazocin, and Levaquin and transferred to our facility.  His COVID was negative here she was found to be borderline hypotensive with systolics around 100, and required norepinephrine.  he was admitted to the ICU  he was eventually extubated however developed worsening hypercarbic respiratory failure on September 17, and required reintubation.  he was then extubated on September 20, but again worsened requiring reintubation, and then subsequently had a tracheostomy on September 23     Patient continues to do well. We are pending placement once he has had adequate trach care education from RT. Pending swallow study and possible discontinuation of NGT and tube feeds.      Patient has received adequate tracheostomy care instruction. Speech therapy recommending continued speech therapy on discharge.       Interval Problem Update  Patient continues to do well. No subjective complaints. No overnight events reported by nursing staff.     Consultants/Specialty  Pulmonology    Code Status  Full Code    Disposition  Pending placement to LTAC    Review of Systems  Review of Systems   Constitutional: Negative for chills and fever.   HENT: Negative for congestion and hearing loss.    Eyes: Negative for double vision and photophobia.   Respiratory: Negative for cough and sputum production.    Cardiovascular: Negative for chest pain, palpitations, orthopnea, claudication and leg swelling.   Gastrointestinal: Negative for abdominal pain, nausea and vomiting.   Genitourinary: Negative for dysuria and urgency.   Musculoskeletal: Negative for myalgias.    Skin: Negative for itching and rash.   Neurological: Negative for dizziness, sensory change, weakness and headaches.   Endo/Heme/Allergies: Does not bruise/bleed easily.   Psychiatric/Behavioral: Negative for depression.        Physical Exam  Temp:  [36.1 °C (97 °F)-37.1 °C (98.8 °F)] 36.9 °C (98.4 °F)  Pulse:  [60-96] 96  Resp:  [16-18] 18  BP: (112-127)/(72-81) 127/72  SpO2:  [94 %-98 %] 94 %    Physical Exam  Vitals signs reviewed.   Constitutional:       Appearance: Normal appearance. He is obese.   HENT:      Head: Normocephalic and atraumatic.      Nose: No congestion or rhinorrhea.      Mouth/Throat:      Mouth: Mucous membranes are moist.      Pharynx: Oropharynx is clear.   Eyes:      Extraocular Movements: Extraocular movements intact.      Conjunctiva/sclera: Conjunctivae normal.      Pupils: Pupils are equal, round, and reactive to light.   Neck:      Musculoskeletal: Neck supple. No neck rigidity or muscular tenderness.      Comments: Tracheostomy in place  Cardiovascular:      Rate and Rhythm: Normal rate and regular rhythm.      Heart sounds: No murmur. No friction rub. No gallop.    Pulmonary:      Effort: Pulmonary effort is normal. No respiratory distress.      Breath sounds: Normal breath sounds. No stridor.   Abdominal:      General: Abdomen is flat. Bowel sounds are normal. There is no distension.      Palpations: Abdomen is soft. There is no mass.      Tenderness: There is no abdominal tenderness.   Musculoskeletal:         General: No swelling, tenderness or deformity.   Skin:     General: Skin is warm and dry.   Neurological:      General: No focal deficit present.      Mental Status: He is alert and oriented to person, place, and time. Mental status is at baseline.   Psychiatric:         Mood and Affect: Mood normal.         Behavior: Behavior normal.         Thought Content: Thought content normal.         Fluids    Intake/Output Summary (Last 24 hours) at 9/30/2020 7166  Last data filed  at 9/30/2020 0805  Gross per 24 hour   Intake 310 ml   Output 1300 ml   Net -990 ml       Laboratory  Recent Labs     09/28/20  0555 09/29/20  0844 09/30/20  0509   WBC 7.0 8.8 7.8   RBC 3.02* 3.27* 3.18*   HEMOGLOBIN 8.5* 9.1* 8.9*   HEMATOCRIT 28.9* 31.9* 30.2*   MCV 95.7 97.6 95.0   MCH 28.1 27.8 28.0   MCHC 29.4* 28.5* 29.5*   RDW 49.5 51.4* 49.7   PLATELETCT 640* 809* 690*   MPV 9.3 9.4 8.9*     Recent Labs     09/28/20  0555 09/29/20  0844 09/30/20  0509   SODIUM 138 140 139   POTASSIUM 3.9 3.8 4.3   CHLORIDE 104 102 105   CO2 27 29 29   GLUCOSE 96 91 105*   BUN 15 18 17   CREATININE 0.22* 0.33* 0.31*   CALCIUM 8.2* 8.8 8.2*                   Imaging  DX-ABDOMEN FOR TUBE PLACEMENT   Final Result      Enteric tube terminates over the expected position of the cardiac region of the stomach favored over distal esophagus      DX-CHEST-PORTABLE (1 VIEW)   Final Result      Worsening left basilar atelectasis, pleural fluid      Otherwise stable chest with right basilar scarring, pleural fluid and probable consolidation      DX-CHEST-PORTABLE (1 VIEW)   Final Result      No significant interval change.         DX-CHEST-PORTABLE (1 VIEW)   Final Result      No significant interval change.      DX-CHEST-PORTABLE (1 VIEW)   Final Result      Stable chest findings compared with 9/21.      DX-CHEST-PORTABLE (1 VIEW)   Final Result      Grossly stable chest appearance compared with 9/20. Direct comparison is hampered due to rotational factors on the 9/20 image.      DX-CHEST-PORTABLE (1 VIEW)   Final Result      Grossly stable chest appearance compared with 9/19.      DX-ABDOMEN FOR TUBE PLACEMENT   Final Result      Feeding tube tip projects over the stomach.      DX-CHEST-PORTABLE (1 VIEW)   Final Result      Stable chest findings compared with 9/18.      DX-CHEST-PORTABLE (1 VIEW)   Final Result         1. Suboptimal study due to patient rotation.   2. Lines and tubes as above.   3. Ill-defined right basilar atelectasis  versus consolidation. Possible right effusion.   4. Mild interstitial edema.         DX-CHEST-FOR LINE PLACEMENT Perform procedure in: Patient's Room   Final Result            1. A right central venous catheter with tip projects appropriately over the expected area of the superior vena cava.      DX-ABDOMEN FOR TUBE PLACEMENT   Final Result      Feeding tube tip overlies the upper gastric fundus.      DX-CHEST-PORTABLE (1 VIEW)   Final Result      1.  Cardiomegaly with bilateral pulmonary infiltrates.      2.  Stable bilateral pleural effusions.      3.  Endotracheal tube present.      DX-CHEST-LIMITED (1 VIEW)   Final Result      Perihilar and bibasilar opacities may represent a combination of edema, atelectasis, pneumonia.      Trace pleural effusions are not excluded.      Interstitial prominence likely represents interstitial edema.         DX-ABDOMEN FOR TUBE PLACEMENT   Final Result      Feeding tube placement with the tip projecting over the distal stomach or duodenal bulb      EC-ECHOCARDIOGRAM COMPLETE W/O CONT   Final Result      OUTSIDE IMAGES-DX CHEST   Final Result      US-FOREIGN FILM ULTRASOUND   Final Result      OUTSIDE IMAGES-DX CHEST   Final Result      OUTSIDE IMAGES-US ABDOMEN   Final Result      OUTSIDE IMAGES-CT CHEST   Final Result      DX-CHEST-PORTABLE (1 VIEW)   Final Result         1.  Pulmonary edema and/or infiltrates are identified, which are stable since the prior exam.   2.  Trace bilateral pleural effusion   3.  Cardiomegaly      DX-CHEST-LIMITED (1 VIEW)   Final Result         1.  Pulmonary edema and/or infiltrates.   2.  Cardiomegaly      DX-CHEST-LIMITED (1 VIEW)   Final Result         1.  Pulmonary edema and/or infiltrates are identified, which are stable since the prior exam.   2.  Small bilateral pleural effusions   3.  Cardiomegaly           Assessment/Plan  * Acute respiratory failure with hypoxia and hypercapnia (HCC)- (present on admission)  Assessment & Plan  The patient  was initially admitted for acute respiratory failure 2/2 to CAP which was successfully treated with a full course of antibiotics. The patient did have difficulty with extubation and required tracheostomy placement which he is tolerating well. No episodes of desaturation noted. He has not had any further episodes of productive cough or fever.  - pending placement to LTAC  - patient has received adequate training on tracheostomy care as per RT      Pneumonia- (present on admission)  Assessment & Plan  MSSA CAP Resolved with completion of antibiotic therapy    Paroxysmal atrial fibrillation (HCC)- (present on admission)  Assessment & Plan  Continue anticoagulation with rivaroxaban  Currently rhythm controlled with amiodarone  Continue follow up with cardiology on outpatient basis.    Right ventricular dilation- (present on admission)  Assessment & Plan  Identified on echocardiogram. Patients volume status is currently stable.   - patient is asymptomatic  - Continue P.O lasix  - continue to monitor volume status      Normocytic anemia- (present on admission)  Assessment & Plan  Likely secondary to chronic disease state and recent trach placement. There is no active bleeding at this time.  Would recommend he continue to have routine colonoscopy in the outpatient setting.    Dysphagia- (present on admission)  Assessment & Plan  Patient is currently on tube feeds via NGT  Pending speech eval/swallow study to determine further tube feeding requirements.    Hypophosphatemia- (present on admission)  Assessment & Plan  Status post replacement    Hypomagnesemia- (present on admission)  Assessment & Plan  Status post replacement  Follow intermittently    Hypokalemia- (present on admission)  Assessment & Plan  Continue to monitor and replace as needed.    Pulmonary hypertension (HCC)- (present on admission)  Assessment & Plan  Likely secondary to obstructive sleep apnea and COPD.      Major depression- (present on  admission)  Assessment & Plan   Continue Cymbalta          VTE prophylaxis: on chronic anticoagulation with rivaroxaban

## 2020-09-30 NOTE — PROGRESS NOTES
Assessment complete.  AA&Ox4. SpO2 95% on 4L of O2 via Tracheal Mask. Denies CP/SOB.  Reporting 4/10 pain. Declines intervention att.  +3 BLE edema  Strict NPO with continuous tube feed running. R amber Smyth.  Denies N/V.  + void. LBM 9/29/2020 per report.    All needs met at this time. Call light within reach. Pt calls appropriately. Hourly rounding in place. Will continue to monitor.

## 2020-09-30 NOTE — ASSESSMENT & PLAN NOTE
Likely secondary to chronic disease state and recent trach placement. There is no active bleeding at this time.  Would recommend he continue to have routine colonoscopy in the outpatient setting.

## 2020-09-30 NOTE — DISCHARGE PLANNING
Care Transition Team Discharge Planning                   Discharge Plan:  SNF    This RN CM is following the case. Pt is still pending acceptance at [facility]..    This RN CM tried to contact Pt's Sister, Jessie at the number given by Pt but it now belongs to another person. Will inform Pt    Still trying to search Pt's sister 's new contact number.    Pt gave me a number to call which is the same number in Pt's Face Sheet and now belongs to a different person.    Tried calling numbers form [employer] of Kin [employer] and they are not working numbers . Will continue to follow.

## 2020-09-30 NOTE — PROGRESS NOTES
Pt is A+Ox4   Complains of 3/10 pain; repositioned for comfort      Strict NPO per MD order   Impact Peptide running via [employer] @ 55 (goal)               Pt tolerating feedings; denies N/V/D      +BM  +void      Tracheostomy in place               Frequent suctioning required               4 liters O2               Pt being encouraged to complete tracheostomy care independently               Emergency equipment and extra canulas at bedside      3+ pitting edema BLE   + numbness/tingling BLE   Right nare cortrak; skin is CDI/pink   Sacrum is red/blanching              Q2 turns in place, waffle mattress on, pillows in use for support/positioning, barrier cream wipes   provided                Ambulating with minimal staff assistance; pivoting to the commode      Bed locked and in the lowest position, call light within reach, upper bed rails raised, all needs met at this time   Daily weights in place

## 2020-09-30 NOTE — THERAPY
Physical Therapy   Initial Evaluation     Patient Name: Jas Vann  Age:  54 y.o., Sex:  male  Medical Record #: [id number]  Today's Date: 9/30/2020     Precautions: Fall Risk, Tracheostomy , Nasogastric Tube, Swallow Precautions ( See Comments)    Assessment  Patient is 54 y.o. male admitted to the hospital SOB and coughing up blood. PT intubated at [facility] hospital and transferred to [facility]. Pt with respiratory failure, sepsis and CAP. Pt has been intubated X 3 and now trached. Pt's PMDH includes Afib, GERD, HTN and psychiatric issues. At time of initial evaluation, pt seated at EOB. Pt is able to perform majority of mobility tasks at SPV and strength is WNL's. Pt is currently limited by debility and decreased endurance from prolonged hospital stay. Pt would benefit from skilled PT intervention to address deficits and improve mobility prior to DC.    Plan    Recommend Physical Therapy 2 times per week until therapy goals are met for the following treatments:  Gait Training, Neuro Re-Education / Balance, Therapeutic Activities and Therapeutic Exercises       Discharge Recommendations: Recommend post-acute placement for additional physical therapy services prior to discharge home          09/30/20 2513   Prior Living Situation   Prior Services Continuous (24 Hour) Care Giving Per Service   Housing / Facility   (Per chart, Long term care at [facility] in [place])   Bathroom Set up Walk In Shower;Shower Chair   Equipment Owned Front-Wheel Walker   Lives with - Patient's Self Care Capacity Attendant / Paid Care Giver   Comments Pt reports he had assistance with ADL's if he wanted    Prior Level of Functional Mobility   Bed Mobility Independent   Transfer Status Independent   Ambulation Independent   Distance Ambulation (Feet)   (short community distances)   Assistive Devices Used Front-Wheel Walker   Passive ROM Lower Body   Passive ROM Lower Body WDL   Active ROM Lower Body    Active ROM Lower Body  WDL   Comments even with  edema   Strength Lower Body   Lower Body Strength  WDL   Comments Strength grossly assessed at 5/5 R LE, 4/5 L LE   Balance Assessment   Sitting Balance (Static) Good   Sitting Balance (Dynamic) Good   Standing Balance (Static) Fair +   Standing Balance (Dynamic) Fair -   Weight Shift Sitting Fair   Weight Shift Standing Fair   Comments Standing balance with FWW   Gait Analysis   Gait Level Of Assist Minimal Assist   Assistive Device Front Wheel Walker   Distance (Feet) 30   # of Times Distance was Traveled 1   Deviation Bradykinetic;Shuffled Gait   Weight Bearing Status No restrictions   Vision Deficits Impacting Mobility Wears glasses   Comments Pt ambulated short distance in room with FWW, minimal assist for balance and management of O2. Pt with slow, antalgic gait with short step and stride length. Pt with kyphotic posture at baseline. Pt required standing rest break X 2 and at times tripoding on FWW due to respiratory status   Bed Mobility    Supine to Sit   (Up at EOB upon arrival)   Sit to Supine Independent   Scooting Supervised   Functional Mobility   Sit to Stand Supervised   Bed, Chair, Wheelchair Transfer Supervised   Mobility Ambulated short distance in room with FWW   Activity Tolerance   Comments Limited standing and walking endurance   Edema / Skin Assessment   Comments B LE edema present. Pt reports this is baseline and he typically uses compression socks   Short Term Goals    Short Term Goal # 1 Pt will ambulate 150 feet with FWW with SPV within 6 sessions to improve endurance   Short Term Goal # 2 Pt will verbalize and demonstrate energy conservation strategies with mobility to improve endurance with mobility tasks upon DC

## 2020-09-30 NOTE — DISCHARGE PLANNING
Agency/Facility Name: [facility]  Outcome: Faxed updated speech therapy notes per request to admissions.     @1106  Agency/Facility Name: [facility]   Outcome: Left voicemail for Doris regarding referral. Requested call back.   RN CM notified.     @0624  Agency/Facility Name: [facility]   Outcome: Left voicemail for admissions regarding referral. Requested call back.

## 2020-09-30 NOTE — THERAPY
Physical Therapy   Initial Evaluation     Patient Name: Jas Vann  Age:  54 y.o., Sex:  male  Medical Record #: [id number]  Today's Date: 9/30/2020     Precautions: (P) Fall Risk, Tracheostomy , Nasogastric Tube, Swallow Precautions ( See Comments)    Assessment  Patient is 54 y.o. male with a diagnosis of ***.  Additional factors influencing patient status / progress ***.      Plan    Recommend Physical Therapy {Frequency & Duration:357875} for the following treatments:  {PT Treatments:747534}       Discharge Recommendations: (P) Recommend post-acute placement for additional physical therapy services prior to discharge home       Subjective    ***     Objective    ***

## 2020-09-30 NOTE — CARE PLAN
Problem: Communication  Goal: The ability to communicate needs accurately and effectively will improve  Outcome: PROGRESSING AS EXPECTED     Problem: Safety  Goal: Will remain free from injury  Outcome: PROGRESSING AS EXPECTED     Problem: Knowledge Deficit  Goal: Knowledge of disease process/condition, treatment plan, diagnostic tests, and medications will improve  Outcome: PROGRESSING AS EXPECTED     Problem: Skin Integrity  Goal: Risk for impaired skin integrity will decrease  Outcome: PROGRESSING AS EXPECTED     Problem: Pain Management  Goal: Pain level will decrease to patient's comfort goal  Outcome: PROGRESSING AS EXPECTED

## 2020-09-30 NOTE — CARE PLAN
Problem: Fluid Volume:  Goal: Will maintain balanced intake and output  Outcome: PROGRESSING AS EXPECTED     Problem: Communication  Goal: The ability to communicate needs accurately and effectively will improve  Outcome: PROGRESSING AS EXPECTED     Problem: Safety  Goal: Will remain free from injury  Outcome: PROGRESSING AS EXPECTED  Goal: Will remain free from falls  Outcome: PROGRESSING AS EXPECTED     Problem: Bowel/Gastric:  Goal: Normal bowel function is maintained or improved  Outcome: PROGRESSING AS EXPECTED     Problem: Knowledge Deficit  Goal: Knowledge of the prescribed therapeutic regimen will improve  Outcome: PROGRESSING AS EXPECTED     Problem: Discharge Barriers/Planning  Goal: Patient's continuum of care needs will be met  Outcome: PROGRESSING AS EXPECTED     Problem: Respiratory:  Goal: Respiratory status will improve  Outcome: PROGRESSING AS EXPECTED     Problem: Mobility  Goal: Risk for activity intolerance will decrease  Outcome: PROGRESSING AS EXPECTED

## 2020-10-01 PROBLEM — E83.39 HYPOPHOSPHATEMIA: Status: RESOLVED | Noted: 2020-01-01 | Resolved: 2020-01-01

## 2020-10-01 PROBLEM — R41.0 DELIRIUM: Status: ACTIVE | Noted: 2020-01-01

## 2020-10-01 PROBLEM — E83.42 HYPOMAGNESEMIA: Status: RESOLVED | Noted: 2020-01-01 | Resolved: 2020-01-01

## 2020-10-01 PROBLEM — E87.6 HYPOKALEMIA: Status: RESOLVED | Noted: 2020-01-01 | Resolved: 2020-01-01

## 2020-10-01 NOTE — PROGRESS NOTES
Hospital Medicine Daily Progress Note    Date of Service  10/1/2020    Chief Complaint  54 y.o. male admitted 9/9/2020 with CAP    Hospital Course    This is a 54-year-old male who presented for evaluation on September 9 after being transferred from [facility] in [place].  he had been been intubated there after having had saturations on room air of 70%.  he was given PIP Tazocin, and Levaquin and transferred to our facility.  His COVID was negative here she was found to be borderline hypotensive with systolics around 100, and required norepinephrine.  he was admitted to the ICU  he was eventually extubated however developed worsening hypercarbic respiratory failure on September 17, and required reintubation.  he was then extubated on September 20, but again worsened requiring reintubation, and then subsequently had a tracheostomy on September 23     Patient continues to do well. We are pending placement once he has had adequate trach care education from RT. Pending swallow study and possible discontinuation of NGT and tube feeds.       Patient has received adequate tracheostomy care instruction. Speech therapy recommending continued speech therapy on discharge.      The patient remains medically cleared. He was evaluated by speech therapy today with recommendations for swallow study tomorrow morning to evaluate candidacy for removal of NGT prior to discharge.      Interval Problem Update  The patient was seen and evaluated this morning without any acute complaints.  Nursing staff does report the patient was lethargic and difficult to arouse in the morning.  A CT scan of the head was done which does not show any acute findings concerning for acute ischemic or hemorrhagic stroke.  Neurologic examination was normal.  The patient is currently back to baseline at the time of my examination.  The patient does have some degree of delirium and we will begin proper protocol.  No other overnight events reported by  nursing staff.    Consultants/Specialty  Pulmonology    Code Status  Full Code    Disposition  May be discharged once he has been accepted to skilled nursing facility.  Pending swallow study tomorrow morning and possible discontinuation of NGT.    Review of Systems  Review of Systems   Constitutional: Negative for chills and fever.   HENT: Negative for sore throat.    Eyes: Negative for discharge.   Respiratory: Negative for cough.    Cardiovascular: Negative for chest pain, palpitations, orthopnea, claudication and leg swelling.   Gastrointestinal: Negative for heartburn.   Genitourinary: Negative for flank pain and hematuria.   Musculoskeletal: Negative for myalgias.   Skin: Negative for rash.   Neurological: Negative for dizziness, tingling, tremors, sensory change, speech change, focal weakness, seizures, loss of consciousness, weakness and headaches.   Endo/Heme/Allergies: Does not bruise/bleed easily.   Psychiatric/Behavioral: Negative for depression and suicidal ideas.        Physical Exam  Temp:  [36.5 °C (97.7 °F)-37 °C (98.6 °F)] 36.9 °C (98.4 °F)  Pulse:  [54-94] 67  Resp:  [14-20] 18  BP: (115-138)/(69-81) 117/77  SpO2:  [92 %-100 %] 98 %    Physical Exam  Vitals signs reviewed.   Constitutional:       Appearance: Normal appearance. He is obese.   HENT:      Head: Normocephalic and atraumatic.      Nose: No congestion or rhinorrhea.      Mouth/Throat:      Mouth: Mucous membranes are moist.      Pharynx: Oropharynx is clear.   Eyes:      Extraocular Movements: Extraocular movements intact.      Conjunctiva/sclera: Conjunctivae normal.      Pupils: Pupils are equal, round, and reactive to light.   Neck:      Musculoskeletal: Normal range of motion and neck supple.      Comments: Tracheostomy in place  Cardiovascular:      Rate and Rhythm: Normal rate and regular rhythm.      Heart sounds: No murmur. No friction rub. No gallop.    Pulmonary:      Effort: Pulmonary effort is normal.      Breath sounds:  Normal breath sounds.   Abdominal:      General: Abdomen is flat. Bowel sounds are normal. There is no distension.      Palpations: Abdomen is soft. There is no mass.   Musculoskeletal:         General: No swelling, tenderness or deformity.   Skin:     General: Skin is warm and dry.      Findings: No erythema or rash.   Neurological:      General: No focal deficit present.      Mental Status: He is alert and oriented to person, place, and time. Mental status is at baseline.      Cranial Nerves: No cranial nerve deficit.      Sensory: No sensory deficit.      Motor: No weakness.   Psychiatric:         Mood and Affect: Mood normal.         Behavior: Behavior normal.         Thought Content: Thought content normal.         Fluids    Intake/Output Summary (Last 24 hours) at 10/1/2020 1414  Last data filed at 10/1/2020 1200  Gross per 24 hour   Intake 1130 ml   Output 2300 ml   Net -1170 ml       Laboratory  Recent Labs     09/29/20  0844 09/30/20  0509 10/01/20  0432   WBC 8.8 7.8 10.0   RBC 3.27* 3.18* 3.51*   HEMOGLOBIN 9.1* 8.9* 9.6*   HEMATOCRIT 31.9* 30.2* 33.9*   MCV 97.6 95.0 96.6   MCH 27.8 28.0 27.4   MCHC 28.5* 29.5* 28.3*   RDW 51.4* 49.7 51.5*   PLATELETCT 809* 690* 844*   MPV 9.4 8.9* 9.1     Recent Labs     09/29/20  0844 09/30/20  0509 10/01/20  0432   SODIUM 140 139 138   POTASSIUM 3.8 4.3 4.4   CHLORIDE 102 105 102   CO2 29 29 30   GLUCOSE 91 105* 114*   BUN 18 17 20   CREATININE 0.33* 0.31* 0.28*   CALCIUM 8.8 8.2* 8.6                   Imaging  CT-HEAD W/O   Final Result         1.  No acute intracranial abnormality is identified, there are nonspecific white matter changes, commonly associated with small vessel ischemic disease.  Associated mild cerebral atrophy is noted.   2.  Atherosclerosis.      DX-ABDOMEN FOR TUBE PLACEMENT   Final Result      Enteric tube terminates over the expected position of the cardiac region of the stomach favored over distal esophagus      DX-CHEST-PORTABLE (1 VIEW)    Final Result      Worsening left basilar atelectasis, pleural fluid      Otherwise stable chest with right basilar scarring, pleural fluid and probable consolidation      DX-CHEST-PORTABLE (1 VIEW)   Final Result      No significant interval change.         DX-CHEST-PORTABLE (1 VIEW)   Final Result      No significant interval change.      DX-CHEST-PORTABLE (1 VIEW)   Final Result      Stable chest findings compared with 9/21.      DX-CHEST-PORTABLE (1 VIEW)   Final Result      Grossly stable chest appearance compared with 9/20. Direct comparison is hampered due to rotational factors on the 9/20 image.      DX-CHEST-PORTABLE (1 VIEW)   Final Result      Grossly stable chest appearance compared with 9/19.      DX-ABDOMEN FOR TUBE PLACEMENT   Final Result      Feeding tube tip projects over the stomach.      DX-CHEST-PORTABLE (1 VIEW)   Final Result      Stable chest findings compared with 9/18.      DX-CHEST-PORTABLE (1 VIEW)   Final Result         1. Suboptimal study due to patient rotation.   2. Lines and tubes as above.   3. Ill-defined right basilar atelectasis versus consolidation. Possible right effusion.   4. Mild interstitial edema.         DX-CHEST-FOR LINE PLACEMENT Perform procedure in: Patient's Room   Final Result            1. A right central venous catheter with tip projects appropriately over the expected area of the superior vena cava.      DX-ABDOMEN FOR TUBE PLACEMENT   Final Result      Feeding tube tip overlies the upper gastric fundus.      DX-CHEST-PORTABLE (1 VIEW)   Final Result      1.  Cardiomegaly with bilateral pulmonary infiltrates.      2.  Stable bilateral pleural effusions.      3.  Endotracheal tube present.      DX-CHEST-LIMITED (1 VIEW)   Final Result      Perihilar and bibasilar opacities may represent a combination of edema, atelectasis, pneumonia.      Trace pleural effusions are not excluded.      Interstitial prominence likely represents interstitial edema.          DX-ABDOMEN FOR TUBE PLACEMENT   Final Result      Feeding tube placement with the tip projecting over the distal stomach or duodenal bulb      EC-ECHOCARDIOGRAM COMPLETE W/O CONT   Final Result      OUTSIDE IMAGES-DX CHEST   Final Result      US-FOREIGN FILM ULTRASOUND   Final Result      OUTSIDE IMAGES-DX CHEST   Final Result      OUTSIDE IMAGES-US ABDOMEN   Final Result      OUTSIDE IMAGES-CT CHEST   Final Result      DX-CHEST-PORTABLE (1 VIEW)   Final Result         1.  Pulmonary edema and/or infiltrates are identified, which are stable since the prior exam.   2.  Trace bilateral pleural effusion   3.  Cardiomegaly      DX-CHEST-LIMITED (1 VIEW)   Final Result         1.  Pulmonary edema and/or infiltrates.   2.  Cardiomegaly      DX-CHEST-LIMITED (1 VIEW)   Final Result         1.  Pulmonary edema and/or infiltrates are identified, which are stable since the prior exam.   2.  Small bilateral pleural effusions   3.  Cardiomegaly           Assessment/Plan  * Acute respiratory failure with hypoxia and hypercapnia (HCC)- (present on admission)  Assessment & Plan  - patient is medically cleared for discharge  - pending placement to LTAC  - patient has received adequate training on tracheostomy care as per RT  - patient's oxygenation currently stable      Delirium  Assessment & Plan  The patient was noted to be somewhat lethargic and confused in the morning.  CT scan was done which does not show any new focal findings.  On neurologic exam patient is motor is 5 out of 5 throughout and sensory is 5 out of 5 throughout.  No cranial nerve deficits noted.  Stat labs were ordered and were negative for any metabolic derangements.  The patient states that he is not sleeping well and was probably tired in the morning however delirium cannot be completely ruled out.  -Delirium protocols to include frequent reorientation of patient to time place and person.  Keep blinds open during waking hours.  No labs or vitals after 10 PM  until 5 AM.    Pneumonia- (present on admission)  Assessment & Plan  MSSA CAP Resolved with completion of antibiotic therapy    Paroxysmal atrial fibrillation (HCC)- (present on admission)  Assessment & Plan  Continue anticoagulation with rivaroxaban  Currently rhythm controlled with amiodarone  Continue follow up with cardiology on outpatient basis.    Normocytic anemia- (present on admission)  Assessment & Plan  Likely secondary to chronic disease state and recent trach placement. There is no active bleeding at this time.  Would recommend he continue to have routine colonoscopy in the outpatient setting.    Dysphagia- (present on admission)  Assessment & Plan  Patient is currently on tube feeds via NGT  Pending speech eval/swallow study to determine further tube feeding requirements.  Discontinue NGT if speech recommends  Continued speech therapy on outpatient basis. Case management is working with outside facility to continue speech therapy.    Pulmonary hypertension (HCC)- (present on admission)  Assessment & Plan  Likely secondary to obstructive sleep apnea and COPD.      Major depression- (present on admission)  Assessment & Plan   Continue Cymbalta     Right ventricular dilation- (present on admission)  Assessment & Plan  - patient is asymptomatic  - continue to monitor volume status           VTE prophylaxis: On chronic anticoagulation with apixaban

## 2020-10-01 NOTE — DIETARY
"Nutrition support weekly update:  Day  of admit.  Jas Vann is a 54 y.o. male with admitting DX of acute respiratory failure.    Tube feeding initiated on 9/10, briefly d/c'd then resumed .   Current TF via gastric cortrak is Impact Peptide 1.5 @ goal rate 55 ml/hr, providing 1980 kcal, 124 grams protein, and 1016 ml free water per day.     Assessment:  Weight 65.2 kg via stand up scale on . Noted 10.2 kg wt loss since admit. All other weights are measured via bed scale, suspect some of this decrease may be d/t difference in scales and/or items on bed when weighing.     Calculation/Equation: MSJ x1.2 = 1749 kcal/day.   Total Calories / day: 1800 - 2000 kcal  (Calories / k - 31 kcal/kg)  Total Grams Protein / day: 78 - 91 g (Grams Protein / k.2 - 1.4 g/kg)    Evaluation:   1. Per SLP swallow evaluation today, recommends continuation of NPO with non-oral source of nutrition.   · SLP also notes \"Given reported frequency of epistaxis, will consider a modified barium swallow study (MBSS) versus FEES in the following days to determine readiness for PO diet\"  2. Per flowsheets Impact Peptide @55ml/hr. Per RN note yesterday pt tolerating tube feedings, no complaints of N/V/D.   3. Noted with 13.5% wt loss since admit, however, pt with CHF and has been diuresed this admit. Unclear how much of this is fluid related v true wt loss.   4. Per RN note today pt with 3+ BLE edema. Lasix on MAR  5. Pt appears thin and cachectic. Per nutrition focused physical exam pt with severe fat wasting of buccal region and severe muscle wasting in temples. Severity of muscle/fat wasting appears to be a chronic condition.   6. Plan is to DC to SNF, pending acceptance.   7. Standard fiber-containing formula appropriate to meet pt's estimated nutrition needs.     Malnutrition risk: Pt with severe malnutrition of unknown etiology as evidenced by severe muscle wasting and severe fat wasting. "     Recommendations/Plan:  1. Change formula to Fibersource HN with goal rate of 70 ml/hr. Formula at goal rate provides 2016 kcal, 91 g protein, and 1361 ml free water per day.  2. Recommend starting Fibersource @25ml/hr and advancing per protocol to goal rate to optimize tolerance, as pt will be transitioning from fiber-free to fiber-containing formula.   3. Advance to PO diet per SLP.   4. Fluids per MD      RD following.

## 2020-10-01 NOTE — THERAPY
"Speech Language Pathology  Daily Treatment     Patient Name: Jas Vann  Age:  54 y.o., Sex:  male  Medical Record #: [id number]  Today's Date: 10/1/2020       Precautions: Fall Risk, Tracheostomy , Nasogastric Tube, Swallow Precautions ( See Comments)  Comments: Speaking valve with RT, RN or Therapy only with supervision. BLUE DYE COMPLETED 10/1 at 10:00 -  24 HOUR MONITORING IN PLACE    Assessment    Was paged by RN to see pt for dysphagia reassessment. Pt sitting out of bed in a chair, on 4L/28% FiO2 via tracheal mask. However, mask was not directly over trach. Education provided to pt regarding importance of humidification via tracheal mask. Tracheal suctioning performed which revealed weak cough and removal of moderate amount of thick-thin/clear secretions. Cuff was already deflated but a syringe was used to remove any residual air. Speaking valve placed in line with trach and tolerated placement for 25 minutes. Blue dyed PO trials were administered and consisted of 4-5 single ice chips, 2 oz MTL, and 1-2 oz applesauce (thinner purees). Patient required 8-15 consecutive swallows to clear single bolus with reported globus sensation and stating, \"it won't go down\" concerning for pharyngeal residue. Patient presented with overt s/sx of aspiration in >80% of trials as seen by increase in wet vocal quality inconsistently, hacking response 50% of the time with MTL and purees, delayed congested coughing response, and pt expectorating blue phlegm into napkin following MTL. Valve was removed and tracheal suctioning was performed with removal of thick/thin secretions. Secretions were void of any blue. Education provided to patient regarding current status and SLP recs. Pt trained on dysphagia exercises to target pharyngeal squeeze (effortful swallow) and laryngeal elevation (falsetto) and agreeable to initiate single ice chips and perform diagnostic swallow study in the following days. Speaking valve was replaced and " left on after session.     Plan    1) PMSV to be worn during waking hours, as tolerated  2) Recommend continue NPO/cortrak  3) OK for patient to have 5-10 single ice chips an hour with encouragement to perform dysphagia exercises  4) Given reported frequency of epistaxis, will consider a modified barium swallow study (MBSS) versus FEES in the following days to determine readiness for PO diet    Continue current treatment plan.    Discharge Recommendations: Recommend post-acute placement for additional speech therapy services prior to discharge home       Objective       10/01/20 0945   Vitals   O2 (LPM) 4   O2 Delivery Device Tracheal Mask   Dysphagia    Positioning / Behavior Modification Modulate Rate or Bite Size;Multiple Swallows;Self Monitoring;Cough / Clear after Swallow;Effortful Swallow   Other Treatments Blue dyed PO trials of ice chips; MTL; and applesauce   Diet / Liquid Recommendation NPO;Pre-Feeding Trials with SLP Only   Skilled Intervention Compensatory Strategies;Verbal Cueing   Recommended Route of Medication Administration   Medication Administration  Via Gastric Tube   Short Term Goals   Short Term Goal # 1 MODIFIED ON 9/28: Patient will tolerate the speaking valve for >30 minutes INTERVALS with no significant change in vitals given close supervision from RT, RN, Therapy   Goal Outcome # 1 Progressing as expected   Short Term Goal # 2 Pt will participate in prefeeding trials 1:1 with SLP with no clinical  s/sx of aspiration   Goal Outcome # 2  Progressing slower than expected

## 2020-10-01 NOTE — ASSESSMENT & PLAN NOTE
- Delirium protocols to include frequent reorientation of patient to time place and person.  Keep blinds open during waking hours.  No labs or vitals after 10 PM until 5 AM.  - patient will be transferred to the ICU for further management of acute hypercapnic respiratory failure.

## 2020-10-01 NOTE — PROGRESS NOTES
Assessment complete.  AA&Ox4. Denies CP/SOB.  Reporting minimal pain. Declines intervention at this time.  Small skin tear to R wrist. +3 BLE edema.  Strict NPO with continuous tube feed running.  R amber Smyth. Small amount of dried blood on dressing. Pt. States he frequently has nosebleeds at baseline.  + void. LBM 9/30/2020.      All needs met at this time. Hourly rounding in place. Call light within reach. Pt calls appropriately. Will continue to monitor.

## 2020-10-01 NOTE — DISCHARGE PLANNING
Agency/Facility Name: [facility]   Outcome: Left voicemail for Rosa in admissions regarding referral. Requested call back. CCA provided RN CM contact information as well.     [phone]  Agency/Facility Name: [facility]   Outcome: Left voicemail for Assistant to DON regarding referral. Requested call back.

## 2020-10-01 NOTE — PROGRESS NOTES
Pt is A+Ox4   Complains of 4/10 pain; medicated per MAR     Strict NPO per MD order   Impact Peptide running via [email or web address] @ 55 (goal)               Pt tolerating feedings; denies N/V/D      +BM  +void      Tracheostomy in place               Frequent suctioning required               4 liters O2               Pt being encouraged to complete tracheostomy care independently               Emergency equipment and extra canulas at bedside    Tracheostomy care completed, inner canula changed      3+ pitting edema BLE   + numbness/tingling BLE   Right nare cortrak; skin is CDI/pink   Sacrum is red/blanching              Q2 turns in place, waffle mattress on, pillows in use for support/positioning, barrier cream wipes   provided                Ambulating with minimal staff assistance; pivoting to the commode      Bed locked and in the lowest position, call light within reach, upper bed rails raised, all needs met at this time   Daily weights in place

## 2020-10-01 NOTE — PROGRESS NOTES
Bed alarm refused despite pt education on fall risk.  Pt is A&O x4 and demonstrates appropriate use of call light to call for assistance. Hourly rounding in place.

## 2020-10-01 NOTE — PROGRESS NOTES
Patient rated moderate fall risk per [facility] Fall Risk Assessment. This RN provided patient education regarding fall risk status and fall prevention. This RN discussed initiating bed & chair alarms, patient refused use despite education. Hourly rounding in place. Call light within reach. Patient agreed to call for assistance prior to standing. Will continue to monitor.

## 2020-10-01 NOTE — CARE PLAN
Problem: Communication  Goal: The ability to communicate needs accurately and effectively will improve  Outcome: PROGRESSING AS EXPECTED  POC discussed with patient, all questions and concerns addressed.      Problem: Safety  Goal: Will remain free from injury  Outcome: PROGRESSING AS EXPECTED  Patient calling appropriately. No injuries this shift.

## 2020-10-01 NOTE — THERAPY
Speech Therapy Contact Note  Spoke with Dr. Fuchs, pt is now medically cleared and requesting diagnostic swallow study to determine functional status. Obtained order for MBSS and will be completed at the earliest available time slot in radiology tomorrow.

## 2020-10-01 NOTE — PROGRESS NOTES
"Pt is lethargic this AM but arouses with mild stimulation. Neurological assessment was unremarkable. Pt is A&Ox4, able to scribe orientation questions onto paper. Patient has equip strength and sensation bilaterally. Vital signs are stable, oxygen saturation is stable. Pt reports \"feeling tired\" but no other symptoms. Blood sugar acquired at 90.   [facility] hospitalist LEIGHA called and updated on the situation. Orders given for STAT head CT.   "

## 2020-10-02 PROBLEM — E46 MALNUTRITION (HCC): Status: ACTIVE | Noted: 2020-01-01

## 2020-10-02 PROBLEM — D75.839 THROMBOCYTOSIS: Status: ACTIVE | Noted: 2020-01-01

## 2020-10-02 NOTE — PROGRESS NOTES
Received report from NATHALIE Salvador. Assumed care of pt. Patient o2 reading 74. Patient suctioned x2 and O2 saturation continued to read in the 70's. Replaced o2 monitor, increased oxygen flow and patient improved to mid-high 80's. RT Cosmo paged to bedside. HOB raised and O2 saturation improved.

## 2020-10-02 NOTE — PROGRESS NOTES
Patient transferred to CICU with NATHALIE Evans and NATHALIE Orourke. Report given to NATHALIE Mclaughlin.

## 2020-10-02 NOTE — DISCHARGE PLANNING
Care Transition Team Discharge Planning    Anticipated Discharge Information  Discharge Disposition: D/T to SNF with medicare cert w/planned hosp IP readmit (83)              Discharge Plan:  [facility]    This RN CM spoke with Shannon ZELAYA at the [address] of [facility] at Tel [phone] and informed her that Pt is medically clear for discharge. Per Shannon she did not get the copies of the clinicals faxed over to them as Rodrigue Valdez is not in the office.    This RN CM also left a VM to the Asst DON of the Facility, requesting a return call to verify Pt's acceptance.    Explained to Shannon that Pt's Insurance is Medical and that Pt prefers to discharge there.    All clinicals requested by Shannon were faxed at F [phone].     Will continue to follow and assist Pt with discharge .

## 2020-10-02 NOTE — THERAPY
Missed Therapy     Patient Name: Jas Vann  Age:  54 y.o., Sex:  male  Medical Record #: [id number]  Today's Date: 10/2/2020    Came to bedside to take pt down for an MBSS. Patient lethargic and unable to maintain wakefulness for no more than a few seconds at a time. Per RN, pt desating this AM and now on 10L/40% FiO2 via T-piece. Update given to MD Radha COOPER to HOLD MBSS at this time. SLP following as pt is appropriate.        10/02/20 1004   Treatment Variance   Reason For Missed Therapy Medical - Patient Unarousable

## 2020-10-02 NOTE — PROGRESS NOTES
Lab called with critical ABG results. Critical lab result read back to Lab.  Dr. Fuchs paged about critical labs.

## 2020-10-02 NOTE — PROGRESS NOTES
Patient began desatting. O2 was reading 67-70 on 5L 28% FiO2. RT Cosmo paged. O2 increased to 10L and FiO2 to 40%. O2 reading 97. Discussed with RT, will come to bedside shortly.

## 2020-10-02 NOTE — PROGRESS NOTES
Assessment complete.  Patient fatigued, arrousable but quickly falls asleep if not actively engaged in conversation. AA&Ox4. Denies CP. Intermittently complains of SOB, patient suctioned this AM. Dim lung sounds in all lobes. Mild work of breathing and shallow respirations.  Reporting 0/10 pain. Declines intervention at this time  Small skin tear to R wrist. BLE edema.  R nare Cortrak. Small amount of dried blood on dressing. Reports baseline frequent nose bleeds.  Strict NPO with continuous tube feed running.  + void. Last BM 10/1/2020    All needs met at this time. Frequent rounding in place for safety. Call light within reach. Pt calls appropriately. Will continue to monitor.

## 2020-10-02 NOTE — PROGRESS NOTES
1530: Pt transferred from  for higher level of care, increased lethargy, abnormal ABG. Pt transferred to ICU bed, placed on ventilator by RT. Placed on bedside telemetry monitor; VSS. Pt alert and asking to write messages. Urinal placed at bedside. TF restarted per order. ABG in one hour.       Skin assessment performed with NATHALIE Orourke. Areas of concern include:  Healing skin tear to right wrist  Scattered bruising  Sutures to b/l chest   PIV x2

## 2020-10-02 NOTE — PROGRESS NOTES
RT at bedside. Speech at bedside to take patient for barium swallow. Pt condition discussed, MD paged regarding safety concerns surrounding barium swallow. MD to come to bedside.

## 2020-10-02 NOTE — DISCHARGE PLANNING
Care Transition Team Discharge Planning    Anticipated Discharge Information  Discharge Disposition: D/T to SNF with medicare cert w/planned hosp IP readmit (83)              Discharge Plan:  [facility]    This RN CM spoke with Tambria, Assist DON at [facility].   Per Gilberto, she has to confirm Pt's acceptance with her DON. She received the clinicals faxed to them.    This RN CM will follow and assist as needed.

## 2020-10-02 NOTE — CARE PLAN
Problem: Communication  Goal: The ability to communicate needs accurately and effectively will improve  Outcome: PROGRESSING AS EXPECTED  POC reviewed with patient. Pen and paper at bedside to facilitate communication     Problem: Safety  Goal: Will remain free from injury  Outcome: PROGRESSING AS EXPECTED  Hourly rounding in place. Free from injury

## 2020-10-02 NOTE — PROGRESS NOTES
[facility] Medicine Daily Progress Note    Date of Service  10/2/2020    Chief Complaint  54 y.o. male admitted 9/9/2020 with shortness of breath    Hospital Course    This is a 54-year-old male who presented for evaluation on September 9 after being transferred from [facility] in [place].  he had been been intubated there after having had saturations on room air of 70%.  he was given PIP Tazocin, and Levaquin and transferred to our facility.  His COVID was negative here she was found to be borderline hypotensive with systolics around 100, and required norepinephrine.  he was admitted to the ICU  he was eventually extubated however developed worsening hypercarbic respiratory failure on September 17, and required reintubation.  he was then extubated on September 20, but again worsened requiring reintubation, and then subsequently had a tracheostomy on September 23     Patient continues to do well. We are pending placement once he has had adequate trach care education from RT. Pending swallow study and possible discontinuation of NGT and tube feeds.       Patient has received adequate tracheostomy care instruction. Speech therapy recommending continued speech therapy on discharge.     Patient to be transferred to ICU for further management and evaluation of acute hypercapnia respiratory failure.    .      Interval Problem Update  The patient was found to be lethargic this morning and had episode of desaturation into the high 70s and low 80s.  He was started on high flow oxygen at 10 L.  He responded well to respiratory therapy treatments.  However ABG drawn at that time showed significant cannot respiratory acidosis as well as hypercapnia.  He will be transferred to the ICU for further management and evaluation    Consultants/Specialty  MICU    Code Status  Full Code    Disposition  Patient to be transferred to ICU for evaluation and management.    Review of Systems  Review of Systems   Constitutional:  Negative for chills, fever and malaise/fatigue.   HENT: Negative for congestion, hearing loss, sore throat and tinnitus.    Eyes: Negative for blurred vision, double vision, photophobia and discharge.   Respiratory: Positive for wheezing. Negative for cough, hemoptysis, sputum production and shortness of breath.    Cardiovascular: Negative for chest pain, palpitations, orthopnea, claudication and leg swelling.   Gastrointestinal: Negative for abdominal pain, blood in stool, diarrhea, heartburn, melena, nausea and vomiting.   Genitourinary: Negative for dysuria, flank pain, hematuria and urgency.   Musculoskeletal: Negative for back pain, joint pain and myalgias.   Skin: Negative for itching and rash.   Neurological: Negative for dizziness, sensory change, speech change, weakness and headaches.   Endo/Heme/Allergies: Does not bruise/bleed easily.   Psychiatric/Behavioral: Negative for depression and suicidal ideas.        Physical Exam  Temp:  [36.7 °C (98 °F)-37.1 °C (98.7 °F)] 37 °C (98.6 °F)  Pulse:  [66-98] 90  Resp:  [14-18] 16  BP: (103-117)/(65-88) 103/69  SpO2:  [90 %-99 %] 96 %    Physical Exam  Vitals signs reviewed.   Constitutional:       Appearance: Normal appearance. He is obese.   HENT:      Head: Normocephalic and atraumatic.      Nose: No congestion or rhinorrhea.      Mouth/Throat:      Mouth: Mucous membranes are moist.      Pharynx: Oropharynx is clear.   Eyes:      General: No scleral icterus.     Extraocular Movements: Extraocular movements intact.      Conjunctiva/sclera: Conjunctivae normal.      Pupils: Pupils are equal, round, and reactive to light.   Neck:      Musculoskeletal: Neck supple. No neck rigidity or muscular tenderness.   Cardiovascular:      Rate and Rhythm: Normal rate and regular rhythm.      Heart sounds: No murmur. No friction rub. No gallop.    Pulmonary:      Effort: Respiratory distress present.      Breath sounds: No stridor. Rhonchi present. No wheezing or rales.    Abdominal:      General: Abdomen is flat. Bowel sounds are normal. There is no distension.      Palpations: Abdomen is soft. There is no mass.      Tenderness: There is no abdominal tenderness. There is no guarding or rebound.   Musculoskeletal:         General: No swelling, tenderness or deformity.   Lymphadenopathy:      Cervical: No cervical adenopathy.   Skin:     General: Skin is warm and dry.      Findings: No erythema or rash.   Neurological:      General: No focal deficit present.      Mental Status: He is alert and oriented to person, place, and time. Mental status is at baseline.      Cranial Nerves: No cranial nerve deficit.      Sensory: No sensory deficit.      Motor: No weakness.   Psychiatric:         Mood and Affect: Mood normal.         Behavior: Behavior normal.         Thought Content: Thought content normal.         Fluids    Intake/Output Summary (Last 24 hours) at 10/2/2020 1522  Last data filed at 10/2/2020 1233  Gross per 24 hour   Intake 1160 ml   Output 800 ml   Net 360 ml       Laboratory  Recent Labs     09/30/20  0509 10/01/20  0432 10/02/20  0534   WBC 7.8 10.0 8.6   RBC 3.18* 3.51* 3.36*   HEMOGLOBIN 8.9* 9.6* 9.2*   HEMATOCRIT 30.2* 33.9* 32.3*   MCV 95.0 96.6 96.1   MCH 28.0 27.4 27.4   MCHC 29.5* 28.3* 28.5*   RDW 49.7 51.5* 51.0*   PLATELETCT 690* 844* 667*   MPV 8.9* 9.1 9.1     Recent Labs     09/30/20  0509 10/01/20  0432 10/02/20  0534   SODIUM 139 138 136   POTASSIUM 4.3 4.4 4.6   CHLORIDE 105 102 99   CO2 29 30 32   GLUCOSE 105* 114* 106*   BUN 17 20 18   CREATININE 0.31* 0.28* 0.36*   CALCIUM 8.2* 8.6 8.6                   Imaging  CT-HEAD W/O   Final Result         1.  No acute intracranial abnormality is identified, there are nonspecific white matter changes, commonly associated with small vessel ischemic disease.  Associated mild cerebral atrophy is noted.   2.  Atherosclerosis.      DX-ABDOMEN FOR TUBE PLACEMENT   Final Result      Enteric tube terminates over the  expected position of the cardiac region of the stomach favored over distal esophagus      DX-CHEST-PORTABLE (1 VIEW)   Final Result      Worsening left basilar atelectasis, pleural fluid      Otherwise stable chest with right basilar scarring, pleural fluid and probable consolidation      DX-CHEST-PORTABLE (1 VIEW)   Final Result      No significant interval change.         DX-CHEST-PORTABLE (1 VIEW)   Final Result      No significant interval change.      DX-CHEST-PORTABLE (1 VIEW)   Final Result      Stable chest findings compared with 9/21.      DX-CHEST-PORTABLE (1 VIEW)   Final Result      Grossly stable chest appearance compared with 9/20. Direct comparison is hampered due to rotational factors on the 9/20 image.      DX-CHEST-PORTABLE (1 VIEW)   Final Result      Grossly stable chest appearance compared with 9/19.      DX-ABDOMEN FOR TUBE PLACEMENT   Final Result      Feeding tube tip projects over the stomach.      DX-CHEST-PORTABLE (1 VIEW)   Final Result      Stable chest findings compared with 9/18.      DX-CHEST-PORTABLE (1 VIEW)   Final Result         1. Suboptimal study due to patient rotation.   2. Lines and tubes as above.   3. Ill-defined right basilar atelectasis versus consolidation. Possible right effusion.   4. Mild interstitial edema.         DX-CHEST-FOR LINE PLACEMENT Perform procedure in: Patient's Room   Final Result            1. A right central venous catheter with tip projects appropriately over the expected area of the superior vena cava.      DX-ABDOMEN FOR TUBE PLACEMENT   Final Result      Feeding tube tip overlies the upper gastric fundus.      DX-CHEST-PORTABLE (1 VIEW)   Final Result      1.  Cardiomegaly with bilateral pulmonary infiltrates.      2.  Stable bilateral pleural effusions.      3.  Endotracheal tube present.      DX-CHEST-LIMITED (1 VIEW)   Final Result      Perihilar and bibasilar opacities may represent a combination of edema, atelectasis, pneumonia.      Trace  pleural effusions are not excluded.      Interstitial prominence likely represents interstitial edema.         DX-ABDOMEN FOR TUBE PLACEMENT   Final Result      Feeding tube placement with the tip projecting over the distal stomach or duodenal bulb      EC-ECHOCARDIOGRAM COMPLETE W/O CONT   Final Result      OUTSIDE IMAGES-DX CHEST   Final Result      US-FOREIGN FILM ULTRASOUND   Final Result      OUTSIDE IMAGES-DX CHEST   Final Result      OUTSIDE IMAGES-US ABDOMEN   Final Result      OUTSIDE IMAGES-CT CHEST   Final Result      DX-CHEST-PORTABLE (1 VIEW)   Final Result         1.  Pulmonary edema and/or infiltrates are identified, which are stable since the prior exam.   2.  Trace bilateral pleural effusion   3.  Cardiomegaly      DX-CHEST-LIMITED (1 VIEW)   Final Result         1.  Pulmonary edema and/or infiltrates.   2.  Cardiomegaly      DX-CHEST-LIMITED (1 VIEW)   Final Result         1.  Pulmonary edema and/or infiltrates are identified, which are stable since the prior exam.   2.  Small bilateral pleural effusions   3.  Cardiomegaly      DX-ESOPHAGUS - [id number]    (Results Pending)   DX-CHEST-LIMITED (1 VIEW)    (Results Pending)        Assessment/Plan  * Acute respiratory failure with hypoxia and hypercapnia (HCC)- (present on admission)  Assessment & Plan    The patient was found to be lethargic this morning and had episode of desaturations into the 70s requiring high flow oxygen at 10 L.  ABG done at that time shows significant hypercapnia and respiratory acidosis.  Repeat ABG after RT treatments show minimal improvement.  The patient is currently back to baseline in terms of his mentation however he will need further evaluation and management and possible reintubation in the ICU. Altered mentation likely related to CO2 narcosis.    Delirium  Assessment & Plan  The patient was noted to be somewhat lethargic and confused in the morning.  CT scan was done which does not show any new focal findings.  On  neurologic exam patient is motor is 5 out of 5 throughout and sensory is 5 out of 5 throughout.  No cranial nerve deficits noted.  Stat labs were ordered and were negative for any metabolic derangements.  The patient states that he is not sleeping well and was probably tired in the morning however delirium cannot be completely ruled out.  -Delirium protocols to include frequent reorientation of patient to time place and person.  Keep blinds open during waking hours.  No labs or vitals after 10 PM until 5 AM.    Pneumonia- (present on admission)  Assessment & Plan  MSSA CAP Resolved with completion of antibiotic therapy    Paroxysmal atrial fibrillation (HCC)- (present on admission)  Assessment & Plan  Continue anticoagulation with rivaroxaban  Currently rhythm controlled with amiodarone  Continue follow up with cardiology on outpatient basis.    Normocytic anemia- (present on admission)  Assessment & Plan  Likely secondary to chronic disease state and recent trach placement. There is no active bleeding at this time.  Would recommend he continue to have routine colonoscopy in the outpatient setting.    Dysphagia- (present on admission)  Assessment & Plan  Patient is currently on tube feeds via NGT  Pending speech eval/swallow study to determine further tube feeding requirements.  Discontinue NGT if speech recommends  Continued speech therapy on outpatient basis. Case management is working with outside facility to continue speech therapy.    Pulmonary hypertension (HCC)- (present on admission)  Assessment & Plan  Likely secondary to obstructive sleep apnea and COPD.      Major depression- (present on admission)  Assessment & Plan   Continue Cymbalta     Right ventricular dilation- (present on admission)  Assessment & Plan  - patient is asymptomatic  - continue to monitor volume status           VTE prophylaxis: rivaroxaban for afib

## 2020-10-02 NOTE — PROGRESS NOTES
Pt laying in bed, call light within reach, bed lowered and locked, fall education reinforced. Pt is A&Ox4 and on 4L of oxygen via tracheal mask. Pt lung sounds are diminished in all lobes with crackles of the upper lobes, bowel sounds are hyperactive in all four quadrants, heart sounds are within defined limits. PT IV is clean,dry,intact, and patent and saline locked. Trache care done. Pt has a right bess cortrak with Fibersource running at 25 mL per hour with goal of 70 mL that patient is tolerating. Pt is tolerating ice chips. Pt has a tracheostomy with velcro attachment that is centered with tracheal mask in place requiring suction once or twice per encounter. Pt is up stand-by to ambulate.

## 2020-10-02 NOTE — PROGRESS NOTES
Lab called with critical ABG results Critical lab result read back to lab.   Dr. Fuchs paged regarding results. Pt awake and alert.

## 2020-10-03 PROBLEM — J44.9 COPD (CHRONIC OBSTRUCTIVE PULMONARY DISEASE) (HCC): Status: ACTIVE | Noted: 2020-01-01

## 2020-10-03 PROBLEM — R41.0 DELIRIUM: Status: RESOLVED | Noted: 2020-01-01 | Resolved: 2020-01-01

## 2020-10-03 NOTE — CARE PLAN
Problem: Communication  Goal: The ability to communicate needs accurately and effectively will improve  Outcome: PROGRESSING AS EXPECTED  -Patient encouraged to communicate thoughts/participate in care/patient communicates by nodding head and writing thoughts down/paper and pen given to encourage expression.     Problem: Safety  Goal: Will remain free from injury  Outcome: PROGRESSING AS EXPECTED  -Patient able to demonstrate use of call light safely for needs, bed locked to lowest position, call light in reach, room close to nurse's station, bed alarm/or chair alarm activated.   Goal: Will remain free from falls  Outcome: PROGRESSING AS EXPECTED  -Patient educated on fall prevention interventions, call light in reach, bed to lowest position, nonslip socks worn, demonstrates correct use of call light

## 2020-10-03 NOTE — CONSULTS
Critical Care Consultation    Date of consult: 10/2/2020    Referring Physician  Casey Fuchs M.D.    Reason for Consultation  Higher level of care, hypoxia and hypercarbic respiratory failure need ventilator therapy    History of Presenting Illness  54 y.o. male who presented 9/9/2020 with HTN, afib, GERD that presented 9/9 for hypoxic respiratory failure from [facility] from [place] with a sat of 70% for pneumonia and negative covid started on Zosyn and Levaquin. He has been extubated and re-intubated a couple times and finally got a trach. Today he had incerase oxygen requirement and desaturation to 70-80% on 10L and then became somnolent and ABG confirmed needing ventilator support and brought back to the ICU ph 7.14/106/101 this occurred after barium swallow eval this am. he also has had thrombocytosis, pulmonary hypertension and severe weight loss with speech therapy and nutrition and awaiting placement prior to this episode. No fever or significant hypotension has been documented. Patient is trach but able to answer yes no question and mouth words but hx is still limited. He jillian chest pain, abdominal pain or weakness.     Code Status  Full Code    Review of Systems  Review of Systems   Unable to perform ROS: Intubated       Past Medical History   has a past medical history of Atrial fibrillation (HCC), GERD (gastroesophageal reflux disease), Hypertension, and Psychiatric problem.    Surgical History   has a past surgical history that includes other orthopedic surgery and other abdominal surgery.    Family History  family history includes Cancer in his father and mother.    Social History   reports that he has quit smoking. He smoked 0.00 packs per day. He has never used smokeless tobacco. He reports current alcohol use. He reports previous drug use.    Medications  Home Medications     Reviewed by Cristina Snider R.N. (Registered Nurse) on 09/19/20 at 1136  Med List Status: Complete    Medication Last Dose Status   amiodarone (CORDARONE) 200 MG Tab  Active   ascorbic acid (ASCORBIC ACID) 500 MG Tab  Active   calcitRIOL (ROCALTROL) 0.5 MCG Cap  Active   DULoxetine (CYMBALTA) 60 MG Cap DR Particles delayed-release capsule  Active   esomeprazole (NEXIUM) 20 MG capsule  Active   ferrous sulfate 325 (65 Fe) MG tablet  Active   folic acid (FOLVITE) 1 MG Tab  Active   furosemide (LASIX) 40 MG Tab  Active   metoprolol (LOPRESSOR) 25 MG Tab  Active   potassium chloride (KLOR-CON) 20 MEQ Pack  Active   rivaroxaban (XARELTO) 20 MG Tab tablet  Active   tamsulosin (FLOMAX) 0.4 MG capsule  Active   topiramate (TOPAMAX) 25 MG Tab  Active   vitamin D (CHOLECALCIFEROL) 1000 UNIT Tab  Active              Current Facility-Administered Medications   Medication Dose Route Frequency Provider Last Rate Last Dose   • acetylcysteine (MUCOMYST) 20 % solution 3-5 mL  3-5 mL Inhalation 4X/DAY (RT) Jus Daniel M.D.   Stopped at 10/02/20 1008   • albuterol (PROVENTIL) 2.5mg/0.5ml nebulizer solution 2.5 mg  2.5 mg Nebulization 4X/DAY (RT) Jus Daniel M.D.   Stopped at 10/02/20 1500   • acetaZOLAMIDE (DIAMOX) 500 mg in NS 50 mL IVPB  500 mg Intravenous Once Sj Mishra M.D.       • [START ON 10/3/2020] thiamine (B-1) 500 mg in 5% dextrose 100 mL IVPB  500 mg Intravenous DAILY Sj Mishra M.D.       • [START ON 10/3/2020] multivitamin (THERAGRAN) tablet 1 Tab  1 Tab Oral DAILY Sj Mishra M.D.       • ipratropium-albuterol (DUONEB) nebulizer solution  3 mL Nebulization Q2HRS PRN (RT) Sj Mishra M.D.       • acetaminophen (TYLENOL) tablet 650 mg  650 mg Enteral Tube Q6HRS PRN Casey Fuchs M.D.   650 mg at 10/02/20 0115   • ketorolac (TORADOL) injection 30 mg  30 mg Intravenous Q6HRS PRN JONATHAN Davenport.PAubreeR.N.   30 mg at 10/02/20 0654   • ondansetron (ZOFRAN ODT) dispertab 4 mg  4 mg Enteral Tube Q4HRS PRN Francisco Novoa M.D.       • Respiratory Therapy Consult   Nebulization  Continuous RT Jus Daniel M.D.       • ipratropium-albuterol (DUONEB) nebulizer solution  3 mL Nebulization Q2HRS PRN (RT) Jus Daniel M.D.   3 mL at 09/24/20 1546   • senna-docusate (PERICOLACE or SENOKOT S) 8.6-50 MG per tablet 2 Tab  2 Tab Enteral Tube BID Jus Daniel M.D.   2 Tab at 10/02/20 1712    And   • polyethylene glycol/lytes (MIRALAX) PACKET 1 Packet  1 Packet Enteral Tube QDAY PRN Jus Daniel M.D.        And   • magnesium hydroxide (MILK OF MAGNESIA) suspension 30 mL  30 mL Enteral Tube QDAY PRN Jsu Daniel M.D.        And   • bisacodyl (DULCOLAX) suppository 10 mg  10 mg Rectal QDAY PRN Jus Daniel M.D.       • lidocaine (XYLOCAINE) 1 % injection 1-2 mL  1-2 mL Tracheal Tube Q30 MIN PRN Jus Daniel M.D.       • omeprazole (FIRST-OMEPRAZOLE) 2 mg/mL oral susp 40 mg  40 mg Enteral Tube DAILY Joe Grimaldo M.D.   40 mg at 10/02/20 0507   • levalbuterol (XOPENEX) 1.25 MG/3ML nebulizer solution 1.25 mg  1.25 mg Nebulization Q4H PRN (RT) Joe Grimaldo M.D.       • Pharmacy Consult: Enteral tube insertion - review meds/change route/product selection  1 Each Other PHARMACY TO DOSE Joe Grimaldo M.D.       • amiodarone (CORDARONE) tablet 200 mg  200 mg Enteral Tube Q DAY Joe Grimaldo M.D.   200 mg at 10/02/20 0505   • furosemide (LASIX) tablet 20 mg  20 mg Enteral Tube Q DAY Joe Grimaldo M.D.   20 mg at 10/02/20 0505   • DULoxetine (CYMBALTA) capsule 60 mg  60 mg Enteral Tube QHS Joe Grimaldo M.D.   60 mg at 10/01/20 2005   • topiramate (TOPAMAX) tablet 75 mg  75 mg Enteral Tube DAILY Joe Grimaldo M.D.   75 mg at 10/02/20 0505   • rivaroxaban (XARELTO) tablet 20 mg  20 mg Enteral Tube PM MEAL Joe Grimaldo M.D.   20 mg at 10/02/20 1712   • lidocaine (LIDODERM) 5 % 2 Patch  2 Patch Transdermal Q24HR Francisco Duval M.D.   Stopped at 10/01/20 1825   • promethazine (PHENERGAN) tablet 12.5-25 mg  12.5-25 mg Enteral  Tube Q4HRS PRN Francisco Duval M.D.       • ondansetron (ZOFRAN) syringe/vial injection 4 mg  4 mg Intravenous Q4HRS PRN Arcadio Bangura M.D.   4 mg at 10/02/20 0639   • promethazine (PHENERGAN) suppository 12.5-25 mg  12.5-25 mg Rectal Q4HRS PRN Arcadio Bangura M.D.       • prochlorperazine (COMPAZINE) injection 5-10 mg  5-10 mg Intravenous Q4HRS PRN Arcadio Bangura M.D.           Allergies  Allergies   Allergen Reactions   • Iron Dextran    • Keflex    • Penicillins      Tolerated zosyn at outside hospital 9/2020       Vital Signs last 24 hours  Temp:  [36.7 °C (98 °F)-37.1 °C (98.7 °F)] 36.7 °C (98.1 °F)  Pulse:  [66-98] 77  Resp:  [12-18] 12  BP: ()/(55-88) 93/55  SpO2:  [90 %-100 %] 94 %    Physical Exam  Physical Exam  Vitals signs reviewed.   Constitutional:       General: He is not in acute distress.     Appearance: He is ill-appearing. He is not toxic-appearing or diaphoretic.      Comments: Chronically ill male with severe malnutrition and weight loss   HENT:      Head:      Comments: Temporal wasting     Mouth/Throat:      Mouth: Mucous membranes are moist.   Eyes:      Pupils: Pupils are equal, round, and reactive to light.   Cardiovascular:      Rate and Rhythm: Normal rate.      Heart sounds: No murmur.   Pulmonary:      Effort: No respiratory distress.      Breath sounds: No stridor. Rhonchi present. No wheezing.      Comments: Decrease breath sounds on right ronchi on left  Abdominal:      General: There is no distension.      Palpations: There is no mass.      Tenderness: There is no abdominal tenderness. There is no guarding or rebound.      Hernia: No hernia is present.      Comments: Sagging skin   Musculoskeletal:         General: Swelling present. No tenderness, deformity or signs of injury.   Skin:     Coloration: Skin is pale. Skin is not jaundiced.      Findings: No bruising or erythema.   Neurological:      Mental Status: He is alert and oriented to person, place, and time.       Cranial Nerves: No cranial nerve deficit.      Sensory: No sensory deficit.      Motor: No weakness.      Coordination: Coordination normal.   Psychiatric:      Comments: Difficult to appreciate         Fluids    Intake/Output Summary (Last 24 hours) at 10/2/2020 1820  Last data filed at 10/2/2020 1233  Gross per 24 hour   Intake 970 ml   Output 500 ml   Net 470 ml       Laboratory  Recent Results (from the past 48 hour(s))   Magnesium    Collection Time: 10/01/20  4:32 AM   Result Value Ref Range    Magnesium 2.2 1.5 - 2.5 mg/dL   Phosphorus    Collection Time: 10/01/20  4:32 AM   Result Value Ref Range    Phosphorus 4.3 2.5 - 4.5 mg/dL   CBC with Differential    Collection Time: 10/01/20  4:32 AM   Result Value Ref Range    WBC 10.0 4.8 - 10.8 K/uL    RBC 3.51 (L) 4.70 - 6.10 M/uL    Hemoglobin 9.6 (L) 14.0 - 18.0 g/dL    Hematocrit 33.9 (L) 42.0 - 52.0 %    MCV 96.6 81.4 - 97.8 fL    MCH 27.4 27.0 - 33.0 pg    MCHC 28.3 (L) 33.7 - 35.3 g/dL    RDW 51.5 (H) 35.9 - 50.0 fL    Platelet Count 844 (H) 164 - 446 K/uL    MPV 9.1 9.0 - 12.9 fL    Neutrophils-Polys 81.20 (H) 44.00 - 72.00 %    Lymphocytes 9.60 (L) 22.00 - 41.00 %    Monocytes 7.80 0.00 - 13.40 %    Eosinophils 0.30 0.00 - 6.90 %    Basophils 0.60 0.00 - 1.80 %    Immature Granulocytes 0.50 0.00 - 0.90 %    Nucleated RBC 0.00 /100 WBC    Neutrophils (Absolute) 8.15 (H) 1.82 - 7.42 K/uL    Lymphs (Absolute) 0.96 (L) 1.00 - 4.80 K/uL    Monos (Absolute) 0.78 0.00 - 0.85 K/uL    Eos (Absolute) 0.03 0.00 - 0.51 K/uL    Baso (Absolute) 0.06 0.00 - 0.12 K/uL    Immature Granulocytes (abs) 0.05 0.00 - 0.11 K/uL    NRBC (Absolute) 0.00 K/uL    Hypochromia 2+ (A)     Anisocytosis 1+     Macrocytosis 1+    Basic Metabolic Panel (BMP)    Collection Time: 10/01/20  4:32 AM   Result Value Ref Range    Sodium 138 135 - 145 mmol/L    Potassium 4.4 3.6 - 5.5 mmol/L    Chloride 102 96 - 112 mmol/L    Co2 30 20 - 33 mmol/L    Glucose 114 (H) 65 - 99 mg/dL    Bun 20 8 - 22  mg/dL    Creatinine 0.28 (L) 0.50 - 1.40 mg/dL    Calcium 8.6 8.5 - 10.5 mg/dL    Anion Gap 6.0 (L) 7.0 - 16.0   ESTIMATED GFR    Collection Time: 10/01/20  4:32 AM   Result Value Ref Range    GFR If African American >60 >60 mL/min/1.73 m 2    GFR If Non African American >60 >60 mL/min/1.73 m 2   PERIPHERAL SMEAR REVIEW    Collection Time: 10/01/20  4:32 AM   Result Value Ref Range    Peripheral Smear Review see below    PLATELET ESTIMATE    Collection Time: 10/01/20  4:32 AM   Result Value Ref Range    Plt Estimation Increased    MORPHOLOGY    Collection Time: 10/01/20  4:32 AM   Result Value Ref Range    RBC Morphology Present     Polychromia 2+    DIFFERENTIAL COMMENT    Collection Time: 10/01/20  4:32 AM   Result Value Ref Range    Comments-Diff see below    ACCU-CHEK GLUCOSE    Collection Time: 10/01/20  5:18 AM   Result Value Ref Range    Glucose - Accu-Ck 90 65 - 99 mg/dL   Magnesium    Collection Time: 10/02/20  5:34 AM   Result Value Ref Range    Magnesium 2.1 1.5 - 2.5 mg/dL   Phosphorus    Collection Time: 10/02/20  5:34 AM   Result Value Ref Range    Phosphorus 3.1 2.5 - 4.5 mg/dL   CBC with Differential    Collection Time: 10/02/20  5:34 AM   Result Value Ref Range    WBC 8.6 4.8 - 10.8 K/uL    RBC 3.36 (L) 4.70 - 6.10 M/uL    Hemoglobin 9.2 (L) 14.0 - 18.0 g/dL    Hematocrit 32.3 (L) 42.0 - 52.0 %    MCV 96.1 81.4 - 97.8 fL    MCH 27.4 27.0 - 33.0 pg    MCHC 28.5 (L) 33.7 - 35.3 g/dL    RDW 51.0 (H) 35.9 - 50.0 fL    Platelet Count 667 (H) 164 - 446 K/uL    MPV 9.1 9.0 - 12.9 fL    Neutrophils-Polys 85.60 (H) 44.00 - 72.00 %    Lymphocytes 6.50 (L) 22.00 - 41.00 %    Monocytes 6.30 0.00 - 13.40 %    Eosinophils 0.70 0.00 - 6.90 %    Basophils 0.40 0.00 - 1.80 %    Immature Granulocytes 0.50 0.00 - 0.90 %    Nucleated RBC 0.00 /100 WBC    Neutrophils (Absolute) 7.32 1.82 - 7.42 K/uL    Lymphs (Absolute) 0.56 (L) 1.00 - 4.80 K/uL    Monos (Absolute) 0.54 0.00 - 0.85 K/uL    Eos (Absolute) 0.06 0.00 -  0.51 K/uL    Baso (Absolute) 0.03 0.00 - 0.12 K/uL    Immature Granulocytes (abs) 0.04 0.00 - 0.11 K/uL    NRBC (Absolute) 0.00 K/uL   Basic Metabolic Panel (BMP)    Collection Time: 10/02/20  5:34 AM   Result Value Ref Range    Sodium 136 135 - 145 mmol/L    Potassium 4.6 3.6 - 5.5 mmol/L    Chloride 99 96 - 112 mmol/L    Co2 32 20 - 33 mmol/L    Glucose 106 (H) 65 - 99 mg/dL    Bun 18 8 - 22 mg/dL    Creatinine 0.36 (L) 0.50 - 1.40 mg/dL    Calcium 8.6 8.5 - 10.5 mg/dL    Anion Gap 5.0 (L) 7.0 - 16.0   ESTIMATED GFR    Collection Time: 10/02/20  5:34 AM   Result Value Ref Range    GFR If African American >60 >60 mL/min/1.73 m 2    GFR If Non African American >60 >60 mL/min/1.73 m 2   ABG - LAB    Collection Time: 10/02/20 11:18 AM   Result Value Ref Range    Ph 7.14 (LL) 7.40 - 7.50    Pco2 106.2 (HH) 26.0 - 37.0 mmHg    Po2 101.7 (H) 64.0 - 87.0 mmHg    O2 Saturation 93.5 93.0 - 99.0 %    Hco3 35 (H) 17 - 25 mmol/L    Base Excess 4 (H) -4 - 3 mmol/L    Body Temp 37.0 Centigrade    O2 Therapy 10.0 2.0 - 10.0 L/min    Ph -TC 7.14 (LL) 7.40 - 7.50    Pco2 -.2 (HH) 26.0 - 37.0 mmHg    Po2 -.7 (H) 64.0 - 87.0 mmHg   ABG - LAB    Collection Time: 10/02/20  2:31 PM   Result Value Ref Range    Ph 7.18 (LL) 7.40 - 7.50    Pco2 92.2 (HH) 26.0 - 37.0 mmHg    Po2 61.2 (L) 64.0 - 87.0 mmHg    O2 Saturation 83.5 (L) 93.0 - 99.0 %    Hco3 33 (H) 17 - 25 mmol/L    Base Excess 3 -4 - 3 mmol/L    Body Temp see below Centigrade    O2 Therapy 10.0 2.0 - 10.0 L/min   ISTAT ARTERIAL BLOOD GAS    Collection Time: 10/02/20  5:04 PM   Result Value Ref Range    Ph 7.516 (H) 7.400 - 7.500    Pco2 42.5 (H) 26.0 - 37.0 mmHg    Po2 244 (H) 64 - 87 mmHg    Tco2 36 (H) 20 - 33 mmol/L    S02 100 (H) 93 - 99 %    Hco3 34.4 (H) 17.0 - 25.0 mmol/L    BE 11 (H) -4 - 3 mmol/L    Body Temp 97.1 F degrees    O2 Therapy 100 %    iPF Ratio 244     Ph Temp Chris 7.529 (H) 7.400 - 7.500    Pco2 Temp Co 41.0 (H) 26.0 - 37.0 mmHg    Po2 Temp Cor  240 (H) 64 - 87 mmHg    Specimen Arterial     Action Range Triggered NO     Inst. Qualified Patient YES        Imaging  DX-CHEST-LIMITED (1 VIEW)   Final Result      1.  Mildly improved aeration with improved bibasilar opacities and diffuse interstitial opacities.   2.  Trace pleural effusions, improved.      CT-HEAD W/O   Final Result         1.  No acute intracranial abnormality is identified, there are nonspecific white matter changes, commonly associated with small vessel ischemic disease.  Associated mild cerebral atrophy is noted.   2.  Atherosclerosis.      DX-ABDOMEN FOR TUBE PLACEMENT   Final Result      Enteric tube terminates over the expected position of the cardiac region of the stomach favored over distal esophagus      DX-CHEST-PORTABLE (1 VIEW)   Final Result      Worsening left basilar atelectasis, pleural fluid      Otherwise stable chest with right basilar scarring, pleural fluid and probable consolidation      DX-CHEST-PORTABLE (1 VIEW)   Final Result      No significant interval change.         DX-CHEST-PORTABLE (1 VIEW)   Final Result      No significant interval change.      DX-CHEST-PORTABLE (1 VIEW)   Final Result      Stable chest findings compared with 9/21.      DX-CHEST-PORTABLE (1 VIEW)   Final Result      Grossly stable chest appearance compared with 9/20. Direct comparison is hampered due to rotational factors on the 9/20 image.      DX-CHEST-PORTABLE (1 VIEW)   Final Result      Grossly stable chest appearance compared with 9/19.      DX-ABDOMEN FOR TUBE PLACEMENT   Final Result      Feeding tube tip projects over the stomach.      DX-CHEST-PORTABLE (1 VIEW)   Final Result      Stable chest findings compared with 9/18.      DX-CHEST-PORTABLE (1 VIEW)   Final Result         1. Suboptimal study due to patient rotation.   2. Lines and tubes as above.   3. Ill-defined right basilar atelectasis versus consolidation. Possible right effusion.   4. Mild interstitial edema.          DX-CHEST-FOR LINE PLACEMENT Perform procedure in: Patient's Room   Final Result            1. A right central venous catheter with tip projects appropriately over the expected area of the superior vena cava.      DX-ABDOMEN FOR TUBE PLACEMENT   Final Result      Feeding tube tip overlies the upper gastric fundus.      DX-CHEST-PORTABLE (1 VIEW)   Final Result      1.  Cardiomegaly with bilateral pulmonary infiltrates.      2.  Stable bilateral pleural effusions.      3.  Endotracheal tube present.      DX-CHEST-LIMITED (1 VIEW)   Final Result      Perihilar and bibasilar opacities may represent a combination of edema, atelectasis, pneumonia.      Trace pleural effusions are not excluded.      Interstitial prominence likely represents interstitial edema.         DX-ABDOMEN FOR TUBE PLACEMENT   Final Result      Feeding tube placement with the tip projecting over the distal stomach or duodenal bulb      EC-ECHOCARDIOGRAM COMPLETE W/O CONT   Final Result      OUTSIDE IMAGES-DX CHEST   Final Result      US-FOREIGN FILM ULTRASOUND   Final Result      OUTSIDE IMAGES-DX CHEST   Final Result      OUTSIDE IMAGES-US ABDOMEN   Final Result      OUTSIDE IMAGES-CT CHEST   Final Result      DX-CHEST-PORTABLE (1 VIEW)   Final Result         1.  Pulmonary edema and/or infiltrates are identified, which are stable since the prior exam.   2.  Trace bilateral pleural effusion   3.  Cardiomegaly      DX-CHEST-LIMITED (1 VIEW)   Final Result         1.  Pulmonary edema and/or infiltrates.   2.  Cardiomegaly      DX-CHEST-LIMITED (1 VIEW)   Final Result         1.  Pulmonary edema and/or infiltrates are identified, which are stable since the prior exam.   2.  Small bilateral pleural effusions   3.  Cardiomegaly      DX-ESOPHAGUS - [id number]    (Results Pending)   CT-CTA CHEST PULMONARY ARTERY W/ RECONS    (Results Pending)   CT-ABDOMEN-PELVIS WITH    (Results Pending)       Assessment/Plan  * Acute respiratory failure with hypoxia  and hypercapnia (HCC)- (present on admission)  Assessment & Plan  Intubated 9/9-9/12, 9/17-9/19, 9/20-9/23 trach brought back to ICU for ventilator support 10/2  Goal saturation > 92%    Monitor pulse oximetry and adjust peep and FIO2 to abg/vbg, and peep optimization.  Monitor ventilator waveforms and titrate flow/peep and volumes according.   CXR: monitor lung volumes and tube/line placement  VAP bundle prevention, oral care, post pyloric feeding  Head of bed > 30 degree  GI prophylaxis  Daily awakening and SBT trials unless contraindicated  Monitor for liberation/extubation  Respiratory treatments: prn    Check CTA chest  Check cortisol level and thyroid panel for chronic respiratory failure etiologies  Poor long term prognosis    Pneumonia- (present on admission)  Assessment & Plan  MSSA in sputum on 9/10  Usual ashwin in sputum culture on 9/17  S/P 3 days of Zosyn followed by 3 days of Unasyn which completed on 9/15  Unasyn resumed on 9/18 - complete 5 days of therapy    Follow up CT chest 10/2 serial procal and decide if further antibiotics indicated    Paroxysmal atrial fibrillation (HCC)- (present on admission)  Assessment & Plan  Continue amiodarone, 200 mg daily  Echo with normal LV systolic function and normal size of LA  Optimize magnesium and potassium  Thyroid studies pending (collected on 9/18 - Lord knows why not yet resulted)  Anticoagulate with rivaroxaban    Thrombocytosis (HCC)  Assessment & Plan  Serial monitor rule out pe or if reactive from infection    Malnutrition (HCC)  Assessment & Plan  Severe malnutrition prior was obese  Check CT chest and abdomen rule out cancer  Nutrition consultation  TSH, HIV  Thiamine and vitamins    Normocytic anemia- (present on admission)  Assessment & Plan  Serial monitor with restrictive transfusion strategies.     Dysphagia- (present on admission)  Assessment & Plan  Continue speech therapy    Pulmonary hypertension (HCC)- (present on admission)  Assessment &  Plan  Long standing per echo. Likely related to untreated AYANNA, COPD, possible chronic hypoxia  CT chest to evaluated etiologies    Major depression- (present on admission)  Assessment & Plan  Continue Cymbalta and Topamax    Right ventricular dilation- (present on admission)  Assessment & Plan  Echo confirms severely dilated RV with mild RV systolic dysfunction  RVSP 42 mmHg  Force diuresis when able      Discussed patient condition and risk of morbidity and/or mortality with RN, RT, Charge nurse / hot rounds and Patient.    The patient remains critically ill with hypercarbic respiratory failure on ventilator with active titration.  Critical care time = 69 minutes in directly providing and coordinating critical care and extensive data review.  No time overlap and excludes procedures.

## 2020-10-03 NOTE — CARE PLAN
Problem: Fluid Volume:  Goal: Will maintain balanced intake and output  Outcome: PROGRESSING AS EXPECTED     Problem: Safety  Goal: Will remain free from injury  Outcome: PROGRESSING AS EXPECTED  Goal: Will remain free from falls  Outcome: PROGRESSING AS EXPECTED     Problem: Infection  Goal: Will remain free from infection  Outcome: PROGRESSING AS EXPECTED     Problem: Respiratory:  Goal: Respiratory status will improve  Outcome: PROGRESSING AS EXPECTED     Problem: Skin Integrity  Goal: Risk for impaired skin integrity will decrease  Outcome: PROGRESSING AS EXPECTED

## 2020-10-03 NOTE — PROGRESS NOTES
Critical Care Progress Note    Date of admission  9/9/2020    Chief Complaint  54 y.o. male admitted 9/9/2020 with respiratory failure, sepsis and pneumonia.    Hospital Course   9/17 -    developed worsening hypercarbic and hypoxemic respiratory failure requiring emergent intubation.  Full vent support.  9/18 -    continue vent support.  Titrating phenylephrine.  Replete electrolytes.  SAT/SBT as tolerated.  9/19 -    SAT/SBT as tolerated.  Continue vent support.  Titrating phenylephrine.  Replete potassium.  Continue Unasyn.  9/20 -    liberated from the ventilator yesterday and did very well until last night when he required reintubation.  Continue vent support.  Continue Unasyn and complete 5 days of therapy.  10/3 -    continue vent support.  T-piece trials during the day as tolerated.      Interval Problem Update  Reviewed last 24 hour events:      Oriented x 4  Vent 2  Trach 12  AF  TF at goal (70)  Xarelto for AF      Review of Systems  Review of Systems   Unable to perform ROS: Acuity of condition        Vital Signs for last 24 hours   Temp:  [36.2 °C (97.1 °F)-37.1 °C (98.7 °F)] 36.4 °C (97.5 °F)  Pulse:  [] 82  Resp:  [10-27] 13  BP: ()/(48-74) 99/60  SpO2:  [86 %-100 %] 97 %    Hemodynamic parameters for last 24 hours       Respiratory Information for the last 24 hours  Vent Mode: Spont  Rate (breaths/min): 18  Vt Target (mL): 420  PEEP/CPAP: 8  P Support: 5  MAP: 11  Control VTE (exp VT): 242    Physical Exam   Physical Exam  Constitutional:       Appearance: He is not diaphoretic.      Comments: On ventilator   HENT:      Head: Normocephalic and atraumatic.      Right Ear: External ear normal.      Left Ear: External ear normal.      Nose: Nose normal.      Mouth/Throat:      Mouth: Mucous membranes are moist.      Pharynx: No oropharyngeal exudate.   Eyes:      General: No scleral icterus.     Pupils: Pupils are equal, round, and reactive to light.   Neck:      Musculoskeletal: Normal  range of motion and neck supple.   Cardiovascular:      Pulses: Normal pulses.      Heart sounds: No friction rub. No gallop.       Comments: Atrial fibrillation  Pulmonary:      Breath sounds: Rales (Few coarse crackles bilaterally) present. No wheezing.   Abdominal:      General: Bowel sounds are normal. There is no distension.      Palpations: Abdomen is soft.      Tenderness: There is no abdominal tenderness. There is no guarding.      Comments: Tolerating enteral tube feedings   Musculoskeletal: Normal range of motion.      Comments: No clubbing or cyanosis   Skin:     General: Skin is warm and dry.      Capillary Refill: Capillary refill takes less than 2 seconds.   Neurological:      Comments: Awake and alert.  Nods.  Follows.  Moves all 4 extremities.         Medications  Current Facility-Administered Medications   Medication Dose Route Frequency Provider Last Rate Last Dose   • levalbuterol (XOPENEX) 1.25 MG/3ML nebulizer solution 1.25 mg  1.25 mg Nebulization 4X/DAY (RT) Joe Grimaldo M.D.   1.25 mg at 10/03/20 1122   • acetylcysteine (MUCOMYST) 20 % solution 3-5 mL  3-5 mL Inhalation 4X/DAY (RT) Jus Daniel M.D.   4 mL at 10/03/20 1122   • thiamine (B-1) 500 mg in 5% dextrose 100 mL IVPB  500 mg Intravenous DAILY Sj Mishra M.D. 200 mL/hr at 10/03/20 0808 500 mg at 10/03/20 0808   • multivitamin (THERAGRAN) tablet 1 Tab  1 Tab Oral DAILY Sj Mishra M.D.   1 Tab at 10/03/20 0528   • midodrine (PROAMATINE) tablet 5 mg  5 mg Enteral Tube Q8HRS Sj Mishra M.D.   5 mg at 10/03/20 1344   • acetaminophen (TYLENOL) tablet 650 mg  650 mg Enteral Tube Q6HRS PRN Casey Fuchs M.D.   650 mg at 10/02/20 0115   • ondansetron (ZOFRAN ODT) dispertab 4 mg  4 mg Enteral Tube Q4HRS PRN Francisco Novoa M.D.       • Respiratory Therapy Consult   Nebulization Continuous RT Jus Daniel M.D.       • senna-docusate (PERICOLACE or SENOKOT S) 8.6-50 MG per tablet 2 Tab  2 Tab Enteral Tube BID  Jus Daniel M.D.   2 Tab at 10/03/20 0527    And   • polyethylene glycol/lytes (MIRALAX) PACKET 1 Packet  1 Packet Enteral Tube QDAY PRN Jus Daniel M.D.        And   • magnesium hydroxide (MILK OF MAGNESIA) suspension 30 mL  30 mL Enteral Tube QDAY PRN Jus Daniel M.D.        And   • bisacodyl (DULCOLAX) suppository 10 mg  10 mg Rectal QDAY PRN Jus Daniel M.D.       • lidocaine (XYLOCAINE) 1 % injection 1-2 mL  1-2 mL Tracheal Tube Q30 MIN PRN Jus Daniel M.D.       • omeprazole (FIRST-OMEPRAZOLE) 2 mg/mL oral susp 40 mg  40 mg Enteral Tube DAILY Joe Grimaldo M.D.   40 mg at 10/03/20 0527   • levalbuterol (XOPENEX) 1.25 MG/3ML nebulizer solution 1.25 mg  1.25 mg Nebulization Q4H PRN (RT) Joe Grimaldo M.D.       • Pharmacy Consult: Enteral tube insertion - review meds/change route/product selection  1 Each Other PHARMACY TO DOSE Joe Grimaldo M.D.       • amiodarone (CORDARONE) tablet 200 mg  200 mg Enteral Tube Q DAY Joe Grimaldo M.D.   200 mg at 10/03/20 0528   • furosemide (LASIX) tablet 20 mg  20 mg Enteral Tube Q DAY Joe Grimaldo M.D.   20 mg at 10/03/20 0528   • DULoxetine (CYMBALTA) capsule 60 mg  60 mg Enteral Tube QHS Joe Grimaldo M.D.   60 mg at 10/02/20 2128   • topiramate (TOPAMAX) tablet 75 mg  75 mg Enteral Tube DAILY Joe Grimlado M.D.   75 mg at 10/03/20 0527   • rivaroxaban (XARELTO) tablet 20 mg  20 mg Enteral Tube PM MEAL Joe Grimaldo M.D.   20 mg at 10/02/20 1712   • lidocaine (LIDODERM) 5 % 2 Patch  2 Patch Transdermal Q24HR Francisco Duval M.D.   2 Patch at 10/02/20 1944   • promethazine (PHENERGAN) tablet 12.5-25 mg  12.5-25 mg Enteral Tube Q4HRS PRN Francisco Duval M.D.       • ondansetron (ZOFRAN) syringe/vial injection 4 mg  4 mg Intravenous Q4HRS PRN Arcadio Bangura M.D.   4 mg at 10/02/20 0639   • promethazine (PHENERGAN) suppository 12.5-25 mg  12.5-25 mg Rectal Q4HRS PRN Arcadio B Peel,  M.D.       • prochlorperazine (COMPAZINE) injection 5-10 mg  5-10 mg Intravenous Q4HRS PRN Arcadio Bangura M.D.           Fluids    Intake/Output Summary (Last 24 hours) at 10/3/2020 1350  Last data filed at 10/3/2020 1300  Gross per 24 hour   Intake 530 ml   Output 950 ml   Net -420 ml       Laboratory  Recent Labs     10/02/20  1118 10/02/20  1431 10/02/20  1704 10/03/20  0449   [id number] 7.14* 7.18*  --   --    [id number] 106.2* 92.2*  --   --    [id number] 101.7* 61.2*  --   --    RRPW2THL 93.5 83.5*  --   --    ARTHCO3 35* 33*  --   --    [id number] 10.0 10.0  --   --    ARTBE 4* 3  --   --    ISTATAPH  --   --  7.516* 7.512*   ISTATAPCO2  --   --  42.5* 37.3*   ISTATAPO2  --   --  244* 51*   ISTATATCO2  --   --  36* 31   [id number]  --   --  100* 89*   ISTATARTHCO3  --   --  34.4* 30.0*   ISTATARTBE  --   --  11* 7*   ISTATTEMP  --   --  97.1 F 97.9 F   ISTATFIO2  --   --  100 30   ISTATSPEC  --   --  Arterial Arterial   ISTATAPHTC  --   --  7.529* 7.518*   [id number]  --   --  240* 49*         Recent Labs     10/01/20  0432 10/02/20  0534 10/03/20  0528   SODIUM 138 136 137   POTASSIUM 4.4 4.6 4.1   CHLORIDE 102 99 100   CO2 30 32 30   BUN 20 18 21   CREATININE 0.28* 0.36* 0.50   MAGNESIUM 2.2 2.1 2.3   PHOSPHORUS 4.3 3.1 2.1*   CALCIUM 8.6 8.6 8.4*     Recent Labs     10/01/20  0432 10/02/20  0534 10/03/20  0528   GLUCOSE 114* 106* 98     Recent Labs     10/01/20  0432 10/02/20  0534 10/03/20  0528   WBC 10.0 8.6 15.0*   NEUTSPOLYS 81.20* 85.60* 86.00*   LYMPHOCYTES 9.60* 6.50* 4.80*   MONOCYTES 7.80 6.30 7.70   EOSINOPHILS 0.30 0.70 0.20   BASOPHILS 0.60 0.40 0.40     Recent Labs     10/01/20  0432 10/02/20  0534 10/03/20  0528   RBC 3.51* 3.36* 3.10*   HEMOGLOBIN 9.6* 9.2* 8.5*   HEMATOCRIT 33.9* 32.3* 29.0*   PLATELETCT 844* 667* 615*       Imaging  X-Ray:  I have personally reviewed the images and compared with prior images. and My impression is: Unchanged left lower lobe  opacification    Assessment/Plan  * Acute respiratory failure with hypoxia and hypercapnia (HCC)- (present on admission)  Assessment & Plan  Intubated 9/9-9/12, 9/17-9/19, 9/20-9/23  Trach 9/23  Continue nocturnal vent support and as necessary during the day  T-piece trials during the day as tolerated    COPD (chronic obstructive pulmonary disease) (HCC)  Assessment & Plan  Continue bronchodilators    Pneumonia- (present on admission)  Assessment & Plan  MSSA in sputum on 9/10  Usual ashwin in sputum culture on 9/17  S/P 3 days of Zosyn followed by 3 days of Unasyn which completed on 9/15  Second Unasyn course from 9/18-9/23  Observe off antibiotics    Paroxysmal atrial fibrillation (HCC)- (present on admission)  Assessment & Plan  Continue amiodarone, 200 mg daily  Echo with normal LV systolic function and normal size of LA  Continue anticoagulation with rivaroxaban  Optimize potassium and magnesium    Malnutrition (HCC)  Assessment & Plan  Continue nutritional support    Dysphagia- (present on admission)  Assessment & Plan  Continue speech therapy    Major depression- (present on admission)  Assessment & Plan  Continue Cymbalta    Right ventricular dilation- (present on admission)  Assessment & Plan  Echo confirms severely dilated RV with mild RV systolic dysfunction  RVSP 42 mmHg       VTE:  NOAC  Ulcer: PPI  Lines: Orozco Catheter  Ongoing indication addressed    I have performed a physical exam and reviewed and updated ROS and Plan today (10/3/2020). In review of yesterday's note (10/2/2020), there are no changes except as documented above.     I have assessed and reassessed his respiratory status with spontaneous breathing trials and ventilator adjustments, airway mechanics, ventilator waveforms, blood pressure, hemodynamics, cardiovascular status and his neurologic status.  He is at increased risk for worsening respiratory and cardiovascular system dysfunction.    Discussed patient condition and risk of  morbidity and/or mortality with RN, RT, Pharmacy, Charge nurse / hot rounds and QA team     The patient remains critically ill.  Critical care time = 32 minutes in directly providing and coordinating critical care and extensive data review.  No time overlap and excludes procedures.    Joe Grimaldo MD  Pulmonary and Critical Care Medicine

## 2020-10-03 NOTE — THERAPY
Missed Therapy     Patient Name: Jas Vann  Age:  54 y.o., Sex:  male  Medical Record #: [id number]  Today's Date: 10/3/2020    Discussed missed therapy with RN - pt transferred to higher level of care on 10/2 requiring full vent support. Per RN, pt improving and has been tolerating T-piece since noon today. Will continue to HOLD MBSS until more medically stable. SLP following, likely early this week to assess appropriateness for swallow study.        10/03/20 0413   Treatment Variance   Reason For Missed Therapy Medical - Patient Is Not Medically Stable

## 2020-10-03 NOTE — CARE PLAN
Adult Ventilation Update    Total Vent Days: 1    Patient Lines/Drains/Airways Status      Active Airway       Name: Placement date: Placement time: Site: Days:    Airway Trach Tracheostomy 8.0  09/23/20   1130   Tracheostomy  9                APV: 20, 400, +8, 60%    Albuterol & Mucomyst QID

## 2020-10-03 NOTE — CARE PLAN
Problem: Ventilation Defect:  Goal: Ability to achieve and maintain unassisted ventilation or tolerate decreased levels of ventilator support  Outcome: PROGRESSING AS EXPECTED  Note:    Ventilator Daily Summary    Vent Day #2    Ventilator settings changed this shift: Patient switched to ASV    Weaning trials:Jaden    Respiratory Procedures:No    Plan: Continue current ventilator settings and wean mechanical ventilation as tolerated per physician orders.

## 2020-10-04 NOTE — CARE PLAN
Problem: Bronchopulmonary Hygiene:  Goal: Increase mobilization of retained secretions  Outcome: PROGRESSING AS EXPECTED  Note: T-piece 10L/40% as tolerated     Problem: Ventilation Defect:  Goal: Ability to achieve and maintain unassisted ventilation or tolerate decreased levels of ventilator support  Outcome: PROGRESSING AS EXPECTED  Note:    Ventilator Daily Summary    Vent Day #3    Ventilator settings changed this shift:No    Weaning trials:Yes    Respiratory Procedures:No    Plan: Continue current ventilator settings and wean mechanical ventilation as tolerated per physician orders.

## 2020-10-04 NOTE — CARE PLAN
Problem: Fluid Volume:  Goal: Will maintain balanced intake and output  Outcome: PROGRESSING AS EXPECTED     Problem: Safety  Goal: Will remain free from falls  Outcome: PROGRESSING AS EXPECTED     Problem: Infection  Goal: Will remain free from infection  Outcome: PROGRESSING AS EXPECTED     Problem: Pain Management  Goal: Pain level will decrease to patient's comfort goal  Outcome: PROGRESSING AS EXPECTED     Problem: Psychosocial Needs:  Goal: Level of anxiety will decrease  Outcome: PROGRESSING AS EXPECTED     Problem: Mobility  Goal: Risk for activity intolerance will decrease  Outcome: PROGRESSING AS EXPECTED

## 2020-10-04 NOTE — PROGRESS NOTES
Critical Care Progress Note    Date of admission  9/9/2020    Chief Complaint  54 y.o. male admitted 9/9/2020 with respiratory failure, sepsis and pneumonia.    Hospital Course   9/17 -    developed worsening hypercarbic and hypoxemic respiratory failure requiring emergent intubation.  Full vent support.  9/18 -    continue vent support.  Titrating phenylephrine.  Replete electrolytes.  SAT/SBT as tolerated.  9/19 -    SAT/SBT as tolerated.  Continue vent support.  Titrating phenylephrine.  Replete potassium.  Continue Unasyn.  9/20 -    liberated from the ventilator yesterday and did very well until last night when he required reintubation.  Continue vent support.  Continue Unasyn and complete 5 days of therapy.  10/3 -    continue vent support.  T-piece trials during the day as tolerated.  10/4 -    continue T-piece trials during the day as tolerated.  Nocturnal ventilatory support.      Interval Problem Update  Reviewed last 24 hour events:      Vent 3  Trach 12  Rest - ASV  120% - PEEP 8  30%  T-piece during day  Xarelto  TF at goal  AF      Review of Systems  Review of Systems   Unable to perform ROS: Acuity of condition        Vital Signs for last 24 hours   Temp:  [36 °C (96.8 °F)-37 °C (98.6 °F)] 36 °C (96.8 °F)  Pulse:  [] 59  Resp:  [7-36] 20  BP: ()/(54-73) 102/71  SpO2:  [83 %-100 %] 92 %    Hemodynamic parameters for last 24 hours       Respiratory Information for the last 24 hours  Vent Mode: ASV  PEEP/CPAP: 8  MAP: 15  Control VTE (exp VT): 290    Physical Exam   Physical Exam  Constitutional:       Appearance: He is not diaphoretic.      Comments: On ventilator   HENT:      Head: Normocephalic and atraumatic.      Right Ear: External ear normal.      Left Ear: External ear normal.      Nose: Nose normal.      Mouth/Throat:      Mouth: Mucous membranes are moist.      Pharynx: Oropharynx is clear.   Eyes:      Conjunctiva/sclera: Conjunctivae normal.      Pupils: Pupils are equal, round,  and reactive to light.   Neck:      Musculoskeletal: Normal range of motion. No neck rigidity or muscular tenderness.   Cardiovascular:      Pulses: Normal pulses.      Heart sounds: No gallop.       Comments: Atrial fibrillation  Pulmonary:      Breath sounds: Rales (Scattered coarse crackles) present. No wheezing.   Abdominal:      General: Bowel sounds are normal. There is no distension.      Palpations: Abdomen is soft.      Tenderness: There is no abdominal tenderness. There is no rebound.      Comments: Tolerating enteral tube feedings   Musculoskeletal: Normal range of motion.      Comments: No clubbing or cyanosis   Skin:     General: Skin is warm and dry.      Capillary Refill: Capillary refill takes less than 2 seconds.   Neurological:      Comments: Awake and alert.  Nods.  Follows.  Moves all 4 extremities.         Medications  Current Facility-Administered Medications   Medication Dose Route Frequency Provider Last Rate Last Dose   • levalbuterol (XOPENEX) 1.25 MG/3ML nebulizer solution 1.25 mg  1.25 mg Nebulization 4X/DAY (RT) Joe Grimaldo M.D.   1.25 mg at 10/04/20 1215   • acetylcysteine (MUCOMYST) 20 % solution 3-5 mL  3-5 mL Inhalation 4X/DAY (RT) Jus Daniel M.D.   4 mL at 10/04/20 1215   • thiamine (B-1) 500 mg in 5% dextrose 100 mL IVPB  500 mg Intravenous DAILY Sj Mishra M.D. 200 mL/hr at 10/04/20 0527 500 mg at 10/04/20 0527   • multivitamin (THERAGRAN) tablet 1 Tab  1 Tab Oral DAILY Sj Mishra M.D.   1 Tab at 10/04/20 0523   • midodrine (PROAMATINE) tablet 5 mg  5 mg Enteral Tube Q8HRS Sj Mishra M.D.   5 mg at 10/04/20 0523   • acetaminophen (TYLENOL) tablet 650 mg  650 mg Enteral Tube Q6HRS PRN Casey Fuchs M.D.   650 mg at 10/04/20 0804   • ondansetron (ZOFRAN ODT) dispertab 4 mg  4 mg Enteral Tube Q4HRS PRN Francisco Novoa M.D.       • Respiratory Therapy Consult   Nebulization Continuous RT Jus Daniel M.D.       • senna-docusate (PERICOLACE or  SENOKOT S) 8.6-50 MG per tablet 2 Tab  2 Tab Enteral Tube BID Jus Daniel M.D.   2 Tab at 10/03/20 0527    And   • polyethylene glycol/lytes (MIRALAX) PACKET 1 Packet  1 Packet Enteral Tube QDAY PRN Jus Daniel M.D.        And   • magnesium hydroxide (MILK OF MAGNESIA) suspension 30 mL  30 mL Enteral Tube QDAY PRN Jus Daniel M.D.        And   • bisacodyl (DULCOLAX) suppository 10 mg  10 mg Rectal QDAY PRN Jus Daniel M.D.       • lidocaine (XYLOCAINE) 1 % injection 1-2 mL  1-2 mL Tracheal Tube Q30 MIN PRN Jus Daniel M.D.       • omeprazole (FIRST-OMEPRAZOLE) 2 mg/mL oral susp 40 mg  40 mg Enteral Tube DAILY Joe Grimaldo M.D.   40 mg at 10/04/20 0523   • levalbuterol (XOPENEX) 1.25 MG/3ML nebulizer solution 1.25 mg  1.25 mg Nebulization Q4H PRN (RT) Joe Grimaldo M.D.       • Pharmacy Consult: Enteral tube insertion - review meds/change route/product selection  1 Each Other PHARMACY TO DOSE Joe Grimaldo M.D.       • amiodarone (CORDARONE) tablet 200 mg  200 mg Enteral Tube Q DAY Joe Grimaldo M.D.   200 mg at 10/04/20 0523   • furosemide (LASIX) tablet 20 mg  20 mg Enteral Tube Q DAY Joe Grimaldo M.D.   20 mg at 10/04/20 0523   • DULoxetine (CYMBALTA) capsule 60 mg  60 mg Enteral Tube QHS Joe Grimaldo M.D.   60 mg at 10/03/20 2137   • topiramate (TOPAMAX) tablet 75 mg  75 mg Enteral Tube DAILY Joe Grimaldo M.D.   75 mg at 10/04/20 0523   • rivaroxaban (XARELTO) tablet 20 mg  20 mg Enteral Tube PM MEAL Joe Grimaldo M.D.   20 mg at 10/03/20 1736   • lidocaine (LIDODERM) 5 % 2 Patch  2 Patch Transdermal Q24HR Francisco Duval M.D.   2 Patch at 10/03/20 1736   • promethazine (PHENERGAN) tablet 12.5-25 mg  12.5-25 mg Enteral Tube Q4HRS PRN Francisco Duval M.D.       • ondansetron (ZOFRAN) syringe/vial injection 4 mg  4 mg Intravenous Q4HRS PRN Arcadio Bangura M.D.   4 mg at 10/02/20 0639   • promethazine (PHENERGAN)  suppository 12.5-25 mg  12.5-25 mg Rectal Q4HRS PRN Arcadio Bangura M.D.       • prochlorperazine (COMPAZINE) injection 5-10 mg  5-10 mg Intravenous Q4HRS PRN Arcadio Bangura M.D.           Fluids    Intake/Output Summary (Last 24 hours) at 10/4/2020 1421  Last data filed at 10/4/2020 1200  Gross per 24 hour   Intake 1000 ml   Output 340 ml   Net 660 ml       Laboratory  Recent Labs     10/02/20  1118 10/02/20  1431 10/02/20  1704 10/03/20  0449   [id number] 7.14* 7.18*  --   --    [id number] 106.2* 92.2*  --   --    [id number] 101.7* 61.2*  --   --    [id number] 93.5 83.5*  --   --    ARTHCO3 35* 33*  --   --    [id number] 10.0 10.0  --   --    ARTBE 4* 3  --   --    ISTATAPH  --   --  7.516* 7.512*   ISTATAPCO2  --   --  42.5* 37.3*   ISTATAPO2  --   --  244* 51*   ISTATATCO2  --   --  36* 31   [id number]  --   --  100* 89*   ISTATARTHCO3  --   --  34.4* 30.0*   ISTATARTBE  --   --  11* 7*   ISTATTEMP  --   --  97.1 F 97.9 F   ISTATFIO2  --   --  100 30   ISTATSPEC  --   --  Arterial Arterial   ISTATAPHTC  --   --  7.529* 7.518*   [id number]  --   --  240* 49*         Recent Labs     10/02/20  0534 10/03/20  0528 10/04/20  0500   SODIUM 136 137 135   POTASSIUM 4.6 4.1 4.3   CHLORIDE 99 100 100   CO2 32 30 28   BUN 18 21 15   CREATININE 0.36* 0.50 0.37*   MAGNESIUM 2.1 2.3 2.2   PHOSPHORUS 3.1 2.1* 2.9   CALCIUM 8.6 8.4* 8.2*     Recent Labs     10/02/20  0534 10/03/20  0528 10/04/20  0500   GLUCOSE 106* 98 88     Recent Labs     10/02/20  0534 10/03/20  0528 10/04/20  0500   WBC 8.6 15.0* 9.1   NEUTSPOLYS 85.60* 86.00* 81.70*   LYMPHOCYTES 6.50* 4.80* 7.50*   MONOCYTES 6.30 7.70 9.10   EOSINOPHILS 0.70 0.20 0.80   BASOPHILS 0.40 0.40 0.50     Recent Labs     10/02/20  0534 10/03/20  0528 10/04/20  0500   RBC 3.36* 3.10* 2.89*   HEMOGLOBIN 9.2* 8.5* 7.9*   HEMATOCRIT 32.3* 29.0* 27.3*   PLATELETCT 667* 615* 489*       Imaging  X-Ray:  I have personally reviewed the images and compared with prior images. and My  impression is: Increased right lower lobe subsegmental atelectasis and improve left lower lobe opacification    Assessment/Plan  * Acute respiratory failure with hypoxia and hypercapnia (HCC)- (present on admission)  Assessment & Plan  Intubated 9/9-9/12, 9/17-9/19, 9/20-9/23  Trach 9/23  Continue vent support at night and as necessary during the day  T-piece trials during the day as tolerated  He needs nocturnal ventilatory support - will try to accomplish with nocturnal BiPAP attached to his trach  He is a candidate for Trilogy support    COPD (chronic obstructive pulmonary disease) ([facility])  Assessment & Plan  Continue bronchodilators    Pneumonia- (present on admission)  Assessment & Plan  MSSA in sputum on 9/10  Usual ashwin in sputum culture on 9/17  S/P 3 days of Zosyn followed by 3 days of Unasyn which completed on 9/15  Second Unasyn course from 9/18-9/23  Observe off antibiotics    Paroxysmal atrial fibrillation (HCC)- (present on admission)  Assessment & Plan  Continue amiodarone, 200 mg daily  Echo with normal LV systolic function and normal size of LA  Continue anticoagulation with rivaroxaban  Optimize magnesium and potassium    Malnutrition (HCC)  Assessment & Plan  Continue nutritional support    Dysphagia- (present on admission)  Assessment & Plan  Continue speech therapy    Major depression- (present on admission)  Assessment & Plan  Continue Cymbalta    Right ventricular dilation- (present on admission)  Assessment & Plan  Echo confirms severely dilated RV with mild RV systolic dysfunction  RVSP 42 mmHg       VTE:  NOAC  Ulcer: PPI  Lines: Orozco Catheter  Ongoing indication addressed    I have performed a physical exam and reviewed and updated ROS and Plan today (10/4/2020). In review of yesterday's note (10/3/2020), there are no changes except as documented above.     I have assessed and reassessed his respiratory status with ventilator adjustments and spontaneous breathing trials, ventilator  waveforms, airway mechanics, hemodynamics, blood pressure, cardiovascular status and his neurologic status.  He is at increased risk for worsening cardiovascular and respiratory system dysfunction.    Discussed patient condition and risk of morbidity and/or mortality with RN, RT, Pharmacy, Charge nurse / hot rounds and QA team     The patient remains critically ill.  Critical care time = 35 minutes in directly providing and coordinating critical care and extensive data review.  No time overlap and excludes procedures.    Joe Grimaldo MD  Pulmonary and Critical Care Medicine

## 2020-10-05 NOTE — CARE PLAN
Problem: Fluid Volume:  Goal: Will maintain balanced intake and output  Outcome: PROGRESSING AS EXPECTED     Problem: Communication  Goal: The ability to communicate needs accurately and effectively will improve  Outcome: PROGRESSING AS EXPECTED     Problem: Safety  Goal: Will remain free from injury  Outcome: PROGRESSING AS EXPECTED     Problem: Discharge Barriers/Planning  Goal: Patient's continuum of care needs will be met  Outcome: PROGRESSING AS EXPECTED     Problem: Respiratory:  Goal: Respiratory status will improve  Outcome: PROGRESSING AS EXPECTED     Problem: Skin Integrity  Goal: Risk for impaired skin integrity will decrease  Outcome: PROGRESSING AS EXPECTED     Problem: Pain Management  Goal: Pain level will decrease to patient's comfort goal  Outcome: PROGRESSING AS EXPECTED

## 2020-10-05 NOTE — THERAPY
Speech Language Pathology  Daily Treatment     Patient Name: Jas Vann  Age:  54 y.o., Sex:  male  Medical Record #: [id number]  Today's Date: 10/5/2020     Precautions  Precautions: Fall Risk, Tracheostomy , Nasogastric Tube, Swallow Precautions ( See Comments)  Comments: PMSV during the day w/ direct supevision by trained RT/RN/SLP    Assessment    Pt was seen for dysphagia and speaking valve tx this morning while sitting upright in chair, currently on 10L 40% via T-piece. Baseline vitals were as follows: HR 93; O2 92%. In-line tracheal and subglottic suctioning yielding minimal thin clear secretions. Subglottic port secretions were slightly blood tinged. Cuff was deflated and speaking valve was placed in-line with T-piece. Pt initially coughed, gagged and had transient change in vitals though returned to baseline after ~1-2 minutes and was able to produce a strong, clear voice. Vitals remained stable for the remainder of the session (~30 minutes) and actually improved from his baseline. Pt performed swallowing exercises targeting laryngeal elevation, tongue base strength and pharyngeal constriction with min-mod verbal/written cues. He swallowed ice chips (x11) with 4-6 effortful swallows, throat clear and f/u swallow. Recommend speaking valve placement during waking hours w/ direct supervision by trained RT/RN/SLP. Ok for 5-10 ice chips w/ RN while wearing the speaking valve. SLP will plan to complete MBS once pt is medically cleared to transport out of ICU w/ MD order.     Plan    Recommend speaking valve placement during waking hours w/ direct supervision by trained RT/RN/SLP.   Ok for 5-10 ice chips w/ RN while wearing the speaking valve.   SLP will plan to complete MBS once pt is medically cleared to transport out of ICU w/ MD order.     Continue current treatment plan.    Discharge Recommendations: Recommend post-acute placement for additional speech therapy services prior to discharge  "home    Subjective    \"I want to talk to my doctor.\"     Objective       10/05/20 0915   Vitals   O2 (LPM) 10   O2 Delivery Device T-Piece   Voice   Modulating Loudness Supervision (5)   Respiration Training Supervision (5)   Comments PMSV tolerated well for 30 minutes   Dysphagia    Positioning / Behavior Modification Modulate Rate or Bite Size;Effortful Swallow;Multiple Swallows;Self Monitoring;Cough / Clear after Swallow   Oral / Pharyngeal / Laryngeal Exercises Laryngeal Exercises;Base of Tongue Exercises;Pharyngeal Constriction Exercises   Other Treatments PO trials of ice chips   Diet / Liquid Recommendation NPO;Pre-Feeding Trials with SLP Only  (ok for 5-10 ice chips/hr w/ RN)   Recommended Route of Medication Administration   Medication Administration  Via Gastric Tube   Short Term Goals   Short Term Goal # 1 MODIFIED ON 9/28: Patient will tolerate the speaking valve for >30 minutes INTERVALS with no significant change in vitals given close supervision from RT, RN, Therapy   Goal Outcome # 1 Progressing as expected   Short Term Goal # 1 B  NEW 10/5: Patient will complete pharyngeal/laryngeal/BoT exercises to improve swallow function 10-15x with min cues.   Short Term Goal # 2 Pt will participate in prefeeding trials 1:1 with SLP with no clinical  s/sx of aspiration   Goal Outcome # 2  Progressing as expected         "

## 2020-10-05 NOTE — PROGRESS NOTES
Critical Care Progress Note    Date of admission  9/9/2020    Chief Complaint  54 y.o. male with H/o Afib admitted 9/9/2020 with respiratory failure, sepsis and pneumonia - intubated in the ER.    Hospital Course   9/17 -    developed worsening hypercarbic and hypoxemic respiratory failure requiring emergent intubation.  Full vent support.  9/18 -    continue vent support.  Titrating phenylephrine.  Replete electrolytes.  SAT/SBT as tolerated.  9/19 -    SAT/SBT as tolerated.  Continue vent support.  Titrating phenylephrine.  Replete potassium.  Continue Unasyn.  9/20 -    liberated from the ventilator yesterday and did very well until last night when he required reintubation.  Continue vent support.  Continue Unasyn and complete 5 days of therapy.  10/3 -    continue vent support.  T-piece trials during the day as tolerated.  10/4 -    continue T-piece trials during the day as tolerated.  Nocturnal ventilatory support.  10/5 -nocturnal vent support and T-piece trials as they as tolerated, may need LTAC/home vent after that-trilogy?      Interval Problem Update  Reviewed last 24 hour events:    Awake and follows  AF 70s  SBp 80-110s  Afebrile  TF 70 goal  UO adequate  Vent 4  Trach 13  %, 8, 30 % HS  Tpiece days - 40%  Min secretions  CXR yesterday noted      LTACH vs Trilogy,  has consultation request for LTAC transfer to facility in [place] where he lives, limited family support per patient unfortunately, may need chronic placement and ventilator facility       YESTERDAY  Vent 3  Trach 12  Rest - ASV  120% - PEEP 8  30%  T-piece during day  Xarelto  TF at goal  AF      Review of Systems  Review of Systems   Unable to perform ROS: Acuity of condition        Vital Signs for last 24 hours   Temp:  [36.2 °C (97.2 °F)-36.8 °C (98.3 °F)] 36.8 °C (98.3 °F)  Pulse:  [] 82  Resp:  [18-45] 29  BP: ()/(51-76) 100/70  SpO2:  [82 %-100 %] 100 %    Hemodynamic parameters for last 24 hours        Respiratory Information for the last 24 hours  Vent Mode: ASV  PEEP/CPAP: 8  MAP: 17  Control VTE (exp VT): 280    Physical Exam   Physical Exam  Vitals signs reviewed.   Constitutional:       Appearance: He is normal weight. He is ill-appearing. He is not toxic-appearing or diaphoretic.      Comments: On ventilator   HENT:      Head: Normocephalic and atraumatic.      Right Ear: External ear normal.      Left Ear: External ear normal.      Nose: Nose normal.      Mouth/Throat:      Mouth: Mucous membranes are moist.      Pharynx: Oropharynx is clear.   Eyes:      General: No scleral icterus.     Conjunctiva/sclera: Conjunctivae normal.      Pupils: Pupils are equal, round, and reactive to light.   Neck:      Musculoskeletal: Normal range of motion. No neck rigidity or muscular tenderness.      Vascular: No JVD.      Comments: Tracheostomy site without obvious infection  Cardiovascular:      Rate and Rhythm: Normal rate. Rhythm irregularly irregular.  No extrasystoles are present.     Pulses: Normal pulses.      Heart sounds: No murmur. No gallop.       Comments: Atrial fibrillation, rate controlled  Pulmonary:      Effort: No respiratory distress (On vent support).      Breath sounds: Rales (Scattered coarse crackles) present. No wheezing.   Abdominal:      General: Bowel sounds are normal. There is no distension.      Palpations: Abdomen is soft.      Tenderness: There is no abdominal tenderness. There is no rebound.      Comments: Tolerating enteral tube feedings   Musculoskeletal: Normal range of motion.   Skin:     General: Skin is warm and dry.      Capillary Refill: Capillary refill takes less than 2 seconds.      Coloration: Skin is not cyanotic.      Nails: There is no clubbing.     Neurological:      Mental Status: He is lethargic.      GCS: GCS eye subscore is 4. GCS verbal subscore is 1. GCS motor subscore is 6.      Comments: Awake and alert.  Brainstem reflexes intact.  Nods and follows.  Moves all  4 extremities.   Psychiatric:         Behavior: Behavior is cooperative.       Medications  Current Facility-Administered Medications   Medication Dose Route Frequency Provider Last Rate Last Dose   • multivitamin (THERAGRAN) tablet 1 Tab  1 Tab Enteral Tube DAILY Joe Grimaldo M.D.   1 Tab at 10/05/20 0509   • levalbuterol (XOPENEX) 1.25 MG/3ML nebulizer solution 1.25 mg  1.25 mg Nebulization 4X/DAY (RT) Joe Grimaldo M.D.   1.25 mg at 10/05/20 1934   • acetylcysteine (MUCOMYST) 20 % solution 3-5 mL  3-5 mL Inhalation 4X/DAY (RT) Jus Daniel M.D.   4 mL at 10/05/20 1934   • midodrine (PROAMATINE) tablet 5 mg  5 mg Enteral Tube Q8HRS Sj Mishra M.D.   5 mg at 10/05/20 2021   • acetaminophen (TYLENOL) tablet 650 mg  650 mg Enteral Tube Q6HRS PRN Casey Fuchs M.D.   650 mg at 10/05/20 1739   • ondansetron (ZOFRAN ODT) dispertab 4 mg  4 mg Enteral Tube Q4HRS PRN Francisco Novoa M.D.       • Respiratory Therapy Consult   Nebulization Continuous RT Jus Daniel M.D.       • senna-docusate (PERICOLACE or SENOKOT S) 8.6-50 MG per tablet 2 Tab  2 Tab Enteral Tube BID Jus Daniel M.D.   2 Tab at 10/05/20 0509    And   • polyethylene glycol/lytes (MIRALAX) PACKET 1 Packet  1 Packet Enteral Tube QDAY PRN Jus Daniel M.D.        And   • magnesium hydroxide (MILK OF MAGNESIA) suspension 30 mL  30 mL Enteral Tube QDAY PRN Jus Daniel M.D.        And   • bisacodyl (DULCOLAX) suppository 10 mg  10 mg Rectal QDAY PRN Jus Daniel M.D.       • lidocaine (XYLOCAINE) 1 % injection 1-2 mL  1-2 mL Tracheal Tube Q30 MIN PRN Jus Daniel M.D.       • omeprazole (FIRST-OMEPRAZOLE) 2 mg/mL oral susp 40 mg  40 mg Enteral Tube DAILY Joe Grimaldo M.D.   40 mg at 10/05/20 0458   • levalbuterol (XOPENEX) 1.25 MG/3ML nebulizer solution 1.25 mg  1.25 mg Nebulization Q4H PRN (RT) Joe Grimaldo M.D.       • Pharmacy Consult: Enteral tube insertion - review meds/change route/product  selection  1 Each Other PHARMACY TO DOSE Joe Grimaldo M.D.       • amiodarone (CORDARONE) tablet 200 mg  200 mg Enteral Tube Q DAY Joe Grimaldo M.D.   200 mg at 10/05/20 0509   • furosemide (LASIX) tablet 20 mg  20 mg Enteral Tube Q DAY Joe Grimaldo M.D.   20 mg at 10/05/20 0509   • DULoxetine (CYMBALTA) capsule 60 mg  60 mg Enteral Tube QHS Joe Grimaldo M.D.   60 mg at 10/05/20 2021   • topiramate (TOPAMAX) tablet 75 mg  75 mg Enteral Tube DAILY Joe Grimaldo M.D.   75 mg at 10/05/20 0511   • rivaroxaban (XARELTO) tablet 20 mg  20 mg Enteral Tube PM MEAL Joe Grimaldo M.D.   20 mg at 10/05/20 1739   • lidocaine (LIDODERM) 5 % 2 Patch  2 Patch Transdermal Q24HR Francisco Duval M.D.   2 Patch at 10/05/20 1851   • promethazine (PHENERGAN) tablet 12.5-25 mg  12.5-25 mg Enteral Tube Q4HRS PRN Francisco Duval M.D.       • ondansetron (ZOFRAN) syringe/vial injection 4 mg  4 mg Intravenous Q4HRS PRN Arcadio Bangura M.D.   4 mg at 10/02/20 0639   • promethazine (PHENERGAN) suppository 12.5-25 mg  12.5-25 mg Rectal Q4HRS PRN Arcadio Bangura M.D.       • prochlorperazine (COMPAZINE) injection 5-10 mg  5-10 mg Intravenous Q4HRS PRN Arcadio Bangura M.D.           Fluids    Intake/Output Summary (Last 24 hours) at 10/5/2020 2027  Last data filed at 10/5/2020 1800  Gross per 24 hour   Intake 1690 ml   Output 1700 ml   Net -10 ml       Laboratory  Recent Labs     10/03/20  0449   ISTATAPH 7.512*   ISTATAPCO2 37.3*   ISTATAPO2 51*   ISTATATCO2 31   [id number] 89*   ISTATARTHCO3 30.0*   ISTATARTBE 7*   ISTATTEMP 97.9 F   ISTATFIO2 30   ISTATSPEC Arterial   ISTATAPHTC 7.518*   [id number] 49*         Recent Labs     10/03/20  0528 10/04/20  0500 10/05/20  0505   SODIUM 137 135 133*   POTASSIUM 4.1 4.3 4.1   CHLORIDE 100 100 97   CO2 30 28 29   BUN 21 15 15   CREATININE 0.50 0.37* 0.49*   MAGNESIUM 2.3 2.2 2.3   PHOSPHORUS 2.1* 2.9 3.5   CALCIUM 8.4* 8.2* 8.3*     Recent  Labs     10/03/20  0528 10/04/20  0500 10/05/20  0505 10/05/20  1250   PREALBUMIN  --   --   --  11.4*   GLUCOSE 98 88 88  --      Recent Labs     10/03/20  0528 10/04/20  0500 10/05/20  0505   WBC 15.0* 9.1 7.9   NEUTSPOLYS 86.00* 81.70* 82.60*   LYMPHOCYTES 4.80* 7.50* 7.40*   MONOCYTES 7.70 9.10 8.30   EOSINOPHILS 0.20 0.80 0.80   BASOPHILS 0.40 0.50 0.50     Recent Labs     10/03/20  0528 10/04/20  0500 10/05/20  0505   RBC 3.10* 2.89* 2.94*   HEMOGLOBIN 8.5* 7.9* 7.9*   HEMATOCRIT 29.0* 27.3* 27.2*   PLATELETCT 615* 489* 421       Imaging  X-Ray:  I have personally reviewed the images and compared with prior images.  EKG:  I have personally reviewed the images and compared with prior images.  CT:    Reviewed  Echo:   Reviewed    Assessment/Plan  * Acute respiratory failure with hypoxia and hypercapnia (HCC)- (present on admission)  Assessment & Plan  Intubated 9/9-9/12, 9/17-9/19, 9/20-9/23  Trach 9/23  Continue vent support at night and as necessary during the day  T-piece trials during the day as tolerated, consider ABG at the end of the day to evaluate for development of hypercarbia over 12 hours  He needs nocturnal ventilatory support - will try to accomplish with nocturnal BiPAP attached to his trach  He is a candidate for Trilogy support  Patient is deconditioned, continue therapies    Best option long-term would be a prolonged rehab ventilator weaning course at a long-term acute care facility and if he fails this consider home trilogy ventilator authorization, patient from [place] and case management working on referring patient to appropriate facility    COPD (chronic obstructive pulmonary disease) (HCC)  Assessment & Plan  Continue bronchodilators  RT protocols  With JONATHAN. fib using Xopenex over albuterol  Pulmonary toilet    Pneumonia- (present on admission)  Assessment & Plan  MSSA in sputum on 9/10  Usual ashwin in sputum culture on 9/17  S/P 3 days of Zosyn followed by 3 days of Unasyn which  completed on 9/15  Second Unasyn course from 9/18-9/23  Currently observing off antibiotics  Update vaccines as clinically appropriate    Paroxysmal atrial fibrillation (HCC)- (present on admission)  Assessment & Plan  Continue amiodarone, 200 mg daily  Echo with normal LV systolic function and normal size of LA however significant RV abnormalities and pulmonary hypertension is noted  Continue anticoagulation with rivaroxaban, monitor for bleeding  Optimize magnesium and potassium    Thrombocytosis (HCC)  Assessment & Plan  Improved, continue serial CBC    Malnutrition (HCC)  Assessment & Plan  Continue nutritional support  Dietary following  Enteral nutrition    Normocytic anemia- (present on admission)  Assessment & Plan  Serial CBC  Transfuse per protocols  Not bleeding clinically but patient is at risk of being on chronic anticoagulation and chronically on the vent    Dysphagia- (present on admission)  Assessment & Plan  Continue speech therapy    Pulmonary hypertension (HCC)- (present on admission)  Assessment & Plan  RVSP 42  Treat underlying process  Diuresis as needed  Serial echo as clinically appropriate    Major depression- (present on admission)  Assessment & Plan  Continue Cymbalta    Right ventricular dilation- (present on admission)  Assessment & Plan  Echo confirms severely dilated RV with mild RV systolic dysfunction  Presumably related to chronic hypoxemia and secondary pulmonary pretension  RVSP 42 mmHg       VTE:  NOAC  Ulcer: PPI  Lines: Orozco Catheter  Ongoing indication addressed    I have performed a physical exam and reviewed and updated ROS and Plan today (10/5/2020). In review of yesterday's note (10/4/2020), there are no changes except as documented above.     I have assessed and reassessed his respiratory status with ventilator adjustments and spontaneous breathing trials, ventilator waveforms, airway mechanics, hemodynamics, blood pressure, cardiovascular status and his neurologic  status.  He is at increased risk for worsening cardiovascular and respiratory system dysfunction.  His prognosis long-term is poor and he is likely to remain on the ventilator.    Discussed patient condition and risk of morbidity and/or mortality with RN, RT, Pharmacy, , Charge nurse / hot rounds and Patient     The patient remains critically ill.  Critical care time = 40 minutes in directly providing and coordinating critical care and extensive data review.  No time overlap and excludes procedures.

## 2020-10-05 NOTE — DISCHARGE PLANNING
Anticipated Discharge Disposition: LTAC vs SNF    Action: left 2 voicemails for assistant ALEXEY Macias [phone] at [facility]. No return call. Spoke w/ LSW Courtney (called through main line [phone] because Courtney's extension not working [phone]) at [facility]. Asked Courtney if she has list of LTAC's in [place]. Courtney states she doesn't have a list. Asked Courtney if possible for pt to return to [facility] w/ home ventilator. Courtney states she needs to check lyric/ Gilberto first and will get back to this writer. Haven't received update from Courtney. Called and left VM for Courtney.    Barriers to Discharge:   No NOK  No support  MediCal insurance  Ventilator dependent (attempting to wean pt off)    Plan: TBD

## 2020-10-06 NOTE — ASSESSMENT & PLAN NOTE
RVSP 42  Treat underlying process, ongoing  Maintain normal oxygenation, suspect chronic hypoxemia primary etiology  Diuresis as needed  Serial echo as clinically appropriate.

## 2020-10-06 NOTE — CARE PLAN
Ventilator Daily Summary    Vent Day # 5; Trach Day #14    8.0 Portex    %, +8, 40%    T-Piece 10L / 40%      Ventilator settings changed this shift: NONE    Weaning trials: T-Piece during day.    Respiratory Procedures:    Plan: Continue current ventilator settings and wean mechanical ventilation as tolerated per physician orders.

## 2020-10-06 NOTE — CARE PLAN
Problem: Fluid Volume:  Goal: Will maintain balanced intake and output  Outcome: PROGRESSING AS EXPECTED     Problem: Communication  Goal: The ability to communicate needs accurately and effectively will improve  Outcome: PROGRESSING AS EXPECTED     Problem: Safety  Goal: Will remain free from injury  Outcome: PROGRESSING AS EXPECTED  Goal: Will remain free from falls  Outcome: PROGRESSING AS EXPECTED     Problem: Infection  Goal: Will remain free from infection  Outcome: PROGRESSING AS EXPECTED

## 2020-10-06 NOTE — DISCHARGE PLANNING
Received Choice form at 0910  Agency/Facility Name: [facility] ([place])  Referral sent per Choice form at 0915    1020- Spoke To: Response notification    [facility] [place]  Plan or Request: pt declined / straight [facility]    1335- Referral sent per choice to [place], [place]

## 2020-10-06 NOTE — DISCHARGE PLANNING
Anticipated Discharge Disposition: LTAC    Action:   0910 haven't received call from assistant ALEXEY Macias or REID Valdez at [facility]. Left another VM for Courtney. Attempted to call Gilberto but no answer. Attempted to google LTAC's in [place] but didn't get helpful search results. [facility]'s suggested by cca. Requested cca to send referral to [facility]. Attempted to call sister Jessie [facility] [phone] as listed on facesheet. No answer, no VM box available. Requested  to do NOK search.  10:38   2 NOK search results:  Mica Huerta [phone], called number, number disconneted  Jessie Moore, [phone], called number, number not accepting calls  1324: received referral decline via fax from [facility] due to straight MediCal. Attempted to call admissions at [facility] to confirm decline reason, [phone], no answer, left VM, no return calls.  1336: received call from Courtney at [facility]. Courtney states they don't do ventilator or home ventilator at [facility] and suggests trying [facility] and [facility]. Courtney states she has no information on straight MediCal. Requested cca to send referral to [facility].  1454: received call from chief nursing officer Francisco at [facility]. Francisco states they don't do ventilators at [facility].      Barriers to Discharge:   No NOK  No support  MediCal insurance  Ventilator dependent (attempting to wean pt off)       Plan: TBD

## 2020-10-06 NOTE — THERAPY
"Physical Therapy   Daily Treatment     Patient Name: Jas Vann  Age:  54 y.o., Sex:  male  Medical Record #: [id number]  Today's Date: 10/6/2020     Precautions: Fall Risk, Tracheostomy , Nasogastric Tube, Swallow Precautions ( See Comments)    Assessment    Patient progressing with functional mobility. He is primarily limited by decreased activity tolerance, O2 needs, and lines/tubes. He ambulated approximately 25ft x3 with FWW and min A. He appears to be demonstrating near baseline gait mechanics. He performed bed mobility and transfers at supervision level. Recommend patient mobilize with RN staff 3x/day to progress endurance and activity tolerance. Will continue to follow.    Plan    Continue current treatment plan.    DC Equipment Recommendations: Unable to determine at this time, Front-Wheel Walker  Discharge Recommendations: Recommend post-acute placement for additional physical therapy services prior to discharge home      Subjective    Agreeable. \"I need pants.\" (written; then requesting shorts in patient belonging bag that were soiled and attempting to don for ambulation)     Objective       10/06/20 1142   Cognition    Cognition / Consciousness X   Speech/ Communication Writes Comprehensible Notes;Nods Appropriately   Level of Consciousness Alert   Attention Impaired   Comments cooperative. wanted to don soiled shorts for ambulation   Balance   Sitting Balance (Static) Good   Sitting Balance (Dynamic) Good   Standing Balance (Static) Fair +   Standing Balance (Dynamic) Fair   Comments with FWW   Gait Analysis   Gait Level Of Assist Minimal Assist   Assistive Device Front Wheel Walker   Distance (Feet) 25   # of Times Distance was Traveled 3   Deviation   (decreased zenia)   # of Stairs Climbed 0   Comments kyphotic posture persists, per chart review this is baseline. Required seated rest between ambulation bouts. Min A for line management   Bed Mobility    Supine to Sit Supervised   Sit to Supine " Supervised   Scooting Supervised   Functional Mobility   Sit to Stand Supervised   Bed, Chair, Wheelchair Transfer Supervised   Transfer Method Stand Step   Short Term Goals    Short Term Goal # 1 Pt will ambulate 150 feet with FWW with SPV within 6 sessions to improve endurance   Goal Outcome # 1 goal not met   Short Term Goal # 2 Pt will verbalize and demonstrate energy conservation strategies with mobility to improve endurance with mobility tasks upon DC   Goal Outcome # 2 Progressing as expected   Anticipated Discharge Equipment and Recommendations   DC Equipment Recommendations Unable to determine at this time;Front-Wheel Walker   Discharge Recommendations Recommend post-acute placement for additional physical therapy services prior to discharge home

## 2020-10-06 NOTE — PROGRESS NOTES
Critical Care Progress Note    Date of admission  9/9/2020    Chief Complaint  54 y.o. male with H/o Afib admitted 9/9/2020 with respiratory failure, sepsis and pneumonia - intubated in the ER.    Hospital Course   9/17 -    developed worsening hypercarbic and hypoxemic respiratory failure requiring emergent intubation.  Full vent support.  9/18 -    continue vent support.  Titrating phenylephrine.  Replete electrolytes.  SAT/SBT as tolerated.  9/19 -    SAT/SBT as tolerated.  Continue vent support.  Titrating phenylephrine.  Replete potassium.  Continue Unasyn.  9/20 -    liberated from the ventilator yesterday and did very well until last night when he required reintubation.  Continue vent support.  Continue Unasyn and complete 5 days of therapy.  10/3 -    continue vent support.  T-piece trials during the day as tolerated.  10/4 -    continue T-piece trials during the day as tolerated.  Nocturnal ventilatory support.  10/5 - nocturnal vent support and T-piece trials as they as tolerated, may need LTAC/home vent after that-trilogy?  10/6 - HS ASV vent, days Tpiece w/PSMV at times, still fatigued at end of day, CM working on dispo       Interval Problem Update  Reviewed last 24 hour events:    Alert and following  SR 70s  SBp 100s  Vent 5 this 4th course on vent  Trach 14  All day Tpiece - HS ASV  40%, PEEP 8  Mod secretions  No CXR  Xarelto  PPI    ABG pre restart vent to check pCO2, d/w RT who will obtain     YESTERDAY  Awake and follows  AF 70s  SBp 80-110s  Afebrile  TF 70 goal  UO adequate  Vent 4  Trach 13  %, 8, 30 % HS  Tpiece days - 40%  Min secretions  CXR yesterday noted      LTACH vs Trilogy,  has consultation request for LTAC transfer to facility in [place] where he lives, limited family support per patient unfortunately, may need chronic placement and ventilator facility    Review of Systems  Review of Systems   Unable to perform ROS: Acuity of condition        Vital Signs for  last 24 hours   Temp:  [36.2 °C (97.2 °F)-36.7 °C (98.1 °F)] 36.4 °C (97.6 °F)  Pulse:  [52-87] 82  Resp:  [12-42] 22  BP: ()/(43-76) 108/61  SpO2:  [96 %-100 %] 100 %    Hemodynamic parameters for last 24 hours       Respiratory Information for the last 24 hours  Vent Mode: ASV  PEEP/CPAP: 8  MAP: 16  Control VTE (exp VT): 340    Physical Exam   Physical Exam  Vitals signs reviewed.   Constitutional:       Appearance: He is normal weight. He is ill-appearing. He is not toxic-appearing or diaphoretic.      Comments: On ventilator   HENT:      Head: Normocephalic and atraumatic.      Right Ear: External ear normal.      Left Ear: External ear normal.      Nose: Nose normal.      Mouth/Throat:      Mouth: Mucous membranes are moist.      Pharynx: Oropharynx is clear.   Eyes:      General: No scleral icterus.     Conjunctiva/sclera: Conjunctivae normal.      Pupils: Pupils are equal, round, and reactive to light.   Neck:      Musculoskeletal: Normal range of motion. No neck rigidity or muscular tenderness.      Vascular: No JVD.      Comments: Tracheostomy site without obvious infection  Cardiovascular:      Rate and Rhythm: Normal rate. Rhythm irregularly irregular.  No extrasystoles are present.     Pulses: Normal pulses.      Heart sounds: No murmur. No gallop.       Comments: Atrial fibrillation, rate controlled  Pulmonary:      Effort: No respiratory distress (On vent support).      Breath sounds: Rales (Scattered coarse crackles) present. No wheezing.   Abdominal:      General: Bowel sounds are normal. There is no distension.      Palpations: Abdomen is soft.      Tenderness: There is no abdominal tenderness. There is no rebound.      Comments: Tolerating enteral tube feedings   Musculoskeletal: Normal range of motion.   Skin:     General: Skin is warm and dry.      Capillary Refill: Capillary refill takes less than 2 seconds.      Coloration: Skin is not cyanotic.      Nails: There is no clubbing.      Neurological:      Mental Status: He is lethargic.      GCS: GCS eye subscore is 4. GCS verbal subscore is 1. GCS motor subscore is 6.      Comments: Awake and alert.  Brainstem reflexes intact.  Nods and follows.  Moves all 4 extremities.   Psychiatric:         Behavior: Behavior is cooperative.       Medications  Current Facility-Administered Medications   Medication Dose Route Frequency Provider Last Rate Last Dose   • multivitamin (THERAGRAN) tablet 1 Tab  1 Tab Enteral Tube DAILY Joe Grimaldo M.D.   1 Tab at 10/06/20 0354   • levalbuterol (XOPENEX) 1.25 MG/3ML nebulizer solution 1.25 mg  1.25 mg Nebulization 4X/DAY (RT) Joe Grimaldo M.D.   1.25 mg at 10/06/20 1909   • acetylcysteine (MUCOMYST) 20 % solution 3-5 mL  3-5 mL Inhalation 4X/DAY (RT) Jus Daniel M.D.   4 mL at 10/06/20 1909   • midodrine (PROAMATINE) tablet 5 mg  5 mg Enteral Tube Q8HRS Sj Mishra M.D.   5 mg at 10/06/20 1418   • acetaminophen (TYLENOL) tablet 650 mg  650 mg Enteral Tube Q6HRS PRN Casey Fuchs M.D.   650 mg at 10/06/20 1924   • ondansetron (ZOFRAN ODT) dispertab 4 mg  4 mg Enteral Tube Q4HRS PRN Francisco Novoa M.D.       • Respiratory Therapy Consult   Nebulization Continuous RT Jus Daniel M.D.       • senna-docusate (PERICOLACE or SENOKOT S) 8.6-50 MG per tablet 2 Tab  2 Tab Enteral Tube BID Jus Daniel M.D.   2 Tab at 10/06/20 1733    And   • polyethylene glycol/lytes (MIRALAX) PACKET 1 Packet  1 Packet Enteral Tube QDAY PRN Jus Daniel M.D.        And   • magnesium hydroxide (MILK OF MAGNESIA) suspension 30 mL  30 mL Enteral Tube QDAY PRN Jus Daniel M.D.        And   • bisacodyl (DULCOLAX) suppository 10 mg  10 mg Rectal QDAY PRN Jus Daniel M.D.       • lidocaine (XYLOCAINE) 1 % injection 1-2 mL  1-2 mL Tracheal Tube Q30 MIN PRN Jus Daniel M.D.       • omeprazole (FIRST-OMEPRAZOLE) 2 mg/mL oral susp 40 mg  40 mg Enteral Tube DAILY Joe Grimaldo M.D.   40 mg at  10/06/20 0404   • levalbuterol (XOPENEX) 1.25 MG/3ML nebulizer solution 1.25 mg  1.25 mg Nebulization Q4H PRN (RT) Joe Grimaldo M.D.       • Pharmacy Consult: Enteral tube insertion - review meds/change route/product selection  1 Each Other PHARMACY TO DOSE Joe Grimaldo M.D.       • amiodarone (CORDARONE) tablet 200 mg  200 mg Enteral Tube Q DAY Joe Grimaldo M.D.   200 mg at 10/06/20 0352   • furosemide (LASIX) tablet 20 mg  20 mg Enteral Tube Q DAY Joe Grimaldo M.D.   20 mg at 10/06/20 0352   • DULoxetine (CYMBALTA) capsule 60 mg  60 mg Enteral Tube QHS Joe Grimaldo M.D.   60 mg at 10/05/20 2021   • topiramate (TOPAMAX) tablet 75 mg  75 mg Enteral Tube DAILY Joe Grimaldo M.D.   75 mg at 10/06/20 0355   • rivaroxaban (XARELTO) tablet 20 mg  20 mg Enteral Tube PM MEAL Joe Grimaldo M.D.   20 mg at 10/06/20 1733   • lidocaine (LIDODERM) 5 % 2 Patch  2 Patch Transdermal Q24HR Francisco Duval M.D.   2 Patch at 10/06/20 1735   • promethazine (PHENERGAN) tablet 12.5-25 mg  12.5-25 mg Enteral Tube Q4HRS PRN Francisco Duval M.D.       • ondansetron (ZOFRAN) syringe/vial injection 4 mg  4 mg Intravenous Q4HRS PRN Arcadio Bangura M.D.   4 mg at 10/02/20 0639   • promethazine (PHENERGAN) suppository 12.5-25 mg  12.5-25 mg Rectal Q4HRS PRN Arcadio Bangura M.D.       • prochlorperazine (COMPAZINE) injection 5-10 mg  5-10 mg Intravenous Q4HRS PRN Arcadio Bangura M.D.           Fluids    Intake/Output Summary (Last 24 hours) at 10/6/2020 1941  Last data filed at 10/6/2020 1800  Gross per 24 hour   Intake 1770 ml   Output 1900 ml   Net -130 ml       Laboratory          Recent Labs     10/04/20  0500 10/05/20  0505 10/06/20  0850   SODIUM 135 133* 134*   POTASSIUM 4.3 4.1 4.1   CHLORIDE 100 97 101   CO2 28 29 26   BUN 15 15 15   CREATININE 0.37* 0.49* 0.38*   MAGNESIUM 2.2 2.3 2.2   PHOSPHORUS 2.9 3.5 3.6   CALCIUM 8.2* 8.3* 8.1*     Recent Labs      10/04/20  0500 10/05/20  0505 10/05/20  1250 10/06/20  0850   PREALBUMIN  --   --  11.4*  --    GLUCOSE 88 88  --  105*     Recent Labs     10/04/20  0500 10/05/20  0505 10/06/20  0850   WBC 9.1 7.9 9.2   NEUTSPOLYS 81.70* 82.60* 85.70*   LYMPHOCYTES 7.50* 7.40* 5.90*   MONOCYTES 9.10 8.30 6.90   EOSINOPHILS 0.80 0.80 0.70   BASOPHILS 0.50 0.50 0.50     Recent Labs     10/04/20  0500 10/05/20  0505 10/06/20  0850   RBC 2.89* 2.94* 3.13*   HEMOGLOBIN 7.9* 7.9* 8.4*   HEMATOCRIT 27.3* 27.2* 28.3*   PLATELETCT 489* 421 517*       Imaging  X-Ray:  I have personally reviewed the images and compared with prior images.  EKG:  I have personally reviewed the images and compared with prior images.  CT:    Reviewed  Echo:   Reviewed    Assessment/Plan  * Acute respiratory failure with hypoxia and hypercapnia (HCC)- (present on admission)  Assessment & Plan  Intubated 9/9-9/12, 9/17-9/19, 9/20-9/23 - now on 4th course on vent  Trach 9/23  Continue vent support at night and as necessary during the day  T-piece trials during the day as tolerated, consider ABG at the end of the day to evaluate for development of hypercarbia over 12 hours  He needs nocturnal ventilatory support - will try to accomplish with nocturnal BiPAP attached to his trach  He is a candidate for Trilogy support  Patient is deconditioned, continue therapies    Best option long-term would be a prolonged rehab ventilator weaning course at a long-term acute care facility and if he fails this consider home trilogy ventilator authorization, patient from [place] and case management working on referring patient to appropriate facility    COPD (chronic obstructive pulmonary disease) (HCC)  Assessment & Plan  Appears to be end-stage, patient wants to continue on the ventilator and go through rehab for now  Continue bronchodilators  RT protocols  With JONATHAN. edi using Xopenex over albuterol  Pulmonary toilet    Pneumonia- (present on admission)  Assessment & Plan  MSSA in  sputum on 9/10  Usual ashwin in sputum culture on 9/17  S/P 3 days of Zosyn followed by 3 days of Unasyn which completed on 9/15  Second Unasyn course from 9/18-9/23  Currently observing off antibiotics, remains afebrile without new S/S of infection  Update vaccines as clinically appropriate    Paroxysmal atrial fibrillation (HCC)- (present on admission)  Assessment & Plan  Continue amiodarone, 200 mg daily  Echo with normal LV systolic function and normal size of LA however significant RV abnormalities and pulmonary hypertension is noted  Continue anticoagulation with rivaroxaban, monitor for bleeding  Optimize electrolytes    Thrombocytosis (HCC)  Assessment & Plan  Return to normal, continue serial CBC    Malnutrition (HCC)  Assessment & Plan  Continue nutritional support  Dietary following  Enteral nutrition, failed swallowing eval    Normocytic anemia- (present on admission)  Assessment & Plan  Serial CBC  Transfuse per protocols  Not bleeding clinically but patient is at risk of being on chronic anticoagulation and chronically on the vent    Dysphagia- (present on admission)  Assessment & Plan  Continue speech therapy  Swallow eval when/if improves enough  Passy-Nicky valve trials    Pulmonary hypertension (HCC)- (present on admission)  Assessment & Plan  RVSP 42  Treat underlying process  Diuresis as needed  Serial echo as clinically appropriate    Major depression- (present on admission)  Assessment & Plan  Continue Cymbalta    Right ventricular dilation- (present on admission)  Assessment & Plan  Echo confirms severely dilated RV with mild RV systolic dysfunction  Presumably related to chronic hypoxemia and secondary pulmonary pretension  RVSP 42 mmHg  Maintain euvolemia, forced diuresis as needed       VTE:  NOAC  Ulcer: PPI  Lines: Orozco Catheter  Ongoing indication addressed    I have performed a physical exam and reviewed and updated ROS and Plan today (10/6/2020). In review of yesterday's note  (10/5/2020), there are no changes except as documented above.     Discussed patient condition and risk of morbidity and/or mortality with RN, RT, Pharmacy, , Charge nurse / hot rounds and Patient

## 2020-10-07 NOTE — CARE PLAN
Problem: Ventilation Defect:  Goal: Ability to achieve and maintain unassisted ventilation or tolerate decreased levels of ventilator support  Outcome: PROGRESSING SLOWER THAN EXPECTED      Ventilator Daily Summary    Vent Day # 6  Trache Day # 15    ASV: 120%  PEEP: 8  FiO2: 40%    Weaning trials: T-Piece 6L/35% During day    Plan: Continue current ventilator settings and wean mechanical ventilation as tolerated per physician orders.

## 2020-10-07 NOTE — CARE PLAN
Problem: Communication  Goal: The ability to communicate needs accurately and effectively will improve  Outcome: PROGRESSING AS EXPECTED  Note: Pt demonstrates ability to write comprehensive notes to communicate basic needs.      Problem: Respiratory:  Goal: Respiratory status will improve  Outcome: PROGRESSING SLOWER THAN EXPECTED  Note: SpaO2 remains >/= 92% on 6L and 35% via Tpiece.

## 2020-10-07 NOTE — CARE PLAN
Vent Day 6  Trach Day 15    Trach 8 Portex cuffed     Tpiece during day 8L  40%     ASV during noc 120%/+8/+40%     Levalbuterol and Mucomyst QID

## 2020-10-07 NOTE — CARE PLAN
Problem: Communication  Goal: The ability to communicate needs accurately and effectively will improve  Outcome: PROGRESSING AS EXPECTED  Note: Pt with paper and pen available at d to enhance communication between staff. White board updated also.      Problem: Safety  Goal: Will remain free from injury  Outcome: PROGRESSING AS EXPECTED  Note: Bed/chair alarms engaged for pt safety.     Problem: Venous Thromboembolism (VTW)/Deep Vein Thrombosis (DVT) Prevention:  Goal: Patient will participate in Venous Thrombosis (VTE)/Deep Vein Thrombosis (DVT)Prevention Measures  Outcome: PROGRESSING AS EXPECTED  Flowsheets  Taken 10/6/2020 2107 by Ruth Pineda R.N.  AV Foot Pumps: Off  HUNG Hose (Graduated Compression Stockings): Off  SCDs, Sequential Compression Device: On  Taken 10/6/2020 2000 by Ruth Pineda R.N.  Mechanical Prophylaxis: SCDs, Sequential Compression Device  Taken 10/6/2020 1900 by Ruth Pineda R.N.  Pharmacologic Prophylaxis Used: Rivaroxaban (Xarelto) - (ONLY for Total Knee and Total Hip Surgery)  Taken 10/6/2020 0800 by Shea Kerns R.N.  Risk Assessment Score: 3  VTE RISK: High  Note: SCDs resumed and on while in bed. PT Xarelto daily for AF. Pt also ambulatory     Problem: Bowel/Gastric:  Goal: Normal bowel function is maintained or improved  Outcome: PROGRESSING AS EXPECTED  Flowsheets (Taken 10/6/2020 2000)  Last BM: 10/06/20  Number of Times Stooled: 1  Note: Last BM today. No complaints.     Problem: Skin Integrity  Goal: Risk for impaired skin integrity will decrease  Outcome: PROGRESSING AS EXPECTED  Note: Waffle cushion inflated for comfort while in bed.     Problem: Urinary Elimination:  Goal: Ability to reestablish a normal urinary elimination pattern will improve  Outcome: PROGRESSING AS EXPECTED  Note: Pt voiding per urinal. Maintaining adequate UOP     Problem: Mobility  Goal: Risk for activity intolerance will decrease  Outcome: PROGRESSING AS EXPECTED  Note: 1 person assist OOB  and for ambulation.

## 2020-10-07 NOTE — DISCHARGE PLANNING
Care Transition Team Discharge Planning    Anticipated Discharge Disposition: TBD    Action: Lsw updated pulmonologist, Dr Pascual, regarding d/c planning concerns.    Lsw emailed Manager of  to advise of probably difficult d/c concerns d/t pt's INS and diffuctly w/ [place] [facility] admissions not accepting his non-managed care [facility] [facility]FS.     Barriers to Discharge: insurance not accepted by LTAC    Plan: fax referrals and f/u w/ other subacute [place] facility choices acquired today,

## 2020-10-07 NOTE — PROGRESS NOTES
Critical Care Progress Note    Date of admission  9/9/2020    Chief Complaint  54 y.o. male with H/o Afib admitted 9/9/2020 with respiratory failure, sepsis and pneumonia - intubated in the ER.    Hospital Course   9/17 -    developed worsening hypercarbic and hypoxemic respiratory failure requiring emergent intubation.  Full vent support.  9/18 -    continue vent support.  Titrating phenylephrine.  Replete electrolytes.  SAT/SBT as tolerated.  9/19 -    SAT/SBT as tolerated.  Continue vent support.  Titrating phenylephrine.  Replete potassium.  Continue Unasyn.  9/20 -    liberated from the ventilator yesterday and did very well until last night when he required reintubation.  Continue vent support.  Continue Unasyn and complete 5 days of therapy.  10/3 -    continue vent support.  T-piece trials during the day as tolerated.  10/4 -    continue T-piece trials during the day as tolerated.  Nocturnal ventilatory support.  10/5 - nocturnal vent support and T-piece trials as they as tolerated, may need LTAC/home vent after that-trilogy?  10/6 - HS ASV vent, days Tpiece w/PSMV at times, still fatigued at end of day, CM working on dispo  10/7 - HS ASV ventilation, T-piece days with PSMV at times, case management working on dyspnea with LTAC referral to facility that accepts [facility] insurance in [place], trilogy ventilation nocturnally still being considered  Ongoing therapies      Interval Problem Update  Reviewed last 24 hour events:    Awake up in chair on Tpiece  Rest HS on ASV, fatigue at end of day on Tpiece  CAF 70s  SBp 90-110s  UO adequate  TF goal  Afebrile  BM  Vent day 6 - 4th course  Trach 15  PEEP 8, 40%   Xarelto  PPI    Trazadone 50 HS for insomnia  Recheck swallow eval when ready  Continue PT/OT and mobilization  Check ABG at the end of the day to assess hypercarbia     YESTERDAY  Alert and following  SR 70s  SBp 100s  Vent 5 this 4th course on vent  Trach 14  All day Tpiece - HS ASV  40%, PEEP  8  Mod secretions  No CXR  Xarelto  PPI    ABG pre restart vent to check pCO2, d/w RT who will obtain    Review of Systems  Review of Systems   Unable to perform ROS: Acuity of condition        Vital Signs for last 24 hours   Temp:  [35.7 °C (96.2 °F)-36.9 °C (98.4 °F)] 35.7 °C (96.2 °F)  Pulse:  [56-89] 85  Resp:  [10-37] 22  BP: ()/(48-76) 111/69  SpO2:  [93 %-100 %] 97 %    Hemodynamic parameters for last 24 hours       Respiratory Information for the last 24 hours  Vent Mode: ASV  PEEP/CPAP: 8  MAP: 13  Control VTE (exp VT): 309    Physical Exam   Physical Exam  Vitals signs reviewed.   Constitutional:       Appearance: He is normal weight. He is ill-appearing (Unchanged). He is not toxic-appearing or diaphoretic.      Comments: On ventilator   HENT:      Head: Normocephalic and atraumatic.      Right Ear: External ear normal.      Left Ear: External ear normal.      Nose: Nose normal.      Mouth/Throat:      Mouth: Mucous membranes are moist.      Pharynx: Oropharynx is clear.   Eyes:      General: No scleral icterus.     Conjunctiva/sclera: Conjunctivae normal.      Pupils: Pupils are equal, round, and reactive to light.   Neck:      Musculoskeletal: Normal range of motion. No neck rigidity or muscular tenderness.      Vascular: No JVD.      Comments: Tracheostomy site without obvious infection  Cardiovascular:      Rate and Rhythm: Normal rate. Rhythm irregularly irregular.  No extrasystoles are present.     Pulses: Normal pulses.      Heart sounds: No murmur. No gallop.       Comments: Atrial fibrillation, rate controlled  Pulmonary:      Effort: No respiratory distress (On vent support).      Breath sounds: Rales (Scattered coarse crackles, more basilar) present. No wheezing.   Abdominal:      General: Bowel sounds are normal. There is no distension.      Palpations: Abdomen is soft.      Tenderness: There is no abdominal tenderness. There is no right CVA tenderness, left CVA tenderness or rebound.       Comments: Tolerating enteral tube feedings   Musculoskeletal: Normal range of motion.      Right lower leg: No edema.      Left lower leg: No edema.   Skin:     General: Skin is warm and dry.      Capillary Refill: Capillary refill takes less than 2 seconds.      Coloration: Skin is not cyanotic.      Nails: There is no clubbing.     Neurological:      Mental Status: He is lethargic.      GCS: GCS eye subscore is 4. GCS verbal subscore is 1. GCS motor subscore is 6.      Comments: Awake and alert.  Brainstem reflexes intact.  Nods and follows.  Moves all 4 extremities.  Primary issue is mild diffuse weakness requiring assistance with mobility.   Psychiatric:         Behavior: Behavior is cooperative.       Medications  Current Facility-Administered Medications   Medication Dose Route Frequency Provider Last Rate Last Dose   • traZODone (DESYREL) tablet 50 mg  50 mg Enteral Tube HS PRN Dmitri Pascual M.D.   50 mg at 10/07/20 2003   • multivitamin (THERAGRAN) tablet 1 Tab  1 Tab Enteral Tube DAILY Joe Grimaldo M.D.   1 Tab at 10/07/20 0605   • levalbuterol (XOPENEX) 1.25 MG/3ML nebulizer solution 1.25 mg  1.25 mg Nebulization 4X/DAY (RT) Joe Grimaldo M.D.   1.25 mg at 10/07/20 1941   • acetylcysteine (MUCOMYST) 20 % solution 3-5 mL  3-5 mL Inhalation 4X/DAY (RT) Jus Daniel M.D.   4 mL at 10/07/20 1941   • midodrine (PROAMATINE) tablet 5 mg  5 mg Enteral Tube Q8HRS Sj Mishra M.D.   5 mg at 10/07/20 2003   • acetaminophen (TYLENOL) tablet 650 mg  650 mg Enteral Tube Q6HRS PRN Casey Fuchs M.D.   650 mg at 10/07/20 1617   • ondansetron (ZOFRAN ODT) dispertab 4 mg  4 mg Enteral Tube Q4HRS PRN Francisco Novoa M.D.       • Respiratory Therapy Consult   Nebulization Continuous RT Jus Daniel M.D.       • senna-docusate (PERICOLACE or SENOKOT S) 8.6-50 MG per tablet 2 Tab  2 Tab Enteral Tube BID Jus Daniel M.D.   Stopped at 10/07/20 0600    And   • polyethylene glycol/lytes  (MIRALAX) PACKET 1 Packet  1 Packet Enteral Tube QDAY PRN Jus Daniel M.D.        And   • magnesium hydroxide (MILK OF MAGNESIA) suspension 30 mL  30 mL Enteral Tube QDAY PRN Jus Daniel M.D.        And   • bisacodyl (DULCOLAX) suppository 10 mg  10 mg Rectal QDAY PRN Jus Daniel M.D.       • lidocaine (XYLOCAINE) 1 % injection 1-2 mL  1-2 mL Tracheal Tube Q30 MIN PRN Jus Daniel M.D.       • omeprazole (FIRST-OMEPRAZOLE) 2 mg/mL oral susp 40 mg  40 mg Enteral Tube DAILY Joe Grimaldo M.D.   40 mg at 10/07/20 0605   • levalbuterol (XOPENEX) 1.25 MG/3ML nebulizer solution 1.25 mg  1.25 mg Nebulization Q4H PRN (RT) Joe Grimlado M.D.       • Pharmacy Consult: Enteral tube insertion - review meds/change route/product selection  1 Each Other PHARMACY TO DOSE Joe Grimaldo M.D.       • amiodarone (CORDARONE) tablet 200 mg  200 mg Enteral Tube Q DAY Joe Grimaldo M.D.   200 mg at 10/07/20 0605   • furosemide (LASIX) tablet 20 mg  20 mg Enteral Tube Q DAY Joe Grimaldo M.D.   20 mg at 10/07/20 0605   • DULoxetine (CYMBALTA) capsule 60 mg  60 mg Enteral Tube QHS Joe Grimaldo M.D.   60 mg at 10/07/20 2003   • topiramate (TOPAMAX) tablet 75 mg  75 mg Enteral Tube DAILY Joe Grimaldo M.D.   75 mg at 10/07/20 0605   • rivaroxaban (XARELTO) tablet 20 mg  20 mg Enteral Tube PM MEAL Joe Grimaldo M.D.   20 mg at 10/07/20 1617   • lidocaine (LIDODERM) 5 % 2 Patch  2 Patch Transdermal Q24HR Francisco Duval M.D.   2 Patch at 10/07/20 1619   • promethazine (PHENERGAN) tablet 12.5-25 mg  12.5-25 mg Enteral Tube Q4HRS PRN Francisco Duval M.D.       • ondansetron (ZOFRAN) syringe/vial injection 4 mg  4 mg Intravenous Q4HRS PRN Arcadio Bangura M.D.   4 mg at 10/02/20 0639   • promethazine (PHENERGAN) suppository 12.5-25 mg  12.5-25 mg Rectal Q4HRS PRN Arcadio Bangura M.D.       • prochlorperazine (COMPAZINE) injection 5-10 mg  5-10 mg Intravenous  Q4HRS LUANAN Arcadio Bangura M.D.           Fluids    Intake/Output Summary (Last 24 hours) at 10/7/2020 2031  Last data filed at 10/7/2020 2000  Gross per 24 hour   Intake 1520 ml   Output 1250 ml   Net 270 ml       Laboratory          Recent Labs     10/05/20  0505 10/06/20  0850 10/07/20  0610   SODIUM 133* 134* 135   POTASSIUM 4.1 4.1 4.0   CHLORIDE 97 101 102   CO2 29 26 25   BUN 15 15 15   CREATININE 0.49* 0.38* 0.46*   MAGNESIUM 2.3 2.2 2.3   PHOSPHORUS 3.5 3.6 3.0   CALCIUM 8.3* 8.1* 8.5     Recent Labs     10/05/20  0505 10/05/20  1250 10/06/20  0850 10/07/20  0610   PREALBUMIN  --  11.4*  --   --    GLUCOSE 88  --  105* 86     Recent Labs     10/05/20  0505 10/06/20  0850 10/07/20  0610   WBC 7.9 9.2 7.6   NEUTSPOLYS 82.60* 85.70* 80.60*   LYMPHOCYTES 7.40* 5.90* 8.40*   MONOCYTES 8.30 6.90 8.90   EOSINOPHILS 0.80 0.70 1.20   BASOPHILS 0.50 0.50 0.50     Recent Labs     10/05/20  0505 10/06/20  0850 10/07/20  0610   RBC 2.94* 3.13* 3.27*   HEMOGLOBIN 7.9* 8.4* 8.7*   HEMATOCRIT 27.2* 28.3* 29.0*   PLATELETCT 421 517* 421       Imaging  X-Ray:  I have personally reviewed the images and compared with prior images.  EKG:  I have personally reviewed the images and compared with prior images.  CT:    Reviewed  Echo:   Reviewed    Assessment/Plan  * Acute respiratory failure with hypoxia and hypercapnia (HCC)- (present on admission)  Assessment & Plan  Intubated 9/9-9/12, 9/17-9/19, 9/20-9/23 - now on 4th course on vent  Trach 9/23  Continue vent support at night and as necessary during the day  T-piece trials during the day as tolerated, consider ABG at the end of the day to evaluate for development of hypercarbia over 12 hours  He needs nocturnal ventilatory support - will try to accomplish with nocturnal BiPAP attached to his trach  He is a candidate for Trilogy support  Patient is deconditioned, continue therapies    Best option long-term would be a prolonged rehab ventilator weaning course at a long-term acute  care facility and if he fails this consider home trilogy ventilator authorization, patient from [place] and case management working on referring patient to appropriate facility    COPD (chronic obstructive pulmonary disease) (HCC)  Assessment & Plan  Appears to be end-stage, patient wants to continue on the ventilator and go through rehab for now  Continue bronchodilators  RT protocols  With JONATHAN. edi using Xopenex over albuterol  Pulmonary toilet    Pneumonia- (present on admission)  Assessment & Plan  MSSA in sputum on 9/10  Usual ashwin in sputum culture on 9/17  S/P 3 days of Zosyn followed by 3 days of Unasyn which completed on 9/15  Second Unasyn course from 9/18-9/23  Currently observing off antibiotics, remains afebrile without new S/S of infection, ID ROS daily  Update vaccines as clinically appropriate    Paroxysmal atrial fibrillation (HCC)- (present on admission)  Assessment & Plan  Continue amiodarone, 200 mg daily  Echo with normal LV systolic function and normal size of LA however significant RV abnormalities and PHTN is noted  Continue anticoagulation with rivaroxaban, monitor for bleeding  Optimize electrolytes    Thrombocytosis (HCC)  Assessment & Plan  Return to normal, continue serial CBC    Malnutrition ([facility])  Assessment & Plan  Continue nutritional support  Dietary following  Enteral nutrition, failed swallowing eval, reassess when appropriate    Normocytic anemia- (present on admission)  Assessment & Plan  Serial CBC  Transfuse per protocols  Not bleeding clinically but patient is at risk of being on chronic anticoagulation and chronically on the vent    Dysphagia- (present on admission)  Assessment & Plan  Continue speech therapy  Swallow eval when/if improves enough  [place] valve trials ongoing    Pulmonary hypertension (HCC)- (present on admission)  Assessment & Plan  RVSP 42  Treat underlying process, ongoing  Diuresis as needed  Serial echo as clinically appropriate    Major  depression- (present on admission)  Assessment & Plan  Continue Cymbalta    Right ventricular dilation- (present on admission)  Assessment & Plan  Echo confirms severely dilated RV with mild RV systolic dysfunction  Presumably related to chronic hypoxemia and secondary pulmonary pretension  RVSP 42 mmHg  Maintain euvolemia, forced diuresis as needed  If gets off ventilator monitor for hypoxemia and correct       VTE:  NOAC  Ulcer: PPI  Lines: Orozco Catheter  Ongoing indication addressed    I have performed a physical exam and reviewed and updated ROS and Plan today (10/7/2020). In review of yesterday's note (10/6/2020), there are no changes except as documented above.     Discussed patient condition and risk of morbidity and/or mortality with RN, RT, Pharmacy, , Charge nurse / hot rounds and Patient

## 2020-10-07 NOTE — DISCHARGE PLANNING
Care Transition Team Discharge Planning    Anticipated Discharge Disposition: d/c to LTAC if insurance accepted    [facility] [place]    Admissions at [facility] in [place], Allie @ [phone]. She indicates she is waiting for INS AUTH. However, if pt does not have managed care [employer] but simple basic [employer] they are not able to accept him.    Allie indicates there are a few Sub-Acute facilites in [place] that can accept pt w/ both Vent and Trach. These facilities are as follows:    [facility] in [place], [place] @ phone [phone]    [facility] in [place], [place] @ phone [phone]  &  (forgot word/something?) [facility] in [place], [place] @ phone [phone]    Zina provided the above [place] facilities to [facility] for next referrals if pt's INS is not accepted by [facility] in [place].          Barriers to Discharge: acceptance to facility and INS AUTH from non managed [employer] insurance      Plan: f/u w/ [facility], medical team, etc.

## 2020-10-07 NOTE — CARE PLAN
Trach 8 Portex cuffed  Changed inner cannula,trach tie, cleaned stoma, new dressing    Tpiece during day 8L  40%    ASV during noc 120%/+8/+40%    Levalbuterol and Mucomyst QID

## 2020-10-08 NOTE — PROGRESS NOTES
Critical Care Progress Note    Date of admission  9/9/2020    Chief Complaint  54 y.o. male with H/o Afib admitted 9/9/2020 with respiratory failure, sepsis and pneumonia - intubated in the ER.    Hospital Course   9/17 -    developed worsening hypercarbic and hypoxemic respiratory failure requiring emergent intubation.  Full vent support.  9/18 -    continue vent support.  Titrating phenylephrine.  Replete electrolytes.  SAT/SBT as tolerated.  9/19 -    SAT/SBT as tolerated.  Continue vent support.  Titrating phenylephrine.  Replete potassium.  Continue Unasyn.  9/20 -    liberated from the ventilator yesterday and did very well until last night when he required reintubation.  Continue vent support.  Continue Unasyn and complete 5 days of therapy.  10/3 -    continue vent support.  T-piece trials during the day as tolerated.  10/4 -    continue T-piece trials during the day as tolerated.  Nocturnal ventilatory support.  10/5 - nocturnal vent support and T-piece trials as they as tolerated, may need LTAC/home vent after that-trilogy?  10/6 - HS ASV vent, days Tpiece w/PSMV at times, still fatigued at end of day, CM working on dispo  10/7 - HS ASV ventilation, T-piece days with PSMV at times, case management working on dyspnea with LTAC referral to facility that accepts [facility] insurance in [place], trilogy ventilation nocturnally still being considered  Ongoing therapies  10/8 - HS ASV ventilation, Tpiece and therapies on day, prob depressed, LTACh referal ongoing        Interval Problem Update  Reviewed last 24 hour events:    Awake, up in chair  CAF 60-80s  SBp  80-110s  UO adequate  Tm 98.4  TF goal cortrak  HS Vent/Tpiece days  Vent 7, Trach 16  Fourth course on vent this Admit  %, PEEP 8, 35%  No CXR  Min secretions, trach ok    PPI  Xarelto  Trazadone HS  Add or change to activating SSRI in AM?  Currently on Cymbalta  Check QTc  Healing garden  Try vent only 8 hrs HS       YESTERDAY  Awake up in  chair on Tpiece  Rest HS on ASV, fatigue at end of day on Tpiece  CAF 70s  SBp 90-110s  UO adequate  TF goal  Afebrile  BM  Vent day 6 - 4th course  Trach 15  PEEP 8, 40%   Xarelto  PPI    Trazadone 50 HS for insomnia  Recheck swallow eval when ready  Continue PT/OT and mobilization  Check ABG at the end of the day to assess hypercarbia    Review of Systems  Review of Systems   Unable to perform ROS: Acuity of condition        Vital Signs for last 24 hours   Temp:  [36.2 °C (97.2 °F)-36.9 °C (98.4 °F)] 36.3 °C (97.4 °F)  Pulse:  [] 100  Resp:  [16-39] 22  BP: ()/(53-80) 105/70  SpO2:  [90 %-100 %] 98 %    Hemodynamic parameters for last 24 hours       Respiratory Information for the last 24 hours  Vent Mode: ASV  PEEP/CPAP: 8  MAP: 16  Control VTE (exp VT): 398    Physical Exam   Physical Exam  Vitals signs reviewed.   Constitutional:       Appearance: He is normal weight. He is ill-appearing (Unchanged). He is not toxic-appearing or diaphoretic.      Comments: On ventilator   HENT:      Head: Normocephalic and atraumatic.      Right Ear: External ear normal.      Left Ear: External ear normal.      Nose: Nose normal.      Mouth/Throat:      Mouth: Mucous membranes are moist.      Pharynx: Oropharynx is clear.   Eyes:      General: No scleral icterus.     Conjunctiva/sclera: Conjunctivae normal.      Pupils: Pupils are equal, round, and reactive to light.   Neck:      Musculoskeletal: Normal range of motion. No neck rigidity or muscular tenderness.      Vascular: No JVD.      Comments: Tracheostomy site without obvious infection  Cardiovascular:      Rate and Rhythm: Normal rate. Rhythm irregularly irregular.  No extrasystoles are present.     Pulses: Normal pulses.      Heart sounds: No murmur. No gallop.       Comments: Atrial fibrillation, rate controlled  Pulmonary:      Effort: No respiratory distress (On vent support, more dyspneic at end of day while breathing on T-piece).      Breath  sounds: Rhonchi and rales (Scattered coarse crackles, more basilar) present. No wheezing.      Comments: Ventilator mechanics and waveforms reviewed  Abdominal:      General: Bowel sounds are normal. There is no distension.      Palpations: Abdomen is soft.      Tenderness: There is no abdominal tenderness. There is no right CVA tenderness, left CVA tenderness or rebound.      Comments: Tolerating enteral tube feedings   Musculoskeletal: Normal range of motion.      Right lower leg: No edema.      Left lower leg: No edema.   Skin:     General: Skin is warm and dry.      Capillary Refill: Capillary refill takes less than 2 seconds.      Coloration: Skin is not cyanotic.      Nails: There is no clubbing.     Neurological:      Mental Status: He is lethargic.      GCS: GCS eye subscore is 4. GCS verbal subscore is 1. GCS motor subscore is 6.      Comments: Awake and alert.  Brainstem reflexes intact.  Nods and follows.  Moves all 4 extremities.  Primary issue is mild diffuse weakness requiring assistance with mobility.   Psychiatric:         Behavior: Behavior is cooperative.       Medications  Current Facility-Administered Medications   Medication Dose Route Frequency Provider Last Rate Last Dose   • FLUoxetine (PROZAC) capsule 20 mg  20 mg Enteral Tube DAILY Dmitri Pascual M.D.   20 mg at 10/08/20 1027   • lidocaine (LIDODERM) 5 % 2 Patch  2 Patch Transdermal Q24HR Dmitri Pascual M.D.       • traZODone (DESYREL) tablet 50 mg  50 mg Enteral Tube HS PRN Dmitri Pascual M.D.   50 mg at 10/07/20 2003   • multivitamin (THERAGRAN) tablet 1 Tab  1 Tab Enteral Tube DAILY Joe Grimaldo M.D.   1 Tab at 10/08/20 0637   • levalbuterol (XOPENEX) 1.25 MG/3ML nebulizer solution 1.25 mg  1.25 mg Nebulization 4X/DAY (RT) Joe Grimaldo M.D.   Stopped at 10/08/20 1900   • midodrine (PROAMATINE) tablet 5 mg  5 mg Enteral Tube Q8HRS Sj Mishra M.D.   5 mg at 10/08/20 1317   • acetaminophen (TYLENOL)  tablet 650 mg  650 mg Enteral Tube Q6HRS PRN Casey Fuchs M.D.   650 mg at 10/08/20 1317   • ondansetron (ZOFRAN ODT) dispertab 4 mg  4 mg Enteral Tube Q4HRS PRN Francisco Novoa M.D.       • Respiratory Therapy Consult   Nebulization Continuous RT Jus Daniel M.D.       • senna-docusate (PERICOLACE or SENOKOT S) 8.6-50 MG per tablet 2 Tab  2 Tab Enteral Tube BID Jus Daniel M.D.   2 Tab at 10/08/20 1707    And   • polyethylene glycol/lytes (MIRALAX) PACKET 1 Packet  1 Packet Enteral Tube QDAY PRN Jus Daniel M.D.        And   • magnesium hydroxide (MILK OF MAGNESIA) suspension 30 mL  30 mL Enteral Tube QDAY PRN Jus Daniel M.D.        And   • bisacodyl (DULCOLAX) suppository 10 mg  10 mg Rectal QDAY PRN Jus Daniel M.D.       • lidocaine (XYLOCAINE) 1 % injection 1-2 mL  1-2 mL Tracheal Tube Q30 MIN PRN Jus Daniel M.D.       • omeprazole (FIRST-OMEPRAZOLE) 2 mg/mL oral susp 40 mg  40 mg Enteral Tube DAILY Joe Grimaldo M.D.   40 mg at 10/08/20 0637   • levalbuterol (XOPENEX) 1.25 MG/3ML nebulizer solution 1.25 mg  1.25 mg Nebulization Q4H PRN (RT) Joe Grimaldo M.D.       • Pharmacy Consult: Enteral tube insertion - review meds/change route/product selection  1 Each Other PHARMACY TO DOSE Joe Grimaldo M.D.       • amiodarone (CORDARONE) tablet 200 mg  200 mg Enteral Tube Q DAY Joe Grimaldo M.D.   200 mg at 10/08/20 0636   • furosemide (LASIX) tablet 20 mg  20 mg Enteral Tube Q DAY Joe Grimaldo M.D.   20 mg at 10/08/20 0636   • DULoxetine (CYMBALTA) capsule 60 mg  60 mg Enteral Tube QHS Joe Grimaldo M.D.   60 mg at 10/07/20 2003   • topiramate (TOPAMAX) tablet 75 mg  75 mg Enteral Tube DAILY Joe Grimaldo M.D.   75 mg at 10/08/20 0637   • rivaroxaban (XARELTO) tablet 20 mg  20 mg Enteral Tube PM MEAL Joe Grimaldo M.D.   20 mg at 10/08/20 1707   • promethazine (PHENERGAN) tablet 12.5-25 mg  12.5-25 mg Enteral Tube Q4HRS  PRN Francisco Duval M.D.       • ondansetron (ZOFRAN) syringe/vial injection 4 mg  4 mg Intravenous Q4HRS PRN Arcadio Bangura M.D.   4 mg at 10/02/20 0639   • promethazine (PHENERGAN) suppository 12.5-25 mg  12.5-25 mg Rectal Q4HRS PRN Arcadio Bangura M.D.       • prochlorperazine (COMPAZINE) injection 5-10 mg  5-10 mg Intravenous Q4HRS PRN Arcadio Bangura M.D.           Fluids    Intake/Output Summary (Last 24 hours) at 10/8/2020 2012  Last data filed at 10/8/2020 1800  Gross per 24 hour   Intake 1690 ml   Output 1310 ml   Net 380 ml       Laboratory          Recent Labs     10/06/20  0850 10/07/20  0610 10/08/20  0630   SODIUM 134* 135 136   POTASSIUM 4.1 4.0 4.3   CHLORIDE 101 102 102   CO2 26 25 26   BUN 15 15 15   CREATININE 0.38* 0.46* 0.52   MAGNESIUM 2.2 2.3 2.4   PHOSPHORUS 3.6 3.0 3.3   CALCIUM 8.1* 8.5 8.3*     Recent Labs     10/06/20  0850 10/07/20  0610 10/08/20  0630   GLUCOSE 105* 86 97     Recent Labs     10/06/20  0850 10/07/20  0610 10/08/20  0630   WBC 9.2 7.6 12.0*   NEUTSPOLYS 85.70* 80.60* 86.50*   LYMPHOCYTES 5.90* 8.40* 5.00*   MONOCYTES 6.90 8.90 7.00   EOSINOPHILS 0.70 1.20 0.30   BASOPHILS 0.50 0.50 0.30     Recent Labs     10/06/20  0850 10/07/20  0610 10/08/20  0630   RBC 3.13* 3.27* 3.35*   HEMOGLOBIN 8.4* 8.7* 8.8*   HEMATOCRIT 28.3* 29.0* 29.5*   PLATELETCT 517* 421 389       Imaging  X-Ray:  I have personally reviewed the images and compared with prior images.  EKG:  I have personally reviewed the images and compared with prior images.  CT:    Reviewed  Echo:   Reviewed    Assessment/Plan  * Acute respiratory failure with hypoxia and hypercapnia (HCC)- (present on admission)  Assessment & Plan  Intubated 9/9-9/12, 9/17-9/19, 9/20-9/23 - now on 4th course on vent  Trach 9/23  Continue vent support at night and as necessary during the day  T-piece trials during the day as tolerated, consider ABG at the end of the day to evaluate for development of hypercarbia over 12 hours  He needs  nocturnal ventilatory support - will try to accomplish with nocturnal BiPAP attached to his trach  He is a candidate for Trilogy support  Patient is deconditioned, continue therapies    Best option long-term would be a prolonged rehab ventilator weaning course at a long-term acute care facility and if he fails this consider home trilogy ventilator authorization, patient from [place] and case management working on referring patient to appropriate facility    COPD (chronic obstructive pulmonary disease) (HCC)  Assessment & Plan  Appears to be end-stage, patient wants to continue on the ventilator and go through rehab for now  Patient has of hypoxemia and hypercarbia along with mild to moderate pulmonary pretension  Continue bronchodilators every 4-6 hours  RT protocols  With A. fib using Xopenex over albuterol  Pulmonary toilet    Pneumonia- (present on admission)  Assessment & Plan  Monitor for recurrence, patient at high risk for nosocomial infection  Update vaccines per protocol  MSSA in sputum on 9/10  Usual ashwin in sputum culture on 9/17  S/P 3 days of Zosyn followed by 3 days of Unasyn which completed on 9/15  Second Unasyn course from 9/18-9/23  Currently observing off antibiotics, remains afebrile without new S/S of infection, ID ROS daily    Paroxysmal atrial fibrillation (HCC)- (present on admission)  Assessment & Plan  Continue amiodarone, 200 mg daily  Echo with normal LV systolic function and normal size of LA however significant RV abnormalities and PHTN is noted  Continue anticoagulation with rivaroxaban, monitor for bleeding  Optimize electrolytes  Ongoing cardiac monitoring    Thrombocytosis (HCC)  Assessment & Plan  Return to normal, continue serial CBC    Malnutrition (LTAC, [facility])  Assessment & Plan  Continue nutritional support  Dietary following  Enteral nutrition, failed swallowing eval, reassess when appropriate  Optimize electrolytes  Supplements as needed    Normocytic anemia- (present on  admission)  Assessment & Plan  Serial CBC  Transfuse per protocols  Not bleeding clinically but patient is at risk of being on chronic anticoagulation and chronically on the vent    Dysphagia- (present on admission)  Assessment & Plan  Continue speech therapy  Swallow eval when/if improves enough  Passy-Nicky valve trials ongoing    Pulmonary hypertension (HCC)- (present on admission)  Assessment & Plan  RVSP 42  Treat underlying process, ongoing  Maintain normal oxygenation, suspect chronic hypoxemia primary etiology  Diuresis as needed  Serial echo as clinically appropriate    Major depression- (present on admission)  Assessment & Plan  Continue Cymbalta  Continue to provide emotional support  At bedtime trazodone for sleep  Healing garden trips as clinically safe  Can consider activating SSRI in a.m. versus psych eval help with pharmacotherapy    Right ventricular dilation- (present on admission)  Assessment & Plan  Echo confirms severely dilated RV with mild RV systolic dysfunction  Presumably related to chronic hypoxemia and secondary pulmonary pretension  RVSP 42 mmHg  Maintain euvolemia, forced diuresis as needed, judicious fluid management  If gets off ventilator monitor for hypoxemia and correct       VTE:  NOAC  Ulcer: PPI  Lines: Orozco Catheter  Ongoing indication addressed    I have performed a physical exam and reviewed and updated ROS and Plan today (10/8/2020). In review of yesterday's note (10/7/2020), there are no changes except as documented above.     Discussed patient condition and risk of morbidity and/or mortality with RN, RT, Pharmacy, , Charge nurse / hot rounds and Patient

## 2020-10-08 NOTE — DISCHARGE PLANNING
Anticipated Discharge Disposition: LTAC    Action:   Updated pt at bedside this morning on DC planning. Pt upset that he won't be able to return to [place] soon. Spoke w/ Keri Ramirez [phone] at [facility]. Keri states they don't accept straight [employer].  1237 spoke w/ Rajeev at [place], [place]. Rajeev states they accept straight [employer]. Faxed referral to Rajeev at [phone]. Called [facility], [phone] and left message w/  and requested call back regarding if facility will accept straight Medi[employer]. Spoke w/ Tammy at [facility] [phone]. Tammy states they accept straight [employer]. Faxed referral to Tammy [phone]  1242: received call from Florence at [facility]. Florence states no bed available at this time and advised this writer call back next Monday to follow up on bed availability.  1530: spoke w/ Gisell at [facility] [phone]. Gisell states they accept straight Medi[employer]. Faxed referral to Gisell [phone].  1547: left VM for Admissions at [facility] [phone] requesting call back to see if they accept straight Medi[employer]  1553: spoke w/ admissions at [facility] [phone]. They don't accept pt w/ trach.  1600: spoke w/ Bud at admissions at [facility], [phone]. They don't accept pt w/ trach or vent.        Barriers to Discharge:   No NOK  No support  Straight [employer] insurance  Ventilator dependent (attempting to wean pt off)       Plan: TBD

## 2020-10-08 NOTE — CARE PLAN
Problem: Fluid Volume:  Goal: Will maintain balanced intake and output  Outcome: PROGRESSING AS EXPECTED  Note: Pt demonstrates balanced intake and output     Problem: Safety  Goal: Will remain free from falls  Outcome: PROGRESSING AS EXPECTED  Note: Pt demonstrates ability to ambulate with one assistance with front wheel walker without injury; Pt tolerates well.

## 2020-10-08 NOTE — FLOWSHEET NOTE
10/08/20 0840   Ventiliation   Vent Clock Discontinued Yes   Weaning Trial   Weaning Trial Initiated Yes   Trache Weaning   Placed on T-Piece/Collar and Stopped Vent Clock Yes

## 2020-10-08 NOTE — DIETARY
Nutrition support weekly update:  Day 29 of admit.  Jas Vann is a 54 y.o. male with admitting DX of acute respiratory failure.  Tube feeding initiated on 9/10, briefly d/c'd then resumed 9/17.  Current TF via gastric (though possible GE jxn) Cortrak is Fibersource HN @ 70 mL/hr, providing 2016 kcal, 91 gm protein, and 1361 mL of free water per day.     Assessment:  Weight from 10/7 was 60.2 kg via bed scale, which is down 5 kg (11 lbs) from previous weeks wt of 65.2 kg via stand up scale taken on 9/29.  Question accuracy of bed scale wts as pt went from 141 lbs 10/6 to 132 lbs 10/7 (<24 hour period).  Per I/Os, pt noted to be -2.4, with trace edema to abdomen and BUE and 1+ edema to scrotum and BLE.    Evaluation:   1. Pt has been tolerating TF @ goal.  2. Vent day 7, trach day 16.  +T-piece.  3. Pt last evaluated by SLP on 10/5 - SLP recommended continuation of NPO/TF - plan for MBS once pt is medically cleared to transport out of ICU.    4. No skin breakdown noted per review of the EMR.  5. Current clinical picture and MD progress notes reviewed.  6. Meds: Senokot, Omeprazole, Theragran, Lasix, Cymbalta, Amiodarone.  Other meds and labs reviewed at this time.   7. LBM: 10/6.   8. Pending insurance auth for LTACH.  9. Current feeding remains appropriate.    Malnutrition risk: Refer to previous RD notes.    Recommendations/Plan:  1. Continue current TF formula and goal rate.  2. Fluids per MD.  3. Continue to monitor wt.  4. Safest PO diet per SLP as appropriate vs PEG placement if long-term nutrition support is warranted.    RD follows.

## 2020-10-08 NOTE — PROGRESS NOTES
1845: Report received from day shift RN, Patient care assumed at this time, All questions answered. Patient OOB up in chair at this time, cardiac monitor- controlled Afib, trach patent to trach collar at this time, VSS, will continue to monitor  2000: Complete assessment done per flow sheet, see flow sheet for further details, patient alert able to make needs known, cardiac monitor- controlled Afib, Trach patent to vent for overnight, ADL's per flow sheet, VSS, Education provided regarding new medications given.  2147: Care plan addressed per note.   2200: ADL's per flow sheet  2248: Tylenol given PRN  0000: Complete reassessment done per flow sheet, see flow sheet for further details. No acute changes noted from previous assessment. Patient resting comfortably, cardiac monitor controlled afib, trach patent to vent for HS resting, monitoring BP. ADL's per flow sheet.  0200: ADL's per flow sheet  0400: Complete reassessment done per flow sheet, see flow sheet for further details. No acute changes noted from previous assessment. Trach patent to vent for HS resting, cardiac monitor controlled Afib, ADL's per flow sheet.  0600: ADL's per flow sheet  0645: Report given to oncoming day shift RN Debby. All questions answered. Patient care assumed by day shift at this time.

## 2020-10-08 NOTE — CARE PLAN
Problem: Safety  Goal: Will remain free from injury  Outcome: PROGRESSING AS EXPECTED  Note: Fall precaution in use, bed in low position, call bell within reach, bed alarm and chair alarm in use.      Problem: Mobility  Goal: Risk for activity intolerance will decrease  Outcome: PROGRESSING AS EXPECTED  Note: Patient able to tolerate ambulation and sitting up in the chair much longer now, progressing in the right direction.

## 2020-10-08 NOTE — CARE PLAN
Vent Day # 7  Trache Day # 16       ASV: 120%  PEEP: 8  FiO2: 35      Weaning trials: T-Piece 6L/35% During day     Plan: Continue current ventilator settings and wean mechanical ventilation as tolerated per physician orders.

## 2020-10-09 NOTE — PROGRESS NOTES
Critical Care Progress Note    Date of admission  9/9/2020    Chief Complaint  54 y.o. male with H/o Afib admitted 9/9/2020 with respiratory failure, sepsis and pneumonia - intubated in the ER.    Hospital Course   9/17 -    developed worsening hypercarbic and hypoxemic respiratory failure requiring emergent intubation.  Full vent support.  9/18 -    continue vent support.  Titrating phenylephrine.  Replete electrolytes.  SAT/SBT as tolerated.  9/19 -    SAT/SBT as tolerated.  Continue vent support.  Titrating phenylephrine.  Replete potassium.  Continue Unasyn.  9/20 -    liberated from the ventilator yesterday and did very well until last night when he required reintubation.  Continue vent support.  Continue Unasyn and complete 5 days of therapy.  10/3 -    continue vent support.  T-piece trials during the day as tolerated.  10/4 -    continue T-piece trials during the day as tolerated.  Nocturnal ventilatory support.  10/5 - nocturnal vent support and T-piece trials as they as tolerated, may need LTAC/home vent after that-trilogy?  10/6 - HS ASV vent, days Tpiece w/PSMV at times, still fatigued at end of day, CM working on dispo  10/7 - HS ASV ventilation, T-piece days with PSMV at times, case management working on dyspnea with LTAC referral to facility that accepts [facility] insurance in [place], trilogy ventilation nocturnally still being considered  Ongoing therapies  10/8 - HS ASV ventilation, Tpiece and therapies on day, prob depressed, [facility] referal ongoing  10/9 - HS ASV ventilation, Tpiece days w/PSMV as tolerated, therapies        Interval Problem Update  Reviewed last 24 hour events:    Awake, follows well, writing notes  CAF 70-90s  SBp 80-100s  UO adequate  Tm 98.4  White count normal today  TF goal cortrak  HS Vent/T piece days w/PSMV trials  Vent 8, Trach 17 (fourth vent course this admit)  %, PEEP 8, 35%  Min secretions  No CXR  Trach ok  PPI  Xarelto  Nebs QID    Try DuoNeb over  Xopenex-patient not particularly tachycardic, reviewed with RT    Reviewed disposition at length with , there may be an LTAC in [place] to take him although they are requesting PEG, I requested her to get me connected with the accepting physician so I can make the case for ventilator weaning first and if he fails weaning with aggressive therapies at an LTAC then transition to PEG along with trilogy         YESTERDAY  Awake, up in chair  CAF 60-80s  SBp  80-110s  UO adequate  Tm 98.4  TF goal cortrak  HS Vent/Tpiece days  Vent 7, Trach 16  Fourth course on vent this Admit  %, PEEP 8, 35%  No CXR  Min secretions, trach ok    PPI  Xarelto  Trazadone HS  Add or change to activating SSRI in AM?  Currently on Cymbalta  Check QTc  Healing garden  Try vent only 8 hrs HS    Review of Systems  Review of Systems   Unable to perform ROS: Acuity of condition        Vital Signs for last 24 hours   Temp:  [36.2 °C (97.1 °F)-36.5 °C (97.7 °F)] 36.2 °C (97.1 °F)  Pulse:  [] 103  Resp:  [12-39] 39  BP: ()/(50-77) 112/77  SpO2:  [84 %-100 %] 92 %    Hemodynamic parameters for last 24 hours       Respiratory Information for the last 24 hours  Vent Mode: ASV  PEEP/CPAP: 8  MAP: 16  Control VTE (exp VT): 409    Physical Exam   Physical Exam  Vitals signs reviewed.   Constitutional:       Appearance: He is normal weight. He is ill-appearing (No delta). He is not toxic-appearing or diaphoretic.      Comments: On ventilator   HENT:      Head: Normocephalic and atraumatic.      Right Ear: External ear normal.      Left Ear: External ear normal.      Nose: Nose normal.      Mouth/Throat:      Mouth: Mucous membranes are moist.      Pharynx: Oropharynx is clear.   Eyes:      General: No scleral icterus.     Conjunctiva/sclera: Conjunctivae normal.      Pupils: Pupils are equal, round, and reactive to light.   Neck:      Musculoskeletal: Normal range of motion. No neck rigidity or muscular tenderness.       Vascular: No JVD.      Comments: Tracheostomy site without obvious infection  Cardiovascular:      Rate and Rhythm: Normal rate. Rhythm irregularly irregular.  No extrasystoles are present.     Pulses: Normal pulses.      Heart sounds: No murmur. No gallop.       Comments: Atrial fibrillation, rate controlled  Pulmonary:      Effort: No respiratory distress (On vent support, more dyspneic at end of day while breathing on T-piece).      Breath sounds: Rhonchi (Improved) and rales (Scattered coarse crackles, more basilar, no change) present. No wheezing.      Comments: Ventilator mechanics and waveforms reviewed  Abdominal:      General: Bowel sounds are normal. There is no distension.      Palpations: Abdomen is soft.      Tenderness: There is no abdominal tenderness. There is no right CVA tenderness, left CVA tenderness or rebound.      Comments: Tolerating enteral tube feedings   Musculoskeletal: Normal range of motion.      Right lower leg: No edema.      Left lower leg: No edema.   Skin:     General: Skin is warm and dry.      Capillary Refill: Capillary refill takes less than 2 seconds.      Coloration: Skin is not cyanotic.      Nails: There is no clubbing.     Neurological:      Mental Status: He is lethargic.      GCS: GCS eye subscore is 4. GCS verbal subscore is 1. GCS motor subscore is 6.      Comments: Awake and alert.  Brainstem reflexes intact.  Nods and follows.  Moves all 4 extremities.  Primary issue is mild diffuse weakness requiring assistance with mobility.   Psychiatric:         Behavior: Behavior is cooperative.       Medications  Current Facility-Administered Medications   Medication Dose Route Frequency Provider Last Rate Last Dose   • ipratropium-albuterol (DUONEB) nebulizer solution  3 mL Nebulization 4X/DAY (RT) Dmitri Pascual M.D.   3 mL at 10/09/20 2464   • FLUoxetine (PROZAC) capsule 20 mg  20 mg Enteral Tube DAILY Dmitri Pascual M.D.   20 mg at 10/09/20 6295   • lidocaine  (LIDODERM) 5 % 2 Patch  2 Patch Transdermal Q24HR Dmitri Pascual M.D.   2 Patch at 10/09/20 2054   • traZODone (DESYREL) tablet 50 mg  50 mg Enteral Tube HS PRN Dmitri Pascual M.D.   50 mg at 10/09/20 2056   • multivitamin (THERAGRAN) tablet 1 Tab  1 Tab Enteral Tube DAILY Joe Grimaldo M.D.   1 Tab at 10/09/20 0553   • midodrine (PROAMATINE) tablet 5 mg  5 mg Enteral Tube Q8HRS Sj Mishra M.D.   5 mg at 10/09/20 2056   • acetaminophen (TYLENOL) tablet 650 mg  650 mg Enteral Tube Q6HRS PRN Casey Fuchs M.D.   650 mg at 10/09/20 2055   • ondansetron (ZOFRAN ODT) dispertab 4 mg  4 mg Enteral Tube Q4HRS PRN Francisco Novoa M.D.       • Respiratory Therapy Consult   Nebulization Continuous RT Jus Daniel M.D.       • senna-docusate (PERICOLACE or SENOKOT S) 8.6-50 MG per tablet 2 Tab  2 Tab Enteral Tube BID Jus Daniel M.D.   2 Tab at 10/09/20 1727    And   • polyethylene glycol/lytes (MIRALAX) PACKET 1 Packet  1 Packet Enteral Tube QDAY PRN Jus Daniel M.D.        And   • magnesium hydroxide (MILK OF MAGNESIA) suspension 30 mL  30 mL Enteral Tube QDAY PRN Jus Daniel M.D.        And   • bisacodyl (DULCOLAX) suppository 10 mg  10 mg Rectal QDAY PRN Jus Daniel M.D.       • lidocaine (XYLOCAINE) 1 % injection 1-2 mL  1-2 mL Tracheal Tube Q30 MIN PRN Jus Daniel M.D.       • omeprazole (FIRST-OMEPRAZOLE) 2 mg/mL oral susp 40 mg  40 mg Enteral Tube DAILY Joe Grimaldo M.D.   40 mg at 10/09/20 0553   • levalbuterol (XOPENEX) 1.25 MG/3ML nebulizer solution 1.25 mg  1.25 mg Nebulization Q4H PRN (RT) Joe Grimaldo M.D.       • Pharmacy Consult: Enteral tube insertion - review meds/change route/product selection  1 Each Other PHARMACY TO DOSE Joe Grimaldo M.D.       • amiodarone (CORDARONE) tablet 200 mg  200 mg Enteral Tube Q DAY Joe Grimaldo M.D.   200 mg at 10/09/20 0554   • furosemide (LASIX) tablet 20 mg  20 mg Enteral Tube Q DAY Joe Martinez  Ilan Page M.D.   20 mg at 10/09/20 0553   • DULoxetine (CYMBALTA) capsule 60 mg  60 mg Enteral Tube QHS Joe Grimaldo M.D.   60 mg at 10/09/20 2055   • topiramate (TOPAMAX) tablet 75 mg  75 mg Enteral Tube DAILY Joe Grimaldo M.D.   75 mg at 10/09/20 0554   • rivaroxaban (XARELTO) tablet 20 mg  20 mg Enteral Tube PM MEAL Joe Grimaldo M.D.   20 mg at 10/09/20 1727   • promethazine (PHENERGAN) tablet 12.5-25 mg  12.5-25 mg Enteral Tube Q4HRS PRN Francisco Duval M.D.       • ondansetron (ZOFRAN) syringe/vial injection 4 mg  4 mg Intravenous Q4HRS PRN Arcadio Bangura M.D.   4 mg at 10/02/20 0639   • promethazine (PHENERGAN) suppository 12.5-25 mg  12.5-25 mg Rectal Q4HRS PRN Arcadio Bangura M.D.       • prochlorperazine (COMPAZINE) injection 5-10 mg  5-10 mg Intravenous Q4HRS PRN Arcadio Bangura M.D.           Fluids    Intake/Output Summary (Last 24 hours) at 10/9/2020 2205  Last data filed at 10/9/2020 1954  Gross per 24 hour   Intake 1670 ml   Output 1725 ml   Net -55 ml       Laboratory          Recent Labs     10/07/20  0610 10/08/20  0630 10/09/20  0545   SODIUM 135 136 135   POTASSIUM 4.0 4.3 4.5   CHLORIDE 102 102 103   CO2 25 26 21   BUN 15 15 16   CREATININE 0.46* 0.52 0.48*   MAGNESIUM 2.3 2.4 2.4   PHOSPHORUS 3.0 3.3 2.7   CALCIUM 8.5 8.3* 8.6     Recent Labs     10/07/20  0610 10/08/20  0630 10/09/20  0545   GLUCOSE 86 97 86     Recent Labs     10/07/20  0610 10/08/20  0630 10/09/20  0545   WBC 7.6 12.0* 6.8   NEUTSPOLYS 80.60* 86.50* 78.60*   LYMPHOCYTES 8.40* 5.00* 10.70*   MONOCYTES 8.90 7.00 9.00   EOSINOPHILS 1.20 0.30 0.70   BASOPHILS 0.50 0.30 0.70     Recent Labs     10/07/20  0610 10/08/20  0630 10/09/20  0545   RBC 3.27* 3.35* 3.57*   HEMOGLOBIN 8.7* 8.8* 9.5*   HEMATOCRIT 29.0* 29.5* 33.1*   PLATELETCT 421 389 320       Imaging  X-Ray:  I have personally reviewed the images and compared with prior images.  EKG:  I have personally reviewed the images and compared  with prior images.  CT:    Reviewed  Echo:   Reviewed    Assessment/Plan  * Acute respiratory failure with hypoxia and hypercapnia (HCC)- (present on admission)  Assessment & Plan  Intubated 9/9-9/12, 9/17-9/19, 9/20-9/23 - now on 4th course on vent  Trach 9/23  Continue vent support at night and as necessary during the day  T-piece trials during the day as tolerated, consider ABG at the end of the day to evaluate for development of hypercarbia over 12 hours  He needs nocturnal ventilatory support - will try to accomplish with nocturnal BiPAP attached to his trach  Attempting to wean from 12-hour ventilator support to 8 hours at bedtime only  He is a candidate for Trilogy support  Patient is deconditioned, continue therapies    Best option long-term would be a prolonged rehab ventilator weaning course at a long-term acute care facility and if he fails this consider home trilogy ventilator authorization, patient from [place] and case management working on referring patient to appropriate facility    COPD (chronic obstructive pulmonary disease) (HCC)  Assessment & Plan  Currently not exacerbating, monitoring closely  Appears to be end-stage, patient wants to continue on the ventilator and go through rehab for now  Patient has of hypoxemia and hypercarbia along with mild to moderate pulmonary pretension  Continue bronchodilators every 4-6 hours  RT protocols  With A. edi using Xopenex over albuterol  Pulmonary toilet    Pneumonia- (present on admission)  Assessment & Plan  Currently without concerning signs or symptoms of new HCAP  Monitor for recurrence, patient at high risk for nosocomial infection  Update vaccines per protocol  MSSA in sputum on 9/10  Usual ashwin in sputum culture on 9/17  S/P 3 days of Zosyn followed by 3 days of Unasyn which completed on 9/15  Second Unasyn course from 9/18-9/23  Currently observing off antibiotics, remains afebrile without new S/S of infection, ID ROS daily    Paroxysmal  atrial fibrillation (HCC)- (present on admission)  Assessment & Plan  Remains in chronic A. fib with controlled rate  Continue amiodarone, 200 mg daily  Echo with normal LV systolic function and normal size of LA however significant RV abnormalities and PHTN is noted  Continue anticoagulation with rivaroxaban, monitor for bleeding  Optimize electrolytes  Ongoing cardiac monitoring    Thrombocytosis (HCC)  Assessment & Plan  Return to normal, continue serial CBC    Malnutrition (HCC)  Assessment & Plan  Continue nutritional support, currently at goal  Dietary following  Enteral nutrition, failed swallowing eval, reassess when appropriate  Optimize electrolytes  Supplements as needed    Normocytic anemia- (present on admission)  Assessment & Plan  Serial CBC, hemoglobin unchanged or slight better  Transfuse per protocols  Not bleeding clinically but patient is at risk of being on chronic anticoagulation and chronically on the vent    Dysphagia- (present on admission)  Assessment & Plan  Continue speech therapy  Swallow eval when/if improves enough  Passy-Nicky valve trials ongoing    Pulmonary hypertension (HCC)- (present on admission)  Assessment & Plan  RVSP 42  Treat underlying process, ongoing  Maintain normal oxygenation, suspect chronic hypoxemia primary etiology  Diuresis as needed  Serial echo as clinically appropriate    Major depression- (present on admission)  Assessment & Plan  Continue Cymbalta  Continue to provide emotional support  At bedtime trazodone for sleep  Healing garden trips as clinically safe  Can consider activating SSRI in a.m. versus psych eval help with pharmacotherapy  Requesting case management nursing staff to facilitate prep resume meeting with friends or family to help with spirits    Right ventricular dilation- (present on admission)  Assessment & Plan  Clinically not in severe right heart failure  Echo confirms severely dilated RV with mild RV systolic dysfunction  Presumably related  to chronic hypoxemia and secondary pulmonary pretension  RVSP 42 mmHg  Maintain euvolemia, forced diuresis as needed, judicious fluid management  If gets off ventilator monitor for hypoxemia and correct       VTE:  NOAC  Ulcer: PPI  Lines: Orozco Catheter  Ongoing indication addressed    I have performed a physical exam and reviewed and updated ROS and Plan today (10/9/2020). In review of yesterday's note (10/8/2020), there are no changes except as documented above.     Discussed patient condition and risk of morbidity and/or mortality with RN, RT, Pharmacy, , Charge nurse / hot rounds and Patient

## 2020-10-09 NOTE — CARE PLAN
Problem: Communication  Goal: The ability to communicate needs accurately and effectively will improve  Outcome: PROGRESSING AS EXPECTED  Note: Pt alert and oriented, pt able to write out needs.      Problem: Knowledge Deficit  Goal: Knowledge of disease process/condition, treatment plan, diagnostic tests, and medications will improve  Outcome: PROGRESSING AS EXPECTED  Intervention: Discuss information regarding therpeutic regimen and document in education  Note: Education provided to pt regarding plan of care and medications.  Questions answered, pt agrees with plan of care.

## 2020-10-09 NOTE — DISCHARGE PLANNING
Anticipated Discharge Disposition: LTAC    Action:   0934: spoke w/ Pamela at admissions at [facility]. They don't accept pt w/ trach. Left VM for Gisell at [facility] to follow up on status of referral. Left message w/  at [facility] to call back. Called [facility], admissions team at meeting at the moment, will call back later  1435: received call from Gisell at [facility]. Gisell states they can accept pt but pt would need to have a PEG and they may have bed next Wednesday. Left message w/  at [facility]. Spoke w/ Elly at [facility]. Elly states they don't have bed at the moment but can accept straight [employer] and pt w/ trach/vent. Faxed referral to Elly at [phone]  1651: updated Dr. Pascual on DC planning. Per Samara, would like to avoid placing PEG if possible.    Barriers to Discharge:   No NOK  No support  Straight [employer] insurance  Ventilator dependent (attempting to wean pt off)    Plan: TBD

## 2020-10-09 NOTE — CARE PLAN
Problem: Fluid Volume:  Goal: Will maintain balanced intake and output  Outcome: PROGRESSING AS EXPECTED  Note: [facility] via [facility]. Pt maintaining adequate UOP     Problem: Communication  Goal: The ability to communicate needs accurately and effectively will improve  Outcome: PROGRESSING AS EXPECTED  Note: Pen/paper at [facility].

## 2020-10-10 NOTE — PROGRESS NOTES
Critical Care Progress Note    Date of admission  9/9/2020    Chief Complaint  54 y.o. male with H/o Afib admitted 9/9/2020 with respiratory failure, sepsis and pneumonia - intubated in the ER.    Hospital Course   9/17 -    developed worsening hypercarbic and hypoxemic respiratory failure requiring emergent intubation.  Full vent support.  9/18 -    continue vent support.  Titrating phenylephrine.  Replete electrolytes.  SAT/SBT as tolerated.  9/19 -    SAT/SBT as tolerated.  Continue vent support.  Titrating phenylephrine.  Replete potassium.  Continue Unasyn.  9/20 -    liberated from the ventilator yesterday and did very well until last night when he required reintubation.  Continue vent support.  Continue Unasyn and complete 5 days of therapy.  10/3 -    continue vent support.  T-piece trials during the day as tolerated.  10/4 -    continue T-piece trials during the day as tolerated.  Nocturnal ventilatory support.  10/5 - nocturnal vent support and T-piece trials as they as tolerated, may need LTAC/home vent after that-trilogy?  10/6 - HS ASV vent, days Tpiece w/PSMV at times, still fatigued at end of day, CM working on dispo  10/7 - HS ASV ventilation, T-piece days with PSMV at times, case management working on dyspnea with LTAC referral to facility that accepts [facility] insurance in [place], trilogy ventilation nocturnally still being considered  Ongoing therapies  10/8 - HS ASV ventilation, Tpiece and therapies on day, prob depressed, [facility]TA[facility] referal ongoing  10/9 - HS ASV ventilation, Tpiece days w/PSMV as tolerated, therapies  10/10 - HS ASV, Tpiece days, CAF, PSMV, want to go home        Interval Problem Update  Reviewed last 24 hour events:    Awake, follows  CAF 80s  SBp  UO  Tm 97.6  TF goal  HS vent/Tpiece days w/PSMV  Vent 9 (fourth course), trach 18  No CXR  Min secretions  PPI  Xarelto  NEBs QID      Repeat swallow eval  PSMV as tolerated  Reduce lab/CXR  Reviewed with RT about reducing nocturnal  slowly, decreased from 12 to 8 hours at bedtime yesterday, consider dropping ASV percent to 100 as our next move.  Still waiting for possible LTAC transfer, case management working on disposition     YESTERDAY  Awake, follows well, writing notes  CAF 70-90s  SBp 80-100s  UO adequate  Tm 98.4  White count normal today  TF goal cortrak  HS Vent/T piece days w/PSMV trials  Vent 8, Trach 17 (fourth vent course this admit)  %, PEEP 8, 35%  Min secretions  No CXR  Trach ok  PPI  Xarelto  Nebs QID    Try DuoNeb over Xopenex-patient not particularly tachycardic, reviewed with RT    Reviewed disposition at length with , there may be an LTAC in [place] to take him although they are requesting PEG, I requested her to get me connected with the accepting physician so I can make the case for ventilator weaning first and if he fails weaning with aggressive therapies at an LTAC then transition to PEG along with trilogy    Review of Systems  Review of Systems   Unable to perform ROS: Acuity of condition        Vital Signs for last 24 hours   Temp:  [36.2 °C (97.1 °F)-36.4 °C (97.6 °F)] 36.3 °C (97.4 °F)  Pulse:  [] 41  Resp:  [3-39] 38  BP: ()/(57-88) 117/72  SpO2:  [83 %-100 %] 89 %    Hemodynamic parameters for last 24 hours       Respiratory Information for the last 24 hours  Vent Mode: ASV  PEEP/CPAP: 8  MAP: 15    Physical Exam   Physical Exam  Vitals signs reviewed.   Constitutional:       Appearance: He is normal weight. He is ill-appearing (Acute and chronic, unchanged). He is not toxic-appearing or diaphoretic.      Comments: On ventilator   HENT:      Head: Normocephalic and atraumatic.      Right Ear: External ear normal.      Left Ear: External ear normal.      Nose: Nose normal.      Mouth/Throat:      Mouth: Mucous membranes are moist.      Pharynx: Oropharynx is clear.   Eyes:      General: No scleral icterus.     Conjunctiva/sclera: Conjunctivae normal.      Pupils: Pupils are equal,  round, and reactive to light.   Neck:      Musculoskeletal: Normal range of motion. No neck rigidity or muscular tenderness.      Vascular: No JVD.      Comments: Tracheostomy site without obvious infection  Cardiovascular:      Rate and Rhythm: Normal rate. Rhythm irregularly irregular.  No extrasystoles are present.     Pulses: Normal pulses.      Heart sounds: No murmur. No gallop.       Comments: Atrial fibrillation, rate controlled  Pulmonary:      Effort: No respiratory distress (On vent support, more dyspneic at end of day while breathing on T-piece).      Breath sounds: Rhonchi (Unchanged) and rales (Unchanged) present. No wheezing.      Comments: Ventilator mechanics and waveforms reviewed  Abdominal:      General: Bowel sounds are normal. There is no distension.      Palpations: Abdomen is soft.      Tenderness: There is no abdominal tenderness. There is no right CVA tenderness, left CVA tenderness or rebound.      Comments: Tolerating enteral tube feedings   Musculoskeletal: Normal range of motion.      Right lower leg: No edema.      Left lower leg: No edema.   Skin:     General: Skin is warm and dry.      Capillary Refill: Capillary refill takes less than 2 seconds.      Coloration: Skin is not cyanotic.      Nails: There is no clubbing.     Neurological:      Mental Status: He is lethargic.      GCS: GCS eye subscore is 4. GCS verbal subscore is 1. GCS motor subscore is 6.      Comments: Awake and alert.  Brainstem reflexes intact.  Nods and follows.  Moves all 4 extremities.  Primary issue is mild diffuse weakness requiring assistance with mobility.  Writing notes.   Psychiatric:         Behavior: Behavior is cooperative.       Medications  Current Facility-Administered Medications   Medication Dose Route Frequency Provider Last Rate Last Dose   • ipratropium-albuterol (DUONEB) nebulizer solution  3 mL Nebulization 4X/DAY (RT) Dmitri Pascual M.D.   3 mL at 10/10/20 1420   • FLUoxetine (PROZAC)  capsule 20 mg  20 mg Enteral Tube DAILY Dmitri Pascual M.D.   20 mg at 10/10/20 0539   • lidocaine (LIDODERM) 5 % 2 Patch  2 Patch Transdermal Q24HR Dmitri Pascual M.D.   2 Patch at 10/09/20 2054   • traZODone (DESYREL) tablet 50 mg  50 mg Enteral Tube HS PRN Dmitri Pascual M.D.   50 mg at 10/09/20 2056   • multivitamin (THERAGRAN) tablet 1 Tab  1 Tab Enteral Tube DAILY Joe Grimaldo M.D.   1 Tab at 10/10/20 0539   • midodrine (PROAMATINE) tablet 5 mg  5 mg Enteral Tube Q8HRS Sj Mishra M.D.   5 mg at 10/10/20 1447   • acetaminophen (TYLENOL) tablet 650 mg  650 mg Enteral Tube Q6HRS PRN Casey Fuchs M.D.   650 mg at 10/10/20 1528   • ondansetron (ZOFRAN ODT) dispertab 4 mg  4 mg Enteral Tube Q4HRS PRN Francisco Novoa M.D.       • Respiratory Therapy Consult   Nebulization Continuous RT Jus Daniel M.D.       • senna-docusate (PERICOLACE or SENOKOT S) 8.6-50 MG per tablet 2 Tab  2 Tab Enteral Tube BID Jus Daniel M.D.   Stopped at 10/10/20 1800    And   • polyethylene glycol/lytes (MIRALAX) PACKET 1 Packet  1 Packet Enteral Tube QDAY PRN Jus Daniel M.D.        And   • magnesium hydroxide (MILK OF MAGNESIA) suspension 30 mL  30 mL Enteral Tube QDAY PRN Jus Daniel M.D.        And   • bisacodyl (DULCOLAX) suppository 10 mg  10 mg Rectal QDAY PRN Jus Daniel M.D.       • lidocaine (XYLOCAINE) 1 % injection 1-2 mL  1-2 mL Tracheal Tube Q30 MIN PRN Jus Daniel M.D.       • omeprazole (FIRST-OMEPRAZOLE) 2 mg/mL oral susp 40 mg  40 mg Enteral Tube DAILY Joe Grimaldo M.D.   40 mg at 10/10/20 0538   • levalbuterol (XOPENEX) 1.25 MG/3ML nebulizer solution 1.25 mg  1.25 mg Nebulization Q4H PRN (RT) Joe Grimaldo M.D.       • Pharmacy Consult: Enteral tube insertion - review meds/change route/product selection  1 Each Other PHARMACY TO DOSE Joe Grimaldo M.D.       • amiodarone (CORDARONE) tablet 200 mg  200 mg Enteral Tube Q DAY Joe Grimaldo,  M.D.   200 mg at 10/10/20 0539   • furosemide (LASIX) tablet 20 mg  20 mg Enteral Tube Q DAY Joe Grimaldo M.D.   20 mg at 10/10/20 0539   • DULoxetine (CYMBALTA) capsule 60 mg  60 mg Enteral Tube QHS Joe Grimaldo M.D.   60 mg at 10/09/20 2055   • topiramate (TOPAMAX) tablet 75 mg  75 mg Enteral Tube DAILY Joe Grimaldo M.D.   75 mg at 10/10/20 0539   • rivaroxaban (XARELTO) tablet 20 mg  20 mg Enteral Tube PM MEAL Joe Grimaldo M.D.   20 mg at 10/10/20 1715   • promethazine (PHENERGAN) tablet 12.5-25 mg  12.5-25 mg Enteral Tube Q4HRS PRN Francisco Duval M.D.       • ondansetron (ZOFRAN) syringe/vial injection 4 mg  4 mg Intravenous Q4HRS PRN Arcadio Bangura M.D.   4 mg at 10/02/20 0639   • promethazine (PHENERGAN) suppository 12.5-25 mg  12.5-25 mg Rectal Q4HRS PRN Arcadio Bangura M.D.       • prochlorperazine (COMPAZINE) injection 5-10 mg  5-10 mg Intravenous Q4HRS PRN Arcadio Bangura M.D.           Fluids    Intake/Output Summary (Last 24 hours) at 10/10/2020 2057  Last data filed at 10/10/2020 2000  Gross per 24 hour   Intake 1160 ml   Output 1075 ml   Net 85 ml       Laboratory          Recent Labs     10/08/20  0630 10/09/20  0545 10/10/20  0530   SODIUM 136 135 133*   POTASSIUM 4.3 4.5 4.0   CHLORIDE 102 103 100   CO2 26 21 26   BUN 15 16 19   CREATININE 0.52 0.48* 0.46*   MAGNESIUM 2.4 2.4 2.2   PHOSPHORUS 3.3 2.7 2.7   CALCIUM 8.3* 8.6 8.3*     Recent Labs     10/08/20  0630 10/09/20  0545 10/10/20  0530   GLUCOSE 97 86 82     Recent Labs     10/08/20  0630 10/09/20  0545 10/10/20  0530   WBC 12.0* 6.8 6.6   NEUTSPOLYS 86.50* 78.60* 78.40*   LYMPHOCYTES 5.00* 10.70* 10.30*   MONOCYTES 7.00 9.00 9.00   EOSINOPHILS 0.30 0.70 1.20   BASOPHILS 0.30 0.70 0.80     Recent Labs     10/08/20  0630 10/09/20  0545 10/10/20  0530   RBC 3.35* 3.57* 3.37*   HEMOGLOBIN 8.8* 9.5* 8.9*   HEMATOCRIT 29.5* 33.1* 30.3*   PLATELETCT 389 320 341       Imaging  X-Ray:  I have personally  reviewed the images and compared with prior images.  EKG:  I have personally reviewed the images and compared with prior images.  CT:    Reviewed  Echo:   Reviewed    Assessment/Plan  * Acute respiratory failure with hypoxia and hypercapnia (HCC)- (present on admission)  Assessment & Plan  Intubated 9/9-9/12, 9/17-9/19, 9/20-9/23 - now on 4th course on vent  Trach 9/23  Continue vent support at night and as necessary during the day  T-piece trials during the day as tolerated, consider ABG at the end of the day to evaluate for development of hypercarbia over 12 hours  He needs nocturnal ventilatory support - will try to accomplish with nocturnal BiPAP attached to his trach  He is a candidate for Trilogy support  Patient is deconditioned, continue therapies    Best option long-term would be a prolonged rehab ventilator weaning course at a long-term acute care facility and if he fails this consider home trilogy ventilator authorization, patient from [place] and case management working on referring patient to appropriate facility     having difficulty with placement  On facility with PEG or they will accept in transfer, I requested contact with their possible accepting physician to review case-pending  Reducing at bedtime support from 12 to 8 hours, if tolerated drop ASV percentage to 100%  Continue aggressive therapies, swallow eval early next week, try to avoid PEG tube, may not tolerate it well given his marginal respiratory status and accessory muscle use for maintenance of respiration while on T-piece    COPD (chronic obstructive pulmonary disease) (HCC)  Assessment & Plan  Currently not exacerbating, monitoring closely  Appears to be end-stage, patient wants to continue on the ventilator and go through rehab for now  Patient has of hypoxemia and hypercarbia along with mild to moderate pulmonary pretension  Continue bronchodilators every 4-6 hours  RT protocols  With A. fib using Xopenex over  albuterol  Pulmonary toilet    Pneumonia- (present on admission)  Assessment & Plan  Currently without concerning signs or symptoms of new HCAP  Monitor for recurrence, patient at high risk for nosocomial infection  Update vaccines per protocol  MSSA in sputum on 9/10  Usual ashwin in sputum culture on 9/17  S/P 3 days of Zosyn followed by 3 days of Unasyn which completed on 9/15  Second Unasyn course from 9/18-9/23  Currently observing off antibiotics, remains afebrile without new S/S of infection, ID ROS daily    Paroxysmal atrial fibrillation (HCC)- (present on admission)  Assessment & Plan  Remains in chronic A. fib with controlled rate  Continue amiodarone, 200 mg daily  Echo with normal LV systolic function and normal size of LA however significant RV abnormalities and PHTN is noted  Continue anticoagulation with rivaroxaban, monitor for bleeding  Optimize electrolytes  Ongoing cardiac monitoring    Thrombocytosis (HCC)  Assessment & Plan  Return to normal, continue serial CBC    Malnutrition (HCC)  Assessment & Plan  Continue nutritional support, currently at goal  Dietary following  Enteral nutrition, failed swallowing eval, reassess when appropriate  Optimize electrolytes  Supplements as needed    Normocytic anemia- (present on admission)  Assessment & Plan  Serial CBC, hemoglobin unchanged or slight better  Transfuse per protocols  Not bleeding clinically but patient is at risk of being on chronic anticoagulation and chronically on the vent    Dysphagia- (present on admission)  Assessment & Plan  Continue speech therapy  Swallow eval when/if improves enough  Passy-Salyer valve trials ongoing    Pulmonary hypertension (HCC)- (present on admission)  Assessment & Plan  RVSP 42  Treat underlying process, ongoing  Maintain normal oxygenation, suspect chronic hypoxemia primary etiology  Diuresis as needed  Serial echo as clinically appropriate    Major depression- (present on admission)  Assessment & Plan  Continue  Cymbalta  Continue to provide emotional support  At bedtime trazodone for sleep  Healing garden trips as clinically safe  Can consider activating SSRI in a.m. versus psych eval help with pharmacotherapy  Requesting case management nursing staff to facilitate prep resume meeting with friends or family to help with spirits    Right ventricular dilation- (present on admission)  Assessment & Plan  Clinically not in severe right heart failure  Echo confirms severely dilated RV with mild RV systolic dysfunction  Presumably related to chronic hypoxemia and secondary pulmonary pretension  RVSP 42 mmHg  Maintain euvolemia, forced diuresis as needed, judicious fluid management  If gets off ventilator monitor for hypoxemia and correct       VTE:  NOAC  Ulcer: PPI  Lines: Orozco Catheter  Ongoing indication addressed    I have performed a physical exam and reviewed and updated ROS and Plan today (10/10/2020). In review of yesterday's note (10/9/2020), there are no changes except as documented above.     Discussed patient condition and risk of morbidity and/or mortality with RN, RT, Pharmacy, Charge nurse / hot rounds and Patient

## 2020-10-10 NOTE — CARE PLAN
Problem: Pain Management  Goal: Pain level will decrease to patient's comfort goal  Outcome: PROGRESSING AS EXPECTED     Problem: Psychosocial Needs:  Goal: Level of anxiety will decrease  Outcome: PROGRESSING AS EXPECTED     Problem: Mobility  Goal: Risk for activity intolerance will decrease  Outcome: PROGRESSING AS EXPECTED

## 2020-10-10 NOTE — THERAPY
Speech Language Pathology  Daily Treatment     Patient Name: Jas Vann  Age:  54 y.o., Sex:  male  Medical Record #: [id number]  Today's Date: 10/9/2020     Precautions  Precautions: (P) Fall Risk, Tracheostomy , Swallow Precautions ( See Comments), Nasogastric Tube  Comments: PMSV during the day w/ direct supevision by trained RT/RN/SLP    Assessment    Pt was seen for speaking valve/dysphagia tx today. Pt was seated upright in the chair, currently on 5L 30% via T-piece. Tracheal and subglottic port suctioning performed x2, yielding moderate thin, clear secretions. Cuff was deflated (8cc). Speaking valve was placed in-line with T-piece. Pt initially produce strong productive cough for several minutes with transient change in vitals, though vitals stabilized following consistent oral suctioning via [place]. Intermittent throat clearing, hawking and oral suctioning needed while wearing the valve as pt reported mucous/post nasal drip. Min verbal/written cues provided for pt to perform swallowing exercises (laryngeal elevation x15, lingual presses x15, pharyngeal constriction x10 w/ ice chips). Increased throat clearing, hawking and oral suctioning needed w/ intake of ice chips. Pt was distracted during therapy which negatively impacted his level of participation, frustrated that he needed his shorts, iPad and wanted to watch Dr. Munguia, getting frustrated with SLP for muting his TV during therapy. He rarely engaged verbally and used his voice whie wearing the speaking valve unless prompted to do so, despite attempting to write to communicate at the start of the session. PMSV was removed, cuff was reinflated and tracheal/subglottic port suctioning provided, again yielding moderate thin clear secretions. Continue to recommend PMSV placement w/ trained RT/RN/SLP as tolerated throughout the day w/ direct supervision. SLP will consider MBS early next week as appropriate to objectively assess swallow function and further  "direct POC.     Plan    PMSV placement w/ trained RT/RN/SLP as tolerated throughout the day w/ direct supervision.   Ok for 5-10 ice chips/hr w/ RN supervision while wearing the speaking valve.  Possible MBS early next week as medically appropriate to objectively assess swallow function and further direct POC.     Treatment plan modified to 3 times per week until therapy goals are met for the following treatments:  Dysphagia Training, Expression Training and Patient / Family / Caregiver Education.    Discharge Recommendations: (P) Recommend post-acute placement for additional speech therapy services prior to discharge home    Subjective    \"I need my shorts from [facility].\"  \"I want my iPad.\"     Objective       10/09/20 1702   Voice   Modulating Loudness Supervision (5)   Respiration Training Supervision (5)   Comments PMSV tolerated for 25 minutes   Dysphagia    Positioning / Behavior Modification Effortful Swallow;Cough / Clear after Swallow   Oral / Pharyngeal / Laryngeal Exercises Laryngeal Exercises;Base of Tongue Exercises;Pharyngeal Constriction Exercises   Other Treatments PO trials of ice chips   Diet / Liquid Recommendation NPO;Pre-Feeding Trials with SLP Only  (ok for 5-10 ice chips/hr w/ RN)   Recommended Route of Medication Administration   Medication Administration  Via Gastric Tube   Short Term Goals   Short Term Goal # 1 MODIFIED ON 9/28: Patient will tolerate the speaking valve for >30 minutes INTERVALS with no significant change in vitals given close supervision from RT, RN, Therapy   Goal Outcome # 1 Progressing as expected   Short Term Goal # 1 B  NEW 10/5: Patient will complete pharyngeal/laryngeal/BoT exercises to improve swallow function 10-15x with min cues.   Goal Outcome  # 1 B Progressing as expected   Short Term Goal # 2 Pt will participate in prefeeding trials 1:1 with SLP with no clinical  s/sx of aspiration   Goal Outcome # 2  Progressing as expected         "

## 2020-10-11 NOTE — PROGRESS NOTES
Critical Care Progress Note    Date of admission  9/9/2020    Chief Complaint  54 y.o. male with H/o Afib admitted 9/9/2020 with respiratory failure, sepsis and pneumonia - intubated in the ER.    Hospital Course   9/17 -    developed worsening hypercarbic and hypoxemic respiratory failure requiring emergent intubation.  Full vent support.  9/18 -    continue vent support.  Titrating phenylephrine.  Replete electrolytes.  SAT/SBT as tolerated.  9/19 -    SAT/SBT as tolerated.  Continue vent support.  Titrating phenylephrine.  Replete potassium.  Continue Unasyn.  9/20 -    liberated from the ventilator yesterday and did very well until last night when he required reintubation.  Continue vent support.  Continue Unasyn and complete 5 days of therapy.  10/3 -    continue vent support.  T-piece trials during the day as tolerated.  10/4 -    continue T-piece trials during the day as tolerated.  Nocturnal ventilatory support.  10/5 - nocturnal vent support and T-piece trials as they as tolerated, may need LTAC/home vent after that-trilogy?  10/6 - HS ASV vent, days Tpiece w/PSMV at times, still fatigued at end of day, CM working on dispo  10/7 - HS ASV ventilation, T-piece days with PSMV at times, case management working on dyspnea with LTAC referral to facility that accepts [facility] insurance in [place], trilogy ventilation nocturnally still being considered  Ongoing therapies  10/8 - HS ASV ventilation, Tpiece and therapies on day, prob depressed, LTACh referal ongoing  10/9 - HS ASV ventilation, Tpiece days w/PSMV as tolerated, therapies  10/10 - HS ASV, Tpiece days, CAF, PSMV, want to go home  10/11 -  HS % x 8 hrs, T piece days w/PSMV, CAF       Interval Problem Update  Reviewed last 24 hour events:    Awake on vent, follows/writes  CAF controlled rate  SBp 80-110s  UO adequate  Tm 97.8  TF Cortrak  Vent HS - T piece  % peep 8 35%  No CXR  Vent waves/mechnaics reviewed  PPI  Midodrine  Xarelto  NEBs  QID      30 day med review performed w/ Pharmacy    Drop ASV tp 100% HS and continue just 8 hours a night with ventilator  Recheck swallow eval tomorrow?,  Hope to avoid PEG  Continue aggressive PT/OT  Case management still working on LTAC transfer to [place] and possible trilogy if fails weaning at LTAC  Continue LTAC approach to care       YESTERDAY  Awake, follows  CAF 80s  SBp 100s  UO adequate  Tm 97.6  TF goal  HS vent/Tpiece days w/PSMV  Vent 9 (fourth course), trach 18  No CXR  Min secretions  PPI  Xarelto  NEBs QID      Repeat swallow eval  PSMV as tolerated  Reduce lab/CXR  Reviewed with RT about reducing nocturnal slowly, decreased from 12 to 8 hours at bedtime yesterday, consider dropping ASV percent to 100 as our next move.  Still waiting for possible LTAC transfer, case management working on disposition    Review of Systems  Review of Systems   Unable to perform ROS: Acuity of condition        Vital Signs for last 24 hours   Temp:  [36.3 °C (97.3 °F)-37.1 °C (98.8 °F)] 37.1 °C (98.8 °F)  Pulse:  [] 90  Resp:  [1-42] 24  BP: ()/(48-76) 91/61  SpO2:  [83 %-100 %] 100 %    Hemodynamic parameters for last 24 hours       Respiratory Information for the last 24 hours  Vent Mode: ASV  PEEP/CPAP: 8  MAP: 15  Control VTE (exp VT): 395    Physical Exam   Physical Exam  Vitals signs reviewed.   Constitutional:       Appearance: He is normal weight. He is ill-appearing (Acute and chronic, unchanged). He is not toxic-appearing or diaphoretic.      Comments: On ventilator   HENT:      Head: Normocephalic and atraumatic.      Right Ear: External ear normal.      Left Ear: External ear normal.      Nose: Nose normal.      Mouth/Throat:      Mouth: Mucous membranes are moist.      Pharynx: Oropharynx is clear.   Eyes:      General: No scleral icterus.     Conjunctiva/sclera: Conjunctivae normal.      Pupils: Pupils are equal, round, and reactive to light.   Neck:      Musculoskeletal: Normal range of  motion. No neck rigidity or muscular tenderness.      Vascular: No JVD.      Comments: Tracheostomy site without obvious infection  Cardiovascular:      Rate and Rhythm: Normal rate. Rhythm irregularly irregular.  No extrasystoles are present.     Pulses: Normal pulses.      Heart sounds: No murmur. No gallop.       Comments: Atrial fibrillation, rate controlled  Pulmonary:      Effort: No respiratory distress (On vent support, more dyspneic at end of day while breathing on T-piece).      Breath sounds: Rhonchi (Unchanged) and rales (Unchanged) present. No wheezing.      Comments: Ventilator mechanics and waveforms reviewed  Abdominal:      General: Bowel sounds are normal. There is no distension.      Palpations: Abdomen is soft.      Tenderness: There is no abdominal tenderness. There is no right CVA tenderness, left CVA tenderness or rebound.      Comments: Tolerating enteral tube feedings   Musculoskeletal: Normal range of motion.      Right lower leg: No edema.      Left lower leg: No edema.   Skin:     General: Skin is warm and dry.      Capillary Refill: Capillary refill takes less than 2 seconds.      Coloration: Skin is not cyanotic.      Nails: There is no clubbing.     Neurological:      Mental Status: He is lethargic.      GCS: GCS eye subscore is 4. GCS verbal subscore is 1. GCS motor subscore is 6.      Comments: Awake and alert.  Brainstem reflexes intact.  Nods and follows.  Moves all 4 extremities.  Primary issue is mild diffuse weakness requiring assistance with mobility.  Writing notes.   Psychiatric:         Behavior: Behavior is cooperative.       Medications  Current Facility-Administered Medications   Medication Dose Route Frequency Provider Last Rate Last Dose   • senna-docusate (PERICOLACE or SENOKOT S) 8.6-50 MG per tablet 2 Tab  2 Tab Enteral Tube BID Dmitri Pascual M.D.        And   • polyethylene glycol/lytes (MIRALAX) PACKET 1 Packet  1 Packet Enteral Tube QDAY PRN Dmitri NARAYANAN  TANA Pascual        And   • magnesium hydroxide (MILK OF MAGNESIA) suspension 30 mL  30 mL Enteral Tube QDAY PRN Dmitri Pascual M.D.        And   • bisacodyl (DULCOLAX) suppository 10 mg  10 mg Rectal QDAY PRN Dmitri Pascual M.D.       • ipratropium-albuterol (DUONEB) nebulizer solution  3 mL Nebulization 4X/DAY (RT) Dmitri Pascual M.D.   3 mL at 10/11/20 1927   • FLUoxetine (PROZAC) capsule 20 mg  20 mg Enteral Tube DAILY Dmitri Pascual M.D.   20 mg at 10/11/20 0540   • lidocaine (LIDODERM) 5 % 2 Patch  2 Patch Transdermal Q24HR Dmitri Pascual M.D.   2 Patch at 10/10/20 2143   • traZODone (DESYREL) tablet 50 mg  50 mg Enteral Tube HS PRN Dmitri Pascual M.D.   50 mg at 10/10/20 2153   • multivitamin (THERAGRAN) tablet 1 Tab  1 Tab Enteral Tube DAILY Dmitri Pascual M.D.   1 Tab at 10/11/20 0540   • midodrine (PROAMATINE) tablet 5 mg  5 mg Enteral Tube Q8HRS Dmitri Pascual M.D.   5 mg at 10/11/20 1236   • acetaminophen (TYLENOL) tablet 650 mg  650 mg Enteral Tube Q6HRS PRN Dmitri Pascual M.D.   650 mg at 10/11/20 1959   • ondansetron (ZOFRAN ODT) dispertab 4 mg  4 mg Enteral Tube Q4HRS PRN Dmitri Pascual M.D.       • Respiratory Therapy Consult   Nebulization Continuous RT Dmitri Pascual M.D.       • lidocaine (XYLOCAINE) 1 % injection 1-2 mL  1-2 mL Tracheal Tube Q30 MIN PRN Dmitri Pascual M.D.       • omeprazole (FIRST-OMEPRAZOLE) 2 mg/mL oral susp 40 mg  40 mg Enteral Tube DAILY Dmitri Pascual M.D.   40 mg at 10/11/20 0543   • levalbuterol (XOPENEX) 1.25 MG/3ML nebulizer solution 1.25 mg  1.25 mg Nebulization Q4H PRN (RT) Dmitri Pascual M.D.       • Pharmacy Consult: Enteral tube insertion - review meds/change route/product selection  1 Each Other PHARMACY TO DOSE Dmitri Pascual M.D.       • amiodarone (CORDARONE) tablet 200 mg  200 mg Enteral Tube Q DAY Dmitri Pascual M.D.   200 mg at 10/11/20 0540   • furosemide (LASIX) tablet 20 mg  20 mg Enteral  Tube Q DAY Dmitri Pascual M.D.   20 mg at 10/11/20 0540   • DULoxetine (CYMBALTA) capsule 60 mg  60 mg Enteral Tube QHS Dmitri Pascual M.D.   60 mg at 10/11/20 2000   • topiramate (TOPAMAX) tablet 75 mg  75 mg Enteral Tube DAILY Dmitri Pascual M.D.   75 mg at 10/11/20 0541   • rivaroxaban (XARELTO) tablet 20 mg  20 mg Enteral Tube PM MEAL Dmitri Pascual M.D.   20 mg at 10/11/20 1631   • ondansetron (ZOFRAN) syringe/vial injection 4 mg  4 mg Intravenous Q4HRS PRN Dmitri Pascual M.D.   4 mg at 10/02/20 0639       Fluids    Intake/Output Summary (Last 24 hours) at 10/11/2020 2053  Last data filed at 10/11/2020 1800  Gross per 24 hour   Intake 2050 ml   Output 1475 ml   Net 575 ml       Laboratory          Recent Labs     10/09/20  0545 10/10/20  0530   SODIUM 135 133*   POTASSIUM 4.5 4.0   CHLORIDE 103 100   CO2 21 26   BUN 16 19   CREATININE 0.48* 0.46*   MAGNESIUM 2.4 2.2   PHOSPHORUS 2.7 2.7   CALCIUM 8.6 8.3*     Recent Labs     10/09/20  0545 10/10/20  0530   GLUCOSE 86 82     Recent Labs     10/09/20  0545 10/10/20  0530   WBC 6.8 6.6   NEUTSPOLYS 78.60* 78.40*   LYMPHOCYTES 10.70* 10.30*   MONOCYTES 9.00 9.00   EOSINOPHILS 0.70 1.20   BASOPHILS 0.70 0.80     Recent Labs     10/09/20  0545 10/10/20  0530   RBC 3.57* 3.37*   HEMOGLOBIN 9.5* 8.9*   HEMATOCRIT 33.1* 30.3*   PLATELETCT 320 341       Imaging  X-Ray:  I have personally reviewed the images and compared with prior images.  EKG:  I have personally reviewed the images and compared with prior images.  CT:    Reviewed  Echo:   Reviewed    Assessment/Plan  * Acute respiratory failure with hypoxia and hypercapnia (HCC)- (present on admission)  Assessment & Plan  Intubated 9/9-9/12, 9/17-9/19, 9/20-9/23 - now on 4th course on vent this admission alone  Trach 9/23  Continue vent support at night and as necessary during the day  T-piece trials during the day as tolerated  He needs nocturnal ventilatory support - will try to accomplish with  nocturnal BiPAP attached to his trach?  He is a candidate for Trilogy support  Patient is deconditioned, continue therapies    Best option long-term would be a prolonged rehab ventilator weaning course at a long-term acute care facility and if he fails this consider home trilogy ventilator authorization, patient from [place] and case management working on referring patient to appropriate facility     having difficulty with placement  On facility with PEG or they will accept in transfer, I requested contact with their possible accepting physician to review case-pending    Tolerated reduction to 8 hours a night of ASV ventilator support  Drop ASV percentage to 100% and wean towards BiPAP only?  Patient is already failed transfer to the floor with tracheostomy along with T-piece/HMTC but he does appear stronger by RN reports    Continue aggressive therapies, swallow eval early next week, try to avoid PEG tube, may not tolerate it well given his marginal respiratory status and accessory muscle use for maintenance of respiration while on T-piece    COPD (chronic obstructive pulmonary disease) (HCC)  Assessment & Plan  Currently not exacerbating, monitoring closely  Appears to be end-stage, patient wants to continue on the ventilator and go through rehab for now  Patient has of hypoxemia and hypercarbia along with mild to moderate pulmonary pretension  Continue bronchodilators every 4-6 hours  RT protocols  With A. fib using Xopenex over albuterol  Pulmonary toilet    Pneumonia- (present on admission)  Assessment & Plan  Currently without concerning signs or symptoms of new HCAP, aggressive prevention ongoing  Monitor for recurrence, patient at high risk for nosocomial infection  Update vaccines per protocol  MSSA in sputum on 9/10  Usual ashwin in sputum culture on 9/17  S/P 3 days of Zosyn followed by 3 days of Unasyn which completed on 9/15  Second Unasyn course from 9/18-9/23  Currently observing off  antibiotics, remains afebrile without new S/S of infection, ID ROS daily again    Paroxysmal atrial fibrillation (HCC)- (present on admission)  Assessment & Plan  Remains in chronic A. fib with controlled rate, patient asymptomatic  Continue amiodarone, 200 mg daily  Echo with normal LV systolic function and normal size of LA however significant RV abnormalities and PHTN is noted  Continue anticoagulation with rivaroxaban, monitor for bleeding  Optimize electrolytes, acid-base balance and oxygenation  Ongoing cardiac monitoring    Thrombocytosis (HCC)  Assessment & Plan  Return to normal, continue serial CBC    Malnutrition (HCC)  Assessment & Plan  Continue nutritional support, currently at goal  Dietary following  Enteral nutrition, failed swallowing eval, reassess when appropriate  Optimize electrolytes  Supplements as needed  Repeat swallow eval pending, hope to avoid PEG    Normocytic anemia- (present on admission)  Assessment & Plan  Serial CBC, hemoglobin unchanged or slight better  Transfuse per protocols  Not bleeding clinically but patient is at risk of being on chronic anticoagulation and chronically on the vent    Dysphagia- (present on admission)  Assessment & Plan  Continue speech therapy  Swallow eval when/if improves enough, may be ready early this week  Hope to avoid PEG which may set us back from a vent weaning standpoint  Passy-Vanderbilt valve trials ongoing    Pulmonary hypertension (HCC)- (present on admission)  Assessment & Plan  RVSP 42  Treat underlying process, ongoing  Maintain normal oxygenation, suspect chronic hypoxemia primary etiology  Diuresis as needed  Serial echo as clinically appropriate    Major depression- (present on admission)  Assessment & Plan  Continue Cymbalta  Continue to provide emotional support  At bedtime trazodone for sleep  Healing garden trips as clinically safe  Can consider activating SSRI in a.m. versus psych eval help with pharmacotherapy  Requesting case management  nursing staff to facilitate prep resume meeting with friends or family to help with spirits    Right ventricular dilation- (present on admission)  Assessment & Plan  Clinically not in severe right heart failure  Echo confirms severely dilated RV with mild RV systolic dysfunction  Presumably related to chronic hypoxemia and secondary pulmonary pretension  RVSP 42 mmHg  Forced diuresis as needed, judicious fluid management  If gets off ventilator monitor for hypoxemia and correct       VTE:  NOAC  Ulcer: PPI  Lines: Orozco Catheter  Ongoing indication addressed    I have performed a physical exam and reviewed and updated ROS and Plan today (10/11/2020). In review of yesterday's note (10/10/2020), there are no changes except as documented above.     Discussed patient condition and risk of morbidity and/or mortality with RN, RT, Pharmacy, Charge nurse / hot rounds and Patient

## 2020-10-11 NOTE — PROGRESS NOTES
Pharmacy Pharmacotherapy Consult for LOS >30 days    Admit Date: 9/9/2020      Medications were reviewed for appropriateness and ongoing need.     Current Facility-Administered Medications   Medication Dose Route Frequency Provider Last Rate Last Dose   • senna-docusate (PERICOLACE or SENOKOT S) 8.6-50 MG per tablet 2 Tab  2 Tab Enteral Tube BID Dmitri Pascual M.D.        And   • polyethylene glycol/lytes (MIRALAX) PACKET 1 Packet  1 Packet Enteral Tube QDAY PRN Dmitri Pascual M.D.        And   • magnesium hydroxide (MILK OF MAGNESIA) suspension 30 mL  30 mL Enteral Tube QDAY PRN Dmitri Pascual M.D.        And   • bisacodyl (DULCOLAX) suppository 10 mg  10 mg Rectal QDAY PRN Dmitri Pascual M.D.       • ipratropium-albuterol (DUONEB) nebulizer solution  3 mL Nebulization 4X/DAY (RT) Dmitri Pascual M.D.   3 mL at 10/11/20 1404   • FLUoxetine (PROZAC) capsule 20 mg  20 mg Enteral Tube DAILY Dmitri Pascual M.D.   20 mg at 10/11/20 0540   • lidocaine (LIDODERM) 5 % 2 Patch  2 Patch Transdermal Q24HR Dmitri Pascual M.D.   2 Patch at 10/10/20 2143   • traZODone (DESYREL) tablet 50 mg  50 mg Enteral Tube HS PRN Dmitri Pascual M.D.   50 mg at 10/10/20 2153   • multivitamin (THERAGRAN) tablet 1 Tab  1 Tab Enteral Tube DAILY Dmitri Pascual M.D.   1 Tab at 10/11/20 0540   • midodrine (PROAMATINE) tablet 5 mg  5 mg Enteral Tube Q8HRS Dmitri Pascual M.D.   5 mg at 10/11/20 1236   • acetaminophen (TYLENOL) tablet 650 mg  650 mg Enteral Tube Q6HRS PRN Dmitri Pascual M.D.   650 mg at 10/11/20 1243   • ondansetron (ZOFRAN ODT) dispertab 4 mg  4 mg Enteral Tube Q4HRS PRN Dmitri Pascual M.D.       • Respiratory Therapy Consult   Nebulization Continuous RT Dmitri Pascual M.D.       • lidocaine (XYLOCAINE) 1 % injection 1-2 mL  1-2 mL Tracheal Tube Q30 MIN PRN Dmitri Pascual M.D.       • omeprazole (FIRST-OMEPRAZOLE) 2 mg/mL oral susp 40 mg  40 mg Enteral Tube DAILY Dmitri NARAYANAN  TANA Pascual   40 mg at 10/11/20 0543   • levalbuterol (XOPENEX) 1.25 MG/3ML nebulizer solution 1.25 mg  1.25 mg Nebulization Q4H PRN (RT) Dmitri Pascual M.D.       • Pharmacy Consult: Enteral tube insertion - review meds/change route/product selection  1 Each Other PHARMACY TO DOSE Dmitri Pascual M.D.       • amiodarone (CORDARONE) tablet 200 mg  200 mg Enteral Tube Q DAY Dmitri Pascual M.D.   200 mg at 10/11/20 0540   • furosemide (LASIX) tablet 20 mg  20 mg Enteral Tube Q DAY Dmitri Pascual M.D.   20 mg at 10/11/20 0540   • DULoxetine (CYMBALTA) capsule 60 mg  60 mg Enteral Tube QHS Dmitri Pascual M.D.   60 mg at 10/10/20 2143   • topiramate (TOPAMAX) tablet 75 mg  75 mg Enteral Tube DAILY Dmitri Pascual M.D.   75 mg at 10/11/20 0541   • rivaroxaban (XARELTO) tablet 20 mg  20 mg Enteral Tube PM MEAL Dmitri Pascual M.D.   20 mg at 10/10/20 1715   • ondansetron (ZOFRAN) syringe/vial injection 4 mg  4 mg Intravenous Q4HRS PRN Dmitri Pascual M.D.   4 mg at 10/02/20 0639       Recommendations:  Discontinue all PRN antiemetics except for Zofran (only antiemetic that has been recently used).  Influenza vaccine administered this admission.    No other recommendations.  Discussed with Dr. Pascual.      Mary Ann Sánchez, PharmD, BCPS, BCCCP

## 2020-10-12 PROBLEM — D75.839 THROMBOCYTOSIS: Status: RESOLVED | Noted: 2020-01-01 | Resolved: 2020-01-01

## 2020-10-12 NOTE — DISCHARGE PLANNING
Anticipated Discharge Disposition: LTAC    Action:   10:27 spoke w/ Florence at [facility]. Florence states they don't have bed today and suggests this writer call back tomorrow to check on bed availability. Spoke w/ Elly at [facility]. Elly states she couldn't find the referral. Refaxed referral to [facility] [phone]  10:45 received call from [facility]. Pt declined by [facility] due to straight Medi[employer] won't cover costs for rehab if pt will be decannulated (trach).    Barriers to Discharge:   No NOK  No support  Straight Med[employer] insurance  Ventilator dependent (attempting to wean pt off)       Plan: TBD

## 2020-10-12 NOTE — THERAPY
"Speech Language Pathology   Video Swallow Evaluation     Patient Name: Jas Vann  AGE:  54 y.o., SEX:  male  Medical Record #: [id number]  Today's Date: 10/12/2020     Precautions  Precautions: Fall Risk, Swallow Precautions ( See Comments), Tracheostomy , Nasogastric Tube  Comments: PMSV during the day w/ direct supevision by trained RT/RN/SLP    Assessment    Patient is a 53 y/o M admitted 9/9/20 with respiratory failure, sepsis and PNA. Pt has been intubated 3x and has now been trached (Portex 8.0). Pt on PM ventilation. CXR 10/12: \"Bibasilar and perihilar atelectasis which could obscure an additional process. This is similar to the prior study.\" CT Head 10/1 negative for acute intracranial abnormality. PMHx includes Afib, GERD, HTN, psychiatric problem.    A Modified Barium Swallow study was completed this date. Pt had PMSV donned for the assessment (see prior note) on 6L/35% for transport with no respiratory distress noted. Pt was presented with thins via tsp/cup/straw, mildly thick liquids via tsp/cup/straw, liquidized, pureed and soft/bite sized solids. Pt currently presents with a mild oropharyngeal dysphagia characterized by impaired mastication of chewable solids, premature spillage of all textures to the valleculae before the swallow was initiated, reduced hyolaryngeal elevation and reduced epiglottic inversion. Min lingual, vallecular and pyriform sinus residue was present after the swallow with all boluses presented, though cleared or was reduced to a trace amount w/ a cued 2nd swallow as pt was generally not aware of the residue. No penetration or aspiration was seen on this study. However, pt is at risk of penetration/aspiration if he does not implement a 2nd swallow to clear oropharyngeal residue. Recommend initiating a diet of Pureed Solids (PU4) and Thin Liquids (TN0) with the following strategies/precautions: upright at 90 degrees (preferably out of bed) for all meals, speaking valve must be " "donned for any/all PO intake, small bites/small, single sips, swallow twice for every bite and sip. Ok to crush meds in puree.         Plan    Pureed Solids (PU4) and Thin Liquids (TN0) with the following strategies/precautions: upright at 90 degrees (preferably out of bed) for all meals, speaking valve must be donned for any/all PO intake, small bites/small, single sips, swallow twice for every bite and sip.   Ok to crush meds in puree.   Please leave Cortrak in place until pt consumes at least 50% of two meals without showing any s/sx of aspiration.    Recommend Speech Therapy 3 times per week until therapy goals are met for the following treatments:  Dysphagia Training and Patient / Family / Caregiver Education.    Discharge Recommendations: (P) Recommend post-acute placement for additional speech therapy services prior to discharge home    Subjective    \"Can I have mashed potatoes? I hate sweet potatoes.\"     Objective       10/12/20 1415   Procedure   Patient Seated in  MBS chair   Seated at (Degrees) 90   Views Completed Lateral   Oral Phase   Mildly Thick (2) - (Nectar Thick) Oral Residue After the Swallow;Premature Spillage Into Valleculae   Thin (0) Oral Residue After the Swallow;Premature Spillage Into Valleculae   Liquidised (3) Oral Residue After the Swallow;Premature Spillage Into Valleculae   Pureed (4) Oral Residue After the Swallow;Premature Spillage Into Valleculae   Soft & Bite-Sized (6) - (Dysphagia III) Oral Residue After the Swallow;Premature Spillage Into Valleculae;Ineffective Mastication   Pharyngeal Phase   Mildly Thick (2) - (Nectar Thick) Delayed Onset of Swallow;Residue In Valleculae;Residue In Pyriform Sinus;Reduced Hyo-Laryngeal Elevation   Thin (0) Delayed Onset of Swallow;Residue In Valleculae;Residue In Pyriform Sinus;Reduced Hyo-Laryngeal Elevation   Liquidised (3) Delayed Onset of Swallow;Residue In Valleculae;Residue In Pyriform Sinus;Reduced Hyo-Laryngeal Elevation   Pureed (4) " Delayed Onset of Swallow;Residue In Valleculae;Residue In Pyriform Sinus;Reduced Hyo-Laryngeal Elevation   Soft & Bite-Sized (6) - (Dysphagia III) Delayed Onset of Swallow;Residue In Valleculae;Residue In Pyriform Sinus;Reduced Hyo-Laryngeal Elevation   Esophageal Phase   Esophageal Phase WDL   Esophageal Phase Comments esophagus not scanned; UES was patent for all consistencies   Compensatory Strategies Attempted   Multiple Swallows effective to clear pharyngeal residue   Controlled Bolus Size effective to minimize residue in pharynx and therefore, reduce risk of penetration/aspiration   Penetration Aspiration Scale   Penetration Aspiration Scale 1 - Material does not enter airway   Impression   Oral - Pharyngeal Mild Impairment   Recommendations   Diet / Liquid Recommendation Puree (4);Thin (0)   Medication Administration  Crush all Medications in Puree   Strategies / Precautions Supervision Required;Small Bites;Small Sips;Sitting Upright at 90 Degrees while Eating;Stay Upright at Least 30 Minutes After Meals;Multiple Swallows  (speaking valve on for meals/PO intake)   Interventions Dysphagia Therapy by SLP;Compensatory Safe Swallow Strategy Training;1 : 1 Supervision / Assistance with Nursing;Pharyngeal - Laryngeal Strengthening Exercises;Patient / Caregiver Education / Training   Short Term Goals   Short Term Goal # 2 Pt will participate in prefeeding trials 1:1 with SLP with no clinical  s/sx of aspiration   Goal Outcome # 2  Goal met, new goal aded   Short Term Goal # 2 B  Patient will consume meals of PU4/TN0 with no s/sx of aspiration given close meal supervision for strategy use.

## 2020-10-12 NOTE — CARE PLAN
Ventilator Daily Summary    Vent Day #10  Trache day 19  ASV  100/8/30%  T-piece   5LPM  30%    Ventilator settings changed this shift: not at this time    Weaning trials: T-piece during the day, ventilator at night     Respiratory Procedures: DUO QID    Plan: Continue current ventilator settings and wean mechanical ventilation as tolerated per physician orders.

## 2020-10-12 NOTE — PROGRESS NOTES
Critical Care Progress Note    Date of admission  9/9/2020    Chief Complaint  54 y.o. male with H/o Afib admitted 9/9/2020 with respiratory failure, sepsis and pneumonia - intubated in the ER.    Hospital Course   9/17 -    developed worsening hypercarbic and hypoxemic respiratory failure requiring emergent intubation.  Full vent support.  9/18 -    continue vent support.  Titrating phenylephrine.  Replete electrolytes.  SAT/SBT as tolerated.  9/19 -    SAT/SBT as tolerated.  Continue vent support.  Titrating phenylephrine.  Replete potassium.  Continue Unasyn.  9/20 -    liberated from the ventilator yesterday and did very well until last night when he required reintubation.  Continue vent support.  Continue Unasyn and complete 5 days of therapy.  10/3 -    continue vent support.  T-piece trials during the day as tolerated.  10/4 -    continue T-piece trials during the day as tolerated.  Nocturnal ventilatory support.  10/5 - nocturnal vent support and T-piece trials as they as tolerated, may need LTAC/home vent after that-trilogy?  10/6 - HS ASV vent, days Tpiece w/PSMV at times, still fatigued at end of day, CM working on dispo  10/7 - HS ASV ventilation, T-piece days with PSMV at times, case management working on dyspnea with LTAC referral to facility that accepts [facility] insurance in [place], trilogy ventilation nocturnally still being considered  Ongoing therapies  10/8 - HS ASV ventilation, Tpiece and therapies on day, prob depressed, LTACh referal ongoing  10/9 - HS ASV ventilation, Tpiece days w/PSMV as tolerated, therapies  10/10 - HS ASV, Tpiece days, CAF, PSMV, want to go home  10/11 -  HS % x 8 hrs, T piece days w/PSMV, CAF   10/12 - % last night, T-piece, barium swallow and passed, doing PSMV trials      Interval Problem Update  Reviewed last 24 hour events:   -no events overnight   - a/ox4   - following all commands   - a-fib 980-110s   - SBP 90s   - MAP > 65   - afebrile   - TFs at  goal with core track   - UOP of 900cc overnight, no morton   - BM this morning   - in chair   - CXR(reviewed): low lung field on left, slumped overnight, poor film   - vent day #11, tach day #20   - rested on ASVC overnight, t-piece during the day   - (+)secretions   - eliquis    - home PPI   - no electroytes   - waiting for LTAC    Yesterday's Events:  Awake on vent, follows/writes  CAF controlled rate  SBp 80-110s  UO adequate  Tm 97.8  TF Cortrak  Vent HS - T piece  % peep 8 35%  No CXR  Vent waves/mechnaics reviewed  PPI  Midodrine  Xarelto  NEBs QID      30 day med review performed w/ Pharmacy    Drop ASV tp 100% HS and continue just 8 hours a night with ventilator  Recheck swallow eval tomorrow?,  Hope to avoid PEG  Continue aggressive PT/OT  Case management still working on LTAC transfer to [place] and possible trilogy if fails weaning at LTAC  Continue LTAC approach to care      Review of Systems  Review of Systems   Constitutional: Positive for malaise/fatigue.   Respiratory: Positive for cough, sputum production and shortness of breath.    Gastrointestinal: Negative for abdominal pain, diarrhea, nausea and vomiting.   Genitourinary: Negative for frequency.   Musculoskeletal: Negative for back pain and neck pain.   Skin: Negative for rash.   Neurological: Positive for weakness.   Psychiatric/Behavioral: Negative for depression. The patient is nervous/anxious.         Vital Signs for last 24 hours   Temp:  [36.3 °C (97.3 °F)-37.1 °C (98.8 °F)] 37.1 °C (98.8 °F)  Pulse:  [] 85  Resp:  [0-42] 16  BP: ()/(25-76) 85/57  SpO2:  [92 %-100 %] 100 %    Hemodynamic parameters for last 24 hours       Respiratory Information for the last 24 hours  Vent Mode: ASV  PEEP/CPAP: 8  MAP: 14  Control VTE (exp VT): 395    Physical Exam   Physical Exam  Vitals signs and nursing note reviewed.   Constitutional:       General: He is not in acute distress.     Appearance: He is ill-appearing. He is not  toxic-appearing.      Comments: Pt appears chronically ill and older than stated age   HENT:      Head: Normocephalic and atraumatic.      Right Ear: External ear normal.      Left Ear: External ear normal.      Nose: Nose normal. No rhinorrhea.      Mouth/Throat:      Mouth: Mucous membranes are moist.      Pharynx: Oropharynx is clear. No oropharyngeal exudate.   Eyes:      General: No scleral icterus.     Extraocular Movements: Extraocular movements intact.      Conjunctiva/sclera: Conjunctivae normal.      Pupils: Pupils are equal, round, and reactive to light.   Neck:      Musculoskeletal: Normal range of motion and neck supple.      Comments: Trach in place without surrounding swelling/erythema  Cardiovascular:      Rate and Rhythm: Normal rate and regular rhythm.      Pulses: Normal pulses.      Heart sounds: Normal heart sounds. No murmur.   Pulmonary:      Breath sounds: No wheezing.      Comments: Coarse breath sounds heard throughout, strong cough  Chest:      Chest wall: No tenderness.   Abdominal:      General: Bowel sounds are normal. There is no distension.      Palpations: Abdomen is soft.      Tenderness: There is no abdominal tenderness. There is no guarding or rebound.   Musculoskeletal: Normal range of motion.      Right lower leg: No edema.      Left lower leg: No edema.   Lymphadenopathy:      Cervical: No cervical adenopathy.   Skin:     General: Skin is warm and dry.      Capillary Refill: Capillary refill takes less than 2 seconds.      Findings: No rash.   Neurological:      Mental Status: He is alert and oriented to person, place, and time.      Cranial Nerves: No cranial nerve deficit.      Sensory: No sensory deficit.      Motor: No weakness.   Psychiatric:         Mood and Affect: Mood normal.       Medications  Current Facility-Administered Medications   Medication Dose Route Frequency Provider Last Rate Last Dose   • senna-docusate (PERICOLACE or SENOKOT S) 8.6-50 MG per tablet 2 Tab   2 Tab Enteral Tube BID Dmitri Pascual M.D.   2 Tab at 10/12/20 0625    And   • polyethylene glycol/lytes (MIRALAX) PACKET 1 Packet  1 Packet Enteral Tube QDAY PRN Dmitri Pascual M.D.        And   • magnesium hydroxide (MILK OF MAGNESIA) suspension 30 mL  30 mL Enteral Tube QDAY PRN Dmitri Pascual M.D.        And   • bisacodyl (DULCOLAX) suppository 10 mg  10 mg Rectal QDAY PRN Dmitri Pascual M.D.       • ipratropium-albuterol (DUONEB) nebulizer solution  3 mL Nebulization 4X/DAY (RT) Dmitri Pascual M.D.   3 mL at 10/11/20 1927   • FLUoxetine (PROZAC) capsule 20 mg  20 mg Enteral Tube DAILY Dmitri Pascual M.D.   20 mg at 10/12/20 0625   • lidocaine (LIDODERM) 5 % 2 Patch  2 Patch Transdermal Q24HR Dmitri Pascual M.D.   2 Patch at 10/11/20 2221   • traZODone (DESYREL) tablet 50 mg  50 mg Enteral Tube HS PRN Dmitri Pascual M.D.   50 mg at 10/11/20 2139   • multivitamin (THERAGRAN) tablet 1 Tab  1 Tab Enteral Tube DAILY Dmitri Pascual M.D.   1 Tab at 10/12/20 0625   • midodrine (PROAMATINE) tablet 5 mg  5 mg Enteral Tube Q8HRS Dmitri Pascual M.D.   5 mg at 10/12/20 0625   • acetaminophen (TYLENOL) tablet 650 mg  650 mg Enteral Tube Q6HRS PRN Dmitri Pascual M.D.   650 mg at 10/11/20 1959   • ondansetron (ZOFRAN ODT) dispertab 4 mg  4 mg Enteral Tube Q4HRS PRN Dmitri Pascual M.D.       • Respiratory Therapy Consult   Nebulization Continuous RT Dmitri Pascual M.D.       • lidocaine (XYLOCAINE) 1 % injection 1-2 mL  1-2 mL Tracheal Tube Q30 MIN PRN Dmitri Pascual M.D.       • omeprazole (FIRST-OMEPRAZOLE) 2 mg/mL oral susp 40 mg  40 mg Enteral Tube DAILY Dmitri Pascual M.D.   40 mg at 10/12/20 0627   • levalbuterol (XOPENEX) 1.25 MG/3ML nebulizer solution 1.25 mg  1.25 mg Nebulization Q4H PRN (RT) Dmitri Pascual M.D.       • Pharmacy Consult: Enteral tube insertion - review meds/change route/product selection  1 Each Other PHARMACY TO DOSE Dmitri Pascual,  M.D.       • amiodarone (CORDARONE) tablet 200 mg  200 mg Enteral Tube Q DAY Dmitri Pascual M.D.   200 mg at 10/12/20 0625   • furosemide (LASIX) tablet 20 mg  20 mg Enteral Tube Q DAY Dmitri Pascual M.D.   20 mg at 10/12/20 0625   • DULoxetine (CYMBALTA) capsule 60 mg  60 mg Enteral Tube QHS Dmitri Pascual M.D.   60 mg at 10/11/20 2000   • topiramate (TOPAMAX) tablet 75 mg  75 mg Enteral Tube DAILY Dmitri Pascual M.D.   75 mg at 10/12/20 0626   • rivaroxaban (XARELTO) tablet 20 mg  20 mg Enteral Tube PM MEAL Dmitri Pascual M.D.   20 mg at 10/11/20 1631   • ondansetron (ZOFRAN) syringe/vial injection 4 mg  4 mg Intravenous Q4HRS PRN Dmitri Pascual M.D.   4 mg at 10/02/20 0639       Fluids    Intake/Output Summary (Last 24 hours) at 10/12/2020 0658  Last data filed at 10/12/2020 0600  Gross per 24 hour   Intake 1800 ml   Output 1525 ml   Net 275 ml       Laboratory          Recent Labs     10/10/20  0530 10/12/20  0430   SODIUM 133* 136   POTASSIUM 4.0 4.0   CHLORIDE 100 100   CO2 26 27   BUN 19 18   CREATININE 0.46* 0.47*   MAGNESIUM 2.2 2.1   PHOSPHORUS 2.7 3.2   CALCIUM 8.3* 8.4*     Recent Labs     10/10/20  0530 10/12/20  0430   ALTSGPT  --  29   ASTSGOT  --  20   ALKPHOSPHAT  --  165*   TBILIRUBIN  --  0.2   GLUCOSE 82 82     Recent Labs     10/10/20  0530 10/12/20  0430   WBC 6.6 6.1   NEUTSPOLYS 78.40* 74.30*   LYMPHOCYTES 10.30* 12.10*   MONOCYTES 9.00 11.20   EOSINOPHILS 1.20 1.60   BASOPHILS 0.80 0.50   ASTSGOT  --  20   ALTSGPT  --  29   ALKPHOSPHAT  --  165*   TBILIRUBIN  --  0.2     Recent Labs     10/10/20  0530 10/12/20  0430   RBC 3.37* 3.34*   HEMOGLOBIN 8.9* 8.5*   HEMATOCRIT 30.3* 29.2*   PLATELETCT 341 339       Imaging  X-Ray:  I have personally reviewed the images and compared with prior images.  EKG:  I have personally reviewed the images and compared with prior images.  CT:    Reviewed  Echo:   Reviewed    Assessment/Plan  * Acute respiratory failure with hypoxia and  hypercapnia (HCC)- (present on admission)  Assessment & Plan  Intubated 9/9-9/12, 9/17-9/19, 9/20-9/23 - now on 4th course on vent this admission alone  Trach 9/23  Continue vent support at night and as necessary during the day  T-piece trials during the day as tolerated  He needs nocturnal ventilatory support - will try to accomplish with nocturnal BiPAP attached to his trach?  He is a candidate for Trilogy support  Patient is deconditioned, continue therapies    Best option long-term would be a prolonged rehab ventilator weaning course at a long-term acute care facility and if he fails this consider home trilogy ventilator authorization, patient from [place] and case management working on referring patient to appropriate facility     having difficulty with placement  On facility with PEG or they will accept in transfer, I requested contact with their possible accepting physician to review case-pending    Tolerated reduction to 8 hours a night of ASV ventilator support  Drop ASV percentage to 100%   Patient is already failed transfer to the floor with tracheostomy along with T-piece/HMTC, but getting stronger    Continue aggressive therapies  Passed SLP, ongoing PSMV triails    COPD (chronic obstructive pulmonary disease) (HCC)  Assessment & Plan  Currently not exacerbating, monitoring closely  Appears to be end-stage, patient wants to continue on the ventilator and go through rehab for now  Patient has of hypoxemia and hypercarbia along with mild to moderate pulmonary pretension  Continue bronchodilators every 4-6 hours  RT protocols  With A. fib using Xopenex over albuterol  Pulmonary toilet.    Pneumonia- (present on admission)  Assessment & Plan  Currently without concerning signs or symptoms of new HCAP, aggressive prevention ongoing  Monitor for recurrence, patient at high risk for nosocomial infection  Update vaccines per protocol  MSSA in sputum on 9/10  Usual ashwin in sputum culture on  9/17  S/P 3 days of Zosyn followed by 3 days of Unasyn which completed on 9/15  Second Unasyn course from 9/18-9/23  Currently observing off antibiotics, remains afebrile without new S/S of infection    Paroxysmal atrial fibrillation (HCC)- (present on admission)  Assessment & Plan  Remains in chronic A. fib with controlled rate, patient asymptomatic  Continue amiodarone, 200 mg daily  Echo with normal LV systolic function and normal size of LA however significant RV abnormalities and PHTN is noted  Continue anticoagulation with rivaroxaban, monitor for bleeding  Optimize electrolytes, acid-base balance and oxygenation  Ongoing cardiac monitoring.    Malnutrition (HCC)  Assessment & Plan  Continue nutritional support, currently at goal  Dietary following  Enteral nutrition  Passed SLP  Optimize electrolytes  Supplements as needed      Normocytic anemia- (present on admission)  Assessment & Plan  Serial CBC, hemoglobin unchanged or slight better  Transfuse per protocols  Not bleeding clinically but patient is at risk of being on chronic anticoagulation and chronically on the vent.    Dysphagia- (present on admission)  Assessment & Plan  Continue speech therapy  SLP today and passed with barium swallow  Hope to avoid PEG which may set us back from a vent weaning standpoint  Passy-Nicky valve trials ongoing    Pulmonary hypertension (HCC)- (present on admission)  Assessment & Plan  RVSP 42  Treat underlying process, ongoing  Maintain normal oxygenation, suspect chronic hypoxemia primary etiology  Diuresis as needed  Serial echo as clinically appropriate.    Major depression- (present on admission)  Assessment & Plan  Continue Cymbalta  Continue to provide emotional support  At bedtime trazodone for sleep  Healing garden trips as clinically safe  Xanax at 0.25mg BID as needed for anxiety  Requesting case management nursing staff to facilitate prep resume meeting with friends or family to help with spirits    Right  ventricular dilation- (present on admission)  Assessment & Plan  Clinically not in severe right heart failure  Echo confirms severely dilated RV with mild RV systolic dysfunction  Presumably related to chronic hypoxemia and secondary pulmonary pretension  RVSP 42 mmHg  Forced diuresis as needed, judicious fluid management  If gets off ventilator monitor for hypoxemia and correct.       VTE:  NOAC  Ulcer: PPI  Lines: Orozco Catheter  Ongoing indication addressed    I have performed a physical exam and reviewed and updated ROS and Plan today (10/12/2020). In review of yesterday's note (10/11/2020), there are no changes except as documented above.     Discussed patient condition and risk of morbidity and/or mortality with RN, RT, Pharmacy, Charge nurse / hot rounds and Patient

## 2020-10-12 NOTE — CARE PLAN
"  Problem: Safety  Goal: Will remain free from falls  Outcome: PROGRESSING AS EXPECTED  Note: Tiffany elena fall risk assessment complete. Bed locked and in lowest position. Bed alarm on. Verified pt has \"Fall Risk\" bracelet on wrist. Verified pt has \"Fall Risk\" sign outside of pt's room. Verified pt's room is close to nursing station. Pt's belongings and call light within reach. Pt educated on importance of pressing call light PRN.        Problem: Bowel/Gastric:  Goal: Normal bowel function is maintained or improved  Outcome: PROGRESSING AS EXPECTED  Intervention: Educate patient and significant other/support system about diet, fluid intake, medications and activity to promote bowel function  Note: Pt had zero bowel movements this shift. No c/o of N/V. No bloody stool or emesis this shift.      "

## 2020-10-12 NOTE — THERAPY
"Speech Language Pathology  Daily Treatment     Patient Name: Jas Vann  Age:  54 y.o., Sex:  male  Medical Record #: [id number]  Today's Date: 10/12/2020     Precautions  Precautions: (P) Fall Risk, Tracheostomy , Nasogastric Tube, Swallow Precautions ( See Comments)    Assessment    Pt was seen for a speaking valve treatment prior to Modified Barium Swallow study (results to follow). Pt is currently on 5L 30% with O2 sats 98-99%. RT present as well to suction patient and assist w/ valve placement. Cuff was deflated and speaking valve was placed in-line with T-piece. Pt coughed intermittently for ~10 minutes initially, able to produce a strong/clear voice. His vitals fluctuated briefly (92-94% O2) but remained at baseline once he stopped coughing. Pt did require oral suctioning while coughing though minimal thin clear secretions were removed and he did not require any suctioning after the initial coughing. Pt was then able to communicate wants/needs/ideas and consume a few ice chips prior to readying for transport to radiology. He tolerated the speaking valve for 20 minutes prior to transport and for the duration of the MBS as well (~30 minutes). Discussed w/ RN pt keeping the speaking valve on for the remainder of the day. Recommend pt wear the speaking valve as tolerated during the day w/ close supervision by trained staff and during meals/PO intake as well.     Plan    Recommend pt wear the speaking valve as tolerated during the day w/ close supervision by trained staff and during meals/PO intake as well.     Continue current treatment plan.    Discharge Recommendations: (P) Recommend post-acute placement for additional speech therapy services prior to discharge home    Subjective    \"Yeah it feels good.\"     Objective       10/12/20 1305   Voice   Modulating Loudness Supervision (5)   Respiration Training Supervision (5)   Counselling / Education  Supervision (5)   Short Term Goals   Short Term Goal # 1 " MODIFIED ON 9/28: Patient will tolerate the speaking valve for >30 minutes INTERVALS with no significant change in vitals given close supervision from RT, RN, Therapy   Goal Outcome # 1 Goal met, new goal added   Short Term Goal # 1 B  NEW 10/12: Patient will wear the speaking valve during all meals and during the day as tolerated with no significant change in vitals given close supervision from trained RN/RT/SLP.

## 2020-10-12 NOTE — PROGRESS NOTES
Cortrak Placement    Tube Team verified patient name and medical record number prior to tube placement.  Cortrak tube (55 inches, 10 [place]) placed at 68 cm in right nare.  Per Cortrak picture, tube appears to be in the stomach.  Nursing Instructions: Awaiting KUB to confirm placement before use for medications or feeding. Once placement confirmed, flush tube with 30 ml of water, and then remove and save stylet, in patient medication drawer.

## 2020-10-13 NOTE — PROGRESS NOTES
Critical Care Progress Note    Date of admission  9/9/2020    Chief Complaint  54 y.o. male with H/o Afib admitted 9/9/2020 with respiratory failure, sepsis and pneumonia - intubated in the ER.    Hospital Course   9/17 -    developed worsening hypercarbic and hypoxemic respiratory failure requiring emergent intubation.  Full vent support.  9/18 -    continue vent support.  Titrating phenylephrine.  Replete electrolytes.  SAT/SBT as tolerated.  9/19 -    SAT/SBT as tolerated.  Continue vent support.  Titrating phenylephrine.  Replete potassium.  Continue Unasyn.  9/20 -    liberated from the ventilator yesterday and did very well until last night when he required reintubation.  Continue vent support.  Continue Unasyn and complete 5 days of therapy.  10/3 -    continue vent support.  T-piece trials during the day as tolerated.  10/4 -    continue T-piece trials during the day as tolerated.  Nocturnal ventilatory support.  10/5 - nocturnal vent support and T-piece trials as they as tolerated, may need LTAC/home vent after that-trilogy?  10/6 - HS ASV vent, days Tpiece w/PSMV at times, still fatigued at end of day, CM working on dispo  10/7 - HS ASV ventilation, T-piece days with PSMV at times, case management working on dyspnea with LTAC referral to facility that accepts [facility] insurance in [place], trilogy ventilation nocturnally still being considered  Ongoing therapies  10/8 - HS ASV ventilation, Tpiece and therapies on day, prob depressed, LTACh referal ongoing  10/9 - HS ASV ventilation, Tpiece days w/PSMV as tolerated, therapies  10/10 - HS ASV, Tpiece days, CAF, PSMV, want to go home  10/11 -  HS % x 8 hrs, T piece days w/PSMV, CAF   10/12 - % last night, T-piece, barium swallow and passed, doing PSMV trials, a-fib with RVR-->dose of amio  10/13 - % overnight, passed swallow eval, cont to work with speaking valve, a-fib      Interval Problem Update  Reviewed last 24 hour events:   - no  events overnight   - a/ox4   - failed speaking vlave today   - a-fib with RVR s/p dose amio   - a-fib 90-100s   - SBP 90-120s   - afebrile   - walking in ICU on vent   - UOP with morton > 600cc   - passed SLP    - trach day #21   - ASV at 100/8, T-piece 8/40% during the day   - thick secretions   - Duoneb QID   - xarelto/ppi    Yesterday's Events:   -no events overnight   - a/ox4   - following all commands   - a-fib 980-110s   - SBP 90s   - MAP > 65   - afebrile   - TFs at goal with core track   - UOP of 900cc overnight, no morton   - BM this morning   - in chair   - CXR(reviewed): low lung field on left, slumped overnight, poor film   - vent day #11, tach day #20   - rested on ASVC overnight, t-piece during the day   - (+)secretions   - eliquis    - home PPI   - no electroytes   - waiting for LTAC      Review of Systems  Review of Systems   Constitutional: Positive for malaise/fatigue. Negative for chills.   HENT: Negative for congestion and sore throat.    Eyes: Negative for blurred vision and double vision.   Respiratory: Positive for cough, sputum production and shortness of breath.    Cardiovascular: Negative for chest pain and leg swelling.   Gastrointestinal: Negative for abdominal pain, diarrhea, nausea and vomiting.   Genitourinary: Negative for frequency.   Musculoskeletal: Negative for back pain and neck pain.   Skin: Negative for rash.   Neurological: Positive for weakness. Negative for headaches.   Endo/Heme/Allergies: Bruises/bleeds easily.   Psychiatric/Behavioral: Negative for depression. The patient is nervous/anxious.         Vital Signs for last 24 hours   Temp:  [36 °C (96.8 °F)-37.2 °C (99 °F)] 36.4 °C (97.5 °F)  Pulse:  [] 48  Resp:  [12-43] 16  BP: ()/(53-79) 105/69  SpO2:  [86 %-100 %] 100 %    Hemodynamic parameters for last 24 hours       Respiratory Information for the last 24 hours  Vent Mode: ASV  PEEP/CPAP: 8  MAP: 16  Control VTE (exp VT): 363    Physical Exam   Physical  Exam  Vitals signs and nursing note reviewed.   Constitutional:       General: He is not in acute distress.     Appearance: He is ill-appearing. He is not toxic-appearing.      Comments: Pt appears chronically ill and older than stated age   HENT:      Head: Normocephalic and atraumatic.      Right Ear: External ear normal.      Left Ear: External ear normal.      Nose: Nose normal. No rhinorrhea.      Mouth/Throat:      Mouth: Mucous membranes are moist.      Pharynx: Oropharynx is clear. No oropharyngeal exudate.   Eyes:      General: No scleral icterus.     Extraocular Movements: Extraocular movements intact.      Conjunctiva/sclera: Conjunctivae normal.      Pupils: Pupils are equal, round, and reactive to light.   Neck:      Musculoskeletal: Normal range of motion and neck supple.      Comments: Trach in place without surrounding swelling/erythema  Cardiovascular:      Rate and Rhythm: Tachycardia present. Rhythm irregularly irregular.      Pulses: Normal pulses.      Heart sounds: Normal heart sounds. No murmur.   Pulmonary:      Breath sounds: No wheezing.      Comments: Coarse breath sounds heard throughout, strong cough  Chest:      Chest wall: No tenderness.   Abdominal:      General: Bowel sounds are normal. There is no distension.      Palpations: Abdomen is soft.      Tenderness: There is no abdominal tenderness. There is no guarding or rebound.   Musculoskeletal: Normal range of motion.      Right lower leg: No edema.      Left lower leg: No edema.   Lymphadenopathy:      Cervical: No cervical adenopathy.   Skin:     General: Skin is warm and dry.      Capillary Refill: Capillary refill takes less than 2 seconds.      Findings: No rash.   Neurological:      Mental Status: He is alert and oriented to person, place, and time.      Cranial Nerves: No cranial nerve deficit.      Sensory: No sensory deficit.      Motor: No weakness.   Psychiatric:         Mood and Affect: Mood normal.        Medications  Current Facility-Administered Medications   Medication Dose Route Frequency Provider Last Rate Last Dose   • ALPRAZolam (XANAX) tablet 0.25 mg  0.25 mg Oral BID PRN Nalini Aguirre M.D.   0.25 mg at 10/12/20 1621   • senna-docusate (PERICOLACE or SENOKOT S) 8.6-50 MG per tablet 2 Tab  2 Tab Enteral Tube BID Dmitri Pascual M.D.   2 Tab at 10/13/20 0611    And   • polyethylene glycol/lytes (MIRALAX) PACKET 1 Packet  1 Packet Enteral Tube QDAY PRN Dmitri Pasucal M.D.        And   • magnesium hydroxide (MILK OF MAGNESIA) suspension 30 mL  30 mL Enteral Tube QDAY PRN Dmitri Pascual M.D.        And   • bisacodyl (DULCOLAX) suppository 10 mg  10 mg Rectal QDAY PRN Dmitri Pascual M.D.       • ipratropium-albuterol (DUONEB) nebulizer solution  3 mL Nebulization 4X/DAY (RT) Dmitri Pascual M.D.   3 mL at 10/13/20 0632   • FLUoxetine (PROZAC) capsule 20 mg  20 mg Enteral Tube DAILY Dmitri Pascual M.D.   20 mg at 10/13/20 0611   • lidocaine (LIDODERM) 5 % 2 Patch  2 Patch Transdermal Q24HR Dmitri Pascual M.D.   2 Patch at 10/12/20 2033   • traZODone (DESYREL) tablet 50 mg  50 mg Enteral Tube HS PRN Dmitri Pascual M.D.   50 mg at 10/12/20 2154   • multivitamin (THERAGRAN) tablet 1 Tab  1 Tab Enteral Tube DAILY Dmitri Pascual M.D.   1 Tab at 10/13/20 0610   • midodrine (PROAMATINE) tablet 5 mg  5 mg Enteral Tube Q8HRS Dmirti Pascual M.D.   Stopped at 10/12/20 1400   • acetaminophen (TYLENOL) tablet 650 mg  650 mg Enteral Tube Q6HRS PRN Dmitri Pascual M.D.   650 mg at 10/13/20 0354   • ondansetron (ZOFRAN ODT) dispertab 4 mg  4 mg Enteral Tube Q4HRS PRN Dmitri Pascual M.D.       • Respiratory Therapy Consult   Nebulization Continuous RT Dmitri Pascual M.D.       • lidocaine (XYLOCAINE) 1 % injection 1-2 mL  1-2 mL Tracheal Tube Q30 MIN PRN Dmitri Pascual M.D.       • omeprazole (FIRST-OMEPRAZOLE) 2 mg/mL oral susp 40 mg  40 mg Enteral Tube DAILY Dmitri NARAYANAN  TANA Pascual   40 mg at 10/13/20 0610   • levalbuterol (XOPENEX) 1.25 MG/3ML nebulizer solution 1.25 mg  1.25 mg Nebulization Q4H PRN (RT) Dmitri Pascual M.D.       • Pharmacy Consult: Enteral tube insertion - review meds/change route/product selection  1 Each Other PHARMACY TO DOSE Dmitri Pascual M.D.       • amiodarone (CORDARONE) tablet 200 mg  200 mg Enteral Tube Q DAY Dmitri Pascual M.D.   200 mg at 10/13/20 0611   • furosemide (LASIX) tablet 20 mg  20 mg Enteral Tube Q DAY Dmitri Pascual M.D.   20 mg at 10/13/20 0611   • DULoxetine (CYMBALTA) capsule 60 mg  60 mg Enteral Tube QHS Dmitri Pascual M.D.   60 mg at 10/12/20 2033   • topiramate (TOPAMAX) tablet 75 mg  75 mg Enteral Tube DAILY Dmitri Pascual M.D.   75 mg at 10/13/20 0610   • rivaroxaban (XARELTO) tablet 20 mg  20 mg Enteral Tube PM MEAL Dmitri Pascual M.D.   20 mg at 10/12/20 1620   • ondansetron (ZOFRAN) syringe/vial injection 4 mg  4 mg Intravenous Q4HRS PRN Dmitri Pascual M.D.   4 mg at 10/12/20 2201       Fluids    Intake/Output Summary (Last 24 hours) at 10/13/2020 0650  Last data filed at 10/13/2020 0600  Gross per 24 hour   Intake 960 ml   Output 415 ml   Net 545 ml       Laboratory          Recent Labs     10/12/20  0430   SODIUM 136   POTASSIUM 4.0   CHLORIDE 100   CO2 27   BUN 18   CREATININE 0.47*   MAGNESIUM 2.1   PHOSPHORUS 3.2   CALCIUM 8.4*     Recent Labs     10/12/20  0430   ALTSGPT 29   ASTSGOT 20   ALKPHOSPHAT 165*   TBILIRUBIN 0.2   GLUCOSE 82     Recent Labs     10/12/20  0430   WBC 6.1   NEUTSPOLYS 74.30*   LYMPHOCYTES 12.10*   MONOCYTES 11.20   EOSINOPHILS 1.60   BASOPHILS 0.50   ASTSGOT 20   ALTSGPT 29   ALKPHOSPHAT 165*   TBILIRUBIN 0.2     Recent Labs     10/12/20  0430   RBC 3.34*   HEMOGLOBIN 8.5*   HEMATOCRIT 29.2*   PLATELETCT 339       Imaging  X-Ray:  I have personally reviewed the images and compared with prior images.  EKG:  I have personally reviewed the images and compared with  prior images.  CT:    Reviewed  Echo:   Reviewed    Assessment/Plan  * Acute respiratory failure with hypoxia and hypercapnia (HCC)- (present on admission)  Assessment & Plan  Intubated 9/9-9/12, 9/17-9/19, 9/20-9/23 - now on 4th course on vent this admission alone  Trach 9/23  Continue vent support at night and as necessary during the day  T-piece trials during the day as tolerated  He needs nocturnal ventilatory support - will try to accomplish with nocturnal BiPAP attached to his trach?  He is a candidate for Trilogy support  Patient is deconditioned, continue therapies    Best option long-term would be a prolonged rehab ventilator weaning course at a long-term acute care facility and if he fails this consider home trilogy ventilator authorization, patient from [place] and case management working on referring patient to appropriate facility     having difficulty with placement  On facility with PEG or they will accept in transfer, I requested contact with their possible accepting physician to review case-pending    Tolerated reduction to 8 hours a night of ASV ventilator support  Drop ASV percentage to 100%   Patient is already failed transfer to the floor with tracheostomy along with T-piece/HMTC, but getting stronger    Continue aggressive therapies of SLP/PT/OT  Passed SLP-->ongoing PSMV trials with speech, hoping to avoid PEG tube placement    COPD (chronic obstructive pulmonary disease) (HCC)  Assessment & Plan  No in exacerbation, monitoring closely  Appears to be end-stage, patient wants to continue on the ventilator and go through rehab for now  Patient has of hypoxemia and hypercarbia along with mild to moderate pulmonary pretension  Continue bronchodilators every 4-6 hours  RT protocols  With A. fib using Xopenex over albuterol  Pulmonary toilet.    Pneumonia- (present on admission)  Assessment & Plan  Currently without concerning signs or symptoms of new HCAP, aggressive prevention  ongoing  Monitor for recurrence, patient at high risk for nosocomial infection  Update vaccines per protocol  MSSA in sputum on 9/10  Usual ashwin in sputum culture on 9/17  S/P 3 days of Zosyn followed by 3 days of Unasyn which completed on 9/15  Second Unasyn course from 9/18-9/23  Currently observing off antibiotics, remains afebrile without new S/S of infection    Paroxysmal atrial fibrillation (HCC)- (present on admission)  Assessment & Plan  Remains in chronic A. fib with controlled rate, patient asymptomatic  Continue amiodarone 200 mg daily, start metoprolol 25mg BID  Echo with normal LV systolic function and normal size of LA however significant RV abnormalities and PHTN is noted  Continue anticoagulation with rivaroxaban, monitor for bleeding  Optimize electrolytes, acid-base balance and oxygenation  Ongoing cardiac monitoring.    Malnutrition (HCC)  Assessment & Plan  Continue nutritional support, currently at goal  Dietary following  Enteral nutrition  Passed SLP  Optimize electrolytes  Supplements as needed      Normocytic anemia- (present on admission)  Assessment & Plan  Serial CBC, hemoglobin unchanged or slight better  Transfuse per protocols  Not bleeding clinically but patient is at risk of being on chronic anticoagulation and chronically on the vent.    Dysphagia- (present on admission)  Assessment & Plan  Continue speech therapy  Passed swallow eval  Hope to avoid PEG which may set us back from a vent weaning standpoint  Passy-Ash[place] valve trials ongoing    Pulmonary hypertension (HCC)- (present on admission)  Assessment & Plan  RVSP 42  Treat underlying process, ongoing  Maintain normal oxygenation, suspect chronic hypoxemia primary etiology  Diuresis as needed  Serial echo as clinically appropriate.    Major depression- (present on admission)  Assessment & Plan  Continue Cymbalta  Continue to provide emotional support  At bedtime trazodone for sleep  Healing garden trips as clinically safe  Xanax  at 0.25mg BID as needed for anxiety  Requesting case management nursing staff to facilitate prep resume meeting with friends or family to help with spirits    Right ventricular dilation- (present on admission)  Assessment & Plan  Clinically not in severe right heart failure  Echo confirms severely dilated RV with mild RV systolic dysfunction  Presumably related to chronic hypoxemia and secondary pulmonary pretension  RVSP 42 mmHg  Forced diuresis as needed, judicious fluid management         VTE:  NOAC  Ulcer: PPI  Lines: Orozco Catheter  Ongoing indication addressed    I have performed a physical exam and reviewed and updated ROS and Plan today (10/13/2020). In review of yesterday's note (10/12/2020), there are no changes except as documented above.     Discussed patient condition and risk of morbidity and/or mortality with RN, RT, Pharmacy, Charge nurse / hot rounds and Patient

## 2020-10-13 NOTE — CARE PLAN
Ventilator Daily Summary    Vent Day #  T 21      NOC ASV as tolerated 100 8 30%                AM T piece  5-8L 35-40%                 Speaking valve   Ventilator settings changed this shift:  no  Weaning trials:  no  Respiratory Procedures:  no  Plan: Continue current ventilator settings and wean mechanical ventilation as tolerated per physician orders.   Continue NOC vent  8 30  And day T Piece 5-8L 35-40% as tolerated   DUO  /   Muco

## 2020-10-13 NOTE — DIETARY
Nutrition support weekly update:  Day 34 of admit.  Jas Vann is a 54 y.o. male with admitting DX of Acute respiratory failure (HCC).    Tube feeding initiated on 9/11, then D/c'd 9/16. TF was resumed 9/17.   Current TF orders: Fibersource HN; goal rate 70 ml/hr to provide 2016 kcal, 91 grams protein, and 1361 ml free water per day.  Duodenal Cortrak in place, but TF has been held since this morning (last running @ 0813 per I/O).     Modified PO diet initiated per SLP 10/12. PO intake charted as 25-50% of dinner last night.  RD visited pt late this morning, but he had not consumed any breakfast. SLP then arrived to get pt's speaking valve in place for PO intake. Per SLP, pt refused to place the speaking valve earlier in the morning. Once valve in place, RD able to speak with pt about preferences for upcoming meals. Discussed supplements as well.  RD called RN this afternoon to see how pt did with late breakfast and lunch. Per RN, pt ate 50-60% of breakfast but did not consume any lunch (pt was full and tired).  Explained plan for RD to monitor PO intake and make recommendations regarding TF (etc).    Assessment:  Weight 47.1 kg with BMI 15.88.  Pt has lost 28.3 kg since admit (38%).    Estimated nutritional needs:  REE per MSJ x1.2 = 1528 kcal/day  35-40 kcal/day for weight maintenance/gain = 7220-9582 kcal/day  1.5+ grams protein/kg = 71+ grams protein/day    Evaluation:   Pt needs to eat 50-75% of meals and supplements to meet nutritional needs without TF.  Of note, pt has previously demonstrated poor motivation.     Malnutrition risk: Severe malnutrition as evidenced by severe fat loss, severe muscle wasting, and severe weight loss.     Recommendations/Plan:  1. Encourage consistent PO intake of 50-75% meals and supplements.  2. Strongly consider resuming TF @ night to meet a portion of pt's estimated needs. Recommend: Fibersource HN @ 70 ml/hr x10 hours overnight (4818-3040) to provide 840 kcal, 38 grams  protein, and 567 ml free water per day.    RD following.

## 2020-10-13 NOTE — THERAPY
Speech Language Pathology  Daily Treatment     Patient Name: Jas Vann  Age:  54 y.o., Sex:  male  Medical Record #: [id number]  Today's Date: 10/13/2020     Precautions  Precautions: Fall Risk, Swallow Precautions ( See Comments)  Comments: PMSV during the day as tolerated w/ close supervision    Assessment    Pt was seen for a speaking valve/dysphagia tx today per RN request as pt had refused to don the speaking valve before breakfast and did not eat. He still has the Cortrak in place, though RN hasn't used it for feeding yet today. Pt is now on 6L 40% via T-piece with O2 sats %. Tracheal suctioning performed, yielding scant thin clear secretions. Cuff was deflated and speaking valve was placed in line with T-piece. Pt immediately produced strong, clear voice without any coughing. He tolerated the valve for ~45 minutes during swallowing treatment with O2 sats generally stable. Pt did become tachycardic while standing to urinate. RN aware. Pt agreed to eat his breakfast from earlier. Extensive education provided to patient re: importance of wearing the speaking valve during meals and as much as tolerated during the day, as well as swallowing recommendations from MBS yesterday. Min verbal cues provided for pt to implement a double swallow with PO intake. He independently took small bites and sips. Pt consumed ~1 oz Boost and ~2 oz purees before declining further PO. No s/sx of aspiration occurred with PO intake. Pt agreed to try to leave the speaking valve on for the remainder of the day and to eat his lunch and dinner with the valve on. RN/MD/RT updated.     Plan    Continue purees/thins with direct meal supervision. Speaking valve must be donned for meals.  Recommend pt wear the speaking valve during the day as tolerated w/ close supervision.    Treatment plan modified to 4 times per week until therapy goals are met for the following treatments:  Dysphagia Training and Patient / Family / Caregiver  "Education.    Discharge Recommendations: (P) Recommend post-acute placement for additional speech therapy services prior to discharge home    Subjective    \"My throat hurts.\"  \"I ate what I could.\"     Objective       10/13/20 1230   Voice   Respiration Training Minimal (4)   Counselling / Education  Minimal (4)   Dysphagia    Positioning / Behavior Modification Modulate Rate or Bite Size;Multiple Swallows;Self Monitoring   Other Treatments meal tx PU4/TN0   Diet / Liquid Recommendation Puree (4);Thin (0)   Recommended Route of Medication Administration   Medication Administration  Crush all Medications in Puree   Short Term Goals   Short Term Goal # 1 B  NEW 10/12: Patient will wear the speaking valve during all meals and during the day as tolerated with no significant change in vitals given close supervision from trained RN/RT/SLP.   Goal Outcome  # 1 B Progressing slower than expected  (pt has been refusing to don the speaking valve w/ RN)   Short Term Goal # 2 B  Patient will consume meals of PU4/TN0 with no s/sx of aspiration given close meal supervision for strategy use.   (Progressing slower than expected d/t refusal to don PMSV)         "

## 2020-10-13 NOTE — DISCHARGE PLANNING
Anticipated Discharge Disposition: TBD    Action:   0849 faxed updated notes to Gisell at [facility] [phone]. Left VM for Gisell.  10:36 spoke w/ Gisell at [facility] [phone]. Gisell states she will review the updated notes and get back to this writer this afternoon  1109: received call from Gisell at [facility]. Gisell states pt needs to have both PEG and trach in order to be considered for admission at [facility] due to insurance policy  11:44 received call from Gisell at [facility]. Gisell requests additional medical records (RT, labs, and MAR) and will review. Faxed notes to Gisell  12:06 Discussed DC planning w/ Dr. Aguirre and BSRN. Pt may need a PEG    Barriers to Discharge:   No NOK  No support  Straight [employer] insurance  Ventilator dependent (attempting to wean pt off)       Plan: TBD

## 2020-10-13 NOTE — CARE PLAN
Problem: Nutritional:  Goal: Achieve adequate nutritional intake  Description: Patient will consume 50-75% of meals and supplements.   Outcome: PROGRESSING SLOWER THAN EXPECTED

## 2020-10-13 NOTE — THERAPY
"Physical Therapy   Daily Treatment     Patient Name: Jas Vann  Age:  54 y.o., Sex:  male  Medical Record #: [id number]  Today's Date: 10/12/2020     Precautions: Fall Risk, Swallow Precautions ( See Comments), Tracheostomy , Nasogastric Tube    Assessment    Patient continuing to demonstrate progress with functional mobility. Today he ambulated approximately 75ft x2 with FWW and min A. At midpoint of ambulation patient expressed discomfort in chest, a \"pressure\" sensation; patient continued to complain of discomfort and demonstrated increasing anxiety and hyperventilation during ambulation to return to room and while standing to urinate in room. Provided education regarding breathing technique and reassurance of SpO2 > 90%. RN aware and present at end of session. Recommend patient mobilize to chair at meal times and ambulate with RN staff 3x/day to progress activity tolerance. Will continue to follow.    Plan    Continue current treatment plan.    DC Equipment Recommendations: Front-Wheel Walker, Unable to determine at this time  Discharge Recommendations: Recommend post-acute placement for additional physical therapy services prior to discharge home      Subjective    \"I need more shorts.\"     Objective       10/12/20 1442   Cognition    Cognition / Consciousness X   Level of Consciousness Alert   Attention Impaired   Comments cooperative. anxious; appeared to begin hyperventilating following activity, required cueing for breathing/calming   Balance   Sitting Balance (Static) Good   Sitting Balance (Dynamic) Good   Standing Balance (Static) Fair +   Standing Balance (Dynamic) Fair   Comments with FWW, no LOB   Gait Analysis   Gait Level Of Assist Minimal Assist   Assistive Device Front Wheel Walker   Distance (Feet) 75   # of Times Distance was Traveled 2   Deviation   (decreased zenia, forward flexed posture)   # of Stairs Climbed 0   Comments as before; standing rest between bouts   Bed Mobility    Supine to " Sit   (NT, in chair pre and post)   Scooting Supervised  (seated)   Functional Mobility   Sit to Stand Supervised   Bed, Chair, Wheelchair Transfer Supervised   Transfer Method Stand Step   Short Term Goals    Short Term Goal # 1 Pt will ambulate 150 feet with FWW with SPV within 6 sessions to improve endurance   Goal Outcome # 1 goal not met   Short Term Goal # 2 Pt will verbalize and demonstrate energy conservation strategies with mobility to improve endurance with mobility tasks upon DC   Goal Outcome # 2 Goal not met

## 2020-10-13 NOTE — PROGRESS NOTES
Patient went into afib with RVR at rate between 150-160 while SBP was 132 and patient was nauseous. Dr. Aguirre notified who ordered amiodarone bolus.    Patient received amiodarone bolus and able to reduce rate to 80s-90s. Dr. Aguirre notified. Maintenance drip discontinued before being started

## 2020-10-14 NOTE — CARE PLAN
Problem: Bowel/Gastric:  Goal: Will not experience complications related to bowel motility  Intervention: Implement Bowel Protocol, if applicable  Note: Patient having regular BMs     Problem: Discharge Barriers/Planning  Goal: Patient's continuum of care needs will be met  Note: Awaiting LTACH placement

## 2020-10-14 NOTE — THERAPY
Speech Language Pathology  Daily Treatment     Patient Name: Jas Vann  Age:  54 y.o., Sex:  male  Medical Record #: [id number]  Today's Date: 10/14/2020     Precautions  Precautions: (P) Fall Risk, Swallow Precautions ( See Comments)  Comments: (P) PMSV during the day as tolerated w/ close supervision    Assessment    Pt seen on this date for dysphagia therapy. Pt eager for PO trials and able to verbalize some of swallowing strategies (small bites/sips, slow rate), but needed reminder for double swallow compensatory strategy. Re-educated pt on results of FEES and he verbalized understanding of education. He declined suctioning prior to and following PO trials and vocal quality was clear. Currently on 10L at 50% Fi02 via t-piece. No overt s/sx of aspiration appreciated with minimal amounts of pt's PU4/TN0 diet. He required fading cues to implement double swallow strategy, but completed independently towards end of session. Pt reported poor appetite and asking for eggs; call placed to food and nutrition. Pt with intermittent drop in 02 sats to mid 80's through out session, but quick rise in saturations with cues for deep breaths. Offered to doff PMSV at end of session, however, he declined. At this time, recommend continuation of PU4/TN0 diet with direct supervision during meals and implementation of swallowing strategies (small bites/sips, up at 90* during meals,slow rate, double swallow strategy, up at 90* during meals).    Plan    recommend continuation of PU4/TN0 diet with direct supervision during meals and implementation of swallowing strategies (small bites/sips, up at 90* during meals,slow rate, double swallow strategy, up at 90* during meals)    Continue current treatment plan.    Discharge Recommendations: (P) Recommend post-acute placement for additional speech therapy services prior to discharge home       Objective       10/14/20 0939   Precautions   Precautions Fall Risk;Swallow Precautions ( See  Comments)   Comments PMSV during the day as tolerated w/ close supervision   Vitals   O2 (LPM) 10   O2 Delivery Device T-Piece   Pain 0 - 10 Group   Therapist Pain Assessment Post Activity Pain Same as Prior to Activity;Nurse Notified;0   Cognitive-Linguistic   Level of Consciousness Alert   Voice   Voice X   Respiration Training Moderate (3)  (cues for deep breaths when 02 saturations drop)   Skilled Intervention Verbal Cueing   Dysphagia    Dysphagia X   Positioning / Behavior Modification Modulate Rate or Bite Size;Self Monitoring   Other Treatments trials of pt's breakfast   Diet / Liquid Recommendation Puree (4);Thin (0)   Nutritional Liquid Intake Rating Scale Non thickened beverages   Nutritional Food Intake Rating Scale Total oral diet of a single consistency   Nursing Communication Swallow Precaution Sign Posted at Head of Bed   Skilled Intervention Compensatory Strategies;Verbal Cueing   Short Term Goals   Short Term Goal # 1 B  NEW 10/12: Patient will wear the speaking valve during all meals and during the day as tolerated with no significant change in vitals given close supervision from trained RN/RT/SLP.   Goal Outcome  # 1 B Progressing as expected   Short Term Goal # 3 Patient will consume meals of PU4/TN0 with no s/sx of aspiration given close meal supervision for strategy use.    Goal Outcome  # 3 Progressing as expected

## 2020-10-14 NOTE — PROGRESS NOTES
Patient's clothes are visibly soiled with previous food and sweat. RN educated patient about this and offered new gown as well as bath. Patient refused both.

## 2020-10-14 NOTE — DISCHARGE PLANNING
Anticipated Discharge Disposition: TBD    Action: spoke w/ Florence at [facility]. Florence states they don't have beds and can't take admissions at the moment. Left VM for Gisell at [facility].    Barriers to Discharge:   No NOK  No support  Straight [employer] insurance  Ventilator dependent (attempting to wean pt off)    Plan: TBD

## 2020-10-14 NOTE — CARE PLAN
Ventilator Daily Summary    Vent Day # 13 Trach day 22    Ventilator settings changed this shift: No /8/30%    Weaning trials:NO    Respiratory Procedures: None    Plan: Continue current ventilator settings and wean mechanical ventilation as tolerated per physician orders.         Pt was on T piece during my shift  at 8L 50% and tolerated well.     Patient goes back on ventilator during the night time.     Patient taking Duoneb QID and Mucomyst QID.

## 2020-10-14 NOTE — PROGRESS NOTES
Critical Care Progress Note    Date of admission  9/9/2020    Chief Complaint  54 y.o. male with H/o Afib admitted 9/9/2020 with respiratory failure, sepsis and pneumonia - intubated in the ER.    Hospital Course   9/17 -    developed worsening hypercarbic and hypoxemic respiratory failure requiring emergent intubation.  Full vent support.  9/18 -    continue vent support.  Titrating phenylephrine.  Replete electrolytes.  SAT/SBT as tolerated.  9/19 -    SAT/SBT as tolerated.  Continue vent support.  Titrating phenylephrine.  Replete potassium.  Continue Unasyn.  9/20 -    liberated from the ventilator yesterday and did very well until last night when he required reintubation.  Continue vent support.  Continue Unasyn and complete 5 days of therapy.  10/3 -    continue vent support.  T-piece trials during the day as tolerated.  10/4 -    continue T-piece trials during the day as tolerated.  Nocturnal ventilatory support.  10/5 - nocturnal vent support and T-piece trials as they as tolerated, may need LTAC/home vent after that-trilogy?  10/6 - HS ASV vent, days Tpiece w/PSMV at times, still fatigued at end of day, CM working on dispo  10/7 - HS ASV ventilation, T-piece days with PSMV at times, case management working on dyspnea with LTAC referral to facility that accepts [facility] insurance in [place], trilogy ventilation nocturnally still being considered  Ongoing therapies  10/8 - HS ASV ventilation, Tpiece and therapies on day, prob depressed, LTACh referal ongoing  10/9 - HS ASV ventilation, Tpiece days w/PSMV as tolerated, therapies  10/10 - HS ASV, Tpiece days, CAF, PSMV, want to go home  10/11 -  HS % x 8 hrs, T piece days w/PSMV, CAF   10/12 - % last night, T-piece, barium swallow and passed, doing PSMV trials, a-fib with RVR-->dose of amio  10/13 - % overnight, passed swallow eval, cont to work with speaking valve, a-fib  10/14 - %/8 overnight, a-fib rate controlled, improving  eating, using speaking valve, T-piece at 10 lpm at 40%       Interval Problem Update  Reviewed last 24 hour events:   - %/8 overnight again   - had some anxiety overnight with desatting to 80s   - a/ox4   - a-fib 90-110s   - SBP 90-120s   - BM on 10/13   - pureed diet   - UOP of 600cc with urinal   - mobilizing   - trach day #22   - T-piece 10 lpm at 40%   - DUOnebs qid   - home ppi   - xarelto   - WBCs at 12.9    Yesterday's Events:   - no events overnight   - a/ox4   - failed speaking vlave today   - a-fib with RVR s/p dose amio   - a-fib 90-100s   - SBP 90-120s   - afebrile   - walking in ICU on vent   - UOP with morton > 600cc   - passed SLP    - trach day #21   - ASV at 100/8, T-piece 8/40% during the day   - thick secretions   - Duoneb QID   - xarelto/ppi      Review of Systems  Review of Systems   Constitutional: Positive for malaise/fatigue. Negative for chills.   HENT: Negative for congestion and sore throat.    Eyes: Negative for blurred vision and double vision.   Respiratory: Positive for cough, sputum production and shortness of breath.    Cardiovascular: Negative for chest pain and leg swelling.   Gastrointestinal: Negative for abdominal pain, diarrhea, nausea and vomiting.   Genitourinary: Negative for frequency.   Musculoskeletal: Negative for back pain and neck pain.   Skin: Negative for rash.   Neurological: Positive for weakness. Negative for headaches.   Endo/Heme/Allergies: Bruises/bleeds easily.   Psychiatric/Behavioral: Negative for depression. The patient is nervous/anxious.         Vital Signs for last 24 hours   Pulse:  [] 53  Resp:  [11-40] 22  BP: ()/(61-84) 95/68  SpO2:  [74 %-100 %] 82 %    Hemodynamic parameters for last 24 hours       Respiratory Information for the last 24 hours  Vent Mode: ASV  PEEP/CPAP: 8  MAP: 12  Control VTE (exp VT): 300    Physical Exam   Physical Exam  Vitals signs and nursing note reviewed.   Constitutional:       General: He is not in  acute distress.     Appearance: He is ill-appearing. He is not toxic-appearing.      Comments: Pt appears chronically ill and older than stated age   HENT:      Head: Normocephalic and atraumatic.      Right Ear: External ear normal.      Left Ear: External ear normal.      Nose: Nose normal. No rhinorrhea.      Mouth/Throat:      Mouth: Mucous membranes are moist.      Pharynx: Oropharynx is clear. No oropharyngeal exudate.   Eyes:      General: No scleral icterus.     Extraocular Movements: Extraocular movements intact.      Conjunctiva/sclera: Conjunctivae normal.      Pupils: Pupils are equal, round, and reactive to light.   Neck:      Musculoskeletal: Normal range of motion and neck supple.      Comments: Trach in place without surrounding swelling/erythema  Cardiovascular:      Rate and Rhythm: Normal rate. Rhythm irregularly irregular.      Pulses: Normal pulses.      Heart sounds: Normal heart sounds. No murmur.   Pulmonary:      Breath sounds: No wheezing.      Comments: Strong cough, clear anteriorly, diminished bases  Chest:      Chest wall: No tenderness.   Abdominal:      General: Bowel sounds are normal. There is no distension.      Palpations: Abdomen is soft.      Tenderness: There is no abdominal tenderness. There is no guarding or rebound.   Musculoskeletal: Normal range of motion.      Right lower leg: No edema.      Left lower leg: No edema.   Lymphadenopathy:      Cervical: No cervical adenopathy.   Skin:     General: Skin is warm and dry.      Capillary Refill: Capillary refill takes less than 2 seconds.      Findings: No rash.   Neurological:      Mental Status: He is alert and oriented to person, place, and time.      Cranial Nerves: No cranial nerve deficit.      Sensory: No sensory deficit.      Motor: No weakness.      Comments: Clear speech with speaking valve in place   Psychiatric:         Mood and Affect: Mood normal.         Behavior: Behavior normal.         Thought Content: Thought  content normal.      Comments: Normal affect       Medications  Current Facility-Administered Medications   Medication Dose Route Frequency Provider Last Rate Last Dose   • ALPRAZolam (XANAX) tablet 0.25 mg  0.25 mg Enteral Tube BID PRN Nalini Aguirre M.D.       • metoprolol (LOPRESSOR) tablet 25 mg  25 mg Oral TWICE DAILY Nalini Aguirre M.D.   25 mg at 10/14/20 0605   • acetylcysteine (MUCOMYST) 20 % solution 3 mL  3 mL Inhalation 4X/DAY (RT) Jus Daniel M.D.   3 mL at 10/13/20 1923   • senna-docusate (PERICOLACE or SENOKOT S) 8.6-50 MG per tablet 2 Tab  2 Tab Enteral Tube BID Dmitri Pascual M.D.   Stopped at 10/13/20 1800    And   • polyethylene glycol/lytes (MIRALAX) PACKET 1 Packet  1 Packet Enteral Tube QDAY PRN Dmitri Pascual M.D.        And   • magnesium hydroxide (MILK OF MAGNESIA) suspension 30 mL  30 mL Enteral Tube QDAY PRN Dmitri Pascual M.D.        And   • bisacodyl (DULCOLAX) suppository 10 mg  10 mg Rectal QDAY PRN Dmitri Pascual M.D.       • ipratropium-albuterol (DUONEB) nebulizer solution  3 mL Nebulization 4X/DAY (RT) Dmitri Pascual M.D.   3 mL at 10/13/20 1924   • FLUoxetine (PROZAC) capsule 20 mg  20 mg Enteral Tube DAILY Dmitri Pascual M.D.   20 mg at 10/14/20 0605   • lidocaine (LIDODERM) 5 % 2 Patch  2 Patch Transdermal Q24HR Dmitri Pascual M.D.   2 Patch at 10/13/20 2029   • traZODone (DESYREL) tablet 50 mg  50 mg Enteral Tube HS PRN Dmitri Pascual M.D.   50 mg at 10/12/20 2154   • multivitamin (THERAGRAN) tablet 1 Tab  1 Tab Enteral Tube DAILY Dmitri Pascual M.D.   1 Tab at 10/14/20 0605   • midodrine (PROAMATINE) tablet 5 mg  5 mg Enteral Tube Q8HRS Nalini Aguirre M.D.   5 mg at 10/14/20 0605   • acetaminophen (TYLENOL) tablet 650 mg  650 mg Enteral Tube Q6HRS PRN Dmitri Pascual M.D.   650 mg at 10/13/20 2028   • ondansetron (ZOFRAN ODT) dispertab 4 mg  4 mg Enteral Tube Q4HRS PRN Dmitri Pascual M.D.       • Respiratory Therapy  Consult   Nebulization Continuous RT Dmitri Pascaul M.D.       • lidocaine (XYLOCAINE) 1 % injection 1-2 mL  1-2 mL Tracheal Tube Q30 MIN PRN Dmitri Pascual M.D.       • omeprazole (FIRST-OMEPRAZOLE) 2 mg/mL oral susp 40 mg  40 mg Enteral Tube DAILY Dmitri Pascual M.D.   40 mg at 10/14/20 0600   • levalbuterol (XOPENEX) 1.25 MG/3ML nebulizer solution 1.25 mg  1.25 mg Nebulization Q4H PRN (RT) Dmitri Pascual M.D.       • Pharmacy Consult: Enteral tube insertion - review meds/change route/product selection  1 Each Other PHARMACY TO DOSE Dmitri Pascual M.D.       • amiodarone (CORDARONE) tablet 200 mg  200 mg Enteral Tube Q DAY Dmitri Pascual M.D.   200 mg at 10/14/20 0605   • furosemide (LASIX) tablet 20 mg  20 mg Enteral Tube Q DAY Dmitri Pascual M.D.   20 mg at 10/14/20 0605   • DULoxetine (CYMBALTA) capsule 60 mg  60 mg Enteral Tube QHS Dmitri Pascual M.D.   60 mg at 10/13/20 2028   • topiramate (TOPAMAX) tablet 75 mg  75 mg Enteral Tube DAILY Dmitri Pascual M.D.   75 mg at 10/14/20 0605   • rivaroxaban (XARELTO) tablet 20 mg  20 mg Enteral Tube PM MEAL Dmitri Pascual M.D.   20 mg at 10/13/20 1804   • ondansetron (ZOFRAN) syringe/vial injection 4 mg  4 mg Intravenous Q4HRS PRN Dmitri Pascual M.D.   4 mg at 10/12/20 2201       Fluids    Intake/Output Summary (Last 24 hours) at 10/14/2020 0704  Last data filed at 10/14/2020 0643  Gross per 24 hour   Intake 730 ml   Output 800 ml   Net -70 ml       Laboratory          Recent Labs     10/12/20  0430   SODIUM 136   POTASSIUM 4.0   CHLORIDE 100   CO2 27   BUN 18   CREATININE 0.47*   MAGNESIUM 2.1   PHOSPHORUS 3.2   CALCIUM 8.4*     Recent Labs     10/12/20  0430   ALTSGPT 29   ASTSGOT 20   ALKPHOSPHAT 165*   TBILIRUBIN 0.2   GLUCOSE 82     Recent Labs     10/12/20  0430   WBC 6.1   NEUTSPOLYS 74.30*   LYMPHOCYTES 12.10*   MONOCYTES 11.20   EOSINOPHILS 1.60   BASOPHILS 0.50   ASTSGOT 20   ALTSGPT 29   ALKPHOSPHAT 165*    TBILIRUBIN 0.2     Recent Labs     10/12/20  0430   RBC 3.34*   HEMOGLOBIN 8.5*   HEMATOCRIT 29.2*   PLATELETCT 339       Imaging  X-Ray:  I have personally reviewed the images and compared with prior images.  EKG:  I have personally reviewed the images and compared with prior images.  CT:    Reviewed  Echo:   Reviewed    Assessment/Plan  * Acute respiratory failure with hypoxia and hypercapnia (HCC)- (present on admission)  Assessment & Plan  Intubated 9/9-9/12, 9/17-9/19, 9/20-9/23 - now on 4th course on vent this admission alone  Trach 9/23  Continue vent support at night and as necessary during the day  T-piece trials during the day as tolerated  He is a candidate for Trilogy support  Patient is deconditioned, continue therapies    Best option long-term would be a prolonged rehab ventilator weaning course at a long-term acute care facility and if he fails this consider home trilogy ventilator authorization, patient from [place] and case management working on referring patient to appropriate facility     having difficulty with placement  On facility with PEG or they will accept in transfer, I requested contact with their possible accepting physician to review case-pending    Tolerating longer T-piece trials during the day-->ambulating, eating, using speaking valve  Cont ASV percentage to 100% at night, but will attempt T-piece trial as long as possible  Patient is already failed transfer to the floor with tracheostomy along with T-piece/HMTC, but getting stronger    Continue aggressive therapies of SLP/PT/OT  Passed SLP-->ongoing PSMV trials with speech, hoping to avoid PEG tube placement    COPD (chronic obstructive pulmonary disease) ([facility])  Assessment & Plan  No in exacerbation, monitoring closely  Appears to be end-stage, patient wants to continue on the ventilator and go through rehab for now  Patient has of hypoxemia and hypercarbia along with mild to moderate pulmonary pretension  Continue  bronchodilators every 4-6 hours  RT protocols  With A. fib using Xopenex over albuterol  Pulmonary toilet/mucomyst    Pneumonia- (present on admission)  Assessment & Plan  Currently without concerning signs or symptoms of new HCAP, aggressive prevention ongoing  Monitor for recurrence, patient at high risk for nosocomial infection  Update vaccines per protocol  MSSA in sputum on 9/10  Usual ashwin in sputum culture on 9/17  S/P 3 days of Zosyn followed by 3 days of Unasyn which completed on 9/15  Second Unasyn course from 9/18-9/23  Remains off antibiotics, remains afebrile without new S/S of infection    Paroxysmal atrial fibrillation (HCC)- (present on admission)  Assessment & Plan  Remains in chronic A. fib with controlled rate, patient asymptomatic  Continue amiodarone 200 mg daily, cont metoprolol 25mg BID  Echo with normal LV systolic function and normal size of LA however significant RV abnormalities and PHTN is noted  Continue anticoagulation with rivaroxaban, monitor for bleeding  Optimize electrolytes, acid-base balance and oxygenation  Ongoing cardiac monitoring.    Malnutrition (HCC)  Assessment & Plan  Continue nutritional support, currently at goal  Dietary following  Enteral nutrition  Passed SLP  Optimize electrolytes  Supplements as needed      Normocytic anemia- (present on admission)  Assessment & Plan  Serial CBC, hemoglobin unchanged or slight better  Transfuse per protocols  Not bleeding clinically but patient is at risk of being on chronic anticoagulation and chronically on the vent.    Dysphagia- (present on admission)  Assessment & Plan  Continue speech therapy  Passed swallow eval  Hope to avoid PEG which may set us back from a vent weaning standpoint  Passy-Nicky valve trials ongoing-->improving and getting stronger    Pulmonary hypertension (HCC)- (present on admission)  Assessment & Plan  RVSP 42  Treat underlying process, ongoing  Maintain normal oxygenation, suspect chronic hypoxemia  primary etiology  Diuresis as needed  Serial echo as clinically appropriate.    Major depression- (present on admission)  Assessment & Plan  Continue Cymbalta  Continue to provide emotional support  At bedtime trazodone for sleep  Healing garden trips as clinically safe  Xanax at 0.25mg BID as needed for anxiety  Requesting case management nursing staff to facilitate prep resume meeting with friends or family to help with spirits    Right ventricular dilation- (present on admission)  Assessment & Plan  Clinically not in severe right heart failure  Echo confirms severely dilated RV with mild RV systolic dysfunction  Presumably related to chronic hypoxemia and secondary pulmonary pretension  RVSP 42 mmHg  Forced diuresis as needed, judicious fluid management         VTE:  NOAC  Ulcer: PPI  Lines: Orozco Catheter  Ongoing indication addressed    I have performed a physical exam and reviewed and updated ROS and Plan today (10/14/2020). In review of yesterday's note (10/13/2020), there are no changes except as documented above.     Discussed patient condition and risk of morbidity and/or mortality with RN, RT, Pharmacy, Charge nurse / hot rounds and Patient

## 2020-10-14 NOTE — CARE PLAN
Ventilator Daily Summary    Vent Day # 13  Trach day 22    Ventilator settings changed this shift: not at  this time    Weaning trials: T-piece during the day, vent at night      Respiratory Procedures: Duo QID, Mucomyst QID    Plan: Continue current ventilator settings and wean mechanical ventilation as tolerated per physician orders.

## 2020-10-14 NOTE — CARE PLAN
Problem: Fluid Volume:  Goal: Will maintain balanced intake and output  Outcome: PROGRESSING AS EXPECTED     Problem: Communication  Goal: The ability to communicate needs accurately and effectively will improve  Outcome: PROGRESSING AS EXPECTED     Problem: Safety  Goal: Will remain free from injury  Outcome: PROGRESSING AS EXPECTED  Goal: Will remain free from falls  Outcome: PROGRESSING AS EXPECTED     Problem: Infection  Goal: Will remain free from infection  Outcome: PROGRESSING AS EXPECTED     Problem: Venous Thromboembolism (VTW)/Deep Vein Thrombosis (DVT) Prevention:  Goal: Patient will participate in Venous Thrombosis (VTE)/Deep Vein Thrombosis (DVT)Prevention Measures  Outcome: PROGRESSING AS EXPECTED     Problem: Bowel/Gastric:  Goal: Normal bowel function is maintained or improved  Outcome: PROGRESSING AS EXPECTED  Goal: Will not experience complications related to bowel motility  Outcome: PROGRESSING AS EXPECTED     Problem: Knowledge Deficit  Goal: Knowledge of disease process/condition, treatment plan, diagnostic tests, and medications will improve  Outcome: PROGRESSING AS EXPECTED     Problem: Discharge Barriers/Planning  Goal: Patient's continuum of care needs will be met  Outcome: PROGRESSING SLOWER THAN EXPECTED     Problem: Respiratory:  Goal: Respiratory status will improve  Outcome: PROGRESSING AS EXPECTED     Problem: Skin Integrity  Goal: Risk for impaired skin integrity will decrease  Outcome: PROGRESSING AS EXPECTED     Problem: Pain Management  Goal: Pain level will decrease to patient's comfort goal  Outcome: PROGRESSING AS EXPECTED     Problem: Urinary Elimination:  Goal: Ability to reestablish a normal urinary elimination pattern will improve  Outcome: PROGRESSING AS EXPECTED     Problem: Psychosocial Needs:  Goal: Level of anxiety will decrease  Outcome: PROGRESSING AS EXPECTED     Problem: Mobility  Goal: Risk for activity intolerance will decrease  Outcome: PROGRESSING AS  EXPECTED

## 2020-10-15 NOTE — CARE PLAN
Ventilator Daily Summary  Patient is currently on  Tpiece with an 8.0 portex trach. He is on 8L and 50% Fio2.     Patient was on a ventilator the previous nights but has been tolerating well on the T piece for the last 26 hours.     When on the ventilator he is on /8/30%

## 2020-10-15 NOTE — CARE PLAN
Problem: Fluid Volume:  Goal: Will maintain balanced intake and output  Outcome: PROGRESSING AS EXPECTED     Problem: Communication  Goal: The ability to communicate needs accurately and effectively will improve  Outcome: PROGRESSING AS EXPECTED     Problem: Safety  Goal: Will remain free from injury  Outcome: PROGRESSING AS EXPECTED  Goal: Will remain free from falls  Outcome: PROGRESSING AS EXPECTED     Problem: Infection  Goal: Will remain free from infection  Outcome: PROGRESSING AS EXPECTED     Problem: Venous Thromboembolism (VTW)/Deep Vein Thrombosis (DVT) Prevention:  Goal: Patient will participate in Venous Thrombosis (VTE)/Deep Vein Thrombosis (DVT)Prevention Measures  Outcome: PROGRESSING AS EXPECTED     Problem: Bowel/Gastric:  Goal: Normal bowel function is maintained or improved  Outcome: PROGRESSING AS EXPECTED  Goal: Will not experience complications related to bowel motility  Outcome: PROGRESSING AS EXPECTED     Problem: Knowledge Deficit  Goal: Knowledge of disease process/condition, treatment plan, diagnostic tests, and medications will improve  Outcome: PROGRESSING AS EXPECTED     Problem: Discharge Barriers/Planning  Goal: Patient's continuum of care needs will be met  Outcome: PROGRESSING AS EXPECTED     Problem: Respiratory:  Goal: Respiratory status will improve  Outcome: PROGRESSING AS EXPECTED     Problem: Skin Integrity  Goal: Risk for impaired skin integrity will decrease  Outcome: PROGRESSING AS EXPECTED     Problem: Pain Management  Goal: Pain level will decrease to patient's comfort goal  Outcome: PROGRESSING AS EXPECTED     Problem: Urinary Elimination:  Goal: Ability to reestablish a normal urinary elimination pattern will improve  Outcome: PROGRESSING AS EXPECTED     Problem: Psychosocial Needs:  Goal: Level of anxiety will decrease  Outcome: PROGRESSING AS EXPECTED     Problem: Mobility  Goal: Risk for activity intolerance will decrease  Outcome: PROGRESSING AS EXPECTED

## 2020-10-15 NOTE — DISCHARGE PLANNING
Anticipated Discharge Disposition: TBD    Action:   10:35 faxed updated notes to [facility] [phone]. Pt currently tolerating T piece for the last 26 hours.  11:20 left VM for assistant ALEXEY Macias [phone] and REID Valdez [phone] at [facility]  1348: attempted to call Gilberto, no answer. Left 2nd VM for Courtney    Barriers to Discharge:   No NOK  No support  Straight Med[employer] insurance    Plan: TBD

## 2020-10-15 NOTE — PROGRESS NOTES
Critical Care Progress Note    Date of admission  9/9/2020    Chief Complaint  54 y.o. male with H/o Afib admitted 9/9/2020 with respiratory failure, sepsis and pneumonia - intubated in the ER.    Hospital Course   9/17 -    developed worsening hypercarbic and hypoxemic respiratory failure requiring emergent intubation.  Full vent support.  9/18 -    continue vent support.  Titrating phenylephrine.  Replete electrolytes.  SAT/SBT as tolerated.  9/19 -    SAT/SBT as tolerated.  Continue vent support.  Titrating phenylephrine.  Replete potassium.  Continue Unasyn.  9/20 -    liberated from the ventilator yesterday and did very well until last night when he required reintubation.  Continue vent support.  Continue Unasyn and complete 5 days of therapy.  10/3 -    continue vent support.  T-piece trials during the day as tolerated.  10/4 -    continue T-piece trials during the day as tolerated.  Nocturnal ventilatory support.  10/5 - nocturnal vent support and T-piece trials as they as tolerated, may need LTAC/home vent after that-trilogy?  10/6 - HS ASV vent, days Tpiece w/PSMV at times, still fatigued at end of day, CM working on dispo  10/7 - HS ASV ventilation, T-piece days with PSMV at times, case management working on dyspnea with LTAC referral to facility that accepts [facility] insurance in [place], trilogy ventilation nocturnally still being considered  Ongoing therapies  10/8 - HS ASV ventilation, Tpiece and therapies on day, prob depressed, LTACh referal ongoing  10/9 - HS ASV ventilation, Tpiece days w/PSMV as tolerated, therapies  10/10 - HS ASV, Tpiece days, CAF, PSMV, want to go home  10/11 -  HS % x 8 hrs, T piece days w/PSMV, CAF   10/12 - % last night, T-piece, barium swallow and passed, doing PSMV trials, a-fib with RVR-->dose of amio  10/13 - % overnight, passed swallow eval, cont to work with speaking valve, a-fib  10/14 - %/8 overnight, a-fib rate controlled, improving  eating, using speaking valve, T-piece at 10 lpm at 40%   10/15 - pt remained on T-piece overnight at 8 lpm at 40%, eating more meals, went to the [employer] yesterday      Interval Problem Update  Reviewed last 24 hour events:   - no events overnight   - T-piece overnight at 8 lpm at 40 %, now at 4 lpm/40%   - a-fib 80-110s   - -110s   - pureed diet   - UOP of 250cc with urinal overnight   - BM on 10/13   - trach day #23   - no imaging   - Home oral ppi   - xarelto   - ambulating with PT/OT    Yesterday's Events:   - %/8 overnight again   - had some anxiety overnight with desatting to 80s   - a/ox4   - a-fib 90-110s   - SBP 90-120s   - BM on 10/13   - pureed diet   - UOP of 600cc with urinal   - mobilizing   - trach day #22   - T-piece 10 lpm at 40%   - DUOnebs qid   - home ppi   - xarelto   - WBCs at 12.9    Review of Systems  Review of Systems   Constitutional: Positive for malaise/fatigue. Negative for chills.   HENT: Negative for congestion and sore throat.    Eyes: Negative for blurred vision and double vision.   Respiratory: Positive for cough, sputum production and shortness of breath.    Cardiovascular: Negative for chest pain and leg swelling.   Gastrointestinal: Negative for abdominal pain, diarrhea, nausea and vomiting.   Genitourinary: Negative for frequency.   Musculoskeletal: Negative for back pain and neck pain.   Skin: Negative for rash.   Neurological: Positive for weakness. Negative for headaches.   Endo/Heme/Allergies: Bruises/bleeds easily.   Psychiatric/Behavioral: Negative for depression. The patient is nervous/anxious.         Vital Signs for last 24 hours   Temp:  [36.3 °C (97.4 °F)-36.4 °C (97.5 °F)] 36.3 °C (97.4 °F)  Pulse:  [] 87  Resp:  [6-45] 43  BP: (100-136)/(48-83) 108/77  SpO2:  [93 %-100 %] 97 %    Hemodynamic parameters for last 24 hours       Respiratory Information for the last 24 hours       Physical Exam   Physical Exam  Vitals signs and nursing note  reviewed.   Constitutional:       General: He is not in acute distress.     Appearance: He is ill-appearing. He is not toxic-appearing.      Comments: Pt appears chronically ill and older than stated age   HENT:      Head: Normocephalic and atraumatic.      Right Ear: External ear normal.      Left Ear: External ear normal.      Nose: Nose normal. No rhinorrhea.      Mouth/Throat:      Mouth: Mucous membranes are moist.      Pharynx: Oropharynx is clear. No oropharyngeal exudate.   Eyes:      General: No scleral icterus.     Extraocular Movements: Extraocular movements intact.      Conjunctiva/sclera: Conjunctivae normal.      Pupils: Pupils are equal, round, and reactive to light.   Neck:      Musculoskeletal: Normal range of motion and neck supple.      Comments: Trach in place without surrounding swelling/erythema  Cardiovascular:      Rate and Rhythm: Normal rate. Rhythm irregularly irregular.      Pulses: Normal pulses.      Heart sounds: Normal heart sounds. No murmur.   Pulmonary:      Breath sounds: No wheezing.      Comments: Strong cough, coarse breath sounds throughout  Chest:      Chest wall: No tenderness.   Abdominal:      General: Bowel sounds are normal. There is no distension.      Palpations: Abdomen is soft.      Tenderness: There is no abdominal tenderness. There is no guarding or rebound.   Musculoskeletal: Normal range of motion.      Right lower leg: No edema.      Left lower leg: No edema.   Lymphadenopathy:      Cervical: No cervical adenopathy.   Skin:     General: Skin is warm and dry.      Capillary Refill: Capillary refill takes less than 2 seconds.      Findings: No rash.   Neurological:      Mental Status: He is alert and oriented to person, place, and time.      Cranial Nerves: No cranial nerve deficit.      Sensory: No sensory deficit.      Motor: No weakness.      Comments: Clear speech with speaking valve in place   Psychiatric:         Mood and Affect: Mood normal.          Behavior: Behavior normal.         Thought Content: Thought content normal.      Comments: Normal affect       Medications  Current Facility-Administered Medications   Medication Dose Route Frequency Provider Last Rate Last Dose   • metoprolol (LOPRESSOR) tablet 25 mg  25 mg Enteral Tube TWICE DAILY Nalini Aguirre M.D.   25 mg at 10/15/20 0503   • ALPRAZolam (XANAX) tablet 0.25 mg  0.25 mg Enteral Tube BID PRN Nalini Aguirre M.D.   0.25 mg at 10/15/20 0451   • acetylcysteine (MUCOMYST) 20 % solution 3 mL  3 mL Inhalation 4X/DAY (RT) Jus Daniel M.D.   3 mL at 10/14/20 2115   • senna-docusate (PERICOLACE or SENOKOT S) 8.6-50 MG per tablet 2 Tab  2 Tab Enteral Tube BID Dmitri Pascual M.D.   2 Tab at 10/14/20 1728    And   • polyethylene glycol/lytes (MIRALAX) PACKET 1 Packet  1 Packet Enteral Tube QDAY PRN Dmitri Pascual M.D.        And   • magnesium hydroxide (MILK OF MAGNESIA) suspension 30 mL  30 mL Enteral Tube QDAY PRN Dmitri Pascual M.D.        And   • bisacodyl (DULCOLAX) suppository 10 mg  10 mg Rectal QDAY PRN Dmitri Pascual M.D.       • ipratropium-albuterol (DUONEB) nebulizer solution  3 mL Nebulization 4X/DAY (RT) Dmitri Pascual M.D.   3 mL at 10/14/20 2115   • FLUoxetine (PROZAC) capsule 20 mg  20 mg Enteral Tube DAILY Dmitri Pascual M.D.   20 mg at 10/15/20 0503   • lidocaine (LIDODERM) 5 % 2 Patch  2 Patch Transdermal Q24HR Dmitri Pascual M.D.   2 Patch at 10/14/20 2024   • traZODone (DESYREL) tablet 50 mg  50 mg Enteral Tube HS PRN Dmitri Pascual M.D.   50 mg at 10/12/20 2154   • multivitamin (THERAGRAN) tablet 1 Tab  1 Tab Enteral Tube DAILY Dmitri Pascual M.D.   1 Tab at 10/15/20 0503   • acetaminophen (TYLENOL) tablet 650 mg  650 mg Enteral Tube Q6HRS PRN Dmitri Pascual M.D.   650 mg at 10/15/20 0255   • ondansetron (ZOFRAN ODT) dispertab 4 mg  4 mg Enteral Tube Q4HRS PRN Dmitri Pascual M.D.   4 mg at 10/14/20 1545   • Respiratory Therapy Consult    Nebulization Continuous RT Dmitri Pascual M.D.       • lidocaine (XYLOCAINE) 1 % injection 1-2 mL  1-2 mL Tracheal Tube Q30 MIN PRN Dmitri Pascual M.D.       • omeprazole (FIRST-OMEPRAZOLE) 2 mg/mL oral susp 40 mg  40 mg Enteral Tube DAILY Dmitri Pascual M.D.   40 mg at 10/15/20 0519   • levalbuterol (XOPENEX) 1.25 MG/3ML nebulizer solution 1.25 mg  1.25 mg Nebulization Q4H PRN (RT) Dmitri Pascual M.D.       • Pharmacy Consult: Enteral tube insertion - review meds/change route/product selection  1 Each Other PHARMACY TO DOSE Dmitri Pascual M.D.       • amiodarone (CORDARONE) tablet 200 mg  200 mg Enteral Tube Q DAY Dmitri Pascual M.D.   200 mg at 10/15/20 0503   • furosemide (LASIX) tablet 20 mg  20 mg Enteral Tube Q DAY Dmitri Pascual M.D.   20 mg at 10/15/20 0503   • DULoxetine (CYMBALTA) capsule 60 mg  60 mg Enteral Tube QHS Dmitri Pascual M.D.   60 mg at 10/14/20 2024   • topiramate (TOPAMAX) tablet 75 mg  75 mg Enteral Tube DAILY Dmitri Pascual M.D.   75 mg at 10/15/20 0519   • rivaroxaban (XARELTO) tablet 20 mg  20 mg Enteral Tube PM MEAL Dmitri Pascual M.D.   20 mg at 10/14/20 1728   • ondansetron (ZOFRAN) syringe/vial injection 4 mg  4 mg Intravenous Q4HRS PRN Dmitri Pascual M.D.   4 mg at 10/12/20 2201       Fluids    Intake/Output Summary (Last 24 hours) at 10/15/2020 0712  Last data filed at 10/15/2020 0305  Gross per 24 hour   Intake 190 ml   Output 300 ml   Net -110 ml       Laboratory          Recent Labs     10/14/20  0640   SODIUM 133*   POTASSIUM 4.3   CHLORIDE 99   CO2 26   BUN 19   CREATININE 0.47*   CALCIUM 8.3*     Recent Labs     10/14/20  0640   GLUCOSE 78     Recent Labs     10/14/20  0640   WBC 12.9*   NEUTSPOLYS 84.40*   LYMPHOCYTES 5.80*   MONOCYTES 9.00   EOSINOPHILS 0.10   BASOPHILS 0.20     Recent Labs     10/14/20  0640   RBC 3.84*   HEMOGLOBIN 9.9*   HEMATOCRIT 33.8*   PLATELETCT 341       Imaging  X-Ray:  I have personally reviewed the  images and compared with prior images.  EKG:  I have personally reviewed the images and compared with prior images.  CT:    Reviewed  Echo:   Reviewed    Assessment/Plan  * Acute respiratory failure with hypoxia and hypercapnia (HCC)- (present on admission)  Assessment & Plan  Intubated 9/9-9/12, 9/17-9/19, 9/20-9/23 - now on 4th course on vent this admission alone  Trach 9/23  T-piece trials during the day as tolerated and as long as possible, now off the vent > 24 hours  Patient is deconditioned, continue therapies     having difficulty with placement  On facility with PEG or they will accept in transfer, I requested contact with their possible accepting physician to review case-pending    Tolerating longer T-piece trials during the day-->ambulating, eating, using speaking valve  Cont T-piece trial as long as possible, off vent > 24 hours  Patient is already failed transfer to the floor with tracheostomy along with T-piece/HMTC, but getting stronger    Continue aggressive therapies of SLP/PT/OT  Passed SLP-->ongoing PSMV trials with speech and improved appetite, hoping to avoid PEG tube placement    COPD (chronic obstructive pulmonary disease) (HCC)  Assessment & Plan  No in exacerbation, monitoring closely  Appears to be end-stage, patient wants to continue on the ventilator and go through rehab for now  Patient has of hypoxemia and hypercarbia along with mild to moderate pulmonary pretension  Continue bronchodilators every 4-6 hours  RT protocols  With A. fib using Xopenex over albuterol  Pulmonary toilet/mucomyst.    Pneumonia- (present on admission)  Assessment & Plan  Currently without concerning signs or symptoms of new HCAP, aggressive prevention ongoing  Monitor for recurrence, patient at high risk for nosocomial infection  Update vaccines per protocol  MSSA in sputum on 9/10  Usual ashwin in sputum culture on 9/17  S/P 3 days of Zosyn followed by 3 days of Unasyn which completed on 9/15  Second  Unasyn course from 9/18-9/23  **Remains off antibiotics, remains afebrile without new S/S of infection    Paroxysmal atrial fibrillation (HCC)- (present on admission)  Assessment & Plan  Remains in chronic A. fib with controlled rate, patient asymptomatic.  Continue amiodarone 200 mg daily, cont metoprolol 25mg BID  Echo with normal LV systolic function and normal size of LA however significant RV abnormalities and PHTN is noted  Continue anticoagulation with rivaroxaban, monitor for bleeding  Optimize electrolytes, acid-base balance and oxygenation  Ongoing cardiac monitoring.    Malnutrition (HCC)  Assessment & Plan  Continue nutritional support, currently at goal  Dietary following  Cont enteral nutrition  Passed SLP, cont pureed diet with enteral nutrition  Optimize electrolytes  Supplements as needed      Normocytic anemia- (present on admission)  Assessment & Plan  Serial CBC, hemoglobin unchanged or slight better  Transfuse per protocols  Not bleeding clinically but patient is at risk of being on chronic anticoagulation and chronically on the vent.    Dysphagia- (present on admission)  Assessment & Plan  Continue speech therapy  Passed swallow eval  Encouraged meals with SLP  Passy-Nicky valve trials ongoing-->improving and getting stronger    Pulmonary hypertension (HCC)- (present on admission)  Assessment & Plan  RVSP 42  Treat underlying process, ongoing  Maintain normal oxygenation, suspect chronic hypoxemia primary etiology  Diuresis as needed  Serial echo as clinically appropriate.    Major depression- (present on admission)  Assessment & Plan  Continue Cymbalta  Continue to provide emotional support  At bedtime trazodone for sleep  [employer] trips as clinically safe  Xanax at 0.25mg BID as needed for anxiety  Requesting case management nursing staff to facilitate prep resume meeting with friends or family to help with spirits  Trips to the [employer]    Right ventricular dilation- (present on  admission)  Assessment & Plan  Clinically not in severe right heart failure  Echo confirms severely dilated RV with mild RV systolic dysfunction  Presumably related to chronic hypoxemia and secondary pulmonary pretension  RVSP 42 mmHg  Forced diuresis as needed, judicious fluid management.         VTE:  NOAC  Ulcer: PPI  Lines: Orozco Catheter  Ongoing indication addressed    I have performed a physical exam and reviewed and updated ROS and Plan today (10/15/2020). In review of yesterday's note (10/14/2020), there are no changes except as documented above.     Discussed patient condition and risk of morbidity and/or mortality with RN, RT, Pharmacy, Charge nurse / hot rounds and Patient

## 2020-10-16 NOTE — CARE PLAN
Problem: Nutritional:  Goal: Achieve adequate nutritional intake  Description: Patient will consume 50-75% of meals and supplements.   Outcome: PROGRESSING SLOWER THAN EXPECTED   Pt eating bites of food per discussion with RN and most meals <25% consumed.  Continue to encourage PO intake during the day and run nocturnal TF regimen of Fibersource HN @ 70 mL/hr x 10 hours (2000 - 0600).

## 2020-10-16 NOTE — CARE PLAN
Problem: Fluid Volume:  Goal: Will maintain balanced intake and output  Outcome: PROGRESSING AS EXPECTED     Problem: Communication  Goal: The ability to communicate needs accurately and effectively will improve  Outcome: PROGRESSING AS EXPECTED     Problem: Safety  Goal: Will remain free from injury  Outcome: PROGRESSING AS EXPECTED     Problem: Infection  Goal: Will remain free from infection  Outcome: PROGRESSING AS EXPECTED     Problem: Venous Thromboembolism (VTW)/Deep Vein Thrombosis (DVT) Prevention:  Goal: Patient will participate in Venous Thrombosis (VTE)/Deep Vein Thrombosis (DVT)Prevention Measures  Outcome: PROGRESSING AS EXPECTED     Problem: Bowel/Gastric:  Goal: Normal bowel function is maintained or improved  Outcome: PROGRESSING AS EXPECTED     Problem: Knowledge Deficit  Goal: Knowledge of disease process/condition, treatment plan, diagnostic tests, and medications will improve  Outcome: PROGRESSING AS EXPECTED     Problem: Respiratory:  Goal: Respiratory status will improve  Outcome: PROGRESSING AS EXPECTED     Problem: Skin Integrity  Goal: Risk for impaired skin integrity will decrease  Outcome: PROGRESSING AS EXPECTED     Problem: Pain Management  Goal: Pain level will decrease to patient's comfort goal  Outcome: PROGRESSING AS EXPECTED     Problem: Urinary Elimination:  Goal: Ability to reestablish a normal urinary elimination pattern will improve  Outcome: PROGRESSING AS EXPECTED     Problem: Mobility  Goal: Risk for activity intolerance will decrease  Outcome: PROGRESSING AS EXPECTED     Problem: Psychosocial Needs:  Goal: Level of anxiety will decrease  Outcome: PROGRESSING SLOWER THAN EXPECTED

## 2020-10-16 NOTE — DISCHARGE PLANNING
Anticipated Discharge Disposition: TBD    Action:   0834 didn't receive return call from Gilberto or Courtney at [facility]. Spoke w/ ALEXEY Sarmiento and informed Torsten that notes were faxed over yesterday. Torsten states he'll review the notes and get back to this writer. Torsten states Gilberto and Courtney are off work   11:34 spoke w/ DON Torsten. Torsten states he's currently busy and will review the notes as soon as possible.  1535: haven't received call from Torsten. Spoke w/  at [facility].  states Torsten is very busy. Left VM on Torsten's extension.    Barriers to Discharge:   No NOK  No support  Straight [employer] insurance       Plan: TBD

## 2020-10-16 NOTE — PROGRESS NOTES
Critical Care Progress Note    Date of admission  9/9/2020    Chief Complaint  54 y.o. male with H/o Afib admitted 9/9/2020 with respiratory failure, sepsis and pneumonia - intubated in the ER.    Hospital Course   9/17 -    developed worsening hypercarbic and hypoxemic respiratory failure requiring emergent intubation.  Full vent support.  9/18 -    continue vent support.  Titrating phenylephrine.  Replete electrolytes.  SAT/SBT as tolerated.  9/19 -    SAT/SBT as tolerated.  Continue vent support.  Titrating phenylephrine.  Replete potassium.  Continue Unasyn.  9/20 -    liberated from the ventilator yesterday and did very well until last night when he required reintubation.  Continue vent support.  Continue Unasyn and complete 5 days of therapy.  10/3 -    continue vent support.  T-piece trials during the day as tolerated.  10/4 -    continue T-piece trials during the day as tolerated.  Nocturnal ventilatory support.  10/5 - nocturnal vent support and T-piece trials as they as tolerated, may need LTAC/home vent after that-trilogy?  10/6 - HS ASV vent, days Tpiece w/PSMV at times, still fatigued at end of day, CM working on dispo  10/7 - HS ASV ventilation, T-piece days with PSMV at times, case management working on dyspnea with LTAC referral to facility that accepts [facility] insurance in [place], trilogy ventilation nocturnally still being considered  Ongoing therapies  10/8 - HS ASV ventilation, Tpiece and therapies on day, prob depressed, LTACh referal ongoing  10/9 - HS ASV ventilation, Tpiece days w/PSMV as tolerated, therapies  10/10 - HS ASV, Tpiece days, CAF, PSMV, want to go home  10/11 -  HS % x 8 hrs, T piece days w/PSMV, CAF   10/12 - % last night, T-piece, barium swallow and passed, doing PSMV trials, a-fib with RVR-->dose of amio  10/13 - % overnight, passed swallow eval, cont to work with speaking valve, a-fib  10/14 - %/8 overnight, a-fib rate controlled, improving  eating, using speaking valve, T-piece at 10 lpm at 40%   10/15 - pt remained on T-piece overnight at 8 lpm at 40%, eating more meals, went to the [employer] yesterday  10/16 - T-piece at 6-15 lpm at 40% for past 48 hours, TFs nocturnally now, meals during the day, PT/OT      Interval Problem Update  Reviewed last 24 hour events:   - no event overnight   - a/ox4   - -110s   - -110s   - TFs at night   - afebrile   - UOP of 300cc per shift with urinal   - BM on 10/13   - tolerating food during the day   - T-piece 6 lpm at 40%   - ambulating    Yesterday's Events:   - no events overnight   - T-piece overnight at 8 lpm at 40 %, now at 4 lpm/40%   - a-fib 80-110s   - -110s   - pureed diet   - UOP of 250cc with urinal overnight   - BM on 10/13   - trach day #23   - no imaging   - Home oral ppi   - xarelto   - ambulating with PT/OT    Review of Systems  Review of Systems   Constitutional: Positive for malaise/fatigue. Negative for chills.   HENT: Negative for congestion and sore throat.    Eyes: Negative for blurred vision and double vision.   Respiratory: Positive for cough, sputum production and shortness of breath.    Cardiovascular: Negative for chest pain and leg swelling.   Gastrointestinal: Negative for abdominal pain, diarrhea, nausea and vomiting.   Genitourinary: Negative for frequency.   Musculoskeletal: Negative for back pain and neck pain.   Skin: Negative for rash.   Neurological: Positive for weakness. Negative for headaches.   Endo/Heme/Allergies: Bruises/bleeds easily.   Psychiatric/Behavioral: Negative for depression. The patient is nervous/anxious.         Vital Signs for last 24 hours   Temp:  [36.1 °C (97 °F)-36.4 °C (97.6 °F)] 36.1 °C (97 °F)  Pulse:  [] 94  Resp:  [18-46] 36  BP: (100-125)/(65-82) 117/69  SpO2:  [84 %-100 %] 99 %    Hemodynamic parameters for last 24 hours       Respiratory Information for the last 24 hours       Physical Exam   Physical Exam  Vitals  signs and nursing note reviewed.   Constitutional:       General: He is not in acute distress.     Appearance: He is ill-appearing. He is not toxic-appearing.      Comments: Pt appears chronically ill and older than stated age   HENT:      Head: Normocephalic and atraumatic.      Right Ear: External ear normal.      Left Ear: External ear normal.      Nose: Nose normal. No rhinorrhea.      Mouth/Throat:      Mouth: Mucous membranes are moist.      Pharynx: Oropharynx is clear. No oropharyngeal exudate.   Eyes:      General: No scleral icterus.     Extraocular Movements: Extraocular movements intact.      Conjunctiva/sclera: Conjunctivae normal.      Pupils: Pupils are equal, round, and reactive to light.   Neck:      Musculoskeletal: Normal range of motion and neck supple.      Comments: Trach in place without surrounding swelling/erythema  Cardiovascular:      Rate and Rhythm: Normal rate. Rhythm irregularly irregular.      Pulses: Normal pulses.      Heart sounds: Normal heart sounds. No murmur.   Pulmonary:      Breath sounds: No wheezing.      Comments: Strong cough, coarse breath sounds throughout  Chest:      Chest wall: No tenderness.   Abdominal:      General: Bowel sounds are normal. There is no distension.      Palpations: Abdomen is soft.      Tenderness: There is no abdominal tenderness. There is no guarding or rebound.   Musculoskeletal: Normal range of motion.      Right lower leg: No edema.      Left lower leg: No edema.   Lymphadenopathy:      Cervical: No cervical adenopathy.   Skin:     General: Skin is warm and dry.      Capillary Refill: Capillary refill takes less than 2 seconds.      Findings: No rash.   Neurological:      Mental Status: He is alert and oriented to person, place, and time.      Cranial Nerves: No cranial nerve deficit.      Sensory: No sensory deficit.      Motor: No weakness.      Comments: Clear speech with speaking valve in place   Psychiatric:         Mood and Affect: Mood  normal.         Behavior: Behavior normal.         Thought Content: Thought content normal.      Comments: Normal affect     no change to exam    Medications  Current Facility-Administered Medications   Medication Dose Route Frequency Provider Last Rate Last Dose   • metoprolol (LOPRESSOR) tablet 25 mg  25 mg Enteral Tube TWICE DAILY Nalini Aguirre M.D.   25 mg at 10/16/20 0524   • ALPRAZolam (XANAX) tablet 0.25 mg  0.25 mg Enteral Tube BID PRN Nalini Aguirre M.D.   0.25 mg at 10/15/20 0451   • acetylcysteine (MUCOMYST) 20 % solution 3 mL  3 mL Inhalation 4X/DAY (RT) Jus Daniel M.D.   3 mL at 10/15/20 1854   • senna-docusate (PERICOLACE or SENOKOT S) 8.6-50 MG per tablet 2 Tab  2 Tab Enteral Tube BID Dmitri Pascual M.D.   2 Tab at 10/15/20 1804    And   • polyethylene glycol/lytes (MIRALAX) PACKET 1 Packet  1 Packet Enteral Tube QDAY PRN Dmitri Pascual M.D.        And   • magnesium hydroxide (MILK OF MAGNESIA) suspension 30 mL  30 mL Enteral Tube QDAY PRN Dmitri Pascual M.D.        And   • bisacodyl (DULCOLAX) suppository 10 mg  10 mg Rectal QDAY PRN Dmitri Pascual M.D.       • ipratropium-albuterol (DUONEB) nebulizer solution  3 mL Nebulization 4X/DAY (RT) Dmitri Pascual M.D.   3 mL at 10/15/20 1854   • FLUoxetine (PROZAC) capsule 20 mg  20 mg Enteral Tube DAILY Dmitri Pascual M.D.   20 mg at 10/16/20 0523   • lidocaine (LIDODERM) 5 % 2 Patch  2 Patch Transdermal Q24HR Dmitri Pascual M.D.   2 Patch at 10/15/20 2018   • traZODone (DESYREL) tablet 50 mg  50 mg Enteral Tube HS PRN Dmitri Pascual M.D.   50 mg at 10/15/20 2016   • multivitamin (THERAGRAN) tablet 1 Tab  1 Tab Enteral Tube DAILY Dmitri Pascual M.D.   1 Tab at 10/16/20 0524   • acetaminophen (TYLENOL) tablet 650 mg  650 mg Enteral Tube Q6HRS PRN Dmitri Pascual M.D.   650 mg at 10/15/20 0255   • ondansetron (ZOFRAN ODT) dispertab 4 mg  4 mg Enteral Tube Q4HRS PRN Dmitri Pascual M.D.   4 mg at 10/14/20  1545   • Respiratory Therapy Consult   Nebulization Continuous RT Dmitri Pascual M.D.       • lidocaine (XYLOCAINE) 1 % injection 1-2 mL  1-2 mL Tracheal Tube Q30 MIN PRN Dmitri Pascual M.D.       • omeprazole (FIRST-OMEPRAZOLE) 2 mg/mL oral susp 40 mg  40 mg Enteral Tube DAILY Dmitri Pascual M.D.   40 mg at 10/16/20 0523   • levalbuterol (XOPENEX) 1.25 MG/3ML nebulizer solution 1.25 mg  1.25 mg Nebulization Q4H PRN (RT) Dmitri Pascual M.D.       • Pharmacy Consult: Enteral tube insertion - review meds/change route/product selection  1 Each Other PHARMACY TO DOSE Dmitri Pascual M.D.       • amiodarone (CORDARONE) tablet 200 mg  200 mg Enteral Tube Q DAY Dmitri Pascual M.D.   200 mg at 10/16/20 0524   • furosemide (LASIX) tablet 20 mg  20 mg Enteral Tube Q DAY Dmitri Pascual M.D.   20 mg at 10/16/20 0524   • DULoxetine (CYMBALTA) capsule 60 mg  60 mg Enteral Tube QHS Dmitri Pascual M.D.   60 mg at 10/15/20 2016   • topiramate (TOPAMAX) tablet 75 mg  75 mg Enteral Tube DAILY Dmitri Pascual M.D.   75 mg at 10/16/20 0523   • rivaroxaban (XARELTO) tablet 20 mg  20 mg Enteral Tube PM MEAL Dmitri Pascual M.D.   20 mg at 10/15/20 1804   • ondansetron (ZOFRAN) syringe/vial injection 4 mg  4 mg Intravenous Q4HRS PRN Dmitri Pascual M.D.   4 mg at 10/12/20 2201       Fluids    Intake/Output Summary (Last 24 hours) at 10/16/2020 0711  Last data filed at 10/16/2020 0600  Gross per 24 hour   Intake 825 ml   Output 675 ml   Net 150 ml       Laboratory          Recent Labs     10/14/20  0640 10/16/20  0520   SODIUM 133* 136   POTASSIUM 4.3 4.8   CHLORIDE 99 99   CO2 26 32   BUN 19 18   CREATININE 0.47* 0.32*   CALCIUM 8.3* 8.7     Recent Labs     10/14/20  0640 10/16/20  0520   GLUCOSE 78 135*     Recent Labs     10/14/20  0640   WBC 12.9*   NEUTSPOLYS 84.40*   LYMPHOCYTES 5.80*   MONOCYTES 9.00   EOSINOPHILS 0.10   BASOPHILS 0.20     Recent Labs     10/14/20  0640   RBC 3.84*   HEMOGLOBIN  9.9*   HEMATOCRIT 33.8*   PLATELETCT 341       Imaging  X-Ray:  I have personally reviewed the images and compared with prior images.  EKG:  I have personally reviewed the images and compared with prior images.  CT:    Reviewed  Echo:   Reviewed    Assessment/Plan  * Acute respiratory failure with hypoxia and hypercapnia (HCC)- (present on admission)  Assessment & Plan  Intubated 9/9-9/12, 9/17-9/19, 9/20-9/23 - now on 4th course on vent this admission alone  Trach 9/23  Ongoing T-piece trials, now off the vent > 48 hours  Patient is deconditioned, continue therapies     having difficulty with placement  On facility with PEG or they will accept in transfer, I requested contact with their possible accepting physician to review case-pending    Tolerating T-piece trials during the day-->ambulating, eating, using speaking valve  Cont T-piece trials, off vent > 48hours  Patient is already failed transfer to the floor with tracheostomy along with T-piece/HMTC, but getting stronger    Continue aggressive therapies of SLP/PT/OT  Passed SLP-->ongoing PSMV trials with speech and improved appetite, hoping to avoid PEG tube placement    COPD (chronic obstructive pulmonary disease) (HCC)  Assessment & Plan  No in exacerbation, monitoring closely  Appears to be end-stage  Patient has of hypoxemia and hypercarbia along with mild to moderate pulmonary pretension  Continue bronchodilators every 4-6 hours  RT protocols  With A. fib using Xopenex over albuterol  Pulmonary toilet/mucomyst    Pneumonia- (present on admission)  Assessment & Plan  Currently without concerning signs or symptoms of new HCAP, aggressive prevention ongoing  Monitor for recurrence, patient at high risk for nosocomial infection  Update vaccines per protocol  MSSA in sputum on 9/10  Usual ashwin in sputum culture on 9/17  S/P 3 days of Zosyn followed by 3 days of Unasyn which completed on 9/15  Second Unasyn course from 9/18-9/23  **Remains off  antibiotics, remains afebrile without new S/S of infection.    Paroxysmal atrial fibrillation (HCC)- (present on admission)  Assessment & Plan  Remains in chronic A. fib with controlled rate, patient asymptomatic.  Continue amiodarone 200 mg daily, cont metoprolol 25mg BID  Echo with normal LV systolic function and normal size of LA however significant RV abnormalities and PHTN is noted  Continue anticoagulation with rivaroxaban, monitor for bleeding  Optimize electrolytes, acid-base balance and oxygenation  Ongoing cardiac monitoring    Malnutrition (HCC)  Assessment & Plan  Continue nutritional support, currently at goal  Dietary following  Cont enteral nutrition  Passed SLP, cont pureed diet with enteral nutrition (at night now)  Optimize electrolytes  Supplements as needed      Normocytic anemia- (present on admission)  Assessment & Plan  Serial CBC, hemoglobin unchanged or slight better  Transfuse per protocols  Not bleeding clinically but patient is at risk of being on chronic anticoagulation and chronically on the vent.    Dysphagia- (present on admission)  Assessment & Plan  Continue speech therapy  Passed swallow eval  Encouraged meals with SLP  Passy-Aurora valve trials ongoing-->improving and getting stronger.    Pulmonary hypertension (HCC)- (present on admission)  Assessment & Plan  RVSP 42  Treat underlying process, ongoing  Maintain normal oxygenation, suspect chronic hypoxemia primary etiology  Diuresis as needed  Serial echo as clinically appropriate.    Major depression- (present on admission)  Assessment & Plan  Continue Cymbalta  Continue to provide emotional support  At bedtime trazodone for sleep  Healing garden trips as clinically safe  Xanax at 0.25mg BID as needed for anxiety  Requesting case management nursing staff to facilitate prep resume meeting with friends or family to help with spirits  Trips to the [employer] garden    Right ventricular dilation- (present on admission)  Assessment &  Plan  Clinically not in severe right heart failure  Echo confirms severely dilated RV with mild RV systolic dysfunction  Presumably related to chronic hypoxemia and secondary pulmonary pretension  RVSP 42 mmHg  Forced diuresis as needed, judicious fluid management         VTE:  NOAC  Ulcer: PPI  Lines: Orozco Catheter  Ongoing indication addressed    I have performed a physical exam and reviewed and updated ROS and Plan today (10/16/2020). In review of yesterday's note (10/15/2020), there are no changes except as documented above.     Discussed patient condition and risk of morbidity and/or mortality with RN, RT, Pharmacy, Charge nurse / hot rounds and Patient

## 2020-10-17 NOTE — PROGRESS NOTES
This RN attempted to administer daily CHG wipes to patient as well as offer another bed bath and change of gown/pants and brush teeth. Patient refused all. Education provided about hygiene and patient continued to refuse.

## 2020-10-17 NOTE — PROGRESS NOTES
Spiritual Care Note    Patient Information     Patient's Name: Jas Vann   MRN: [id number]    YOB: 1965   Age and Gender: 54 y.o. male   Service Area: [facility]   Room (and Bed): [place] [address]   Ethnicity or Nationality:     Primary Language: English   Restorationism/Spiritual preference: Adventism   Place of Residence: [place]   Family/Friends/Others Present: No   Clinical Team Present: Yes (code team)   Medical Diagnosis(-es)/Procedure(s): Acute respiratory failure   Code Status: Full Code    Date of Admission: 9/9/2020   Length of Stay: 38 days        Spiritual Care Provider Information:  Name of Spiritual Care Provider: Brittany Sotelo  Title of Spiritual Care Provider: Associate Mcgregor  Phone Number: [phone]  E-mail: [email or web address]  Total time : 10 minutes    Spiritual Screen Results:    Gen Nursing  Spiritual Screen  Is your spiritual health or inner well-being important to you as you cope with your medical condition?: No  Would you like to receive a visit from our Spiritual Care team or your own Gnosticist or spiritual leader?: No  Was spiritual care education provided to the patient?: Declined     Palliative Care  PC Restorationism/Spiritual Screening  Is your spiritual health or inner well-being important to you as you cope with your medical condition?: No  Would you like to receive a visit from our Spiritual Care team or your own Gnosticist or spiritual leader?: No  Was spiritual care education provided to the patient?: Declined      Encounter/Request Information  Encounter/Request Type   Visited With: Patient not available, Health care provider  Nature of the Visit: Initial, On shift  Crisis Visit: Critical care  Referral From/ Origin of Request: (Overhead code)    Religous Needs/Values       Spiritual Assessment     Spiritual Care Encounters    Interaction/Conversation:  responded to code and was advised by nurse that no family is on site, but that 'there may be a brother  "somewhere.\"   instructed her to let Spiritual Care know if pt's family expresses any desire to speak to a .    Plan: Visit Upon Request    Notes:            "

## 2020-10-17 NOTE — CARE PLAN
Problem: Fluid Volume:  Goal: Will maintain balanced intake and output  Outcome: PROGRESSING AS EXPECTED     Problem: Communication  Goal: The ability to communicate needs accurately and effectively will improve  Outcome: PROGRESSING AS EXPECTED     Problem: Safety  Goal: Will remain free from injury  Outcome: PROGRESSING AS EXPECTED     Problem: Infection  Goal: Will remain free from infection  Outcome: PROGRESSING AS EXPECTED     Problem: Venous Thromboembolism (VTW)/Deep Vein Thrombosis (DVT) Prevention:  Goal: Patient will participate in Venous Thrombosis (VTE)/Deep Vein Thrombosis (DVT)Prevention Measures  Outcome: PROGRESSING AS EXPECTED     Problem: Bowel/Gastric:  Goal: Normal bowel function is maintained or improved  Outcome: PROGRESSING AS EXPECTED     Problem: Knowledge Deficit  Goal: Knowledge of disease process/condition, treatment plan, diagnostic tests, and medications will improve  Outcome: PROGRESSING AS EXPECTED     Problem: Respiratory:  Goal: Respiratory status will improve  Outcome: PROGRESSING AS EXPECTED     Problem: Skin Integrity  Goal: Risk for impaired skin integrity will decrease  Outcome: PROGRESSING AS EXPECTED     Problem: Pain Management  Goal: Pain level will decrease to patient's comfort goal  Outcome: PROGRESSING AS EXPECTED     Problem: Urinary Elimination:  Goal: Ability to reestablish a normal urinary elimination pattern will improve  Outcome: PROGRESSING AS EXPECTED     Problem: Psychosocial Needs:  Goal: Level of anxiety will decrease  Outcome: PROGRESSING SLOWER THAN EXPECTED

## 2020-10-17 NOTE — CARE PLAN
Problem: Bronchopulmonary Hygiene:  Goal: Increase mobilization of retained secretions  Outcome: PROGRESSING SLOWER THAN EXPECTED       Respiratory Update    Treatment modality: SVN/Aerosol  Frequency: Q4/Q4    Pt tolerating current treatments well with no adverse reactions.

## 2020-10-17 NOTE — PROGRESS NOTES
Critical Care Progress Note    Date of admission  9/9/2020    Chief Complaint  54 y.o. male with H/o Afib admitted 9/9/2020 with respiratory failure, sepsis and pneumonia - intubated in the ER.    Hospital Course   9/17 -    developed worsening hypercarbic and hypoxemic respiratory failure requiring emergent intubation.  Full vent support.  9/18 -    continue vent support.  Titrating phenylephrine.  Replete electrolytes.  SAT/SBT as tolerated.  9/19 -    SAT/SBT as tolerated.  Continue vent support.  Titrating phenylephrine.  Replete potassium.  Continue Unasyn.  9/20 -    liberated from the ventilator yesterday and did very well until last night when he required reintubation.  Continue vent support.  Continue Unasyn and complete 5 days of therapy.  10/3 -    continue vent support.  T-piece trials during the day as tolerated.  10/4 -    continue T-piece trials during the day as tolerated.  Nocturnal ventilatory support.  10/5 - nocturnal vent support and T-piece trials as they as tolerated, may need LTAC/home vent after that-trilogy?  10/6 - HS ASV vent, days Tpiece w/PSMV at times, still fatigued at end of day, CM working on dispo  10/7 - HS ASV ventilation, T-piece days with PSMV at times, case management working on dyspnea with LTAC referral to facility that accepts [facility] insurance in [place], trilogy ventilation nocturnally still being considered  Ongoing therapies  10/8 - HS ASV ventilation, Tpiece and therapies on day, prob depressed, [facility]TA[facility] referal ongoing  10/9 - HS ASV ventilation, Tpiece days w/PSMV as tolerated, therapies  10/10 - HS ASV, Tpiece days, CAF, PSMV, want to go home  10/11 -  HS % x 8 hrs, T piece days w/PSMV, CAF   10/12 - % last night, T-piece, barium swallow and passed, doing PSMV trials, a-fib with RVR-->dose of amio  10/13 - % overnight, passed swallow eval, cont to work with speaking valve, a-fib  10/14 - %/8 overnight, a-fib rate controlled, improving  eating, using speaking valve, T-piece at 10 lpm at 40%   10/15 - pt remained on T-piece overnight at 8 lpm at 40%, eating more meals, went to the [employer] yesterday  10/16 - T-piece at 6-15 lpm at 40% for past 48 hours, TFs nocturnally now, meals during the day, PT/OT  10/17 - vent discontinued, T-piece at 10 lpm at 40%, ambulating more on own      Interval Problem Update  Reviewed last 24 hour events:   - no events overnight   - a/ox4   - pt reports feeling stronger   - a-fib 90-110s   - -110s   - Tmax 97.5   - tolerating TFs at night   - tolerating PSMV trials and pureed diet   - BM 2 days ago   - adequate UOP with urinal   - T-piece at 10 lpm at 40%   - ambulating    Yesterday's Events:   - no event overnight   - a/ox4   - a-fib 100-110s   - -110s   - TFs at night   - afebrile   - UOP of 300cc per shift with urinal   - BM on 10/13   - tolerating food during the day   - T-piece 6 lpm at 40%   - ambulating    Review of Systems  Review of Systems   Constitutional: Positive for malaise/fatigue. Negative for chills.   HENT: Negative for congestion and sore throat.    Eyes: Negative for blurred vision and double vision.   Respiratory: Positive for cough, sputum production and shortness of breath.    Cardiovascular: Negative for chest pain and leg swelling.   Gastrointestinal: Negative for abdominal pain, diarrhea, nausea and vomiting.   Genitourinary: Negative for frequency.   Musculoskeletal: Negative for back pain and neck pain.   Skin: Negative for rash.   Neurological: Positive for weakness. Negative for headaches.   Endo/Heme/Allergies: Bruises/bleeds easily.   Psychiatric/Behavioral: Negative for depression. The patient is nervous/anxious.         Vital Signs for last 24 hours   Temp:  [36.1 °C (97 °F)-36.2 °C (97.2 °F)] 36.1 °C (97 °F)  Pulse:  [] 101  Resp:  [11-45] 12  BP: (100-128)/(65-84) 103/69  SpO2:  [91 %-100 %] 96 %    Hemodynamic parameters for last 24 hours       Respiratory  Information for the last 24 hours       Physical Exam   Physical Exam  Vitals signs and nursing note reviewed.   Constitutional:       General: He is not in acute distress.     Appearance: He is ill-appearing. He is not toxic-appearing.      Comments: Pt appears chronically ill and older than stated age   HENT:      Head: Normocephalic and atraumatic.      Right Ear: External ear normal.      Left Ear: External ear normal.      Nose: Nose normal. No rhinorrhea.      Mouth/Throat:      Mouth: Mucous membranes are moist.      Pharynx: Oropharynx is clear. No oropharyngeal exudate.   Eyes:      General: No scleral icterus.     Extraocular Movements: Extraocular movements intact.      Conjunctiva/sclera: Conjunctivae normal.      Pupils: Pupils are equal, round, and reactive to light.   Neck:      Musculoskeletal: Normal range of motion and neck supple.      Comments: Trach in place without surrounding swelling/erythema  Cardiovascular:      Rate and Rhythm: Normal rate. Rhythm irregularly irregular.      Pulses: Normal pulses.      Heart sounds: Normal heart sounds. No murmur.   Pulmonary:      Breath sounds: No wheezing.      Comments: Strong cough, coarse breath sounds throughout  Chest:      Chest wall: No tenderness.   Abdominal:      General: Bowel sounds are normal. There is no distension.      Palpations: Abdomen is soft.      Tenderness: There is no abdominal tenderness. There is no guarding or rebound.   Musculoskeletal: Normal range of motion.      Right lower leg: No edema.      Left lower leg: No edema.   Lymphadenopathy:      Cervical: No cervical adenopathy.   Skin:     General: Skin is warm and dry.      Capillary Refill: Capillary refill takes less than 2 seconds.      Findings: No rash.   Neurological:      Mental Status: He is alert and oriented to person, place, and time.      Cranial Nerves: No cranial nerve deficit.      Sensory: No sensory deficit.      Motor: No weakness.      Comments: Clear  speech with speaking valve in place   Psychiatric:         Mood and Affect: Mood normal.         Behavior: Behavior normal.         Thought Content: Thought content normal.      Comments: Normal affect     exam unchanged    Medications  Current Facility-Administered Medications   Medication Dose Route Frequency Provider Last Rate Last Dose   • Metoprolol Tartrate (LOPRESSOR) injection 5 mg  5 mg Intravenous Q5 MIN PRN Nalini Aguirre M.D.       • metoprolol (LOPRESSOR) tablet 25 mg  25 mg Enteral Tube TWICE DAILY Nalini Aguirre M.D.   25 mg at 10/17/20 0517   • ALPRAZolam (XANAX) tablet 0.25 mg  0.25 mg Enteral Tube BID PRN Nalini Aguirre M.D.   0.25 mg at 10/15/20 0451   • acetylcysteine (MUCOMYST) 20 % solution 3 mL  3 mL Inhalation 4X/DAY (RT) Jus Daniel M.D.   3 mL at 10/16/20 1912   • senna-docusate (PERICOLACE or SENOKOT S) 8.6-50 MG per tablet 2 Tab  2 Tab Enteral Tube BID Dmitri Pascual M.D.   2 Tab at 10/17/20 0517    And   • polyethylene glycol/lytes (MIRALAX) PACKET 1 Packet  1 Packet Enteral Tube QDAY PRN Dmitri Pascual M.D.        And   • magnesium hydroxide (MILK OF MAGNESIA) suspension 30 mL  30 mL Enteral Tube QDAY PRN Dmitri Pascual M.D.        And   • bisacodyl (DULCOLAX) suppository 10 mg  10 mg Rectal QDAY PRN Dmitri Pascual M.D.       • ipratropium-albuterol (DUONEB) nebulizer solution  3 mL Nebulization 4X/DAY (RT) Dmitri Pascual M.D.   3 mL at 10/16/20 1911   • FLUoxetine (PROZAC) capsule 20 mg  20 mg Enteral Tube DAILY Dmitri Pascual M.D.   20 mg at 10/17/20 0516   • lidocaine (LIDODERM) 5 % 2 Patch  2 Patch Transdermal Q24HR Dmitri Pascual M.D.   2 Patch at 10/16/20 2023   • traZODone (DESYREL) tablet 50 mg  50 mg Enteral Tube HS PRN Dmitri Pascual M.D.   50 mg at 10/15/20 2016   • multivitamin (THERAGRAN) tablet 1 Tab  1 Tab Enteral Tube DAILY Dmitri Pascual M.D.   1 Tab at 10/17/20 0516   • acetaminophen (TYLENOL) tablet 650 mg  650 mg  Enteral Tube Q6HRS PRN Dmitri Pascual M.D.   650 mg at 10/16/20 1253   • ondansetron (ZOFRAN ODT) dispertab 4 mg  4 mg Enteral Tube Q4HRS PRN Dmitri Pascual M.D.   4 mg at 10/14/20 1545   • Respiratory Therapy Consult   Nebulization Continuous RT Dmitri Pascual M.D.       • lidocaine (XYLOCAINE) 1 % injection 1-2 mL  1-2 mL Tracheal Tube Q30 MIN PRN Dmitri Pascual M.D.       • omeprazole (FIRST-OMEPRAZOLE) 2 mg/mL oral susp 40 mg  40 mg Enteral Tube DAILY Dmitri Pascual M.D.   40 mg at 10/17/20 0517   • levalbuterol (XOPENEX) 1.25 MG/3ML nebulizer solution 1.25 mg  1.25 mg Nebulization Q4H PRN (RT) Dmitri Pascual M.D.       • Pharmacy Consult: Enteral tube insertion - review meds/change route/product selection  1 Each Other PHARMACY TO DOSE Dmitri Pascual M.D.       • amiodarone (CORDARONE) tablet 200 mg  200 mg Enteral Tube Q DAY Dmitri Pascual M.D.   200 mg at 10/17/20 0516   • furosemide (LASIX) tablet 20 mg  20 mg Enteral Tube Q DAY Dmitri Pascual M.D.   20 mg at 10/17/20 0516   • DULoxetine (CYMBALTA) capsule 60 mg  60 mg Enteral Tube QHS Dmitri Pascual M.D.   60 mg at 10/16/20 2023   • topiramate (TOPAMAX) tablet 75 mg  75 mg Enteral Tube DAILY Dmitri Pascual M.D.   75 mg at 10/17/20 0516   • rivaroxaban (XARELTO) tablet 20 mg  20 mg Enteral Tube PM MEAL Dmitri Pascual M.D.   20 mg at 10/16/20 1654   • ondansetron (ZOFRAN) syringe/vial injection 4 mg  4 mg Intravenous Q4HRS PRN Dmitri Pascual M.D.   4 mg at 10/12/20 2201       Fluids    Intake/Output Summary (Last 24 hours) at 10/17/2020 0706  Last data filed at 10/17/2020 0600  Gross per 24 hour   Intake 850 ml   Output 525 ml   Net 325 ml       Laboratory          Recent Labs     10/16/20  0520   SODIUM 136   POTASSIUM 4.8   CHLORIDE 99   CO2 32   BUN 18   CREATININE 0.32*   CALCIUM 8.7     Recent Labs     10/16/20  0520   GLUCOSE 135*         No results for input(s): RBC, HEMOGLOBIN, HEMATOCRIT,  PLATELETCT, PROTHROMBTM, APTT, INR, IRON, FERRITIN, TOTIRONBC in the last 72 hours.    Imaging  X-Ray:  I have personally reviewed the images and compared with prior images.  EKG:  I have personally reviewed the images and compared with prior images.  CT:    Reviewed  Echo:   Reviewed    Assessment/Plan  * Acute respiratory failure with hypoxia and hypercapnia (HCC)- (present on admission)  Assessment & Plan  Intubated 9/9-9/12, 9/17-9/19, 9/20-9/23 - now on 4th course on vent this admission alone  Trach 9/23  Ongoing T-piece trials, now off the vent > 48 hours  Patient is deconditioned, continue therapies     having difficulty with placement  On facility with PEG or they will accept in transfer, I requested contact with their possible accepting physician to review case-pending    Continue ambulating, eating, using speaking valve  Cont T-piece trials, off vent > 48hours  Patient is already failed transfer to the floor with tracheostomy along with T-piece/HMTC, but getting stronger    Continue aggressive therapies of SLP/PT/OT  Passed SLP-->ongoing PSMV trials with speech and improved appetite, hoping to avoid PEG tube placement    COPD (chronic obstructive pulmonary disease) (HCC)  Assessment & Plan  No in exacerbation, monitoring closely  Appears to be end-stage  Patient has of hypoxemia and hypercarbia along with mild to moderate pulmonary pretension  Continue bronchodilators every 4-6 hours  RT protocols  With A. fib using Xopenex over albuterol  Pulmonary toilet/mucomyst    Pneumonia- (present on admission)  Assessment & Plan  Currently without concerning signs or symptoms of new HCAP, aggressive prevention ongoing  Monitor for recurrence, patient at high risk for nosocomial infection  Update vaccines per protocol  MSSA in sputum on 9/10  Usual ashwin in sputum culture on 9/17  S/P 3 days of Zosyn followed by 3 days of Unasyn which completed on 9/15  Second Unasyn course from 9/18-9/23  **Remains off  antibiotics, remains afebrile without new S/S of infection.    Paroxysmal atrial fibrillation (HCC)- (present on admission)  Assessment & Plan  Remains in chronic A. fib with controlled rate, patient asymptomatic.  Continue amiodarone 200 mg daily, cont metoprolol 25mg BID  Echo with normal LV systolic function and normal size of LA however significant RV abnormalities and PHTN is noted  Continue anticoagulation with rivaroxaban, monitor for bleeding  Optimize electrolytes, acid-base balance and oxygenation  Ongoing cardiac monitoring    Malnutrition (HCC)  Assessment & Plan  Continue nutritional support, currently at goal  Dietary following  Cont enteral nutrition  Passed SLP, cont pureed diet with enteral nutrition (at night now)  Optimize electrolytes  Supplements as needed      Normocytic anemia- (present on admission)  Assessment & Plan  Serial CBC, hemoglobin unchanged or slight better  Transfuse per protocols  Not bleeding clinically but patient is at risk of being on chronic anticoagulation and chronically on the vent.    Dysphagia- (present on admission)  Assessment & Plan  Continue speech therapy  Passed swallow eval  Encouraged meals with SLP  Passy-Willington valve trials-->wearing during most of the day    Pulmonary hypertension (HCC)- (present on admission)  Assessment & Plan  RVSP 42  Treat underlying process, ongoing  Maintain normal oxygenation, suspect chronic hypoxemia primary etiology  Diuresis as needed  Serial echo as clinically appropriate.    Major depression- (present on admission)  Assessment & Plan  Continue Cymbalta  Continue to provide emotional support  At bedtime trazodone for sleep  Healing garden trips as clinically safe  Xanax at 0.25mg BID as needed for anxiety  Requesting case management nursing staff to facilitate prep resume meeting with friends or family to help with spirits  Trips to the healing garden    Right ventricular dilation- (present on admission)  Assessment &  Plan  Clinically not in severe right heart failure  Echo confirms severely dilated RV with mild RV systolic dysfunction  Presumably related to chronic hypoxemia and secondary pulmonary pretension  RVSP 42 mmHg  Forced diuresis as needed, judicious fluid management         VTE:  NOAC  Ulcer: PPI  Lines: Orozco Catheter  Ongoing indication addressed    I have performed a physical exam and reviewed and updated ROS and Plan today (10/17/2020). In review of yesterday's note (10/16/2020), there are no changes except as documented above.     Discussed patient condition and risk of morbidity and/or mortality with RN, RT, Pharmacy, Charge nurse / hot rounds and Patient     Pt doing well and getting stronger.   Will keep in the ICU for now due to the patient failing on the floor.

## 2020-10-17 NOTE — PROGRESS NOTES
Patient hitting call light against table. All call lights answered as patient is calling at 10 minute intervals. This RN asked patient to please not hit call light against table. Patient continuing hit call button and hit call light physically against table throughout shift.

## 2020-10-17 NOTE — PROGRESS NOTES
Patient continuously wearing silicone nasal cannula with soft gray foam around ears to protect skin while using oxygen from tracheostomy tube or on T-piece. Patient refusing removal of oxygen tubing while cuff is inflated. Education provided. Refusal for removal continued by patient.

## 2020-10-17 NOTE — PROGRESS NOTES
Bed bath and CHG wipes offered to patient but he refused and refused a linen change on bed. Patient currently laying on bed with waffle cushion underneath but no sheets because they were soiled. Patient got from chair to bed with chair alarm going off with nurse going to room. Patient pants on and patient gown on currently. Patient refusing to get off bed currently for nurse to apply new sheets. Patient educated on risk for skin breakdown and need for sheets to protect skin. Patient continues refusal of sheet change.

## 2020-10-18 PROBLEM — I46.9 PEA (PULSELESS ELECTRICAL ACTIVITY) (HCC): Status: ACTIVE | Noted: 2020-01-01

## 2020-10-18 NOTE — PROGRESS NOTES
Late entry:    Assumed care of pt at 1900. Pt lethargic but able to follow commands. Pt c/o pain at this time. VSS.  Around 2115, pt became hypotensive with SBP in the 70's. When RN was at bedside to reassess pt, Distension was noted in pelvic area. RN placed catheter for urinary retention.  Spoke with Dr. Grimaldo and updated on pt status/hemodynamics.  Orders to give 1L NS bolus x1 for hypotension.

## 2020-10-18 NOTE — CONSULTS
General Surgery Consult    CHIEF COMPLAINT: scrotal swelling     HISTORY OF PRESENT ILLNESS: The patient is a 54 y.o. male admitted to CICU for quite some time, who apparently had suspected vasovagal episode this afternoon and initially underwent some chest compressions for approx. 1min before he awoke. After he awoke, it was noted he had new large scrotal swelling and general surgery consult was requested. Bedside ultrasound of the scrotum is demonstrative of a large amount of fluid, but no obvious intestine. Bedside reduction was attempted but could not be successfully performed. Patient does not complain of abdominal pain or scrotal/groin pain at this time.    PAST MEDICAL HISTORY:  has a past medical history of Atrial fibrillation (HCC), GERD (gastroesophageal reflux disease), Hypertension, and Psychiatric problem.     PAST SURGICAL HISTORY:  has a past surgical history that includes other orthopedic surgery and other abdominal surgery.     ALLERGIES:   Allergies   Allergen Reactions   • Iron Dextran    • Keflex    • Penicillins      Tolerated zosyn at outside hospital 9/2020        CURRENT MEDICATIONS:   Home Medications     Reviewed by Cristina Snider R.N. (Registered Nurse) on 09/19/20 at 1136  Med List Status: Complete   Medication Last Dose Status   amiodarone (CORDARONE) 200 MG Tab  Active   ascorbic acid (ASCORBIC ACID) 500 MG Tab  Active   calcitRIOL (ROCALTROL) 0.5 MCG Cap  Active   DULoxetine (CYMBALTA) 60 MG Cap DR Particles delayed-release capsule  Active   esomeprazole (NEXIUM) 20 MG capsule  Active   ferrous sulfate 325 (65 Fe) MG tablet  Active   folic acid (FOLVITE) 1 MG Tab  Active   furosemide (LASIX) 40 MG Tab  Active   metoprolol (LOPRESSOR) 25 MG Tab  Active   potassium chloride (KLOR-CON) 20 MEQ Pack  Active   rivaroxaban (XARELTO) 20 MG Tab tablet  Active   tamsulosin (FLOMAX) 0.4 MG capsule  Active   topiramate (TOPAMAX) 25 MG Tab  Active   vitamin D (CHOLECALCIFEROL) 1000 UNIT Tab   "Active                FAMILY HISTORY:   Family History   Problem Relation Age of Onset   • Cancer Mother    • Cancer Father         SOCIAL HISTORY:   Social History     Tobacco Use   • Smoking status: Former Smoker     Packs/day: 0.00   • Smokeless tobacco: Never Used   Substance and Sexual Activity   • Alcohol use: Yes   • Drug use: Not Currently   • Sexual activity: Not on file       REVIEW OF SYSTEMS: Comprehensive review of systems unobtainable    PHYSICAL EXAMINATION:     GENERAL: The patient is awake and follows commands.   VITAL SIGNS: /66   Pulse (!) 107   Temp 36.4 °C (97.5 °F) (Temporal)   Resp (!) 21   Ht 1.722 m (5' 7.8\")   Wt 47.3 kg (104 lb 4.4 oz)   SpO2 98%   HEAD AND NECK: Demonstrates symmetric, reactive pupils. Extraocular muscles   are intact. Nares and oropharynx are clear.   NECK: Supple. No adenopathy.  CHEST:No respiratory distress.    CARDIOVASCULAR: irregular rate. The extremities are well perfused.   ABDOMEN: soft, scaphoid, nontender, scrotum is swollen and distended L>R, no tissue palpable within the inguinal canal, no hernias palpable  EXTREMITIES: Examination of the upper and lower extremities demonstrates no cyanosis edema or clubbing.  NEUROLOGIC: Alert & oriented x 3, Normal motor function, Normal sensory function, No focal deficits noted.    LABORATORY VALUES:   Recent Labs     10/16/20  0520 10/17/20  1630   WBC 15.3* 30.3*   RBC 4.07* 4.16*   HEMOGLOBIN 10.3* 10.5*   HEMATOCRIT 37.4* 38.0*   MCV 91.9 91.3   MCH 25.3* 25.2*   MCHC 27.5* 27.6*   RDW 51.2* 51.8*   PLATELETCT 350 394   MPV 10.1 10.1     Recent Labs     10/16/20  0520 10/17/20  1630   SODIUM 136 134*   POTASSIUM 4.8 4.2   CHLORIDE 99 92*   CO2 32 32   GLUCOSE 135* 138*   BUN 18 16   CREATININE 0.32* 0.42*   CALCIUM 8.7 9.1     Recent Labs     10/17/20  1630   ASTSGOT 26   ALTSGPT 33   TBILIRUBIN 0.5   ALKPHOSPHAT 171*   GLOBULIN 3.7*   INR 1.60*     Recent Labs     10/17/20  1630   INR 1.60*      "   IMAGING:   DX-ESOPHAGUS - [id number]   Final Result      DX-ABDOMEN FOR TUBE PLACEMENT   Final Result      Enteric tube tip projects over the      DX-CHEST-PORTABLE (1 VIEW)   Final Result      Bibasilar and perihilar atelectasis which could obscure an additional process. This is similar to the prior study.      DX-CHEST-PORTABLE (1 VIEW)   Final Result      Cardiomegaly and mild vascular congestion.      Bibasilar atelectasis, increased on the right.      DX-CHEST-PORTABLE (1 VIEW)   Final Result         1.  Pulmonary edema and/or infiltrates are identified, which are stable since the prior exam.   2.  Cardiomegaly      CT-ABDOMEN-PELVIS WITH   Final Result         1.  Air and fluid-filled distention of small bowel and colon, appearance suggests diffuse enterocolitis and/or ileus. Radiographic follow-up to resolution recommended.   2.  Large bilateral scrotal hydroceles      CT-CTA CHEST PULMONARY ARTERY W/ RECONS   Final Result         1.  No large central pulmonary embolus is appreciated, evaluation of the subsegmental branches is essentially nondiagnostic due to motion artifacts. Additional imaging would be required for definitive exclusion of small distal pulmonary emboli.   2.  Linear consolidations the bilateral lung bases, left greater than right, appearance favors atelectasis. Infiltrates not excluded.   3.  Patchy right middle lobe opacities suggests subtle infiltrates.   4.  Layering bilateral pleural effusions with thickened pleura, consider chronic effusions which would include empyema in the appropriate clinical setting.   5.  Pleural-based calcifications.   6.  Bilateral rib and sternal fractures, appear likely subacute or chronic. Consider component of acute fracture as clinically appropriate.      DX-CHEST-LIMITED (1 VIEW)   Final Result      1.  Mildly improved aeration with improved bibasilar opacities and diffuse interstitial opacities.   2.  Trace pleural effusions, improved.      CT-HEAD  W/O   Final Result         1.  No acute intracranial abnormality is identified, there are nonspecific white matter changes, commonly associated with small vessel ischemic disease.  Associated mild cerebral atrophy is noted.   2.  Atherosclerosis.      DX-ABDOMEN FOR TUBE PLACEMENT   Final Result      Enteric tube terminates over the expected position of the cardiac region of the stomach favored over distal esophagus      DX-CHEST-PORTABLE (1 VIEW)   Final Result      Worsening left basilar atelectasis, pleural fluid      Otherwise stable chest with right basilar scarring, pleural fluid and probable consolidation      DX-CHEST-PORTABLE (1 VIEW)   Final Result      No significant interval change.         DX-CHEST-PORTABLE (1 VIEW)   Final Result      No significant interval change.      DX-CHEST-PORTABLE (1 VIEW)   Final Result      Stable chest findings compared with 9/21.      DX-CHEST-PORTABLE (1 VIEW)   Final Result      Grossly stable chest appearance compared with 9/20. Direct comparison is hampered due to rotational factors on the 9/20 image.      DX-CHEST-PORTABLE (1 VIEW)   Final Result      Grossly stable chest appearance compared with 9/19.      DX-ABDOMEN FOR TUBE PLACEMENT   Final Result      Feeding tube tip projects over the stomach.      DX-CHEST-PORTABLE (1 VIEW)   Final Result      Stable chest findings compared with 9/18.      DX-CHEST-PORTABLE (1 VIEW)   Final Result         1. Suboptimal study due to patient rotation.   2. Lines and tubes as above.   3. Ill-defined right basilar atelectasis versus consolidation. Possible right effusion.   4. Mild interstitial edema.         DX-CHEST-FOR LINE PLACEMENT Perform procedure in: Patient's Room   Final Result            1. A right central venous catheter with tip projects appropriately over the expected area of the superior vena cava.      DX-ABDOMEN FOR TUBE PLACEMENT   Final Result      Feeding tube tip overlies the upper gastric fundus.       DX-CHEST-PORTABLE (1 VIEW)   Final Result      1.  Cardiomegaly with bilateral pulmonary infiltrates.      2.  Stable bilateral pleural effusions.      3.  Endotracheal tube present.      DX-CHEST-LIMITED (1 VIEW)   Final Result      Perihilar and bibasilar opacities may represent a combination of edema, atelectasis, pneumonia.      Trace pleural effusions are not excluded.      Interstitial prominence likely represents interstitial edema.         DX-ABDOMEN FOR TUBE PLACEMENT   Final Result      Feeding tube placement with the tip projecting over the distal stomach or duodenal bulb      EC-ECHOCARDIOGRAM COMPLETE W/O CONT   Final Result      OUTSIDE IMAGES-DX CHEST   Final Result      US-FOREIGN FILM ULTRASOUND   Final Result      OUTSIDE IMAGES-DX CHEST   Final Result      OUTSIDE IMAGES-US ABDOMEN   Final Result      OUTSIDE IMAGES-CT CHEST   Final Result      DX-CHEST-PORTABLE (1 VIEW)   Final Result         1.  Pulmonary edema and/or infiltrates are identified, which are stable since the prior exam.   2.  Trace bilateral pleural effusion   3.  Cardiomegaly      DX-CHEST-LIMITED (1 VIEW)   Final Result         1.  Pulmonary edema and/or infiltrates.   2.  Cardiomegaly      DX-CHEST-LIMITED (1 VIEW)   Final Result         1.  Pulmonary edema and/or infiltrates are identified, which are stable since the prior exam.   2.  Small bilateral pleural effusions   3.  Cardiomegaly      CT-ABDOMEN-PELVIS WITH    (Results Pending)   IMPRESSION:  1. Bilateral scrotal hydroceles, left worse than right, similar to prior study. No inguinal hernia.  2. Consolidative opacity in the left lower lung, which may represent atelectasis, sequela of aspiration or developing pneumonia.  3. Trace bilateral pleural effusions and bibasilar atelectasis.  4. Sequela of resuscitation efforts in the lower chest.            IMPRESSION AND PLAN:   55 y/o male s/p rapid response, possible PEA with 1min chest compressions, now stable and awake  1.  Bedside U/S suggests fluid in the scrotum, stat CT confirms unchanged hydroceles. No inguinal hernia  2. Can f/u with outpatient urology regarding hydroceles  3. Please call general surgery with any further questions or concerns      ___________________________________   Ian Reeder M.D.    DD: 10/17/2020 DT: 5:40 PM

## 2020-10-18 NOTE — PROGRESS NOTES
Critical Care Progress Note    Date of admission  9/9/2020    Chief Complaint  54 y.o. male with H/o Afib admitted 9/9/2020 with respiratory failure, sepsis and pneumonia - intubated in the ER.    Hospital Course   9/17 -    developed worsening hypercarbic and hypoxemic respiratory failure requiring emergent intubation.  Full vent support.  9/18 -    continue vent support.  Titrating phenylephrine.  Replete electrolytes.  SAT/SBT as tolerated.  9/19 -    SAT/SBT as tolerated.  Continue vent support.  Titrating phenylephrine.  Replete potassium.  Continue Unasyn.  9/20 -    liberated from the ventilator yesterday and did very well until last night when he required reintubation.  Continue vent support.  Continue Unasyn and complete 5 days of therapy.  10/3 -    continue vent support.  T-piece trials during the day as tolerated.  10/4 -    continue T-piece trials during the day as tolerated.  Nocturnal ventilatory support.  10/5 - nocturnal vent support and T-piece trials as they as tolerated, may need LTAC/home vent after that-trilogy?  10/6 - HS ASV vent, days Tpiece w/PSMV at times, still fatigued at end of day, CM working on dispo  10/7 - HS ASV ventilation, T-piece days with PSMV at times, case management working on dyspnea with LTAC referral to facility that accepts [facility] insurance in [place], trilogy ventilation nocturnally still being considered  Ongoing therapies  10/8 - HS ASV ventilation, Tpiece and therapies on day, prob depressed, LTACh referal ongoing  10/9 - HS ASV ventilation, Tpiece days w/PSMV as tolerated, therapies  10/10 - HS ASV, Tpiece days, CAF, PSMV, want to go home  10/11 -  HS % x 8 hrs, T piece days w/PSMV, CAF   10/12 - % last night, T-piece, barium swallow and passed, doing PSMV trials, a-fib with RVR-->dose of amio  10/13 - % overnight, passed swallow eval, cont to work with speaking valve, a-fib  10/14 - %/8 overnight, a-fib rate controlled, improving  eating, using speaking valve, T-piece at 10 lpm at 40%   10/15 - pt remained on T-piece overnight at 8 lpm at 40%, eating more meals, went to the [employer] yesterday  10/16 - T-piece at 6-15 lpm at 40% for past 48 hours, TFs nocturnally now, meals during the day, PT/OT  10/17 - vent discontinued, T-piece at 10 lpm at 40%, ambulating more on own-->later in the day: PEA arrest with short CPR with ROSC achieved, placed back on the vent, hydroceles noted to scrotum  10/18 - weaned off ventilator, back to using speaking valve and eating this morning      Interval Problem Update  Reviewed last 24 hour events:   - pt with PEA code yesterday afternoon   - went back on the vent   - a/ox4   - a-fib 90-120s   - SBP 100s   - afebrile   - up to chair   - multiple BMs   - 40cc UOP with morton    -CXR(reviewed): poor film, appears to have emphysema changes   - xarelto   - home ppi   - Mg 1.7    - K 3.4    **weaned off vent quickly this morning and back to T-piece and PSMV trials      Yesterday's Events:   - no events overnight   - a/ox4   - pt reports feeling stronger   - a-fib 90-110s   - -110s   - Tmax 97.5   - tolerating TFs at night   - tolerating PSMV trials and pureed diet   - BM 2 days ago   - adequate UOP with urinal   - T-piece at 10 lpm at 40%   - ambulating    Review of Systems  Review of Systems   Constitutional: Positive for malaise/fatigue. Negative for chills.   HENT: Negative for congestion and sore throat.    Eyes: Negative for blurred vision and double vision.   Respiratory: Positive for cough, sputum production and shortness of breath.    Cardiovascular: Negative for chest pain and leg swelling.   Gastrointestinal: Negative for abdominal pain, diarrhea, nausea and vomiting.   Genitourinary: Negative for frequency.   Musculoskeletal: Negative for back pain and neck pain.   Skin: Negative for rash.   Neurological: Positive for weakness. Negative for headaches.   Endo/Heme/Allergies: Bruises/bleeds  easily.   Psychiatric/Behavioral: Negative for depression. The patient is nervous/anxious.         Vital Signs for last 24 hours   Temp:  [36.4 °C (97.5 °F)-37.1 °C (98.8 °F)] 37.1 °C (98.8 °F)  Pulse:  [] 104  Resp:  [0-44] 16  BP: ()/(50-76) 106/68  SpO2:  [86 %-100 %] 100 %    Hemodynamic parameters for last 24 hours       Respiratory Information for the last 24 hours  Vent Mode: APVCMV  Rate (breaths/min): 20  Vt Target (mL): 400  PEEP/CPAP: 8  P Support: 10  MAP: 12  Control VTE (exp VT): 409    Physical Exam   Physical Exam  Vitals signs and nursing note reviewed.   Constitutional:       General: He is not in acute distress.     Appearance: He is ill-appearing. He is not toxic-appearing.      Comments: Pt appears chronically ill and older than stated age   HENT:      Head: Normocephalic and atraumatic.      Right Ear: External ear normal.      Left Ear: External ear normal.      Nose: Nose normal. No rhinorrhea.      Mouth/Throat:      Mouth: Mucous membranes are moist.      Pharynx: Oropharynx is clear. No oropharyngeal exudate.   Eyes:      General: No scleral icterus.     Extraocular Movements: Extraocular movements intact.      Conjunctiva/sclera: Conjunctivae normal.      Pupils: Pupils are equal, round, and reactive to light.   Neck:      Musculoskeletal: Normal range of motion and neck supple.      Comments: Trach in place without surrounding swelling/erythema  Cardiovascular:      Rate and Rhythm: Normal rate. Rhythm irregularly irregular.      Pulses: Normal pulses.      Heart sounds: Normal heart sounds. No murmur.   Pulmonary:      Breath sounds: No wheezing.      Comments: Strong cough, coarse breath sounds throughout  Chest:      Chest wall: No tenderness.   Abdominal:      General: Bowel sounds are normal. There is no distension.      Palpations: Abdomen is soft.      Tenderness: There is no abdominal tenderness. There is no guarding or rebound.   Musculoskeletal: Normal range of  motion.      Right lower leg: No edema.      Left lower leg: No edema.   Lymphadenopathy:      Cervical: No cervical adenopathy.   Skin:     General: Skin is warm and dry.      Capillary Refill: Capillary refill takes less than 2 seconds.      Findings: No rash.   Neurological:      Mental Status: He is alert and oriented to person, place, and time.      Cranial Nerves: No cranial nerve deficit.      Sensory: No sensory deficit.      Motor: No weakness.      Comments: Back on vent with morning exam   Psychiatric:         Behavior: Behavior normal.         Thought Content: Thought content normal.      Comments: Low mood         Medications  Current Facility-Administered Medications   Medication Dose Route Frequency Provider Last Rate Last Dose   • famotidine (PEPCID) tablet 20 mg  20 mg Enteral Tube Q12HRS Nalini Aguirre M.D.   20 mg at 10/18/20 0458    Or   • famotidine (PEPCID) injection 20 mg  20 mg Intravenous Q12HRS Nalini Aguirre M.D.       • MD Alert...ICU Electrolyte Replacement per Pharmacy   Other PHARMACY TO DOSE Nalini Aguirre M.D.       • midazolam (VERSED) injection 2 mg  2 mg Intravenous Q HOUR PRN Nalini Aguirre M.D.   2 mg at 10/17/20 2014   • Metoprolol Tartrate (LOPRESSOR) injection 5 mg  5 mg Intravenous Q5 MIN PRN Nalini Aguirre M.D.       • metoprolol (LOPRESSOR) tablet 25 mg  25 mg Enteral Tube TWICE DAILY Nalini Aguirre M.D.   25 mg at 10/18/20 0458   • ALPRAZolam (XANAX) tablet 0.25 mg  0.25 mg Enteral Tube BID PRN Nalini Aguirre M.D.   0.25 mg at 10/15/20 0451   • acetylcysteine (MUCOMYST) 20 % solution 3 mL  3 mL Inhalation 4X/DAY (RT) Jus Daniel M.D.   3 mL at 10/18/20 0636   • senna-docusate (PERICOLACE or SENOKOT S) 8.6-50 MG per tablet 2 Tab  2 Tab Enteral Tube BID Dmitri Pascual M.D.   2 Tab at 10/18/20 0457    And   • polyethylene glycol/lytes (MIRALAX) PACKET 1 Packet  1 Packet Enteral Tube QDAY PRN Dmitri Pascual M.D.        And   • magnesium  hydroxide (MILK OF MAGNESIA) suspension 30 mL  30 mL Enteral Tube QDAY PRN Dmitri Pascual M.D.        And   • bisacodyl (DULCOLAX) suppository 10 mg  10 mg Rectal QDAY PRN Dmitri Pascual M.D.   10 mg at 10/17/20 2013   • ipratropium-albuterol (DUONEB) nebulizer solution  3 mL Nebulization 4X/DAY (RT) Dmitri Pascual M.D.   3 mL at 10/18/20 0636   • FLUoxetine (PROZAC) capsule 20 mg  20 mg Enteral Tube DAILY Dmitri Pascual M.D.   20 mg at 10/18/20 0457   • lidocaine (LIDODERM) 5 % 2 Patch  2 Patch Transdermal Q24HR Dmitri Pascual M.D.   2 Patch at 10/17/20 2014   • traZODone (DESYREL) tablet 50 mg  50 mg Enteral Tube HS PRN Dmitri Pascual M.D.   50 mg at 10/17/20 2141   • multivitamin (THERAGRAN) tablet 1 Tab  1 Tab Enteral Tube DAILY Dmitri Pascual M.D.   1 Tab at 10/18/20 0457   • acetaminophen (TYLENOL) tablet 650 mg  650 mg Enteral Tube Q6HRS PRN Dmitri Pascual M.D.   650 mg at 10/18/20 0458   • ondansetron (ZOFRAN ODT) dispertab 4 mg  4 mg Enteral Tube Q4HRS PRN Dmitri Pascual M.D.   4 mg at 10/14/20 1545   • Respiratory Therapy Consult   Nebulization Continuous RT Dmitri Pascual M.D.       • lidocaine (XYLOCAINE) 1 % injection 1-2 mL  1-2 mL Tracheal Tube Q30 MIN PRN Dmitri Pascual M.D.       • omeprazole (FIRST-OMEPRAZOLE) 2 mg/mL oral susp 40 mg  40 mg Enteral Tube DAILY Dmitri Pascual M.D.   40 mg at 10/18/20 0622   • levalbuterol (XOPENEX) 1.25 MG/3ML nebulizer solution 1.25 mg  1.25 mg Nebulization Q4H PRN (RT) Dmitri Psacual M.D.       • Pharmacy Consult: Enteral tube insertion - review meds/change route/product selection  1 Each Other PHARMACY TO DOSE Dmitri Pascual M.D.       • amiodarone (CORDARONE) tablet 200 mg  200 mg Enteral Tube Q DAY Dmitri Pascual M.D.   200 mg at 10/18/20 0457   • furosemide (LASIX) tablet 20 mg  20 mg Enteral Tube Q DAY Dmitri Pascual M.D.   20 mg at 10/18/20 0457   • DULoxetine (CYMBALTA) capsule 60 mg  60 mg  Enteral Tube QHS Dmitri Pascual M.D.   60 mg at 10/17/20 2014   • topiramate (TOPAMAX) tablet 75 mg  75 mg Enteral Tube DAILY Dmitri Pascual M.D.   75 mg at 10/18/20 0620   • rivaroxaban (XARELTO) tablet 20 mg  20 mg Enteral Tube PM MEAL Dmitri Pascual M.D.   20 mg at 10/17/20 2014   • ondansetron (ZOFRAN) syringe/vial injection 4 mg  4 mg Intravenous Q4HRS PRN Dmitri Pascual M.D.   4 mg at 10/12/20 2201       Fluids    Intake/Output Summary (Last 24 hours) at 10/18/2020 0714  Last data filed at 10/18/2020 0100  Gross per 24 hour   Intake 1060 ml   Output 1190 ml   Net -130 ml       Laboratory          Recent Labs     10/16/20  0520 10/17/20  1630 10/18/20  0207   SODIUM 136 134* 137   POTASSIUM 4.8 4.2 3.4*   CHLORIDE 99 92* 98   CO2 32 32 30   BUN 18 16 15   CREATININE 0.32* 0.42* 0.34*   MAGNESIUM  --  2.0 1.7   CALCIUM 8.7 9.1 8.5     Recent Labs     10/16/20  0520 10/17/20  1630 10/18/20  0207   ALTSGPT  --  33  --    ASTSGOT  --  26  --    ALKPHOSPHAT  --  171*  --    TBILIRUBIN  --  0.5  --    GLUCOSE 135* 138* 84     Recent Labs     10/16/20  0520 10/17/20  1630 10/18/20  0202   WBC 15.3* 30.3* 19.2*   NEUTSPOLYS 89.20* 89.00* 91.50*   LYMPHOCYTES 3.70* 3.50* 2.90*   MONOCYTES 6.30 5.60 4.80   EOSINOPHILS 0.00 0.00 0.00   BASOPHILS 0.10 0.30 0.10   ASTSGOT  --  26  --    ALTSGPT  --  33  --    ALKPHOSPHAT  --  171*  --    TBILIRUBIN  --  0.5  --      Recent Labs     10/16/20  0520 10/17/20  1630 10/18/20  0202   RBC 4.07* 4.16* 3.34*   HEMOGLOBIN 10.3* 10.5* 8.4*   HEMATOCRIT 37.4* 38.0* 28.9*   PLATELETCT 350 394 235   PROTHROMBTM  --  19.5*  --    INR  --  1.60*  --        Imaging  X-Ray:  I have personally reviewed the images and compared with prior images.  EKG:  I have personally reviewed the images and compared with prior images.  CT:    Reviewed  Echo:   Reviewed    Assessment/Plan  * Acute respiratory failure with hypoxia and hypercapnia (HCC)- (present on admission)  Assessment &  Plan  Intubated 9/9-9/12, 9/17-9/19, 9/20-9/23 - now on 4th course on vent this admission alone  Trach 9/23  Ongoing T-piece trials, now off the vent > 48 hours  Patient is deconditioned, continue therapies     having difficulty with placement  On facility with PEG or they will accept in transfer, I requested contact with their possible accepting physician to review case-pending    Continue ambulating, eating, using speaking valve  Cont T-piece trials, off vent > 48hours  Patient is already failed transfer to the floor with tracheostomy along with T-piece/HMTC, but getting stronger    Continue aggressive therapies of SLP/PT/OT  Passed SLP-->ongoing PSMV trials with speech and improved appetite, hoping to avoid PEG tube placement    COPD (chronic obstructive pulmonary disease) ([facility])  Assessment & Plan  No in exacerbation, monitoring closely  Appears to be end-stage  Patient has of hypoxemia and hypercarbia along with mild to moderate pulmonary pretension  Continue bronchodilators every 4-6 hours  RT protocols  With A. fib using Xopenex over albuterol  Pulmonary toilet/mucomyst    Pneumonia- (present on admission)  Assessment & Plan  Currently without concerning signs or symptoms of new HCAP, aggressive prevention ongoing  Monitor for recurrence, patient at high risk for nosocomial infection  Update vaccines per protocol  MSSA in sputum on 9/10  Usual ashwin in sputum culture on 9/17  S/P 3 days of Zosyn followed by 3 days of Unasyn which completed on 9/15  Second Unasyn course from 9/18-9/23  **Remains off antibiotics, remains afebrile without new S/S of infection.    Paroxysmal atrial fibrillation (HCC)- (present on admission)  Assessment & Plan  Remains in chronic A. fib with controlled rate, patient asymptomatic.  Continue amiodarone 200 mg daily, cont metoprolol 25mg BID  Echo with normal LV systolic function and normal size of LA however significant RV abnormalities and PHTN is noted  Continue  anticoagulation with rivaroxaban, monitor for bleeding  Optimize electrolytes, acid-base balance and oxygenation  Ongoing cardiac monitoring    Malnutrition (HCC)  Assessment & Plan  Continue nutritional support, currently at goal  Dietary following  Cont enteral nutrition  Passed SLP, cont pureed diet with enteral nutrition (at night now)  Optimize electrolytes  Supplements as needed      Normocytic anemia- (present on admission)  Assessment & Plan  Serial CBC, hemoglobin unchanged or slight better  Transfuse per protocols  Not bleeding clinically but patient is at risk of being on chronic anticoagulation and chronically on the vent.    Dysphagia- (present on admission)  Assessment & Plan  Continue speech therapy  Passed swallow eval  Encouraged meals with SLP  Pass[place] valve trials-->wearing during most of the day    Pulmonary hypertension (HCC)- (present on admission)  Assessment & Plan  RVSP 42  Treat underlying process, ongoing  Maintain normal oxygenation, suspect chronic hypoxemia primary etiology  Diuresis as needed  Serial echo as clinically appropriate.    Major depression- (present on admission)  Assessment & Plan  Continue Cymbalta  Continue to provide emotional support  At bedtime trazodone for sleep  [employer] trips as clinically safe  Xanax at 0.25mg BID as needed for anxiety  Requesting case management nursing staff to facilitate prep resume meeting with friends or family to help with spirits  Trips to the [employer]    Right ventricular dilation- (present on admission)  Assessment & Plan  Clinically not in severe right heart failure  Echo confirms severely dilated RV with mild RV systolic dysfunction  Presumably related to chronic hypoxemia and secondary pulmonary pretension  RVSP 42 mmHg  Forced diuresis as needed, judicious fluid management         VTE:  NOAC  Ulcer: PPI  Lines: Orozco Catheter  Ongoing indication addressed    I have performed a physical exam and reviewed and updated ROS  and Plan today (10/18/2020). In review of yesterday's note (10/17/2020), there are no changes except as documented above.     Discussed patient condition and risk of morbidity and/or mortality with RN, RT, Pharmacy, Charge nurse / hot rounds and Patient     After PEA/brief CPR event last night, but patient appears to be back to where he was.  Weaned quickly off the vent and back to T-piece and PSMV.  Eating and ambulating. Will monitor in ICU.

## 2020-10-18 NOTE — PROGRESS NOTES
At 1612, patient attempting to urinate into urinal as he had been throughout shift. Patient then began to fall forward, I caught him and placed him in chair which he had just stood up out of to urinate. Patient became unresponsive, pulse ox read 47% and no pulse palpated, patient ashen white. I yelled for help while beginning CPR and scrotum observed to be extremely swollen with scent of a bowel movement present.    At 1613, team arrived at bedside and code blue initiated. Patient assisted to bed and ACLS team took over, roles in place. Dr. Aguirre at bedside. At 1617, code blue ended (charting completed by Dominique ZELAYA). Dr. Aguirre paged for general surgeon.    Dr. Reeder arrived at bedside and he ordered CT abdomen/pelvis with IV contrast.    Patient taken down to CT with rapid team and RT. Dr. Reeder went with.

## 2020-10-18 NOTE — PROGRESS NOTES
Pulmonary/Critical Care Medicine   Brief Note    Date of service: 10/17/2020  Time: 1700    CODE BLUE called overhead.    Arrived to find patient slumped in chair, RNs state no pulse with O2 sat in 40s.  CPR initiated.  No cardioversion or epi necessary.  Pt apparently was standing attempting to urinate when he fell forward in front of RN and RN initiated CODE.  CPR ongoing for about 1 minute and during pulse check, pulses returned.  Pt bagged and then placed back on the ventilator.  Pt also waking and moving arms and nodding appropriately to questions.  Patient was seen to be in a-fib at 120s with SBP in 120s.      During primary survey, noted significant swelling to scrotum that was hard to palpation with scaffoid abdomen.  Auscultation of the scrotum revealed bowel sounds.  Attempted to reduce what appeared to be a hernia.  I was unsuccessful.  I called general surgery on call, Dr. Reeder, who came to bedside to evaluate.  Pt will go for CT abd/pelvis.    Nalini Aguirre MD  Pulmonary and Critical Care Medicine

## 2020-10-18 NOTE — ASSESSMENT & PLAN NOTE
Related to weaning. It has occurred x5-6 times  No events while on ventilator  Too fragile, too deconditioned to wean for possibly several months

## 2020-10-18 NOTE — CARE PLAN
Problem: Bronchopulmonary Hygiene:  Goal: Increase mobilization of retained secretions  Outcome: PROGRESSING SLOWER THAN EXPECTED      Ventilator Daily Summary    Vent Day # 1    Weaning trials: T-Piece x 8 hrs    Plan: Continue current ventilator settings and wean mechanical ventilation as tolerated per physician orders.

## 2020-10-19 NOTE — PROGRESS NOTES
Received report and assumed pt care. Pt is up in chair, on T-piece (tolerating well). VSS. No needs at this time.

## 2020-10-19 NOTE — CARE PLAN
Ventilator Daily Summary    Vent Day #3      Plan: Continue current T-piece as tolerated per physician orders.

## 2020-10-19 NOTE — CARE PLAN
Problem: Bowel/Gastric:  Goal: Normal bowel function is maintained or improved  Outcome: PROGRESSING AS EXPECTED  Large, loose BM today  Hold Senna    Problem: Respiratory:  Goal: Respiratory status will improve  Outcome: PROGRESSING SLOWER THAN EXPECTED  T-piece  Wean as tolerated

## 2020-10-19 NOTE — PROGRESS NOTES
Critical Care Progress Note    Date of admission  9/9/2020    Chief Complaint  54 y.o. male with H/o Afib admitted 9/9/2020 with respiratory failure, sepsis and pneumonia - intubated in the ER.    Hospital Course   9/17 -    developed worsening hypercarbic and hypoxemic respiratory failure requiring emergent intubation.  Full vent support.  9/18 -    continue vent support.  Titrating phenylephrine.  Replete electrolytes.  SAT/SBT as tolerated.  9/19 -    SAT/SBT as tolerated.  Continue vent support.  Titrating phenylephrine.  Replete potassium.  Continue Unasyn.  9/20 -    liberated from the ventilator yesterday and did very well until last night when he required reintubation.  Continue vent support.  Continue Unasyn and complete 5 days of therapy.  10/3 -    continue vent support.  T-piece trials during the day as tolerated.  10/4 -    continue T-piece trials during the day as tolerated.  Nocturnal ventilatory support.  10/5 - nocturnal vent support and T-piece trials as they as tolerated, may need LTAC/home vent after that-trilogy?  10/6 - HS ASV vent, days Tpiece w/PSMV at times, still fatigued at end of day, CM working on dispo  10/7 - HS ASV ventilation, T-piece days with PSMV at times, case management working on dyspnea with LTAC referral to facility that accepts [facility] insurance in [place], trilogy ventilation nocturnally still being considered  Ongoing therapies  10/8 - HS ASV ventilation, Tpiece and therapies on day, prob depressed, [facility] referal ongoing  10/9 - HS ASV ventilation, Tpiece days w/PSMV as tolerated, therapies  10/10 - HS ASV, Tpiece days, CAF, PSMV, want to go home  10/11 -  HS % x 8 hrs, T piece days w/PSMV, CAF   10/12 - % last night, T-piece, barium swallow and passed, doing PSMV trials, a-fib with RVR-->dose of amio  10/13 - % overnight, passed swallow eval, cont to work with speaking valve, a-fib  10/14 - %/8 overnight, a-fib rate controlled, improving  eating, using speaking valve, T-piece at 10 lpm at 40%   10/15 - pt remained on T-piece overnight at 8 lpm at 40%, eating more meals, went to the [employer] yesterday  10/16 - T-piece at 6-15 lpm at 40% for past 48 hours, TFs nocturnally now, meals during the day, PT/OT  10/17 - vent discontinued, T-piece at 10 lpm at 40%, ambulating more on own-->later in the day: PEA arrest with short CPR with ROSC achieved, placed back on the vent, hydroceles noted to scrotum  10/18 - weaned off ventilator, back to using speaking valve and eating this morning     10/19-  Remains off vent for two days    Interval Problem Update  Reviewed last 24 hour events:  Admitted 9/9  PEA for one minute two days ago  Trach 9/23  Afib 90s  BP OK, afebrile  Tube feeds night, diet during day  Mobilizes to chair, stands and walks in room  Suctioned q 4 hours  Offvent 24 hours  Possible transfer to [facility] this Wednesday  PRN Zanax stopped      Prior   - pt with PEA code yesterday afternoon   - went back on the vent   - a/ox4   - a-fib 90-120s   - SBP 100s   - afebrile   - up to chair   - multiple BMs   - 40cc UOP with morton    -CXR(reviewed): poor film, appears to have emphysema changes   - xarelto   - home ppi   - Mg 1.7    - K 3.4    **weaned off vent quickly this morning and back to T-piece and PSMV trials      Yesterday's Events:   - no events overnight   - a/ox4   - pt reports feeling stronger   - a-fib 90-110s   - -110s   - Tmax 97.5   - tolerating TFs at night   - tolerating PSMV trials and pureed diet   - BM 2 days ago   - adequate UOP with urinal   - T-piece at 10 lpm at 40%   - ambulating    Review of Systems  Review of Systems   Constitutional: Positive for malaise/fatigue. Negative for chills.   HENT: Negative for congestion and sore throat.    Eyes: Negative for blurred vision and double vision.   Respiratory: Positive for cough, sputum production and shortness of breath.    Cardiovascular: Negative for chest pain and leg  swelling.   Gastrointestinal: Negative for abdominal pain, diarrhea, nausea and vomiting.   Genitourinary: Negative for frequency.   Musculoskeletal: Negative for back pain and neck pain.   Skin: Negative for rash.   Neurological: Positive for weakness. Negative for headaches.   Endo/Heme/Allergies: Bruises/bleeds easily.   Psychiatric/Behavioral: Negative for depression. The patient is nervous/anxious.         Vital Signs for last 24 hours   Temp:  [35.9 °C (96.6 °F)] 35.9 °C (96.6 °F)  Pulse:  [] 98  Resp:  [14-57] 29  BP: ()/(63-78) 115/72  SpO2:  [83 %-98 %] 95 %    Hemodynamic parameters for last 24 hours       Respiratory Information for the last 24 hours       Physical Exam   Physical Exam  Vitals signs and nursing note reviewed.   Constitutional:       General: He is not in acute distress.     Appearance: He is ill-appearing. He is not toxic-appearing.      Comments: Pt appears chronically ill and older than stated age   HENT:      Head: Normocephalic and atraumatic.      Right Ear: External ear normal.      Left Ear: External ear normal.      Nose: Nose normal. No rhinorrhea.      Mouth/Throat:      Mouth: Mucous membranes are moist.      Pharynx: Oropharynx is clear. No oropharyngeal exudate.   Eyes:      General: No scleral icterus.     Extraocular Movements: Extraocular movements intact.      Conjunctiva/sclera: Conjunctivae normal.      Pupils: Pupils are equal, round, and reactive to light.   Neck:      Musculoskeletal: Normal range of motion and neck supple.      Comments: Trach in place without surrounding swelling/erythema  Cardiovascular:      Rate and Rhythm: Normal rate. Rhythm irregularly irregular.      Pulses: Normal pulses.      Heart sounds: Normal heart sounds. No murmur.   Pulmonary:      Breath sounds: No wheezing.      Comments: Strong cough, coarse breath sounds throughout  Chest:      Chest wall: No tenderness.   Abdominal:      General: Bowel sounds are normal. There is no  distension.      Palpations: Abdomen is soft.      Tenderness: There is no abdominal tenderness. There is no guarding or rebound.   Musculoskeletal: Normal range of motion.      Right lower leg: No edema.      Left lower leg: No edema.   Lymphadenopathy:      Cervical: No cervical adenopathy.   Skin:     General: Skin is warm and dry.      Capillary Refill: Capillary refill takes less than 2 seconds.      Findings: No rash.   Neurological:      Mental Status: He is alert and oriented to person, place, and time.      Cranial Nerves: No cranial nerve deficit.      Sensory: No sensory deficit.      Motor: No weakness.      Comments: Back on vent with morning exam   Psychiatric:         Behavior: Behavior normal.         Thought Content: Thought content normal.      Comments: Low mood         Medications  Current Facility-Administered Medications   Medication Dose Route Frequency Provider Last Rate Last Dose   • MD Alert...ICU Electrolyte Replacement per Pharmacy   Other PHARMACY TO DOSE Nalini Aguirre M.D.       • midazolam (VERSED) injection 2 mg  2 mg Intravenous Q HOUR PRN Nalini Aguirre M.D.   2 mg at 10/17/20 2014   • Metoprolol Tartrate (LOPRESSOR) injection 5 mg  5 mg Intravenous Q5 MIN PRN Nalini Aguirre M.D.       • metoprolol (LOPRESSOR) tablet 25 mg  25 mg Enteral Tube TWICE DAILY Nalini Aguirre M.D.   25 mg at 10/19/20 0611   • senna-docusate (PERICOLACE or SENOKOT S) 8.6-50 MG per tablet 2 Tab  2 Tab Enteral Tube BID Dmitri Pascual M.D.   Stopped at 10/18/20 1800    And   • polyethylene glycol/lytes (MIRALAX) PACKET 1 Packet  1 Packet Enteral Tube QDAY PRN Dmitri Pascual M.D.        And   • magnesium hydroxide (MILK OF MAGNESIA) suspension 30 mL  30 mL Enteral Tube QDAY PRN Dmitri Pascual M.D.        And   • bisacodyl (DULCOLAX) suppository 10 mg  10 mg Rectal QDAY PRN Dmitri Pascual M.D.   10 mg at 10/17/20 2013   • FLUoxetine (PROZAC) capsule 20 mg  20 mg Enteral Tube DAILY  Dmitri Pascual M.D.   20 mg at 10/19/20 0611   • lidocaine (LIDODERM) 5 % 2 Patch  2 Patch Transdermal Q24HR Dmitri Pascual M.D.   2 Patch at 10/18/20 2008   • traZODone (DESYREL) tablet 50 mg  50 mg Enteral Tube HS PRN Dmitri Pascual M.D.   50 mg at 10/18/20 2008   • multivitamin (THERAGRAN) tablet 1 Tab  1 Tab Enteral Tube DAILY Dmitri Pascual M.D.   1 Tab at 10/19/20 0611   • acetaminophen (TYLENOL) tablet 650 mg  650 mg Enteral Tube Q6HRS PRN Dmitri Pascual M.D.   650 mg at 10/19/20 1305   • ondansetron (ZOFRAN ODT) dispertab 4 mg  4 mg Enteral Tube Q4HRS PRN Dmitri Pascual M.D.   4 mg at 10/14/20 1545   • Respiratory Therapy Consult   Nebulization Continuous RT Dmitri Pascual M.D.       • lidocaine (XYLOCAINE) 1 % injection 1-2 mL  1-2 mL Tracheal Tube Q30 MIN PRN Dmitri Pascual M.D.       • omeprazole (FIRST-OMEPRAZOLE) 2 mg/mL oral susp 40 mg  40 mg Enteral Tube DAILY Dmitri Pascual M.D.   40 mg at 10/19/20 0611   • levalbuterol (XOPENEX) 1.25 MG/3ML nebulizer solution 1.25 mg  1.25 mg Nebulization Q4H PRN (RT) Dmitri Pascual M.D.       • Pharmacy Consult: Enteral tube insertion - review meds/change route/product selection  1 Each Other PHARMACY TO DOSE Dmitri Pascual M.D.       • amiodarone (CORDARONE) tablet 200 mg  200 mg Enteral Tube Q DAY Dmitri Pascual M.D.   200 mg at 10/19/20 0611   • furosemide (LASIX) tablet 20 mg  20 mg Enteral Tube Q DAY Dmitri Pascual M.D.   20 mg at 10/19/20 0611   • DULoxetine (CYMBALTA) capsule 60 mg  60 mg Enteral Tube QHS Dmitri Pascual M.D.   60 mg at 10/18/20 2009   • topiramate (TOPAMAX) tablet 75 mg  75 mg Enteral Tube DAILY Dmitri Pascual M.D.   75 mg at 10/19/20 0611   • rivaroxaban (XARELTO) tablet 20 mg  20 mg Enteral Tube PM MEAL Dmitri Pascual M.D.   20 mg at 10/18/20 1718   • ondansetron (ZOFRAN) syringe/vial injection 4 mg  4 mg Intravenous Q4HRS PRN Dmitri Pascual M.D.   4 mg at 10/12/20 2203        Fluids    Intake/Output Summary (Last 24 hours) at 10/19/2020 1721  Last data filed at 10/19/2020 1417  Gross per 24 hour   Intake 630 ml   Output 360 ml   Net 270 ml       Laboratory          Recent Labs     10/17/20  1630 10/18/20  0207 10/19/20  0432   SODIUM 134* 137 136   POTASSIUM 4.2 3.4* 4.3   CHLORIDE 92* 98 97   CO2 32 30 33   BUN 16 15 20   CREATININE 0.42* 0.34* 0.36*   MAGNESIUM 2.0 1.7 2.1   PHOSPHORUS  --   --  2.6   CALCIUM 9.1 8.5 8.5     Recent Labs     10/17/20  1630 10/18/20  0207 10/19/20  0432 10/19/20  1153   ALTSGPT 33  --  25  --    ASTSGOT 26  --  10*  --    ALKPHOSPHAT 171*  --  144*  --    TBILIRUBIN 0.5  --  0.3  --    PREALBUMIN  --   --   --  5.1*   GLUCOSE 138* 84 115*  --      Recent Labs     10/17/20  1630 10/18/20  0202 10/19/20  0432   WBC 30.3* 19.2* 10.9*   NEUTSPOLYS 89.00* 91.50* 87.10*   LYMPHOCYTES 3.50* 2.90* 5.10*   MONOCYTES 5.60 4.80 7.20   EOSINOPHILS 0.00 0.00 0.00   BASOPHILS 0.30 0.10 0.10   ASTSGOT 26  --  10*   ALTSGPT 33  --  25   ALKPHOSPHAT 171*  --  144*   TBILIRUBIN 0.5  --  0.3     Recent Labs     10/17/20  1630 10/18/20  0202 10/19/20  0432   RBC 4.16* 3.34* 3.60*   HEMOGLOBIN 10.5* 8.4* 8.9*   HEMATOCRIT 38.0* 28.9* 31.4*   PLATELETCT 394 235 286   PROTHROMBTM 19.5*  --   --    INR 1.60*  --   --        Imaging  X-Ray:  I have personally reviewed the images and compared with prior images.  EKG:  I have personally reviewed the images and compared with prior images.  CT:    Reviewed  Echo:   Reviewed    Assessment/Plan  * Acute respiratory failure with hypoxia and hypercapnia (HCC)- (present on admission)  Assessment & Plan  Intubated 9/9-9/12, 9/17-9/19, 9/20-9/23 - now on 4th course on vent this admission alone  Trach 9/23  Ongoing T-piece trials, now off the vent > 48 hours  Patient remains deconditioned, continue therapies     having difficulty with placement  On facility with PEG or they will accept in transfer, I requested contact with  their possible accepting physician to review case-pending    Continue ambulating, eating, using speaking valve  Cont T-piece trials, off vent > 48hours  Patient is already failed transfer to the floor with tracheostomy along with T-piece/HMTC, but getting stronger    Continue aggressive therapies of SLP/PT/OT  Passed SLP-->ongoing PSMV trials with speech and improved appetite, hoping to avoid PEG tube placement    Off vent two days, minimal needs for suctioning  May be good candidate to be managed on floor soon given lack of nursing/RT frequent needs    PEA (Pulseless electrical activity) ([facility])  Assessment & Plan  Brief CPR-->?vasovagal while attempting to urinate  Pt remains a full code  Weaned off vent quickly    No further issues in past 48 hours  Unclear etiology of ? One minute PEA two days ago      COPD (chronic obstructive pulmonary disease) ([facility])  Assessment & Plan  No in exacerbation, monitoring closely  Appears to be end-stage  Patient has of hypoxemia and hypercarbia along with mild to moderate pulmonary pretension  Continue bronchodilators every 4-6 hours  RT protocols  With A. fib using Xopenex over albuterol  Pulmonary toilet/mucomyst    Pneumonia- (present on admission)  Assessment & Plan  Currently without concerning signs or symptoms of new HCAP, aggressive prevention ongoing  Monitor for recurrence, patient at high risk for nosocomial infection  Update vaccines per protocol  MSSA in sputum on 9/10  Usual ashwin in sputum culture on 9/17  S/P 3 days of Zosyn followed by 3 days of Unasyn which completed on 9/15  Second Unasyn course from 9/18-9/23  **Remains off antibiotics, remains afebrile without new S/S of infection.    Paroxysmal atrial fibrillation (HCC)- (present on admission)  Assessment & Plan  Remains in chronic A. fib with controlled rate, patient asymptomatic.  Continue amiodarone 200 mg daily, cont metoprolol 25mg BID  Echo with normal LV systolic function and normal size of LA however  significant RV abnormalities and PHTN is noted  Continue anticoagulation with rivaroxaban, monitor for bleeding  Optimize electrolytes, acid-base balance and oxygenation  Ongoing cardiac monitoring    Malnutrition (HCC)  Assessment & Plan  Continue nutritional support, currently at goal  Dietary following  Cont enteral nutrition  Passed SLP, cont pureed diet with enteral nutrition (at night now)  Optimize electrolytes  Supplements as needed      Normocytic anemia- (present on admission)  Assessment & Plan  Serial CBC, hemoglobin unchanged or slight better  Transfuse per protocols  Not bleeding clinically but patient is at risk of being on chronic anticoagulation and chronically on the vent.    Dysphagia- (present on admission)  Assessment & Plan  Continue speech therapy  Passed swallow eval  Encouraged meals with SLP  [place] valve trials-->wearing during most of the day    Pulmonary hypertension (HCC)- (present on admission)  Assessment & Plan  RVSP 42  Treat underlying process, ongoing  Maintain normal oxygenation, suspect chronic hypoxemia primary etiology  Diuresis as needed  Serial echo as clinically appropriate.    Major depression- (present on admission)  Assessment & Plan  Continue Cymbalta  Continue to provide emotional support  At bedtime trazodone for sleep  Qritiqr trips as clinically safe  Xanax at 0.25mg BID as needed for anxiety  Requesting case management nursing staff to facilitate prep resume meeting with friends or family to help with spirits  Trips to the [employer]    Right ventricular dilation- (present on admission)  Assessment & Plan  Clinically not in severe right heart failure  Echo confirms severely dilated RV with mild RV systolic dysfunction  Presumably related to chronic hypoxemia and secondary pulmonary pretension  RVSP 42 mmHg  Forced diuresis as needed, judicious fluid management         VTE:  NOAC  Ulcer: PPI  Lines: Orozco Catheter  Ongoing indication addressed    I  have performed a physical exam and reviewed and updated ROS and Plan today (10/19/2020). In review of yesterday's note (10/18/2020), there are no changes except as documented above.     Discussed patient condition and risk of morbidity and/or mortality with RN, RT, Pharmacy, Charge nurse / hot rounds and Patient     After PEA/brief CPR event last night, but patient appears to be back to where he was.  Weaned quickly off the vent and back to T-piece and PSMV.  Eating and ambulating. Will monitor in ICU.

## 2020-10-20 NOTE — CARE PLAN
Tracheostomy Weaning/Decannulation Update    Cough: Productive (10/20/20 0246)  Sputum Amount: Small (10/20/20 0246)  Sputum Color: White (10/20/20 0246)  Sputum Consistency: Thick (10/20/20 0246)    FiO2%: 40 % (10/20/20 0246)  O2 (LPM): 10 (10/20/20 0246)

## 2020-10-20 NOTE — DIETARY
Nutrition support weekly update:  Day 41 of admit.  Jas Vann is a 54 y.o. male with admitting DX of Acute respiratory failure       Current TF orders: Fibersource HN; goal rate 70 ml/hr x10 hours (5643-6952) to provide 840 kcal, 38 grams protein, and 567 ml free water per day (50% of estimated needs).     Modified PO diet initiated per SLP 10/12. Sips, <25% most meals, 25-50% x1 meal (10/18). 0% today per RN.    Assessment:  Current Weight 57.2 kg, needs based on lowest measured weight 47.1 kg (10/13)     Estimated nutritional needs:  REE per MSJ x1.2 = 1528 kcal/day  35-40 kcal/day for weight maintenance/gain = 8918-5157 kcal/day  1.5+ grams protein/kg = 71+ grams protein/day    Evaluation:  1. Weaned off ventilator 10/18, using speaking valve, PO diet, NOC TF to supplement nutrition.   2. Variable weights since admit (47 kg to 78.8 kg): likely fluid related d/t Lasix. Some weight loss also expected due to loss of lean body mass with lengthy hospital stay.  3. Labs: Pre-Albumin 5.1L, CRP 8.09H (Inflammatory process)  4. Pt needs to eat 50-75% of meals and supplements to meet nutritional needs without TF.  5. +BM (10/19) large, loose   6. Current feeding meeting ~50% of estimated nutrition needs, PO intake does not appear to be contributing significant nutrition x7 days. RD to increase TF to meet ~85% of estimated nutrition needs.    Malnutrition risk (10/13): Severe malnutrition as evidenced by severe fat loss, severe muscle wasting, and severe weight loss.     Recommendations/Plan:  1. Pureed diet per SLP, Boost Plus or magic cup with meals  2. Encourage consistent PO intake of 50-75% meals and supplements.  3. Increase Fibersource HN to 100 ml/hr x12 hours overnight (6528-1874) to provide 1440 kcal, 65 grams protein, and 972 ml free water per day.  4. Consider appetite stimulant as appropriate    RD following

## 2020-10-20 NOTE — PROGRESS NOTES
Critical Care Progress Note    Date of admission  9/9/2020    Chief Complaint  54 y.o. male with H/o Afib admitted 9/9/2020 with respiratory failure, sepsis and pneumonia - intubated in the ER.    Hospital Course   9/17 -    developed worsening hypercarbic and hypoxemic respiratory failure requiring emergent intubation.  Full vent support.  9/18 -    continue vent support.  Titrating phenylephrine.  Replete electrolytes.  SAT/SBT as tolerated.  9/19 -    SAT/SBT as tolerated.  Continue vent support.  Titrating phenylephrine.  Replete potassium.  Continue Unasyn.  9/20 -    liberated from the ventilator yesterday and did very well until last night when he required reintubation.  Continue vent support.  Continue Unasyn and complete 5 days of therapy.  10/3 -    continue vent support.  T-piece trials during the day as tolerated.  10/4 -    continue T-piece trials during the day as tolerated.  Nocturnal ventilatory support.  10/5 - nocturnal vent support and T-piece trials as they as tolerated, may need LTAC/home vent after that-trilogy?  10/6 - HS ASV vent, days Tpiece w/PSMV at times, still fatigued at end of day, CM working on dispo  10/7 - HS ASV ventilation, T-piece days with PSMV at times, case management working on dyspnea with LTAC referral to facility that accepts [facility] insurance in [place], trilogy ventilation nocturnally still being considered  Ongoing therapies  10/8 - HS ASV ventilation, Tpiece and therapies on day, prob depressed, LTA[facility] referal ongoing  10/9 - HS ASV ventilation, Tpiece days w/PSMV as tolerated, therapies  10/10 - HS ASV, Tpiece days, CAF, PSMV, want to go home  10/11 -  HS % x 8 hrs, T piece days w/PSMV, CAF   10/12 - % last night, T-piece, barium swallow and passed, doing PSMV trials, a-fib with RVR-->dose of amio  10/13 - % overnight, passed swallow eval, cont to work with speaking valve, a-fib  10/14 - %/8 overnight, a-fib rate controlled, improving  eating, using speaking valve, T-piece at 10 lpm at 40%   10/15 - pt remained on T-piece overnight at 8 lpm at 40%, eating more meals, went to the [employer] garden yesterday  10/16 - T-piece at 6-15 lpm at 40% for past 48 hours, TFs nocturnally now, meals during the day, PT/OT  10/17 - vent discontinued, T-piece at 10 lpm at 40%, ambulating more on own-->later in the day: PEA arrest with short CPR with ROSC achieved, placed back on the vent, hydroceles noted to scrotum  10/18 - weaned off ventilator, back to using speaking valve and eating this morning     10/19-  Remains off vent for two days  10/20- off vent on t-piece three days    Interval Problem Update  Reviewed last 24 hour events:  Desaturation episode this am while on speaking valve- saturations to 50s, came up in several minutes with suctioning  Off vent three days  Minimal suctioning requested  Stable vitals  Working with OT/PT/ Speech      Prior  Admitted 9/9  PEA for one minute two days ago  Trach 9/23  Afib 90s  BP OK, afebrile  Tube feeds night, diet during day  Mobilizes to chair, stands and walks in room  Suctioned q 4 hours  Offvent 24 hours  Possible transfer to [facility] this Wednesday  PRN Zanax stopped      Prior   - pt with PEA code yesterday afternoon   - went back on the vent   - a/ox4   - a-fib 90-120s   - SBP 100s   - afebrile   - up to chair   - multiple BMs   - 40cc UOP with morton    -CXR(reviewed): poor film, appears to have emphysema changes   - xarelto   - home ppi   - Mg 1.7    - K 3.4    **weaned off vent quickly this morning and back to T-piece and PSMV trials      Yesterday's Events:   - no events overnight   - a/ox4   - pt reports feeling stronger   - a-fib 90-110s   - -110s   - Tmax 97.5   - tolerating TFs at night   - tolerating PSMV trials and pureed diet   - BM 2 days ago   - adequate UOP with urinal   - T-piece at 10 lpm at 40%   - ambulating    Review of Systems  Review of Systems   Constitutional: Positive for  malaise/fatigue. Negative for chills.   HENT: Negative for congestion and sore throat.    Eyes: Negative for blurred vision and double vision.   Respiratory: Positive for cough, sputum production and shortness of breath.    Cardiovascular: Negative for chest pain and leg swelling.   Gastrointestinal: Negative for abdominal pain, diarrhea, nausea and vomiting.   Genitourinary: Negative for frequency.   Musculoskeletal: Negative for back pain and neck pain.   Skin: Negative for rash.   Neurological: Positive for weakness. Negative for headaches.   Endo/Heme/Allergies: Bruises/bleeds easily.   Psychiatric/Behavioral: Negative for depression. The patient is nervous/anxious.         Vital Signs for last 24 hours   Temp:  [35.9 °C (96.6 °F)-36.9 °C (98.4 °F)] 36.9 °C (98.4 °F)  Pulse:  [] 71  Resp:  [1-47] 20  BP: (102-132)/(65-86) 119/76  SpO2:  [83 %-98 %] 96 %    Hemodynamic parameters for last 24 hours       Respiratory Information for the last 24 hours       Physical Exam   Physical Exam  Vitals signs and nursing note reviewed.   Constitutional:       General: He is not in acute distress.     Appearance: He is ill-appearing. He is not toxic-appearing.      Comments: Pt appears chronically ill and older than stated age   HENT:      Head: Normocephalic and atraumatic.      Right Ear: External ear normal.      Left Ear: External ear normal.      Nose: Nose normal. No rhinorrhea.      Mouth/Throat:      Mouth: Mucous membranes are moist.      Pharynx: Oropharynx is clear. No oropharyngeal exudate.   Eyes:      General: No scleral icterus.     Extraocular Movements: Extraocular movements intact.      Conjunctiva/sclera: Conjunctivae normal.      Pupils: Pupils are equal, round, and reactive to light.   Neck:      Musculoskeletal: Normal range of motion and neck supple.      Comments: Trach in place without surrounding swelling/erythema  Cardiovascular:      Rate and Rhythm: Normal rate. Rhythm irregularly irregular.       Pulses: Normal pulses.      Heart sounds: Normal heart sounds. No murmur.   Pulmonary:      Breath sounds: No wheezing.      Comments: Strong cough, coarse breath sounds throughout  Chest:      Chest wall: No tenderness.   Abdominal:      General: Bowel sounds are normal. There is no distension.      Palpations: Abdomen is soft.      Tenderness: There is no abdominal tenderness. There is no guarding or rebound.   Musculoskeletal: Normal range of motion.      Right lower leg: No edema.      Left lower leg: No edema.   Lymphadenopathy:      Cervical: No cervical adenopathy.   Skin:     General: Skin is warm and dry.      Capillary Refill: Capillary refill takes less than 2 seconds.      Findings: No rash.   Neurological:      Mental Status: He is alert and oriented to person, place, and time.      Cranial Nerves: No cranial nerve deficit.      Sensory: No sensory deficit.      Motor: No weakness.      Comments: Back on vent with morning exam   Psychiatric:         Behavior: Behavior normal.         Thought Content: Thought content normal.      Comments: Low mood         Medications  Current Facility-Administered Medications   Medication Dose Route Frequency Provider Last Rate Last Dose   • MD Alert...ICU Electrolyte Replacement per Pharmacy   Other PHARMACY TO DOSE Nalini Aguirre M.D.       • midazolam (VERSED) injection 2 mg  2 mg Intravenous Q HOUR PRN Nalini Aguirre M.D.   2 mg at 10/17/20 2014   • Metoprolol Tartrate (LOPRESSOR) injection 5 mg  5 mg Intravenous Q5 MIN PRN Nalini Aguirre M.D.       • metoprolol (LOPRESSOR) tablet 25 mg  25 mg Enteral Tube TWICE DAILY Nalini Aguirre M.D.   25 mg at 10/20/20 0459   • senna-docusate (PERICOLACE or SENOKOT S) 8.6-50 MG per tablet 2 Tab  2 Tab Enteral Tube BID Dmitri Pascual M.D.   2 Tab at 10/20/20 0458    And   • polyethylene glycol/lytes (MIRALAX) PACKET 1 Packet  1 Packet Enteral Tube QDAY PRN Dmitri Pascual M.D.        And   • magnesium  hydroxide (MILK OF MAGNESIA) suspension 30 mL  30 mL Enteral Tube QDAY PRN Dmitri Pascual M.D.        And   • bisacodyl (DULCOLAX) suppository 10 mg  10 mg Rectal QDAY PRN Dmitri Pascual M.D.   10 mg at 10/17/20 2013   • FLUoxetine (PROZAC) capsule 20 mg  20 mg Enteral Tube DAILY Dmitri Pascual M.D.   20 mg at 10/20/20 0458   • lidocaine (LIDODERM) 5 % 2 Patch  2 Patch Transdermal Q24HR Dmitri Pascual M.D.   2 Patch at 10/19/20 1941   • traZODone (DESYREL) tablet 50 mg  50 mg Enteral Tube HS PRN Dmitri Pascual M.D.   50 mg at 10/19/20 1941   • multivitamin (THERAGRAN) tablet 1 Tab  1 Tab Enteral Tube DAILY Dmitri Pascual M.D.   1 Tab at 10/20/20 0459   • acetaminophen (TYLENOL) tablet 650 mg  650 mg Enteral Tube Q6HRS PRN Dmitri Pascual M.D.   650 mg at 10/19/20 1921   • ondansetron (ZOFRAN ODT) dispertab 4 mg  4 mg Enteral Tube Q4HRS PRN Dmitri Pascual M.D.   4 mg at 10/14/20 1545   • Respiratory Therapy Consult   Nebulization Continuous RT Dmitri Pascual M.D.       • lidocaine (XYLOCAINE) 1 % injection 1-2 mL  1-2 mL Tracheal Tube Q30 MIN PRN Dmitri Pascual M.D.       • omeprazole (FIRST-OMEPRAZOLE) 2 mg/mL oral susp 40 mg  40 mg Enteral Tube DAILY Dmitri Pascual M.D.   40 mg at 10/20/20 0459   • levalbuterol (XOPENEX) 1.25 MG/3ML nebulizer solution 1.25 mg  1.25 mg Nebulization Q4H PRN (RT) Dmitri Pascual M.D.       • Pharmacy Consult: Enteral tube insertion - review meds/change route/product selection  1 Each Other PHARMACY TO DOSE Dmitri Pascual M.D.       • amiodarone (CORDARONE) tablet 200 mg  200 mg Enteral Tube Q DAY Dmitri Pascual M.D.   200 mg at 10/20/20 0458   • furosemide (LASIX) tablet 20 mg  20 mg Enteral Tube Q DAY Dmitri Pascual M.D.   20 mg at 10/20/20 0459   • DULoxetine (CYMBALTA) capsule 60 mg  60 mg Enteral Tube QHS Dmitri Pascual M.D.   60 mg at 10/19/20 1940   • topiramate (TOPAMAX) tablet 75 mg  75 mg Enteral Tube DAILY Dmitri  LADY Pascual M.D.   75 mg at 10/20/20 0459   • rivaroxaban (XARELTO) tablet 20 mg  20 mg Enteral Tube PM MEAL Dmitri Pascual M.D.   20 mg at 10/19/20 1741   • ondansetron (ZOFRAN) syringe/vial injection 4 mg  4 mg Intravenous Q4HRS PRN Dmitri Pascual M.D.   4 mg at 10/12/20 2201       Fluids    Intake/Output Summary (Last 24 hours) at 10/20/2020 0816  Last data filed at 10/20/2020 0601  Gross per 24 hour   Intake 1030 ml   Output 665 ml   Net 365 ml       Laboratory          Recent Labs     10/17/20  1630 10/18/20  0207 10/19/20  0432   SODIUM 134* 137 136   POTASSIUM 4.2 3.4* 4.3   CHLORIDE 92* 98 97   CO2 32 30 33   BUN 16 15 20   CREATININE 0.42* 0.34* 0.36*   MAGNESIUM 2.0 1.7 2.1   PHOSPHORUS  --   --  2.6   CALCIUM 9.1 8.5 8.5     Recent Labs     10/17/20  1630 10/18/20  0207 10/19/20  0432 10/19/20  1153   ALTSGPT 33  --  25  --    ASTSGOT 26  --  10*  --    ALKPHOSPHAT 171*  --  144*  --    TBILIRUBIN 0.5  --  0.3  --    PREALBUMIN  --   --   --  5.1*   GLUCOSE 138* 84 115*  --      Recent Labs     10/17/20  1630 10/18/20  0202 10/19/20  0432   WBC 30.3* 19.2* 10.9*   NEUTSPOLYS 89.00* 91.50* 87.10*   LYMPHOCYTES 3.50* 2.90* 5.10*   MONOCYTES 5.60 4.80 7.20   EOSINOPHILS 0.00 0.00 0.00   BASOPHILS 0.30 0.10 0.10   ASTSGOT 26  --  10*   ALTSGPT 33  --  25   ALKPHOSPHAT 171*  --  144*   TBILIRUBIN 0.5  --  0.3     Recent Labs     10/17/20  1630 10/18/20  0202 10/19/20  0432   RBC 4.16* 3.34* 3.60*   HEMOGLOBIN 10.5* 8.4* 8.9*   HEMATOCRIT 38.0* 28.9* 31.4*   PLATELETCT 394 235 286   PROTHROMBTM 19.5*  --   --    INR 1.60*  --   --        Imaging  X-Ray:  I have personally reviewed the images and compared with prior images.  EKG:  I have personally reviewed the images and compared with prior images.  CT:    Reviewed  Echo:   Reviewed    Assessment/Plan  * Acute respiratory failure with hypoxia and hypercapnia (HCC)- (present on admission)  Assessment & Plan  Intubated 9/9-9/12, 9/17-9/19, 9/20-9/23 - now  on 4th course on vent this admission alone  Trach 9/23  Ongoing T-piece trials, now off the vent > 48 hours  Patient remains deconditioned, continue therapies     having difficulty with placement  On facility with PEG or they will accept in transfer, I requested contact with their possible accepting physician to review case-pending    Continue ambulating, eating, using speaking valve  Cont T-piece trials, off vent > 48hours  Patient is already failed transfer to the floor with tracheostomy along with T-piece/HMTC, but getting stronger    Continue aggressive therapies of SLP/PT/OT  Passed SLP-->ongoing PSMV trials with speech and improved appetite, hoping to avoid PEG tube placement    Off vent two days, minimal needs for suctioning  May be good candidate to be managed on floor soon given lack of nursing/RT frequent needs    PEA (Pulseless electrical activity) ([facility])  Assessment & Plan  Brief CPR-->?vasovagal while attempting to urinate  Pt remains a full code  Weaned off vent quickly    No further issues in past 48 hours  Unclear etiology of ? One minute PEA two days ago      COPD (chronic obstructive pulmonary disease) ([facility])  Assessment & Plan  No in exacerbation, monitoring closely  Appears to be end-stage  Patient has of hypoxemia and hypercarbia along with mild to moderate pulmonary pretension  Continue bronchodilators every 4-6 hours  RT protocols  With A. fib using Xopenex over albuterol  Pulmonary toilet/mucomyst    Pneumonia- (present on admission)  Assessment & Plan  Currently without concerning signs or symptoms of new HCAP, aggressive prevention ongoing  Monitor for recurrence, patient at high risk for nosocomial infection  Update vaccines per protocol  MSSA in sputum on 9/10  Usual ashwin in sputum culture on 9/17  S/P 3 days of Zosyn followed by 3 days of Unasyn which completed on 9/15  Second Unasyn course from 9/18-9/23  **Remains off antibiotics, remains afebrile without new S/S of  infection.    Paroxysmal atrial fibrillation (HCC)- (present on admission)  Assessment & Plan  Remains in chronic A. fib with controlled rate, patient asymptomatic.  Continue amiodarone 200 mg daily, cont metoprolol 25mg BID  Echo with normal LV systolic function and normal size of LA however significant RV abnormalities and PHTN is noted  Continue anticoagulation with rivaroxaban, monitor for bleeding  Optimize electrolytes, acid-base balance and oxygenation  Ongoing cardiac monitoring    Malnutrition (HCC)  Assessment & Plan  Continue nutritional support, currently at goal  Dietary following  Cont enteral nutrition  Passed SLP, cont pureed diet with enteral nutrition (at night now)  Optimize electrolytes  Supplements as needed      Normocytic anemia- (present on admission)  Assessment & Plan  Serial CBC, hemoglobin unchanged or slight better  Transfuse per protocols  Not bleeding clinically but patient is at risk of being on chronic anticoagulation and chronically on the vent.    Dysphagia- (present on admission)  Assessment & Plan  Continue speech therapy  Passed swallow eval  Encouraged meals with SLP  Passy-Nicky valve trials-->wearing during most of the day    Pulmonary hypertension (HCC)- (present on admission)  Assessment & Plan  RVSP 42  Treat underlying process, ongoing  Maintain normal oxygenation, suspect chronic hypoxemia primary etiology  Diuresis as needed  Serial echo as clinically appropriate.    Major depression- (present on admission)  Assessment & Plan  Continue Cymbalta  Continue to provide emotional support  At bedtime trazodone for sleep  ZAO [employer] trips as clinically safe  Xanax at 0.25mg BID as needed for anxiety  Requesting case management nursing staff to facilitate prep resume meeting with friends or family to help with spirits  Trips to the [employer]    Right ventricular dilation- (present on admission)  Assessment & Plan  Clinically not in severe right heart failure  Echo  confirms severely dilated RV with mild RV systolic dysfunction  Presumably related to chronic hypoxemia and secondary pulmonary pretension  RVSP 42 mmHg  Forced diuresis as needed, judicious fluid management         VTE:  NOAC  Ulcer: PPI  Lines: Orozco Catheter  Ongoing indication addressed    I have performed a physical exam and reviewed and updated ROS and Plan today (10/20/2020). In review of yesterday's note (10/19/2020), there are no changes except as documented above.     Discussed patient condition and risk of morbidity and/or mortality with RN, RT, Pharmacy, Charge nurse / hot rounds and Patient     After PEA/brief CPR event last night, but patient appears to be back to where he was.  Weaned quickly off the vent and back to T-piece and PSMV.  Eating and ambulating. Will monitor in ICU.

## 2020-10-20 NOTE — THERAPY
Missed Therapy     Patient Name: Jas Vann  Age:  54 y.o., Sex:  male  Medical Record #: [id number]  Today's Date: 10/20/2020    Discussed missed therapy with RN Bernabe.    Attempted to see pt for speaking valve/dysphagia tx today. Per RN, pt had the speaking valve on this morning and desatted to 50's but improved w/ suctioning. He has been resting in bed ever since. Upon arrival to room, pt was sleeping and not able to maintain arousal to participate. SLP will reattempt as able/appropriate. Per discussion w/ RN, place speaking valve only for meals w/ direct supervision.

## 2020-10-21 NOTE — PROGRESS NOTES
Critical Care Progress Note    Date of admission  9/9/2020    Chief Complaint  54 y.o. male with H/o Afib admitted 9/9/2020 with respiratory failure, sepsis and pneumonia - intubated in the ER.    Hospital Course   9/17 -    developed worsening hypercarbic and hypoxemic respiratory failure requiring emergent intubation.  Full vent support.  9/18 -    continue vent support.  Titrating phenylephrine.  Replete electrolytes.  SAT/SBT as tolerated.  9/19 -    SAT/SBT as tolerated.  Continue vent support.  Titrating phenylephrine.  Replete potassium.  Continue Unasyn.  9/20 -    liberated from the ventilator yesterday and did very well until last night when he required reintubation.  Continue vent support.  Continue Unasyn and complete 5 days of therapy.  10/3 -    continue vent support.  T-piece trials during the day as tolerated.  10/4 -    continue T-piece trials during the day as tolerated.  Nocturnal ventilatory support.  10/5 - nocturnal vent support and T-piece trials as they as tolerated, may need LTAC/home vent after that-trilogy?  10/6 - HS ASV vent, days Tpiece w/PSMV at times, still fatigued at end of day, CM working on dispo  10/7 - HS ASV ventilation, T-piece days with PSMV at times, case management working on dyspnea with LTAC referral to facility that accepts [facility] insurance in [place], trilogy ventilation nocturnally still being considered  Ongoing therapies  10/8 - HS ASV ventilation, Tpiece and therapies on day, prob depressed, LTA[facility] referal ongoing  10/9 - HS ASV ventilation, Tpiece days w/PSMV as tolerated, therapies  10/10 - HS ASV, Tpiece days, CAF, PSMV, want to go home  10/11 -  HS % x 8 hrs, T piece days w/PSMV, CAF   10/12 - % last night, T-piece, barium swallow and passed, doing PSMV trials, a-fib with RVR-->dose of amio  10/13 - % overnight, passed swallow eval, cont to work with speaking valve, a-fib  10/14 - %/8 overnight, a-fib rate controlled, improving  eating, using speaking valve, T-piece at 10 lpm at 40%   10/15 - pt remained on T-piece overnight at 8 lpm at 40%, eating more meals, went to the [employer] yesterday  10/16 - T-piece at 6-15 lpm at 40% for past 48 hours, TFs nocturnally now, meals during the day, PT/OT  10/17 - vent discontinued, T-piece at 10 lpm at 40%, ambulating more on own-->later in the day: PEA arrest with short CPR with ROSC achieved, placed back on the vent, hydroceles noted to scrotum  10/18 - weaned off ventilator, back to using speaking valve and eating this morning     10/19-  Remains off vent for two days  10/20- off vent on t-piece three days  10/21- transient lost of pulse, CPR, bagged and back to baseline    Interval Problem Update  Reviewed last 24 hour events:  Desaturation episode this am while on speaking valve- saturations to 50s, came up in several minutes with suctioning  Off vent three days  Minimal suctioning requested  Stable vitals  Working with OT/PT/ Speech  Not weaning in the foreseeable future- vent 24/7  Needs placement in LTAC or equivalent when bed available (does not need acute care hospital for his level of care but does need facility that can manage ventilators  Pt asking about hospice options/ end-of-life care today:       Prior  Admitted 9/9  PEA for one minute two days ago  Trach 9/23  Afib 90s  BP OK, afebrile  Tube feeds night, diet during day  Mobilizes to chair, stands and walks in room  Suctioned q 4 hours  Offvent 24 hours  Possible transfer to [facility] this Wednesday  PRN Zanax stopped      Prior   - pt with PEA code yesterday afternoon   - went back on the vent   - a/ox4   - a-fib 90-120s   - SBP 100s   - afebrile   - up to chair   - multiple BMs   - 40cc UOP with morton    -CXR(reviewed): poor film, appears to have emphysema changes   - xarelto   - home ppi   - Mg 1.7    - K 3.4    **weaned off vent quickly this morning and back to T-piece and PSMV trials      Yesterday's Events:   - no events  overnight   - a/ox4   - pt reports feeling stronger   - a-fib 90-110s   - -110s   - Tmax 97.5   - tolerating TFs at night   - tolerating PSMV trials and pureed diet   - BM 2 days ago   - adequate UOP with urinal   - T-piece at 10 lpm at 40%   - ambulating    Review of Systems  Review of Systems   Constitutional: Positive for malaise/fatigue. Negative for chills.   HENT: Negative for congestion and sore throat.    Eyes: Negative for blurred vision and double vision.   Respiratory: Positive for cough, sputum production and shortness of breath.    Cardiovascular: Negative for chest pain and leg swelling.   Gastrointestinal: Negative for abdominal pain, diarrhea, nausea and vomiting.   Genitourinary: Negative for frequency.   Musculoskeletal: Negative for back pain and neck pain.   Skin: Negative for rash.   Neurological: Positive for weakness. Negative for headaches.   Endo/Heme/Allergies: Bruises/bleeds easily.   Psychiatric/Behavioral: Negative for depression. The patient is nervous/anxious.         Vital Signs for last 24 hours   Temp:  [35.8 °C (96.4 °F)-36.1 °C (97 °F)] 35.8 °C (96.5 °F)  Pulse:  [] 123  Resp:  [10-57] 26  BP: ()/(49-85) 150/85  SpO2:  [87 %-100 %] 93 %    Hemodynamic parameters for last 24 hours       Respiratory Information for the last 24 hours  Vent Mode: APVCMV  Rate (breaths/min): 12  Vt Target (mL): 390  PEEP/CPAP: 8  MAP: 22  Control VTE (exp VT): 368    Physical Exam   Physical Exam  Vitals signs and nursing note reviewed.   Constitutional:       General: He is not in acute distress.     Appearance: He is ill-appearing. He is not toxic-appearing.      Comments: Pt appears chronically ill and older than stated age   HENT:      Head: Normocephalic and atraumatic.      Right Ear: External ear normal.      Left Ear: External ear normal.      Nose: Nose normal. No rhinorrhea.      Mouth/Throat:      Mouth: Mucous membranes are moist.      Pharynx: Oropharynx is clear. No  oropharyngeal exudate.   Eyes:      General: No scleral icterus.     Extraocular Movements: Extraocular movements intact.      Conjunctiva/sclera: Conjunctivae normal.      Pupils: Pupils are equal, round, and reactive to light.   Neck:      Musculoskeletal: Normal range of motion and neck supple.      Comments: Trach in place without surrounding swelling/erythema  Cardiovascular:      Rate and Rhythm: Normal rate. Rhythm irregularly irregular.      Pulses: Normal pulses.      Heart sounds: Normal heart sounds. No murmur.   Pulmonary:      Breath sounds: No wheezing.      Comments: Strong cough, coarse breath sounds throughout  Chest:      Chest wall: No tenderness.   Abdominal:      General: Bowel sounds are normal. There is no distension.      Palpations: Abdomen is soft.      Tenderness: There is no abdominal tenderness. There is no guarding or rebound.   Musculoskeletal: Normal range of motion.      Right lower leg: No edema.      Left lower leg: No edema.   Lymphadenopathy:      Cervical: No cervical adenopathy.   Skin:     General: Skin is warm and dry.      Capillary Refill: Capillary refill takes less than 2 seconds.      Findings: No rash.   Neurological:      Mental Status: He is alert and oriented to person, place, and time.      Cranial Nerves: No cranial nerve deficit.      Sensory: No sensory deficit.      Motor: No weakness.      Comments: Back on vent with morning exam   Psychiatric:         Behavior: Behavior normal.         Thought Content: Thought content normal.      Comments: Low mood         Medications  Current Facility-Administered Medications   Medication Dose Route Frequency Provider Last Rate Last Dose   • ipratropium-albuterol (DUONEB) nebulizer solution  3 mL Nebulization Q2HRS PRN (RT) Dmitri Pascual M.D.       • ipratropium-albuterol (DUONEB) nebulizer solution  3 mL Nebulization Q6HRS (RT) Dmitri Pascual M.D.   Stopped at 10/21/20 1400   • MD Alert...ICU Electrolyte  Replacement per Pharmacy   Other PHARMACY TO DOSE Nalini Aguirre M.D.       • midazolam (VERSED) injection 2 mg  2 mg Intravenous Q HOUR PRN Nalini Aguirre M.D.   2 mg at 10/17/20 2014   • Metoprolol Tartrate (LOPRESSOR) injection 5 mg  5 mg Intravenous Q5 MIN PRN Nalini Aguirre M.D.       • metoprolol (LOPRESSOR) tablet 25 mg  25 mg Enteral Tube TWICE DAILY Nalini Aguirre M.D.   25 mg at 10/21/20 0429   • senna-docusate (PERICOLACE or SENOKOT S) 8.6-50 MG per tablet 2 Tab  2 Tab Enteral Tube BID Dmitri Pascual M.D.   2 Tab at 10/21/20 0429    And   • polyethylene glycol/lytes (MIRALAX) PACKET 1 Packet  1 Packet Enteral Tube QDAY PRN Dmitri Pascual M.D.        And   • magnesium hydroxide (MILK OF MAGNESIA) suspension 30 mL  30 mL Enteral Tube QDAY PRN Dmitri Pascual M.D.        And   • bisacodyl (DULCOLAX) suppository 10 mg  10 mg Rectal QDAY PRN Dmitri Pascual M.D.   10 mg at 10/17/20 2013   • FLUoxetine (PROZAC) capsule 20 mg  20 mg Enteral Tube DAILY Dmitri Pascual M.D.   20 mg at 10/21/20 0429   • lidocaine (LIDODERM) 5 % 2 Patch  2 Patch Transdermal Q24HR Dmitri Pascual M.D.   2 Patch at 10/20/20 2053   • traZODone (DESYREL) tablet 50 mg  50 mg Enteral Tube HS PRN Dmitri Pascual M.D.   50 mg at 10/20/20 2053   • multivitamin (THERAGRAN) tablet 1 Tab  1 Tab Enteral Tube DAILY Dmitri Pascual M.D.   1 Tab at 10/21/20 0429   • acetaminophen (TYLENOL) tablet 650 mg  650 mg Enteral Tube Q6HRS PRN Dmitri Pascual M.D.   650 mg at 10/21/20 1150   • ondansetron (ZOFRAN ODT) dispertab 4 mg  4 mg Enteral Tube Q4HRS PRN Dmitri Pascual M.D.   4 mg at 10/14/20 1545   • Respiratory Therapy Consult   Nebulization Continuous RT Dmitri Pascual M.D.       • lidocaine (XYLOCAINE) 1 % injection 1-2 mL  1-2 mL Tracheal Tube Q30 MIN PRN Dmitri Pascual M.D.       • omeprazole (FIRST-OMEPRAZOLE) 2 mg/mL oral susp 40 mg  40 mg Enteral Tube DAILY Dmitri Pascual M.D.   40 mg  at 10/21/20 0429   • Pharmacy Consult: Enteral tube insertion - review meds/change route/product selection  1 Each Other PHARMACY TO DOSE Dmitri Pascual M.D.       • amiodarone (CORDARONE) tablet 200 mg  200 mg Enteral Tube Q DAY Dmitri Pascual M.D.   200 mg at 10/21/20 0429   • furosemide (LASIX) tablet 20 mg  20 mg Enteral Tube Q DAY Dmitri Pascual M.D.   20 mg at 10/21/20 0429   • DULoxetine (CYMBALTA) capsule 60 mg  60 mg Enteral Tube QHS Dmitri Pascual M.D.   60 mg at 10/20/20 2052   • topiramate (TOPAMAX) tablet 75 mg  75 mg Enteral Tube DAILY Dmitri Pascual M.D.   75 mg at 10/21/20 0429   • rivaroxaban (XARELTO) tablet 20 mg  20 mg Enteral Tube PM MEAL Dmitri Pascual M.D.   20 mg at 10/20/20 1815   • ondansetron (ZOFRAN) syringe/vial injection 4 mg  4 mg Intravenous Q4HRS PRN Dmitri Pascual M.D.   4 mg at 10/12/20 2201       Fluids    Intake/Output Summary (Last 24 hours) at 10/21/2020 1617  Last data filed at 10/21/2020 1505  Gross per 24 hour   Intake 1510 ml   Output 455 ml   Net 1055 ml       Laboratory  Recent Labs     10/21/20  0315 10/21/20  0319 10/21/20  0727   RHZVA71H  --  7.21*  --    KRGKSS686C  --  99.9*  --    XLSHY246O  --  87.7*  --    XHYF3XQQ  --  94.2  --    ARTHCO3  --  39*  --    ARTBE  --  8*  --    ISTATAPH 7.177*  --  7.488   ISTATAPCO2 >100.0*  --  48.9*   ISTATAPO2 78  --  65   ISTATATCO2 see below  --  39*   PNMHXHZ3BJD see below  --  94   ISTATARTHCO3 see below  --  37.1*   ISTATARTBE see below  --  12*   ISTATTEMP 96.7 F  --  97.2 F   ISTATFIO2 50  --  30   ISTATSPEC Arterial  --  Arterial   ISTATAPHTC 7.191*  --  7.500   TYJXPOTE5OC 73  --  62*         Recent Labs     10/19/20  0432 10/21/20  0254   SODIUM 136 138   POTASSIUM 4.3 4.9   CHLORIDE 97 96   CO2 33 39*   BUN 20 20   CREATININE 0.36* 0.36*   MAGNESIUM 2.1  --    PHOSPHORUS 2.6  --    CALCIUM 8.5 9.1     Recent Labs     10/19/20  0432 10/19/20  1153 10/21/20  0254   ALTSGPT 25  --   --     ASTSGOT 10*  --   --    ALKPHOSPHAT 144*  --   --    TBILIRUBIN 0.3  --   --    PREALBUMIN  --  5.1*  --    GLUCOSE 115*  --  152*     Recent Labs     10/19/20  0432 10/21/20  0245   WBC 10.9* 18.3*   NEUTSPOLYS 87.10* 92.30*   LYMPHOCYTES 5.10* 1.80*   MONOCYTES 7.20 5.20   EOSINOPHILS 0.00 0.00   BASOPHILS 0.10 0.20   ASTSGOT 10*  --    ALTSGPT 25  --    ALKPHOSPHAT 144*  --    TBILIRUBIN 0.3  --      Recent Labs     10/19/20  0432 10/21/20  0245   RBC 3.60* 4.12*   HEMOGLOBIN 8.9* 10.3*   HEMATOCRIT 31.4* 38.8*   PLATELETCT 286 277       Imaging  X-Ray:  I have personally reviewed the images and compared with prior images.  EKG:  I have personally reviewed the images and compared with prior images.  CT:    Reviewed  Echo:   Reviewed    Assessment/Plan  * Acute respiratory failure with hypoxia and hypercapnia (HCC)- (present on admission)  Assessment & Plan  Intubated 9/9-9/12, 9/17-9/19, 9/20-9/23 - now on 4th course on vent this admission alone  Trach 9/23  Ongoing T-piece trials, now off the vent > 48 hours  Patient remains deconditioned, continue therapies     having difficulty with placement  On facility with PEG or they will accept in transfer, I requested contact with their possible accepting physician to review case-pending    Continue ambulating, eating, using speaking valve  Cont T-piece trials, off vent > 48hours  Patient is already failed transfer to the floor with tracheostomy along with T-piece/HMTC, but getting stronger    Continue aggressive therapies of SLP/PT/OT  Passed SLP-->ongoing PSMV trials with speech and improved appetite, hoping to avoid PEG tube placement    Off vent two days, minimal needs for suctioning  May be good candidate to be managed on floor soon given lack of nursing/RT frequent needs    Needs mechanical ventilator 24/7. Too fragile for weaning (hypoventilates- arrests)  Needs LTAC or similar  Pt asking about hospice/ end-of-life care  Palliative care  consulted    PEA (Pulseless electrical activity) ([facility])  Assessment & Plan  Brief CPR-->?vasovagal while attempting to urinate  Pt remains a full code  Weaned off vent quickly    No further issues in past 48 hours  Unclear etiology of ? One minute PEA two days ago      COPD (chronic obstructive pulmonary disease) ([facility])  Assessment & Plan  No in exacerbation, monitoring closely  Appears to be end-stage  Patient has of hypoxemia and hypercarbia along with mild to moderate pulmonary pretension  Continue bronchodilators every 4-6 hours  RT protocols  With A. fib using Xopenex over albuterol  Pulmonary toilet/mucomyst    Pneumonia- (present on admission)  Assessment & Plan  Currently without concerning signs or symptoms of new HCAP, aggressive prevention ongoing  Monitor for recurrence, patient at high risk for nosocomial infection  Update vaccines per protocol  MSSA in sputum on 9/10  Usual ashwin in sputum culture on 9/17  S/P 3 days of Zosyn followed by 3 days of Unasyn which completed on 9/15  Second Unasyn course from 9/18-9/23  **Remains off antibiotics, remains afebrile without new S/S of infection.    Paroxysmal atrial fibrillation (HCC)- (present on admission)  Assessment & Plan  Remains in chronic A. fib with controlled rate, patient asymptomatic.  Continue amiodarone 200 mg daily, cont metoprolol 25mg BID  Echo with normal LV systolic function and normal size of LA however significant RV abnormalities and PHTN is noted  Continue anticoagulation with rivaroxaban, monitor for bleeding  Optimize electrolytes, acid-base balance and oxygenation  Ongoing cardiac monitoring    Malnutrition ([facility])  Assessment & Plan  Continue nutritional support, currently at goal  Dietary following  Cont enteral nutrition  Passed SLP, cont pureed diet with enteral nutrition (at night now)  Optimize electrolytes  Supplements as needed      Normocytic anemia- (present on admission)  Assessment & Plan  Serial CBC, hemoglobin unchanged or  slight better  Transfuse per protocols  Not bleeding clinically but patient is at risk of being on chronic anticoagulation and chronically on the vent.    Dysphagia- (present on admission)  Assessment & Plan  No more vent weans- too fragile    Pulmonary hypertension (HCC)- (present on admission)  Assessment & Plan  RVSP 42  Treat underlying process, ongoing  Maintain normal oxygenation, suspect chronic hypoxemia primary etiology  Diuresis as needed  Serial echo as clinically appropriate.    Major depression- (present on admission)  Assessment & Plan  Continue Cymbalta  Continue to provide emotional support  At bedtime trazodone for sleep  Healing garden trips as clinically safe  Xanax at 0.25mg BID as needed for anxiety  Requesting case management nursing staff to facilitate prep resume meeting with friends or family to help with spirits  Trips to the [employer]    Right ventricular dilation- (present on admission)  Assessment & Plan  Clinically not in severe right heart failure  Echo confirms severely dilated RV with mild RV systolic dysfunction  Presumably related to chronic hypoxemia and secondary pulmonary pretension  RVSP 42 mmHg  Forced diuresis as needed, judicious fluid management         VTE:  NOAC  Ulcer: PPI  Lines: Orozco Catheter  Ongoing indication addressed    I have performed a physical exam and reviewed and updated ROS and Plan today (10/21/2020). In review of yesterday's note (10/20/2020), there are no changes except as documented above.     Discussed patient condition and risk of morbidity and/or mortality with RN, RT, Pharmacy, Charge nurse / hot rounds and Patient

## 2020-10-21 NOTE — DIETARY
Nutrition services: RN called about changing tube feeding. Pt is back on vent and diet to be discontinued. Will change to continuous feedings with Fibersource @ 50 ml/hr.

## 2020-10-21 NOTE — CODE DOCUMENTATION
Per RNBernabe, patient had been on t-piece for about 20 minutes and he was sitting in the chair. He had to have a bowel movement so he was helped to the commode. After the bowel movement patient was helped back to the chair. He had gotten about 75% of the way back to the chair when he suddenly stopped moving. RN looked at his face and he was grey and started to fall to the floor. Michon called for help. When patient was on the ground she felt for a pulse and could not feel one. Code blue was called. Patient required one round of CPR and then a pulse was felt. No medications were given. Patient became responsive and helped back to bed.

## 2020-10-21 NOTE — PROGRESS NOTES
At approximately 1400 this RN helped patient to sit in the chair, as he has done many days before, he also asked to be put on the T-piece and RT place him on T piece    1415 This RN assisted patient to bedside commode    1420 Patient finished with bedside commode and stood to be cleaned. Patient then took several steps toward the chair before he stopped moving. He appeared gray and this RN assisted him to the ground.     1422 No femoral pulse felt, PEA, CPR started    1424 Pads applied, RT, MD, and pharmacy to bedside. RT bagging patient    1427 - ROSC, afib 120s, SBP 140s, patient following commands

## 2020-10-21 NOTE — DISCHARGE PLANNING
Anticipated Discharge Disposition: TBD    Action:   0943: left VM for Gisell at [facility] [phone]  11:06 left 2nd VM for [facility]  12:14 received call from Gisell at [facility]. Gisell requests updated clinical notes and states she has no bed but will need to review notes before confirming hiren will accept referral. Pending PEG. Faxed clinical notes to Gisell [phone]  12:52 multiple faxes to Gisell failed due to fax busy. Spoke w/ Gisell. Gisell states she's expecting other faxes coming in so need to wait and refax shortly again  1338: fax confirmation received  1350: spoke w/ Gisell at [facility]. Informed Gisell that pt's long term resident at [facility] and pt does not live in [place], [place] as listed on facesheet and that unable to locate NOK including sister. Gisell states she'll check w/ her director regarding the referral    Barriers to Discharge:   No NOK  No support  Straight [employer] insurance  Ventilator dependent       Plan: TBD

## 2020-10-21 NOTE — CARE PLAN
Problem: Ventilation Defect:  Goal: Ability to achieve and maintain unassisted ventilation or tolerate decreased levels of ventilator support  Outcome: PROGRESSING AS EXPECTED  Note:    Ventilator Daily Summary    Vent Day #1  Trach Day#29    Ventilator settings changed this shift:RR lowered to 12    Weaning trials:No    Respiratory Procedures:No    Plan: Continue current ventilator settings and wean mechanical ventilation as tolerated per physician orders.

## 2020-10-21 NOTE — RESPIRATORY CARE
Patient was placed on t-piece 10L 40% for 23 minutes, then patient coded and placed back on ventilator. Per MD hold t-piece trials for now.

## 2020-10-21 NOTE — DIETARY
Nutrition Services: Update   Pt severely malnourished and has required ongoing nutritional support this admit 2/2 poor PO intake; not meeting his needs orally.  Pt remains a full code at this time - per RD judgement, pt would benefit form PEG placement for ongoing supplemental nutrition.

## 2020-10-21 NOTE — PROGRESS NOTES
RCC    Called by RN and RT for altered LOC    Patient seen and examined     Patient with end-stage COPD and when I last saw him we have given up on weaning and were trying to arrange trilogy vent initiation and LTAC since he had failed weaning x4  Currently on T-piece for 3 days after short PEA arrest 10/17  Now lethargic with ABG revealing PCO2 over 100  Placed back in ventilator and rate adjusted down from the high 20s to 16 and currently his end-tidal CO2 is in the mid 50s  WBC increased to 18,3  Chest x-ray pending, will evaluate for lobar atelectasis and need for therapeutic bronchoscopy    Following after vent restarted  CAF on monitor - rate 70-80s  Lungs w/bialt rhonchi but BS at both bases  ABD benign  Follows, nods moves all 4    Assessment  Acute on chronic respiratory failure with hypoxemia and hypercapnia  Recurrent severe hypercapnia with altered LOC and respiratory acidosis  Recent PEA arrest presumably secondary to above  End-stage COPD now ventilator dependent  Status post pneumonia and COPD exacerbation currently on maintenance regimen  Chronic atelectasis/pleural effusions on chest x-ray, monitoring for HCAP  Pulmonary hypertension  Status post tracheostomy  Chronic atrial fibrillation  Chronic anemia  Debilitated/malnourished  Dysphagia  Depression    Recommendations  This is the sixth (or more???) time he has goneback on the ventilator on this admission despite receiving a tracheostomy, nutritional support, ongoing physical therapy and repeated therapy for COPD and pneumonia.    He appears now to be ventilator dependent failed T-piece overnight attempts x2 in the last week AGAIN, 1 resulting in PEA arrest and the other this AM with finding severe hypercarbia/acidosis and patient placed back on the ventilator before suffering a similar event.    Suggest wean CMV to -120% HS for 8-12 hrs again and send out on Trilogy Vent w/ those settings - placement issue per  in the  past    Chest x-ray to eval for need for therapeutic bronchoscopy  Scheduled nebulized treatments with DuoNeb q. ID and as needed to facilitate pulmonary toilet, he may need this lifelong  Continue aggressive mobilization and physical therapy  Continue aggressive nutritional support  No change in current depression therapy    The patient remains critically ill.  Critical care time = 45 minutes in directly providing and coordinating critical care and extensive data review.  No time overlap and excludes procedures.    D/w RN, RT and charge RN

## 2020-10-21 NOTE — PROGRESS NOTES
Spiritual Care Note    Patient Information     Patient's Name: Jas Vann   MRN: [id number]    YOB: 1965   Age and Gender: 54 y.o. male   Service Area: CARDIAC ICU [facility]   Room (and Bed): [place] [address]   Ethnicity or Nationality:     Primary Language: English   Evangelical/Spiritual preference: Temple   Place of Residence: [place]   Family/Friends/Others Present: no   Clinical Team Present: Code Team   Medical Diagnosis(-es)/Procedure(s): Acute respiratory failure   Code Status: Full Code    Date of Admission: 9/9/2020   Length of Stay: 42 days        Spiritual Care Provider Information:  Name of Spiritual Care Provider: Elif Dsouza  Title of Spiritual Care Provider: Associate   Phone Number: [phone]  E-mail: [email or web address]  Total time : 15 minutes    Spiritual Screen Results:    Gen Nursing  Spiritual Screen  Is your spiritual health or inner well-being important to you as you cope with your medical condition?: No  Would you like to receive a visit from our Spiritual Care team or your own Moravian or spiritual leader?: No  Was spiritual care education provided to the patient?: Declined     Palliative Care  PC Evangelical/Spiritual Screening  Is your spiritual health or inner well-being important to you as you cope with your medical condition?: No  Would you like to receive a visit from our Spiritual Care team or your own Moravian or spiritual leader?: No  Was spiritual care education provided to the patient?: Declined      Encounter/Request Information  Encounter/Request Type   Visited With: Patient, Health care provider  Nature of the Visit: Initial, On shift  Continue Visiting: Yes  Next Follow-up Date: 10/23/20  Crisis Visit: Critical care  Referral From/ Origin of Request: Verbal staff(CODE BLUE)    Religous Needs/Values  Evangelical Needs Visit    Evangelical Needs: Prayer    Spiritual Assessment   Spiritual Care Encounters    Interacton/Conversation:  " responded to Code Blue - no family available - when  arrived, pt conscious and responsive.  Pt desired prayer @ BS -  prayed with pt - pt mouthed the words to the 'Lord's Prayer\".  Pt nodded head up and down when  asked if he would like a follow up visit.   will follow up Friday.    Assessment: Need    Need: Seeking Spiritual Assistance and Support    Interventions: Compassionate Presence, Spiritual Support, Prayer  Outcomes: Value/Dignity/Respect    Plan: (f/u Friday)    "

## 2020-10-21 NOTE — CARE PLAN
Ventilator Daily Summary    Vent Day # 1   Trache Day # 29     Ventilator settings changed this shift: none at this time     Weaning trials: t-piece as tolerated during the day, rest on the ventilator at night     Respiratory Procedures: duo q6     Plan: Continue current ventilator settings and wean mechanical ventilation as tolerated per physician orders.

## 2020-10-21 NOTE — CARE PLAN
Problem: Respiratory:  Goal: Respiratory status will improve  Outcome: PROGRESSING SLOWER THAN EXPECTED  Note: PT is on t-piece 15L 50%FiO2.      Problem: Psychosocial Needs:  Goal: Level of anxiety will decrease  Outcome: PROGRESSING AS EXPECTED  Note: Assess patients pain and treat as needed.

## 2020-10-22 NOTE — DISCHARGE PLANNING
Anticipated Discharge Disposition: TBD    Action:   0945: left VM for Gisell at [facility] [phone]. Code blude PEA yesterday 10/21. Palliative RN attempted to speak w/ pt post code but pt w/ some AMS. Pt more alert today. Medical team needs to discuss goal of care and code status w/ pt so still pending PEG (Dr. Novoa aware).  1440: haven't received call back from Gisell. Left message w/    1542: spoke w/ Jessie Mane director from palliative. Palliative will follow up regarding goal of care/code status  1557: spoke w/ BSRN. Pt still undecided about goal of care      Barriers to Discharge:   No NOK  No support  Straight Medi[employer] insurance  Ventilator dependent  Difficult discharge/placement    Plan: TBD

## 2020-10-22 NOTE — THERAPY
Missed Therapy     Patient Name: Jas Vann  Age:  54 y.o., Sex:  male  Medical Record #: [id number]  Today's Date: 10/22/2020    Discussed missed therapy with RN Bernabe.     Pt is now back on the vent after PEA arrest yesterday and plan to is to keep him ventilated 24/7. SLP will hold tx at this time. Please reconsult as appropriate.

## 2020-10-22 NOTE — PALLIATIVE CARE
"Palliative Care follow-up  PC RN visited with Nagi at  following the event today. He was lethargic and able to participate by writing but had difficulty staying awake. He was disoriented, believing it was February and tells this RN he feel \"fuzzy.\" In trying to write his responses, he would repeat words and not put together logical statements. He tells this RN he wants to go to [place] but when asked if he understands why he can't go, he writes \"No.\" PC RN attempted to clarify feelings and fears surrounding death and he worries about being in pain and what's on the other side. He was not able to participate meaningfully in more conversations at this time but PC will f/u in the coming days about his wishes.    Updated: ROBINSON RN/MD    Plan: f/u on POC    Thank you for allowing Palliative Care to support this patient and family. Contact [phone] for additional assistance, change in patient status, or with any questions/concerns.   "

## 2020-10-22 NOTE — CARE PLAN
Problem: Ventilation Defect:  Goal: Ability to achieve and maintain unassisted ventilation or tolerate decreased levels of ventilator support  Outcome: PROGRESSING AS EXPECTED  Note:    Ventilator Daily Summary    Vent Day #2  Trach Day#30    Ventilator settings changed this shift:No    Weaning trials:No    Respiratory Procedures:No    Plan: Continue current ventilator settings and wean mechanical ventilation as tolerated per physician orders.

## 2020-10-22 NOTE — PROGRESS NOTES
"Multiple discussions with patient today about goals of care.    Patient repeatedly writes in note book that he would like a coca cola. Explained to patient that he cannot eat or drink anything at this time, due to need for ventilator and we can no longer T-piece at this time as he has had multiple PEA arrests.     Explained to patient he cannot eat or drink with vent but we could discuss comfort measures and get him food and drinks. However, explained this would mean he would be taken off the vent and not put back on and he would die. Dr. Novoa at bedside reiterating options for full support or comfort care. Dr. Novoa asked patient to consider his options.    Patient still insists on wanting a coke, and writes that he wants \"to talk to whoever is in charge of this place\".    Education will continue. Case management and Palliative also involved in care.  "

## 2020-10-23 NOTE — PROGRESS NOTES
MD Novoa updated on bladder scan results and pt stating he feels like he needs to urinate but cannot and failed attempt at straight cathing. Per MD, if pt does not void by 1400, attempt Coude catheter and if this fails urology consult will be placed by MD

## 2020-10-23 NOTE — CARE PLAN
Problem: Hyperinflation:  Goal: Prevent or improve atelectasis  Outcome: NOT MET     Problem: Bronchopulmonary Hygiene:  Goal: Increase mobilization of retained secretions  Outcome: MET     Problem: Ventilation Defect:  Goal: Ability to achieve and maintain unassisted ventilation or tolerate decreased levels of ventilator support  Outcome: PROGRESSING AS EXPECTED  Note:    Ventilator Daily Summary    Vent Day #3  Trach Day#31      Ventilator settings changed this shift:No    Weaning trials:No    Respiratory Procedures:No    Plan: Continue current ventilator settings and wean mechanical ventilation as tolerated per physician orders.

## 2020-10-23 NOTE — PALLIATIVE CARE
"Palliative Care follow-up  PC RN revisited with Nagi to discuss goals and POC. Introduced self and role. Revisited with Nagi to discuses GOC. Explained that while pt is on the ventilator and unable for him to be place on a T-piece due to multiple PEA arrests team can not allow him to eat and drink. PC RN asked Nagi to write down his thoughts. He wrote, \"MD talked about IT\" and pointed at his stomach. PC RN addressed the PEG tube, explained that would be the next step. Education provided on the Cortrak and PEG tube. When asked Pt do you want to have a PEG tube placed, pt did not respond. Pt then wrote down something about [place] and getting his clothes. PC RN guided him back to our conversation on GOC. PC RN explained comfort measures and allowing him to eat and drink, providing him medications so he would not struggle with his breathing or pain. Explained that he can not be a full code/full treat and be able to eat or drink. Pt stopped witting, allowed some silence, RN asked pt if he had any questions and he shook his head no. Asked if he needed some time to think and he said shook his head yes.  Dr Novoa was involved in the conversation.   Updated: Sharad and Dr. Novoa on outcome.     Plan: f/u on GOC     Thank you for allowing Palliative Care to support this patient and family. Contact [phone] for additional assistance, change in patient status, or with any questions/concerns.   "

## 2020-10-23 NOTE — DISCHARGE PLANNING
Care Transition Team Discharge Planning    Anticipated Discharge Disposition: TBD    Action: Per AM rounds, Lsw to f/u w/ supervisor to determine what other options for d/c are available that have not already been tried. Palliative to see pt today to discuss comfort care as pt is medically declining throughout his admission, I.e. vent, trach, tpiece, many codes. Higher levels of breathing supports needed over time. PMA is questioning if the hospital would rather pay for pt to transfer to higher level of care outside of hospital or remain admitted to care.     Lsw spoke to MARY Gooden. She will locate the [employer] RN [place] contact number, and suggests unit d/c planner f/u w/ pt's INS directly. Let them know we are having difficulty placing him and acquire their support to do so. MARY will f/u w/ options to d/c w/ [facility] if appropriate.    Barriers to Discharge: pt's MediCAL is FFS and not accepted by facilities w/ his current list of acute care needs    Plan: f/u w/ MARY Gooden [employer] FFS oeprators/RN [place], medical team, [facility]

## 2020-10-23 NOTE — CARE PLAN
Ventilator Daily Summary    Vent Day # 3   TD # 31     Ventilator settings changed this shift: none at this time     Weaning trials: none per MD     Respiratory Procedures: none     Plan: Continue current ventilator settings and wean mechanical ventilation as tolerated per physician orders.

## 2020-10-23 NOTE — CARE PLAN
Problem: Communication  Goal: The ability to communicate needs accurately and effectively will improve  Outcome: PROGRESSING AS EXPECTED     Problem: Skin Integrity  Goal: Risk for impaired skin integrity will decrease  Outcome: PROGRESSING AS EXPECTED

## 2020-10-23 NOTE — PROGRESS NOTES
Critical Care Progress Note    Date of admission  9/9/2020    Chief Complaint  54 y.o. male with H/o Afib admitted 9/9/2020 with respiratory failure, sepsis and pneumonia - intubated in the ER.    Hospital Course   9/17 -    developed worsening hypercarbic and hypoxemic respiratory failure requiring emergent intubation.  Full vent support.  9/18 -    continue vent support.  Titrating phenylephrine.  Replete electrolytes.  SAT/SBT as tolerated.  9/19 -    SAT/SBT as tolerated.  Continue vent support.  Titrating phenylephrine.  Replete potassium.  Continue Unasyn.  9/20 -    liberated from the ventilator yesterday and did very well until last night when he required reintubation.  Continue vent support.  Continue Unasyn and complete 5 days of therapy.  10/3 -    continue vent support.  T-piece trials during the day as tolerated.  10/4 -    continue T-piece trials during the day as tolerated.  Nocturnal ventilatory support.  10/5 - nocturnal vent support and T-piece trials as they as tolerated, may need LTAC/home vent after that-trilogy?  10/6 - HS ASV vent, days Tpiece w/PSMV at times, still fatigued at end of day, CM working on dispo  10/7 - HS ASV ventilation, T-piece days with PSMV at times, case management working on dyspnea with LTAC referral to facility that accepts [facility] insurance in [place], trilogy ventilation nocturnally still being considered  Ongoing therapies  10/8 - HS ASV ventilation, Tpiece and therapies on day, prob depressed, [facility] referal ongoing  10/9 - HS ASV ventilation, Tpiece days w/PSMV as tolerated, therapies  10/10 - HS ASV, Tpiece days, CAF, PSMV, want to go home  10/11 -  HS % x 8 hrs, T piece days w/PSMV, CAF   10/12 - % last night, T-piece, barium swallow and passed, doing PSMV trials, a-fib with RVR-->dose of amio  10/13 - % overnight, passed swallow eval, cont to work with speaking valve, a-fib  10/14 - %/8 overnight, a-fib rate controlled, improving  eating, using speaking valve, T-piece at 10 lpm at 40%   10/15 - pt remained on T-piece overnight at 8 lpm at 40%, eating more meals, went to the [place] yesterday  10/16 - T-piece at 6-15 lpm at 40% for past 48 hours, TFs nocturnally now, meals during the day, PT/OT  10/17 - vent discontinued, T-piece at 10 lpm at 40%, ambulating more on own-->later in the day: PEA arrest with short CPR with ROSC achieved, placed back on the vent, hydroceles noted to scrotum  10/18 - weaned off ventilator, back to using speaking valve and eating this morning     10/19-  Remains off vent for two days  10/20- off vent on t-piece three days  10/21- transient lost of pulse, CPR, bagged and back to baseline  10/22- on vent now 24/7- no wean efforts indefinitely  10/23- on vent, awaiting LTAC/ SNF placement    Interval Problem Update  Reviewed last 24 hour events:  On continuous vent  Writing notes  Unclear if patient OK for long-term vent vs changing to comfort measures  If goes for long-term vent/ placement recommend PEG  Palliative care consulted  No overnight events  Palliative had long discussion with pt- no decisions made re: goals of care        Prior  Desaturation episode this am while on speaking valve- saturations to 50s, came up in several minutes with suctioning  Off vent three days  Minimal suctioning requested  Stable vitals  Working with OT/PT/ Speech  Not weaning in the foreseeable future- vent 24/7  Needs placement in LTAC or equivalent when bed available (does not need acute care hospital for his level of care but does need facility that can manage ventilators  Pt asking about hospice options/ end-of-life care today:       Prior  Admitted 9/9  PEA for one minute two days ago  Trach 9/23  Afib 90s  BP OK, afebrile  Tube feeds night, diet during day  Mobilizes to chair, stands and walks in room  Suctioned q 4 hours  Offvent 24 hours  Possible transfer to [facility] this Wednesday  PRN Zanax stopped      Prior   - pt with  PEA code yesterday afternoon   - went back on the vent   - a/ox4   - a-fib 90-120s   - SBP 100s   - afebrile   - up to chair   - multiple BMs   - 40cc UOP with morton    -CXR(reviewed): poor film, appears to have emphysema changes   - xarelto   - home ppi   - Mg 1.7    - K 3.4    **weaned off vent quickly this morning and back to T-piece and PSMV trials      Yesterday's Events:   - no events overnight   - a/ox4   - pt reports feeling stronger   - a-fib 90-110s   - -110s   - Tmax 97.5   - tolerating TFs at night   - tolerating PSMV trials and pureed diet   - BM 2 days ago   - adequate UOP with urinal   - T-piece at 10 lpm at 40%   - ambulating    Review of Systems  Review of Systems   Constitutional: Positive for malaise/fatigue. Negative for chills.   HENT: Negative for congestion and sore throat.    Eyes: Negative for blurred vision and double vision.   Respiratory: Positive for cough, sputum production and shortness of breath.    Cardiovascular: Negative for chest pain and leg swelling.   Gastrointestinal: Negative for abdominal pain, diarrhea, nausea and vomiting.   Genitourinary: Negative for frequency.   Musculoskeletal: Negative for back pain and neck pain.   Skin: Negative for rash.   Neurological: Positive for weakness. Negative for headaches.   Endo/Heme/Allergies: Bruises/bleeds easily.   Psychiatric/Behavioral: Negative for depression. The patient is nervous/anxious.         Vital Signs for last 24 hours   Temp:  [36 °C (96.8 °F)-36.5 °C (97.7 °F)] 36 °C (96.8 °F)  Pulse:  [] 107  Resp:  [15-27] 26  BP: ()/(50-75) 106/68  SpO2:  [90 %-100 %] 95 %    Hemodynamic parameters for last 24 hours       Respiratory Information for the last 24 hours  Vent Mode: APVCMV  Rate (breaths/min): 12  Vt Target (mL): 390  PEEP/CPAP: 8  MAP: 16  Control VTE (exp VT): 369    Physical Exam   Physical Exam  Vitals signs and nursing note reviewed.   Constitutional:       General: He is not in acute distress.      Appearance: He is ill-appearing. He is not toxic-appearing.      Comments: Pt appears chronically ill and older than stated age   HENT:      Head: Normocephalic and atraumatic.      Right Ear: External ear normal.      Left Ear: External ear normal.      Nose: Nose normal. No rhinorrhea.      Mouth/Throat:      Mouth: Mucous membranes are moist.      Pharynx: Oropharynx is clear. No oropharyngeal exudate.   Eyes:      General: No scleral icterus.     Extraocular Movements: Extraocular movements intact.      Conjunctiva/sclera: Conjunctivae normal.      Pupils: Pupils are equal, round, and reactive to light.   Neck:      Musculoskeletal: Normal range of motion and neck supple.      Comments: Trach in place without surrounding swelling/erythema  Cardiovascular:      Rate and Rhythm: Normal rate. Rhythm irregularly irregular.      Pulses: Normal pulses.      Heart sounds: Normal heart sounds. No murmur.   Pulmonary:      Breath sounds: No wheezing.      Comments: Strong cough, coarse breath sounds throughout  Chest:      Chest wall: No tenderness.   Abdominal:      General: Bowel sounds are normal. There is no distension.      Palpations: Abdomen is soft.      Tenderness: There is no abdominal tenderness. There is no guarding or rebound.   Musculoskeletal: Normal range of motion.      Right lower leg: No edema.      Left lower leg: No edema.   Lymphadenopathy:      Cervical: No cervical adenopathy.   Skin:     General: Skin is warm and dry.      Capillary Refill: Capillary refill takes less than 2 seconds.      Findings: No rash.   Neurological:      Mental Status: He is alert and oriented to person, place, and time.      Cranial Nerves: No cranial nerve deficit.      Sensory: No sensory deficit.      Motor: No weakness.      Comments: Back on vent with morning exam   Psychiatric:         Behavior: Behavior normal.         Thought Content: Thought content normal.      Comments: Low mood         Medications  Current  Facility-Administered Medications   Medication Dose Route Frequency Provider Last Rate Last Dose   • HYDROcodone-acetaminophen (NORCO) 5-325 MG per tablet 1 Tab  1 Tab Enteral Tube Q4HRS PRN Francisco Novoa M.D.   1 Tab at 10/23/20 1119   • ipratropium-albuterol (DUONEB) nebulizer solution  3 mL Nebulization Q2HRS PRN (RT) Dmitri Pascual M.D.       • MD Alert...ICU Electrolyte Replacement per Pharmacy   Other PHARMACY TO DOSE Nalini Aguirre M.D.       • Metoprolol Tartrate (LOPRESSOR) injection 5 mg  5 mg Intravenous Q5 MIN PRN Nalini Aguirre M.D.       • metoprolol (LOPRESSOR) tablet 25 mg  25 mg Enteral Tube TWICE DAILY Nalini Aguirre M.D.   Stopped at 10/23/20 0506   • senna-docusate (PERICOLACE or SENOKOT S) 8.6-50 MG per tablet 2 Tab  2 Tab Enteral Tube BID Dmitri Pascual M.D.   2 Tab at 10/22/20 1706    And   • polyethylene glycol/lytes (MIRALAX) PACKET 1 Packet  1 Packet Enteral Tube QDAY PRN Dmitri Pascual M.D.        And   • magnesium hydroxide (MILK OF MAGNESIA) suspension 30 mL  30 mL Enteral Tube QDAY PRN Dmitri Pascual M.D.        And   • bisacodyl (DULCOLAX) suppository 10 mg  10 mg Rectal QDAY PRN Dmitri Pascual M.D.   10 mg at 10/17/20 2013   • FLUoxetine (PROZAC) capsule 20 mg  20 mg Enteral Tube DAILY Dmitri Pascual M.D.   20 mg at 10/23/20 0510   • lidocaine (LIDODERM) 5 % 2 Patch  2 Patch Transdermal Q24HR Dmitri Pascual M.D.   2 Patch at 10/22/20 2002   • traZODone (DESYREL) tablet 50 mg  50 mg Enteral Tube HS PRN Dmitri Pascual M.D.   50 mg at 10/22/20 2340   • multivitamin (THERAGRAN) tablet 1 Tab  1 Tab Enteral Tube DAILY Dmitri Pascual M.D.   1 Tab at 10/23/20 0511   • acetaminophen (TYLENOL) tablet 650 mg  650 mg Enteral Tube Q6HRS PRN Dmitri Pascual M.D.   650 mg at 10/22/20 1340   • ondansetron (ZOFRAN ODT) dispertab 4 mg  4 mg Enteral Tube Q4HRS PRN Dmitri Pascual M.D.   4 mg at 10/14/20 1545   • Respiratory Therapy Consult    Nebulization Continuous RT Dmitri Pascual M.D.       • lidocaine (XYLOCAINE) 1 % injection 1-2 mL  1-2 mL Tracheal Tube Q30 MIN PRN Dmitri Pascual M.D.       • omeprazole (FIRST-OMEPRAZOLE) 2 mg/mL oral susp 40 mg  40 mg Enteral Tube DAILY Dmitri Pascual M.D.   40 mg at 10/23/20 0510   • Pharmacy Consult: Enteral tube insertion - review meds/change route/product selection  1 Each Other PHARMACY TO DOSE Dmitri Pascual M.D.       • amiodarone (CORDARONE) tablet 200 mg  200 mg Enteral Tube Q DAY Dmitri Pascual M.D.   200 mg at 10/23/20 0510   • furosemide (LASIX) tablet 20 mg  20 mg Enteral Tube Q DAY Dmitri Pascual M.D.   Stopped at 10/23/20 0508   • DULoxetine (CYMBALTA) capsule 60 mg  60 mg Enteral Tube QHS Dmitri Pascual M.D.   60 mg at 10/22/20 2002   • topiramate (TOPAMAX) tablet 75 mg  75 mg Enteral Tube DAILY Dmitri Pascual M.D.   75 mg at 10/23/20 0511   • rivaroxaban (XARELTO) tablet 20 mg  20 mg Enteral Tube PM MEAL Dmitri Pascual M.D.   20 mg at 10/22/20 1706   • ondansetron (ZOFRAN) syringe/vial injection 4 mg  4 mg Intravenous Q4HRS PRN Dmitri Pascual M.D.   4 mg at 10/12/20 2201       Fluids    Intake/Output Summary (Last 24 hours) at 10/23/2020 1357  Last data filed at 10/23/2020 1300  Gross per 24 hour   Intake 1770 ml   Output 970 ml   Net 800 ml       Laboratory  Recent Labs     10/21/20  0315 10/21/20  0319 10/21/20  0727   YVYSF72D  --  7.21*  --    HMBPHG556Q  --  99.9*  --    ACNGB181W  --  87.7*  --    CXTB2JVM  --  94.2  --    ARTHCO3  --  39*  --    ARTBE  --  8*  --    ISTATAPH 7.177*  --  7.488   ISTATAPCO2 >100.0*  --  48.9*   ISTATAPO2 78  --  65   ISTATATCO2 see below  --  39*   SQMBAGB9AVH see below  --  94   ISTATARTHCO3 see below  --  37.1*   ISTATARTBE see below  --  12*   ISTATTEMP 96.7 F  --  97.2 F   ISTATFIO2 50  --  30   ISTATSPEC Arterial  --  Arterial   ISTATAPHTC 7.191*  --  7.500   [id number] 73  --  62*         Recent Labs      10/21/20  0254 10/22/20  0420 10/23/20  0413   SODIUM 138 139 136   POTASSIUM 4.9 3.4* 3.8   CHLORIDE 96 98 97   CO2 39* 38* 33   BUN 20 25* 18   CREATININE 0.36* 0.40* 0.39*   MAGNESIUM  --  2.0 2.0   PHOSPHORUS  --  1.7* 1.9*   CALCIUM 9.1 8.2* 8.5     Recent Labs     10/21/20  0254 10/22/20  0420 10/23/20  0413   GLUCOSE 152* 103* 77     Recent Labs     10/21/20  0245 10/22/20  0420 10/23/20  0413   WBC 18.3* 20.1* 13.7*   NEUTSPOLYS 92.30* 88.30* 85.50*   LYMPHOCYTES 1.80* 4.20* 5.20*   MONOCYTES 5.20 6.60 8.30   EOSINOPHILS 0.00 0.00 0.20   BASOPHILS 0.20 0.10 0.10     Recent Labs     10/21/20  0245 10/22/20  0420 10/23/20  0413   RBC 4.12* 3.54* 3.53*   HEMOGLOBIN 10.3* 8.7* 8.8*   HEMATOCRIT 38.8* 31.1* 30.7*   PLATELETCT 277 257 217       Imaging  X-Ray:  I have personally reviewed the images and compared with prior images.  EKG:  I have personally reviewed the images and compared with prior images.  CT:    Reviewed  Echo:   Reviewed    Assessment/Plan  * Acute respiratory failure with hypoxia and hypercapnia (HCC)- (present on admission)  Assessment & Plan  Intubated 9/9-9/12, 9/17-9/19, 9/20-9/23 - now on 4th course on vent this admission alone  Trach 9/23  Ongoing T-piece trials, now off the vent > 48 hours  Patient remains deconditioned, continue therapies     having difficulty with placement  On facility with PEG or they will accept in transfer, I requested contact with their possible accepting physician to review case-pending    Continue ambulating, eating, using speaking valve  Cont T-piece trials, off vent > 48hours  Patient is already failed transfer to the floor with tracheostomy along with T-piece/HMTC, but getting stronger    Continue aggressive therapies of SLP/PT/OT  Passed SLP-->ongoing PSMV trials with speech and improved appetite, hoping to avoid PEG tube placement    Off vent two days, minimal needs for suctioning  May be good candidate to be managed on floor soon given lack of  nursing/RT frequent needs    Needs mechanical ventilator 24/7. Too fragile for weaning (hypoventilates- arrests)  Needs LTAC or similar  Pt asking about hospice/ end-of-life care  Palliative care consulted  Pt does not seem to have made any new decision re: goals of care as of this bel  Still not making his wishes known- consider capacity evaluation    PEA (Pulseless electrical activity) ([facility])  Assessment & Plan  Brief CPR-->?vasovagal while attempting to urinate  Pt remains a full code  Weaned off vent quickly    No further issues in past 48 hours  Unclear etiology of ? One minute PEA two days ago      COPD (chronic obstructive pulmonary disease) ([facility])  Assessment & Plan  No in exacerbation, monitoring closely  Appears to be end-stage  Patient has of hypoxemia and hypercarbia along with mild to moderate pulmonary pretension  Continue bronchodilators every 4-6 hours  RT protocols  With A. fib using Xopenex over albuterol  Pulmonary toilet/mucomyst    Pneumonia- (present on admission)  Assessment & Plan  Currently without concerning signs or symptoms of new HCAP, aggressive prevention ongoing  Monitor for recurrence, patient at high risk for nosocomial infection  Update vaccines per protocol  MSSA in sputum on 9/10  Usual ashwin in sputum culture on 9/17  S/P 3 days of Zosyn followed by 3 days of Unasyn which completed on 9/15  Second Unasyn course from 9/18-9/23  **Remains off antibiotics, remains afebrile without new S/S of infection.    Paroxysmal atrial fibrillation (HCC)- (present on admission)  Assessment & Plan  Remains in chronic A. fib with controlled rate, patient asymptomatic.  Continue amiodarone 200 mg daily, cont metoprolol 25mg BID  Echo with normal LV systolic function and normal size of LA however significant RV abnormalities and PHTN is noted  Continue anticoagulation with rivaroxaban, monitor for bleeding  Optimize electrolytes, acid-base balance and oxygenation  Ongoing cardiac  monitoring    Malnutrition (HCC)  Assessment & Plan  Continue nutritional support, currently at goal  Dietary following  Cont enteral nutrition  Passed SLP, cont pureed diet with enteral nutrition (at night now)  Optimize electrolytes  Supplements as needed      Normocytic anemia- (present on admission)  Assessment & Plan  Serial CBC, hemoglobin unchanged or slight better  Transfuse per protocols  Not bleeding clinically but patient is at risk of being on chronic anticoagulation and chronically on the vent.    Dysphagia- (present on admission)  Assessment & Plan  No more vent weans- too fragile    Pulmonary hypertension (HCC)- (present on admission)  Assessment & Plan  RVSP 42  Treat underlying process, ongoing  Maintain normal oxygenation, suspect chronic hypoxemia primary etiology  Diuresis as needed  Serial echo as clinically appropriate.    Major depression- (present on admission)  Assessment & Plan  Continue Cymbalta  Continue to provide emotional support  At bedtime trazodone for sleep  Healing garden trips as clinically safe  Xanax at 0.25mg BID as needed for anxiety  Requesting case management nursing staff to facilitate prep resume meeting with friends or family to help with spirits  Trips to the [employer] garden    Right ventricular dilation- (present on admission)  Assessment & Plan  Clinically not in severe right heart failure  Echo confirms severely dilated RV with mild RV systolic dysfunction  Presumably related to chronic hypoxemia and secondary pulmonary pretension  RVSP 42 mmHg  Forced diuresis as needed, judicious fluid management         VTE:  NOAC  Ulcer: PPI  Lines: Orozco Catheter  Ongoing indication addressed    I have performed a physical exam and reviewed and updated ROS and Plan today (10/23/2020). In review of yesterday's note (10/22/2020), there are no changes except as documented above.     Discussed patient condition and risk of morbidity and/or mortality with RN, RT, Pharmacy, Charge  nurse / hot rounds and Patient

## 2020-10-24 NOTE — PROGRESS NOTES
Dr Novoa notified of patient SBP 85-95 MAP 65-75. Order received to go off of patient MAP for treatment.

## 2020-10-24 NOTE — PROGRESS NOTES
Dr Novoa updated on patient heart rate declining to 34-40 with SBP 82-85. Order received for 1L Lactated Ringers.

## 2020-10-24 NOTE — PROGRESS NOTES
Critical Care Progress Note    Date of admission  9/9/2020    Chief Complaint  54 y.o. male with H/o Afib admitted 9/9/2020 with respiratory failure, sepsis and pneumonia - intubated in the ER.    Hospital Course   9/17 -    developed worsening hypercarbic and hypoxemic respiratory failure requiring emergent intubation.  Full vent support.  9/18 -    continue vent support.  Titrating phenylephrine.  Replete electrolytes.  SAT/SBT as tolerated.  9/19 -    SAT/SBT as tolerated.  Continue vent support.  Titrating phenylephrine.  Replete potassium.  Continue Unasyn.  9/20 -    liberated from the ventilator yesterday and did very well until last night when he required reintubation.  Continue vent support.  Continue Unasyn and complete 5 days of therapy.  10/3 -    continue vent support.  T-piece trials during the day as tolerated.  10/4 -    continue T-piece trials during the day as tolerated.  Nocturnal ventilatory support.  10/5 - nocturnal vent support and T-piece trials as they as tolerated, may need LTAC/home vent after that-trilogy?  10/6 - HS ASV vent, days Tpiece w/PSMV at times, still fatigued at end of day, CM working on dispo  10/7 - HS ASV ventilation, T-piece days with PSMV at times, case management working on dyspnea with LTAC referral to facility that accepts [facility] insurance in [place], trilogy ventilation nocturnally still being considered  Ongoing therapies  10/8 - HS ASV ventilation, Tpiece and therapies on day, prob depressed, [facility] referal ongoing  10/9 - HS ASV ventilation, Tpiece days w/PSMV as tolerated, therapies  10/10 - HS ASV, Tpiece days, CAF, PSMV, want to go home  10/11 -  HS % x 8 hrs, T piece days w/PSMV, CAF   10/12 - % last night, T-piece, barium swallow and passed, doing PSMV trials, a-fib with RVR-->dose of amio  10/13 - % overnight, passed swallow eval, cont to work with speaking valve, a-fib  10/14 - %/8 overnight, a-fib rate controlled, improving  eating, using speaking valve, T-piece at 10 lpm at 40%   10/15 - pt remained on T-piece overnight at 8 lpm at 40%, eating more meals, went to the [employer] garden yesterday  10/16 - T-piece at 6-15 lpm at 40% for past 48 hours, TFs nocturnally now, meals during the day, PT/OT  10/17 - vent discontinued, T-piece at 10 lpm at 40%, ambulating more on own-->later in the day: PEA arrest with short CPR with ROSC achieved, placed back on the vent, hydroceles noted to scrotum  10/18 - weaned off ventilator, back to using speaking valve and eating this morning     10/19-  Remains off vent for two days  10/20- off vent on t-piece three days  10/21- transient lost of pulse, CPR, bagged and back to baseline  10/22- on vent now 24/7- no wean efforts indefinitely  10/23- on vent, awaiting LTAC/ SNF placement  10/24- on vent, no changes overnight    Interval Problem Update  Reviewed last 24 hour events:  On vent 24/7  No major events overnight  Pt unable/ unwilling to make preference re: long term care vs comfort care known to palliative care yesterday  Will try again to talk with pt about PEG tube      Prior  On continuous vent  Writing notes  Unclear if patient OK for long-term vent vs changing to comfort measures  If goes for long-term vent/ placement recommend PEG  Palliative care consulted  No overnight events  Palliative had long discussion with pt- no decisions made re: goals of care        Prior  Desaturation episode this am while on speaking valve- saturations to 50s, came up in several minutes with suctioning  Off vent three days  Minimal suctioning requested  Stable vitals  Working with OT/PT/ Speech  Not weaning in the foreseeable future- vent 24/7  Needs placement in LTAC or equivalent when bed available (does not need acute care hospital for his level of care but does need facility that can manage ventilators  Pt asking about hospice options/ end-of-life care today:       Prior  Admitted 9/9  PEA for one minute two  days ago  Trach 9/23  Afib 90s  BP OK, afebrile  Tube feeds night, diet during day  Mobilizes to chair, stands and walks in room  Suctioned q 4 hours  Offvent 24 hours  Possible transfer to [facility] this Wednesday  PRN Zanax stopped      Prior   - pt with PEA code yesterday afternoon   - went back on the vent   - a/ox4   - a-fib 90-120s   - SBP 100s   - afebrile   - up to chair   - multiple BMs   - 40cc UOP with morton    -CXR(reviewed): poor film, appears to have emphysema changes   - xarelto   - home ppi   - Mg 1.7    - K 3.4    **weaned off vent quickly this morning and back to T-piece and PSMV trials      Yesterday's Events:   - no events overnight   - a/ox4   - pt reports feeling stronger   - a-fib 90-110s   - -110s   - Tmax 97.5   - tolerating TFs at night   - tolerating PSMV trials and pureed diet   - BM 2 days ago   - adequate UOP with urinal   - T-piece at 10 lpm at 40%   - ambulating    Review of Systems  Review of Systems   Constitutional: Positive for malaise/fatigue. Negative for chills.   HENT: Negative for congestion and sore throat.    Eyes: Negative for blurred vision and double vision.   Respiratory: Positive for cough, sputum production and shortness of breath.    Cardiovascular: Negative for chest pain and leg swelling.   Gastrointestinal: Negative for abdominal pain, diarrhea, nausea and vomiting.   Genitourinary: Negative for frequency.   Musculoskeletal: Negative for back pain and neck pain.   Skin: Negative for rash.   Neurological: Positive for weakness. Negative for headaches.   Endo/Heme/Allergies: Bruises/bleeds easily.   Psychiatric/Behavioral: Negative for depression. The patient is nervous/anxious.         Vital Signs for last 24 hours   Temp:  [35.8 °C (96.4 °F)-37.9 °C (100.2 °F)] 35.8 °C (96.4 °F)  Pulse:  [] 65  Resp:  [12-27] 12  BP: ()/(58-85) 96/67  SpO2:  [88 %-100 %] 98 %    Hemodynamic parameters for last 24 hours       Respiratory Information for the last 24  hours  Vent Mode: APVCMV  Rate (breaths/min): 12  Vt Target (mL): 390  PEEP/CPAP: 8  P Support: 10  MAP: 13  Control VTE (exp VT): 362    Physical Exam   Physical Exam  Vitals signs and nursing note reviewed.   Constitutional:       General: He is not in acute distress.     Appearance: He is ill-appearing. He is not toxic-appearing.      Comments: Pt appears chronically ill and older than stated age   HENT:      Head: Normocephalic and atraumatic.      Right Ear: External ear normal.      Left Ear: External ear normal.      Nose: Nose normal. No rhinorrhea.      Mouth/Throat:      Mouth: Mucous membranes are moist.      Pharynx: Oropharynx is clear. No oropharyngeal exudate.   Eyes:      General: No scleral icterus.     Extraocular Movements: Extraocular movements intact.      Conjunctiva/sclera: Conjunctivae normal.      Pupils: Pupils are equal, round, and reactive to light.   Neck:      Musculoskeletal: Normal range of motion and neck supple.      Comments: Trach in place without surrounding swelling/erythema  Cardiovascular:      Rate and Rhythm: Normal rate. Rhythm irregularly irregular.      Pulses: Normal pulses.      Heart sounds: Normal heart sounds. No murmur.   Pulmonary:      Breath sounds: No wheezing.      Comments: Strong cough, coarse breath sounds throughout  Chest:      Chest wall: No tenderness.   Abdominal:      General: Bowel sounds are normal. There is no distension.      Palpations: Abdomen is soft.      Tenderness: There is no abdominal tenderness. There is no guarding or rebound.   Musculoskeletal: Normal range of motion.      Right lower leg: No edema.      Left lower leg: No edema.   Lymphadenopathy:      Cervical: No cervical adenopathy.   Skin:     General: Skin is warm and dry.      Capillary Refill: Capillary refill takes less than 2 seconds.      Findings: No rash.   Neurological:      Mental Status: He is alert and oriented to person, place, and time.      Cranial Nerves: No cranial  nerve deficit.      Sensory: No sensory deficit.      Motor: No weakness.      Comments: Back on vent with morning exam   Psychiatric:         Behavior: Behavior normal.         Thought Content: Thought content normal.      Comments: Low mood         Medications  Current Facility-Administered Medications   Medication Dose Route Frequency Provider Last Rate Last Dose   • HYDROcodone-acetaminophen (NORCO) 5-325 MG per tablet 1 Tab  1 Tab Enteral Tube Q4HRS PRN Francisco Novoa M.D.   1 Tab at 10/24/20 0600   • ipratropium-albuterol (DUONEB) nebulizer solution  3 mL Nebulization Q2HRS PRN (RT) Dmitri Pascual M.D.       • MD Alert...ICU Electrolyte Replacement per Pharmacy   Other PHARMACY TO DOSE Nalini Aguirre M.D.       • Metoprolol Tartrate (LOPRESSOR) injection 5 mg  5 mg Intravenous Q5 MIN PRN Nalini Aguirre M.D.       • metoprolol (LOPRESSOR) tablet 25 mg  25 mg Enteral Tube TWICE DAILY Nalini Aguirre M.D.   25 mg at 10/24/20 0558   • senna-docusate (PERICOLACE or SENOKOT S) 8.6-50 MG per tablet 2 Tab  2 Tab Enteral Tube BID Dmitri Pascual M.D.   2 Tab at 10/24/20 0558    And   • polyethylene glycol/lytes (MIRALAX) PACKET 1 Packet  1 Packet Enteral Tube QDAY PRN Dmitri Pascual M.D.        And   • magnesium hydroxide (MILK OF MAGNESIA) suspension 30 mL  30 mL Enteral Tube QDAY PRN Dmitri Pascual M.D.        And   • bisacodyl (DULCOLAX) suppository 10 mg  10 mg Rectal QDAY PRN Dmitri Pascual M.D.   10 mg at 10/17/20 2013   • FLUoxetine (PROZAC) capsule 20 mg  20 mg Enteral Tube DAILY Dmitri Pascual M.D.   20 mg at 10/24/20 0558   • lidocaine (LIDODERM) 5 % 2 Patch  2 Patch Transdermal Q24HR Dmitri Pascual M.D.   2 Patch at 10/23/20 2135   • traZODone (DESYREL) tablet 50 mg  50 mg Enteral Tube HS PRN Dmitri Pascual M.D.   50 mg at 10/22/20 2340   • multivitamin (THERAGRAN) tablet 1 Tab  1 Tab Enteral Tube DAILY Dmitri Pascual M.D.   1 Tab at 10/24/20 0558   •  acetaminophen (TYLENOL) tablet 650 mg  650 mg Enteral Tube Q6HRS PRN Dmitri Pascual M.D.   650 mg at 10/22/20 1340   • ondansetron (ZOFRAN ODT) dispertab 4 mg  4 mg Enteral Tube Q4HRS PRN Dmitri Pascual M.D.   4 mg at 10/14/20 1545   • Respiratory Therapy Consult   Nebulization Continuous RT Dmitri Pascual M.D.       • lidocaine (XYLOCAINE) 1 % injection 1-2 mL  1-2 mL Tracheal Tube Q30 MIN PRN Dmitri Pascual M.D.       • omeprazole (FIRST-OMEPRAZOLE) 2 mg/mL oral susp 40 mg  40 mg Enteral Tube DAILY Dmitri Pascual M.D.   40 mg at 10/24/20 0601   • Pharmacy Consult: Enteral tube insertion - review meds/change route/product selection  1 Each Other PHARMACY TO DOSE Dmitri Pascual M.D.       • amiodarone (CORDARONE) tablet 200 mg  200 mg Enteral Tube Q DAY Dmitri Pascual M.D.   200 mg at 10/24/20 0558   • furosemide (LASIX) tablet 20 mg  20 mg Enteral Tube Q DAY Dmitri Pascual M.D.   20 mg at 10/24/20 0558   • DULoxetine (CYMBALTA) capsule 60 mg  60 mg Enteral Tube QHS Dmitri Pascual M.D.   60 mg at 10/23/20 2130   • topiramate (TOPAMAX) tablet 75 mg  75 mg Enteral Tube DAILY Dmitri Pascual M.D.   75 mg at 10/24/20 0600   • rivaroxaban (XARELTO) tablet 20 mg  20 mg Enteral Tube PM MEAL Dmitri Pascual M.D.   20 mg at 10/23/20 1641   • ondansetron (ZOFRAN) syringe/vial injection 4 mg  4 mg Intravenous Q4HRS PRN Dmitri Pascual M.D.   4 mg at 10/12/20 2201       Fluids    Intake/Output Summary (Last 24 hours) at 10/24/2020 0731  Last data filed at 10/24/2020 0600  Gross per 24 hour   Intake 1650 ml   Output 910 ml   Net 740 ml       Laboratory          Recent Labs     10/22/20  0420 10/23/20  0413 10/24/20  0554   SODIUM 139 136 132*   POTASSIUM 3.4* 3.8 4.7   CHLORIDE 98 97 96   CO2 38* 33 33   BUN 25* 18 14   CREATININE 0.40* 0.39* 0.46*   MAGNESIUM 2.0 2.0 2.2   PHOSPHORUS 1.7* 1.9* 3.0   CALCIUM 8.2* 8.5 8.3*     Recent Labs     10/22/20  0420 10/23/20  0413  10/24/20  0554   GLUCOSE 103* 77 87     Recent Labs     10/22/20  0420 10/23/20  0413 10/24/20  0554   WBC 20.1* 13.7* 10.7   NEUTSPOLYS 88.30* 85.50* 82.70*   LYMPHOCYTES 4.20* 5.20* 8.80*   MONOCYTES 6.60 8.30 7.20   EOSINOPHILS 0.00 0.20 0.30   BASOPHILS 0.10 0.10 0.20     Recent Labs     10/22/20  0420 10/23/20  0413 10/24/20  0554   RBC 3.54* 3.53* 3.99*   HEMOGLOBIN 8.7* 8.8* 9.8*   HEMATOCRIT 31.1* 30.7* 34.7*   PLATELETCT 257 217 247       Imaging  X-Ray:  I have personally reviewed the images and compared with prior images.  EKG:  I have personally reviewed the images and compared with prior images.  CT:    Reviewed  Echo:   Reviewed    Assessment/Plan  * Acute respiratory failure with hypoxia and hypercapnia (HCC)- (present on admission)  Assessment & Plan  Intubated 9/9-9/12, 9/17-9/19, 9/20-9/23 - now on 4th course on vent this admission alone  Trach 9/23  Ongoing T-piece trials, now off the vent > 48 hours  Patient remains deconditioned, continue therapies     having difficulty with placement  On facility with PEG or they will accept in transfer, I requested contact with their possible accepting physician to review case-pending    Continue ambulating, eating, using speaking valve  Cont T-piece trials, off vent > 48hours  Patient is already failed transfer to the floor with tracheostomy along with T-piece/HMTC, but getting stronger    Continue aggressive therapies of SLP/PT/OT  Passed SLP-->ongoing PSMV trials with speech and improved appetite, hoping to avoid PEG tube placement    Off vent two days, minimal needs for suctioning  May be good candidate to be managed on floor soon given lack of nursing/RT frequent needs    Needs mechanical ventilator 24/7. Too fragile for weaning (hypoventilates- arrests)  Needs LTAC or similar  Pt asking about hospice/ end-of-life care  Palliative care consulted  Pt does not seem to have made any new decision re: goals of care as of this morni  Still not  making his wishes known- consider capacity evaluation    PEA (Pulseless electrical activity) ([facility])  Assessment & Plan  Brief CPR-->?vasovagal while attempting to urinate  Pt remains a full code  Weaned off vent quickly    No further issues in past 48 hours  Unclear etiology of ? One minute PEA two days ago      COPD (chronic obstructive pulmonary disease) ([facility])  Assessment & Plan  No in exacerbation, monitoring closely  Appears to be end-stage  Patient has of hypoxemia and hypercarbia along with mild to moderate pulmonary pretension  Continue bronchodilators every 4-6 hours  RT protocols  With A. fib using Xopenex over albuterol  Pulmonary toilet/mucomyst    Pneumonia- (present on admission)  Assessment & Plan  Currently without concerning signs or symptoms of new HCAP, aggressive prevention ongoing  Monitor for recurrence, patient at high risk for nosocomial infection  Update vaccines per protocol  MSSA in sputum on 9/10  Usual ashwin in sputum culture on 9/17  S/P 3 days of Zosyn followed by 3 days of Unasyn which completed on 9/15  Second Unasyn course from 9/18-9/23  **Remains off antibiotics, remains afebrile without new S/S of infection.    Paroxysmal atrial fibrillation (HCC)- (present on admission)  Assessment & Plan  Remains in chronic A. fib with controlled rate, patient asymptomatic.  Continue amiodarone 200 mg daily, cont metoprolol 25mg BID  Echo with normal LV systolic function and normal size of LA however significant RV abnormalities and PHTN is noted  Continue anticoagulation with rivaroxaban, monitor for bleeding  Optimize electrolytes, acid-base balance and oxygenation  Ongoing cardiac monitoring    Malnutrition ([facility])  Assessment & Plan  Continue nutritional support, currently at goal  Dietary following  Cont enteral nutrition  Passed SLP, cont pureed diet with enteral nutrition (at night now)  Optimize electrolytes  Supplements as needed      Normocytic anemia- (present on admission)  Assessment &  Plan  Serial CBC, hemoglobin unchanged or slight better  Transfuse per protocols  Not bleeding clinically but patient is at risk of being on chronic anticoagulation and chronically on the vent.    Dysphagia- (present on admission)  Assessment & Plan  No more vent weans- too fragile    Pulmonary hypertension (HCC)- (present on admission)  Assessment & Plan  RVSP 42  Treat underlying process, ongoing  Maintain normal oxygenation, suspect chronic hypoxemia primary etiology  Diuresis as needed  Serial echo as clinically appropriate.    Major depression- (present on admission)  Assessment & Plan  Continue Cymbalta  Continue to provide emotional support  At bedtime trazodone for sleep  Healing garden trips as clinically safe  Xanax at 0.25mg BID as needed for anxiety  Requesting case management nursing staff to facilitate prep resume meeting with friends or family to help with spirits  Trips to the [employer]    Right ventricular dilation- (present on admission)  Assessment & Plan  Clinically not in severe right heart failure  Echo confirms severely dilated RV with mild RV systolic dysfunction  Presumably related to chronic hypoxemia and secondary pulmonary pretension  RVSP 42 mmHg  Forced diuresis as needed, judicious fluid management         VTE:  NOAC  Ulcer: PPI  Lines: Orozco Catheter  Ongoing indication addressed    I have performed a physical exam and reviewed and updated ROS and Plan today (10/24/2020). In review of yesterday's note (10/23/2020), there are no changes except as documented above.     Discussed patient condition and risk of morbidity and/or mortality with RN, RT, Pharmacy, Charge nurse / hot rounds and Patient

## 2020-10-25 NOTE — CARE PLAN
Ventilator Daily Summary    Vent Day # 4  Trach Day # 33    Ventilator settings changed this shift: No changes made    Weaning trials: No per MD order    Plan: Continue current ventilator settings and wean mechanical ventilation as tolerated per physician orders.

## 2020-10-25 NOTE — CARE PLAN
Problem: Communication  Goal: The ability to communicate needs accurately and effectively will improve  Outcome: PROGRESSING AS EXPECTED     Problem: Safety  Goal: Will remain free from falls  Outcome: PROGRESSING AS EXPECTED     Problem: Fluid Volume:  Goal: Will maintain balanced intake and output  Outcome: PROGRESSING SLOWER THAN EXPECTED     Pt communicates needs with white board. Patient calls for assistance. Bed alarm set. Will continue to monitor.

## 2020-10-25 NOTE — PROGRESS NOTES
Critical Care Progress Note    Date of admission  9/9/2020    Chief Complaint  54 y.o. male with H/o Afib admitted 9/9/2020 with respiratory failure, sepsis and pneumonia - intubated in the ER.    Hospital Course   9/17 -    developed worsening hypercarbic and hypoxemic respiratory failure requiring emergent intubation.  Full vent support.  9/18 -    continue vent support.  Titrating phenylephrine.  Replete electrolytes.  SAT/SBT as tolerated.  9/19 -    SAT/SBT as tolerated.  Continue vent support.  Titrating phenylephrine.  Replete potassium.  Continue Unasyn.  9/20 -    liberated from the ventilator yesterday and did very well until last night when he required reintubation.  Continue vent support.  Continue Unasyn and complete 5 days of therapy.  10/3 -    continue vent support.  T-piece trials during the day as tolerated.  10/4 -    continue T-piece trials during the day as tolerated.  Nocturnal ventilatory support.  10/5 - nocturnal vent support and T-piece trials as they as tolerated, may need LTAC/home vent after that-trilogy?  10/6 - HS ASV vent, days Tpiece w/PSMV at times, still fatigued at end of day, CM working on dispo  10/7 - HS ASV ventilation, T-piece days with PSMV at times, case management working on dyspnea with LTAC referral to facility that accepts [facility] insurance in [place], trilogy ventilation nocturnally still being considered  Ongoing therapies  10/8 - HS ASV ventilation, Tpiece and therapies on day, prob depressed, LTACh referal ongoing  10/9 - HS ASV ventilation, Tpiece days w/PSMV as tolerated, therapies  10/10 - HS ASV, Tpiece days, CAF, PSMV, want to go home  10/11 -  HS % x 8 hrs, T piece days w/PSMV, CAF   10/12 - % last night, T-piece, barium swallow and passed, doing PSMV trials, a-fib with RVR-->dose of amio  10/13 - % overnight, passed swallow eval, cont to work with speaking valve, a-fib  10/14 - %/8 overnight, a-fib rate controlled, improving  eating, using speaking valve, T-piece at 10 lpm at 40%   10/15 - pt remained on T-piece overnight at 8 lpm at 40%, eating more meals, went to the [employer] garden yesterday  10/16 - T-piece at 6-15 lpm at 40% for past 48 hours, TFs nocturnally now, meals during the day, PT/OT  10/17 - vent discontinued, T-piece at 10 lpm at 40%, ambulating more on own-->later in the day: PEA arrest with short CPR with ROSC achieved, placed back on the vent, hydroceles noted to scrotum  10/18 - weaned off ventilator, back to using speaking valve and eating this morning     10/19-  Remains off vent for two days  10/20- off vent on t-piece three days  10/21- transient lost of pulse, CPR, bagged and back to baseline  10/22- on vent now 24/7- no wean efforts indefinitely  10/23- on vent, awaiting LTAC/ SNF placement  10/24- on vent, no changes overnight  10/25- on vent, no changes    Interval Problem Update  Reviewed last 24 hour events:  On continuous vent  No weaning (pt arrested, or nearly arrested 6 times from respiratory causes when being weaned over the past 6 weeks)  Trach being changed out today by RT  No further discussions with pt re: PEG, goals of care (pt unable/ unwilling and no surrogate)  No placement options currently (needs LTAC or nursing homes that take patients on long-term mechanical ventilation) per Case Management secondary to insurance    Prior  On vent 24/7  No major events overnight  Pt unable/ unwilling to make preference re: long term care vs comfort care known to palliative care yesterday  Will try again to talk with pt about PEG tube      Prior  On continuous vent  Writing notes  Unclear if patient OK for long-term vent vs changing to comfort measures  If goes for long-term vent/ placement recommend PEG  Palliative care consulted  No overnight events  Palliative had long discussion with pt- no decisions made re: goals of care        Prior  Desaturation episode this am while on speaking valve- saturations to  50s, came up in several minutes with suctioning  Off vent three days  Minimal suctioning requested  Stable vitals  Working with OT/PT/ Speech  Not weaning in the foreseeable future- vent 24/7  Needs placement in LTAC or equivalent when bed available (does not need acute care hospital for his level of care but does need facility that can manage ventilators  Pt asking about hospice options/ end-of-life care today:       Prior  Admitted 9/9  PEA for one minute two days ago  Trach 9/23  Afib 90s  BP OK, afebrile  Tube feeds night, diet during day  Mobilizes to chair, stands and walks in room  Suctioned q 4 hours  Offvent 24 hours  Possible transfer to [facility] this Wednesday  PRN Zanax stopped      Prior   - pt with PEA code yesterday afternoon   - went back on the vent   - a/ox4   - a-fib 90-120s   - SBP 100s   - afebrile   - up to chair   - multiple BMs   - 40cc UOP with morton    -CXR(reviewed): poor film, appears to have emphysema changes   - xarelto   - home ppi   - Mg 1.7    - K 3.4    **weaned off vent quickly this morning and back to T-piece and PSMV trials      Yesterday's Events:   - no events overnight   - a/ox4   - pt reports feeling stronger   - a-fib 90-110s   - -110s   - Tmax 97.5   - tolerating TFs at night   - tolerating PSMV trials and pureed diet   - BM 2 days ago   - adequate UOP with urinal   - T-piece at 10 lpm at 40%   - ambulating    Review of Systems  Review of Systems   Constitutional: Positive for malaise/fatigue. Negative for chills.   HENT: Negative for congestion and sore throat.    Eyes: Negative for blurred vision and double vision.   Respiratory: Positive for cough, sputum production and shortness of breath.    Cardiovascular: Negative for chest pain and leg swelling.   Gastrointestinal: Negative for abdominal pain, diarrhea, nausea and vomiting.   Genitourinary: Negative for frequency.   Musculoskeletal: Negative for back pain and neck pain.   Skin: Negative for rash.   Neurological:  Positive for weakness. Negative for headaches.   Endo/Heme/Allergies: Bruises/bleeds easily.   Psychiatric/Behavioral: Negative for depression. The patient is nervous/anxious.         Vital Signs for last 24 hours   Temp:  [35.9 °C (96.6 °F)-36.1 °C (97 °F)] 35.9 °C (96.6 °F)  Pulse:  [] 96  Resp:  [12-22] 19  BP: ()/(61-77) 95/67  SpO2:  [94 %-99 %] 99 %    Hemodynamic parameters for last 24 hours       Respiratory Information for the last 24 hours  Vent Mode: APVCMV  Rate (breaths/min): 12  Vt Target (mL): 390  PEEP/CPAP: 8  MAP: 13  Control VTE (exp VT): 371    Physical Exam   Physical Exam  Vitals signs (ON vent, writes notes slowly, not distressed) and nursing note reviewed.   Constitutional:       General: He is not in acute distress.     Appearance: He is ill-appearing. He is not toxic-appearing.      Comments: Pt appears chronically ill and older than stated age   HENT:      Head: Normocephalic and atraumatic.      Right Ear: External ear normal.      Left Ear: External ear normal.      Nose: Nose normal. No rhinorrhea.      Mouth/Throat:      Mouth: Mucous membranes are moist.      Pharynx: Oropharynx is clear. No oropharyngeal exudate.   Eyes:      General: No scleral icterus.     Extraocular Movements: Extraocular movements intact.      Conjunctiva/sclera: Conjunctivae normal.      Pupils: Pupils are equal, round, and reactive to light.   Neck:      Musculoskeletal: Normal range of motion and neck supple.      Comments: Trach in place without surrounding swelling/erythema  Cardiovascular:      Rate and Rhythm: Normal rate. Rhythm irregularly irregular.      Pulses: Normal pulses.      Heart sounds: Normal heart sounds. No murmur.   Pulmonary:      Breath sounds: No wheezing.      Comments: Strong cough, coarse breath sounds throughout  Chest:      Chest wall: No tenderness.   Abdominal:      General: Bowel sounds are normal. There is no distension.      Palpations: Abdomen is soft.       Tenderness: There is no abdominal tenderness. There is no guarding or rebound.   Musculoskeletal: Normal range of motion.      Right lower leg: No edema.      Left lower leg: No edema.   Lymphadenopathy:      Cervical: No cervical adenopathy.   Skin:     General: Skin is warm and dry.      Capillary Refill: Capillary refill takes less than 2 seconds.      Findings: No rash.   Neurological:      Mental Status: He is alert and oriented to person, place, and time.      Cranial Nerves: No cranial nerve deficit.      Sensory: No sensory deficit.      Motor: No weakness.      Comments: Back on vent with morning exam   Psychiatric:         Behavior: Behavior normal.         Thought Content: Thought content normal.      Comments: Low mood         Medications  Current Facility-Administered Medications   Medication Dose Route Frequency Provider Last Rate Last Dose   • HYDROcodone-acetaminophen (NORCO) 5-325 MG per tablet 1 Tab  1 Tab Enteral Tube Q4HRS PRN Francisco Novoa M.D.   1 Tab at 10/24/20 2029   • ipratropium-albuterol (DUONEB) nebulizer solution  3 mL Nebulization Q2HRS PRN (RT) Dmitri Pascual M.D.       • MD Alert...ICU Electrolyte Replacement per Pharmacy   Other PHARMACY TO DOSE Nalini Aguirre M.D.       • Metoprolol Tartrate (LOPRESSOR) injection 5 mg  5 mg Intravenous Q5 MIN PRN Nalini Aguirre M.D.       • metoprolol (LOPRESSOR) tablet 25 mg  25 mg Enteral Tube TWICE DAILY Nalini Aguirre M.D.   25 mg at 10/25/20 0446   • senna-docusate (PERICOLACE or SENOKOT S) 8.6-50 MG per tablet 2 Tab  2 Tab Enteral Tube BID Dmitri Pascual M.D.   2 Tab at 10/25/20 0447    And   • polyethylene glycol/lytes (MIRALAX) PACKET 1 Packet  1 Packet Enteral Tube QDAY PRN Dmitri Pascual M.D.        And   • magnesium hydroxide (MILK OF MAGNESIA) suspension 30 mL  30 mL Enteral Tube QDAY PRN Dmitri Pascual M.D.        And   • bisacodyl (DULCOLAX) suppository 10 mg  10 mg Rectal QDAY PRN Dmitri Pascual M.D.    10 mg at 10/17/20 2013   • FLUoxetine (PROZAC) capsule 20 mg  20 mg Enteral Tube DAILY Dmitri Pascual M.D.   20 mg at 10/25/20 0447   • lidocaine (LIDODERM) 5 % 2 Patch  2 Patch Transdermal Q24HR Dmitri Pascual M.D.   2 Patch at 10/24/20 2037   • traZODone (DESYREL) tablet 50 mg  50 mg Enteral Tube HS PRN Dmitri Pascual M.D.   50 mg at 10/22/20 2340   • multivitamin (THERAGRAN) tablet 1 Tab  1 Tab Enteral Tube DAILY Dmitri Pascual M.D.   1 Tab at 10/25/20 0446   • acetaminophen (TYLENOL) tablet 650 mg  650 mg Enteral Tube Q6HRS PRN Dmitri Pascual M.D.   650 mg at 10/22/20 1340   • ondansetron (ZOFRAN ODT) dispertab 4 mg  4 mg Enteral Tube Q4HRS PRN Dmitri Pascual M.D.   4 mg at 10/14/20 1545   • Respiratory Therapy Consult   Nebulization Continuous RT Dmitri Pascual M.D.       • lidocaine (XYLOCAINE) 1 % injection 1-2 mL  1-2 mL Tracheal Tube Q30 MIN PRN Dmitri Pascual M.D.       • omeprazole (FIRST-OMEPRAZOLE) 2 mg/mL oral susp 40 mg  40 mg Enteral Tube DAILY Dmitri Pascual M.D.   40 mg at 10/25/20 0447   • Pharmacy Consult: Enteral tube insertion - review meds/change route/product selection  1 Each Other PHARMACY TO DOSE Dmitri Pascual M.D.       • amiodarone (CORDARONE) tablet 200 mg  200 mg Enteral Tube Q DAY Dmitri Pascual M.D.   200 mg at 10/25/20 0447   • furosemide (LASIX) tablet 20 mg  20 mg Enteral Tube Q DAY Dmitri Pascual M.D.   20 mg at 10/25/20 0446   • DULoxetine (CYMBALTA) capsule 60 mg  60 mg Enteral Tube QHS Dmitri Pascual M.D.   60 mg at 10/24/20 2029   • topiramate (TOPAMAX) tablet 75 mg  75 mg Enteral Tube DAILY Dmitri Pascual M.D.   75 mg at 10/25/20 0802   • rivaroxaban (XARELTO) tablet 20 mg  20 mg Enteral Tube PM MEAL Dmitri Pascual M.D.   20 mg at 10/24/20 1739   • ondansetron (ZOFRAN) syringe/vial injection 4 mg  4 mg Intravenous Q4HRS PRN Dmitri Pascual M.D.   4 mg at 10/12/20 2201       Fluids    Intake/Output Summary (Last  24 hours) at 10/25/2020 1230  Last data filed at 10/25/2020 1200  Gross per 24 hour   Intake 1030 ml   Output 1025 ml   Net 5 ml       Laboratory          Recent Labs     10/23/20  0413 10/24/20  0554 10/25/20  0510   SODIUM 136 132* 128*   POTASSIUM 3.8 4.7 4.1   CHLORIDE 97 96 93*   CO2 33 33 32   BUN 18 14 18   CREATININE 0.39* 0.46* 0.38*   MAGNESIUM 2.0 2.2  --    PHOSPHORUS 1.9* 3.0  --    CALCIUM 8.5 8.3* 8.6     Recent Labs     10/23/20  0413 10/24/20  0554 10/25/20  0510   GLUCOSE 77 87 101*     Recent Labs     10/23/20  0413 10/24/20  0554 10/25/20  0510   WBC 13.7* 10.7 11.0*   NEUTSPOLYS 85.50* 82.70* 83.80*   LYMPHOCYTES 5.20* 8.80* 6.50*   MONOCYTES 8.30 7.20 8.20   EOSINOPHILS 0.20 0.30 0.40   BASOPHILS 0.10 0.20 0.10     Recent Labs     10/23/20  0413 10/24/20  0554 10/25/20  0510   RBC 3.53* 3.99* 3.67*   HEMOGLOBIN 8.8* 9.8* 8.9*   HEMATOCRIT 30.7* 34.7* 31.5*   PLATELETCT 217 247 280       Imaging  X-Ray:  I have personally reviewed the images and compared with prior images.  EKG:  I have personally reviewed the images and compared with prior images.  CT:    Reviewed  Echo:   Reviewed    Assessment/Plan  * Acute respiratory failure with hypoxia and hypercapnia (HCC)- (present on admission)  Assessment & Plan  Intubated 9/9-9/12, 9/17-9/19, 9/20-9/23 - now on 4th course on vent this admission alone  Trach 9/23  Ongoing T-piece trials, now off the vent > 48 hours  Patient remains deconditioned, continue therapies     having difficulty with placement  On facility with PEG or they will accept in transfer, I requested contact with their possible accepting physician to review case-pending    Continue ambulating, eating, using speaking valve  Cont T-piece trials, off vent > 48hours  Patient is already failed transfer to the floor with tracheostomy along with T-piece/HMTC, but getting stronger    Continue aggressive therapies of SLP/PT/OT  Passed SLP-->ongoing PSMV trials with speech and  improved appetite, hoping to avoid PEG tube placement    Off vent two days, minimal needs for suctioning  May be good candidate to be managed on floor soon given lack of nursing/RT frequent needs    Needs mechanical ventilator 24/7. Too fragile for weaning (hypoventilates- arrests)  Needs LTAC or similar  Pt asking about hospice/ end-of-life care  Palliative care consulted  Pt does not seem to have made any new decision re: goals of care as of this bel  Still not making his wishes known- consider capacity evaluation  Discuss PEG- patient unable or unwilling to guide clinicians re: his preferences for PEG, goals of care. No surrogates available    PEA (Pulseless electrical activity) ([facility])  Assessment & Plan  Related to weaning  No events while on ventilator  Too fragile, too deconditioned to wean for possibly several months      COPD (chronic obstructive pulmonary disease) ([facility])  Assessment & Plan  No in exacerbation, monitoring closely  Appears to be end-stage  Patient has of hypoxemia and hypercarbia along with mild to moderate pulmonary pretension  Continue bronchodilators prn 4-6 hours  RT protocols  With COLLEEN fib using Xopenex over albuterol  Pulmonary toilet/mucomyst    Pneumonia- (present on admission)  Assessment & Plan  Resolved    Paroxysmal atrial fibrillation (HCC)- (present on admission)  Assessment & Plan  Afib with controlled ventricular rate  Monitor    Malnutrition ([facility])  Assessment & Plan  Continue nutritional support, currently at goal  Dietary following  Cont enteral nutrition  Passed SLP, cont pureed diet with enteral nutrition (at night now)  Optimize electrolytes  Supplements as needed      Normocytic anemia- (present on admission)  Assessment & Plan  Stable    Dysphagia- (present on admission)  Assessment & Plan  No more vent weans- too fragile    Major depression- (present on admission)  Assessment & Plan  Continue Cymbalta  Continue to provide emotional support  At bedtime trazodone for  sleep  T[employer]ster trips as clinically safe  Xanax at 0.25mg BID as needed for anxiety  Requesting case management nursing staff to facilitate prep resume meeting with friends or family to help with spirits  Trips to the [employer]    Right ventricular dilation- (present on admission)  Assessment & Plan  Clinically not in severe right heart failure  Echo confirms severely dilated RV with mild RV systolic dysfunction  Presumably related to chronic hypoxemia and secondary pulmonary pretension  RVSP 42 mmHg  Forced diuresis as needed, judicious fluid management         VTE:  NOAC  Ulcer: PPI  Lines: Orozco Catheter  Ongoing indication addressed    I have performed a physical exam and reviewed and updated ROS and Plan today (10/25/2020). In review of yesterday's note (10/24/2020), there are no changes except as documented above.     Discussed patient condition and risk of morbidity and/or mortality with RN, RT, Pharmacy, Charge nurse / hot rounds and Patient

## 2020-10-25 NOTE — FLOWSHEET NOTE
Respiratory Update  Monthly trach change to 6.0 Shiley cuffed. Stoma site clean, patient tolerated well.

## 2020-10-26 NOTE — CARE PLAN
Ventilator Daily Summary    Vent Day # 5  Trach Day # 33    Ventilator settings changed this shift: No changes made    Weaning trials: No per MD order    Respiratory procedures: Trach change to 6.0 Shiley cuffed    Plan: Continue current ventilator settings and wean mechanical ventilation as tolerated per physician orders.

## 2020-10-26 NOTE — CARE PLAN
Problem: Communication  Goal: The ability to communicate needs accurately and effectively will improve  Outcome: PROGRESSING AS EXPECTED     Problem: Safety  Goal: Will remain free from injury  Outcome: PROGRESSING AS EXPECTED     Problem: Safety  Goal: Will remain free from falls  Outcome: PROGRESSING AS EXPECTED     Patient remains on trach vent, able to express his needs through utilizing pen and paper to write. Patient calls for assistance. Mobilized patient with assist x 2. Will continue to monitor.

## 2020-10-26 NOTE — CARE PLAN
Ventilator Daily Summary    Vent Day #6    Trach Day #34    Plan: Continue current ventilator settings per physician orders.

## 2020-10-26 NOTE — PROGRESS NOTES
Called MD Bangura in regards to low urinary output/ retention of 282cc in second bladder scan at 1800 this afternoon, previous scan today 280cc at 1000 a.m. Will insert morton and give 1L LR per MD orders.

## 2020-10-26 NOTE — DISCHARGE PLANNING
Anticipated Discharge Disposition: TBD    Action:   10:16 left VM for Gisell at [facility] [phone]. Spoke w/ palliative RN Dominique. Palliative will try and follow up w/ pt regarding discussion of goal of care. Pt has been undecided about PEG and goal of care/code status. PEG recommended per RD note 10/21. Leadership aware of difficult DC placement. Called the insurance number as listed on facesheet [id number]. Number disconnected.  10:30 spoke w/ RN RENO Nicole at [facility] [phone]. Bonnie states she's w/ [facility] [employer] and so does not manage cases for pt on [employer]. Bonnie states she does not know contact info for  for [employer] or whether straight [employer] has .  10:50 spoke w/ SELINA So. Saray states pt would need to call himself and speak to [employer] to convert from straight to HMO or [facility] [employer], and to apply for Medicare, pt would also need to call and talk to Medicare himself.  1320 spoke w/ Gisell at [facility]. Gisell states they might consider referral if pt has PEG but no bed at this time. Called main [employer] number [phone]. They don't have contact info for  for [employer]. Updated Dr. Guidry and need to discuss PEG w/ pt. Updated supervisor Eleanor.  1602: called again main [employer] number [phone] and spoke w/ Jeaneth at the main number. Jeaneth states she does not know or have contact info for  for [employer] but will submit a request to her leadership to try to see if they have contact info and will call back this writer but not sure when    Barriers to Discharge:   No NOK  No support  Straight Medi[employer] insurance  Ventilator dependent  Difficult discharge/placement       Plan: TBD

## 2020-10-26 NOTE — CARE PLAN
Adult Ventilation Update    Patient Lines/Drains/Airways Status      Active Airway       Name: Placement date: Placement time: Site: Days:    Airway Trach Tracheostomy 6.0  10/25/20   1422   Tracheostomy                    Static Compliance (ml / cm H2O): 17.5 (10/26/20 1421)    Patient failed trials because of Barriers to Wean: (Per MD) (10/26/20 1421)    Sputum/Suction   Cough: Productive (10/26/20 0638)  Sputum Amount: Small (10/26/20 0638)  Sputum Color: White (10/26/20 0638)  Sputum Consistency: Thin;Thick (10/26/20 0638)    Events/Summary/Plan: Pt. Remains on ventilator. Will continue to monitor.

## 2020-10-27 NOTE — DIETARY
Nutrition support weekly update:  Day 48 of admit.  Jas Vann is a 54 y.o. male with admitting DX of Acute respiratory failure (HCC).    Tube feeding originally initiated on 9/10.  Cortrak is in the duodenum per imaging 10/12.  Current TF regimen since 10/21: Fibersource HN @ goal rate 50 ml/hr, providing 1440 kcal, 65 grams protein, and 972 ml free water per day.    Assessment:  Weight 62.7 kg with BMI 21.14.  Weight increasing. Pt has been receiving consistent nutrition support for the last week. Also, +6 L fluid per I/O.    Estimated nutritional needs per current weight:  REE per MSJ x1.2 = 1715 kcal/day  RMR per P[facility] (vent L/min 8.6, T max/24 hours 36.8) = 1571 kcal/day  0410-6051 kcal/day (26-29 kcal/kg)  1.2-1.5 grams protein/kg = 75-94 grams protein/day    Evaluation:   1. Pt remains on vent support.  2. Nutrition support required to meet full nutritional needs. Current equations based on today's weight reveal need to increase kcal and protein provisions.  3. Labs reviewed.  4. Meds include lasix, electrolyte replacement, MVI, and bowel regimen.  5. BM today  6. Reviewed recent MD and Palliative Care notes.  7. Current formula remains appropriate; will increase goal rate.    Malnutrition risk: Severe malnutrition previously noted.    Recommendations/Plan:  1. Continue TF formula Fibersource HN and increase to new goal rate 60 ml/hr to provide 1728 kcal, 78 grams protein, and 1166 ml free water per day.  2. Fluids per MD.    RD following.

## 2020-10-27 NOTE — CARE PLAN
Problem: Bowel/Gastric:  Goal: Normal bowel function is maintained or improved  Outcome: PROGRESSING AS EXPECTED  Flowsheets (Taken 10/27/2020 1000)  Last BM: 10/27/20  Number of Times Stooled: 1  Note: BM today. Patient able to control bowels until on bed pan (refused getting up to commode)     Problem: Respiratory:  Goal: Respiratory status will improve  Outcome: PROGRESSING AS EXPECTED  Note: Patient trached, on APVcmp (RR 12, Vt 300, PEEP 8, FiO2 30%) saturing mid-90s with clear after suction breath sounds

## 2020-10-27 NOTE — PROGRESS NOTES
Critical Care Progress Note    Date of admission  9/9/2020    Chief Complaint  54 y.o. male with H/o Afib admitted 9/9/2020 with respiratory failure, sepsis and pneumonia - intubated in the ER.    Hospital Course   9/17 -    developed worsening hypercarbic and hypoxemic respiratory failure requiring emergent intubation.  Full vent support.  9/18 -    continue vent support.  Titrating phenylephrine.  Replete electrolytes.  SAT/SBT as tolerated.  9/19 -    SAT/SBT as tolerated.  Continue vent support.  Titrating phenylephrine.  Replete potassium.  Continue Unasyn.  9/20 -    liberated from the ventilator yesterday and did very well until last night when he required reintubation.  Continue vent support.  Continue Unasyn and complete 5 days of therapy.  10/3 -    continue vent support.  T-piece trials during the day as tolerated.  10/4 -    continue T-piece trials during the day as tolerated.  Nocturnal ventilatory support.  10/5 - nocturnal vent support and T-piece trials as they as tolerated, may need LTAC/home vent after that-trilogy?  10/6 - HS ASV vent, days Tpiece w/PSMV at times, still fatigued at end of day, CM working on dispo  10/7 - HS ASV ventilation, T-piece days with PSMV at times, case management working on dyspnea with LTAC referral to facility that accepts [facility] insurance in [place], trilogy ventilation nocturnally still being considered  Ongoing therapies  10/8 - HS ASV ventilation, Tpiece and therapies on day, prob depressed, LTACh referal ongoing  10/9 - HS ASV ventilation, Tpiece days w/PSMV as tolerated, therapies  10/10 - HS ASV, Tpiece days, CAF, PSMV, want to go home  10/11 -  HS % x 8 hrs, T piece days w/PSMV, CAF   10/12 - % last night, T-piece, barium swallow and passed, doing PSMV trials, a-fib with RVR-->dose of amio  10/13 - % overnight, passed swallow eval, cont to work with speaking valve, a-fib  10/14 - %/8 overnight, a-fib rate controlled, improving  eating, using speaking valve, T-piece at 10 lpm at 40%   10/15 - pt remained on T-piece overnight at 8 lpm at 40%, eating more meals, [place] the [place]  10/16 - T-piece at 6-15 lpm at 40% for past 48 hours, TFs nocturnally now, meals during the day, PT/OT  10/17 - vent discontinued, T-piece at 10 lpm at 40%, ambulating more on own-->later in the day: PEA arrest with short CPR with ROSC achieved, placed back on the vent, hydroceles noted to scrotum  10/18 - weaned off ventilator, back to using speaking valve and eating this morning     10/19-  Remains off vent for two days  10/20- off vent on t-piece three days  10/21- transient lost of pulse, CPR, bagged and back to baseline  10/22- on vent now 24/7- no wean efforts indefinitely. Desaturation episode this am while on speaking valve- saturations to 50s, came up in several minutes with suctioning  10/23- on vent, awaiting LTAC/ SNF placement. Unclear if patient OK for long-term vent vs changing to comfort measures  10/24- on vent, no changes overnight.Pt unable/ unwilling to make preference re: long term care vs comfort care known to palliative care  10/25- on vent, no changes. No weaning attempted due to pt arrested nearly 6 times). Trach was changed out by RT. No further discussions with pt re: PEG, goals of care (pt unable/ unwilling and no surrogate)      Interval Problem Update  Reviewed last 24 hour events:  No acute changes overnight.   Remains on full vent, 30% FIO2 PEEP 8  Afebrile  HR in 80-90s  SBP 90-110s  Creatinine 0.37, HCO3 31  Sodium 130 (from 128)  Discussion regarding hospice/end of life care is ongoing. Palliative care is following. Pt seems to be indecisive regarding PEG tube. Unable to place patient pending his decision regarding PEG      Review of Systems  Review of Systems   Constitutional: Negative for chills and malaise/fatigue.   Respiratory: Positive for shortness of breath. Negative for cough and sputum production.     Cardiovascular: Negative for chest pain and leg swelling.   Gastrointestinal: Negative for abdominal pain, diarrhea, nausea and vomiting.   Neurological: Positive for weakness. Negative for headaches.   Endo/Heme/Allergies: Does not bruise/bleed easily.   Psychiatric/Behavioral: Negative for depression. The patient is nervous/anxious.    All other systems reviewed and are negative.       Vital Signs for last 24 hours   Temp:  [35.9 °C (96.6 °F)-36.7 °C (98 °F)] 36.7 °C (98 °F)  Pulse:  [] 87  Resp:  [15-32] 23  BP: ()/(36-83) 93/74  SpO2:  [89 %-100 %] 96 %    Hemodynamic parameters for last 24 hours       Respiratory Information for the last 24 hours  Vent Mode: APVCMV  Rate (breaths/min): 12  Vt Target (mL): 390  PEEP/CPAP: 8  MAP: 11  Control VTE (exp VT): 390    Physical Exam   Physical Exam  Vitals signs (ON vent, writes notes slowly, not distressed) and nursing note reviewed.   Constitutional:       General: He is not in acute distress.     Appearance: He is ill-appearing. He is not toxic-appearing or diaphoretic.      Comments: Pt appears chronically ill and older than stated age   HENT:      Head: Normocephalic and atraumatic.      Left Ear: External ear normal.      Nose: No rhinorrhea.      Mouth/Throat:      Mouth: Mucous membranes are dry.      Pharynx: No oropharyngeal exudate.   Neck:      Musculoskeletal: Neck supple.      Comments: Trach in place   Cardiovascular:      Rate and Rhythm: Normal rate. Rhythm irregularly irregular.      Pulses: Normal pulses.      Heart sounds: Normal heart sounds. No murmur.   Pulmonary:      Effort: No respiratory distress.      Breath sounds: Normal breath sounds. No wheezing, rhonchi or rales.      Comments: Strong cough, coarse breath sounds throughout  Chest:      Chest wall: No tenderness.   Abdominal:      General: There is no distension.      Palpations: Abdomen is soft. There is no mass.      Tenderness: There is no abdominal tenderness. There is  no guarding.      Hernia: No hernia is present.   Musculoskeletal:      Right lower leg: No edema.      Left lower leg: No edema.   Skin:     General: Skin is dry.      Capillary Refill: Capillary refill takes less than 2 seconds.      Coloration: Skin is not jaundiced.   Neurological:      General: No focal deficit present.      Mental Status: He is alert and oriented to person, place, and time.      Cranial Nerves: No cranial nerve deficit.      Motor: No weakness.   Psychiatric:      Comments: Low mood         Medications  Current Facility-Administered Medications   Medication Dose Route Frequency Provider Last Rate Last Dose   • fentaNYL (SUBLIMAZE) injection 50 mcg  50 mcg Intravenous Q HOUR PRN Cy Guidry D.O.   50 mcg at 10/26/20 1257   • HYDROcodone-acetaminophen (NORCO) 5-325 MG per tablet 1 Tab  1 Tab Enteral Tube Q4HRS PRN Francisco Novoa M.D.   1 Tab at 10/26/20 1027   • ipratropium-albuterol (DUONEB) nebulizer solution  3 mL Nebulization Q2HRS PRN (RT) Dmitri Pascual M.D.       • MD Alert...ICU Electrolyte Replacement per Pharmacy   Other PHARMACY TO DOSE Nalini Aguirre M.D.       • Metoprolol Tartrate (LOPRESSOR) injection 5 mg  5 mg Intravenous Q5 MIN PRN Nalini Aguirre M.D.       • metoprolol (LOPRESSOR) tablet 25 mg  25 mg Enteral Tube TWICE DAILY Nalini Aguirre M.D.   25 mg at 10/26/20 1753   • senna-docusate (PERICOLACE or SENOKOT S) 8.6-50 MG per tablet 2 Tab  2 Tab Enteral Tube BID Dmitri Pascual M.D.   Stopped at 10/25/20 1800    And   • polyethylene glycol/lytes (MIRALAX) PACKET 1 Packet  1 Packet Enteral Tube QDAY PRN Dmitri Pascual M.D.        And   • magnesium hydroxide (MILK OF MAGNESIA) suspension 30 mL  30 mL Enteral Tube QDAY PRN Dmitri Pascual M.D.        And   • bisacodyl (DULCOLAX) suppository 10 mg  10 mg Rectal QDAY PRN Dmitri Pascual M.D.   10 mg at 10/17/20 2013   • FLUoxetine (PROZAC) capsule 20 mg  20 mg Enteral Tube DAILY Dmitri Pascual M.D.    20 mg at 10/26/20 0505   • lidocaine (LIDODERM) 5 % 2 Patch  2 Patch Transdermal Q24HR Dmitri Pascual M.D.   2 Patch at 10/25/20 2155   • traZODone (DESYREL) tablet 50 mg  50 mg Enteral Tube HS PRN Dmitri Pascual M.D.   50 mg at 10/22/20 2340   • multivitamin (THERAGRAN) tablet 1 Tab  1 Tab Enteral Tube DAILY Dmitri Pascual M.D.   1 Tab at 10/26/20 0505   • acetaminophen (TYLENOL) tablet 650 mg  650 mg Enteral Tube Q6HRS PRN Dmitri Pascual M.D.   650 mg at 10/26/20 1753   • ondansetron (ZOFRAN ODT) dispertab 4 mg  4 mg Enteral Tube Q4HRS PRN Dmitri Pascual M.D.   4 mg at 10/14/20 1545   • Respiratory Therapy Consult   Nebulization Continuous RT Dmitri Pascual M.D.       • lidocaine (XYLOCAINE) 1 % injection 1-2 mL  1-2 mL Tracheal Tube Q30 MIN PRN Dmitri Pascual M.D.       • omeprazole (FIRST-OMEPRAZOLE) 2 mg/mL oral susp 40 mg  40 mg Enteral Tube DAILY Dmitri Pascual M.D.   40 mg at 10/26/20 0506   • Pharmacy Consult: Enteral tube insertion - review meds/change route/product selection  1 Each Other PHARMACY TO DOSE Dmitri Pascual M.D.       • amiodarone (CORDARONE) tablet 200 mg  200 mg Enteral Tube Q DAY Dmitri Pascual M.D.   200 mg at 10/26/20 0505   • furosemide (LASIX) tablet 20 mg  20 mg Enteral Tube Q DAY Dmitri Pascual M.D.   20 mg at 10/26/20 0505   • DULoxetine (CYMBALTA) capsule 60 mg  60 mg Enteral Tube QHS Dmitri Pascual M.D.   60 mg at 10/25/20 2056   • topiramate (TOPAMAX) tablet 75 mg  75 mg Enteral Tube DAILY Dmitri Pascual M.D.   75 mg at 10/26/20 0506   • rivaroxaban (XARELTO) tablet 20 mg  20 mg Enteral Tube PM MEAL Dmitri Pascual M.D.   20 mg at 10/26/20 1754   • ondansetron (ZOFRAN) syringe/vial injection 4 mg  4 mg Intravenous Q4HRS PRN Dmitri Pascual M.D.   4 mg at 10/12/20 2201       Fluids    Intake/Output Summary (Last 24 hours) at 10/26/2020 1814  Last data filed at 10/26/2020 1810  Gross per 24 hour   Intake 2260 ml   Output  750 ml   Net 1510 ml       Laboratory          Recent Labs     10/24/20  0554 10/25/20  0510 10/26/20  0443   SODIUM 132* 128* 130*   POTASSIUM 4.7 4.1 3.5*   CHLORIDE 96 93* 95*   CO2 33 32 31   BUN 14 18 21   CREATININE 0.46* 0.38* 0.37*   MAGNESIUM 2.2  --  2.0   PHOSPHORUS 3.0  --  3.2   CALCIUM 8.3* 8.6 8.0*     Recent Labs     10/24/20  0554 10/25/20  0510 10/26/20  0443   GLUCOSE 87 101* 97     Recent Labs     10/24/20  0554 10/25/20  0510 10/26/20  0443   WBC 10.7 11.0* 10.4   NEUTSPOLYS 82.70* 83.80* 84.10*   LYMPHOCYTES 8.80* 6.50* 6.90*   MONOCYTES 7.20 8.20 7.80   EOSINOPHILS 0.30 0.40 0.30   BASOPHILS 0.20 0.10 0.10     Recent Labs     10/24/20  0554 10/25/20  0510 10/26/20  0443   RBC 3.99* 3.67* 3.44*   HEMOGLOBIN 9.8* 8.9* 8.3*   HEMATOCRIT 34.7* 31.5* 28.8*   PLATELETCT 247 280 281       Imaging  X-Ray:  I have personally reviewed the images and compared with prior images.  EKG:  I have personally reviewed the images and compared with prior images.  CT:    Reviewed  Echo:   Reviewed    Assessment/Plan  * Acute respiratory failure with hypoxia and hypercapnia (HCC)- (present on admission)  Assessment & Plan  Intubated 9/9-9/12, 9/17-9/19, 9/20-9/23 - now on 4th course on vent this admission alone  Trach 9/23  Given multiple cardiac arrest - currently no weaning trials attempted.   Ongoing discussion regarding PEG placement - pt is being indecisive  Palliative care is following.     Plan:   Continue full vent support. Avoid weaning   Seems multiple discussions about hospice vs PEG have been ongoing, but pt remains indecisive.   Pt does have capacity to make his own decisions      PEA (Pulseless electrical activity) (HCC)  Assessment & Plan  Related to weaning. It has occurred x5-6 times  No events while on ventilator  Too fragile, too deconditioned to wean for possibly several months      COPD (chronic obstructive pulmonary disease) (HCC)  Assessment & Plan  End stage COPD with mild-moderate pulm  HTN  Continue bronchodilators prn 4-6 hours  RT protocols      Pneumonia- (present on admission)  Assessment & Plan  Resolved    Paroxysmal atrial fibrillation (HCC)- (present on admission)  Assessment & Plan  Afib with controlled ventricular rate  Monitor    Malnutrition (HCC)  Assessment & Plan  Cont enteral nutrition,a t goal  Optimize electrolytes  Supplements as needed      Normocytic anemia- (present on admission)  Assessment & Plan  Stable    Dysphagia- (present on admission)  Assessment & Plan  No more vent weans- too fragile    Major depression- (present on admission)  Assessment & Plan  Continue Cymbalta  Continue to provide emotional support  At bedtime trazodone for sleep  [employer] Alerts trips as clinically safe  Xanax at 0.25mg BID as needed for anxiety  Requesting case management nursing staff to facilitate prep resume meeting with friends or family to help with spirits  Trips to the [place]    Right ventricular dilation- (present on admission)  Assessment & Plan  Clinically not in severe right heart failure  Echo confirms severely dilated RV with mild RV systolic dysfunction  Presumably related to chronic hypoxemia and secondary pulmonary pretension  RVSP 42 mmHg  On lasix 20mg PO daily   Forced diuresis as needed, judicious fluid management         VTE:  NOAC  Ulcer: PPI  Lines: Orozco Catheter  Ongoing indication addressed    I have performed a physical exam and reviewed and updated ROS and Plan today (10/26/2020). In review of yesterday's note (10/25/2020), there are no changes except as documented above.     Discussed patient condition and risk of morbidity and/or mortality with RN, RT, Pharmacy, Charge nurse / hot rounds and Patient

## 2020-10-27 NOTE — PALLIATIVE CARE
Palliative Care follow-up  PC RN discussed pt with Dr. Guidry and BS RN [place]. PC RN attempted to visit pt at bedside and discuss GOC, pt keeps his eyes closed shakes his head no when asked if he would be willing to talk. PC RN offers to return later today and pt agrees with a head nod.       Updated: Dr. Guidry    Plan: PC RN will follow up.     Thank you for allowing Palliative Care to support this patient and family. Contact [phone] for additional assistance, change in patient status, or with any questions/concerns.

## 2020-10-27 NOTE — CARE PLAN
Ventilator Daily Summary    Vent Day # 10  Trache day 34  APVCMV  12/390/8/30%    Ventilator settings changed this shift: not at this time    Weaning trials: no    Respiratory Procedures: not at this time    Plan: Continue current ventilator settings and wean mechanical ventilation as tolerated per physician orders.

## 2020-10-27 NOTE — PROGRESS NOTES
Critical Care Progress Note    Date of admission  9/9/2020    Chief Complaint  54 y.o. male with H/o Afib admitted 9/9/2020 with respiratory failure, sepsis and pneumonia - intubated in the ER.    Hospital Course   9/17 -    developed worsening hypercarbic and hypoxemic respiratory failure requiring emergent intubation.  Full vent support.  9/18 -    continue vent support.  Titrating phenylephrine.  Replete electrolytes.  SAT/SBT as tolerated.  9/19 -    SAT/SBT as tolerated.  Continue vent support.  Titrating phenylephrine.  Replete potassium.  Continue Unasyn.  9/20 -    liberated from the ventilator yesterday and did very well until last night when he required reintubation.  Continue vent support.  Continue Unasyn and complete 5 days of therapy.  10/3 -    continue vent support.  T-piece trials during the day as tolerated.  10/4 -    continue T-piece trials during the day as tolerated.  Nocturnal ventilatory support.  10/5 - nocturnal vent support and T-piece trials as they as tolerated, may need LTAC/home vent after that-trilogy?  10/6 - HS ASV vent, days Tpiece w/PSMV at times, still fatigued at end of day, CM working on dispo  10/7 - HS ASV ventilation, T-piece days with PSMV at times, case management working on dyspnea with LTAC referral to facility that accepts [facility] insurance in [place], trilogy ventilation nocturnally still being considered  Ongoing therapies  10/8 - HS ASV ventilation, Tpiece and therapies on day, prob depressed, LTACh referal ongoing  10/9 - HS ASV ventilation, Tpiece days w/PSMV as tolerated, therapies  10/10 - HS ASV, Tpiece days, CAF, PSMV, want to go home  10/11 -  HS % x 8 hrs, T piece days w/PSMV, CAF   10/12 - % last night, T-piece, barium swallow and passed, doing PSMV trials, a-fib with RVR-->dose of amio  10/13 - % overnight, passed swallow eval, cont to work with speaking valve, a-fib  10/14 - %/8 overnight, a-fib rate controlled, improving  eating, using speaking valve, T-piece at 10 lpm at 40%   10/15 - pt remained on T-piece overnight at 8 lpm at 40%, eating more meals, [facility]  10/16 - T-piece at 6-15 lpm at 40% for past 48 hours, TFs nocturnally now, meals during the day, PT/OT  10/17 - vent discontinued, T-piece at 10 lpm at 40%, ambulating more on own-->later in the day: PEA arrest with short CPR with ROSC achieved, placed back on the vent, hydroceles noted to scrotum  10/18 - weaned off ventilator, back to using speaking valve and eating this morning     10/19-  Remains off vent for two days  10/20- off vent on t-piece three days  10/21- transient lost of pulse, CPR, bagged and back to baseline  10/22- on vent now 24/7- no wean efforts indefinitely. Desaturation episode this am while on speaking valve- saturations to 50s, came up in several minutes with suctioning  10/23- on vent, awaiting LTAC/ SNF placement. Unclear if patient OK for long-term vent vs changing to comfort measures  10/24- on vent, no changes overnight.Pt unable/ unwilling to make preference re: long term care vs comfort care known to palliative care  10/25- on vent, no changes. No weaning attempted due to pt arrested nearly 6 times). Trach was changed out by RT. No further discussions with pt re: PEG, goals of care (pt unable/ unwilling and no surrogate)      Interval Problem Update  Reviewed last 24 hour events:  No acute changes overnight.   Remains on full vent, 30% FIO2 PEEP 8. No weaning given recurrent cardiac arrest.   Afebrile  HR in 70-80s  SBP 90-110s  Creatinine 0.36, HCO3 29  Sodium 133   UOP 940ml in the past 24 hours, I/O positive 500cc      Review of Systems  Review of Systems   Constitutional: Negative for chills and malaise/fatigue.   Respiratory: Negative for cough and shortness of breath.    Cardiovascular: Negative for chest pain and leg swelling.   Gastrointestinal: Negative for abdominal pain, diarrhea, nausea and vomiting.    Neurological: Negative for headaches.   All other systems reviewed and are negative.       Vital Signs for last 24 hours   Temp:  [36.2 °C (97.1 °F)-36.8 °C (98.2 °F)] 36.4 °C (97.6 °F)  Pulse:  [63-97] 94  Resp:  [10-26] 20  BP: ()/(52-84) 113/84  SpO2:  [89 %-100 %] 96 %    Hemodynamic parameters for last 24 hours       Respiratory Information for the last 24 hours  Vent Mode: APVCMV  Rate (breaths/min): 12  Vt Target (mL): 290  PEEP/CPAP: 8  P Support: 10  MAP: 14  Control VTE (exp VT): 390    Physical Exam   Physical Exam  Vitals signs (ON vent, writes notes slowly, not distressed) and nursing note reviewed.   Constitutional:       General: He is not in acute distress.     Appearance: He is ill-appearing. He is not toxic-appearing or diaphoretic.      Comments: Pt appears chronically ill and older than stated age   HENT:      Head: Normocephalic and atraumatic.      Left Ear: External ear normal.      Nose: No rhinorrhea.      Mouth/Throat:      Mouth: Mucous membranes are dry.      Pharynx: No oropharyngeal exudate.   Neck:      Musculoskeletal: Neck supple.      Comments: Trach in place   Cardiovascular:      Rate and Rhythm: Normal rate. Rhythm irregularly irregular.      Pulses: Normal pulses.      Heart sounds: Normal heart sounds. No murmur.   Pulmonary:      Effort: No respiratory distress.      Breath sounds: Normal breath sounds. No wheezing, rhonchi or rales.      Comments: Strong cough, coarse breath sounds throughout  Chest:      Chest wall: No tenderness.   Abdominal:      General: There is no distension.      Palpations: Abdomen is soft.      Tenderness: There is no abdominal tenderness. There is no guarding.   Musculoskeletal:      Right lower leg: No edema.      Left lower leg: No edema.   Skin:     General: Skin is dry.      Capillary Refill: Capillary refill takes less than 2 seconds.      Coloration: Skin is not jaundiced.   Neurological:      General: No focal deficit present.       Mental Status: He is alert.      Cranial Nerves: No cranial nerve deficit.      Motor: No weakness.   Psychiatric:      Comments: Low mood         Medications  Current Facility-Administered Medications   Medication Dose Route Frequency Provider Last Rate Last Dose   • fentaNYL (SUBLIMAZE) injection 50 mcg  50 mcg Intravenous Q HOUR PRN Cy Guidry D.O.   50 mcg at 10/26/20 1257   • HYDROcodone-acetaminophen (NORCO) 5-325 MG per tablet 1 Tab  1 Tab Enteral Tube Q4HRS PRN Francisco Novoa M.D.   1 Tab at 10/27/20 1133   • ipratropium-albuterol (DUONEB) nebulizer solution  3 mL Nebulization Q2HRS PRN (RT) Dmitri Pascual M.D.       • MD Alert...ICU Electrolyte Replacement per Pharmacy   Other PHARMACY TO DOSE Nalini Aguirre M.D.       • Metoprolol Tartrate (LOPRESSOR) injection 5 mg  5 mg Intravenous Q5 MIN PRN Nalini Aguirre M.D.       • metoprolol (LOPRESSOR) tablet 25 mg  25 mg Enteral Tube TWICE DAILY Nalini Aguirre M.D.   Stopped at 10/27/20 0600   • senna-docusate (PERICOLACE or SENOKOT S) 8.6-50 MG per tablet 2 Tab  2 Tab Enteral Tube BID Dmitri Pascual M.D.   2 Tab at 10/27/20 0502    And   • polyethylene glycol/lytes (MIRALAX) PACKET 1 Packet  1 Packet Enteral Tube QDAY PRN Dmitri Pascual M.D.        And   • magnesium hydroxide (MILK OF MAGNESIA) suspension 30 mL  30 mL Enteral Tube QDAY PRN Dmitri Pascual M.D.        And   • bisacodyl (DULCOLAX) suppository 10 mg  10 mg Rectal QDAY PRN Dmitri Pascual M.D.   10 mg at 10/17/20 2013   • FLUoxetine (PROZAC) capsule 20 mg  20 mg Enteral Tube DAILY Dmitri Pascual M.D.   20 mg at 10/27/20 0502   • lidocaine (LIDODERM) 5 % 2 Patch  2 Patch Transdermal Q24HR Dmitri Pascual M.D.   2 Patch at 10/26/20 2114   • traZODone (DESYREL) tablet 50 mg  50 mg Enteral Tube HS PRN Dmitri Pascual M.D.   50 mg at 10/22/20 9680   • multivitamin (THERAGRAN) tablet 1 Tab  1 Tab Enteral Tube DAILY Dmitri Pascual M.D.   1 Tab at 10/27/20 7931    • acetaminophen (TYLENOL) tablet 650 mg  650 mg Enteral Tube Q6HRS PRN Dmitri Pascual M.D.   650 mg at 10/27/20 0143   • ondansetron (ZOFRAN ODT) dispertab 4 mg  4 mg Enteral Tube Q4HRS PRN Dmitri Pascual M.D.   4 mg at 10/14/20 1545   • Respiratory Therapy Consult   Nebulization Continuous RT Dmitri Pascual M.D.       • lidocaine (XYLOCAINE) 1 % injection 1-2 mL  1-2 mL Tracheal Tube Q30 MIN PRN Dmitri Pascual M.D.       • omeprazole (FIRST-OMEPRAZOLE) 2 mg/mL oral susp 40 mg  40 mg Enteral Tube DAILY Dmitri Pascual M.D.   40 mg at 10/27/20 0502   • Pharmacy Consult: Enteral tube insertion - review meds/change route/product selection  1 Each Other PHARMACY TO DOSE Dmitri Pascual M.D.       • amiodarone (CORDARONE) tablet 200 mg  200 mg Enteral Tube Q DAY Dmitri Pascual M.D.   200 mg at 10/27/20 0502   • furosemide (LASIX) tablet 20 mg  20 mg Enteral Tube Q DAY Dmitri Pascual M.D.   20 mg at 10/27/20 0502   • DULoxetine (CYMBALTA) capsule 60 mg  60 mg Enteral Tube QHS Dmitri Pascual M.D.   60 mg at 10/26/20 2114   • topiramate (TOPAMAX) tablet 75 mg  75 mg Enteral Tube DAILY Dmitri Pascual M.D.   75 mg at 10/27/20 0627   • rivaroxaban (XARELTO) tablet 20 mg  20 mg Enteral Tube PM MEAL Dmitri Pascual M.D.   20 mg at 10/26/20 1754   • ondansetron (ZOFRAN) syringe/vial injection 4 mg  4 mg Intravenous Q4HRS PRN Dmitri Pascual M.D.   4 mg at 10/12/20 2201       Fluids    Intake/Output Summary (Last 24 hours) at 10/27/2020 1501  Last data filed at 10/27/2020 1200  Gross per 24 hour   Intake 1700 ml   Output 1295 ml   Net 405 ml       Laboratory          Recent Labs     10/25/20  0510 10/26/20  0443 10/27/20  0354   SODIUM 128* 130* 133*   POTASSIUM 4.1 3.5* 4.0   CHLORIDE 93* 95* 100   CO2 32 31 29   BUN 18 21 17   CREATININE 0.38* 0.37* 0.36*   MAGNESIUM  --  2.0  --    PHOSPHORUS  --  3.2  --    CALCIUM 8.6 8.0* 7.6*     Recent Labs     10/25/20  0510 10/26/20  0443  10/27/20  0354   GLUCOSE 101* 97 82     Recent Labs     10/25/20  0510 10/26/20  0443 10/27/20  0354   WBC 11.0* 10.4 9.1   NEUTSPOLYS 83.80* 84.10* 84.60*   LYMPHOCYTES 6.50* 6.90* 5.40*   MONOCYTES 8.20 7.80 9.00   EOSINOPHILS 0.40 0.30 0.10   BASOPHILS 0.10 0.10 0.10     Recent Labs     10/25/20  0510 10/26/20  0443 10/27/20  0354   RBC 3.67* 3.44* 3.21*   HEMOGLOBIN 8.9* 8.3* 8.0*   HEMATOCRIT 31.5* 28.8* 27.4*   PLATELETCT 280 281 287       Imaging  X-Ray:  I have personally reviewed the images and compared with prior images.  EKG:  I have personally reviewed the images and compared with prior images.  CT:    Reviewed  Echo:   Reviewed    Assessment/Plan  * Acute respiratory failure with hypoxia and hypercapnia (HCC)- (present on admission)  Assessment & Plan  Intubated 9/9-9/12, 9/17-9/19, 9/20-9/23 - now on 4th course on vent this admission alone  Trach 9/23  Given multiple cardiac arrest - currently no weaning trials attempted.   Ongoing discussions in the past regarding PEG placement vs hospice- pt is being indecisive  Palliative care is following.     Plan:   Continue full vent support. Avoid weaning  Pt does have capacity to make his own decisions  I had long discussion with him today regarding PEG tube placement. Pt does have capacity to make his own decisions. Pt decided for PEG tube placement.   Consulted GI Dr. Urias for PEG placement.       PEA (Pulseless electrical activity) ([facility])  Assessment & Plan  Related to weaning. It has occurred x5-6 times  No events while on ventilator  Too fragile, too deconditioned to wean for possibly several months      COPD (chronic obstructive pulmonary disease) ([facility])  Assessment & Plan  End stage COPD with mild-moderate pulm HTN  Continue bronchodilators prn 4-6 hours  RT protocols      Pneumonia- (present on admission)  Assessment & Plan  Resolved    Paroxysmal atrial fibrillation (HCC)- (present on admission)  Assessment & Plan  Afib with controlled ventricular  rate  Monitor    Malnutrition (HCC)  Assessment & Plan  Cont enteral nutrition,a t goal  Optimize electrolytes  Supplements as needed      Normocytic anemia- (present on admission)  Assessment & Plan  Stable    Dysphagia- (present on admission)  Assessment & Plan  No more vent weans- too fragile    Major depression- (present on admission)  Assessment & Plan  Continue Cymbalta  Continue to provide emotional support  At bedtime trazodone for sleep  Healing garden trips as clinically safe  Xanax at 0.25mg BID as needed for anxiety    Right ventricular dilation- (present on admission)  Assessment & Plan  Clinically not in severe right heart failure  Echo confirms severely dilated RV with mild RV systolic dysfunction  Presumably related to chronic hypoxemia and secondary pulmonary pretension  RVSP 42 mmHg  On lasix 20mg PO daily   Forced diuresis as needed, judicious fluid management  Will give lasix 20mg IV x1         VTE:  NOAC  Ulcer: PPI  Lines: Orozco Catheter  Ongoing indication addressed    I have performed a physical exam and reviewed and updated ROS and Plan today (10/27/2020). In review of yesterday's note (10/26/2020), there are no changes except as documented above.     Discussed patient condition and risk of morbidity and/or mortality with RN, RT, Pharmacy, Charge nurse / hot rounds and Patient

## 2020-10-27 NOTE — CARE PLAN
Problem: Pain Management  Goal: Pain level will decrease to patient's comfort goal  Outcome: PROGRESSING SLOWER THAN EXPECTED  Pt required fentanyl during DAY shift for back pain and presently refusing turns and mobility for fear of pain returning.     Problem: Urinary Elimination:  Goal: Ability to reestablish a normal urinary elimination pattern will improve  Outcome: PROGRESSING SLOWER THAN EXPECTED   Pt required morton replaced d/t urinary retention.

## 2020-10-28 NOTE — CARE PLAN
Problem: Ventilation Defect:  Goal: Ability to achieve and maintain unassisted ventilation or tolerate decreased levels of ventilator support  Outcome: PROGRESSING AS EXPECTED     Problem: Oxygenation:  Goal: Maintain adequate oxygenation dependent on patient condition  Outcome: PROGRESSING AS EXPECTED     Adult Ventilation Update    Patient Lines/Drains/Airways Status      Active Airway       Name: Placement date: Placement time: Site: Days:    Airway Trach Tracheostomy 6.0  10/25/20   1422   Tracheostomy  2                  No SBT/T-Piece this shift    Sputum/Suction  Cough: Productive (10/27/20 1507)  Sputum Amount: Small (10/27/20 1507)  Sputum Color: White (10/27/20 1507)  Sputum Consistency: Thin;Thick (10/27/20 1507)      Events/Summary/Plan: Pt. Remains on ventilator. Will continue to monitor.

## 2020-10-28 NOTE — CONSULTS
DATE OF SERVICE:  10/28/2020    REASON FOR CONSULTATION:  Feeding difficulty.    REQUESTING PHYSICIAN:  Dr. Cy Guidry    HISTORY OF PRESENT ILLNESS: Thank you very much for asking me to see this   unfortunate 54-year-old man who is currently on a ventilator via his   tracheostomy.  Therefore, the history is from the chart and history confirmed   with the patient who was alert.  Patient was admitted early last month with   respiratory failure and sepsis.  He was placed on the ventilator and has been   on the ventilator since.  He has had multiple failed attempts at weaning and   has had 6 separate episodes of cardiac arrest when being extubated.  Because   of this, a tracheostomy was placed.  He has been receiving his nutrition via   nasoenteric tube and tolerating his tube feedings well.  He is currently on   Xarelto for history of atrial fibrillation.  It is felt that he is going to be   on the ventilator long term and therefore other feeding sources are needed   and thus GI was called for consideration of gastrostomy tube.    REVIEW OF SYSTEMS:  A 10-system review of systems attempted by myself,   pertinent positives and negatives stated otherwise noncontributory.    PAST MEDICAL HISTORY:  Vent-dependent respiratory failure, atrial   fibrillation, hypertension, GERD.    PAST SURGICAL HISTORY:  Tracheostomy, orthopedic surgeries, abdominal surgery   of unknown significance.    ADVERSE DRUG REACTIONS:  PENICILLIN, KEFLEX, AND IRON DEXTRAN.    MEDICATIONS:  Reviewed by myself.  Please see the chart for detail.    SOCIAL HISTORY:  He used to smoke tobacco.  Prior to this admission, he would   drink moderate amounts of alcohol.    FAMILY HISTORY:  Mother and father had cancers of unknown origin.    LABORATORY DATA:  White count 7.9, hemoglobin 8.3, hematocrit 29.1, platelets   334.  Sodium 131, potassium 3.9, chloride 98, bicarbonate 28, BUN 16,   creatinine 0.41.  No recent liver enzymes.  No recent INR  either.    PHYSICAL EXAMINATION:  VITAL SIGNS:  Temperature 97.8, pulse of 80, blood pressure 100/62.  GENERAL:  He is in no distress, alert, on the ventilator, communicates with   hand signals, nodding and pen on paper.  HEENT:  Sclerae nonicteric.  Mucous membranes are pink and moist.  LUNGS:  Coarse bilaterally.  HEART:  Sounds are regular.  ABDOMEN:  Soft, nontender, scaphoid.    ASSESSMENT:  1.  Feeding difficulty.  2.  Ventilator-dependent respiratory failure.  3.  Failure to thrive.  4.  Anticoagulation use.    DISCUSSION:  It does look long-term that he is not going to be weaned from the   vent and I think a gastrostomy tube is reasonable.    PLAN:  1.  Hold Xarelto.  2.  Check INR.  3.  EGD with gastrostomy tube tomorrow with Dr. Garcia if he feels this is safe.    Thank you very much for allowing me to participate in the care of this nice   gentleman.  If you have any questions, please do not hesitate to call.       ____________________________________     MD HERMINIO LYMAN / FERNIE    DD:  10/28/2020 08:31:59  DT:  10/28/2020 08:57:03    D#:  [id number]  Job#:  [id number]

## 2020-10-28 NOTE — CARE PLAN
Ventilator Daily Summary     Vent Day # 11  Trache day 35  APVCMV  12/390/8/30%     Ventilator settings changed this shift: not at this time     Weaning trials: no     Respiratory Procedures: not at this time     Plan: Continue current ventilator settings and wean mechanical ventilation as tolerated per physician orders.

## 2020-10-28 NOTE — CARE PLAN
Problem: Infection  Goal: Will remain free from infection  Outcome: PROGRESSING AS EXPECTED  Intervention: Assess signs and symptoms of infection  Note: Completed  Intervention: Implement standard precautions and perform hand washing before and after patient contact  Note: Completed.  Intervention: Assess for removal of potential routes of infection, such as IV, central line, intra-arterial or urinary catheters  Note: Pt has morton and vent.  Refused bed bath this evening, but permitted RN to give CHG bath and perform morton and trach care.     Problem: Mobility  Goal: Risk for activity intolerance will decrease  Outcome: PROGRESSING SLOWER THAN EXPECTED  Intervention: Encourage patient to increase activity level in collaboration with Interdisciplinary Team  Note: Pt refusing activity r/t back pain.  This RN is of the impression that pt has become listless and apathetic.

## 2020-10-28 NOTE — DISCHARGE PLANNING
Anticipated Discharge Disposition: TBD    Action:   0946 per chart review, pt consented to PEG. GI consult placed. Requested difficult DC planning assistance from RN RENO De Paz via email.  12:00 spoke w/ pt at bedside. Pt communicates by writing. Asked pt if he has/had social security disability benefits. Pt wrote that he used to have disability benefits and he was getting some sort of government insurance check. Pt's handwriting and spelling difficult to understand. Spoke w/ [facility]. [facility] states that they do not assist pt with Medicare applications so unable to provide assistance to enroll pt in Medicare for placement options.     Barriers to Discharge:   No NOK  No support  Straight Medi[employer] insurance  Unable to locate contact info for  for straight Medi[employer] for placement assistance  Ventilator dependent  LTAC level of care  Difficult discharge/placement       Plan: [facility]

## 2020-10-28 NOTE — PROGRESS NOTES
Critical Care Progress Note    Date of admission  9/9/2020    Chief Complaint  54 y.o. male with H/o Afib admitted 9/9/2020 with respiratory failure, sepsis and pneumonia - intubated in the ER.    Hospital Course   9/17 -    developed worsening hypercarbic and hypoxemic respiratory failure requiring emergent intubation.  Full vent support.  9/18 -    continue vent support.  Titrating phenylephrine.  Replete electrolytes.  SAT/SBT as tolerated.  9/19 -    SAT/SBT as tolerated.  Continue vent support.  Titrating phenylephrine.  Replete potassium.  Continue Unasyn.  9/20 -    liberated from the ventilator yesterday and did very well until last night when he required reintubation.  Continue vent support.  Continue Unasyn and complete 5 days of therapy.  10/3 -    continue vent support.  T-piece trials during the day as tolerated.  10/4 -    continue T-piece trials during the day as tolerated.  Nocturnal ventilatory support.  10/5 - nocturnal vent support and T-piece trials as they as tolerated, may need LTAC/home vent after that-trilogy?  10/6 - HS ASV vent, days Tpiece w/PSMV at times, still fatigued at end of day, CM working on dispo  10/7 - HS ASV ventilation, T-piece days with PSMV at times, case management working on dyspnea with LTAC referral to facility that accepts [facility] insurance in [place], trilogy ventilation nocturnally still being considered  Ongoing therapies  10/8 - HS ASV ventilation, Tpiece and therapies on day, prob depressed, LTACh referal ongoing  10/9 - HS ASV ventilation, Tpiece days w/PSMV as tolerated, therapies  10/10 - HS ASV, Tpiece days, CAF, PSMV, want to go home  10/11 -  HS % x 8 hrs, T piece days w/PSMV, CAF   10/12 - % last night, T-piece, barium swallow and passed, doing PSMV trials, a-fib with RVR-->dose of amio  10/13 - % overnight, passed swallow eval, cont to work with speaking valve, a-fib  10/14 - %/8 overnight, a-fib rate controlled, improving  eating, using speaking valve, T-piece at 10 lpm at 40%   10/15 - pt remained on T-piece overnight at 8 lpm at 40%, eating more meals, went to the [employer] yesterday  10/16 - T-piece at 6-15 lpm at 40% for past 48 hours, TFs nocturnally now, meals during the day, PT/OT  10/17 - vent discontinued, T-piece at 10 lpm at 40%, ambulating more on own-->later in the day: PEA arrest with short CPR with ROSC achieved, placed back on the vent, hydroceles noted to scrotum  10/18 - weaned off ventilator, back to using speaking valve and eating this morning     10/19-  Remains off vent for two days  10/20- off vent on t-piece three days  10/21- transient lost of pulse, CPR, bagged and back to baseline  10/22- on vent now 24/7- no wean efforts indefinitely. Desaturation episode this am while on speaking valve- saturations to 50s, came up in several minutes with suctioning  10/23- on vent, awaiting LTAC/ SNF placement. Unclear if patient OK for long-term vent vs changing to comfort measures  10/24- on vent, no changes overnight.Pt unable/ unwilling to make preference re: long term care vs comfort care known to palliative care  10/25- on vent, no changes. No weaning attempted due to pt arrested nearly 6 times). Trach was changed out by RT. No further discussions with pt re: PEG, goals of care (pt unable/ unwilling and no surrogate)      Interval Problem Update  Reviewed last 24 hour events:  SBP was somewhat on low side in 70-80s but MAP maintained >60  No change in mental status.   Remains on full vent, 30% FIO2 PEEP 8. No weaning given recurrent cardiac arrests in the past  Pt decided for PEG tube today   Afebrile  HR in 70-80s  SBP 90-110s  Creatinine 0.41, HCO3 28  Sodium 131  UOP 940ml in the past 24 hours, I/O positive 500cc      Review of Systems  Review of Systems   Constitutional: Negative for chills and malaise/fatigue.   Respiratory: Negative for cough and shortness of breath.    Cardiovascular: Negative for chest  pain and leg swelling.   Gastrointestinal: Negative for abdominal pain, diarrhea, nausea and vomiting.   Neurological: Negative for headaches.   All other systems reviewed and are negative.       Vital Signs for last 24 hours   Temp:  [35.7 °C (96.3 °F)-36.5 °C (97.7 °F)] 35.7 °C (96.3 °F)  Pulse:  [60-85] 83  Resp:  [13-25] 20  BP: ()/(52-73) 99/72  SpO2:  [91 %-99 %] 96 %    Hemodynamic parameters for last 24 hours       Respiratory Information for the last 24 hours  Vent Mode: APVCMV  Rate (breaths/min): 12  Vt Target (mL): 390  PEEP/CPAP: 8  MAP: 12  Control VTE (exp VT): 422    Physical Exam   Physical Exam  Vitals signs (ON vent, writes notes slowly, not distressed) and nursing note reviewed.   Constitutional:       General: He is not in acute distress.     Appearance: He is ill-appearing. He is not toxic-appearing or diaphoretic.      Comments: Pt appears chronically ill  Cachectic  Sitting in chair comfortablly   HENT:      Head: Normocephalic and atraumatic.      Mouth/Throat:      Mouth: Mucous membranes are dry.      Pharynx: No oropharyngeal exudate.   Neck:      Musculoskeletal: Neck supple.      Comments: Trach in place   Cardiovascular:      Rate and Rhythm: Normal rate. Rhythm irregularly irregular.      Pulses: Normal pulses.      Heart sounds: Normal heart sounds. No murmur.   Pulmonary:      Effort: No respiratory distress.      Breath sounds: Normal breath sounds. No wheezing or rales.      Comments: Strong cough, coarse breath sounds throughout  Abdominal:      General: Bowel sounds are normal. There is no distension.      Palpations: Abdomen is soft.      Tenderness: There is no abdominal tenderness. There is no guarding.   Musculoskeletal:      Right lower leg: No edema.      Left lower leg: No edema.   Skin:     General: Skin is dry.      Capillary Refill: Capillary refill takes less than 2 seconds.   Neurological:      General: No focal deficit present.      Mental Status: He is alert.       Cranial Nerves: No cranial nerve deficit.      Motor: No weakness.   Psychiatric:      Comments: Low mood         Medications  Current Facility-Administered Medications   Medication Dose Route Frequency Provider Last Rate Last Dose   • furosemide (LASIX) tablet 40 mg  40 mg Enteral Tube Q DAY MOIRA Bess.O.   40 mg at 10/28/20 1158   • fentaNYL (SUBLIMAZE) injection 50 mcg  50 mcg Intravenous Q HOUR PRN MOIRA Bess.O.   50 mcg at 10/26/20 1257   • HYDROcodone-acetaminophen (NORCO) 5-325 MG per tablet 1 Tab  1 Tab Enteral Tube Q4HRS PRN Francisco Novoa M.D.   1 Tab at 10/28/20 1059   • ipratropium-albuterol (DUONEB) nebulizer solution  3 mL Nebulization Q2HRS PRN (RT) Dmitri Pascual M.D.       • MD Alert...ICU Electrolyte Replacement per Pharmacy   Other PHARMACY TO DOSE Nalini Aguirre M.D.       • Metoprolol Tartrate (LOPRESSOR) injection 5 mg  5 mg Intravenous Q5 MIN PRN Nalini Aguirre M.D.       • metoprolol (LOPRESSOR) tablet 25 mg  25 mg Enteral Tube TWICE DAILY Nalini Aguirre M.D.   Stopped at 10/28/20 0600   • senna-docusate (PERICOLACE or SENOKOT S) 8.6-50 MG per tablet 2 Tab  2 Tab Enteral Tube BID Dmitri Pascual M.D.   Stopped at 10/27/20 1800    And   • polyethylene glycol/lytes (MIRALAX) PACKET 1 Packet  1 Packet Enteral Tube QDAY PRN Dmitri Pascual M.D.        And   • magnesium hydroxide (MILK OF MAGNESIA) suspension 30 mL  30 mL Enteral Tube QDAY PRN Dmitri Pascual M.D.        And   • bisacodyl (DULCOLAX) suppository 10 mg  10 mg Rectal QDAY PRN Dmitri Pascual M.D.   10 mg at 10/17/20 2013   • FLUoxetine (PROZAC) capsule 20 mg  20 mg Enteral Tube DAILY Dmitri Pascual M.D.   20 mg at 10/28/20 0403   • lidocaine (LIDODERM) 5 % 2 Patch  2 Patch Transdermal Q24HR Dmitri Pascual M.D.   2 Patch at 10/27/20 2111   • traZODone (DESYREL) tablet 50 mg  50 mg Enteral Tube HS PRN Dmitri Pascual M.D.   50 mg at 10/22/20 6740   • multivitamin (THERAGRAN) tablet 1  Tab  1 Tab Enteral Tube DAILY Dmitri Pascual M.D.   1 Tab at 10/28/20 0403   • acetaminophen (TYLENOL) tablet 650 mg  650 mg Enteral Tube Q6HRS PRN Dmitri Pascual M.D.   650 mg at 10/27/20 1749   • ondansetron (ZOFRAN ODT) dispertab 4 mg  4 mg Enteral Tube Q4HRS PRN Dmitri Pascual M.D.   4 mg at 10/14/20 1545   • Respiratory Therapy Consult   Nebulization Continuous RT Dmitri Pascual M.D.       • lidocaine (XYLOCAINE) 1 % injection 1-2 mL  1-2 mL Tracheal Tube Q30 MIN PRN Dmitri Pascual M.D.       • omeprazole (FIRST-OMEPRAZOLE) 2 mg/mL oral susp 40 mg  40 mg Enteral Tube DAILY Dmitri Pascual M.D.   40 mg at 10/28/20 0404   • Pharmacy Consult: Enteral tube insertion - review meds/change route/product selection  1 Each Other PHARMACY TO DOSE Dmitri Pascual M.D.       • amiodarone (CORDARONE) tablet 200 mg  200 mg Enteral Tube Q DAY Dmitri Pascual M.D.   200 mg at 10/28/20 0403   • DULoxetine (CYMBALTA) capsule 60 mg  60 mg Enteral Tube QHS Dmitri Pascual M.D.   60 mg at 10/27/20 2111   • topiramate (TOPAMAX) tablet 75 mg  75 mg Enteral Tube DAILY Dmitri Pascual M.D.   75 mg at 10/28/20 0403   • ondansetron (ZOFRAN) syringe/vial injection 4 mg  4 mg Intravenous Q4HRS PRN Dmitri Pascual M.D.   4 mg at 10/12/20 2201       Fluids    Intake/Output Summary (Last 24 hours) at 10/28/2020 1615  Last data filed at 10/28/2020 1400  Gross per 24 hour   Intake 1705 ml   Output 1540 ml   Net 165 ml       Laboratory          Recent Labs     10/26/20  0443 10/27/20  0354 10/28/20  0414   SODIUM 130* 133* 131*   POTASSIUM 3.5* 4.0 3.9   CHLORIDE 95* 100 98   CO2 31 29 28   BUN 21 17 16   CREATININE 0.37* 0.36* 0.41*   MAGNESIUM 2.0  --   --    PHOSPHORUS 3.2  --   --    CALCIUM 8.0* 7.6* 7.5*     Recent Labs     10/26/20  0443 10/27/20  0354 10/28/20  0414   GLUCOSE 97 82 79     Recent Labs     10/26/20  0443 10/27/20  0354 10/28/20  0414   WBC 10.4 9.1 7.9   NEUTSPOLYS 84.10* 84.60* 82.30*    LYMPHOCYTES 6.90* 5.40* 7.60*   MONOCYTES 7.80 9.00 8.80   EOSINOPHILS 0.30 0.10 0.30   BASOPHILS 0.10 0.10 0.40     Recent Labs     10/26/20  0443 10/27/20  0354 10/28/20  0414 10/28/20  0845   RBC 3.44* 3.21* 3.40*  --    HEMOGLOBIN 8.3* 8.0* 8.3*  --    HEMATOCRIT 28.8* 27.4* 29.1*  --    PLATELETCT 281 287 334  --    PROTHROMBTM  --   --   --  18.8*   INR  --   --   --  1.52*       Imaging  X-Ray:  I have personally reviewed the images and compared with prior images.  EKG:  I have personally reviewed the images and compared with prior images.  CT:    Reviewed  Echo:   Reviewed    Assessment/Plan  * Acute respiratory failure with hypoxia and hypercapnia (HCC)- (present on admission)  Assessment & Plan  Intubated 9/9-9/12, 9/17-9/19, 9/20-9/23 - now on 4th course on vent this admission alone  Trach 9/23  Given multiple cardiac arrest - currently no weaning trials attempted.   Ongoing discussions in the past regarding PEG placement vs hospice- pt is being indecisive  Palliative care is following.     Plan:   Continue full vent support. Avoid weaning  Pt does have capacity to make his own decisions  I had long discussion with him today regarding PEG tube placement. Pt does have capacity to make his own decisions. Pt decided for PEG tube placement  GI was consulted for PEG placement.  Pt was on xarelto and last dose was yesterday 10/27.  Plan for PEG on 10/29.   Consulted GI Dr. Urias for PEG placement.  Disposition planning per        PEA (Pulseless electrical activity) ([facility])  Assessment & Plan  Related to weaning. It has occurred x5-6 times  No events while on ventilator  Too fragile, too deconditioned to wean for possibly several months      COPD (chronic obstructive pulmonary disease) ([facility])  Assessment & Plan  End stage COPD with mild-moderate pulm HTN  Continue bronchodilators prn 4-6 hours  RT protocols      Pneumonia- (present on admission)  Assessment & Plan  Resolved    Paroxysmal atrial  fibrillation (HCC)- (present on admission)  Assessment & Plan  Afib with controlled ventricular rate  Monitor    Malnutrition (HCC)  Assessment & Plan  Cont enteral nutrition,a t goal  Optimize electrolytes  Supplements as needed      Normocytic anemia- (present on admission)  Assessment & Plan  Stable    Dysphagia- (present on admission)  Assessment & Plan  No more vent weans- too fragile    Major depression- (present on admission)  Assessment & Plan  Continue Cymbalta  Continue to provide emotional support  At bedtime trazodone for sleep  Xanax at 0.25mg BID as needed for anxiety    Right ventricular dilation- (present on admission)  Assessment & Plan  Clinically not in severe right heart failure  Echo confirms severely dilated RV with mild RV systolic dysfunction  Presumably related to chronic hypoxemia and secondary pulmonary pretension  RVSP 42 mmHg  Will increase lasix to 40mg PO daily   Forced diuresis as needed, judicious fluid management         VTE:  NOAC  Ulcer: PPI  Lines: Orozco Catheter  Ongoing indication addressed    I have performed a physical exam and reviewed and updated ROS and Plan today (10/28/2020). In review of yesterday's note (10/27/2020), there are no changes except as documented above.     Discussed patient condition and risk of morbidity and/or mortality with RN, RT, Pharmacy, Charge nurse / hot rounds and Patient

## 2020-10-28 NOTE — CARE PLAN
Problem: Urinary Elimination:  Goal: Ability to reestablish a normal urinary elimination pattern will improve  Intervention: Evaluate need to continue indwelling urinary catheter  Note: Orozco placed for retension      Problem: Mobility  Goal: Risk for activity intolerance will decrease  Intervention: Assess and monitor signs of activity intolerance  Note: Patient mobilized up to the chair with two person assist.

## 2020-10-29 NOTE — CARE PLAN
Problem: Pain Management  Goal: Pain level will decrease to patient's comfort goal  Note: Patient educated on nursing pain assessment scale. Q4 hour and PRN pain assessments completed.      Problem: Mobility  Goal: Risk for activity intolerance will decrease  Intervention: Assess and monitor signs of activity intolerance  Note: Patient mobilized up to the chair for 2.5 hours. Patient tolerated well. Patient placed back in bed for Peg tube placement.

## 2020-10-29 NOTE — PROGRESS NOTES
Critical Care Progress Note    Date of admission  9/9/2020    Chief Complaint  54 y.o. male with H/o Afib admitted 9/9/2020 with respiratory failure, sepsis and pneumonia - intubated in the ER.    Hospital Course   9/17 -    developed worsening hypercarbic and hypoxemic respiratory failure requiring emergent intubation.  Full vent support.  9/18 -    continue vent support.  Titrating phenylephrine.  Replete electrolytes.  SAT/SBT as tolerated.  9/19 -    SAT/SBT as tolerated.  Continue vent support.  Titrating phenylephrine.  Replete potassium.  Continue Unasyn.  9/20 -    liberated from the ventilator yesterday and did very well until last night when he required reintubation.  Continue vent support.  Continue Unasyn and complete 5 days of therapy.  10/3 -    continue vent support.  T-piece trials during the day as tolerated.  10/4 -    continue T-piece trials during the day as tolerated.  Nocturnal ventilatory support.  10/5 - nocturnal vent support and T-piece trials as they as tolerated, may need LTAC/home vent after that-trilogy?  10/6 - HS ASV vent, days Tpiece w/PSMV at times, still fatigued at end of day, CM working on dispo  10/7 - HS ASV ventilation, T-piece days with PSMV at times, case management working on dyspnea with LTAC referral to facility that accepts [facility] insurance in [place], trilogy ventilation nocturnally still being considered  Ongoing therapies  10/8 - HS ASV ventilation, Tpiece and therapies on day, prob depressed, LTACh referal ongoing  10/9 - HS ASV ventilation, Tpiece days w/PSMV as tolerated, therapies  10/10 - HS ASV, Tpiece days, CAF, PSMV, want to go home  10/11 -  HS % x 8 hrs, T piece days w/PSMV, CAF   10/12 - % last night, T-piece, barium swallow and passed, doing PSMV trials, a-fib with RVR-->dose of amio  10/13 - % overnight, passed swallow eval, cont to work with speaking valve, a-fib  10/14 - %/8 overnight, a-fib rate controlled, improving  eating, using speaking valve, T-piece at 10 lpm at 40%   10/15 - pt remained on T-piece overnight at 8 lpm at 40%, eating more meals, went to the [employer] yesterday  10/16 - T-piece at 6-15 lpm at 40% for past 48 hours, TFs nocturnally now, meals during the day, PT/OT  10/17 - vent discontinued, T-piece at 10 lpm at 40%, ambulating more on own-->later in the day: PEA arrest with short CPR with ROSC achieved, placed back on the vent, hydroceles noted to scrotum  10/18 - weaned off ventilator, back to using speaking valve and eating this morning     10/19-  Remains off vent for two days  10/20- off vent on t-piece three days  10/21- transient lost of pulse, CPR, bagged and back to baseline  10/22- on vent now 24/7- no wean efforts indefinitely. Desaturation episode this am while on speaking valve- saturations to 50s, came up in several minutes with suctioning  10/23- on vent, awaiting LTAC/ SNF placement. Unclear if patient OK for long-term vent vs changing to comfort measures  10/24- on vent, no changes overnight.Pt unable/ unwilling to make preference re: long term care vs comfort care known to palliative care  10/25- on vent, no changes. No weaning attempted due to pt arrested nearly 6 times). Trach was changed out by RT. No further discussions with pt re: PEG, goals of care (pt unable/ unwilling and no surrogate)      Interval Problem Update  Reviewed last 24 hour events:  SBP was somewhat on low side in 70-80s but MAP maintained >60  No change in mental status.   Remains on full vent, 30% FIO2 PEEP 8. No weaning given recurrent cardiac arrests in the past  Afebrile  HR in 80-90s  SBP 90-110s  Creatinine 0.44, HCO3 28  Sodium 131  UOP 940ml in the past 24 hours, I/O positive 500cc    Addendum:  GI attempted to do PEG placement today, but unsuccesful due to likely anatomical change from previous abdominal surgery  Gen/Trauma Surgery Dr. Sabillon was consulted for G-tube placement      Review of Systems  Review  of Systems   Constitutional: Negative for chills.   Respiratory: Negative for cough and shortness of breath.    Cardiovascular: Negative for chest pain and leg swelling.   Gastrointestinal: Negative for abdominal pain, diarrhea, nausea and vomiting.   Neurological: Negative for headaches.   All other systems reviewed and are negative.       Vital Signs for last 24 hours   Temp:  [35.7 °C (96.3 °F)-36.2 °C (97.2 °F)] 36.1 °C (96.9 °F)  Pulse:  [54-87] 84  Resp:  [9-21] 17  BP: ()/(48-75) 104/75  SpO2:  [81 %-99 %] 94 %    Hemodynamic parameters for last 24 hours       Respiratory Information for the last 24 hours  Vent Mode: APVCMV  Rate (breaths/min): 12  Vt Target (mL): 390  PEEP/CPAP: 8  MAP: 11  Control VTE (exp VT): 404    Physical Exam   Physical Exam  Vitals signs (ON vent, writes notes slowly, not distressed) and nursing note reviewed.   Constitutional:       General: He is not in acute distress.     Appearance: He is ill-appearing. He is not toxic-appearing or diaphoretic.      Comments: Pt appears chronically ill  Cachectic  Sitting in chair comfortablly   HENT:      Head: Normocephalic.      Mouth/Throat:      Mouth: Mucous membranes are dry.      Pharynx: No oropharyngeal exudate.   Neck:      Musculoskeletal: Neck supple.      Comments: Trach in place   Cardiovascular:      Rate and Rhythm: Normal rate. Rhythm irregularly irregular.      Pulses: Normal pulses.      Heart sounds: Normal heart sounds. No murmur.   Pulmonary:      Effort: No respiratory distress.      Breath sounds: Normal breath sounds. No wheezing or rales.      Comments: Strong cough, coarse breath sounds throughout  Abdominal:      General: Bowel sounds are normal. There is no distension.      Palpations: Abdomen is soft.      Tenderness: There is no abdominal tenderness. There is no guarding.   Musculoskeletal:      Right lower leg: No edema.      Left lower leg: No edema.   Skin:     General: Skin is dry.      Capillary Refill:  Capillary refill takes less than 2 seconds.   Neurological:      General: No focal deficit present.      Mental Status: He is alert.      Cranial Nerves: No cranial nerve deficit.      Motor: No weakness.      Gait: Gait normal.         Medications  Current Facility-Administered Medications   Medication Dose Route Frequency Provider Last Rate Last Dose   • FENTANYL CITRATE (PF) 0.05 MG/ML INJ SOLN (WRAPPED)            • MIDAZOLAM HCL 5 MG/5ML INJ SOLN (WRAPPED)            • MD ALERT... vancomycin per MD   Other Physician to Dose Bryan Garcia M.D.       • furosemide (LASIX) tablet 40 mg  40 mg Enteral Tube Q DAY Cy Guidry D.O.   Stopped at 10/29/20 0600   • fentaNYL (SUBLIMAZE) injection 50 mcg  50 mcg Intravenous Q HOUR PRN DAMARIS BessO.   50 mcg at 10/26/20 1257   • HYDROcodone-acetaminophen (NORCO) 5-325 MG per tablet 1 Tab  1 Tab Enteral Tube Q4HRS PRN Francisco Novoa M.D.   1 Tab at 10/29/20 0151   • ipratropium-albuterol (DUONEB) nebulizer solution  3 mL Nebulization Q2HRS PRN (RT) Dmitri Pascual M.D.       • MD Alert...ICU Electrolyte Replacement per Pharmacy   Other PHARMACY TO DOSE Nalini Aguirre M.D.       • Metoprolol Tartrate (LOPRESSOR) injection 5 mg  5 mg Intravenous Q5 MIN PRN Nalini Aguirre M.D.       • metoprolol (LOPRESSOR) tablet 25 mg  25 mg Enteral Tube TWICE DAILY Nalini Aguirre M.D.   Stopped at 10/29/20 0600   • senna-docusate (PERICOLACE or SENOKOT S) 8.6-50 MG per tablet 2 Tab  2 Tab Enteral Tube BID Dmitri Pascual M.D.   Stopped at 10/27/20 1800    And   • polyethylene glycol/lytes (MIRALAX) PACKET 1 Packet  1 Packet Enteral Tube QDAY PRN Dmitri Pascual M.D.        And   • magnesium hydroxide (MILK OF MAGNESIA) suspension 30 mL  30 mL Enteral Tube QDAY PRN Dmitri Pascual M.D.        And   • bisacodyl (DULCOLAX) suppository 10 mg  10 mg Rectal QDAY PRN Dmitri Pascual M.D.   10 mg at 10/17/20 2013   • FLUoxetine (PROZAC) capsule 20 mg  20 mg Enteral Tube  DAILY Dmitri Pascual M.D.   Stopped at 10/29/20 0600   • lidocaine (LIDODERM) 5 % 2 Patch  2 Patch Transdermal Q24HR Dmitri Pascual M.D.   2 Patch at 10/28/20 2038   • traZODone (DESYREL) tablet 50 mg  50 mg Enteral Tube HS PRN Dmitri Pascual M.D.   50 mg at 10/22/20 2340   • multivitamin (THERAGRAN) tablet 1 Tab  1 Tab Enteral Tube DAILY Dmitri Pascual M.D.   Stopped at 10/29/20 0600   • acetaminophen (TYLENOL) tablet 650 mg  650 mg Enteral Tube Q6HRS PRN Dmitri Pascual M.D.   650 mg at 10/27/20 1749   • ondansetron (ZOFRAN ODT) dispertab 4 mg  4 mg Enteral Tube Q4HRS PRN Dmitri Pascual M.D.   4 mg at 10/14/20 1545   • Respiratory Therapy Consult   Nebulization Continuous RT Dmitri Pascual M.D.       • lidocaine (XYLOCAINE) 1 % injection 1-2 mL  1-2 mL Tracheal Tube Q30 MIN PRN Dmitri Pascual M.D.       • omeprazole (FIRST-OMEPRAZOLE) 2 mg/mL oral susp 40 mg  40 mg Enteral Tube DAILY Dmitri Pascual M.D.   Stopped at 10/29/20 0600   • Pharmacy Consult: Enteral tube insertion - review meds/change route/product selection  1 Each Other PHARMACY TO DOSE Dmitri Pascual M.D.       • amiodarone (CORDARONE) tablet 200 mg  200 mg Enteral Tube Q DAY Dmitri Pascual M.D.   Stopped at 10/29/20 0600   • DULoxetine (CYMBALTA) capsule 60 mg  60 mg Enteral Tube QHS Dmitri Pascual M.D.   60 mg at 10/28/20 2037   • topiramate (TOPAMAX) tablet 75 mg  75 mg Enteral Tube DAILY Dmitri Pascual M.D.   Stopped at 10/29/20 0600   • ondansetron (ZOFRAN) syringe/vial injection 4 mg  4 mg Intravenous Q4HRS PRN Dmitri Pascual M.D.   4 mg at 10/12/20 2201       Fluids    Intake/Output Summary (Last 24 hours) at 10/29/2020 1006  Last data filed at 10/29/2020 1000  Gross per 24 hour   Intake 1080 ml   Output 1475 ml   Net -395 ml       Laboratory          Recent Labs     10/27/20  0354 10/28/20  0414 10/29/20  0430   SODIUM 133* 131* 126*   POTASSIUM 4.0 3.9 4.2   CHLORIDE 100 98 94*   CO2 29  28 30   BUN 17 16 16   CREATININE 0.36* 0.41* 0.44*   CALCIUM 7.6* 7.5* 7.9*     Recent Labs     10/27/20  0354 10/28/20  0414 10/29/20  0430   GLUCOSE 82 79 80     Recent Labs     10/27/20  0354 10/28/20  0414 10/29/20  0430   WBC 9.1 7.9 10.0   NEUTSPOLYS 84.60* 82.30* 77.50*   LYMPHOCYTES 5.40* 7.60* 10.90*   MONOCYTES 9.00 8.80 10.40   EOSINOPHILS 0.10 0.30 0.30   BASOPHILS 0.10 0.40 0.30     Recent Labs     10/27/20  0354 10/28/20  0414 10/28/20  0845 10/29/20  0430   RBC 3.21* 3.40*  --  3.62*   HEMOGLOBIN 8.0* 8.3*  --  8.9*   HEMATOCRIT 27.4* 29.1*  --  30.7*   PLATELETCT 287 334  --  423   PROTHROMBTM  --   --  18.8* 17.4*   INR  --   --  1.52* 1.38*       Imaging  X-Ray:  I have personally reviewed the images and compared with prior images.  EKG:  I have personally reviewed the images and compared with prior images.  CT:    Reviewed  Echo:   Reviewed    Assessment/Plan  * Acute respiratory failure with hypoxia and hypercapnia (HCC)- (present on admission)  Assessment & Plan  Intubated 9/9-9/12, 9/17-9/19, 9/20-9/23 - now on 4th course on vent this admission alone  Trach 9/23  Given multiple cardiac arrest - currently no weaning trials attempted.   Ongoing discussions in the past regarding PEG placement vs hospice- pt is being indecisive  Palliative care is following.     Plan:   Continue full vent support. Avoid weaning  Pt elected PEG placement   Pt was on xarelto and last dose was yesterday 10/27.  GI was consulted for PEG placement and attempted to place PEG however procedure was aborted due to likely anatomical difficulty from previous abdominal surgery.   Surgery was consulted Dr. Sabillon for G-tube placement   Disposition planning per  after G-tube placement. Pt has Medical and could only go to LTAC in [place]      PEA (Pulseless electrical activity) (HCC)  Assessment & Plan  Related to weaning. It has occurred x5-6 times  No events while on ventilator  Too fragile, too deconditioned to  wean for possibly several months      COPD (chronic obstructive pulmonary disease) ([facility])  Assessment & Plan  End stage COPD with mild-moderate pulm HTN  Continue bronchodilators prn 4-6 hours  RT protocols      Pneumonia- (present on admission)  Assessment & Plan  Resolved    Paroxysmal atrial fibrillation (HCC)- (present on admission)  Assessment & Plan  Afib with controlled ventricular rate  Monitor    Malnutrition ([facility])  Assessment & Plan  Cont enteral nutrition,a t goal  Optimize electrolytes  Supplements as needed      Normocytic anemia- (present on admission)  Assessment & Plan  Stable    Dysphagia- (present on admission)  Assessment & Plan  No more vent weans- too fragile    Major depression- (present on admission)  Assessment & Plan  Continue Cymbalta  Continue to provide emotional support  At bedtime trazodone for sleep  Xanax at 0.25mg BID as needed for anxiety    Right ventricular dilation- (present on admission)  Assessment & Plan  Clinically not in severe right heart failure  Echo confirms severely dilated RV with mild RV systolic dysfunction  Presumably related to chronic hypoxemia and secondary pulmonary pretension  RVSP 42 mmHg  Continue lasix to 40mg PO daily   Forced diuresis as needed, judicious fluid management         VTE:  NOAC  Ulcer: PPI  Lines: Orozco Catheter  Ongoing indication addressed    I have performed a physical exam and reviewed and updated ROS and Plan today (10/29/2020). In review of yesterday's note (10/28/2020), there are no changes except as documented above.     Discussed patient condition and risk of morbidity and/or mortality with RN, RT, Pharmacy, Charge nurse / hot rounds and Patient

## 2020-10-29 NOTE — PROCEDURES
Esophagogastroduodenoscopy  Date of Procedure: 10/29/2020  Attending Physician: Bryan Garcia    Indications: PEG tube placement  Instrument: Olympus Flexible Endoscope  Sedation:   Anesthesiologist/Type of Anesthesia: Monitored Anesthesia Care  Dr. Cy Guidry DO, please see MICU record.  Time out 10:36AM  End time: 10:51AM    1g IV Vancomycin was given before procedure    Pre-Anesthesia Assessment:  Prior to the procedure, a History and Physical was performed, and patient medications and allergies were reviewed. The patient’s tolerance of previous anesthesia was also reviewed. The risks and benefits of the procedure and the sedation options and risks were discussed with the patient including but not limited to infection, bleeding, aspiration, perforation, adverse medication reaction, missed diagnosis, and missed lesions. The patient verbalized understanding. All questions were answered, and informed consent was obtained.       After I obtained informed consent from the patient, the patient was placed in the left lateral position. Appropriate time-out protocol was followed: the correct patient, the correct procedure, and the correct equipment in the room were confirmed. Throughout the procedure, the patient’s blood pressure, pulse, and oxygen saturations were monitored continuously. The Olympus flexible gastroscope was gently passed through the incisoral orifice into the oral cavity and under direct visualization the esophagus was intubated. The endoscope was passed down the esophagus, through the stomach and into the 1st portion of the duodenum. Retroflexion was performed at the gastroesophageal junction. Color, texture, mucosa and anatomy of esophagus, stomach, and duodenum were carefully examined with the scope.  After completion of the examination, the endoscope as removed. The patient tolerated the procedure well. There were no immediate postoperative complications.       Findings:    Esophagus: GEJ @ 35cm. Normal  esophagus otherwise  Stomach: Hiatus suspected at 40cm from incisors. The anatomy was abnormal unclear if surgical change vs hiatal hernia. Max Insufflation and translumination unable to identify safe location of PEG tube placement via endoscopy  Duodenum: examination to 1st portion normal. No gastric outlet obstruction.    Review of CT scan suggestive of possible transverse colon overlying stomach preventing translumination    Impressions:   1. Unsuccessful EGD attempt for placement of PEG tube  2. Suspect possible hiatal hernia (5cm) vs post surgical change anatomy    Recommendations:   1. Consider surgical consultation for PEG tube vs J tube placement if necessary      GI TO SIGN OFF / STAND BY - Thank you very much for allowing me to participate in the care of your patient.  Please feel free to contact me anytime at [phone]      This note was generated using voice recognition software which has a small chance of producing errors of grammar and possibly content. I have made every reasonable attempt to find and correct any obvious errors, but expect that some may not be found prior to finalization of this note

## 2020-10-29 NOTE — CARE PLAN
Problem: Skin Integrity  Goal: Risk for impaired skin integrity will decrease  Note: Pt's sacrum is red but blanching.  Mepilex in place.  Pt refused to turn at 2000 and said he wanted to remain supine.  Discussed skin integrity and risk of wounds if pt does not turn.  Pt indicated he understood but still wanted to remain supine.  Will continue to provide education and offer turns to pt q2hrs.       Problem: Pain Management  Goal: Pain level will decrease to patient's comfort goal  Note: Pain assessed q4hrs and PRN.  Pain medications given per MAR.  Refer to flowsheet for non-pharmalogical interventions. Reviewed pain goals with patient.        Problem: Mobility  Goal: Risk for activity intolerance will decrease  Note: Pt refused mobility.  Discussed benefits of mobility.  Pt indicated he understood but continued to refuse.  He stated he would maybe mobilize in the morning.

## 2020-10-29 NOTE — PROGRESS NOTES
Bedside report received from NATHALIE Evans and pt care assumed at 1900.  Assessment completed and plan of care discussed with pt including pain control, NPO at 0000, and peg tube tomorrow morning.

## 2020-10-29 NOTE — PROGRESS NOTES
Monitor Summary: A-fib 's    While going to OR on ambu-bag patient heart rate 120-140's    -/.08/-

## 2020-10-29 NOTE — PROGRESS NOTES
Patient transport to Pre-Op on monitor with ambu-bag. RT,RN, and transport present. Patient became very anxious while going down to OR. Heart rate up to the 140's and RR 30-40. Report given to Pre-op RN and Dr. Lott.

## 2020-10-29 NOTE — PROGRESS NOTES
54 yom with prolonged respiratory failure  Needs alternate enteral access  Failed PEG placement earlier today  Will plan on lap g tube  Discussed risks and benefits  Wishes to proceed.

## 2020-10-29 NOTE — ANESTHESIA PREPROCEDURE EVALUATION
Relevant Problems   PULMONARY   (+) COPD (chronic obstructive pulmonary disease) (HCC)   (+) Pneumonia      CARDIAC   (+) Chronic migraine   (+) Essential hypertension   (+) PEA (Pulseless electrical activity) (HCC)   (+) Paroxysmal atrial fibrillation (HCC)   (+) Pulmonary hypertension (HCC)       Physical Exam    Anesthesia Plan    ASA 4- EMERGENT       Plan - general                       Informed Consent:

## 2020-10-29 NOTE — DISCHARGE PLANNING
note:  This CM assisting with placement of complex care pt.     Called :  [facility]   [address], [place]  Per Dominique they have beds so CM will send choice to [facility] for a referral.   Discussed with RENO Rodríguez  Tel [phone]  Fax [phone]    No bed at [facility]  [phone]    No trach beds at [facility]  [phone]    [facility]  [phone]    [facility]  [phone]    CM called Uma at [phone] to find out the  for pt with [facility]. There was no answer and  was on hold for about 30 minutes.

## 2020-10-29 NOTE — DISCHARGE PLANNING
note:  Called [facility].   They may have bed available and can fx referral to : [phone]  Tel : [phone]    [facility] will not accept transfers with [facility] at this time.     Called [facility]  [phone] but the do not accept [facility]  This facility is connected to [facility]    Called:  [facility] [phone] No Answer  [facility] [phone] No Beds  [facility] does not take trach [phone]  [facility] [phone].

## 2020-10-29 NOTE — DISCHARGE PLANNING
note:  Called [facility] for [place] at [phone]. She confirmed that there are a few places that can take trach pts and would be better if we work on decannulation.     She suggested the Subacute Unit at [facility]  [address]  Tel [phone] Fax [phone]  They have a subacute unit that accept traches but admission nurse is not available but can fax the referral and attention to Ch[facility]. Will follow up.

## 2020-10-29 NOTE — PROGRESS NOTES
1020 Dr. Garcia an Dr. Guidry at bedside    1025 Consent signed by both patient and Dr. Garcia. Consent placed in chart.     1036 Time out completed, all agree    1037 50 mcg of fentanyl and 2 of versed given IVP    1039 20 mg of Propofol given, IVP    1040 20 mg of Propofol given IVP    1041 30 mg of Propofol given IVP    1044 30 mg of Propofol given IVP    1047 50 mcg of Fentanyl given IVP    1051 Procedure end time    Dr. Garcia unable to perform PEG tube due to anatomy. Dr. Guidry to consult surgery.

## 2020-10-29 NOTE — CONSULTS
DATE OF SERVICE:  10/29/2020    SURGERY CONSULTATION    HISTORY OF PRESENT ILLNESS:  The patient is a 54-year-old man, who I have been   asked to evaluate further for consideration of laparoscopic G-tube placement.    He has been in the hospital for about a month.  He was admitted with sepsis   and has had prolonged requirement for ventilation.  He is currently being   ventilated through the trach.  He is not extubatable.  He has had multiple   cardiac arrests when being extubated during this admission.  A PEG tube was   attempted earlier today, but was not completed due to inability to visualize   through the abdominal wall.  He has had some type of abdominal surgery, which   is not clear.  By my exam, he is comfortable and in no distress.    PAST MEDICAL HISTORY:  Gleaned from the chart is significant for atrial   fibrillation, hypertension, gastroesophageal reflux disease.    PAST SURGICAL HISTORY:  Tracheostomy, some type of abdominal surgery and   multiple orthopedic procedures.    MEDICATIONS:  Currently  include amiodarone, Cymbalta, fentanyl, Prozac,   Lasix, DuoNeb, Lidoderm patch, Lopressor, and multivitamins.    ALLERGIES:  HE HAS ALLERGIES TO KEFLEX, PENICILLIN, IRON DEXTRAN.    SOCIAL HISTORY:  He does drink moderately, quit smoking, does not use illicit   drugs.    FAMILY HISTORY:  Significant for malignancies, not otherwise defined.    REVIEW OF SYSTEMS:  Currently unobtainable as he is nonverbal.    PHYSICAL EXAMINATION:  VITAL SIGNS:  He currently has a temperature of 36.8, pulse of 98, blood   pressure is 89/61.  GENERAL:  He is lying in bed, does not appear distressed.  HEENT:  Unremarkable.  His oropharynx is without lesions.  He does have a   tracheostomy in place and a Dobbhoff tube in place.  LUNGS:  Clear.  He is ventilated.  ABDOMEN:  Soft.  There is a low transverse incision below his abdomen of   unclear etiology.  EXTREMITIES:  Symmetrical.  NEUROLOGIC:  He has palpable radial and femoral  pulses.  He does not have any   focal deficits.    LABORATORY DATA:  Includes a white count 7.9, hematocrit 29.1, platelets of   344.  Sodium is 131, potassium 3.9, chloride is 98, BUN 16, creatinine 0.41.    Again, he had endoscopy with anticipation of a PEG today, which was abandoned   due to inability to visualize through the abdominal wall.  He was noted to   have either hiatal hernia, some sort of alteration of his anatomy, but the   duodenum was reached without difficulty during this endoscopy, suggesting that   his stomach is indeed intact.  We will plan on a laparoscopic G-tube.  I   discussed this with the patient including laparoscopic approach, potential   converting to an open procedure, associated risk of bleeding, infection,   abscess, and hematoma.  I also discussed the risk of tube malfunction and   peritonitis.  He understands all the above and does wish to proceed.  We will   make arrangements.       ____________________________________     MD TORITO TONEY / FERNIE    DD:  10/29/2020 14:41:31  DT:  10/29/2020 16:26:02    D#:  [id number]  Job#:  [id number]

## 2020-10-29 NOTE — PROCEDURES
Conscious Sedation Procedure Note    Indication: acute hypoxic respiratory failure s/p trach, need PEG    Consent: I have discussed with the patient and/or the patient representative the indication, alternatives, and the possible risks and/or complications of the planned procedure and the anesthesia methods. The patient and/or patient representative appear to understand and agree to proceed.    Physician Involvement: The attending physician was present and supervising this procedure.    Pre-Sedation Documentation and Exam: I have personally completed a history, physical exam & review of systems for this patient (see notes).  Vital signs have been reviewed (see flow sheet for vitals).  I have reviewed the patient's history and review of systems.    Airway Assessment: normal    Prior History of Anesthesia Complications: none    ASA Classification: Class 4 - A patient with an incapacitating systemic disease that is a constant threat to life    Sedation/ Anesthesia Plan: intravenous sedation    Medications Used: midazolam (Versed) intravenously, fentanyl intravenously and propofol intravenously    Monitoring and Safety: The patient was placed on a cardiac monitor and vital signs, pulse oximetry and level of consciousness were continuously evaluated throughout the procedure. The patient was closely monitored until recovery from the medications was complete and the patient had returned to baseline status. Respiratory therapy was on standby at all times during the procedure.    Post-Sedation Vital Signs: Vital signs were reviewed and were stable after the procedure (see flow sheet for vitals)            Post-Sedation Exam: Lungs: clear to auscultation bilaterally and Cardiovascular: regular rate and rhythm           Complications: none    Sedation Time: 1036 to 1051. 15 Total minutes

## 2020-10-29 NOTE — DISCHARGE PLANNING
Anticipated Discharge Disposition: TBD    Action: received call from Gisell at [facility]. Informed Gisell that PEG placed today. Faxed updated notes to Gisell [phone]  1530: per BSRN, bedside PEG placement failed due to difficult anatomic location. Pt scheduled for OR PEG this evening     Barriers to Discharge:   No NOK  No support  [facility] MediCal insurance  Unable to locate contact info for  for straight MediCal for placement assistance  Ventilator dependent  LTAC level of care  Difficult discharge/placement       Plan: TBD

## 2020-10-30 PROBLEM — R57.9 SHOCK (HCC): Status: ACTIVE | Noted: 2020-01-01

## 2020-10-30 NOTE — PALLIATIVE CARE
Palliative Care follow-up  PC RN discussed with Dr. Guidry. MD able to have comprehensive discussion with pt and pt verbalized desire for PEG. PC RN will continue to follow and support.         Thank you for allowing Palliative Care to support this patient and family. Contact [phone] for additional assistance, change in patient status, or with any questions/concerns.

## 2020-10-30 NOTE — PROGRESS NOTES
"Received bedside report assumed care of pt. Pt resting in bed. Bedside table, and call light within reach. Bed alarm on and in lowest position. Pt in AFIB with a rate of 150, Metropolol pushes prn.  Updated whiteboard in room and discussed today's POC. No questions at this time.  Pt educated on the importance of using waffle mattress and q2hr turning and refused, pt states \"I don't care if I get bed sores\".    "

## 2020-10-30 NOTE — CARE PLAN
Problem: Ventilation Defect:  Goal: Ability to achieve and maintain unassisted ventilation or tolerate decreased levels of ventilator support  Outcome: PROGRESSING AS EXPECTED  Note:    Ventilator Daily Summary    Vent Day #10  Trach Day# 38    Ventilator settings changed this shift:No    Weaning trials:No    Respiratory Procedures:No    Plan: Continue current ventilator settings and wean mechanical ventilation as tolerated per physician orders.

## 2020-10-30 NOTE — PROGRESS NOTES
1140 Dr Guidry at bedside for bronchoscopy procedure. Dr. Guidry explained procedure to patient and patient agrees to bronchoscopy.  Consent signed by Dr. Guidry and patient.     1145 Time out completed, all agree.    1148 100 mcg Fentanyl IVP given, see MAR    1149 30 mg Propofol IVP given    1150 Bronchoscopy procedure started    1153 30 mg of Propofol IVP given, see MAR    1156 end of bronchoscopy procedure

## 2020-10-30 NOTE — OR SURGEON
Immediate Post OP Note    PreOp Diagnosis: inability to eat    PostOp Diagnosis: inability to eat    Procedure(s):  CREATION, JEJUNOSTOMY - Wound Class: Clean Contaminated  LAPAROTOMY, EXPLORATORY - Wound Class: Clean Contaminated  LYSIS, ADHESIONS - Wound Class: Clean Contaminated    Surgeon(s):  Arash Sabillon M.D.    Anesthesiologist/Type of Anesthesia:  Anesthesiologist: Scott Lott M.D./General    Surgical Staff:  Circulator: Wenceslao Bolden R.N.  Relief Circulator: Bonnie Benz R.N.  Scrub Person: Chris Johansen R.N.  Count [place]: Lucie Hermosillo    Specimens removed if any:  * No specimens in log *    Estimated Blood Loss: 25 cc    Findings: extremely dense adhesions in the upper abdomen    Complications: none        10/29/2020 6:11 PM Arash Sabillon M.D.

## 2020-10-30 NOTE — PROCEDURES
Central Line Insertion    Date/Time: 10/30/2020 3:38 PM  Performed by: Cy Guidry D.O.  Authorized by: Cy Guidry D.O.     Consent:     Consent obtained:  Verbal    Consent given by:  Patient    Risks discussed:  Arterial puncture, bleeding, infection, incorrect placement, nerve damage and pneumothorax    Alternatives discussed:  Alternative treatment  Universal protocol:     Site/side marked: yes      Immediately prior to procedure, a time out was called: yes      Patient identity confirmed:  Verbally with patient and arm band  Pre-procedure details:     Hand hygiene: Hand hygiene performed prior to insertion      Sterile barrier technique: All elements of maximal sterile technique followed      Skin preparation:  2% chlorhexidine    Skin preparation agent: Skin preparation agent completely dried prior to procedure    Anesthesia:     Anesthesia method:  Local infiltration    Local anesthetic:  Lidocaine 1% w/o epi  Procedure details:     Location:  R internal jugular    Patient position:  Trendelenburg    Procedural supplies:  Triple lumen    Catheter size:  7 Fr    Landmarks identified: yes      Ultrasound guidance: yes      Sterile ultrasound techniques: Sterile gel and sterile probe covers were used      Number of attempts:  1    Successful placement: yes    Post-procedure details:     Post-procedure:  Dressing applied and line sutured    Assessment:  Blood return through all ports, free fluid flow, no pneumothorax on x-ray and placement verified by x-ray    Patient tolerance of procedure:  Tolerated well, no immediate complications

## 2020-10-30 NOTE — ANESTHESIA QCDR
2019 [facility] Clinical Data Registry (for Quality Improvement)     Postoperative nausea/vomiting risk protocol (Adult = 18 yrs and Pediatric 3-17 yrs)- (430 and 463)  General inhalation anesthetic (NOT TIVA) with PONV risk factors: Yes  Provision of anti-emetic therapy with at least 2 different classes of agents: Yes   Patient DID NOT receive anti-emetic therapy and reason is documented in Medical Record:  N/A    Multimodal Pain Management- (477)  Non-emergent surgery AND patient age >= 18: No  Use of Multimodal Pain Management, two or more drugs and/or interventions, NOT including systemic opioids:   Exception: Documented allergy to multiple classes of analgesics:     Smoking Abstinence (404)  Patient is current smoker (cigarette, pipe, e-cig, marijuanna): No  Elective Surgery:   Abstinence instructions provided prior to day of surgery:   Patient abstained from smoking on day of surgery:     Pre-Op Beta-Blocker in Isolated CABG (44)  Isolated CABG AND patient age >= 18: No  Beta-blocker admin within 24 hours of surgical incision:   Exception:of medical reason(s) for not administering beta blocker within 24 hours prior to surgical incision (e.g., not  indicated,other medical reason):     PACU assessment of acute postoperative pain prior to Anesthesia Care End- Applies to Patients Age = 18- (ABG7)  Initial PACU pain score is which of the following: Pain not assessed  Patient unable to report pain score:     Post-anesthetic transfer of care checklist/protocol to PACU/ICU- (426 and 427)  Upon conclusion of case, patient transferred to which of the following locations: ICU  Use of transfer checklist/protocol: Yes  Exclusion: Service Performed in Patient Hospital Room (and thus did not require transfer): N/A  Unplanned admission to ICU related to anesthesia service up through end of PACU care- (MD51)  Unplanned admission to ICU (not initially anticipated at anesthesia start time): No

## 2020-10-30 NOTE — PROGRESS NOTES
Dr. Guidry updated on patient's temperature of 37.8 and UOP of 70 for shift so far. Orders received to give lasix IVP, see MAR.

## 2020-10-30 NOTE — PROGRESS NOTES
Dr. Guidry updated on overnight events, patient's WBC of 39.4, patient continuing to be in A-fib RVR, and difficulty to control pain. Orders received for blood cultures/sputum, CXR, fentanyl gtt, Amio infusion without bolus.

## 2020-10-30 NOTE — PROGRESS NOTES
Progress Note               Author: Estela Emerson M.D. Date & Time created: 10/30/2020  12:43 PM     Interval History:  POD 1 attempt at PEG tube placement, then attempted open G-tube placement with conversion to G-tube placement.  Very difficult surgery.    Overnight the patient was significantly tachycardic and complained of significant pain requiring fentanyl drip.  His white count went up significantly to 47 last night but then came down to 39 on recheck.  Blood cultures are being drawn, a bronchoscopy has been done with showed minimal thick white secretions.    Review of Systems:  Review of Systems   Constitutional: Negative for chills and fever.   Gastrointestinal: Positive for abdominal pain. Negative for nausea and vomiting.       Physical Exam:  Physical Exam  Constitutional:       General: He is not in acute distress.     Appearance: He is ill-appearing. He is not toxic-appearing or diaphoretic.   HENT:      Head: Normocephalic and atraumatic.      Mouth/Throat:      Mouth: Mucous membranes are dry.   Eyes:      Extraocular Movements: Extraocular movements intact.   Cardiovascular:      Rate and Rhythm: Normal rate.   Pulmonary:      Effort: Pulmonary effort is normal.   Abdominal:      General: There is no distension.      Palpations: Abdomen is soft.      Tenderness: There is abdominal tenderness.      Comments: Dressing c/d/i. J tube in place.    Skin:     General: Skin is warm and dry.      Capillary Refill: Capillary refill takes less than 2 seconds.   Neurological:      General: No focal deficit present.      Mental Status: He is alert.   Psychiatric:         Mood and Affect: Mood normal.         Behavior: Behavior normal.         Labs:          Recent Labs     10/28/20  0414 10/29/20  0430 10/30/20  0452   SODIUM 131* 126* 133*   POTASSIUM 3.9 4.2 4.7   CHLORIDE 98 94* 93*   CO2 28 30 25   BUN 16 16 25*   CREATININE 0.41* 0.44* 0.70   CALCIUM 7.5* 7.9* 8.8     Recent Labs     10/28/20  0414  10/29/20  0430 10/30/20  0452   GLUCOSE 79 80 152*     Recent Labs     10/28/20  0845 10/29/20  0430 10/30/20  0452 10/30/20  0626 10/30/20  1055   RBC  --  3.62* 4.27* 4.25*  --    HEMOGLOBIN  --  8.9* 10.3* 10.5*  --    HEMATOCRIT  --  30.7* 36.4* 36.3*  --    PLATELETCT  --  423 707* 714*  --    PROTHROMBTM 18.8* 17.4*  --   --  19.5*   APTT  --   --   --   --  47.3*   INR 1.52* 1.38*  --   --  1.59*     Recent Labs     10/29/20  0430 10/30/20  0452 10/30/20  06   WBC 10.0 47.0* 39.4*   NEUTSPOLYS 77.50* 95.70* 94.00*   LYMPHOCYTES 10.90* 0.90* 1.00*   MONOCYTES 10.40 3.40 1.00   EOSINOPHILS 0.30 0.00 0.00   BASOPHILS 0.30 0.00 0.00     Hemodynamics:  Temp (24hrs), Av.4 °C (97.6 °F), Min:36.2 °C (97.2 °F), Max:36.8 °C (98.3 °F)  Temperature: 36.3 °C (97.4 °F), Monitored Temp: 37.8 °C (100 °F)  Pulse  Av.9  Min: 0  Max: 240   Blood Pressure: 103/76     Respiratory:  Vent Mode: APVCMV, Rate (breaths/min): 12, PEEP/CPAP: 8, PIP: 26, MAP: 13 Respiration: (!) 28, Pulse Oximetry: 97 %     Work Of Breathing / Effort: Vented  RUL Breath Sounds: Diminished, RML Breath Sounds: Diminished, RLL Breath Sounds: Diminished, LEO Breath Sounds: Diminished, LLL Breath Sounds: Diminished  Fluids:    Intake/Output Summary (Last 24 hours) at 10/30/2020 1243  Last data filed at 10/30/2020 1200  Gross per 24 hour   Intake 648.2 ml   Output 570 ml   Net 78.2 ml     Weight: 53.8 kg (118 lb 9.7 oz)  GI/Nutrition:  Orders Placed This Encounter   Procedures   • Diet NPO     Standing Status:   Standing     Number of Occurrences:   1     Order Specific Question:   Restrict to:     Answer:   Strict [1]     Medical Decision Making, by Problem:  Active Hospital Problems    Diagnosis   • *Acute respiratory failure with hypoxia and hypercapnia ([facility]) [J96.01, J96.02]   • PEA (Pulseless electrical activity) ([facility]) [I46.9]   • COPD (chronic obstructive pulmonary disease) ([facility]) [J44.9]   • Pneumonia [J18.9]   • Paroxysmal atrial fibrillation  ([facility]) [I48.0]   • Malnutrition ([facility]) [E46]   • Normocytic anemia [D64.9]   • Dysphagia [R13.10]   • Pulmonary hypertension ([facility]) [I27.20]   • Right ventricular dilation [I51.7]   • Major depression [F32.9]       Plan:  POD 1 attempt at PEG tube placement, then attempted open G-tube placement with conversion to G-tube placement.  Very difficult surgery.    In the face of the patient not doing well and having had a very difficult abdominal surgery, I would not recommend starting meds or tube feeds through the J-tube just yet.  I placed an order to place the J-tube to gravity.  If he continues to have tachycardia and there is no other obvious source, I would consider doing a small bowel follow-through or similar type of exam to look for bowel injury.    We will continue to follow closely.    Quality-Core Measures   Reviewed items::  Labs reviewed  Orozco catheter::  No Orozco

## 2020-10-30 NOTE — PROCEDURES
BRONCHOSCOPY PROCEDURE NOTE      Date: 10/30/2020  Time: 12:00 PM    Time out: performed. Name, MRN, allergy and procedure were confirmed.     Indication: acute respiratory failure    Consent: Informed consent obtained from patient after explaining the benefits/risks of the procedure including but not limited to bleeding, infection, airway trauma or loss thereof, pneumothorax/hemothorax, arrythmia, or death. Patient or surrogate expressed understanding and agreement and signed consent which can be found in the patient's chart.    Procedure: Bronchoscopy with therapeutic aspiration of secretions    Sedation: propofol, fentanyl, 1% lidocaine    Findings:  Respiratory therapy and nursing at bedside throughout procedure. Patient provided sedation and analgesia throughout the procedure. Placed on full ventilator support with an FiO2 of 100% during procedure. Using a fiberoptic bronchoscope, trachea entered via trach.  5 mL of local anesthetic sprayed at the yamilka. Airways visualized directly and careful inspection of airway was accomplished.     Minimal thick white secretions was noted in the yamilka which was suctioned. Otherwise right main stem, RUL, RLL, left main stem, LLL, LEO, lingula all clear without secretions.     Specimen sent to microbiology/pathology: none    Complications: none  Patient tolerated procedure well without any difficulties and left in care of bedside nurse/RT.     Estimated blood loss: none    For the above procedure I also performed the conscious sedation and was directly involved with administration of medication, monitoring airway, vital signs, preventing complications.  Administered total 100mcg fentanyl and 60mg of propofol in titration fashion until achieving a level of moderate procedural sedation.      Patient tolerated procedure well without complications from sedation and was left in the care of his bedside nurse and respiratory therapy. Procedural sedation time equals 11 minutes  which was separate from procedure time as described above.  Start time: 11.45  Stop time: 1146    Cy Guidry D.O.  Critical Care Medicine

## 2020-10-30 NOTE — PROGRESS NOTES
Dr. Guidry updated on no urine output after lasix IVP and lactic acid of 7.2.  Orders received to bolus 1 L of LR to be given

## 2020-10-30 NOTE — DISCHARGE PLANNING
Received Choice form at 1125  Agency/Facility Name: [facility]-  [facility]  Referral sent per Choice form at 1135  Fax-[phone]  Yt-[phone]      Received Choice form at 1125  Agency/Facility Name: [facility]   Referral sent per Choice form at 1135  Fax-[phone]  Tx-[phone]

## 2020-10-30 NOTE — PROGRESS NOTES
Patient became hypotensive, SBPs in 50s-60s. 1 L bolus of LR just completed, patient complaining of dizziness. On APVcmv, overbreathing vent at rate of 39. Complaining of pain in RLQ abdomen.    Orders from Dr. Guidry:  1 more liter of LR  300 mcg Keven IVP  Start Levo gtt at 20 mcg/min  2 amps of bicarb (100 mEq)  TEG sent to blood bank.    Abdominal x-ray and CXR already complete. Waiting on results.

## 2020-10-30 NOTE — CARE PLAN
"  Problem: Safety  Goal: Will remain free from falls  Outcome: PROGRESSING AS EXPECTED  Note: Fall precautions in place, bed in lowest and locked position, use of non slid foot wear, three side rails up and enforced the use of call light.      Problem: Skin Integrity  Goal: Risk for impaired skin integrity will decrease  Outcome: PROGRESSING AS EXPECTED  Intervention: Implement precautions to protect skin integrity in collaboration with the interdisciplinary team  Note: Pt educated on importance of q2hr turning and refused. Pt states \"I don't care if I get bed sores\".      "

## 2020-10-30 NOTE — OP REPORT
DATE OF SERVICE:  10/29/2020    SURGEON:  Arash Sabillon MD    PREOPERATIVE DIAGNOSIS:  Chronic respiratory failure and inability to eat.    POSTOPERATIVE DIAGNOSIS:  Chronic respiratory failure and inability to eat.    PROCEDURE PERFORMED:  Laparoscopic with conversion to open procedure with   extensive lysis of adhesions and placement of a jejunostomy tube.    ANESTHESIA:  General endotracheal anesthesia.    ANESTHESIOLOGIST:  Scott Lott MD    INDICATIONS:  A 54-year-old male with chronic respiratory failure.  Has been   tolerating tube feeds through a Dobbhoff tube.  In order to get him to a   long-term care facility, alternate enteral access was requested.  He had had   attempted PEG tube placement by gastroenterology and this was abandoned due to   inability to visualize through the abdominal wall.  It was felt that a   laparoscopic gastrostomy tube was indicated.    DESCRIPTION OF PROCEDURE:  Procedure was discussed in detail with the patient   including laparoscopic approach, potential for converting to an open   procedure; associated risks of bleeding, infection, abscess, and hematoma.  He   understood all of the above and wished to proceed.  He was placed under   anesthesia by Dr. Lott.  His abdomen was prepped and draped with   chlorhexidine prep and sterile drapes.  After a timeout, a right subcostal   incision was made and through this incision, Veress needle was placed.  Low   pressure was confirmed and CO2 insufflation was used to achieve a   pneumoperitoneum of 15 mmHg.  Same incision, 5 mm port was placed.    Laparoscope was inserted and extremely dense adhesions were noted.  I tried to   carefully work my way through these adhesions in hopes of continuing with the   laparoscopic procedure, but it became apparent that the adhesions were quite   dense and felt that there was significant risk of injury to the patient.  I   then converted to an open procedure.  I used the patient's previous  midline as   a guide.  Prolene sutures were encountered.  Peritoneal cavity was entered.    Significant dense adhesions were noted throughout.  Lysis of adhesions was   undertaken.  Eventually, sufficient space was created in the peritoneal cavity   to evaluate further.  It became apparent that the stomach was essentially   inaccessible.  It was covered by colon which was densely adherent.  It was   felt that based on this altered anatomy that the patient would best be served   by a jejunostomy tube.  I did lyse the proximal jejunum and identified the   ligament of Treitz.  I then chose a place for placement of jejunostomy tube.    A standard Witzel type jejunostomy tube using a red Jamil catheter was   created.  Once the Witzel jejunostomy had been created and tacked to the   abdominal wall, hemostasis was assured. The fascia was then approximated with   #1 Vicryl sutures.  Skin was stapled.  The J-tube was clamped.  Patient   tolerated the procedure well without apparent complication, but did remain   critically ill.       ____________________________________     MD TORITO TONEY / FERNIE    DD:  10/29/2020 18:15:52  DT:  10/29/2020 18:52:26    D#:  [id number]  Job#:  [id number]

## 2020-10-30 NOTE — ANESTHESIA POSTPROCEDURE EVALUATION
Patient: Jas Vann    Procedure Summary     Date: 10/29/20 Room / Location: [facility] 08 / SURGERY [facility]    Anesthesia Start: 1639 Anesthesia Stop: 1845    Procedures:       CREATION, JEJUNOSTOMY (Abdomen)      LAPAROTOMY, EXPLORATORY (Abdomen)      LYSIS, ADHESIONS (Abdomen) Diagnosis: (Chronic Respiratory Failure)    Surgeon: Arash Sabillon M.D. Responsible Provider: Scott Lott M.D.    Anesthesia Type: general ASA Status: 4 - Emergent          Final Anesthesia Type: general  Last vitals  BP   Blood Pressure: (!) 91/69    Temp   36.2 °C (97.2 °F)    Pulse   Pulse: (!) 107   Resp   20    SpO2   95 %      Anesthesia Post Evaluation    Patient location during evaluation: ICU  Level of consciousness: obtunded/minimal responses  Pain score: 0    Respiratory status: ventilator               Nurse Pain Score: 2 (NPRS)

## 2020-10-30 NOTE — PROGRESS NOTES
Pulmonary/Critical Care Medicine   Cross Cover Note    Date of service: 10/29/2020  Time: 2050    Mr. Vann is well known to me and the ICU staff.  Pt underwent surgical placement of a G-tube today.  Unable due to adhesions and underwent an ex lap with lysis of adhesion and open J-tube placement.    He has been NPO since midnight.  He is in atrial fibrillation at baseline that is normally rate controlled.  He is now running in the 120-130s with some slight improvement with metoprolol 5mg IV pushes x 3.  Will given Amiodarone 150mg bolus x 1.  May need drip.  Will monitor.    Nalini Aguirre MD  Pulmonary and Critical Care Medicine

## 2020-10-30 NOTE — PROGRESS NOTES
Critical Care Progress Note    Date of admission  9/9/2020    Chief Complaint  54 y.o. male with H/o Afib admitted 9/9/2020 with respiratory failure, sepsis and pneumonia - intubated in the ER.    Hospital Course   9/17 -    developed worsening hypercarbic and hypoxemic respiratory failure requiring emergent intubation.  Full vent support.  9/18 -    continue vent support.  Titrating phenylephrine.  Replete electrolytes.  SAT/SBT as tolerated.  9/19 -    SAT/SBT as tolerated.  Continue vent support.  Titrating phenylephrine.  Replete potassium.  Continue Unasyn.  9/20 -    liberated from the ventilator yesterday and did very well until last night when he required reintubation.  Continue vent support.  Continue Unasyn and complete 5 days of therapy.  10/3 -    continue vent support.  T-piece trials during the day as tolerated.  10/4 -    continue T-piece trials during the day as tolerated.  Nocturnal ventilatory support.  10/5 - nocturnal vent support and T-piece trials as they as tolerated, may need LTAC/home vent after that-trilogy?  10/6 - HS ASV vent, days Tpiece w/PSMV at times, still fatigued at end of day, CM working on dispo  10/7 - HS ASV ventilation, T-piece days with PSMV at times, case management working on dyspnea with LTAC referral to facility that accepts [facility] insurance in [place], trilogy ventilation nocturnally still being considered  Ongoing therapies  10/8 - HS ASV ventilation, Tpiece and therapies on day, prob depressed, LTACh referal ongoing  10/9 - HS ASV ventilation, Tpiece days w/PSMV as tolerated, therapies  10/10 - HS ASV, Tpiece days, CAF, PSMV, want to go home  10/11 -  HS % x 8 hrs, T piece days w/PSMV, CAF   10/12 - % last night, T-piece, barium swallow and passed, doing PSMV trials, a-fib with RVR-->dose of amio  10/13 - % overnight, passed swallow eval, cont to work with speaking valve, a-fib  10/14 - %/8 overnight, a-fib rate controlled, improving  eating, using speaking valve, T-piece at 10 lpm at 40%   10/15 - pt remained on T-piece overnight at 8 lpm at 40%, eating more meals, went to the [employer] garden yesterday  10/16 - T-piece at 6-15 lpm at 40% for past 48 hours, TFs nocturnally now, meals during the day, PT/OT  10/17 - vent discontinued, T-piece at 10 lpm at 40%, ambulating more on own-->later in the day: PEA arrest with short CPR with ROSC achieved, placed back on the vent, hydroceles noted to scrotum  10/18 - weaned off ventilator, back to using speaking valve and eating this morning     10/19-  Remains off vent for two days  10/20- off vent on t-piece three days  10/21- transient lost of pulse, CPR, bagged and back to baseline  10/22- on vent now 24/7- no wean efforts indefinitely. Desaturation episode this am while on speaking valve- saturations to 50s, came up in several minutes with suctioning  10/23- on vent, awaiting LTAC/ SNF placement. Unclear if patient OK for long-term vent vs changing to comfort measures  10/24- on vent, no changes overnight.Pt unable/ unwilling to make preference re: long term care vs comfort care known to palliative care  10/25- on vent, no changes. No weaning attempted due to pt arrested nearly 6 times). Trach was changed out by RT. No further discussions with pt re: PEG, goals of care (pt unable/ unwilling and no surrogate)  10/30 s/p J tube placement, ex lap by Gen Surgery     Interval Problem Update  Reviewed last 24 hour events:  S/p J tube placement and ex lap  Post operatively, pt developed afib with RVR with HR in 120s-130s  Pain is not control  Will get blood cultures and trach aspirate  SBP was somewhat on low side in 70-80s but MAP maintained >60  No change in mental status.   Remains on full vent, 30% FIO2 PEEP 8. No weaning given recurrent cardiac arrests in the past  Afebrile  HR in 80-90s  SBP 90-110s  Creatinine 0.44, HCO3 28  Sodium 131  UOP 940ml yesterday, I/O positive 500cc    Addendum:  This am,  noted purulent trach secretions - s/p bronch with mainly clear bronchi/subsegmental bronchi  Throughout the day pt was decompensating  He became more hypotensive, requiring pressors  Afib with RVR in 120-140s, on amio gtt  NG tube was placed with 600cc output of dark coffee brown materials  Xray abdomen with dilated bowel  2L fluid boluses were given  Started on piptazo, blood cultures and trach aspirate obtained  PH 7.25 -> 7.1. 2amps of bicarb given   Lactate 7.2  Low UOP today  Gen Surgery Dr. Emerson was informed and plan     Review of Systems  Review of Systems   Constitutional: Negative for chills.   Respiratory: Negative for cough and shortness of breath.    Cardiovascular: Negative for chest pain and leg swelling.   Gastrointestinal: Negative for abdominal pain, diarrhea, nausea and vomiting.   Neurological: Negative for headaches.   All other systems reviewed and are negative.       Vital Signs for last 24 hours   Temp:  [36.2 °C (97.2 °F)-36.8 °C (98.3 °F)] 36.3 °C (97.4 °F)  Pulse:  [] 125  Resp:  [15-39] 31  BP: ()/(57-86) 122/66  SpO2:  [88 %-100 %] 94 %    Hemodynamic parameters for last 24 hours       Respiratory Information for the last 24 hours  Vent Mode: APVCMV  Rate (breaths/min): 12  Vt Target (mL): 390  PEEP/CPAP: 8  MAP: 13  Control VTE (exp VT): 384    Physical Exam   Physical Exam  Vitals signs (On vent, open eyes, follow commans appropriately) and nursing note reviewed.   Constitutional:       General: He is not in acute distress.     Appearance: He is ill-appearing. He is not toxic-appearing or diaphoretic.      Comments: Pt appears chronically ill  Cachectic  Sitting in chair comfortablly   HENT:      Head: Normocephalic.      Mouth/Throat:      Mouth: Mucous membranes are dry.      Pharynx: No oropharyngeal exudate.   Neck:      Musculoskeletal: Neck supple.      Comments: Trach in place   Cardiovascular:      Rate and Rhythm: Normal rate. Rhythm irregularly irregular.       Pulses: Normal pulses.      Heart sounds: Normal heart sounds. No murmur.   Pulmonary:      Effort: No respiratory distress.      Breath sounds: Normal breath sounds. No wheezing or rales.      Comments: Strong cough, coarse breath sounds throughout  Abdominal:      General: Bowel sounds are normal. There is no distension.      Palpations: Abdomen is soft.      Tenderness: There is no abdominal tenderness. There is no guarding.   Musculoskeletal:      Right lower leg: No edema.      Left lower leg: No edema.   Skin:     General: Skin is dry.      Capillary Refill: Capillary refill takes less than 2 seconds.   Neurological:      General: No focal deficit present.      Mental Status: He is alert.      Cranial Nerves: No cranial nerve deficit.      Motor: No weakness.      Gait: Gait normal.         Medications  Current Facility-Administered Medications   Medication Dose Route Frequency Provider Last Rate Last Dose   • fentaNYL (SUBLIMAZE) injection 100 mcg  100 mcg Intravenous Q HOUR PRN Cy Guidry D.O.       • 5% dextrose infusion   Intravenous Continuous Cy Guidry D.O. 30 mL/hr at 10/30/20 0830     • amiodarone (NEXTERONE) 360 mg/200 mL infusion  0.5-1 mg/min Intravenous Continuous Cy Guidry, D.O. 33 mL/hr at 10/30/20 0830 1 mg/min at 10/30/20 0830   • fentaNYL (SUBLIMAZE) 50 mcg/mL in 50mL (Continuous Infusion)   Intravenous Continuous Cy Guidry, D.O. 2 mL/hr at 10/30/20 0915 100 mcg/hr at 10/30/20 0915   • furosemide (LASIX) tablet 40 mg  40 mg Enteral Tube Q DAY Cy Guidry D.O.   Stopped at 10/29/20 0600   • HYDROcodone-acetaminophen (NORCO) 5-325 MG per tablet 1 Tab  1 Tab Enteral Tube Q4HRS PRN Francisco Novoa M.D.   1 Tab at 10/29/20 0151   • ipratropium-albuterol (DUONEB) nebulizer solution  3 mL Nebulization Q2HRS PRN (RT) Dmitri Pascual M.D.       • MD Alert...ICU Electrolyte Replacement per Pharmacy   Other PHARMACY TO DOSE Nalini Aguirre M.D.       • metoprolol (LOPRESSOR) tablet 25 mg  25  mg Enteral Tube TWICE DAILY Nalini Aguirer M.D.   Stopped at 10/29/20 0600   • senna-docusate (PERICOLACE or SENOKOT S) 8.6-50 MG per tablet 2 Tab  2 Tab Enteral Tube BID Dmitri Pascual M.D.   Stopped at 10/27/20 1800    And   • polyethylene glycol/lytes (MIRALAX) PACKET 1 Packet  1 Packet Enteral Tube QDAY PRN Dmitri Pascual M.D.        And   • magnesium hydroxide (MILK OF MAGNESIA) suspension 30 mL  30 mL Enteral Tube QDAY PRN Dmitri Pascual M.D.        And   • bisacodyl (DULCOLAX) suppository 10 mg  10 mg Rectal QDAY PRN Dmitri Pascual M.D.   10 mg at 10/17/20 2013   • FLUoxetine (PROZAC) capsule 20 mg  20 mg Enteral Tube DAILY Dmitri Pascual M.D.   Stopped at 10/29/20 0600   • lidocaine (LIDODERM) 5 % 2 Patch  2 Patch Transdermal Q24HR Dmitri Pascual M.D.   2 Patch at 10/29/20 2003   • traZODone (DESYREL) tablet 50 mg  50 mg Enteral Tube HS PRN Dmitri Pascual M.D.   50 mg at 10/22/20 2340   • multivitamin (THERAGRAN) tablet 1 Tab  1 Tab Enteral Tube DAILY Dmitri Pascual M.D.   Stopped at 10/29/20 0600   • acetaminophen (TYLENOL) tablet 650 mg  650 mg Enteral Tube Q6HRS PRN Dmitri Pascual M.D.   650 mg at 10/27/20 1749   • ondansetron (ZOFRAN ODT) dispertab 4 mg  4 mg Enteral Tube Q4HRS PRN Dmitri Pascual M.D.   4 mg at 10/14/20 1545   • Respiratory Therapy Consult   Nebulization Continuous RT Dmitri Pascual M.D.       • lidocaine (XYLOCAINE) 1 % injection 1-2 mL  1-2 mL Tracheal Tube Q30 MIN PRN Dmitri Pascual M.D.       • omeprazole (FIRST-OMEPRAZOLE) 2 mg/mL oral susp 40 mg  40 mg Enteral Tube DAILY Dmitri Pascual M.D.   Stopped at 10/29/20 0600   • Pharmacy Consult: Enteral tube insertion - review meds/change route/product selection  1 Each Other PHARMACY TO DOSE Dmitri Pascual M.D.       • amiodarone (CORDARONE) tablet 200 mg  200 mg Enteral Tube Q DAY Dmitri Pascual M.D.   Stopped at 10/29/20 0600   • DULoxetine (CYMBALTA) capsule 60 mg  60 mg  Enteral Tube QHS Dmitri Pascual M.D.   Stopped at 10/29/20 2100   • topiramate (TOPAMAX) tablet 75 mg  75 mg Enteral Tube DAILY Dmitri Pascual M.D.   Stopped at 10/29/20 0600   • ondansetron (ZOFRAN) syringe/vial injection 4 mg  4 mg Intravenous Q4HRS PRN Dmitri Pascual M.D.   4 mg at 10/12/20 2201       Fluids    Intake/Output Summary (Last 24 hours) at 10/30/2020 0953  Last data filed at 10/30/2020 0800  Gross per 24 hour   Intake 360 ml   Output 700 ml   Net -340 ml       Laboratory          Recent Labs     10/28/20  0414 10/29/20  0430 10/30/20  0452   SODIUM 131* 126* 133*   POTASSIUM 3.9 4.2 4.7   CHLORIDE 98 94* 93*   CO2 28 30 25   BUN 16 16 25*   CREATININE 0.41* 0.44* 0.70   CALCIUM 7.5* 7.9* 8.8     Recent Labs     10/28/20  0414 10/29/20  0430 10/30/20  0452   GLUCOSE 79 80 152*     Recent Labs     10/29/20  0430 10/30/20  0452 10/30/20  0626   WBC 10.0 47.0* 39.4*   NEUTSPOLYS 77.50* 95.70* 94.00*   LYMPHOCYTES 10.90* 0.90* 1.00*   MONOCYTES 10.40 3.40 1.00   EOSINOPHILS 0.30 0.00 0.00   BASOPHILS 0.30 0.00 0.00     Recent Labs     10/28/20  0845 10/29/20  0430 10/30/20  0452 10/30/20  0626   RBC  --  3.62* 4.27* 4.25*   HEMOGLOBIN  --  8.9* 10.3* 10.5*   HEMATOCRIT  --  30.7* 36.4* 36.3*   PLATELETCT  --  423 707* 714*   PROTHROMBTM 18.8* 17.4*  --   --    INR 1.52* 1.38*  --   --        Imaging  X-Ray:  I have personally reviewed the images and compared with prior images.  EKG:  I have personally reviewed the images and compared with prior images.  CT:    Reviewed  Echo:   Reviewed    Assessment/Plan  * Shock (HCC)  Assessment & Plan  Worsening sepsis this am and throughout the day.   Pt is POD 1 J tube placement - difficult surgery with lysis of adhesions  Xray of abdomen with dilated bowel, no free air  NG tube placement with 600cc dark coffee ground material  Pt became hypotensive, with 2 pressors. Had 2L fluid bolus  Lactate 7.2  Blood cultures obtained. Trach aspirate  obtained  Started on piptazo  Low UOP  Start protonix BID    Plan:   General surgery to take pt to OR. D/w Dr. Emerson  Continue NE, vasopressin and phenylephrine to keep MAP >65  Start hydrocortisone  Continue piptazo  CBC Q4H, type and cross, TEG  Serial lactates  Continue protonix    PEA (Pulseless electrical activity) (HCC)  Assessment & Plan  Related to weaning. It has occurred x5-6 times  No events while on ventilator  Too fragile, too deconditioned to wean for possibly several months      Acute respiratory failure with hypoxia and hypercapnia (HCC)- (present on admission)  Assessment & Plan  Intubated 9/9-9/12, 9/17-9/19, 9/20-9/23 - now on 4th course on vent this admission alone  Trach 9/23  Given multiple cardiac arrest - currently no weaning trials attempted.   Ongoing discussions in the past regarding PEG placement vs hospice- pt is being indecisive  Palliative care is following.     Plan:   Continue full vent support. Avoid weaning  Pt was on xarelto and last dose 10/27.  Bronched pt today after some purulent secretions - minimal amount of secretions in bronch (see my notes for detail)  Disposition planning per  after G-tube placement. Pt has Medical and could only go to LTAC in [place]      COPD (chronic obstructive pulmonary disease) (HCC)  Assessment & Plan  End stage COPD with mild-moderate pulm HTN  Continue bronchodilators prn 4-6 hours  RT protocols      Pneumonia- (present on admission)  Assessment & Plan  Resolved    Paroxysmal atrial fibrillation (HCC)- (present on admission)  Assessment & Plan  Afib with RVR, started on amiodarone gtt overnight  Had bolus 150mg x1 last night  Will rebolus and continue gtt.     Malnutrition (HCC)  Assessment & Plan  Cont enteral nutrition,a t goal  Optimize electrolytes  Supplements as needed      Normocytic anemia- (present on admission)  Assessment & Plan  Stable    Dysphagia- (present on admission)  Assessment & Plan  No more vent weans-  too fragile    Major depression- (present on admission)  Assessment & Plan          Right ventricular dilation- (present on admission)  Assessment & Plan  Clinically not in severe right heart failure  Echo confirms severely dilated RV with mild RV systolic dysfunction  Presumably related to chronic hypoxemia and secondary pulmonary pretension  RVSP 42 mmHg  Hold lasix         VTE:  NOAC  Ulcer: PPI  Lines: Orozco Catheter  Ongoing indication addressed    I have performed a physical exam and reviewed and updated ROS and Plan today (10/30/2020). In review of yesterday's note (10/29/2020), there are no changes except as documented above.     Discussed patient condition and risk of morbidity and/or mortality with RN, RT, Pharmacy, Charge nurse / hot rounds and Patient     The patient remains critically ill. Critical care time = 150 mins in directly providing and coordinating critical care and extensive data review. No time overlap and excludes procedures.

## 2020-10-30 NOTE — ANESTHESIA TIME REPORT
Anesthesia Start and Stop Event Times     Date Time Event    10/29/2020 1639 Anesthesia Start     1643 Ready for Procedure     1845 Anesthesia Stop        Responsible Staff  10/29/20    Name Role Begin End    Scott Lott M.D. Anesth 1639 1845        Preop Diagnosis (Free Text):  Pre-op Diagnosis     Chronic Respiratory Failure        Preop Diagnosis (Codes):    Post op Diagnosis  Respiratory failure (HCC)      Premium Reason  K. Alert    Comments: alert and emergency

## 2020-10-30 NOTE — DISCHARGE PLANNING
note:  Requested for Anne to notify pt that efforts are being made in search of facilities in [place] for LTAC    This CM has sent a choice for [facility] and [facility]. Attempted to talk to Chary Wu at [place] but she was not available.

## 2020-10-31 PROBLEM — R65.21 SEPTIC SHOCK (HCC): Status: ACTIVE | Noted: 2020-01-01

## 2020-10-31 PROBLEM — A41.9 SEPTIC SHOCK (HCC): Status: ACTIVE | Noted: 2020-01-01

## 2020-10-31 NOTE — PROGRESS NOTES
Dr. Guidry at bedside to assess patient and discuss plan of care. Patient nodding appropriately and mouthing words appropriately. Pt asked multiple times if he would like compressions if his heart stops, patient stated no adamantly multiple times. Pt c/o 10/10 pain, medicated per Dr. Guidry.

## 2020-10-31 NOTE — PROGRESS NOTES
Received bedside report assumed care of pt. . VS stable. Updated whiteboard in room and discussed today's POC.   and Rapid RN at bedside.

## 2020-10-31 NOTE — PROGRESS NOTES
[facility] surgical acute care surgery service note    Postop day 2 difficult open jejunostomy placement, postop day 1 takeback with findings of enterotomy with intra-abdominal bowel content contamination    Patient remains critically ill overnights.  Initially was improving a little bit with decrease in lactate, but that has come back up again.  He is now persistently hypoglycemic, on 4 pressors, needed cardioversion for atrial fibrillation with rapid ventricular rate this morning.    Exam:  Afebrile, heart rate 120s, systolics in the 90s and 100s  Intubated and sedated  Abdomen distended but soft.  Dressings dry.  Left abdominal drain with dark serosanguineous fluid, no evidence of ongoing enteric leak.  J-tube to gravity.    Assessment: Gravely ill 54-year-old man status post primary repair of missed enterotomy from open J-tube placement    No evidence of ongoing enteric leak  Very much appreciate the critical care team's support.  Patient continues to be gravely ill and may not survive the insult.  He is on maximal support.  I will continue to follow.

## 2020-10-31 NOTE — ANESTHESIA POSTPROCEDURE EVALUATION
Patient: Jas Vann    Procedure Summary     Date: 10/30/20 Room / Location: [facility] 09 / SURGERY [facility]    Anesthesia Start: 1758 Anesthesia Stop:     Procedure: LAPAROTOMY, EXPLORATORY - abdominal washout, and repair small instestine (Abdomen) Diagnosis: (enteric leak, septic shock)    Surgeon: Estela Emerson M.D. Responsible Provider: Arcadio Anaya M.D.    Anesthesia Type: general ASA Status: 4 - Emergent          Final Anesthesia Type: general  Last vitals  BP   Blood Pressure: 100/54    Temp   36.3 °C (97.4 °F)    Pulse   Pulse: (!) 150   Resp   (!) 37    SpO2   92 %      Anesthesia Post Evaluation    Patient location during evaluation: ICU  Patient participation: complete - patient cannot participate  Level of consciousness: obtunded/minimal responses    Airway patency: patent  Anesthetic complications: no  Cardiovascular status: hemodynamically stable  Respiratory status: ventilator and acceptable  Hydration status: euvolemic    PONV: none           Nurse Pain Score: 7 (NPRS)

## 2020-10-31 NOTE — PROGRESS NOTES
Dr. Guidry updated on patients ionized calcium, fever, BLE pedal and posterior tibial pulses not dopplerable, and worsening ischemia to bilateral toes. Tylenol suppository ordered.

## 2020-10-31 NOTE — PROGRESS NOTES
Pulmonary/Critical Care Medicine   Progress Note    Date of service: 10/30/2020  Time: 7:18 PM    Discussed this patient's case with Goran Guidry and Ki.  The patient had significant deterioration throughout the day requiring 3 vasopressors and was taken back to the OR by Dr. Emerson for a exploratory laparotomy.  Intraoperatively he was found to have an enterotomy with succus in the left paracolic gutter.  The enterotomy was repaired primarily and a Esau drain was placed.  He was brought back to the ICU on 3 vasopressors with continued systolic blood pressures in the 80s to 90s.  CVP was obtained and found to be 8.  ABG was obtained and reviewed demonstrating significant mixed metabolic and respiratory acidosis.  The patient's respiratory rate was increased from 12-18 and he was started on a bicarbonate infusion.  Labs were sent for analysis and he was found to have a lactic acidosis at 8.1.    2030: Patient's hemodynamics remain poor with systolic blood pressures in the 80s and maps in the 50s.  His CVP has worsened to 4.  He will be given 1 L of Plasma-Lyte and placed on epinephrine as his fourth vasopressor as he is now maxed norepinephrine, Keven-Synephrine, vasopressin.     2156: Continued poor hemodynamics with continued decrease CVP due to severe third spacing and severity of illness.  An additional liter of Plasma-Lyte will be given at this time and the patient will be continued to be monitored.  Repeat ABG with continued mixed metabolic and respiratory acidosis, respiratory rate increased to 22.     2240: Called back to the patient's bedside for ongoing hemodynamic instability.  Preload remains low at this time, an additional 1 L of Plasma-Lyte will be given and the maximum dosing of norepinephrine will be increased to 50 mcg/hr. Lactate 8.2, iCa 0.88. Will give 1g CaCl.    2356: Hemodynamics remain poor, epinephrine maximum increased to 20 mcg/min.    I spent 80 min in reviewing the patient's  condition, physical examination, laboratory and imaging data, prior documentation, in discussion with RN, RT, Pharmacy, Dr Emerson and in formulating an assessment/plan.    Critical Care time: 80 min. No time overlap, procedures not included in time.  [id number] [id number]

## 2020-10-31 NOTE — PROGRESS NOTES
Critical Care Progress Note    Date of admission  9/9/2020    Chief Complaint  54 y.o. male with H/o Afib admitted 9/9/2020 with respiratory failure, sepsis and pneumonia - intubated in the ER.    Hospital Course   9/17 -    developed worsening hypercarbic and hypoxemic respiratory failure requiring emergent intubation.  Full vent support.  9/18 -    continue vent support.  Titrating phenylephrine.  Replete electrolytes.  SAT/SBT as tolerated.  9/19 -    SAT/SBT as tolerated.  Continue vent support.  Titrating phenylephrine.  Replete potassium.  Continue Unasyn.  9/20 -    liberated from the ventilator yesterday and did very well until last night when he required reintubation.  Continue vent support.  Continue Unasyn and complete 5 days of therapy.  10/3 -    continue vent support.  T-piece trials during the day as tolerated.  10/4 -    continue T-piece trials during the day as tolerated.  Nocturnal ventilatory support.  10/5 - nocturnal vent support and T-piece trials as they as tolerated, may need LTAC/home vent after that-trilogy?  10/6 - HS ASV vent, days Tpiece w/PSMV at times, still fatigued at end of day, CM working on dispo  10/7 - HS ASV ventilation, T-piece days with PSMV at times, case management working on dyspnea with LTAC referral to facility that accepts [facility] insurance in [place], trilogy ventilation nocturnally still being considered  Ongoing therapies  10/8 - HS ASV ventilation, Tpiece and therapies on day, prob depressed, LTACh referal ongoing  10/9 - HS ASV ventilation, Tpiece days w/PSMV as tolerated, therapies  10/10 - HS ASV, Tpiece days, CAF, PSMV, want to go home  10/11 -  HS % x 8 hrs, T piece days w/PSMV, CAF   10/12 - % last night, T-piece, barium swallow and passed, doing PSMV trials, a-fib with RVR-->dose of amio  10/13 - % overnight, passed swallow eval, cont to work with speaking valve, a-fib  10/14 - %/8 overnight, a-fib rate controlled, improving  eating, using speaking valve, T-piece at 10 lpm at 40%   10/15 - pt remained on T-piece overnight at 8 lpm at 40%, eating more meals, went to the [employer] yesterday  10/16 - T-piece at 6-15 lpm at 40% for past 48 hours, TFs nocturnally now, meals during the day, PT/OT  10/17 - vent discontinued, T-piece at 10 lpm at 40%, ambulating more on own-->later in the day: PEA arrest with short CPR with ROSC achieved, placed back on the vent, hydroceles noted to scrotum  10/18 - weaned off ventilator, back to using speaking valve and eating this morning     10/19-  Remains off vent for two days  10/20- off vent on t-piece three days  10/21- transient lost of pulse, CPR, bagged and back to baseline  10/22- on vent now 24/7- no wean efforts indefinitely. Desaturation episode this am while on speaking valve- saturations to 50s, came up in several minutes with suctioning  10/23- on vent, awaiting LTAC/ SNF placement. Unclear if patient OK for long-term vent vs changing to comfort measures  10/24- on vent, no changes overnight.Pt unable/ unwilling to make preference re: long term care vs comfort care known to palliative care  10/25- on vent, no changes. No weaning attempted due to pt arrested nearly 6 times). Trach was changed out by RT. No further discussions with pt re: PEG, goals of care (pt unable/ unwilling and no surrogate)  10/29 attempted PEG placement, but aborted due to different abdominal anatomy  10/29 s/p J tube placement, ex lap by surgery  10/30 s/p enterotomy with intraabdominal bowel content contamination     Interval Problem Update  Reviewed last 24 hour events:  POD 2 J tube placement  POD 1 re-open ex lap, intraop found to have enterotomy with intraabdominal bowel content contamination     Profoundly ill with 4 pressors all of them are at maximum support  Profound lactic acidosis - on bicarb gtt  Low UOP  On fentanyl gtt, RASS 0. Following commands  Pain is not control, rated 10/10. RASS 0 able to  communicate with yes/no questions  MAP in 50-60s  On full vent, 70% FIO2 PEEP 8. No weaning given recurrent cardiac arrests in the past  Low UOP with creatinine rising      Review of Systems  Review of Systems   Reason unable to perform ROS: limited due to medical condition.   Gastrointestinal: Positive for abdominal pain (rated 10 out of 10). Negative for nausea.   All other systems reviewed and are negative.       Vital Signs for last 24 hours   Temp:  [36.3 °C (97.4 °F)] 36.3 °C (97.4 °F)  Pulse:  [] 126  Resp:  [18-42] 31  BP: ()/(15-97) 102/71  SpO2:  [82 %-100 %] 98 %    Hemodynamic parameters for last 24 hours       Respiratory Information for the last 24 hours  Vent Mode: ASV  Rate (breaths/min): 24  Vt Target (mL): 390  PEEP/CPAP: 8  MAP: 19  Control VTE (exp VT): 367    Physical Exam   Physical Exam  Vitals signs and nursing note reviewed.   Constitutional:       General: He is in acute distress.      Appearance: He is ill-appearing and toxic-appearing.      Comments: Pt appears chronically ill  Cachectic     HENT:      Mouth/Throat:      Mouth: Mucous membranes are dry.   Neck:      Musculoskeletal: Neck supple.      Comments: Trach in place   Cardiovascular:      Rate and Rhythm: Normal rate. Rhythm irregularly irregular.      Pulses: Normal pulses.      Heart sounds: Normal heart sounds. No murmur.   Pulmonary:      Effort: No respiratory distress.      Breath sounds: Normal breath sounds. No wheezing or rales.      Comments: Diminished throughout  Abdominal:      General: Bowel sounds are normal. There is no distension.      Palpations: Abdomen is soft.      Tenderness: There is abdominal tenderness. There is guarding.      Comments: abd surgical dressing in abdomen, ELI drain in place   Musculoskeletal:      Right lower leg: No edema.      Left lower leg: No edema.   Skin:     General: Skin is dry.      Capillary Refill: Capillary refill takes less than 2 seconds.   Neurological:       General: No focal deficit present.      Mental Status: He is alert.      Cranial Nerves: No cranial nerve deficit.      Motor: No weakness.      Gait: Gait normal.         Medications  Current Facility-Administered Medications   Medication Dose Route Frequency Provider Last Rate Last Dose   • lactated ringers infusion   Intravenous Continuous Joe Grimaldo M.D. 150 mL/hr at 10/31/20 0151     • fentaNYL (SUBLIMAZE) injection 100 mcg  100 mcg Intravenous Q HOUR PRN Cy Guidry D.O.       • amiodarone (NEXTERONE) 360 mg/200 mL infusion  1 mg/min Intravenous Continuous Cy Guidry D.O.   Stopped at 10/30/20 2305   • fentaNYL (SUBLIMAZE) 50 mcg/mL in 50mL (Continuous Infusion)   Intravenous Continuous MOIRA Bess.O. 2 mL/hr at 10/30/20 2319 100 mcg/hr at 10/30/20 2319   • heparin infusion 25,000 units in 500 mL 0.45% NACL  0-30 Units/kg/hr Intravenous Continuous MOIRA Bess.O.   Stopped at 10/30/20 1432   • heparin injection 2,200 Units  40 Units/kg Intravenous PRN Cy Guidry D.O.       • lidocaine (XYLOCAINE) 1 % injection 0.5 mL  0.5 mL Intradermal Once PRN MOIRA Bess.O.       • piperacillin-tazobactam (ZOSYN) 4.5 g in  mL IVPB  4.5 g Intravenous Q8HRS MOIRA Bess.O. 25 mL/hr at 10/31/20 0418 4.5 g at 10/31/20 0418   • pantoprazole (PROTONIX) injection 40 mg  40 mg Intravenous BID Cy Guidry D.O.   40 mg at 10/31/20 0418   • norepinephrine (Levophed) infusion 8 mg/250 mL (premix)  0-50 mcg/min Intravenous Continuous Adelso Abdi Jr. D.O. 93.8 mL/hr at 10/31/20 0623 50 mcg/min at 10/31/20 0623   • vasopressin (VASOSTRICT) 20 Units in  mL Infusion  0.03 Units/min Intravenous Continuous MOIRA Bess.O. 9 mL/hr at 10/31/20 0216 0.03 Units/min at 10/31/20 0216   • phenylephrine (LISANDRO-SYNEPHRINE) 40 mg in  mL Infusion  0-300 mcg/min Intravenous Continuous Cy Guidry D.O. 112.5 mL/hr at 10/31/20 0621 300 mcg/min at 10/31/20 0621   • hydrocortisone sodium succinate PF (SOLU-CORTEF)  100 MG injection 100 mg  100 mg Intravenous Q8HRS Cy Guidry D.O.   100 mg at 10/31/20 0418   • sodium bicarbonate 150 mEq in D5W infusion (premix)   Intravenous Continuous MOIRA Meyers Jr..O. 83 mL/hr at 10/30/20 2030 83 mL/hr at 10/30/20 2030   • LACTATED RINGERS IV SOLN            • LACTATED RINGERS IV SOLN            • EPINEPHrine (Adrenalin) infusion 4 mg/250 mL (premix)  0-20 mcg/min Intravenous Continuous MOIRA Meyers Jr..O. 52.5 mL/hr at 10/31/20 0415 14 mcg/min at 10/31/20 0415   • HYDROcodone-acetaminophen (NORCO) 5-325 MG per tablet 1 Tab  1 Tab Enteral Tube Q4HRS PRN Francisco Novoa M.D.   1 Tab at 10/29/20 0151   • ipratropium-albuterol (DUONEB) nebulizer solution  3 mL Nebulization Q2HRS PRN (RT) Dmitri Pascual M.D.   3 mL at 10/31/20 0406   • MD Alert...ICU Electrolyte Replacement per Pharmacy   Other PHARMACY TO DOSE Nalini Aguirre M.D.       • metoprolol (LOPRESSOR) tablet 25 mg  25 mg Enteral Tube TWICE DAILY Nalini Aguirre M.D.   Stopped at 10/29/20 0600   • senna-docusate (PERICOLACE or SENOKOT S) 8.6-50 MG per tablet 2 Tab  2 Tab Enteral Tube BID Dmitri Pascual M.D.   Stopped at 10/27/20 1800    And   • polyethylene glycol/lytes (MIRALAX) PACKET 1 Packet  1 Packet Enteral Tube QDAY PRN Dmitri Pascual M.D.        And   • magnesium hydroxide (MILK OF MAGNESIA) suspension 30 mL  30 mL Enteral Tube QDAY PRN Dmitri Pascual M.D.        And   • bisacodyl (DULCOLAX) suppository 10 mg  10 mg Rectal QDAY PRN Dmitri Pascual M.D.   10 mg at 10/17/20 2013   • FLUoxetine (PROZAC) capsule 20 mg  20 mg Enteral Tube DAILY Dmitri Pascual M.D.   Stopped at 10/29/20 0600   • lidocaine (LIDODERM) 5 % 2 Patch  2 Patch Transdermal Q24HR Dmitri Pascual M.D.   2 Patch at 10/30/20 2149   • traZODone (DESYREL) tablet 50 mg  50 mg Enteral Tube HS PRN Dmitri Pascual M.D.   50 mg at 10/22/20 2340   • multivitamin (THERAGRAN) tablet 1 Tab  1 Tab Enteral Tube DAILY Dmitri NARAYANAN  TANA Pascual   Stopped at 10/29/20 0600   • acetaminophen (TYLENOL) tablet 650 mg  650 mg Enteral Tube Q6HRS PRN Dmitri Pascual M.D.   650 mg at 10/27/20 1749   • ondansetron (ZOFRAN ODT) dispertab 4 mg  4 mg Enteral Tube Q4HRS PRN Dmitri Pascual M.D.   4 mg at 10/14/20 1545   • Respiratory Therapy Consult   Nebulization Continuous RT Dmitri Pascual M.D.       • lidocaine (XYLOCAINE) 1 % injection 1-2 mL  1-2 mL Tracheal Tube Q30 MIN PRN Dmitri Pascual M.D.       • Pharmacy Consult: Enteral tube insertion - review meds/change route/product selection  1 Each Other PHARMACY TO DOSE Dmitri Pascual M.D.       • amiodarone (CORDARONE) tablet 200 mg  200 mg Enteral Tube Q DAY Dmitri Pascual M.D.   Stopped at 10/29/20 0600   • DULoxetine (CYMBALTA) capsule 60 mg  60 mg Enteral Tube QHS Dmitri Pascual M.D.   Stopped at 10/29/20 2100   • topiramate (TOPAMAX) tablet 75 mg  75 mg Enteral Tube DAILY Dmitri Pascual M.D.   Stopped at 10/29/20 0600   • ondansetron (ZOFRAN) syringe/vial injection 4 mg  4 mg Intravenous Q4HRS PRN Dmitri Pascual M.D.   4 mg at 10/12/20 2201       Fluids    Intake/Output Summary (Last 24 hours) at 10/31/2020 0639  Last data filed at 10/31/2020 0600  Gross per 24 hour   Intake 3476.61 ml   Output 795 ml   Net 2681.61 ml       Laboratory  Recent Labs     10/30/20  2251 10/31/20  0404 10/31/20  0504   ISTATAPH 7.249* 7.277* 7.331*   ISTATAPCO2 50.4* 41.2* 38.2*   ISTATAPO2 103* 108* 79   ISTATATCO2 24 20 21   XKRRLOT8FER 97 97 95   ISTATARTHCO3 22.1 19.2 20.2   ISTATARTBE -5* -7* -5*   ISTATTEMP 37.1 C 37.4 C 37.1 C   ISTATFIO2 80 70 70   ISTATSPEC Arterial Arterial Arterial   ISTATAPHTC 7.248* 7.272* 7.329*   SPPFYEWG2PS 104* 110* 80         Recent Labs     10/30/20  0452 10/30/20  1335 10/30/20  1948 10/30/20  2244 10/31/20  0405   SODIUM 133*  --  135 137  --    POTASSIUM 4.7  --  5.1 4.7  --    CHLORIDE 93*  --  97 99  --    CO2 25  --  19* 20  --    BUN 25*  --   33* 33*  --    CREATININE 0.70  --  1.13 1.11  --    MAGNESIUM  --  2.3  --   --  1.6   PHOSPHORUS  --   --   --   --  3.6   CALCIUM 8.8  --  7.2* 6.0*  --      Recent Labs     10/30/20  0452 10/30/20  1948 10/30/20  2244   ALTSGPT  --  208* 349*   ASTSGOT  --  297* 521*   ALKPHOSPHAT  --  225* 174*   TBILIRUBIN  --  0.6 0.6   GLUCOSE 152* 85 94     Recent Labs     10/30/20  1948 10/30/20  2244 10/31/20  0405   WBC 36.6* 18.2* 14.9*   NEUTSPOLYS 94.80* 94.00* 80.00*   LYMPHOCYTES 1.00* 1.80* 2.60*   MONOCYTES 3.10 3.60 1.70   EOSINOPHILS 0.10 0.00 0.00   BASOPHILS 0.40 0.30 0.00   ASTSGOT 297* 521*  --    ALTSGPT 208* 349*  --    ALKPHOSPHAT 225* 174*  --    TBILIRUBIN 0.6 0.6  --      Recent Labs     10/28/20  0845 10/29/20  0430  10/30/20  1055  10/30/20  1948 10/30/20  2244 10/31/20  0035 10/31/20  0405   RBC  --  3.62*   < >  --   --  3.81* 3.44*  --  3.10*   HEMOGLOBIN  --  8.9*   < >  --    < > 9.4* 8.5* 8.7* 7.6*   HEMATOCRIT  --  30.7*   < >  --    < > 33.3* 29.6* 30.1* 26.5*   PLATELETCT  --  423   < >  --   --  628* 495*  --  370   PROTHROMBTM 18.8* 17.4*  --  19.5*  --   --   --   --   --    APTT  --   --   --  47.3*  --   --   --   --   --    INR 1.52* 1.38*  --  1.59*  --   --   --   --   --     < > = values in this interval not displayed.       Imaging  X-Ray:  I have personally reviewed the images and compared with prior images.  EKG:  I have personally reviewed the images and compared with prior images.  CT:    Reviewed  Echo:   Reviewed    Assessment/Plan  * Septic shock (HCC)  Assessment & Plan  Pt is POD 2 J tube placement - difficult surgery with lysis of adhesions  POD 1 re-open ex lap, intraop found to have enterotomy with intraabdominal bowel content contamination     Pt is in profound septic shock with 4 pressors all of them are max  Profound lactic acidosis with lactate 8-9  On bicarb gtt to support metabolic acidosis.   On FIO2 100%, PEEP 10   Low UOP  Multiple doses of calcium repletion  "  Profound hypoglycemia s/p multiple D50 boluses   Afib with RVR     Very poor prognosis with multiple organ failure.   Pt surprisingly is RASS 0 and I was able to inform him his critical condition and explained his high risk of death.   I was able to made him DNR/DNI this morning  Throughout the day, pt remains critically ill, unable to wean pressors.   We again have discussions about goal of care with patient and I explained about his critical condition  Pt mentioned \"let me go\" and he does not want to be resuscitated nor continue further treatment  He would like comfort care measures.           PEA (Pulseless electrical activity) (HCC)  Assessment & Plan  Related to weaning. It has occurred x5-6 times  No events while on ventilator  Too fragile, too deconditioned to wean for possibly several months      Acute respiratory failure with hypoxia and hypercapnia (HCC)- (present on admission)  Assessment & Plan  Intubated 9/9-9/12, 9/17-9/19, 9/20-9/23 - now on 4th course on vent this admission alone  Trach 9/23  Given multiple cardiac arrest - currently no weaning trials attempted.       COPD (chronic obstructive pulmonary disease) (HCC)  Assessment & Plan        Paroxysmal atrial fibrillation (HCC)- (present on admission)  Assessment & Plan  Amio was discontinued overnight. Lidocaine was bolused and pt had cardioversion this am.      Malnutrition (HCC)  Assessment & Plan        Major depression- (present on admission)  Assessment & Plan          Right ventricular dilation- (present on admission)  Assessment & Plan           VTE:  NOAC  Ulcer: PPI  Lines: Orozco Catheter  Ongoing indication addressed    I have performed a physical exam and reviewed and updated ROS and Plan today (10/31/2020). In review of yesterday's note (10/30/2020), there are no changes except as documented above.     Discussed patient condition and risk of morbidity and/or mortality with RN, RT, Pharmacy, Charge nurse / hot rounds and Patient     I " have assessed and reassessed his respiratory status,, hemodynamics, cardiovascular and neurological status. Came to bedside multiple times htroughout the day. Had discussions about his goal of care.   High risk of deterioration and worsening vital organ dysfunction and death without the above critical care interventions. Pt decided for comfort care.    The patient remains critically ill. Critical care time = 90 mins in directly providing and coordinating critical care and extensive data review. No time overlap and excludes procedures.

## 2020-10-31 NOTE — PROGRESS NOTES
Attempted to notify patients listed emergency contact, Jessie, his sister, of his passing. An unknown male answered the phone and stated I have the wrong phone number. No additional contact information to call for NOK/emergency contacts to notify of patients passing.

## 2020-10-31 NOTE — PROGRESS NOTES
Updated Dr. Abdi on procedure completed in OR. Orders received to do STAT lactic, CBC, CMP and ABG once patient arrives to unit.

## 2020-10-31 NOTE — OR SURGEON
PreOp Diagnosis: Septic shock on 3 pressors, POD 1 difficult jejunostomy placement    PostOp Diagnosis: Enterotomy with intra-abdominal bowel content contamination, Septic shock on 3 pressors, POD 1 difficult jejunostomy placement    Procedure(s):  Reopening of recent exploratory laparotomy  abdominal washout   Repair enterotomy  - Wound Class: Dirty or Infected    Surgeon(s):  Estela Emerson M.D.  Assistant:   TANA Solares, MS3    Anesthesiologist/Type of Anesthesia:  Anesthesiologist: Arcadio Anaya M.D.; Santiago Arenas M.D./General    Surgical Staff:  Assistant: Leila Juan  Circulator: Aimee Baig RAubreeNAubree; Jolanta Amado R.N.  Operating Room Assistant (ORA): Victor Manuel Kendrick Circulator: Shagufta Harrison R.N.  Scrub Person: Laquita Escobar; Chay Cardoza; Yaya Clay    Specimens removed if any:  * No specimens in log *    Estimated Blood Loss: 20mL    Findings: Small hematoma in the subcutaneous tissue.  Moderate contamination with succus of the left paracolic gutter.  Enterotomy proximal to the J-tube site.  J-tube is in good position.    Complications: none apparent at the time of surgery    Indications: This is a 54-year-old man with multiple severe medical problems who has been in the cardiac ICU for weeks.  He is moving toward being able to transfer to a lower level of care, and a PEG tube was attempted but aborted yesterday.  Dr. Sabillon was constant consulted for a surgical tube placement.  This is extremely difficult due to dense adhesions, and the stomach was in fact never found.  This was converted to a jejunostomy tube.  Overnight he had significant pain and throughout the day today he has descended into septic shock.  There is no free air on his x-ray but there was no other evident source of infection so he was brought to the operating room for exploration.  He was unable to give consent due to needing significant narcotics for pain  control.  He has no family.  We performed a two-physician emergency consent.    Description of procedure: The patient was brought to the operating room and placed in comfortable supine position on the operating room table.  General anesthesia was induced without complication.  Zoll pads were placed due to his significant cardiac history.  His abdomen was clipped prepped with ChloraPrep and draped in the usual sterile fashion.  Timeout was held completed confirming correct patient correct site correct procedure and SCDs on and functioning.  He received antibiotics prior to incision.  I removed the staples in his midline incision and discovered a small hematoma.  There is no active bleeding.  I removed the sutures with scissors.  I encountered some bile in the left abdomen.  I had delivered the intestinal contents into the wound.  I irrigated the area well.  It appeared that the succus was coming from the left upper quadrant.  We were able to pull some of the small bowel that was proximal to the J-tube into the field of vision and noted a enterotomy about 3 mm in size.  This was repaired in 2 layers with interrupted 3 oh silks.  The second layer was performed in a Lembert fashion.  This was a sturdy repair and did not narrow the bowel lumen.  We then irrigated the abdomen with 4 L of saline.  A Esau drain was placed through the left lower quadrant previously placed laparoscopy incision.  This was secured with 3-0 nylon.  At the fascia was closed with interrupted #1 Vicryl's.  The skin was irrigated and closed again with staples.  Dry dressings were placed.  The patient was then brought back to the ICU in grave condition, still on 3 pressors and in septic shock.    The patient should keep his NG tube to low continuous wall suction.  He is to keep his ELI drains to self suction.  The G-tube is to be left to gravity drainage to a Orozco bag.  I discussed the patient with his RN and Dr. Abdi after the completion of the  procedure.        10/30/2020 7:17 PM Estela Emerson M.D.

## 2020-10-31 NOTE — PROGRESS NOTES
Lab called critical result of Lactic Acid 8.9. Critical result read back. Dr. Guidry notified of critical result. No new orders at this time.

## 2020-10-31 NOTE — ANESTHESIA PROCEDURE NOTES
Arterial Line  Performed by: Arcadio Anaya M.D.  Authorized by: Arcadio Anaya M.D.     Start Time:  10/30/2020 6:15 PM  End Time:  10/30/2020 6:21 PM  Localization: ultrasound guidance and surface landmarks    Patient Location:  OR  Indication: continuous blood pressure monitoring        Catheter Size:  20 G  Seldinger Technique?: Yes    Laterality:  Right  Site:  Radial artery  Line Secured:  Antimicrobial disc, tape and transparent dressing  Events: patient tolerated procedure well with no complications

## 2020-10-31 NOTE — ANESTHESIA QCDR
2019 Qualified Clinical Data Registry (for Quality Improvement)     Postoperative nausea/vomiting risk protocol (Adult = 18 yrs and Pediatric 3-17 yrs)- (430 and 463)  General inhalation anesthetic (NOT TIVA) with PONV risk factors: Yes  Provision of anti-emetic therapy with at least 2 different classes of agents: Yes   Patient DID NOT receive anti-emetic therapy and reason is documented in Medical Record:  N/A    Multimodal Pain Management- (477)  Non-emergent surgery AND patient age >= 18: No  Use of Multimodal Pain Management, two or more drugs and/or interventions, NOT including systemic opioids:   Exception: Documented allergy to multiple classes of analgesics:     Smoking Abstinence (404)  Patient is current smoker (cigarette, pipe, e-cig, marijuanna): No  Elective Surgery:   Abstinence instructions provided prior to day of surgery:   Patient abstained from smoking on day of surgery:     Pre-Op Beta-Blocker in Isolated CABG (44)  Isolated CABG AND patient age >= 18: No  Beta-blocker admin within 24 hours of surgical incision:   Exception:of medical reason(s) for not administering beta blocker within 24 hours prior to surgical incision (e.g., not  indicated,other medical reason):     PACU assessment of acute postoperative pain prior to Anesthesia Care End- Applies to Patients Age = 18- (ABG7)  Initial PACU pain score is which of the following: Pain not assessed  Patient unable to report pain score: Yes (Patient Unable to Report)    Post-anesthetic transfer of care checklist/protocol to PACU/ICU- (426 and 427)  Upon conclusion of case, patient transferred to which of the following locations: ICU  Use of transfer checklist/protocol: Yes  Exclusion: Service Performed in Patient Hospital Room (and thus did not require transfer): N/A  Unplanned admission to ICU related to anesthesia service up through end of PACU care- (MD51)  Unplanned admission to ICU (not initially anticipated at anesthesia start time):  No

## 2020-10-31 NOTE — PROGRESS NOTES
Dr. Guidry updated on patients SBP high 70s to low 80s despite max pressors, pulse ox high 70s to low 80s. Orders to increase john gtt to 400 mcg/min, and have Kamryn, RT, increase PEEP 10 and FiO2 100%.

## 2020-10-31 NOTE — CARE PLAN
Problem: Ventilation Defect:  Goal: Ability to achieve and maintain unassisted ventilation or tolerate decreased levels of ventilator support  Outcome: PROGRESSING AS EXPECTED  Note:    Ventilator Daily Summary    Vent Day #11  Trach Day#39    Ventilator settings changed this shift:Yes; switched to an ASV of 180%    Weaning trials:No    Respiratory Procedures:No    Plan: Continue current ventilator settings and wean mechanical ventilation as tolerated per physician orders.

## 2020-10-31 NOTE — PROGRESS NOTES
Patient transported to OR on monitor and Vent by RN, CCT, and RT. Report given to Dr. Anaya and Dr. Emerson.

## 2020-10-31 NOTE — PROGRESS NOTES
Lab called critical results blood glucose 40, calcium 6.5. Critical results read back. Dr. Guidry updated on criticals and coming to bedside for persistent hypotension, john gtt increased to 500 mcg/min per MD. Pt afib 150-160s, fsbs rechecked and 19, 50 grams D50 given per Dr. Guidry, pressures improved 90s/50s. Still afib 140-160s. Timeout called 0908, patient synchronized cardioverted at 150 J at 0910, BP 89/57.

## 2020-10-31 NOTE — ANESTHESIA TIME REPORT
Anesthesia Start and Stop Event Times     Date Time Event    10/30/2020 1758 Ready for Procedure     1758 Anesthesia Start        Responsible Staff  10/30/20    Name Role Begin End    Arcadio Anaya M.D. Anesth 1758     Santiago Arenas M.D. Anesth 1758 1822        Preop Diagnosis (Free Text):  Pre-op Diagnosis     septic shock, presume abdominal source         Preop Diagnosis (Codes):    Post op Diagnosis  Septic shock (HCC)      Premium Reason  A. 3PM - 7AM    Comments: arterial catheterization

## 2020-10-31 NOTE — PROGRESS NOTES
"Dr. Guidry and this RN at bedside to discuss plan of care with patient. Pt awake to voice, nodding appropriately, BLAND to command, giving thumbs up. Plan of care options discussed, patient nodding yes adamantly to no more care, mouthing multiple times \"let me go\". Comfort care orders to be placed by Dr. Guidry.   "

## 2020-10-31 NOTE — ASSESSMENT & PLAN NOTE
"Pt is POD 2 J tube placement - difficult surgery with lysis of adhesions  POD 1 re-open ex lap, intraop found to have enterotomy with intraabdominal bowel content contamination     Pt is in profound septic shock with 4 pressors all of them are max  Profound lactic acidosis with lactate 8-9  On bicarb gtt to support metabolic acidosis.   On FIO2 100%, PEEP 10   Low UOP  Multiple doses of calcium repletion   Profound hypoglycemia s/p multiple D50 boluses   Afib with RVR     Very poor prognosis with multiple organ failure.   Pt surprisingly is RASS 0 and I was able to inform him his critical condition and explained his high risk of death.   I was able to made him DNR/DNI this morning  Throughout the day, pt remains critically ill, unable to wean pressors.   We again have discussions about goal of care with patient and I explained about his critical condition  Pt mentioned \"let me go\" and he does not want to be resuscitated nor continue further treatment  He would like comfort care measures.         "

## 2020-10-31 NOTE — ANESTHESIA PREPROCEDURE EVALUATION
Relevant Problems   PULMONARY   (+) COPD (chronic obstructive pulmonary disease) ([facility])   (+) Pneumonia      CARDIAC   (+) Chronic migraine   (+) Essential hypertension   (+) PEA (Pulseless electrical activity) ([facility])   (+) Paroxysmal atrial fibrillation ([facility])   (+) Pulmonary hypertension ([facility])       Physical Exam    Airway   Mallampati: II  TM distance: >3 FB       Cardiovascular   Rhythm: irregular  Rate: abnormal  (-) murmur     Dental - normal exam        Facial Hair   Pulmonary - normal exam     Abdominal    Neurological - normal exam         Other findings: Trach on vent            Anesthesia Plan    ASA 4- EMERGENT   ASA physical status emergent criteria: cardiac compromise requiring immediate intervention    Plan - general             Induction: intravenous    Postoperative Plan: Postoperative administration of opioids is intended.    Pertinent diagnostic labs and testing reviewed    Informed Consent:    Anesthetic plan and risks discussed with patient.    Use of blood products discussed with: patient whom consented to blood products.

## 2020-10-31 NOTE — PROGRESS NOTES
Patient more and more ill throughout the day today. No evidence of free air on plain film, but no clear etiology for illness other than recent surgery. Lactate 7 earlier this afternoon. WIll proceed to OR for ex lap, evaluation, possible bowel resection or repair, other procedures as indicated.

## 2020-10-31 NOTE — PROGRESS NOTES
Spoke with social work, patient NOK search has been completed and no working phone number for estranged sister, Jessie, has been found. Unable to notify NOK of patients passing.

## 2020-10-31 NOTE — PROGRESS NOTES
Dr. Emerson at bedside. Received orders to start patient on LR maintenance fluids at 150 hr, NG to suction, patient to remain NPO, and leave j tube to gravity.

## 2020-10-31 NOTE — PROCEDURES
Procedure Note    Date: 10/31/2020  Time: 0908    Procedure: Elective electrical cardioversion    Indication: afib with RVR, hypotension  Consent: not obtained due to emergent situation    Procedure: After obtaining consent, a time-out was performed. Pads were placed on patient's chest in the anterior-posterior location. All back-up airway, suction, and code cart supplies ready at bedside. Patient reaching a moderate sedation level. At that point, in synchronized fashion, 150J of electricity delivered x1 and rate converted to NSR.     Medications: pt has fentanyl 100mcg IV x1 and gtt.   Complications: none    Cy Guidry, DO  Critical Care Medicine

## 2020-10-31 NOTE — PROGRESS NOTES
Pulmonary and Critical Care Medicine Progress Note    0130 hours:    I was called to see this gentleman for increasing lactic acidosis.  He is in septic shock.  He is on epinephrine at 20 mcg/min, norepinephrine at 50 mcg/min, phenylephrine at 300 mcg/min and vasopressin at 0.03 units/min.  He is in atrial fibrillation with a rate of 114.  His CVP is 6.  He has intra-abdominal sepsis.  He underwent exploratory laparotomy with abdominal washout and repair of enterotomy on 10/30 at approximately 1900 hrs.  He was found to have an enterotomy with intra-abdominal bowel content contamination.  He had undergone laparotomy with jejunostomy placement and lysis of adhesions on 10/29 at approximately 1800 hrs.  His lactic acid is 9.3.  His calcium is low.  I am going to bolus him with 1 L of IV NS.  I will also give him 1 g of calcium chloride.  Earlier this evening I reviewed the case with Dr. Abdi who provided extensive critical care to him earlier this evening.  This gentleman is on full mechanical ventilatory support.  I reviewed his airway mechanics and ventilator waveforms.    Diagnoses:    Severe sepsis with septic shock  S/P laparotomy with washout and enterotomy repair on 10/30  S/P laparotomy with lysis of adhesions and J-tube placement on 10/29  Peritonitis with intra-abdominal sepsis  Acute on chronic hypoxemic and hypercarbic respiratory failure  Lactic acidosis    I have assessed and reassessed his respiratory status with ventilator adjustments, airway mechanics, ventilator waveforms, blood pressure, hemodynamics, cardiovascular status with titration of multiple vasopressors and his neurologic status.  He is at high risk for worsening respiratory and cardiovascular system dysfunction as well as death.    The patient remains critically ill.  Critical care time = 60 minutes in directly providing and coordinating critical care and extensive data review.  No time overlap and excludes procedures.    Discussed with  RN    Joe Grimaldo MD  Pulmonary and Critical Care Medicine

## 2020-10-31 NOTE — OR NURSING
@7140 PT arrived to Preop. WHO checklist completed with OR RN. 2 MD consent completed for Surgical and Anesthesia Consent due to emergent procedure. Pt brought back to OR with OR staff.

## 2020-11-01 LAB
BACTERIA SPEC RESP CULT: ABNORMAL
BACTERIA SPEC RESP CULT: ABNORMAL
CA-I BLD ISE-SCNC: 1.03 MMOL/L (ref 1.1–1.3)
CA-I BLD ISE-SCNC: 1.11 MMOL/L (ref 1.1–1.3)
GRAM STN SPEC: ABNORMAL
POTASSIUM BLD-SCNC: 4.4 MMOL/L (ref 3.6–5.5)
POTASSIUM BLD-SCNC: 4.8 MMOL/L (ref 3.6–5.5)
SIGNIFICANT IND 70042: ABNORMAL
SITE SITE: ABNORMAL
SODIUM BLD-SCNC: 126 MMOL/L (ref 135–145)
SODIUM BLD-SCNC: 134 MMOL/L (ref 135–145)
SOURCE SOURCE: ABNORMAL

## 2020-11-01 NOTE — DISCHARGE SUMMARY
Death Summary    Cause of Death  Profound septic shock due to bowel perforation due to acute respiratory failure     Comorbid Conditions at the Time of Death  Principal Problem:    Septic shock (HCC) POA: Unknown  Active Problems:    Acute respiratory failure with hypoxia and hypercapnia ([facility]) POA: Yes    PEA (Pulseless electrical activity) ([facility]) POA: No    Paroxysmal atrial fibrillation (HCC) POA: Yes    Pneumonia POA: Yes    COPD (chronic obstructive pulmonary disease) ([facility]) POA: Unknown    Right ventricular dilation POA: Yes    Major depression POA: Yes    Pulmonary hypertension (HCC) POA: Yes    Dysphagia POA: Yes    Normocytic anemia POA: Yes    Malnutrition (HCC) POA: Unknown  Resolved Problems:    Atelectasis POA: Unknown    Hypotension POA: Unknown    Sepsis ([facility]) POA: Unknown    Hypokalemia POA: Yes    Hypomagnesemia POA: Yes    Hypophosphatemia POA: Yes    Delirium POA: Unknown    Thrombocytosis (HCC) POA: Unknown      History of Presenting Illness and Hospital Course  This is a 54 y.o. male who was initially admitted 9/9/2020 for acute hypoxic and hypercapnic respiratory failure due to pneumonia. Pt developed profound ICU weakness, difficult to wean and required tracheostomy. Post tracheostomy course, pt was doing well, however during weaning trial, pt developed cardiac arrest and this occurred 4-5 times every time we attempted TP trials on him. We decided to hold further weaning trials given his multiple episodes of cardiac arrest. Multiple discussions regarding goal of care also occurred and assisted by Palliative care. Patient was not able to decide for placement to LTAC vs hospice. It took a while for patient to eventually decide for PEG tube. GI was consulted for PEG placement and unfortunately was aborted due to anatomical change from his previous surgery. General surgery was subsequently consulted for G-tube or J-tube placement. Pt was taken to OR for J tube placement. Post op, pt developed SIRS  and worsening septic shock. Pt was taken back to OR for re-exploration and found enterotomy with bowel contamination. Pt required multiple pressors and worsening metabolic acidosis. He eventually made himself DNR and comfort care. Pt  on 10/31 at 1505    Death Date: 10/31/20   Death Time: 1505         Pronounced By (RN1): Estela Freed  Pronounced By (RN2): Dominique Coates

## 2020-11-04 LAB
BACTERIA BLD CULT: NORMAL
BACTERIA BLD CULT: NORMAL
SIGNIFICANT IND 70042: NORMAL
SIGNIFICANT IND 70042: NORMAL
SITE SITE: NORMAL
SITE SITE: NORMAL
SOURCE SOURCE: NORMAL
SOURCE SOURCE: NORMAL

## 2020-11-07 NOTE — DOCUMENTATION QUERY
[facility]                                                                       Query Response Note      PATIENT:               CHARLENE GONZALEZ  ACCT #:                  [id number]  MRN:                     [id number]  :                      1965  ADMIT DATE:       2020 8:26 PM  DISCH DATE:        10/31/2020 3:05 PM  RESPONDING  PROVIDER #:        [id number]           QUERY TEXT:    Please clarify in documentation the relationship, if any, between the open jejunostomy and the enterotomy.    The patient's Clinical Indicators include:  Patient is a 55 y/o male transferred from [facility] for respiratory failure from pneumonia.  A PEG tube was attempted on 10/29 but was not performed due to difficult anatomy.  Later the same day PEG placement was again attempted after converting from laparoscopic to an open procedure.  Op report noted significant adhesions with inaccessible stomach so a jejunostomy was ultimately placed.    An expolratory laparotomy was performed on 10/30 noting an enterotomy proximal to the J-tube site with contamination.  The jejunum was repaired with sutures.    Risk factors: septic shock, respiratory failure, tracheostomy, pneumonia  Options provided:   -- Enterotomy was a complication of the open jejunostomy   -- Enterotomy was incidental due to difficult anatomy and dense adhesions   -- Unable to determine      Query created by: Leila Villanueva on 2020 10:02 AM    RESPONSE TEXT:    Enterotomy was a complication of the open jejunostomy          Electronically signed by:  ISABEL ORTEGA DO 2020 8:51 AM

## 2020-11-12 NOTE — DISCHARGE PLANNING
Upon chart review, pt  10/31. Pt was long term resident at [facility]. Left  for LSW Courtney to inform of pt's passing as pt had belongings there at [facility].

## 2020-11-25 NOTE — DOCUMENTATION QUERY
[facility]                                                                       Query Response Note      PATIENT:               CHARLENE GONZALEZ  ACCT #:                  [id number]  MRN:                     [id number]  :                      1965  ADMIT DATE:       2020 8:26 PM  DISCH DATE:        10/31/2020 3:05 PM  RESPONDING  PROVIDER #:        [id number]           QUERY TEXT:    Please clarify in documentation the relationship, if any, between the open jejunostomy and the enterotomy.      The patient's Clinical Indicators include:  Patient is a 55 y/o male transferred from [facility] for respiratory failure from pneumonia.  A PEG tube was attempted on 10/29 but was not performed due to difficult anatomy.  Later the same day PEG placement was again attempted after converting from laparoscopic to an open procedure.  Op report noted significant adhesions with inaccessible stomach so a jejunostomy was ultimately placed.    An exploratory laparotomy was performed on 10/30 noting an enterotomy proximal to the J-tube site with contamination.  The jejunum was repaired with sutures.    Risk factors: septic shock, respiratory failure, tracheostomy, pneumonia  Options provided:   -- Enterotomy was a complication of the open jejunostomy   -- Enterotomy was incidental due to difficult anatomy and dense adhesions   -- Unable to determine      Query created by: Jocelyn Hastings on 2020 11:35 AM    RESPONSE TEXT:    Enterotomy was incidental due to difficult anatomy and dense adhesions          Electronically signed by:  STEFANY MINER MD 2020 4:57 PM

## 2022-07-31 NOTE — PROGRESS NOTES
Pt. being discharged. Pt. educated on discharge instructions and new prescriptions. Pt shown how to use home O2 tank. Pt verbalizes understanding. Follow up appointment made with cardiology. PIV removed, monitor checked in. Pt. Going home with wife. RN to call transport for escort out, will monitor pt. Until off unit.   Ayush Trujillo

## 2023-10-16 NOTE — PROGRESS NOTES
Airway    Performed by: Dandy Campbell DO  Authorized by: Dandy Campbell DO    Final Airway Type:  Endotracheal airway  Final Endotracheal Airway*:  ETT  ETT Size (mm)*:  7.0  Cuff*:  Regular  Technique Used for Successful ETT Placement:  Direct laryngoscopy  Devices/Methods Used in Placement*:  Oral ETT  Intubation Procedure*:  ETCO2, Preoxygenation, Atraumatic, Dentition Unchanged and Phaynx Clear  Insertion Site:  Oral  Blade Type*:  Matute  Blade Size*:  2  Cuff Volume (mL):  6  Measured from*:  Teeth  Secured at (cm)*:  21  Placement Verified by: auscultation, capnometry and equal breath sounds    Glottic View*:  1 - full view of glottis  Attempts*:  1  Number of Other Approaches Attempted:  0   Patient Identified, Procedure confirmed, Emergency equipment available and Safety protocols followed  Location:  OR  Urgency:  Elective  Difficult Airway: No    Indications for Airway Management:  Anesthesia  Spontaneous Ventilation: absent    Sedation Level:  Anesthetized  Mask Difficulty Assessment:  1 - vent by mask  Start Time: 10/16/2023 11:43 AM   Easy mask and easy DL.       Went into pts room at approx 0300 and noticed he had become more tachypneic and more lethargic and diaphoretic. Unable to write or mouth words or interact at this time. Unable to keep eyes open during conversation. RT notified. Will draw ABG and notify MD of results. Morning labs drawn early.     Dr. Kennedy notified of ABG results. Orders received. Pt placed back on ventilator. No sedation ordered.

## 2024-05-06 NOTE — PALLIATIVE CARE
"Palliative Care follow-up  PC RN revisited with Nagi to discuss goals and POC. He states it's unusual that his sister has not been reached/involved but does not know her number or have any way to find it. She resides in [place] and has 3 kids, but he's not certain their names/whereabouts. He has no other relatives. PC explained difficulty in completing POA paperwork given Jessie's contact information is unknown but discussed the Declaration. He is open to completing this and it was reviewed/signed by Nagi. PC discussed code status in depth and he states \"just one more time.\" PC confirmed he would want to attempt CPR if it we re necessary and he states \"yes, just one more time though.\" PC expressed understanding. PC made a plan to try to locate his sister and he was appreciative. He denied any needs/questions at this time.     Updated: ROBINSON RN/MD    Plan: f/u on POA if sister Jessie located    Thank you for allowing Palliative Care to support this patient and family. Contact [phone] for additional assistance, change in patient status, or with any questions/concerns.   " 2) On encounter, the pt was noted to be audibly congested & exertional dyspnea was demonstrated. The pt was awake but easily fatigued. He was communicatively passive, internally distractible & [place]. Pt was able to verbalize during communicative probes at times. When pt verbalized, his motor speech integrity was functional & his verbalizations were linguistically intact. However, his voice was hoarse/harsh/raspy/breathy indicative of Dysphonia in setting of Supraglottitis with severe laryngeal edema. Additionally, his verbalizations were often brief, variably tangential and sometimes contextually irrelevant which likely is at least partially due to encephalopathy from supraglottitis which variably hinders mentation/cognition.

## 2024-09-06 NOTE — DISCHARGE PLANNING
"MARISA supervisor, Giacomo staffed difficult d/c patient with Gisell [facility]     Concerns include:    [facility] Insurance (baseline)  High medical needs  Needs LTAC level of care in [place]    Patient previously live at [facility]: However unable to take him back due to, [place] hospital, ventilator dependent, never completley stable. Discussion has been exhausted on this.     MARISA reported per MD krys inquiring about possibility of paying for LTAC placement vs. Remaining in hospital.     MARISA supervisor to look in to inquiring contact information for [facility]  for assistance. Also to inquire with leadership team about local placement with [facility].     SW to follow up with care management staff on unit.     -MARISA called \"Bonnie\" [facility] RN/CM: [phone] left message to call MARISA supervisor back for coordination.     -Staffed with leadership about options for local placement/[facility]. Patient does have insurance, although not the greatest, all options of basic [facility]/LTAC placement need to be exhausted in [place].     -MARISA called [facility]: [phone], case management department: Left message to see if they have any ideas.    PLAN A: We need to find [facility] LTAC placement in [place].     Per chart review:     [facility]: Does not accept pt insurance.   [facility]: Does not accept pt insurance.   [facility] in [place], [place]: Do accept baseline [facility]  [facility] in [place], [place]: Accept insurance and trach vent (MD does not want to)  [place], [place]: Do accept baseline [facility] (no beds available)  [facility]: Do accept baseline [facility], need to have PEG,   [facility]: No trach or vent  [facility]: No trach  [facility]: No trach or vent        " 20.7

## (undated) DEVICE — HEAD HOLDER JUNIOR/ADULT

## (undated) DEVICE — GOWN SURGEONS X-LARGE - DISP. (30/CA)

## (undated) DEVICE — GOWN WARMING STANDARD FLEX - (30/CA)

## (undated) DEVICE — BLADE SURGICAL #10 - (50/BX)

## (undated) DEVICE — GLOVE BIOGEL PI INDICATOR SZ 7.5 SURGICAL PF LF -(50/BX 4BX/CA)

## (undated) DEVICE — CHLORAPREP 26 ML APPLICATOR - ORANGE TINT(25/CA)

## (undated) DEVICE — GLOVE BIOGEL SZ 6.5 SURGICAL PF LTX (50PR/BX 4BX/CA)

## (undated) DEVICE — GLOVE BIOGEL SZ 8 SURGICAL PF LTX - (50PR/BX 4BX/CA)

## (undated) DEVICE — SYRINGE 50ML CATHETER TIP (40EA/BX 4BX/CA)

## (undated) DEVICE — BLANKET WARMING LOWER BODY - (10/CA) INACTIVE USE #8585

## (undated) DEVICE — SUTURE 2-0 SILK SH (36PK/BX)

## (undated) DEVICE — GLOVE BIOGEL SZ 7.5 SURGICAL PF LTX - (50PR/BX 4BX/CA)

## (undated) DEVICE — STAPLER SKIN DISP - (6/BX 10BX/CA) VISISTAT

## (undated) DEVICE — BLADE SURGICAL CLIPPER - (50EA/CA)

## (undated) DEVICE — Device

## (undated) DEVICE — ELECTRODE 850 FOAM ADHESIVE - HYDROGEL RADIOTRNSPRNT (50/PK)

## (undated) DEVICE — SENSOR SPO2 NEO LNCS ADHESIVE (20/BX) SEE USER NOTES

## (undated) DEVICE — SLEEVE, VASO, THIGH, MED

## (undated) DEVICE — PACK MAJOR BASIN - (2EA/CA)

## (undated) DEVICE — DRESSING LEUKOMED STERILE 11.75X4IN - (50/CA)

## (undated) DEVICE — LIGASURE TISSUE FUSION  - SINGLE USE (6/CA)

## (undated) DEVICE — SUCTION INSTRUMENT YANKAUER OPEN TIP W/O VENT (50EA/CA)

## (undated) DEVICE — SUTURE 1 VICRYL PLUS CT-1 - 18 INCH (12/BX)

## (undated) DEVICE — SUTURE 3-0 VICRYL PLUS SH - 8X 18 INCH (12/BX)

## (undated) DEVICE — SUCTION INSTRUMENT YANKAUER BULBOUS TIP W/O VENT (50EA/CA)

## (undated) DEVICE — BITE BLOCK ADULT 60FR (100EA/CA)

## (undated) DEVICE — TOWELS CLOTH SURGICAL - (4/PK 20PK/CA)

## (undated) DEVICE — TUBE GASTROSTOMY 18FR 20CC

## (undated) DEVICE — NEPTUNE 4 PORT MANIFOLD - (20/PK)

## (undated) DEVICE — DRAPE LAPAROTOMY T SHEET - (12EA/CA)

## (undated) DEVICE — TUBING CLEARLINK DUO-VENT - C-FLO (48EA/CA)

## (undated) DEVICE — TRANSDUCER ADULT DISP. SINGLE BONDED STERILE - (20EA/CA)

## (undated) DEVICE — SPONGE GAUZESTER 4 X 4 4PLY - (128PK/CA)

## (undated) DEVICE — SUTURE 1 VICRYL PLUS CTX - 8 X 18 INCH (12/BX)

## (undated) DEVICE — LACTATED RINGERS INJ 1000 ML - (14EA/CA 60CA/PF)

## (undated) DEVICE — PLUG CATHETER DRAIN TUBE PROTECTOR CAP

## (undated) DEVICE — GLOVE SZ 7.5 BIOGEL PI MICRO - PF LF (50PR/BX)

## (undated) DEVICE — SPONGE DRAIN 4 X 4IN 6-PLY - (2/PK25PK/BX12BX/CS)

## (undated) DEVICE — DETERGENT RENUZYME PLUS 10 OZ PACKET (50/BX)

## (undated) DEVICE — SET TUBING PNEUMOCLEAR HIGH FLOW SMOKE EVACUATION (10EA/BX)

## (undated) DEVICE — GLOVE BIOGEL PI INDICATOR SZ 8.0 SURGICAL PF LF -(50/BX 4BX/CA)

## (undated) DEVICE — PACK LAP CHOLE OR - (2EA/CA)

## (undated) DEVICE — PAD LAP STERILE 18 X 18 - (5/PK 40PK/CA)

## (undated) DEVICE — MASK ANESTHESIA ADULT  - (100/CA)

## (undated) DEVICE — KIT CUSTOM PROCEDURE SINGLE FOR ENDO  (15/CA)

## (undated) DEVICE — TROCAR 5X100 NON BLADED Z-TH - READ KII (6/BX)

## (undated) DEVICE — TUBE CONNECT SUCTION CLEAR 120 X 1/4" (50EA/CA)"

## (undated) DEVICE — SET LEADWIRE 5 LEAD BEDSIDE DISPOSABLE ECG (1SET OF 5/EA)

## (undated) DEVICE — SUTURE 3-0 SILK SH (12PK/BX)

## (undated) DEVICE — GLOVE BIOGEL INDICATOR SZ 7SURGICAL PF LTX - (50/BX 4BX/CA)

## (undated) DEVICE — KIT RADIAL ARTERY 20GA W/MAX BARRIER AND BIOPATCH  (5EA/CA) #10740 IS FOR THE SET RADIAL ARTERIAL

## (undated) DEVICE — SODIUM CHL IRRIGATION 0.9% 1000ML (12EA/CA)

## (undated) DEVICE — NEEDLE INSFL 120MM 14GA VRRS - (20/BX)

## (undated) DEVICE — SET EXTENSION WITH 2 PORTS (48EA/CA) ***PART #2C8610 IS A SUBSTITUTE*****

## (undated) DEVICE — CANISTER SUCTION 3000ML MECHANICAL FILTER AUTO SHUTOFF MEDI-VAC NONSTERILE LF DISP  (40EA/CA)

## (undated) DEVICE — CANNULA W/SEAL 5X100 Z-THRE - ADED KII (12/BX)

## (undated) DEVICE — FILM CASSETTE ENDO

## (undated) DEVICE — PROTECTOR ULNA NERVE - (36PR/CA)

## (undated) DEVICE — KIT ANESTHESIA W/CIRCUIT & 3/LT BAG W/FILTER (20EA/CA)

## (undated) DEVICE — KIT 18FR INITIAL PLACEMENT - (1/CA)

## (undated) DEVICE — BOVIE  BLADE 6 EXTENDED - (50/PK)

## (undated) DEVICE — SUTURE GENERAL

## (undated) DEVICE — SUTURE 3-0 SILK SH C/R 18 IN - (12/BX)

## (undated) DEVICE — DRAPE MAYO STAND - (30/CA)

## (undated) DEVICE — ELECTRODE DUAL RETURN W/ CORD - (50/PK)

## (undated) DEVICE — SUTURE 2-0 ETHILON FS - (36/BX) 18 INCH